# Patient Record
Sex: FEMALE | Race: WHITE | NOT HISPANIC OR LATINO | Employment: OTHER | URBAN - NONMETROPOLITAN AREA
[De-identification: names, ages, dates, MRNs, and addresses within clinical notes are randomized per-mention and may not be internally consistent; named-entity substitution may affect disease eponyms.]

---

## 2017-03-15 ENCOUNTER — LAB REQUISITION (OUTPATIENT)
Dept: LAB | Facility: HOSPITAL | Age: 62
End: 2017-03-15

## 2017-03-15 DIAGNOSIS — Z00.00 ENCOUNTER FOR GENERAL ADULT MEDICAL EXAMINATION WITHOUT ABNORMAL FINDINGS: ICD-10-CM

## 2017-03-15 LAB — DIGOXIN SERPL-MCNC: 0.6 NG/ML (ref 0.8–2)

## 2017-03-15 PROCEDURE — 80162 ASSAY OF DIGOXIN TOTAL: CPT | Performed by: INTERNAL MEDICINE

## 2017-03-22 ENCOUNTER — LAB REQUISITION (OUTPATIENT)
Dept: LAB | Facility: HOSPITAL | Age: 62
End: 2017-03-22

## 2017-03-22 DIAGNOSIS — Z01.419 ENCOUNTER FOR GYNECOLOGICAL EXAMINATION WITHOUT ABNORMAL FINDING: ICD-10-CM

## 2017-03-22 LAB — PAP SMEAR: NORMAL

## 2017-03-22 PROCEDURE — G0123 SCREEN CERV/VAG THIN LAYER: HCPCS | Performed by: INTERNAL MEDICINE

## 2017-03-24 LAB
GEN CATEG CVX/VAG CYTO-IMP: NORMAL
LAB AP CASE REPORT: NORMAL
LAB AP GYN ADDITIONAL INFORMATION: NORMAL
Lab: NORMAL
PATH INTERP SPEC-IMP: NORMAL
STAT OF ADQ CVX/VAG CYTO-IMP: NORMAL

## 2017-05-17 ENCOUNTER — OFFICE VISIT (OUTPATIENT)
Dept: CARDIOLOGY | Age: 62
End: 2017-05-17
Payer: COMMERCIAL

## 2017-05-17 VITALS
SYSTOLIC BLOOD PRESSURE: 120 MMHG | DIASTOLIC BLOOD PRESSURE: 84 MMHG | BODY MASS INDEX: 40.15 KG/M2 | HEART RATE: 76 BPM | HEIGHT: 65 IN | WEIGHT: 241 LBS

## 2017-05-17 DIAGNOSIS — I25.10 CORONARY ARTERY DISEASE INVOLVING NATIVE CORONARY ARTERY OF NATIVE HEART WITHOUT ANGINA PECTORIS: ICD-10-CM

## 2017-05-17 DIAGNOSIS — I48.20 CHRONIC ATRIAL FIBRILLATION (HCC): Primary | ICD-10-CM

## 2017-05-17 DIAGNOSIS — I10 ESSENTIAL HYPERTENSION: ICD-10-CM

## 2017-05-17 PROCEDURE — 99213 OFFICE O/P EST LOW 20 MIN: CPT | Performed by: CLINICAL NURSE SPECIALIST

## 2017-05-17 RX ORDER — ALBUTEROL SULFATE 90 UG/1
2 AEROSOL, METERED RESPIRATORY (INHALATION) EVERY 6 HOURS PRN
COMMUNITY
End: 2018-04-25 | Stop reason: SDUPTHER

## 2017-05-17 ASSESSMENT — ENCOUNTER SYMPTOMS
NAUSEA: 0
VOMITING: 0
COUGH: 0
ORTHOPNEA: 0
BLURRED VISION: 0
HEARTBURN: 0
SHORTNESS OF BREATH: 0

## 2017-05-31 ENCOUNTER — HOSPITAL ENCOUNTER (OUTPATIENT)
Dept: WOMENS IMAGING | Age: 62
Discharge: HOME | End: 2017-05-31
Payer: COMMERCIAL

## 2017-05-31 DIAGNOSIS — Z12.31 VISIT FOR SCREENING MAMMOGRAM: ICD-10-CM

## 2017-05-31 PROCEDURE — G0202 SCR MAMMO BI INCL CAD: HCPCS

## 2017-06-08 ENCOUNTER — NURSE ONLY (OUTPATIENT)
Dept: INTERNAL MEDICINE | Age: 62
End: 2017-06-08

## 2017-06-08 ENCOUNTER — ANTI-COAG VISIT (OUTPATIENT)
Dept: INTERNAL MEDICINE | Age: 62
End: 2017-06-08

## 2017-06-08 DIAGNOSIS — I48.20 CHRONIC ATRIAL FIBRILLATION (HCC): Primary | ICD-10-CM

## 2017-06-08 LAB
INTERNATIONAL NORMALIZATION RATIO, POC: 1.8
PROTHROMBIN TIME, POC: NORMAL

## 2017-06-29 ENCOUNTER — NURSE ONLY (OUTPATIENT)
Dept: INTERNAL MEDICINE | Age: 62
End: 2017-06-29

## 2017-06-29 DIAGNOSIS — Z79.01 LONG TERM (CURRENT) USE OF ANTICOAGULANTS: ICD-10-CM

## 2017-06-29 DIAGNOSIS — I48.91 ATRIAL FIBRILLATION, UNSPECIFIED TYPE (HCC): Primary | ICD-10-CM

## 2017-06-29 LAB
INTERNATIONAL NORMALIZATION RATIO, POC: 1.9
PROTHROMBIN TIME, POC: NORMAL

## 2017-07-06 RX ORDER — AMIODARONE HYDROCHLORIDE 200 MG/1
TABLET ORAL
Qty: 30 TABLET | Refills: 0 | Status: SHIPPED | OUTPATIENT
Start: 2017-07-06 | End: 2017-10-17 | Stop reason: CLARIF

## 2017-07-20 DIAGNOSIS — Z79.01 LONG TERM (CURRENT) USE OF ANTICOAGULANTS: Primary | ICD-10-CM

## 2017-07-20 DIAGNOSIS — I48.91 ATRIAL FIBRILLATION, UNSPECIFIED TYPE (HCC): ICD-10-CM

## 2017-07-20 DIAGNOSIS — Z79.01 LONG TERM (CURRENT) USE OF ANTICOAGULANTS: ICD-10-CM

## 2017-07-20 LAB
INR BLD: 2.01 (ref 0.88–1.18)
PROTHROMBIN TIME: 22.9 SEC (ref 12–14.6)

## 2017-07-25 ENCOUNTER — ANTI-COAG VISIT (OUTPATIENT)
Dept: INTERNAL MEDICINE | Age: 62
End: 2017-07-25

## 2017-08-24 ENCOUNTER — ANTI-COAG VISIT (OUTPATIENT)
Dept: INTERNAL MEDICINE | Age: 62
End: 2017-08-24

## 2017-08-24 ENCOUNTER — NURSE ONLY (OUTPATIENT)
Dept: INTERNAL MEDICINE | Age: 62
End: 2017-08-24
Payer: COMMERCIAL

## 2017-08-24 DIAGNOSIS — Z79.01 LONG TERM (CURRENT) USE OF ANTICOAGULANTS: ICD-10-CM

## 2017-08-24 DIAGNOSIS — I48.91 ATRIAL FIBRILLATION, UNSPECIFIED TYPE (HCC): Primary | ICD-10-CM

## 2017-08-24 LAB
INTERNATIONAL NORMALIZATION RATIO, POC: 2.4
PROTHROMBIN TIME, POC: NORMAL

## 2017-08-24 PROCEDURE — 85610 PROTHROMBIN TIME: CPT | Performed by: INTERNAL MEDICINE

## 2017-09-20 DIAGNOSIS — E78.2 MIXED HYPERLIPIDEMIA: ICD-10-CM

## 2017-09-20 DIAGNOSIS — E78.2 MIXED HYPERLIPIDEMIA: Primary | ICD-10-CM

## 2017-09-20 DIAGNOSIS — E03.9 UNSPECIFIED HYPOTHYROIDISM: ICD-10-CM

## 2017-09-20 DIAGNOSIS — E11.9 TYPE 2 DIABETES MELLITUS WITHOUT COMPLICATION, WITHOUT LONG-TERM CURRENT USE OF INSULIN (HCC): ICD-10-CM

## 2017-09-20 DIAGNOSIS — I10 ESSENTIAL HYPERTENSION, MALIGNANT: ICD-10-CM

## 2017-09-20 LAB
ALBUMIN SERPL-MCNC: 4 G/DL (ref 3.5–5.2)
ALP BLD-CCNC: 44 U/L (ref 35–104)
ALT SERPL-CCNC: 31 U/L (ref 5–33)
ANION GAP SERPL CALCULATED.3IONS-SCNC: 17 MMOL/L (ref 7–19)
AST SERPL-CCNC: 26 U/L (ref 5–32)
BILIRUB SERPL-MCNC: 0.5 MG/DL (ref 0.2–1.2)
BUN BLDV-MCNC: 8 MG/DL (ref 8–23)
CALCIUM SERPL-MCNC: 9.1 MG/DL (ref 8.8–10.2)
CHLORIDE BLD-SCNC: 98 MMOL/L (ref 98–111)
CHOLESTEROL, TOTAL: 140 MG/DL (ref 160–199)
CO2: 26 MMOL/L (ref 22–29)
CREAT SERPL-MCNC: 0.5 MG/DL (ref 0.5–0.9)
GFR NON-AFRICAN AMERICAN: >60
GLUCOSE BLD-MCNC: 156 MG/DL (ref 74–109)
HBA1C MFR BLD: 9.3 %
HDLC SERPL-MCNC: 37 MG/DL (ref 65–121)
LDL CHOLESTEROL CALCULATED: 72 MG/DL
POTASSIUM SERPL-SCNC: 3.9 MMOL/L (ref 3.5–5)
SODIUM BLD-SCNC: 141 MMOL/L (ref 136–145)
TOTAL PROTEIN: 7.4 G/DL (ref 6.6–8.7)
TRIGL SERPL-MCNC: 154 MG/DL (ref 150–199)
TSH SERPL DL<=0.05 MIU/L-ACNC: 3.01 UIU/ML (ref 0.27–4.2)

## 2017-09-21 ENCOUNTER — ANTI-COAG VISIT (OUTPATIENT)
Dept: INTERNAL MEDICINE | Age: 62
End: 2017-09-21

## 2017-09-21 ENCOUNTER — NURSE ONLY (OUTPATIENT)
Dept: INTERNAL MEDICINE | Age: 62
End: 2017-09-21
Payer: COMMERCIAL

## 2017-09-21 DIAGNOSIS — I48.91 ATRIAL FIBRILLATION, UNSPECIFIED TYPE (HCC): ICD-10-CM

## 2017-09-21 DIAGNOSIS — Z79.01 LONG TERM (CURRENT) USE OF ANTICOAGULANTS: Primary | ICD-10-CM

## 2017-09-21 LAB
INTERNATIONAL NORMALIZATION RATIO, POC: 1.9
PROTHROMBIN TIME, POC: NORMAL

## 2017-09-21 PROCEDURE — 85610 PROTHROMBIN TIME: CPT | Performed by: INTERNAL MEDICINE

## 2017-09-26 PROBLEM — E55.9 VITAMIN D DEFICIENCY: Status: ACTIVE | Noted: 2017-09-26

## 2017-09-26 PROBLEM — E11.29 TYPE 2 DIABETES MELLITUS WITH MICROALBUMINURIA, WITHOUT LONG-TERM CURRENT USE OF INSULIN (HCC): Status: ACTIVE | Noted: 2017-09-26

## 2017-09-26 PROBLEM — F33.2 ENDOGENOUS DEPRESSION (HCC): Status: ACTIVE | Noted: 2017-09-26

## 2017-09-26 PROBLEM — R80.9 TYPE 2 DIABETES MELLITUS WITH MICROALBUMINURIA, WITHOUT LONG-TERM CURRENT USE OF INSULIN (HCC): Status: ACTIVE | Noted: 2017-09-26

## 2017-09-26 PROBLEM — E78.2 MIXED HYPERLIPIDEMIA: Status: ACTIVE | Noted: 2017-09-26

## 2017-09-26 PROBLEM — I50.20 SYSTOLIC CONGESTIVE HEART FAILURE (HCC): Status: ACTIVE | Noted: 2017-09-26

## 2017-09-26 PROBLEM — E66.01 MORBID OBESITY DUE TO EXCESS CALORIES (HCC): Status: ACTIVE | Noted: 2017-09-26

## 2017-09-26 PROBLEM — Z92.89 H/O BONE DENSITY STUDY: Status: ACTIVE | Noted: 2017-09-26

## 2017-09-26 PROBLEM — Z12.4 PAP SMEAR FOR CERVICAL CANCER SCREENING: Status: ACTIVE | Noted: 2017-09-26

## 2017-10-17 ENCOUNTER — OFFICE VISIT (OUTPATIENT)
Dept: INTERNAL MEDICINE | Age: 62
End: 2017-10-17
Payer: COMMERCIAL

## 2017-10-17 VITALS
HEART RATE: 102 BPM | SYSTOLIC BLOOD PRESSURE: 130 MMHG | OXYGEN SATURATION: 97 % | DIASTOLIC BLOOD PRESSURE: 78 MMHG | WEIGHT: 244.6 LBS | HEIGHT: 65 IN | BODY MASS INDEX: 40.75 KG/M2

## 2017-10-17 DIAGNOSIS — I10 ESSENTIAL HYPERTENSION: Primary | ICD-10-CM

## 2017-10-17 DIAGNOSIS — E03.9 ACQUIRED HYPOTHYROIDISM: ICD-10-CM

## 2017-10-17 DIAGNOSIS — E78.2 MIXED HYPERLIPIDEMIA: ICD-10-CM

## 2017-10-17 DIAGNOSIS — R80.9 TYPE 2 DIABETES MELLITUS WITH MICROALBUMINURIA, WITHOUT LONG-TERM CURRENT USE OF INSULIN (HCC): ICD-10-CM

## 2017-10-17 DIAGNOSIS — F33.2 ENDOGENOUS DEPRESSION (HCC): ICD-10-CM

## 2017-10-17 DIAGNOSIS — E11.29 TYPE 2 DIABETES MELLITUS WITH MICROALBUMINURIA, WITHOUT LONG-TERM CURRENT USE OF INSULIN (HCC): ICD-10-CM

## 2017-10-17 PROCEDURE — 99214 OFFICE O/P EST MOD 30 MIN: CPT | Performed by: INTERNAL MEDICINE

## 2017-10-17 ASSESSMENT — ENCOUNTER SYMPTOMS
COUGH: 0
SORE THROAT: 0
CONSTIPATION: 0
CHEST TIGHTNESS: 0
WHEEZING: 0
ABDOMINAL PAIN: 0

## 2017-10-17 NOTE — PROGRESS NOTES
Chief Complaint   Patient presents with    6 Month Follow-Up     History of presenting illness:  Nava Weldon is a 60 y.o. female who presents today for follow up on her chronic medical conditions as noted below. Essential hypertension- Patient reports her Bp has been well controlled ( systolic below 877; diastolic below 90) at home when checked with home/ store equipment. 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 Has been taking  cholesterol lowering medication as prescribed and does not report any side effects.     Hypothyroidism- takes synthroid daily    Increased allergy sx- increased clear drainage        Patient Active Problem List    Diagnosis Date Noted    Acquired hypothyroidism 10/17/2017    Morbid obesity due to excess calories ([facility] 09/26/2017    Vitamin D deficiency 09/83/1209    Systolic congestive heart failure ([facility] 09/26/2017     Overview Note:     3/12 echo ef 40%      Pap smear for cervical cancer screening 09/26/2017     Overview Note:     2016 neg      H/O bone density study 09/26/2017     Overview Note:     2015 nl      Mixed hyperlipidemia 09/26/2017    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[facility] 09/26/2017    Type 2 diabetes mellitus with microalbuminuria, without long-term current use of insulin ([facility] 09/26/2017    Long term current use of anticoagulant therapy 06/29/2017     Overview Note:     Updating Deprecated Diagnoses      Screening for colon cancer 04/15/2015    Hx of amiodarone therapy 09/24/2014    PAF (paroxysmal atrial fibrillation) tablet Take 81 mg by mouth daily.  levothyroxine (SYNTHROID) 137 MCG tablet Take 125 mcg by mouth daily.  metformin (GLUCOPHAGE) 500 MG tablet Take  by mouth 2 times daily (with meals).  glipiZIDE (GLUCOTROL) 10 MG tablet Take 10 mg by mouth 2 times daily (before meals).  atorvastatin (LIPITOR) 40 MG tablet Take 80 mg by mouth daily. No current facility-administered medications for this visit. Allergies   Allergen Reactions    Aspartame And Phenylalanine     Penicillins     Shellfish-Derived Products     Zetia [Ezetimibe]      Social History   Substance Use Topics    Smoking status: Never Smoker    Smokeless tobacco: Not on file    Alcohol use No      Family History   Problem Relation Age of Onset    Diabetes Mother     Heart Failure Mother     High Blood Pressure Mother     Liver Cancer Father     Colon Cancer Neg Hx     Colon Polyps Neg Hx        Review of Systems   Constitutional: Positive for fatigue. Negative for chills and fever. HENT: Negative for congestion, ear pain, nosebleeds, postnasal drip and sore throat. Respiratory: Negative for cough, chest tightness and wheezing. Cardiovascular: Negative for chest pain, palpitations and leg swelling. Gastrointestinal: Negative for abdominal pain and constipation. Genitourinary: Negative for dysuria and urgency. Musculoskeletal: Positive for arthralgias. Skin: Negative for rash. Neurological: Negative for dizziness and headaches. Psychiatric/Behavioral: Negative. Vitals:    10/17/17 1212   BP: 130/78   Pulse: 102   SpO2: 97%   Weight: 244 lb 9.6 oz (110.9 kg)   Height: 5' 5\" (1.651 m)     Body mass index is 40.7 kg/m². Physical Exam   Constitutional: She is oriented to person, place, and time. She appears well-developed and well-nourished. HENT:   Head: Normocephalic and atraumatic.    Right Ear: External ear normal.   Left Ear: External ear normal.   Mouth/Throat: Oropharynx is clear and use of insulin ([address]- poorly controlled- long discussion with the patient. She states that \"I will never take insulin, I cannot take any shots\" she states that she has not been following her diet and occasionally has not been taking her medication related to all above. She is promising that she will get back on her regular diet and medications and we will repeat the A1c here in about 3 months. At this time I'm adding innvokana, 300 mg daily. 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[address]- Behavioral therapy with counselor, she refuses. She states that she is already feeling better and does not want any medication. She has no suicidal ideation. Mixed hyperlipidemia- lipid panel is good range, she will continue current treatment plans with Lipitor 80 mg daily    Hypothyroid- cont synthroid 137 µg daily              Orders Placed This Encounter   Procedures    Hemoglobin A1C    Basic Metabolic Panel     New Prescriptions    No medications on file        Return in about 3 months (around 1/17/2018) for Medication check. There are no Patient Instructions on file for this visit. EMR Dragon/transcription disclaimer:Significant part of this  encounter note is electronic transcription/translation of spoken language to printed text. The electronic translation of spoken language may be erroneous, or at times, nonsensical words or phrases may be inadvertently transcribed.  Although I have reviewed the note for such errors, some may still exist.

## 2017-10-19 DIAGNOSIS — I48.0 PAF (PAROXYSMAL ATRIAL FIBRILLATION) (HCC): Primary | ICD-10-CM

## 2017-10-19 DIAGNOSIS — Z79.01 LONG TERM CURRENT USE OF ANTICOAGULANT THERAPY: ICD-10-CM

## 2017-10-31 DIAGNOSIS — I10 HYPERTENSION: ICD-10-CM

## 2017-11-01 DIAGNOSIS — I48.0 PAF (PAROXYSMAL ATRIAL FIBRILLATION) (HCC): ICD-10-CM

## 2017-11-01 DIAGNOSIS — Z79.01 LONG TERM CURRENT USE OF ANTICOAGULANT THERAPY: ICD-10-CM

## 2017-11-01 LAB
INR BLD: 1.83 (ref 0.88–1.18)
PROTHROMBIN TIME: 21.2 SEC (ref 12–14.6)

## 2017-11-01 RX ORDER — DIGOXIN 125 MCG
TABLET ORAL
Qty: 30 TABLET | Refills: 3 | Status: SHIPPED | OUTPATIENT
Start: 2017-11-01 | End: 2018-02-20 | Stop reason: SDUPTHER

## 2017-11-01 RX ORDER — METOPROLOL SUCCINATE 25 MG/1
TABLET, EXTENDED RELEASE ORAL
Qty: 30 TABLET | Refills: 3 | Status: SHIPPED | OUTPATIENT
Start: 2017-11-01 | End: 2018-02-20 | Stop reason: SDUPTHER

## 2017-11-01 RX ORDER — VALSARTAN AND HYDROCHLOROTHIAZIDE 320; 25 MG/1; MG/1
TABLET, FILM COATED ORAL
Qty: 30 TABLET | Refills: 3 | Status: SHIPPED | OUTPATIENT
Start: 2017-11-01 | End: 2018-02-20 | Stop reason: SDUPTHER

## 2017-11-01 RX ORDER — LEVOTHYROXINE SODIUM 0.12 MG/1
TABLET ORAL
Qty: 30 TABLET | Refills: 3 | Status: SHIPPED | OUTPATIENT
Start: 2017-11-01 | End: 2018-02-20 | Stop reason: SDUPTHER

## 2017-11-01 RX ORDER — ATORVASTATIN CALCIUM 80 MG/1
TABLET, FILM COATED ORAL
Qty: 30 TABLET | Refills: 3 | Status: SHIPPED | OUTPATIENT
Start: 2017-11-01 | End: 2018-02-20 | Stop reason: SDUPTHER

## 2017-11-01 RX ORDER — GLIPIZIDE 10 MG/1
TABLET, FILM COATED, EXTENDED RELEASE ORAL
Qty: 60 TABLET | Refills: 3 | Status: SHIPPED | OUTPATIENT
Start: 2017-11-01 | End: 2018-02-20 | Stop reason: SDUPTHER

## 2017-11-16 ENCOUNTER — ANTI-COAG VISIT (OUTPATIENT)
Dept: INTERNAL MEDICINE | Age: 62
End: 2017-11-16

## 2017-11-16 ENCOUNTER — NURSE ONLY (OUTPATIENT)
Dept: INTERNAL MEDICINE | Age: 62
End: 2017-11-16
Payer: COMMERCIAL

## 2017-11-16 DIAGNOSIS — I48.0 PAF (PAROXYSMAL ATRIAL FIBRILLATION) (HCC): Primary | ICD-10-CM

## 2017-11-16 DIAGNOSIS — Z79.01 LONG TERM CURRENT USE OF ANTICOAGULANT THERAPY: ICD-10-CM

## 2017-11-16 LAB
INR BLD: 2.1
INTERNATIONAL NORMALIZATION RATIO, POC: NORMAL
PROTHROMBIN TIME, POC: 2.1

## 2017-11-16 PROCEDURE — 85610 PROTHROMBIN TIME: CPT | Performed by: INTERNAL MEDICINE

## 2017-11-16 NOTE — PROGRESS NOTES
Ms. Veronica Llanos was here today.      INR today: 2.1      INR Goal: 2.0-3.0    Dosing Plan  As of 11/16/2017    TTR:   42.8 % (5 mo)   Full instructions:   7.5 mg on Sun, Thu; 5 mg all other days           CONTINUE SAME DOSE    NEXT COUMADIN CLINIC APT IS: 12/14/17 @ 3:30pm    Electronically signed by Jhonatan Stanton MA on 11/16/2017 at 3:40 PM

## 2017-11-20 ENCOUNTER — OFFICE VISIT (OUTPATIENT)
Dept: CARDIOLOGY | Age: 62
End: 2017-11-20
Payer: COMMERCIAL

## 2017-11-20 VITALS
SYSTOLIC BLOOD PRESSURE: 142 MMHG | HEART RATE: 127 BPM | RESPIRATION RATE: 16 BRPM | BODY MASS INDEX: 38.89 KG/M2 | DIASTOLIC BLOOD PRESSURE: 84 MMHG | HEIGHT: 66 IN | WEIGHT: 242 LBS | OXYGEN SATURATION: 98 %

## 2017-11-20 DIAGNOSIS — I10 ESSENTIAL HYPERTENSION: ICD-10-CM

## 2017-11-20 DIAGNOSIS — I48.20 CHRONIC ATRIAL FIBRILLATION (HCC): Primary | ICD-10-CM

## 2017-11-20 PROCEDURE — 99213 OFFICE O/P EST LOW 20 MIN: CPT | Performed by: INTERNAL MEDICINE

## 2017-11-28 NOTE — PROGRESS NOTES
mellitus without mention of complication, not stated as uncontrolled     Umbilical hernia     URI (upper respiratory infection)      Past Surgical History:   Procedure Laterality Date    CARDIAC CATHETERIZATION  10/21/09    EF 35% left ventricle is moderately enlarged     CARDIOVERSION  10/9/13    CHOLECYSTECTOMY      EYE SURGERY      retina and cataracts     GALLBLADDER SURGERY      TUBAL LIGATION        Family History   Problem Relation Age of Onset    Diabetes Mother     Heart Failure Mother     High Blood Pressure Mother     Liver Cancer Father      Social History   Substance Use Topics    Smoking status: Never Smoker    Smokeless tobacco: Never Used    Alcohol use No      Allergies   Allergen Reactions    Aspartame And Phenylalanine     Penicillins     Shellfish-Derived Products     Zetia [Ezetimibe]      Outpatient Prescriptions Marked as Taking for the 11/20/17 encounter (Office Visit) with ROBBIE Lopez MD   Medication Sig Dispense Refill    atorvastatin (LIPITOR) 80 MG tablet TAKE 1 TABLET BY MOUTH ONCE DAILY 30 tablet 3    valsartan-hydrochlorothiazide (DIOVAN-HCT) 320-25 MG per tablet TAKE 1 TABLET BY MOUTH ONCE DAILY 30 tablet 3    digoxin (LANOXIN) 125 MCG tablet TAKE 1 TABLET BY MOUTH ONCE DAILY 30 tablet 3    metFORMIN (GLUCOPHAGE) 500 MG tablet TAKE 2 TABLETS TWICE DAILY WITH FOOD 120 tablet 3    levothyroxine (SYNTHROID) 125 MCG tablet TAKE 1 TABLET BY MOUTH ONCE DAILY 30 tablet 3    glipiZIDE (GLUCOTROL XL) 10 MG extended release tablet TAKE 1 TABLET BY MOUTH TWICE DAILY. 60 tablet 3    metoprolol succinate (TOPROL XL) 25 MG extended release tablet TAKE 1 TABLET BY MOUTH ONCE DAILY 30 tablet 3    albuterol sulfate HFA (VENTOLIN HFA) 108 (90 BASE) MCG/ACT inhaler Inhale 2 puffs into the lungs every 6 hours as needed for Wheezing      Multiple Vitamins-Minerals (THERAPEUTIC MULTIVITAMIN-MINERALS) tablet Take 1 tablet by mouth daily.       Vitamin D (CHOLECALCIFEROL) 1000 UNITS CAPS capsule Take 1,000 Units by mouth daily.  warfarin (COUMADIN) 5 MG tablet Take 5 mg by mouth Daily. Regulated by Dr. Itmiaz Barnett.  aspirin 81 MG EC tablet Take 81 mg by mouth daily. I have reviewed and confirm the Past Medical History, Allergies, and Medications sections above and have updated the computerized patient record. Data:   BP Readings from Last 3 Encounters:   11/20/17 (!) 142/84   10/17/17 130/78   05/17/17 120/84    Pulse Readings from Last 3 Encounters:   11/20/17 127   10/17/17 102   05/17/17 76        Estimated body mass index is 39.66 kg/m² as calculated from the following:    Height as of this encounter: 5' 5.5\" (1.664 m). Weight as of this encounter: 242 lb (109.8 kg). Recent Results (from the past 336 hour(s))   Protime-INR    Collection Time: 11/16/17 12:00 AM   Result Value Ref Range    INR 2.10    POCT INR    Collection Time: 11/16/17  3:33 PM   Result Value Ref Range    INR      Protime 2.1      Review of Systems  Constitutional:  Negative for significant fatigue, activity change, appetite change, or unexpected weight change. Negative for fever, chills or diaphoresis. HENT:  Negative for nosebleeds, facial swelling, rhinorrhea or neck stiffness. RESPIRATORY:  Negative for shortness of breath. No cough, wheezing or stridor. CARDIOVASCULAR:  Negative for chest pain, palpitations or leg swelling. GASTROINTESTINAL:   Negative for abdominal distention or pain. Negative for constipation, diarrhea, nausea or vomiting. GENITOURINARY:  Negative for dysuria, frequency or urgency. MUSCULOSKELETAL:   Negative for myalgia or arthralgia. EXTREMITIES:  Negative for clubbing, cyanosis or edema. SKIN:  Negative for color change or rash. NEUROLOGICAL:   Negative for dizziness, light-headedness, numbness or headaches. Negative for speech difficulty. HEMATOLOGICAL:   No excessive bruising or bleeding.   PSYCHIATRIC/BEHAVIORAL:   No severe confusion, anxiety, or insomnia. Except as noted in the HPI, all other systems are negative. Physical Exam:  Vital Signs:  BP (!) 142/84   Pulse 127   Resp 16   Ht 5' 5.5\" (1.664 m)   Wt 242 lb (109.8 kg)   SpO2 98%   BMI 39.66 kg/m²   Constitutional:  The patient is a pleasant 60 y.o. female in no acute distress. She appears well-developed and well-nourished. HEAD:  Normocephalic without evidence of old or recent trauma. EYES:  Sclerae clear. Conjunctivae pink. EOMs intact. Pupils equal and round. NOSE:  No nasal discharge or epistaxis. MOUTH:  Teeth, gums and palate normal.   THROAT:  No lesions on lips or buccal mucosa. Tongue protrudes in midline and is well papillated. NECK:  No noted jugular venous distention. No carotid bruits. No thyromegaly. CHEST:  Clear bilateral breath sounds without wheezes or rhonchi. RESPIRATORY:  The lungs clear to auscultation bilaterally with normal respiratory effort. CARDIOVASCULAR:   The heart's rhythm is irregular without audible murmurs or gallop sounds. ABDOMEN:  The abdomen is soft without tenderness or masses. UPPER EXTREMITY EVALUATION:  Radial pulses palpable bilaterally. LOWER EXTREMITY EVALUATION:  Negative for peripheral edema. Femoral, popliteal, dorsalis pedis, and posterior tibialis pulses 2+ to palpation bilaterally. No cyanosis or clubbing. MUSCULOSKELETAL:  Normal muscle strength and tone. No atrophy or abnormalities. SKIN:  Warm, dry, intact. No dermatitis or ulcers. NEUROLOGIC:  Intact cranial nerves II through XII and no focal weakness. Assessment / Plan:   1. Chronic atrial fibrillation - she is anticoagulated and rate controlled, continue current medications. 2.  Hypertension - blood pressure is well controlled on current therapy as listed, which we will continue. 3.  Return in six months.     _____________________________________________________  Electronically Signed by:   ROBBIE Landry KELLY Sanchez, F.A.C.C. [id number] [facility]  _____________________________________________________  Copy:   Vernon Bullock MD

## 2017-11-30 NOTE — TELEPHONE ENCOUNTER
Requested Prescriptions     Pending Prescriptions Disp Refills    warfarin (COUMADIN) 5 MG tablet [Pharmacy Med Name: WARFARIN 5MG TABLET] 30 tablet 2     Sig: TAKE 1 TABLET DAILY EXCEPT TAKE 1/2 TABLET ON FRIDAY

## 2017-12-01 RX ORDER — WARFARIN SODIUM 5 MG/1
TABLET ORAL
Qty: 30 TABLET | Refills: 2 | Status: SHIPPED | OUTPATIENT
Start: 2017-12-01 | End: 2018-01-17 | Stop reason: SDUPTHER

## 2017-12-14 ENCOUNTER — ANTI-COAG VISIT (OUTPATIENT)
Dept: INTERNAL MEDICINE | Age: 62
End: 2017-12-14

## 2017-12-14 ENCOUNTER — NURSE ONLY (OUTPATIENT)
Dept: INTERNAL MEDICINE | Age: 62
End: 2017-12-14
Payer: COMMERCIAL

## 2017-12-14 DIAGNOSIS — Z79.01 LONG TERM CURRENT USE OF ANTICOAGULANT THERAPY: Primary | ICD-10-CM

## 2017-12-14 LAB
INTERNATIONAL NORMALIZATION RATIO, POC: 1.7
PROTHROMBIN TIME, POC: NORMAL

## 2017-12-14 PROCEDURE — 85610 PROTHROMBIN TIME: CPT | Performed by: INTERNAL MEDICINE

## 2017-12-14 NOTE — PROGRESS NOTES
Ms. Malcom Hill was here today. INR today: 1.7       INR Goal: 2.0-3.0    Dosing Plan  As of 12/14/2017    TTR:   40.0 % (6 mo)   Full instructions:   12/14: 10 mg; Otherwise 7.5 mg on Sun, Tue, Thu; 5 mg all other days                 PLAN: Take 10mg tonight, then 7.5mg Sunday, Tuesday, and Thursday and 5mg all other days. NEXT COUMADIN CLINIC APT IS: 12/28/17 @ 3:30pm         [facility] COU[facility]  [phone]    [facility] SIDE EFFECTS  The major complication associated with warfarin is bleeding due to excessive anticoagulation. Excessive bleeding, or hemorrhage, can occur from any area of the body, and patients on warfarin should report any falls or accidents, as well as signs or symptoms of bleeding or unusual bruising. Signs of unusual bleeding include bleeding from the gums, blood in the urine, bloody or dark stool, a nosebleed, or vomiting blood. Because the risk of bleeding increases as the INR rises, the INR is closely monitored and adjustments are made as needed to maintain the INR within the target range. Deretha Rocher also cause skin necrosis or gangrene, which can cause dark red or black areas on the skin. This is a rare complication that may occur during the first several days of warfarin therapy. When to seek help  If there are obvious or subtle signs of bleeding, including the following, patients should call their healthcare provider immediately.   · Persistent nausea, stomach upset, or vomiting blood or other material that looks like coffee grounds   · Headaches, dizziness, or weakness   · Nosebleeds   · Dark red or brown urine   · Blood in the bowel movement or dark-colored stool   · Pain, discomfort, or swelling, especially after an injury   · After a serious fall or head injury, even if there are no other symptoms  The patient should also call if any of the following occurs:  · Bleeding from the gums after brushing the teeth   · Swelling or pain at an injection

## 2017-12-28 ENCOUNTER — ANTI-COAG VISIT (OUTPATIENT)
Dept: INTERNAL MEDICINE | Age: 62
End: 2017-12-28

## 2017-12-28 ENCOUNTER — NURSE ONLY (OUTPATIENT)
Dept: INTERNAL MEDICINE | Age: 62
End: 2017-12-28
Payer: COMMERCIAL

## 2017-12-28 DIAGNOSIS — I48.0 PAF (PAROXYSMAL ATRIAL FIBRILLATION) (HCC): Primary | ICD-10-CM

## 2017-12-28 DIAGNOSIS — Z79.01 LONG TERM CURRENT USE OF ANTICOAGULANT THERAPY: ICD-10-CM

## 2017-12-28 LAB
INTERNATIONAL NORMALIZATION RATIO, POC: 1.9
PROTHROMBIN TIME, POC: NORMAL

## 2017-12-28 PROCEDURE — 85610 PROTHROMBIN TIME: CPT | Performed by: INTERNAL MEDICINE

## 2017-12-28 NOTE — PROGRESS NOTES
Ms. Nicholos Epley was here today. INR today: 1.9      INR Goal: 2.0-3.0    Dosing Plan  As of 12/28/2017    TTR:   37.1 % (6.4 mo)   Full instructions:   7.5 mg on Sun, Tue, Thu; 5 mg all other days                 PLAN: CONTINUE CURRENT DOSE    NEXT COUMADIN CLINIC APT IS: 1/4/2018 AT 3:45 PM        [facility] COU[facility]IN CL[facility]  [phone]    Twila LIU  The major complication associated with warfarin is bleeding due to excessive anticoagulation. Excessive bleeding, or hemorrhage, can occur from any area of the body, and patients on warfarin should report any falls or accidents, as well as signs or symptoms of bleeding or unusual bruising. Signs of unusual bleeding include bleeding from the gums, blood in the urine, bloody or dark stool, a nosebleed, or vomiting blood. Because the risk of bleeding increases as the INR rises, the INR is closely monitored and adjustments are made as needed to maintain the INR within the target range. Joenathan Zamora also cause skin necrosis or gangrene, which can cause dark red or black areas on the skin. This is a rare complication that may occur during the first several days of warfarin therapy. When to seek help  If there are obvious or subtle signs of bleeding, including the following, patients should call their healthcare provider immediately.   · Persistent nausea, stomach upset, or vomiting blood or other material that looks like coffee grounds   · Headaches, dizziness, or weakness   · Nosebleeds   · Dark red or brown urine   · Blood in the bowel movement or dark-colored stool   · Pain, discomfort, or swelling, especially after an injury   · After a serious fall or head injury, even if there are no other symptoms  The patient should also call if any of the following occurs:  · Bleeding from the gums after brushing the teeth   · Swelling or pain at an injection site   · Excessive menstrual bleeding or bleeding between menstrual periods outside. Warfarin and food  Some foods and supplements can interfere with warfarin's effectiveness. After being stabilized on a particular warfarin dose, consult a healthcare provider before making major dietary changes (eg, starting a diet to lose weight, starting a nutritional supplement or vitamin). · Vitamin K  Eating an increased amount of foods rich in vitamin K can lower the prothrombin time and INR, making warfarin less effective, and potentially increasing the risk of blood clots. Patients who take warfarin should aim to eat a relatively similar amount of vitamin K each week. Some foods have a high level of vitamin K, including: kale, broccoli, spinach, bailey or turnip greens, lettuce, [place] sprouts, and cabbage (table 1). It is not necessary to avoid these foods. However, the patient should eat a relatively similar amount on a regular basis rather than eating a large serving occasionally. · Cranberry juice  There have been mixed reports on the effect of cranberry juice in people who use warfarin to prevent blood clots. Some experts have reported that drinking cranberry juice while on warfarin can cause significant over-anticoagulation and bleeding [1]. However, a small study found that drinking one eight ounce serving of cranberry juice per day for seven days had no effect on the INR of seven men taking warfarin for atrial fibrillation [2]. It is possible that larger amounts could have a more significant effect. The best advice is probably to avoid consuming large amounts of cranberry juice, and to speak with a healthcare provider regarding any concerns about a possible interaction. · Alcohol  Chronic abuse of alcohol affects the body's ability to handle warfarin. Patients on warfarin therapy should avoid drinking alcohol on a daily basis. Alcohol should be limited to no more than one to two servings of alcohol occasionally.  In addition, drinking excessive amounts of alcohol can increase the risk of injury, and therefore bleeding. Warfarin and medications  A number of medications, herbs, and vitamins can interact with warfarin (table 2 and table 3). This interaction may affect the action of warfarin or the other medication. If warfarin is affected, the dose may need to be adjusted (up or down) to maintain an optimal coagulation effect. Patients who take warfarin should consult with their clinician before taking any new medication, including over-the-counter (non-prescription) drugs, herbal medicines, vitamins, or any other products. Some of the most common over-the-counter pain relievers, including acetaminophen (Tylenol®), aspirin, and nonsteroidal antiinflammatory drugs (such as ibuprofen [Advil®]) and naproxen (Aleve®), enhance the anticoagulant effects of warfarin. Vitamin E may increase the anticoagulant effects of warfarin. Consult a healthcare provider before adding or changing a dose of vitamin E or any other vitamin. Wear medical identification  People who require long-term warfarin should wear a bracelet, necklace, or similar alert tag at all times. If an accident occurs and the person is too ill to explain their condition, this will help responders provide appropriate care. The alert tag should include a list of major medical conditions and the reason warfarin is needed (eg, atrial fibrillation), as well as the name and phone number of an emergency contact. One device, Medic Alert®, provides a toll-free number that emergency medical workers can call to find out a person's medical history, list of medications, family emergency contact numbers, and healthcare provider names and numbers.     GRAPHICS: Table 1  Foods with moderate to high levels of vitamin K  Food name Serving size Vitamin K (micrograms)   High level vitamin K foods   Kale, frozen (cooked or boiled, drained) 1/2 cup 570   Kale, fresh, (cooked or boiled, drained) 1/2 cup 530   Spinach, frozen (cooked or boiled, drained) 1/2 cup 514   Spinach, raw 1 cup 150   Shira greens, frozen (cooked, drained) 1/2 cup 530   Turnip greens, frozen (cooked, drained) 1/2 cup 425   [place] sprouts, frozen (cooked, drained) 1/2 cup 110   Moderate level vitamin K foods   Asparagus, frozen (cooked, drained) 1/2 cup  4 haynes 72  48   Asparagus, fresh (cooked, drained) 4 haynes 30   Broccoli, frozen (cooked, drained) 1/2 cup 60   Broccoli, fresh (cooked, drained) 1 spear 52   Broccoli, raw 1/2 cup 40   Lettuce (butterhead, [place], cornelius) 1/2 head 80   Lettuce (iceberg, crisphead) 1/2 head 65   Lettuce (conor, cos) 1 cup 57   Lettuce (green leaf) 1 cup 97   Okra, fresh (cooked, drained) 1/2 cup 32   Okra, frozen (cooked, drained) 1/2 cup 44   Cabbage (cooked, drained) 1/2 cup 73   Cabbage, raw 1/2 cup 21   Cabbage, josué (raw) 1/2 cup 24   Cabbage, Chinese (cooked, drained) 1/2 cup 28   Coleslaw (fast food-type) 3/4 cup 56   Sauerkraut, canned 1/2 cup 41   Peas, frozen, with pod (cooked, drained) 1/2 cup 24   Peas, fresh, with pod (cooked, drained) 1/2 cup 20   Peas, green, frozen (cooked, drained) 1/2 cup 18   Celery, raw 1/2 cup 17   Beans, green or yellow, fresh (cooked, drained) 1/2 cup 10   Oil, canola 1 tablespoon 17   Oil, olive 1 tablespoon 8   Oil, other (including peanut, sesame, safflower, corn, sunflower, soybean) 1 tablespoon 3 or less   Green tea, brewed in hot water 3.5 ounces 0.3   Data from: [employer]. USDA Nutrient Database for Standard Reference. Nutrient Data Laboratory Home Page, [employer].

## 2018-01-04 ENCOUNTER — NURSE ONLY (OUTPATIENT)
Dept: INTERNAL MEDICINE | Age: 63
End: 2018-01-04
Payer: COMMERCIAL

## 2018-01-04 ENCOUNTER — ANTI-COAG VISIT (OUTPATIENT)
Dept: INTERNAL MEDICINE | Age: 63
End: 2018-01-04

## 2018-01-04 DIAGNOSIS — I48.0 PAF (PAROXYSMAL ATRIAL FIBRILLATION) (HCC): Primary | ICD-10-CM

## 2018-01-04 DIAGNOSIS — Z79.01 LONG TERM CURRENT USE OF ANTICOAGULANT THERAPY: ICD-10-CM

## 2018-01-04 LAB
INTERNATIONAL NORMALIZATION RATIO, POC: 1.8
PROTHROMBIN TIME, POC: NORMAL

## 2018-01-04 PROCEDURE — 85610 PROTHROMBIN TIME: CPT | Performed by: INTERNAL MEDICINE

## 2018-01-04 NOTE — PROGRESS NOTES
Ms. Horne Liner was here today. INR today: INR/Prothrombin Time      INR Goal: 2.0-3.0    Dosing Plan  As of 1/4/2018    TTR:   35.8 % (6.7 mo)   Full instructions:   5 mg on Mon, Wed, Fri; 7.5 mg all other days           Patient INR continues to be thick, dosage adjustment        PLAN: NEW DOSE:  TAKE 7.5MG ON SAT, SUN, TUES, THURS, 5 MG ALL OTHER DAYS    NEXT COUMADIN CLINIC APT IS: 1/10/2018 9:00 am        [facility] COUMADIN CLINIC  [phone]    Fazal Screen EFFECTS  The major complication associated with warfarin is bleeding due to excessive anticoagulation. Excessive bleeding, or hemorrhage, can occur from any area of the body, and patients on warfarin should report any falls or accidents, as well as signs or symptoms of bleeding or unusual bruising. Signs of unusual bleeding include bleeding from the gums, blood in the urine, bloody or dark stool, a nosebleed, or vomiting blood. Because the risk of bleeding increases as the INR rises, the INR is closely monitored and adjustments are made as needed to maintain the INR within the target range. Kev Loach also cause skin necrosis or gangrene, which can cause dark red or black areas on the skin. This is a rare complication that may occur during the first several days of warfarin therapy. When to seek help  If there are obvious or subtle signs of bleeding, including the following, patients should call their healthcare provider immediately.   · Persistent nausea, stomach upset, or vomiting blood or other material that looks like coffee grounds   · Headaches, dizziness, or weakness   · Nosebleeds   · Dark red or brown urine   · Blood in the bowel movement or dark-colored stool   · Pain, discomfort, or swelling, especially after an injury   · After a serious fall or head injury, even if there are no other symptoms  The patient should also call if any of the following occurs:  · Bleeding from the gums after brushing the teeth ice, wet or polished floors, or other potentially slippery surfaces. · Avoid walking on unfamiliar areas outside. Warfarin and food  Some foods and supplements can interfere with warfarin's effectiveness. After being stabilized on a particular warfarin dose, consult a healthcare provider before making major dietary changes (eg, starting a diet to lose weight, starting a nutritional supplement or vitamin). · Vitamin K  Eating an increased amount of foods rich in vitamin K can lower the prothrombin time and INR, making warfarin less effective, and potentially increasing the risk of blood clots. Patients who take warfarin should aim to eat a relatively similar amount of vitamin K each week. Some foods have a high level of vitamin K, including: kale, broccoli, spinach, bailey or turnip greens, lettuce, [place] sprouts, and cabbage (table 1). It is not necessary to avoid these foods. However, the patient should eat a relatively similar amount on a regular basis rather than eating a large serving occasionally. · Cranberry juice  There have been mixed reports on the effect of cranberry juice in people who use warfarin to prevent blood clots. Some experts have reported that drinking cranberry juice while on warfarin can cause significant over-anticoagulation and bleeding [1]. However, a small study found that drinking one eight ounce serving of cranberry juice per day for seven days had no effect on the INR of seven men taking warfarin for atrial fibrillation [2]. It is possible that larger amounts could have a more significant effect. The best advice is probably to avoid consuming large amounts of cranberry juice, and to speak with a healthcare provider regarding any concerns about a possible interaction. · Alcohol  Chronic abuse of alcohol affects the body's ability to handle warfarin. Patients on warfarin therapy should avoid drinking alcohol on a daily basis.  Alcohol should be limited to no more than one to two servings of alcohol occasionally. In addition, drinking excessive amounts of alcohol can increase the risk of injury, and therefore bleeding. Warfarin and medications  A number of medications, herbs, and vitamins can interact with warfarin (table 2 and table 3). This interaction may affect the action of warfarin or the other medication. If warfarin is affected, the dose may need to be adjusted (up or down) to maintain an optimal coagulation effect. Patients who take warfarin should consult with their clinician before taking any new medication, including over-the-counter (non-prescription) drugs, herbal medicines, vitamins, or any other products. Some of the most common over-the-counter pain relievers, including acetaminophen (Tylenol®), aspirin, and nonsteroidal antiinflammatory drugs (such as ibuprofen [Advil®]) and naproxen (Aleve®), enhance the anticoagulant effects of warfarin. Vitamin E may increase the anticoagulant effects of warfarin. Consult a healthcare provider before adding or changing a dose of vitamin E or any other vitamin. Wear medical identification  People who require long-term warfarin should wear a bracelet, necklace, or similar alert tag at all times. If an accident occurs and the person is too ill to explain their condition, this will help responders provide appropriate care. The alert tag should include a list of major medical conditions and the reason warfarin is needed (eg, atrial fibrillation), as well as the name and phone number of an emergency contact. One device, Medic Alert®, provides a toll-free number that emergency medical workers can call to find out a person's medical history, list of medications, family emergency contact numbers, and healthcare provider names and numbers.     GRAPHICS: Table 1  Foods with moderate to high levels of vitamin K  Food name Serving size Vitamin K (micrograms)   High level vitamin K foods   Kale, frozen (cooked or boiled, drained)

## 2018-01-05 ENCOUNTER — TELEPHONE (OUTPATIENT)
Dept: INTERNAL MEDICINE | Age: 63
End: 2018-01-05

## 2018-01-05 NOTE — TELEPHONE ENCOUNTER
Patient was seen in Coumadin Clinic yesterday. She continues to run on the thick side:    1/4  1.8  12/28 1.9  12/14 1.7  11/16 2.1    I increased her Coumadin to 7.5 4 days a week (sat, sun, tues, thurs) 5 mg all other days. She had been taking 5mg daily, 7.5 tues, thurs, sun. This is the 2nd time we have increased her dose since 11/16. Patient states that taking 7.5mg gives her diarrhea and she would like for me to discuss this and her dosing with you. Please advise. Patient will be at work today, but advised that I can leave a message with detailed instructions with her son and she will call back if she has any further questions.

## 2018-01-05 NOTE — TELEPHONE ENCOUNTER
Discussed this further with Dr. Jennie Robles this morning,  She suggested on the days that pt takes 7.5 mg to divide the dose: 2.5 mg in the morning, 5 mg in the evening to see if this helps with her stomach issues. Left a detailed msg with patient's son, Aura Gold. I let him know that Dr. Jennie Robles does what pt to take the Coumadin like instructed at yesterday's [facility] appt and that she suggested on the 7.5mg days taking 2.5  Mg in the am and 5mg in the pm.  Pt's son voiced understanding and repeated the instructions back to me. He will have patient call Coumadin Clinic or Dr. Mamie Stoner office with any questions or concerns.

## 2018-01-10 ENCOUNTER — ANTI-COAG VISIT (OUTPATIENT)
Dept: INTERNAL MEDICINE | Age: 63
End: 2018-01-10

## 2018-01-10 ENCOUNTER — NURSE ONLY (OUTPATIENT)
Dept: INTERNAL MEDICINE | Age: 63
End: 2018-01-10
Payer: COMMERCIAL

## 2018-01-10 DIAGNOSIS — E11.29 TYPE 2 DIABETES MELLITUS WITH MICROALBUMINURIA, WITHOUT LONG-TERM CURRENT USE OF INSULIN (HCC): ICD-10-CM

## 2018-01-10 DIAGNOSIS — R80.9 TYPE 2 DIABETES MELLITUS WITH MICROALBUMINURIA, WITHOUT LONG-TERM CURRENT USE OF INSULIN (HCC): ICD-10-CM

## 2018-01-10 DIAGNOSIS — Z79.01 LONG TERM CURRENT USE OF ANTICOAGULANT THERAPY: ICD-10-CM

## 2018-01-10 DIAGNOSIS — I48.0 PAF (PAROXYSMAL ATRIAL FIBRILLATION) (HCC): Primary | ICD-10-CM

## 2018-01-10 LAB
ANION GAP SERPL CALCULATED.3IONS-SCNC: 18 MMOL/L (ref 7–19)
BUN BLDV-MCNC: 9 MG/DL (ref 8–23)
CALCIUM SERPL-MCNC: 9 MG/DL (ref 8.8–10.2)
CHLORIDE BLD-SCNC: 98 MMOL/L (ref 98–111)
CO2: 27 MMOL/L (ref 22–29)
CREAT SERPL-MCNC: 0.5 MG/DL (ref 0.5–0.9)
GFR NON-AFRICAN AMERICAN: >60
GLUCOSE BLD-MCNC: 150 MG/DL (ref 74–109)
HBA1C MFR BLD: 7.6 %
INTERNATIONAL NORMALIZATION RATIO, POC: 2.1
POTASSIUM SERPL-SCNC: 3.7 MMOL/L (ref 3.5–5)
PROTHROMBIN TIME, POC: NORMAL
SODIUM BLD-SCNC: 143 MMOL/L (ref 136–145)

## 2018-01-10 PROCEDURE — 85610 PROTHROMBIN TIME: CPT | Performed by: INTERNAL MEDICINE

## 2018-01-17 RX ORDER — WARFARIN SODIUM 5 MG/1
TABLET ORAL
Qty: 60 TABLET | Refills: 2 | Status: SHIPPED | OUTPATIENT
Start: 2018-01-17 | End: 2018-05-21 | Stop reason: SDUPTHER

## 2018-01-24 ENCOUNTER — HOSPITAL ENCOUNTER (OUTPATIENT)
Dept: GENERAL RADIOLOGY | Age: 63
Discharge: HOME | End: 2018-01-24
Payer: COMMERCIAL

## 2018-01-24 ENCOUNTER — NURSE ONLY (OUTPATIENT)
Dept: INTERNAL MEDICINE | Age: 63
End: 2018-01-24
Payer: COMMERCIAL

## 2018-01-24 ENCOUNTER — OFFICE VISIT (OUTPATIENT)
Dept: INTERNAL MEDICINE | Age: 63
End: 2018-01-24
Payer: COMMERCIAL

## 2018-01-24 ENCOUNTER — ANTI-COAG VISIT (OUTPATIENT)
Dept: INTERNAL MEDICINE | Age: 63
End: 2018-01-24

## 2018-01-24 VITALS
SYSTOLIC BLOOD PRESSURE: 132 MMHG | OXYGEN SATURATION: 98 % | WEIGHT: 230 LBS | HEART RATE: 98 BPM | BODY MASS INDEX: 36.96 KG/M2 | HEIGHT: 66 IN | DIASTOLIC BLOOD PRESSURE: 78 MMHG

## 2018-01-24 DIAGNOSIS — E78.2 MIXED HYPERLIPIDEMIA: ICD-10-CM

## 2018-01-24 DIAGNOSIS — I48.0 PAF (PAROXYSMAL ATRIAL FIBRILLATION) (HCC): Primary | ICD-10-CM

## 2018-01-24 DIAGNOSIS — Z79.01 LONG TERM CURRENT USE OF ANTICOAGULANT THERAPY: ICD-10-CM

## 2018-01-24 DIAGNOSIS — E11.29 TYPE 2 DIABETES MELLITUS WITH MICROALBUMINURIA, WITHOUT LONG-TERM CURRENT USE OF INSULIN (HCC): ICD-10-CM

## 2018-01-24 DIAGNOSIS — M79.644 FINGER PAIN, RIGHT: ICD-10-CM

## 2018-01-24 DIAGNOSIS — E55.9 VITAMIN D DEFICIENCY: ICD-10-CM

## 2018-01-24 DIAGNOSIS — E03.9 ACQUIRED HYPOTHYROIDISM: ICD-10-CM

## 2018-01-24 DIAGNOSIS — F33.2 ENDOGENOUS DEPRESSION (HCC): ICD-10-CM

## 2018-01-24 DIAGNOSIS — R80.9 TYPE 2 DIABETES MELLITUS WITH MICROALBUMINURIA, WITHOUT LONG-TERM CURRENT USE OF INSULIN (HCC): ICD-10-CM

## 2018-01-24 DIAGNOSIS — I10 ESSENTIAL HYPERTENSION: Primary | ICD-10-CM

## 2018-01-24 LAB
INTERNATIONAL NORMALIZATION RATIO, POC: 2.1
PROTHROMBIN TIME, POC: NORMAL

## 2018-01-24 PROCEDURE — 99214 OFFICE O/P EST MOD 30 MIN: CPT | Performed by: INTERNAL MEDICINE

## 2018-01-24 PROCEDURE — 85610 PROTHROMBIN TIME: CPT | Performed by: INTERNAL MEDICINE

## 2018-01-24 PROCEDURE — 73130 X-RAY EXAM OF HAND: CPT

## 2018-01-24 ASSESSMENT — ENCOUNTER SYMPTOMS
SORE THROAT: 0
CHEST TIGHTNESS: 0
WHEEZING: 0
ABDOMINAL PAIN: 0
COUGH: 0
CONSTIPATION: 0

## 2018-01-24 NOTE — PROGRESS NOTES
 PAF (paroxysmal atrial fibrillation) (HCC)     Essential hypertension     CAD (coronary artery disease)      Past Medical History:   Diagnosis Date    Acute laryngitis     Acute sinusitis     Allergic rhinitis     Ankle pain     Asthma     Asthmatic bronchitis     Atrial fibrillation ([facility] [address] 9/13/12, 10/9/13    cardioversion    Atrial flutter (HCC)     paroxysmal     CAD (coronary artery disease)     CHF (congestive heart failure) (HCC)     Chronic back pain     Cough     Diabetes     Dizzy     Dysuria     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HCC)     Fatigue     Foot pain     Hx of amiodarone therapy 9/24/2014    Hyperlipidemia     PCP manages cholesterol    Hypertension     Menopause     Obesity     Skin rash     Type II or unspecified type diabetes mellitus without mention of complication, not stated as uncontrolled     Umbilical hernia     URI (upper respiratory infection)       Past Surgical History:   Procedure Laterality Date    CARDIAC CATHETERIZATION  10/21/09    EF 35% left ventricle is moderately enlarged     CARDIOVERSION  10/9/13    CHOLECYSTECTOMY      EYE SURGERY      retina and cataracts     GALLBLADDER SURGERY      TUBAL LIGATION       Current Outpatient Prescriptions   Medication Sig Dispense Refill    warfarin (COUMADIN) 5 MG tablet TAKE 1 TABLET Monday, Wednesday, Friday, all other days take 1 1/2 tablets 60 tablet 2    atorvastatin (LIPITOR) 80 MG tablet TAKE 1 TABLET BY MOUTH ONCE DAILY 30 tablet 3    valsartan-hydrochlorothiazide (DIOVAN-HCT) 320-25 MG per tablet TAKE 1 TABLET BY MOUTH ONCE DAILY 30 tablet 3    digoxin (LANOXIN) 125 MCG tablet TAKE 1 TABLET BY MOUTH ONCE DAILY 30 tablet 3    metFORMIN (GLUCOPHAGE) 500 MG tablet TAKE 2 TABLETS TWICE DAILY WITH FOOD 120 tablet 3    levothyroxine (SYNTHROID) 125 MCG tablet TAKE 1 TABLET BY MOUTH ONCE DAILY 30 tablet 3    glipiZIDE (GLUCOTROL XL) 10 MG extended release tablet TAKE 1 TABLET BY MOUTH TWICE DAILY.  60 tablet 3 appears well-developed and well-nourished. HENT:   Right Ear: External ear normal.   Left Ear: External ear normal.   Mouth/Throat: Oropharynx is clear and moist. No oropharyngeal exudate. Eyes: Conjunctivae are normal. Pupils are equal, round, and reactive to light. Neck: Neck supple. No JVD present. No thyromegaly present. Cardiovascular: Normal rate and normal heart sounds. No murmur heard. Pulmonary/Chest: Breath sounds normal. No respiratory distress. She has no wheezes. She has no rales. She exhibits no tenderness. Abdominal: Soft. Bowel sounds are normal.   Musculoskeletal: Normal range of motion. The lateral hands have arthritic changes, Heberden's nodes bilateral DIP joints  Right index finger DIP joint movement is painful, slightly more swollen compared to left side   Lymphadenopathy:     She has no cervical adenopathy. Neurological: She is oriented to person, place, and time. Skin: Skin is warm. No rash noted.        Lab Review   Nurse Only on 01/24/2018   Component Date Value    INR 01/24/2018 2.1    Orders Only on 01/10/2018   Component Date Value    Hemoglobin A1C 01/10/2018 7.6*    Sodium 01/10/2018 143     Potassium 01/10/2018 3.7     Chloride 01/10/2018 98     CO2 01/10/2018 27     Anion Gap 01/10/2018 18     Glucose 01/10/2018 150*    BUN 01/10/2018 9     CREATININE 01/10/2018 0.5     GFR Non- 01/10/2018 >60     Calcium 01/10/2018 9.0    Nurse Only on 01/10/2018   Component Date Value    INR 01/10/2018 2.1    Nurse Only on 01/04/2018   Component Date Value    INR 01/04/2018 1.8    Nurse Only on 12/28/2017   Component Date Value    INR 12/28/2017 1.9    Nurse Only on 12/14/2017   Component Date Value    INR 12/14/2017 1.7            ASSESSMENT/PLAN:  Essential hypertension- BP is good/ cont same meds     Type 2 diabetes mellitus with microalbuminuria, without long-term current use of insulin ([address]-  Hemoglobin A1c is significantly better -it is 7.6/was 9.3 late September 2017. At that time we added innvokana, 300 mg daily that she could not afford. She will also continue metformin, glipizide Patient also has been following diet, continue diet and exercise     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[facility] [address]- Behavioral therapy with counselor, she refuses. She states that she is already feeling better and does not want any medication. She has no suicidal ideation.     Mixed hyperlipidemia- lipid panel is good range, she will continue current treatment plans with Lipitor 80 mg daily     Hypothyroid- cont synthroid 137 µg daily    Right hand index finger pain, see above. Obtain x-ray for further evaluation    Orders Placed This Encounter   Procedures    Comprehensive Metabolic Panel    CBC Auto Differential    Hemoglobin A1C    Lipid Panel    Microalbumin / Creatinine Urine Ratio    Urinalysis    TSH without Reflex    T4, Free    Vitamin D 25 Hydroxy     New Prescriptions    No medications on file        Return in about 3 months (around 4/24/2018) for Annual Physical.   There are no Patient Instructions on file for this visit. EMR Dragon/transcription disclaimer:Significant part of this  encounter note is electronic transcription/translation of spoken language to printed text. The electronic translation of spoken language may be erroneous, or at times, nonsensical words or phrases may be inadvertently transcribed.  Although I have reviewed the note for such errors, some may still exist.

## 2018-02-15 ENCOUNTER — ANTI-COAG VISIT (OUTPATIENT)
Dept: INTERNAL MEDICINE | Age: 63
End: 2018-02-15

## 2018-02-15 ENCOUNTER — NURSE ONLY (OUTPATIENT)
Dept: INTERNAL MEDICINE | Age: 63
End: 2018-02-15
Payer: COMMERCIAL

## 2018-02-15 DIAGNOSIS — Z79.01 LONG TERM CURRENT USE OF ANTICOAGULANT THERAPY: Primary | ICD-10-CM

## 2018-02-15 LAB
INTERNATIONAL NORMALIZATION RATIO, POC: 2.3
PROTHROMBIN TIME, POC: NORMAL

## 2018-02-15 PROCEDURE — 85610 PROTHROMBIN TIME: CPT | Performed by: INTERNAL MEDICINE

## 2018-02-15 NOTE — PROGRESS NOTES
· Diarrhea, vomiting, or inability to eat for more than 24 hours   · Fever (temperature greater than 100.4º F or 38º C)    It is important to remember that warfarin is taken to reduce the risk of a clotting condition(s), such as a deep vein thrombosis or pulmonary embolism. If one or more of these symptoms develops, the patient should seek immediate medical attention. OTHER RECOMMENDATIONS  Take warfarin on a schedule  Warfarin should be taken exactly as directed. Do not increase, decrease, or change the dose unless told to do so by a healthcare provider. If a dose is missed or forgotten, call the prescribing clinician for advice. Warfarin tablets come in different strengths; each is usually a different color, with the amount of warfarin (in milligrams) clearly printed on the tablet. If the color or dose of the tablet appears different than those taken previously, the patient should immediately notify their pharmacist or healthcare provider. Reduce the risk of bleeding  There is a tendency to bleed more easily than usual while taking warfarin. Some simple changes can decrease this risk:  · Use a soft bristle toothbrush   · Floss with waxed floss rather than unwaxed floss   · Shave with an electric razor rather than a blade   · Take care when using sharp objects, such as knives and scissors   · Avoid activities that have a risk of falling or injury (eg, contact sports)  Prevent falls  Falling may significantly increase the risk of bleeding. Taking measures to prevent falls is recommended, and could include the following:  · Remove loose rugs and electrical cords or any other loose items in the home that could lead to tripping, slipping, and falling. · Ensure that there is adequate lighting in all areas inside and around the home, including stairwells and entrance ways. · Avoid walking on ice, wet or polished floors, or other potentially slippery surfaces.    · Avoid walking on unfamiliar areas drained) 1/2 cup 514   Spinach, raw 1 cup 150   Shira greens, frozen (cooked, drained) 1/2 cup 530   Turnip greens, frozen (cooked, drained) 1/2 cup 425   [place] sprouts, frozen (cooked, drained) 1/2 cup 110   Moderate level vitamin K foods   Asparagus, frozen (cooked, drained) 1/2 cup  4 haynes 72  48   Asparagus, fresh (cooked, drained) 4 haynes 30   Broccoli, frozen (cooked, drained) 1/2 cup 60   Broccoli, fresh (cooked, drained) 1 spear 52   Broccoli, raw 1/2 cup 40   Lettuce (butterhead, [place], cornelius) 1/2 head 80   Lettuce (iceberg, crisphead) 1/2 head 65   Lettuce (conor, cos) 1 cup 57   Lettuce (green leaf) 1 cup 97   Okra, fresh (cooked, drained) 1/2 cup 32   Okra, frozen (cooked, drained) 1/2 cup 44   Cabbage (cooked, drained) 1/2 cup 73   Cabbage, raw 1/2 cup 21   Cabbage, josué (raw) 1/2 cup 24   Cabbage, Chinese (cooked, drained) 1/2 cup 28   Coleslaw (fast food-type) 3/4 cup 56   Sauerkraut, canned 1/2 cup 41   Peas, frozen, with pod (cooked, drained) 1/2 cup 24   Peas, fresh, with pod (cooked, drained) 1/2 cup 20   Peas, green, frozen (cooked, drained) 1/2 cup 18   Celery, raw 1/2 cup 17   Beans, green or yellow, fresh (cooked, drained) 1/2 cup 10   Oil, canola 1 tablespoon 17   Oil, olive 1 tablespoon 8   Oil, other (including peanut, sesame, safflower, corn, sunflower, soybean) 1 tablespoon 3 or less   Green tea, brewed in hot water 3.5 ounces 0.3   Data from: [employer]. USDA Nutrient Database for Standard Reference. Nutrient Data Laboratory Home Page, [employer].

## 2018-02-20 DIAGNOSIS — I10 HYPERTENSION: ICD-10-CM

## 2018-02-20 RX ORDER — VALSARTAN AND HYDROCHLOROTHIAZIDE 320; 25 MG/1; MG/1
TABLET, FILM COATED ORAL
Qty: 30 TABLET | Refills: 0 | Status: SHIPPED | OUTPATIENT
Start: 2018-02-20 | End: 2018-03-22 | Stop reason: SDUPTHER

## 2018-02-20 RX ORDER — LEVOTHYROXINE SODIUM 0.12 MG/1
TABLET ORAL
Qty: 30 TABLET | Refills: 0 | Status: SHIPPED | OUTPATIENT
Start: 2018-02-20 | End: 2018-03-22 | Stop reason: SDUPTHER

## 2018-02-20 RX ORDER — DIGOXIN 125 MCG
TABLET ORAL
Qty: 30 TABLET | Refills: 0 | Status: SHIPPED | OUTPATIENT
Start: 2018-02-20 | End: 2018-03-22 | Stop reason: SDUPTHER

## 2018-02-20 RX ORDER — METOPROLOL SUCCINATE 25 MG/1
TABLET, EXTENDED RELEASE ORAL
Qty: 30 TABLET | Refills: 0 | Status: SHIPPED | OUTPATIENT
Start: 2018-02-20 | End: 2018-03-22 | Stop reason: SDUPTHER

## 2018-02-20 RX ORDER — GLIPIZIDE 10 MG/1
TABLET, FILM COATED, EXTENDED RELEASE ORAL
Qty: 60 TABLET | Refills: 0 | Status: SHIPPED | OUTPATIENT
Start: 2018-02-20 | End: 2018-03-22 | Stop reason: SDUPTHER

## 2018-02-20 RX ORDER — ATORVASTATIN CALCIUM 80 MG/1
TABLET, FILM COATED ORAL
Qty: 30 TABLET | Refills: 0 | Status: SHIPPED | OUTPATIENT
Start: 2018-02-20 | End: 2018-03-22 | Stop reason: SDUPTHER

## 2018-02-22 ENCOUNTER — OFFICE VISIT (OUTPATIENT)
Dept: INTERNAL MEDICINE | Age: 63
End: 2018-02-22
Payer: COMMERCIAL

## 2018-02-22 VITALS
TEMPERATURE: 98.1 F | HEART RATE: 116 BPM | DIASTOLIC BLOOD PRESSURE: 84 MMHG | HEIGHT: 66 IN | OXYGEN SATURATION: 95 % | WEIGHT: 232 LBS | BODY MASS INDEX: 37.28 KG/M2 | SYSTOLIC BLOOD PRESSURE: 166 MMHG

## 2018-02-22 DIAGNOSIS — J02.9 PHARYNGITIS, UNSPECIFIED ETIOLOGY: Primary | ICD-10-CM

## 2018-02-22 LAB — S PYO AG THROAT QL: NORMAL

## 2018-02-22 PROCEDURE — 99213 OFFICE O/P EST LOW 20 MIN: CPT | Performed by: NURSE PRACTITIONER

## 2018-02-22 PROCEDURE — 87880 STREP A ASSAY W/OPTIC: CPT | Performed by: NURSE PRACTITIONER

## 2018-02-22 RX ORDER — AZITHROMYCIN 250 MG/1
TABLET, FILM COATED ORAL
Qty: 1 PACKET | Refills: 0 | Status: SHIPPED | OUTPATIENT
Start: 2018-02-22 | End: 2018-04-25 | Stop reason: CLARIF

## 2018-02-22 RX ORDER — METHYLPREDNISOLONE 4 MG/1
TABLET ORAL
Qty: 1 KIT | Refills: 0 | Status: SHIPPED | OUTPATIENT
Start: 2018-02-22 | End: 2018-02-28

## 2018-02-22 ASSESSMENT — ENCOUNTER SYMPTOMS
COUGH: 1
RHINORRHEA: 0
COLOR CHANGE: 0
BACK PAIN: 0
SHORTNESS OF BREATH: 0
PHOTOPHOBIA: 0
VOICE CHANGE: 0
SORE THROAT: 1
VOMITING: 0
NAUSEA: 0

## 2018-02-22 NOTE — PROGRESS NOTES
[facility] INTERNAL MEDICINE  [facility] [facility]  [address] [place] [address]  Dept: [phone]  Dept Fax: [phone]  Loc: [phone]    Dewayne Sargent is a 60 y.o. female who presents today for her medical conditions/complaints as noted below. Dewayne Sargent is c/o of Pharyngitis (Sore throat that started yesterday with white patches, difficulty swallowing, cough, hoarseness)        HPI:     HPI   Chief Complaint   Patient presents with    Pharyngitis     Sore throat that started yesterday with white patches, difficulty swallowing, cough, hoarseness     Patient presents today for evaluation of sore throat and cough. She states she can see white patches. She is having difficulty swallowing. She denies fever. Symptoms have been present x 2 days.      Past Medical History:   Diagnosis Date    Acute laryngitis     Acute sinusitis     Allergic rhinitis     Ankle pain     Asthma     Asthmatic bronchitis     Atrial fibrillation ([address] 9/13/12, 10/9/13    cardioversion    Atrial flutter (HCC)     paroxysmal     CAD (coronary artery disease)     CHF (congestive heart failure) (HCC)     Chronic back pain     Cough     Diabetes     Dizzy     Dysuria     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[address]     Fatigue     Foot pain     Hx of amiodarone therapy 9/24/2014    Hyperlipidemia     PCP manages cholesterol    Hypertension     Menopause     Obesity     Skin rash     Type II or unspecified type diabetes mellitus without mention of complication, not stated as uncontrolled     Umbilical hernia     URI (upper respiratory infection)       Past Surgical History:   Procedure Laterality Date    CARDIAC CATHETERIZATION  10/21/09    EF 35% left ventricle is moderately enlarged     CARDIOVERSION  10/9/13    CHOLECYSTECTOMY      EYE SURGERY      retina and cataracts     GALLBLADDER SURGERY      TUBAL LIGATION         Vitals 2/22/2018 1/24/2018 11/20/2017 11/20/2017 10/17/2017 5/17/2017 SYSTOLIC 252 609 [phone]   DIASTOLIC 84 78 84 86 78 84   Site Left Arm Left Arm - - - -   Position Sitting Sitting - - - -   Pulse 116 98 - 127 102 76   Temp 98.1 - - - - -   Resp - - - 16 - -   Weight 232 lb 230 lb - 242 lb 244 lb 9.6 oz 241 lb   Height 5' 5.5\" 5' 5.5\" - 5' 5.5\" 5' 5\" 5' 5\"   BMI (wt*703/ht~2) 38.02 kg/m2 37.69 kg/m2 - 39.65 kg/m2 40.7 kg/m2 40.1 kg/m2   Some recent data might be hidden       Family History   Problem Relation Age of Onset    Diabetes Mother     Heart Failure Mother     High Blood Pressure Mother     Liver Cancer Father     Colon Cancer Neg Hx     Colon Polyps Neg Hx        Social History   Substance Use Topics    Smoking status: Never Smoker    Smokeless tobacco: Never Used    Alcohol use No      Current Outpatient Prescriptions   Medication Sig Dispense Refill    methylPREDNISolone (MEDROL DOSEPACK) 4 MG tablet Take by mouth. 1 kit 0    azithromycin (ZITHROMAX) 250 MG tablet Take 2 tabs (500 mg) on Day 1, and take 1 tab (250 mg) on days 2 through 5. 1 packet 0    atorvastatin (LIPITOR) 80 MG tablet TAKE 1 TABLET BY MOUTH ONCE DAILY 30 tablet 0    valsartan-hydrochlorothiazide (DIOVAN-HCT) 320-25 MG per tablet TAKE 1 TABLET BY MOUTH ONCE DAILY 30 tablet 0    digoxin (LANOXIN) 125 MCG tablet TAKE 1 TABLET BY MOUTH ONCE DAILY 30 tablet 0    metFORMIN (GLUCOPHAGE) 500 MG tablet TAKE 2 TABLETS TWICE DAILY WITH FOOD 120 tablet 0    glipiZIDE (GLUCOTROL XL) 10 MG extended release tablet TAKE 1 TABLET BY MOUTH TWICE DAILY.  60 tablet 0    metoprolol succinate (TOPROL XL) 25 MG extended release tablet TAKE 1 TABLET BY MOUTH ONCE DAILY 30 tablet 0    levothyroxine (SYNTHROID) 125 MCG tablet TAKE 1 TABLET BY MOUTH ONCE DAILY 30 tablet 0    warfarin (COUMADIN) 5 MG tablet TAKE 1 TABLET Monday, Wednesday, Friday, all other days take 1 1/2 tablets 60 tablet 2    albuterol sulfate HFA (VENTOLIN HFA) 108 (90 BASE) MCG/ACT inhaler Inhale 2 puffs into the lungs every 6 for color change and rash. Allergic/Immunologic: Negative for environmental allergies and food allergies. Neurological: Negative for dizziness, speech difficulty and headaches. Hematological: Does not bruise/bleed easily. Psychiatric/Behavioral: Negative for sleep disturbance and suicidal ideas. Objective:     Physical Exam   Constitutional: She is oriented to person, place, and time. She appears well-developed and well-nourished. HENT:   Head: Atraumatic. Right Ear: External ear normal.   Left Ear: External ear normal.   Nose: Nose normal.   Mouth/Throat: Posterior oropharyngeal edema and posterior oropharyngeal erythema present. Eyes: Conjunctivae are normal. Pupils are equal, round, and reactive to light. Neck: Normal range of motion. Neck supple. Cardiovascular: Normal rate, regular rhythm, S1 normal, S2 normal and normal heart sounds. Pulmonary/Chest: Effort normal and breath sounds normal.   Abdominal: Soft. Bowel sounds are normal.   Musculoskeletal: Normal range of motion. Neurological: She is alert and oriented to person, place, and time. Skin: Skin is warm and dry. Psychiatric: She has a normal mood and affect. Her behavior is normal.   Nursing note and vitals reviewed. BP (!) 166/84 (Site: Left Arm, Position: Sitting)   Pulse 116   Temp 98.1 °F (36.7 °C)   Ht 5' 5.5\" (1.664 m)   Wt 232 lb (105.2 kg)   SpO2 95%   BMI 38.02 kg/m²     Assessment:      1. Pharyngitis, unspecified etiology  POCT rapid strep A     Results for orders placed or performed in visit on 02/22/18   POCT rapid strep A   Result Value Ref Range    Strep A Ag None Detected None Detected       Plan:     1. Begin medrol dose pack and zpack as directed. 2. Increase fluids. 3. Return if symptoms worsen. Patient given educational materials - see patient instructions. Discussed use, benefit, and side effects of prescribed medications. All patient questions answered.   Pt voiced understanding. Reviewed health maintenance. Instructed to continue current medications, diet and exercise. Patient agreed with treatment plan. Follow up as directed. MEDICATIONS:  Orders Placed This Encounter   Medications    methylPREDNISolone (MEDROL DOSEPACK) 4 MG tablet     Sig: Take by mouth. Dispense:  1 kit     Refill:  0    azithromycin (ZITHROMAX) 250 MG tablet     Sig: Take 2 tabs (500 mg) on Day 1, and take 1 tab (250 mg) on days 2 through 5. Dispense:  1 packet     Refill:  0         ORDERS:  Orders Placed This Encounter   Procedures    POCT rapid strep A       Follow-up:  Return if symptoms worsen or fail to improve. PATIENT INSTRUCTIONS:  Patient Instructions   1. Begin medrol dose pack and zpack as directed. 2. Increase fluids. 3. Return if symptoms worsen.      Electronically signed by ADELITA Burr on 2/22/2018 at 10:09 AM

## 2018-03-08 ENCOUNTER — TELEPHONE (OUTPATIENT)
Dept: INTERNAL MEDICINE | Age: 63
End: 2018-03-08

## 2018-03-13 ENCOUNTER — TELEPHONE (OUTPATIENT)
Dept: CARDIOLOGY | Age: 63
End: 2018-03-13

## 2018-03-13 ENCOUNTER — TELEPHONE (OUTPATIENT)
Dept: INTERNAL MEDICINE | Age: 63
End: 2018-03-13

## 2018-03-13 NOTE — TELEPHONE ENCOUNTER
Patient called stating that she is having 2 teeth pulled on Thurs and that that Dr. Leigha Diallo needed something regarding her Coumadin. I spoke with Maciej Cosme at Dr. Morelia Johnson office, they need an order for patient to hold Coumadin 2-3 days prior to procedure. Spoke with Dr. Rosario Winchester and because patient sees cardio for A-fib, she states they need to be the ones to adjust it. Left voice mail for cardio to call me back. Spoke with Maciej Cosme at [facility] office and let her know that cardio would have to adjust it. She says that Dr. Leigha Diallo will most likely pull the teeth since it is only 2, if there is a recent inr, if we can't get the order to hold. Spoke with Kaykay Lopez at [facility], Dr. Fay Elliott refuses to give order on whether patient can hold it or not because patient goes to Coumadin Clinic here and Dr. Rosario Winchester manages her Coumadin dosage. Even though she sees him for A-fib, he won't give the order. I discussed this with Dr. Rosario Winchester. Verbal order for patient to hold x 2 days, with INR 3/14 afternoon to fax to Dr. Leigha Diallo given. Patient notifed and CC appt scheduled for 3/14/18 at 3:30pm.  Left msg for Maciej Cosme to call me back, she was in surgery when I tried to call her.

## 2018-03-14 ENCOUNTER — NURSE ONLY (OUTPATIENT)
Dept: INTERNAL MEDICINE | Age: 63
End: 2018-03-14
Payer: COMMERCIAL

## 2018-03-14 ENCOUNTER — TELEPHONE (OUTPATIENT)
Dept: INTERNAL MEDICINE | Age: 63
End: 2018-03-14

## 2018-03-14 ENCOUNTER — ANTI-COAG VISIT (OUTPATIENT)
Dept: INTERNAL MEDICINE | Age: 63
End: 2018-03-14

## 2018-03-14 DIAGNOSIS — Z79.01 LONG TERM CURRENT USE OF ANTICOAGULANT THERAPY: ICD-10-CM

## 2018-03-14 DIAGNOSIS — I48.0 PAF (PAROXYSMAL ATRIAL FIBRILLATION) (HCC): Primary | ICD-10-CM

## 2018-03-14 LAB
INTERNATIONAL NORMALIZATION RATIO, POC: 3
PROTHROMBIN TIME, POC: NORMAL

## 2018-03-14 PROCEDURE — 85610 PROTHROMBIN TIME: CPT | Performed by: INTERNAL MEDICINE

## 2018-03-14 NOTE — PROGRESS NOTES
loose items in the home that could lead to tripping, slipping, and falling. · Ensure that there is adequate lighting in all areas inside and around the home, including stairwells and entrance ways. · Avoid walking on ice, wet or polished floors, or other potentially slippery surfaces. · Avoid walking on unfamiliar areas outside. Warfarin and food  Some foods and supplements can interfere with warfarin's effectiveness. After being stabilized on a particular warfarin dose, consult a healthcare provider before making major dietary changes (eg, starting a diet to lose weight, starting a nutritional supplement or vitamin). · Vitamin K  Eating an increased amount of foods rich in vitamin K can lower the prothrombin time and INR, making warfarin less effective, and potentially increasing the risk of blood clots. Patients who take warfarin should aim to eat a relatively similar amount of vitamin K each week. Some foods have a high level of vitamin K, including: kale, broccoli, spinach, bailey or turnip greens, lettuce, [place] sprouts, and cabbage (table 1). It is not necessary to avoid these foods. However, the patient should eat a relatively similar amount on a regular basis rather than eating a large serving occasionally. · Cranberry juice  There have been mixed reports on the effect of cranberry juice in people who use warfarin to prevent blood clots. Some experts have reported that drinking cranberry juice while on warfarin can cause significant over-anticoagulation and bleeding [1]. However, a small study found that drinking one eight ounce serving of cranberry juice per day for seven days had no effect on the INR of seven men taking warfarin for atrial fibrillation [2]. It is possible that larger amounts could have a more significant effect.   The best advice is probably to avoid consuming large amounts of cranberry juice, and to speak with a healthcare provider regarding any concerns about a possible

## 2018-03-22 ENCOUNTER — ANTI-COAG VISIT (OUTPATIENT)
Dept: INTERNAL MEDICINE | Age: 63
End: 2018-03-22

## 2018-03-22 ENCOUNTER — NURSE ONLY (OUTPATIENT)
Dept: INTERNAL MEDICINE | Age: 63
End: 2018-03-22
Payer: COMMERCIAL

## 2018-03-22 DIAGNOSIS — Z79.01 LONG TERM CURRENT USE OF ANTICOAGULANT THERAPY: Primary | ICD-10-CM

## 2018-03-22 DIAGNOSIS — I10 HYPERTENSION: ICD-10-CM

## 2018-03-22 LAB
INTERNATIONAL NORMALIZATION RATIO, POC: 1.4
PROTHROMBIN TIME, POC: NORMAL

## 2018-03-22 PROCEDURE — 85610 PROTHROMBIN TIME: CPT | Performed by: INTERNAL MEDICINE

## 2018-03-22 NOTE — PROGRESS NOTES
1/2 cup 570   Kale, fresh, (cooked or boiled, drained) 1/2 cup 530   Spinach, frozen (cooked or boiled, drained) 1/2 cup 514   Spinach, raw 1 cup 150   Shira greens, frozen (cooked, drained) 1/2 cup 530   Turnip greens, frozen (cooked, drained) 1/2 cup 425   [place] sprouts, frozen (cooked, drained) 1/2 cup 110   Moderate level vitamin K foods   Asparagus, frozen (cooked, drained) 1/2 cup  4 haynes 72  48   Asparagus, fresh (cooked, drained) 4 haynes 30   Broccoli, frozen (cooked, drained) 1/2 cup 60   Broccoli, fresh (cooked, drained) 1 spear 52   Broccoli, raw 1/2 cup 40   Lettuce (butterhead, [place], cornelius) 1/2 head 80   Lettuce (iceberg, crisphead) 1/2 head 65   Lettuce (conor, cos) 1 cup 57   Lettuce (green leaf) 1 cup 97   Okra, fresh (cooked, drained) 1/2 cup 32   Okra, frozen (cooked, drained) 1/2 cup 44   Cabbage (cooked, drained) 1/2 cup 73   Cabbage, raw 1/2 cup 21   Cabbage, josué (raw) 1/2 cup 24   Cabbage, Chinese (cooked, drained) 1/2 cup 28   Coleslaw (fast food-type) 3/4 cup 56   Sauerkraut, canned 1/2 cup 41   Peas, frozen, with pod (cooked, drained) 1/2 cup 24   Peas, fresh, with pod (cooked, drained) 1/2 cup 20   Peas, green, frozen (cooked, drained) 1/2 cup 18   Celery, raw 1/2 cup 17   Beans, green or yellow, fresh (cooked, drained) 1/2 cup 10   Oil, canola 1 tablespoon 17   Oil, olive 1 tablespoon 8   Oil, other (including peanut, sesame, safflower, corn, sunflower, soybean) 1 tablespoon 3 or less   Green tea, brewed in hot water 3.5 ounces 0.3   Data from: [employer]. USDA Nutrient Database for Standard Reference. Nutrient Data Laboratory Home Page, CreditCardRecommendations.ca.

## 2018-03-23 RX ORDER — GLIPIZIDE 10 MG/1
TABLET, FILM COATED, EXTENDED RELEASE ORAL
Qty: 60 TABLET | Refills: 3 | Status: SHIPPED | OUTPATIENT
Start: 2018-03-23 | End: 2018-07-19 | Stop reason: SDUPTHER

## 2018-03-23 RX ORDER — METOPROLOL SUCCINATE 25 MG/1
TABLET, EXTENDED RELEASE ORAL
Qty: 30 TABLET | Refills: 3 | Status: SHIPPED | OUTPATIENT
Start: 2018-03-23 | End: 2018-05-21 | Stop reason: SDUPTHER

## 2018-03-23 RX ORDER — VALSARTAN AND HYDROCHLOROTHIAZIDE 320; 25 MG/1; MG/1
TABLET, FILM COATED ORAL
Qty: 30 TABLET | Refills: 3 | Status: SHIPPED | OUTPATIENT
Start: 2018-03-23 | End: 2018-07-19 | Stop reason: SDUPTHER

## 2018-03-23 RX ORDER — DIGOXIN 125 MCG
TABLET ORAL
Qty: 30 TABLET | Refills: 2 | Status: SHIPPED | OUTPATIENT
Start: 2018-03-23 | End: 2018-06-21 | Stop reason: SDUPTHER

## 2018-03-23 RX ORDER — LEVOTHYROXINE SODIUM 0.12 MG/1
TABLET ORAL
Qty: 30 TABLET | Refills: 3 | Status: SHIPPED | OUTPATIENT
Start: 2018-03-23 | End: 2018-07-19 | Stop reason: SDUPTHER

## 2018-03-23 RX ORDER — ATORVASTATIN CALCIUM 80 MG/1
TABLET, FILM COATED ORAL
Qty: 30 TABLET | Refills: 3 | Status: SHIPPED | OUTPATIENT
Start: 2018-03-23 | End: 2018-07-19 | Stop reason: SDUPTHER

## 2018-03-29 ENCOUNTER — NURSE ONLY (OUTPATIENT)
Dept: INTERNAL MEDICINE | Age: 63
End: 2018-03-29
Payer: COMMERCIAL

## 2018-03-29 ENCOUNTER — ANTI-COAG VISIT (OUTPATIENT)
Dept: INTERNAL MEDICINE | Age: 63
End: 2018-03-29

## 2018-03-29 DIAGNOSIS — Z79.01 LONG TERM CURRENT USE OF ANTICOAGULANT THERAPY: Primary | ICD-10-CM

## 2018-03-29 LAB
INTERNATIONAL NORMALIZATION RATIO, POC: 2.4
PROTHROMBIN TIME, POC: NORMAL

## 2018-03-29 PROCEDURE — 85610 PROTHROMBIN TIME: CPT | Performed by: INTERNAL MEDICINE

## 2018-03-29 NOTE — PROGRESS NOTES
vomiting, or inability to eat for more than 24 hours   · Fever (temperature greater than 100.4º F or 38º C)    It is important to remember that warfarin is taken to reduce the risk of a clotting condition(s), such as a deep vein thrombosis or pulmonary embolism. If one or more of these symptoms develops, the patient should seek immediate medical attention. OTHER RECOMMENDATIONS  Take warfarin on a schedule  Warfarin should be taken exactly as directed. Do not increase, decrease, or change the dose unless told to do so by a healthcare provider. If a dose is missed or forgotten, call the prescribing clinician for advice. Warfarin tablets come in different strengths; each is usually a different color, with the amount of warfarin (in milligrams) clearly printed on the tablet. If the color or dose of the tablet appears different than those taken previously, the patient should immediately notify their pharmacist or healthcare provider. Reduce the risk of bleeding  There is a tendency to bleed more easily than usual while taking warfarin. Some simple changes can decrease this risk:  · Use a soft bristle toothbrush   · Floss with waxed floss rather than unwaxed floss   · Shave with an electric razor rather than a blade   · Take care when using sharp objects, such as knives and scissors   · Avoid activities that have a risk of falling or injury (eg, contact sports)  Prevent falls  Falling may significantly increase the risk of bleeding. Taking measures to prevent falls is recommended, and could include the following:  · Remove loose rugs and electrical cords or any other loose items in the home that could lead to tripping, slipping, and falling. · Ensure that there is adequate lighting in all areas inside and around the home, including stairwells and entrance ways. · Avoid walking on ice, wet or polished floors, or other potentially slippery surfaces.    · Avoid walking on unfamiliar areas drained) 1/2 cup 514   Spinach, raw 1 cup 150   Shira greens, frozen (cooked, drained) 1/2 cup 530   Turnip greens, frozen (cooked, drained) 1/2 cup 425   [place] sprouts, frozen (cooked, drained) 1/2 cup 110   Moderate level vitamin K foods   Asparagus, frozen (cooked, drained) 1/2 cup  4 haynes 72  48   Asparagus, fresh (cooked, drained) 4 haynes 30   Broccoli, frozen (cooked, drained) 1/2 cup 60   Broccoli, fresh (cooked, drained) 1 spear 52   Broccoli, raw 1/2 cup 40   Lettuce (butterhead, [place], cornelius) 1/2 head 80   Lettuce (iceberg, crisphead) 1/2 head 65   Lettuce (conor, cos) 1 cup 57   Lettuce (green leaf) 1 cup 97   Okra, fresh (cooked, drained) 1/2 cup 32   Okra, frozen (cooked, drained) 1/2 cup 44   Cabbage (cooked, drained) 1/2 cup 73   Cabbage, raw 1/2 cup 21   Cabbage, josué (raw) 1/2 cup 24   Cabbage, Chinese (cooked, drained) 1/2 cup 28   Coleslaw (fast food-type) 3/4 cup 56   Sauerkraut, canned 1/2 cup 41   Peas, frozen, with pod (cooked, drained) 1/2 cup 24   Peas, fresh, with pod (cooked, drained) 1/2 cup 20   Peas, green, frozen (cooked, drained) 1/2 cup 18   Celery, raw 1/2 cup 17   Beans, green or yellow, fresh (cooked, drained) 1/2 cup 10   Oil, canola 1 tablespoon 17   Oil, olive 1 tablespoon 8   Oil, other (including peanut, sesame, safflower, corn, sunflower, soybean) 1 tablespoon 3 or less   Green tea, brewed in hot water 3.5 ounces 0.3   Data from: [employer]. USDA Nutrient Database for Standard Reference. Nutrient Data Laboratory Home Page, CreditCardRecommendations.ca.

## 2018-04-11 DIAGNOSIS — I10 ESSENTIAL HYPERTENSION: ICD-10-CM

## 2018-04-11 DIAGNOSIS — E03.9 ACQUIRED HYPOTHYROIDISM: ICD-10-CM

## 2018-04-11 DIAGNOSIS — E78.2 MIXED HYPERLIPIDEMIA: ICD-10-CM

## 2018-04-11 DIAGNOSIS — R80.9 TYPE 2 DIABETES MELLITUS WITH MICROALBUMINURIA, WITHOUT LONG-TERM CURRENT USE OF INSULIN (HCC): ICD-10-CM

## 2018-04-11 DIAGNOSIS — E11.29 TYPE 2 DIABETES MELLITUS WITH MICROALBUMINURIA, WITHOUT LONG-TERM CURRENT USE OF INSULIN (HCC): ICD-10-CM

## 2018-04-11 DIAGNOSIS — E55.9 VITAMIN D DEFICIENCY: ICD-10-CM

## 2018-04-11 LAB
ALBUMIN SERPL-MCNC: 4 G/DL (ref 3.5–5.2)
ALP BLD-CCNC: 43 U/L (ref 35–104)
ALT SERPL-CCNC: 24 U/L (ref 5–33)
ANION GAP SERPL CALCULATED.3IONS-SCNC: 14 MMOL/L (ref 7–19)
AST SERPL-CCNC: 23 U/L (ref 5–32)
BACTERIA: NEGATIVE /HPF
BASOPHILS ABSOLUTE: 0.1 K/UL (ref 0–0.2)
BASOPHILS RELATIVE PERCENT: 1 % (ref 0–1)
BILIRUB SERPL-MCNC: 0.6 MG/DL (ref 0.2–1.2)
BILIRUBIN URINE: NEGATIVE
BLOOD, URINE: NEGATIVE
BUN BLDV-MCNC: 9 MG/DL (ref 8–23)
CALCIUM SERPL-MCNC: 9.3 MG/DL (ref 8.8–10.2)
CHLORIDE BLD-SCNC: 101 MMOL/L (ref 98–111)
CHOLESTEROL, TOTAL: 148 MG/DL (ref 160–199)
CLARITY: CLEAR
CO2: 30 MMOL/L (ref 22–29)
COLOR: YELLOW
CREAT SERPL-MCNC: 0.5 MG/DL (ref 0.5–0.9)
CREATININE URINE: 76.5 MG/DL (ref 4.2–622)
EOSINOPHILS ABSOLUTE: 0.2 K/UL (ref 0–0.6)
EOSINOPHILS RELATIVE PERCENT: 2.2 % (ref 0–5)
EPITHELIAL CELLS, UA: 2 /HPF (ref 0–5)
GFR NON-AFRICAN AMERICAN: >60
GLUCOSE BLD-MCNC: 120 MG/DL (ref 74–109)
GLUCOSE URINE: NEGATIVE MG/DL
HBA1C MFR BLD: 7.3 %
HCT VFR BLD CALC: 43.9 % (ref 37–47)
HDLC SERPL-MCNC: 39 MG/DL (ref 65–121)
HEMOGLOBIN: 14.1 G/DL (ref 12–16)
HYALINE CASTS: 1 /HPF (ref 0–8)
KETONES, URINE: NEGATIVE MG/DL
LDL CHOLESTEROL CALCULATED: 78 MG/DL
LEUKOCYTE ESTERASE, URINE: ABNORMAL
LYMPHOCYTES ABSOLUTE: 3 K/UL (ref 1.1–4.5)
LYMPHOCYTES RELATIVE PERCENT: 41.3 % (ref 20–40)
MCH RBC QN AUTO: 29.1 PG (ref 27–31)
MCHC RBC AUTO-ENTMCNC: 32.1 G/DL (ref 33–37)
MCV RBC AUTO: 90.5 FL (ref 81–99)
MICROALBUMIN UR-MCNC: 4.3 MG/DL (ref 0–19)
MICROALBUMIN/CREAT UR-RTO: 56.2 MG/G
MONOCYTES ABSOLUTE: 0.6 K/UL (ref 0–0.9)
MONOCYTES RELATIVE PERCENT: 8.8 % (ref 0–10)
NEUTROPHILS ABSOLUTE: 3.4 K/UL (ref 1.5–7.5)
NEUTROPHILS RELATIVE PERCENT: 46.3 % (ref 50–65)
NITRITE, URINE: NEGATIVE
PDW BLD-RTO: 14.2 % (ref 11.5–14.5)
PH UA: 7.5
PLATELET # BLD: 202 K/UL (ref 130–400)
PMV BLD AUTO: 11.1 FL (ref 9.4–12.3)
POTASSIUM SERPL-SCNC: 3.9 MMOL/L (ref 3.5–5)
PROTEIN UA: NEGATIVE MG/DL
RBC # BLD: 4.85 M/UL (ref 4.2–5.4)
RBC UA: 2 /HPF (ref 0–4)
SODIUM BLD-SCNC: 145 MMOL/L (ref 136–145)
SPECIFIC GRAVITY UA: 1.01
T4 FREE: 1.6 NG/DL (ref 0.9–1.7)
TOTAL PROTEIN: 7.2 G/DL (ref 6.6–8.7)
TRIGL SERPL-MCNC: 155 MG/DL (ref 0–149)
TSH SERPL DL<=0.05 MIU/L-ACNC: 1.38 UIU/ML (ref 0.27–4.2)
UROBILINOGEN, URINE: 1 E.U./DL
VITAMIN D 25-HYDROXY: 32.5 NG/ML
WBC # BLD: 7.3 K/UL (ref 4.8–10.8)
WBC UA: 1 /HPF (ref 0–5)

## 2018-04-12 ENCOUNTER — NURSE ONLY (OUTPATIENT)
Dept: INTERNAL MEDICINE | Age: 63
End: 2018-04-12
Payer: COMMERCIAL

## 2018-04-12 ENCOUNTER — ANTI-COAG VISIT (OUTPATIENT)
Dept: INTERNAL MEDICINE | Age: 63
End: 2018-04-12

## 2018-04-12 DIAGNOSIS — Z79.01 LONG TERM CURRENT USE OF ANTICOAGULANT THERAPY: Primary | ICD-10-CM

## 2018-04-12 PROBLEM — Z12.4 PAP SMEAR FOR CERVICAL CANCER SCREENING: Status: RESOLVED | Noted: 2017-09-26 | Resolved: 2018-04-12

## 2018-04-12 LAB
INTERNATIONAL NORMALIZATION RATIO, POC: 2.2
PROTHROMBIN TIME, POC: NORMAL

## 2018-04-12 PROCEDURE — 85610 PROTHROMBIN TIME: CPT | Performed by: INTERNAL MEDICINE

## 2018-04-25 ENCOUNTER — NURSE ONLY (OUTPATIENT)
Dept: INTERNAL MEDICINE | Age: 63
End: 2018-04-25
Payer: COMMERCIAL

## 2018-04-25 ENCOUNTER — OFFICE VISIT (OUTPATIENT)
Dept: INTERNAL MEDICINE | Age: 63
End: 2018-04-25
Payer: COMMERCIAL

## 2018-04-25 VITALS
HEIGHT: 66 IN | HEART RATE: 106 BPM | RESPIRATION RATE: 18 BRPM | BODY MASS INDEX: 35.84 KG/M2 | SYSTOLIC BLOOD PRESSURE: 122 MMHG | OXYGEN SATURATION: 96 % | DIASTOLIC BLOOD PRESSURE: 72 MMHG | WEIGHT: 223 LBS

## 2018-04-25 DIAGNOSIS — E11.29 TYPE 2 DIABETES MELLITUS WITH MICROALBUMINURIA, WITHOUT LONG-TERM CURRENT USE OF INSULIN (HCC): ICD-10-CM

## 2018-04-25 DIAGNOSIS — I10 ESSENTIAL HYPERTENSION: ICD-10-CM

## 2018-04-25 DIAGNOSIS — Z12.31 SCREENING MAMMOGRAM, ENCOUNTER FOR: ICD-10-CM

## 2018-04-25 DIAGNOSIS — Z79.01 LONG TERM CURRENT USE OF ANTICOAGULANT THERAPY: Primary | ICD-10-CM

## 2018-04-25 DIAGNOSIS — E03.9 ACQUIRED HYPOTHYROIDISM: ICD-10-CM

## 2018-04-25 DIAGNOSIS — R80.9 TYPE 2 DIABETES MELLITUS WITH MICROALBUMINURIA, WITHOUT LONG-TERM CURRENT USE OF INSULIN (HCC): ICD-10-CM

## 2018-04-25 DIAGNOSIS — E55.9 VITAMIN D DEFICIENCY: ICD-10-CM

## 2018-04-25 DIAGNOSIS — I48.0 PAF (PAROXYSMAL ATRIAL FIBRILLATION) (HCC): ICD-10-CM

## 2018-04-25 DIAGNOSIS — F33.2 ENDOGENOUS DEPRESSION (HCC): ICD-10-CM

## 2018-04-25 DIAGNOSIS — Z00.00 ANNUAL PHYSICAL EXAM: Primary | ICD-10-CM

## 2018-04-25 DIAGNOSIS — E78.2 MIXED HYPERLIPIDEMIA: ICD-10-CM

## 2018-04-25 LAB
INTERNATIONAL NORMALIZATION RATIO, POC: 2.4
PROTHROMBIN TIME, POC: NORMAL

## 2018-04-25 PROCEDURE — 99396 PREV VISIT EST AGE 40-64: CPT | Performed by: INTERNAL MEDICINE

## 2018-04-25 PROCEDURE — 85610 PROTHROMBIN TIME: CPT | Performed by: INTERNAL MEDICINE

## 2018-04-25 RX ORDER — CLINDAMYCIN HYDROCHLORIDE 300 MG/1
300 CAPSULE ORAL 3 TIMES DAILY
COMMUNITY
End: 2018-05-21 | Stop reason: ALTCHOICE

## 2018-04-25 RX ORDER — ALBUTEROL SULFATE 90 UG/1
2 AEROSOL, METERED RESPIRATORY (INHALATION) EVERY 6 HOURS PRN
Qty: 1 INHALER | Refills: 5 | Status: SHIPPED | OUTPATIENT
Start: 2018-04-25 | End: 2019-05-21 | Stop reason: SDUPTHER

## 2018-04-25 ASSESSMENT — ENCOUNTER SYMPTOMS
EYE REDNESS: 0
COLOR CHANGE: 0
CONSTIPATION: 0
TROUBLE SWALLOWING: 0
COUGH: 0
ABDOMINAL PAIN: 0
WHEEZING: 0
SINUS PRESSURE: 0
DIARRHEA: 0
EYE PAIN: 0
SORE THROAT: 0
CHEST TIGHTNESS: 0
BLOOD IN STOOL: 0
NAUSEA: 0
VOICE CHANGE: 0
VOMITING: 0

## 2018-04-25 ASSESSMENT — PATIENT HEALTH QUESTIONNAIRE - PHQ9
SUM OF ALL RESPONSES TO PHQ QUESTIONS 1-9: 0
2. FEELING DOWN, DEPRESSED OR HOPELESS: 0
1. LITTLE INTEREST OR PLEASURE IN DOING THINGS: 0
SUM OF ALL RESPONSES TO PHQ9 QUESTIONS 1 & 2: 0

## 2018-05-08 ENCOUNTER — NURSE ONLY (OUTPATIENT)
Dept: INTERNAL MEDICINE | Age: 63
End: 2018-05-08
Payer: COMMERCIAL

## 2018-05-08 DIAGNOSIS — I48.0 PAF (PAROXYSMAL ATRIAL FIBRILLATION) (HCC): Primary | ICD-10-CM

## 2018-05-08 DIAGNOSIS — Z79.01 LONG TERM CURRENT USE OF ANTICOAGULANT THERAPY: ICD-10-CM

## 2018-05-08 LAB
INTERNATIONAL NORMALIZATION RATIO, POC: 2.5
PROTHROMBIN TIME, POC: NORMAL

## 2018-05-08 PROCEDURE — 85610 PROTHROMBIN TIME: CPT | Performed by: INTERNAL MEDICINE

## 2018-05-21 ENCOUNTER — OFFICE VISIT (OUTPATIENT)
Dept: CARDIOLOGY | Age: 63
End: 2018-05-21
Payer: COMMERCIAL

## 2018-05-21 VITALS
HEART RATE: 98 BPM | HEIGHT: 66 IN | SYSTOLIC BLOOD PRESSURE: 132 MMHG | DIASTOLIC BLOOD PRESSURE: 62 MMHG | WEIGHT: 222 LBS | BODY MASS INDEX: 35.68 KG/M2

## 2018-05-21 DIAGNOSIS — E78.2 MIXED HYPERLIPIDEMIA: ICD-10-CM

## 2018-05-21 DIAGNOSIS — E66.01 MORBID OBESITY DUE TO EXCESS CALORIES (HCC): ICD-10-CM

## 2018-05-21 DIAGNOSIS — I10 ESSENTIAL HYPERTENSION: ICD-10-CM

## 2018-05-21 DIAGNOSIS — I48.20 CHRONIC ATRIAL FIBRILLATION (HCC): Primary | ICD-10-CM

## 2018-05-21 PROCEDURE — 93000 ELECTROCARDIOGRAM COMPLETE: CPT | Performed by: CLINICAL NURSE SPECIALIST

## 2018-05-21 PROCEDURE — 99213 OFFICE O/P EST LOW 20 MIN: CPT | Performed by: CLINICAL NURSE SPECIALIST

## 2018-05-21 RX ORDER — WARFARIN SODIUM 5 MG/1
TABLET ORAL
Qty: 38 TABLET | Refills: 0 | Status: SHIPPED | OUTPATIENT
Start: 2018-05-21 | End: 2018-07-19 | Stop reason: SDUPTHER

## 2018-05-21 RX ORDER — METOPROLOL SUCCINATE 50 MG/1
50 TABLET, EXTENDED RELEASE ORAL DAILY
Qty: 90 TABLET | Refills: 3 | Status: SHIPPED | OUTPATIENT
Start: 2018-05-21 | End: 2019-05-09 | Stop reason: SDUPTHER

## 2018-05-21 ASSESSMENT — ENCOUNTER SYMPTOMS
VOMITING: 0
BLURRED VISION: 0
ORTHOPNEA: 0
NAUSEA: 0
COUGH: 0
HEARTBURN: 0
SHORTNESS OF BREATH: 0

## 2018-05-25 PROBLEM — Z00.00 ANNUAL PHYSICAL EXAM: Status: RESOLVED | Noted: 2018-04-25 | Resolved: 2018-05-25

## 2018-05-25 PROBLEM — Z12.31 SCREENING MAMMOGRAM, ENCOUNTER FOR: Status: RESOLVED | Noted: 2018-04-25 | Resolved: 2018-05-25

## 2018-05-31 ENCOUNTER — NURSE ONLY (OUTPATIENT)
Dept: INTERNAL MEDICINE | Age: 63
End: 2018-05-31
Payer: COMMERCIAL

## 2018-05-31 DIAGNOSIS — I48.20 CHRONIC ATRIAL FIBRILLATION (HCC): ICD-10-CM

## 2018-05-31 DIAGNOSIS — Z79.01 LONG TERM CURRENT USE OF ANTICOAGULANT THERAPY: Primary | ICD-10-CM

## 2018-05-31 LAB
INTERNATIONAL NORMALIZATION RATIO, POC: 2.2
PROTHROMBIN TIME, POC: NORMAL

## 2018-05-31 PROCEDURE — 85610 PROTHROMBIN TIME: CPT | Performed by: INTERNAL MEDICINE

## 2018-06-01 ENCOUNTER — HOSPITAL ENCOUNTER (OUTPATIENT)
Dept: WOMENS IMAGING | Age: 63
Discharge: HOME | End: 2018-06-01
Payer: COMMERCIAL

## 2018-06-01 DIAGNOSIS — Z12.31 SCREENING MAMMOGRAM, ENCOUNTER FOR: ICD-10-CM

## 2018-06-01 PROCEDURE — 77063 BREAST TOMOSYNTHESIS BI: CPT

## 2018-06-21 ENCOUNTER — NURSE ONLY (OUTPATIENT)
Dept: INTERNAL MEDICINE | Age: 63
End: 2018-06-21
Payer: COMMERCIAL

## 2018-06-21 DIAGNOSIS — Z79.01 LONG TERM CURRENT USE OF ANTICOAGULANT THERAPY: Primary | ICD-10-CM

## 2018-06-21 LAB
INTERNATIONAL NORMALIZATION RATIO, POC: 2.6
PROTHROMBIN TIME, POC: NORMAL

## 2018-06-21 PROCEDURE — 85610 PROTHROMBIN TIME: CPT | Performed by: INTERNAL MEDICINE

## 2018-06-21 RX ORDER — DIGOXIN 125 MCG
TABLET ORAL
Qty: 30 TABLET | Refills: 3 | Status: SHIPPED | OUTPATIENT
Start: 2018-06-21 | End: 2018-10-11 | Stop reason: SDUPTHER

## 2018-07-12 ENCOUNTER — NURSE ONLY (OUTPATIENT)
Dept: INTERNAL MEDICINE | Age: 63
End: 2018-07-12
Payer: COMMERCIAL

## 2018-07-12 DIAGNOSIS — I48.20 CHRONIC ATRIAL FIBRILLATION (HCC): Primary | ICD-10-CM

## 2018-07-12 LAB
INTERNATIONAL NORMALIZATION RATIO, POC: 2.1
PROTHROMBIN TIME, POC: NORMAL

## 2018-07-12 PROCEDURE — 85610 PROTHROMBIN TIME: CPT | Performed by: INTERNAL MEDICINE

## 2018-07-12 NOTE — PROGRESS NOTES
Ms. Bc Sumner was here today. INR today:   Results for orders placed or performed in visit on 07/12/18   POCT INR   Result Value Ref Range    INR 2.1     Protime         INR Goal: 2.0-3.0    Dosing Plan  As of 7/12/2018    TTR:   64.1 % (1.1 y)   Full warfarin instructions:   5 mg on Mon, Wed, Fri; 7.5 mg all other days              PLAN: CONTINUE CURRENT DOSE    NEXT COUMADIN CLINIC APT IS: 8/1/2018 at 9:15am  FASTING LABS ON 8/1/18 BEFORE OR AFTER COUMADIN CLINIC  DR. GRANADO 8/14/18 AT 12:45PM    [facility] INTERNAL MEDICINE COUMADIN CLINIC  [phone]  Summer Hours:  Tuesday's-   11:12am-10:80pm  Wednesday's- 7:15am-3:00pm  Thursday's-  6:30am-4:30pm      IF IT'S AN EMERGENCY, PLEASE CALL 911 OR GO TO YOUR NEAREST EMERGENCY ROOM. IF UNABLE TO REACH COUMADIN CLINIC, [address] [place] [address] Rd, [phone]. [phone]

## 2018-07-19 RX ORDER — ATORVASTATIN CALCIUM 80 MG/1
TABLET, FILM COATED ORAL
Qty: 30 TABLET | Refills: 5 | Status: SHIPPED | OUTPATIENT
Start: 2018-07-19 | End: 2019-01-09 | Stop reason: SDUPTHER

## 2018-07-19 RX ORDER — VALSARTAN AND HYDROCHLOROTHIAZIDE 320; 25 MG/1; MG/1
TABLET, FILM COATED ORAL
Qty: 30 TABLET | Refills: 5 | Status: SHIPPED | OUTPATIENT
Start: 2018-07-19 | End: 2018-08-14

## 2018-07-19 RX ORDER — GLIPIZIDE 10 MG/1
TABLET, FILM COATED, EXTENDED RELEASE ORAL
Qty: 60 TABLET | Refills: 5 | Status: SHIPPED | OUTPATIENT
Start: 2018-07-19 | End: 2018-08-14

## 2018-07-19 RX ORDER — LEVOTHYROXINE SODIUM 0.12 MG/1
TABLET ORAL
Qty: 30 TABLET | Refills: 5 | Status: SHIPPED | OUTPATIENT
Start: 2018-07-19 | End: 2019-01-15 | Stop reason: SDUPTHER

## 2018-07-19 RX ORDER — WARFARIN SODIUM 5 MG/1
TABLET ORAL
Qty: 38 TABLET | Refills: 5 | Status: SHIPPED | OUTPATIENT
Start: 2018-07-19 | End: 2019-01-09 | Stop reason: SDUPTHER

## 2018-07-23 ENCOUNTER — TELEPHONE (OUTPATIENT)
Dept: INTERNAL MEDICINE | Age: 63
End: 2018-07-23

## 2018-07-24 RX ORDER — IRBESARTAN AND HYDROCHLOROTHIAZIDE 300; 12.5 MG/1; MG/1
1 TABLET, FILM COATED ORAL DAILY
Qty: 30 TABLET | Refills: 3 | Status: SHIPPED | OUTPATIENT
Start: 2018-07-24 | End: 2018-11-13 | Stop reason: SDUPTHER

## 2018-08-01 ENCOUNTER — NURSE ONLY (OUTPATIENT)
Dept: INTERNAL MEDICINE | Age: 63
End: 2018-08-01
Payer: COMMERCIAL

## 2018-08-01 DIAGNOSIS — E11.29 TYPE 2 DIABETES MELLITUS WITH MICROALBUMINURIA, WITHOUT LONG-TERM CURRENT USE OF INSULIN (HCC): ICD-10-CM

## 2018-08-01 DIAGNOSIS — I48.20 CHRONIC ATRIAL FIBRILLATION (HCC): Primary | ICD-10-CM

## 2018-08-01 DIAGNOSIS — R80.9 TYPE 2 DIABETES MELLITUS WITH MICROALBUMINURIA, WITHOUT LONG-TERM CURRENT USE OF INSULIN (HCC): ICD-10-CM

## 2018-08-01 LAB
ALBUMIN SERPL-MCNC: 3.9 G/DL (ref 3.5–5.2)
ALP BLD-CCNC: 41 U/L (ref 35–104)
ALT SERPL-CCNC: 21 U/L (ref 5–33)
ANION GAP SERPL CALCULATED.3IONS-SCNC: 15 MMOL/L (ref 7–19)
AST SERPL-CCNC: 19 U/L (ref 5–32)
BILIRUB SERPL-MCNC: 0.5 MG/DL (ref 0.2–1.2)
BUN BLDV-MCNC: 9 MG/DL (ref 8–23)
CALCIUM SERPL-MCNC: 9.1 MG/DL (ref 8.8–10.2)
CHLORIDE BLD-SCNC: 102 MMOL/L (ref 98–111)
CO2: 26 MMOL/L (ref 22–29)
CREAT SERPL-MCNC: <0.5 MG/DL (ref 0.5–0.9)
GFR NON-AFRICAN AMERICAN: >60
GLUCOSE BLD-MCNC: 103 MG/DL (ref 74–109)
HBA1C MFR BLD: 6.9 % (ref 4–6)
INTERNATIONAL NORMALIZATION RATIO, POC: 2.7
POTASSIUM SERPL-SCNC: 3.9 MMOL/L (ref 3.5–5)
PROTHROMBIN TIME, POC: NORMAL
SODIUM BLD-SCNC: 143 MMOL/L (ref 136–145)
TOTAL PROTEIN: 7.1 G/DL (ref 6.6–8.7)

## 2018-08-01 PROCEDURE — 85610 PROTHROMBIN TIME: CPT | Performed by: INTERNAL MEDICINE

## 2018-08-01 NOTE — PROGRESS NOTES
Ms. Shelby Monge was here today. INR today:   Results for orders placed or performed in visit on 08/01/18   POCT INR   Result Value Ref Range    INR 2.7     Protime       INR Goal: 2.0-3.0    Dosing Plan  As of 8/1/2018    TTR:   65.8 % (1.1 y)   Full warfarin instructions:   5 mg on Mon, Wed, Fri; 7.5 mg all other days          PLAN: CONTINUE CURRENT DOSE  NEXT COUMADIN CLINIC AP[facility]: [facility] [phone]  Summer Hours:  Tuesday's-   11:12am-10:80pm  Wednesday's- 11:12am-10:80pm  Thursday's-  6:30am-4:30pm  IF IT'S AN EMERGENCY, PLEASE CALL 911 OR GO TO YOUR NEAREST EMERGENCY ROOM. IF UNABLE TO REACH COUMADIN CLINIC, [address] [place] [address] Rd, [phone].

## 2018-08-14 ENCOUNTER — NURSE ONLY (OUTPATIENT)
Dept: INTERNAL MEDICINE | Age: 63
End: 2018-08-14
Payer: COMMERCIAL

## 2018-08-14 ENCOUNTER — OFFICE VISIT (OUTPATIENT)
Dept: INTERNAL MEDICINE | Age: 63
End: 2018-08-14
Payer: COMMERCIAL

## 2018-08-14 VITALS
RESPIRATION RATE: 18 BRPM | SYSTOLIC BLOOD PRESSURE: 136 MMHG | OXYGEN SATURATION: 97 % | HEIGHT: 66 IN | DIASTOLIC BLOOD PRESSURE: 82 MMHG | WEIGHT: 219 LBS | HEART RATE: 92 BPM | BODY MASS INDEX: 35.2 KG/M2

## 2018-08-14 DIAGNOSIS — E55.9 VITAMIN D DEFICIENCY: ICD-10-CM

## 2018-08-14 DIAGNOSIS — R80.9 TYPE 2 DIABETES MELLITUS WITH MICROALBUMINURIA, WITHOUT LONG-TERM CURRENT USE OF INSULIN (HCC): Primary | ICD-10-CM

## 2018-08-14 DIAGNOSIS — I48.20 CHRONIC ATRIAL FIBRILLATION (HCC): ICD-10-CM

## 2018-08-14 DIAGNOSIS — I48.20 CHRONIC ATRIAL FIBRILLATION (HCC): Primary | ICD-10-CM

## 2018-08-14 DIAGNOSIS — F33.2 ENDOGENOUS DEPRESSION (HCC): ICD-10-CM

## 2018-08-14 DIAGNOSIS — I10 ESSENTIAL HYPERTENSION: ICD-10-CM

## 2018-08-14 DIAGNOSIS — E11.29 TYPE 2 DIABETES MELLITUS WITH MICROALBUMINURIA, WITHOUT LONG-TERM CURRENT USE OF INSULIN (HCC): Primary | ICD-10-CM

## 2018-08-14 LAB
INTERNATIONAL NORMALIZATION RATIO, POC: 2.6
PROTHROMBIN TIME, POC: NORMAL

## 2018-08-14 PROCEDURE — 85610 PROTHROMBIN TIME: CPT | Performed by: INTERNAL MEDICINE

## 2018-08-14 PROCEDURE — 99214 OFFICE O/P EST MOD 30 MIN: CPT | Performed by: INTERNAL MEDICINE

## 2018-08-14 RX ORDER — GLIPIZIDE 5 MG/1
TABLET ORAL
Qty: 90 TABLET | Refills: 3 | Status: SHIPPED | OUTPATIENT
Start: 2018-08-14 | End: 2018-12-11 | Stop reason: SDUPTHER

## 2018-08-14 RX ORDER — HYDROCHLOROTHIAZIDE 25 MG/1
TABLET ORAL
Qty: 30 TABLET | Refills: 3 | Status: SHIPPED | OUTPATIENT
Start: 2018-08-14 | End: 2019-04-09 | Stop reason: SDUPTHER

## 2018-08-14 ASSESSMENT — ENCOUNTER SYMPTOMS
SORE THROAT: 0
COUGH: 0
CHEST TIGHTNESS: 0
ABDOMINAL PAIN: 0
WHEEZING: 0
CONSTIPATION: 0

## 2018-08-14 NOTE — PROGRESS NOTES
Ms. Jenny Miller was here today. INR today:   Results for orders placed or performed in visit on 08/14/18   POCT INR   Result Value Ref Range    INR 2.6     Protime     INR Goal: 2.0-3.0    Dosing Plan  As of 8/14/2018    TTR:   66.9 % (1.2 y)   Full warfarin instructions:   5 mg on Mon, Wed, Fri; 7.5 mg all other days          PLAN: CONTINUE CURRENT DOSE    NEXT COUMADIN CLINIC APT IS: 9/6/2018 AT 3:45PM    [facility] INTERNAL MEDICINE COUMADIN CLINIC [phone]  Summer Hours:  Tuesday's-   11:12am-10:80pm  Wednesday's- 11:12am-10:80pm  Thursday's-  6:30am-4:30pm  IF IT'S AN EMERGENCY, PLEASE CALL 911 OR GO TO YOUR NEAREST EMERGENCY ROOM. IF UNABLE TO REACH COUMADIN CLINIC, [address] [place] [address] Rd, [phone].

## 2018-08-14 NOTE — PROGRESS NOTES
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[address]     Fatigue     Foot pain     Hx of amiodarone therapy 9/24/2014    Hyperlipidemia     PCP manages cholesterol    Hypertension     Menopause     Obesity     Skin rash     Type II or unspecified type diabetes mellitus without mention of complication, not stated as uncontrolled     Umbilical hernia     URI (upper respiratory infection)       Past Surgical History:   Procedure Laterality Date    CARDIAC CATHETERIZATION  10/21/09    EF 35% left ventricle is moderately enlarged     CARDIOVERSION  10/9/13    CHOLECYSTECTOMY      EYE SURGERY      retina and cataracts     GALLBLADDER SURGERY      TUBAL LIGATION       Current Outpatient Prescriptions   Medication Sig Dispense Refill    hydrochlorothiazide (HYDRODIURIL) 25 MG tablet Take 1/2 tablet daily 30 tablet 3    glipiZIDE (GLUCOTROL) 5 MG tablet Take 2 in am and 1 in pm 90 tablet 3    irbesartan-hydrochlorothiazide (AVALIDE) 300-12.5 MG per tablet Take 1 tablet by mouth daily 30 tablet 3    warfarin (COUMADIN) 5 MG tablet TAKE 1 TABLET BY MOUTH EVERY DAY ON M-W-F AND 1 1/2 TABLETS ON ALL OTHER DAYS 38 tablet 5    atorvastatin (LIPITOR) 80 MG tablet TAKE 1 TABLET BY MOUTH ONCE DAILY 30 tablet 5    levothyroxine (SYNTHROID) 125 MCG tablet TAKE 1 TABLET BY MOUTH ONCE DAILY 30 tablet 5    digoxin (LANOXIN) 125 MCG tablet TAKE 1 TABLET BY MOUTH ONCE DAILY 30 tablet 3    metFORMIN (GLUCOPHAGE) 500 MG tablet TAKE 2 TABLETS BY MOUTH TWICE DAILY WITH FOOD 120 tablet 3    metoprolol succinate (TOPROL XL) 50 late September 2017. She will  continue metformin. 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 , currently no prescription is needed     Mixed hyperlipidemia- lipid panel is good range, she will continue current treatment plans with Lipitor 80 mg daily     Hypothyroid- cont synthroid 137 µg daily/ her level good 4/18  Repeat 11/18     Vit D- increased to 2000 ( level 32)/ plan repeat level in 11/18    INR good today 2.6  Cont current dose coumadin      Orders Placed This Encounter   Procedures    Lipid Panel    Hemoglobin A1C    Comprehensive Metabolic Panel    Vitamin D 25 Hydroxy    TSH without Reflex    T4, Free     New Prescriptions    GLIPIZIDE (GLUCOTROL) 5 MG TABLET    Take 2 in am and 1 in pm    HYDROCHLOROTHIAZIDE (HYDRODIURIL) 25 MG TABLET    Take 1/2 tablet daily        Return in about 3 months (around 11/14/2018) for Medication check. There are no Patient Instructions on file for this visit. EMR Dragon/transcription disclaimer:Significant part of this  encounter note is electronic transcription/translation of spoken language to printed text. The electronic translation of spoken language may be erroneous, or at times, nonsensical words or phrases may be inadvertently transcribed.  Although I have reviewed the note for such errors, some may still exist.

## 2018-08-27 ENCOUNTER — OFFICE VISIT (OUTPATIENT)
Dept: URGENT CARE | Age: 63
End: 2018-08-27
Payer: COMMERCIAL

## 2018-08-27 VITALS
SYSTOLIC BLOOD PRESSURE: 137 MMHG | DIASTOLIC BLOOD PRESSURE: 83 MMHG | WEIGHT: 223 LBS | RESPIRATION RATE: 20 BRPM | OXYGEN SATURATION: 97 % | HEIGHT: 65 IN | HEART RATE: 102 BPM | TEMPERATURE: 97.5 F | BODY MASS INDEX: 37.15 KG/M2

## 2018-08-27 DIAGNOSIS — T63.301A SPIDER BITE WOUND, ACCIDENTAL OR UNINTENTIONAL, INITIAL ENCOUNTER: Primary | ICD-10-CM

## 2018-08-27 PROCEDURE — 99213 OFFICE O/P EST LOW 20 MIN: CPT | Performed by: NURSE PRACTITIONER

## 2018-08-27 RX ORDER — CLINDAMYCIN HYDROCHLORIDE 300 MG/1
300 CAPSULE ORAL 3 TIMES DAILY
Qty: 30 CAPSULE | Refills: 0 | Status: SHIPPED | OUTPATIENT
Start: 2018-08-27 | End: 2018-09-06

## 2018-08-27 RX ORDER — CALCIUM CARBONATE 200(500)MG
1 TABLET,CHEWABLE ORAL DAILY
Qty: 30 TABLET | Refills: 0 | Status: SHIPPED | OUTPATIENT
Start: 2018-08-27 | End: 2018-09-26

## 2018-08-27 NOTE — PROGRESS NOTES
Dose Series) 04/25/2019 (Originally 8/4/2004)    Colon cancer screen colonoscopy  10/16/2019 (Originally 8/4/2004)    DTaP/Tdap/Td vaccine (1 - Tdap) 10/15/2026 (Originally 8/4/1973)    Pneumococcal med risk (1 of 1 - PPSV23) 10/22/2026 (Originally 8/4/1973)    Hepatitis C screen  10/14/2027 (Originally 1955)    Flu vaccine (1) 10/21/2027 (Originally 9/1/2018)    HIV screen  10/12/2028 (Originally 8/4/1969)    Lipid screen  04/11/2019    TSH testing  04/11/2019    Diabetic foot exam  04/25/2019    A1C test (Diabetic or Prediabetic)  08/01/2019    Potassium monitoring  08/01/2019    Creatinine monitoring  08/01/2019    Cervical cancer screen  03/22/2020    Breast cancer screen  06/01/2020       Subjective:      Review of Systems   Constitutional: Negative. Negative for chills, fatigue, fever and unexpected weight change. HENT: Negative. Eyes: Negative. Negative for redness and itching. Respiratory: Negative. Negative for cough, shortness of breath and wheezing. Cardiovascular: Negative. Gastrointestinal: Negative. Genitourinary: Negative. Musculoskeletal: Negative. Skin: Positive for color change. Negative for rash. Neurological: Negative. Hematological: Negative. Psychiatric/Behavioral: Negative. Objective:     Physical Exam   Constitutional: She is oriented to person, place, and time. Vital signs are normal. She appears well-developed and well-nourished. Non-toxic appearance. She does not have a sickly appearance. She does not appear ill. No distress. HENT:   Head: Normocephalic and atraumatic. Eyes: Pupils are equal, round, and reactive to light. Conjunctivae are normal.   Neck: Trachea normal and normal range of motion. Cardiovascular: Normal rate and regular rhythm. Pulmonary/Chest: Effort normal and breath sounds normal.   Abdominal: Soft. Bowel sounds are normal.   Neurological: She is alert and oriented to person, place, and time.    Skin: Skin is warm. 2cm erythematous area with pinpoint dark center. Non-fluctuant. No drainage, no warmth, no streaks noted to area. Psychiatric: She has a normal mood and affect. Her speech is normal and behavior is normal. Judgment and thought content normal. Cognition and memory are normal.   Nursing note reviewed. /83   Pulse 102   Temp 97.5 °F (36.4 °C) (Oral)   Resp 20   Ht 5' 5\" (1.651 m)   Wt 223 lb (101.2 kg)   SpO2 97%   BMI 37.11 kg/m²     Assessment:       Diagnosis Orders   1. Spider bite wound, accidental or unintentional, initial encounter  clindamycin (CLEOCIN) 300 MG capsule       Plan:     Bite appears to be from spider. Pt agreed to treat prophylactic with antibiotics. Advised to follow up in 3 days or sooner if signs of infection worsen (discussed). Pt voiced understanding. No orders of the defined types were placed in this encounter. No Follow-up on file. No orders of the defined types were placed in this encounter. Orders Placed This Encounter   Medications    calcium carbonate (ANTACID) 500 MG chewable tablet     Sig: Take 1 tablet by mouth daily     Dispense:  30 tablet     Refill:  0    clindamycin (CLEOCIN) 300 MG capsule     Sig: Take 1 capsule by mouth 3 times daily for 10 days     Dispense:  30 capsule     Refill:  0       Patient given educational materials - see patient instructions. Discussed use, benefit, and side effects of prescribed medications. All patient questions answered. Pt voiced understanding. Reviewed health maintenance. Instructed to continue current medications, diet and exercise. Patient agreed with treatment plan. Follow up as directed. Patient Instructions   1. Take antibiotic as prescribed  2. Take claritin for 10 days  3. Take antacid for 3 days  4. Watch for signs of infection: warmth, red streaks, fever and return to clinic or go to ER  5.  Return to clinic as needed        Electronically signed by ADELITA Hernandez -

## 2018-08-28 ASSESSMENT — ENCOUNTER SYMPTOMS
SHORTNESS OF BREATH: 0
EYE REDNESS: 0
EYES NEGATIVE: 1
WHEEZING: 0
EYE ITCHING: 0
COLOR CHANGE: 1
RESPIRATORY NEGATIVE: 1
COUGH: 0
GASTROINTESTINAL NEGATIVE: 1

## 2018-09-06 ENCOUNTER — NURSE ONLY (OUTPATIENT)
Dept: INTERNAL MEDICINE | Age: 63
End: 2018-09-06
Payer: COMMERCIAL

## 2018-09-06 DIAGNOSIS — I48.20 CHRONIC ATRIAL FIBRILLATION (HCC): Primary | ICD-10-CM

## 2018-09-06 LAB
INTERNATIONAL NORMALIZATION RATIO, POC: 3.5
PROTHROMBIN TIME, POC: NORMAL

## 2018-09-06 PROCEDURE — 85610 PROTHROMBIN TIME: CPT | Performed by: INTERNAL MEDICINE

## 2018-09-18 ENCOUNTER — NURSE ONLY (OUTPATIENT)
Dept: INTERNAL MEDICINE | Age: 63
End: 2018-09-18
Payer: COMMERCIAL

## 2018-09-18 DIAGNOSIS — I48.20 CHRONIC ATRIAL FIBRILLATION (HCC): Primary | ICD-10-CM

## 2018-09-18 LAB
INTERNATIONAL NORMALIZATION RATIO, POC: 2.2
PROTHROMBIN TIME, POC: NORMAL

## 2018-09-18 PROCEDURE — 85610 PROTHROMBIN TIME: CPT | Performed by: INTERNAL MEDICINE

## 2018-09-18 NOTE — PROGRESS NOTES
Ms. Donald Ring was here today. INR today:   Results for orders placed or performed in visit on 09/18/18   POCT INR   Result Value Ref Range    INR 2.2     Protime       INR Goal: 2.0-3.0    Dosing Plan  As of 9/18/2018    TTR:   65.6 % (1.3 y)   Full warfarin instructions:   5 mg on Mon, Wed, Fri; 7.5 mg all other days          PLAN: CONTINUE CURRENT DOSE  NEXT COUMADIN CLINIC APT IS: 10/4/2018 at 3:45pm    Coumadin Clinic Hours  Tuesday 7:30am - 4:00pm  Wednesday 7:30am - 4:00pm  Thursday 7:30am - 4:00pm  IF IT'S AN EMERGENCY, PLEASE CALL 911 OR GO TO YOUR NEAREST EMERGENCY ROOM. [facility] INTERNAL MEDICINE COUMADIN CLINIC [phone]  IF UNABLE TO REACH COUMADIN CLINIC, [address] [place] [address], [phone].

## 2018-10-04 ENCOUNTER — NURSE ONLY (OUTPATIENT)
Dept: INTERNAL MEDICINE | Age: 63
End: 2018-10-04
Payer: COMMERCIAL

## 2018-10-04 DIAGNOSIS — I48.20 CHRONIC ATRIAL FIBRILLATION (HCC): Primary | ICD-10-CM

## 2018-10-04 LAB
INTERNATIONAL NORMALIZATION RATIO, POC: 2.4
PROTHROMBIN TIME, POC: NORMAL

## 2018-10-04 PROCEDURE — 85610 PROTHROMBIN TIME: CPT | Performed by: INTERNAL MEDICINE

## 2018-10-11 RX ORDER — DIGOXIN 125 MCG
TABLET ORAL
Qty: 30 TABLET | Refills: 5 | Status: SHIPPED | OUTPATIENT
Start: 2018-10-11 | End: 2019-04-08 | Stop reason: SDUPTHER

## 2018-10-18 ENCOUNTER — NURSE ONLY (OUTPATIENT)
Dept: INTERNAL MEDICINE | Age: 63
End: 2018-10-18
Payer: COMMERCIAL

## 2018-10-18 DIAGNOSIS — I48.20 CHRONIC ATRIAL FIBRILLATION (HCC): Primary | ICD-10-CM

## 2018-10-18 LAB
INTERNATIONAL NORMALIZATION RATIO, POC: 2.5
PROTHROMBIN TIME, POC: NORMAL

## 2018-10-18 PROCEDURE — 85610 PROTHROMBIN TIME: CPT | Performed by: INTERNAL MEDICINE

## 2018-10-18 NOTE — PROGRESS NOTES
Ms. Zach Cleary was here today. INR today:   Results for orders placed or performed in visit on 10/18/18   POCT INR   Result Value Ref Range    INR 2.5     Protime     INR Goal: 2.0-3.0    Dosing Plan  As of 10/18/2018    TTR:   67.7 % (1.3 y)   Full warfarin instructions:   5 mg on Mon, Wed, Fri; 7.5 mg all other days            PLAN: CONTINUE CURRENT DOSE  NEXT COUMADIN CLINIC APT IS: 11/14/2018 at 11:15am (at appt with Dr. Kevin Garcia)  IF YOU START AN ANTIBIOTIC, CALL AND I WILL GET YOU IN SOONER. Coumadin Clinic Hours  Tuesday 7:30am - 4:00pm  Wednesday 7:30am - 4:00pm  Thursday 7:30am - 4:00pm  IF IT'S AN EMERGENCY, PLEASE CALL 911 OR GO TO YOUR NEAREST EMERGENCY ROOM. [facility] INTERNAL MEDICINE COUMADIN CLINIC [phone]  IF UNABLE TO REACH COUMADIN CLINIC, [address] [place] [address], [phone].

## 2018-10-31 DIAGNOSIS — E55.9 VITAMIN D DEFICIENCY: ICD-10-CM

## 2018-10-31 DIAGNOSIS — F33.2 ENDOGENOUS DEPRESSION (HCC): ICD-10-CM

## 2018-10-31 DIAGNOSIS — E11.29 TYPE 2 DIABETES MELLITUS WITH MICROALBUMINURIA, WITHOUT LONG-TERM CURRENT USE OF INSULIN (HCC): ICD-10-CM

## 2018-10-31 DIAGNOSIS — I10 ESSENTIAL HYPERTENSION: ICD-10-CM

## 2018-10-31 DIAGNOSIS — R80.9 TYPE 2 DIABETES MELLITUS WITH MICROALBUMINURIA, WITHOUT LONG-TERM CURRENT USE OF INSULIN (HCC): ICD-10-CM

## 2018-10-31 DIAGNOSIS — I48.20 CHRONIC ATRIAL FIBRILLATION (HCC): ICD-10-CM

## 2018-10-31 LAB
ALBUMIN SERPL-MCNC: 3.9 G/DL (ref 3.5–5.2)
ALP BLD-CCNC: 42 U/L (ref 35–104)
ALT SERPL-CCNC: 32 U/L (ref 5–33)
ANION GAP SERPL CALCULATED.3IONS-SCNC: 14 MMOL/L (ref 7–19)
AST SERPL-CCNC: 27 U/L (ref 5–32)
BILIRUB SERPL-MCNC: 0.6 MG/DL (ref 0.2–1.2)
BUN BLDV-MCNC: 8 MG/DL (ref 8–23)
CALCIUM SERPL-MCNC: 9.4 MG/DL (ref 8.8–10.2)
CHLORIDE BLD-SCNC: 101 MMOL/L (ref 98–111)
CHOLESTEROL, TOTAL: 144 MG/DL (ref 160–199)
CO2: 29 MMOL/L (ref 22–29)
CREAT SERPL-MCNC: 0.5 MG/DL (ref 0.5–0.9)
GFR NON-AFRICAN AMERICAN: >60
GLUCOSE BLD-MCNC: 125 MG/DL (ref 74–109)
HBA1C MFR BLD: 6.9 % (ref 4–6)
HDLC SERPL-MCNC: 41 MG/DL (ref 65–121)
LDL CHOLESTEROL CALCULATED: 76 MG/DL
POTASSIUM SERPL-SCNC: 4.4 MMOL/L (ref 3.5–5)
SODIUM BLD-SCNC: 144 MMOL/L (ref 136–145)
T4 FREE: 1.5 NG/DL (ref 0.9–1.7)
TOTAL PROTEIN: 7.1 G/DL (ref 6.6–8.7)
TRIGL SERPL-MCNC: 136 MG/DL (ref 0–149)
TSH SERPL DL<=0.05 MIU/L-ACNC: 2.36 UIU/ML (ref 0.27–4.2)
VITAMIN D 25-HYDROXY: 36.1 NG/ML

## 2018-11-13 RX ORDER — IRBESARTAN AND HYDROCHLOROTHIAZIDE 300; 12.5 MG/1; MG/1
TABLET, FILM COATED ORAL
Qty: 30 TABLET | Refills: 0 | Status: SHIPPED | OUTPATIENT
Start: 2018-11-13 | End: 2018-12-11 | Stop reason: SDUPTHER

## 2018-11-14 ENCOUNTER — NURSE ONLY (OUTPATIENT)
Dept: INTERNAL MEDICINE | Age: 63
End: 2018-11-14
Payer: COMMERCIAL

## 2018-11-14 ENCOUNTER — OFFICE VISIT (OUTPATIENT)
Dept: INTERNAL MEDICINE | Age: 63
End: 2018-11-14
Payer: COMMERCIAL

## 2018-11-14 VITALS
BODY MASS INDEX: 35.68 KG/M2 | DIASTOLIC BLOOD PRESSURE: 86 MMHG | SYSTOLIC BLOOD PRESSURE: 130 MMHG | HEART RATE: 88 BPM | HEIGHT: 66 IN | OXYGEN SATURATION: 98 % | WEIGHT: 222 LBS

## 2018-11-14 DIAGNOSIS — I48.20 CHRONIC ATRIAL FIBRILLATION (HCC): Primary | ICD-10-CM

## 2018-11-14 DIAGNOSIS — Z79.01 LONG TERM CURRENT USE OF ANTICOAGULANT THERAPY: ICD-10-CM

## 2018-11-14 DIAGNOSIS — I48.20 CHRONIC ATRIAL FIBRILLATION (HCC): ICD-10-CM

## 2018-11-14 DIAGNOSIS — E11.29 TYPE 2 DIABETES MELLITUS WITH MICROALBUMINURIA, WITHOUT LONG-TERM CURRENT USE OF INSULIN (HCC): Primary | ICD-10-CM

## 2018-11-14 DIAGNOSIS — E03.9 ACQUIRED HYPOTHYROIDISM: ICD-10-CM

## 2018-11-14 DIAGNOSIS — R80.9 TYPE 2 DIABETES MELLITUS WITH MICROALBUMINURIA, WITHOUT LONG-TERM CURRENT USE OF INSULIN (HCC): Primary | ICD-10-CM

## 2018-11-14 DIAGNOSIS — J01.00 ACUTE NON-RECURRENT MAXILLARY SINUSITIS: ICD-10-CM

## 2018-11-14 DIAGNOSIS — E78.2 MIXED HYPERLIPIDEMIA: ICD-10-CM

## 2018-11-14 DIAGNOSIS — E55.9 VITAMIN D DEFICIENCY: ICD-10-CM

## 2018-11-14 DIAGNOSIS — I10 ESSENTIAL HYPERTENSION: ICD-10-CM

## 2018-11-14 LAB
INTERNATIONAL NORMALIZATION RATIO, POC: 2.2
PROTHROMBIN TIME, POC: NORMAL

## 2018-11-14 PROCEDURE — 99214 OFFICE O/P EST MOD 30 MIN: CPT | Performed by: INTERNAL MEDICINE

## 2018-11-14 PROCEDURE — 85610 PROTHROMBIN TIME: CPT | Performed by: INTERNAL MEDICINE

## 2018-11-14 RX ORDER — AZITHROMYCIN 250 MG/1
250 TABLET, FILM COATED ORAL SEE ADMIN INSTRUCTIONS
Qty: 6 TABLET | Refills: 0 | Status: SHIPPED | OUTPATIENT
Start: 2018-11-14 | End: 2019-02-20 | Stop reason: SDUPTHER

## 2018-11-14 ASSESSMENT — ENCOUNTER SYMPTOMS
COUGH: 0
ABDOMINAL PAIN: 0
SINUS PRESSURE: 1
CHEST TIGHTNESS: 0
WHEEZING: 0
CONSTIPATION: 0
SINUS PAIN: 1

## 2018-11-14 NOTE — PROGRESS NOTES
Dragon/transcription disclaimer:Significant part of this  encounter note is electronic transcription/translationof spoken language to printed text. The electronic translation of spoken language may be erroneous, or at times, nonsensical words or phrases may be inadvertently transcribed.  Although I have reviewed the note for sucherrors, some may still exist.

## 2018-11-14 NOTE — PROGRESS NOTES
Ms. Ian Rizvi was here today. INR today:   Results for orders placed or performed in visit on 11/14/18   POCT INR   Result Value Ref Range    INR 2.2     Protime       INR Goal: 2.0-3.0    Dosing Plan  As of 11/14/2018    TTR:   69.4 % (1.4 y)   Full warfarin instructions:   5 mg on Mon, Wed, Fri; 7.5 mg all other days          PLAN: CONTINUE CURRENT DOSE  NEXT COUMADIN CLINIC APT IS: 12/13/2018 at 4:00pm    Coumadin Clinic Hours  Tuesday 7:30am - 4:00pm  Wednesday 7:30am - 4:00pm  Thursday 7:30am - 4:00pm  IF IT'S AN EMERGENCY, PLEASE CALL 911 OR GO TO YOUR NEAREST EMERGENCY ROOM. [facility] INTERNAL MEDICINE COUMADIN CLINIC [phone]  IF UNABLE TO REACH COUMADIN CLINIC, [address] [place] [address], [phone].

## 2018-11-19 ENCOUNTER — OFFICE VISIT (OUTPATIENT)
Dept: CARDIOLOGY | Age: 63
End: 2018-11-19
Payer: COMMERCIAL

## 2018-11-19 VITALS
SYSTOLIC BLOOD PRESSURE: 126 MMHG | DIASTOLIC BLOOD PRESSURE: 78 MMHG | WEIGHT: 226 LBS | BODY MASS INDEX: 37.65 KG/M2 | HEART RATE: 66 BPM | HEIGHT: 65 IN

## 2018-11-19 DIAGNOSIS — I25.10 CORONARY ARTERY DISEASE INVOLVING NATIVE CORONARY ARTERY OF NATIVE HEART WITHOUT ANGINA PECTORIS: ICD-10-CM

## 2018-11-19 DIAGNOSIS — I50.22 CHRONIC SYSTOLIC CONGESTIVE HEART FAILURE (HCC): ICD-10-CM

## 2018-11-19 DIAGNOSIS — I48.20 CHRONIC ATRIAL FIBRILLATION (HCC): Primary | ICD-10-CM

## 2018-11-19 DIAGNOSIS — I10 ESSENTIAL HYPERTENSION: ICD-10-CM

## 2018-11-19 DIAGNOSIS — E66.01 MORBID OBESITY DUE TO EXCESS CALORIES (HCC): ICD-10-CM

## 2018-11-19 PROCEDURE — 99213 OFFICE O/P EST LOW 20 MIN: CPT | Performed by: CLINICAL NURSE SPECIALIST

## 2018-11-19 ASSESSMENT — ENCOUNTER SYMPTOMS
CHEST TIGHTNESS: 0
SHORTNESS OF BREATH: 0
COUGH: 0
WHEEZING: 0
FACIAL SWELLING: 0
EYE REDNESS: 0
NAUSEA: 0
VOMITING: 0
ABDOMINAL PAIN: 0

## 2018-11-19 NOTE — PROGRESS NOTES
11/14/18 130/86   08/27/18 137/83    Pulse Readings from Last 3 Encounters:   11/19/18 66   11/14/18 88   08/27/18 102        Wt Readings from Last 3 Encounters:   11/19/18 226 lb (102.5 kg)   11/14/18 222 lb (100.7 kg)   08/27/18 223 lb (101.2 kg)      Physical Exam   Constitutional: She is oriented to person, place, and time. She appears well-developed and well-nourished. HENT:   Head: Normocephalic and atraumatic. Eyes: Pupils are equal, round, and reactive to light. Right eye exhibits no discharge. Left eye exhibits no discharge. Neck: No JVD present. No tracheal deviation present. Cardiovascular: Normal rate, regular rhythm, normal heart sounds and intact distal pulses. Exam reveals no gallop and no friction rub. No murmur heard. No carotid bruit   Pulmonary/Chest: Effort normal and breath sounds normal. No respiratory distress. She has no wheezes. She has no rales. Abdominal: Soft. There is no tenderness. Musculoskeletal: She exhibits no edema. Normal gait and station   Neurological: She is alert and oriented to person, place, and time. No cranial nerve deficit. Skin: Skin is warm and dry. No rash noted. Psychiatric: She has a normal mood and affect. Her behavior is normal. Judgment normal.   Nursing note and vitals reviewed. Data:  Lab Results   Component Value Date    CHOL 144 (L) 10/31/2018    TRIG 136 10/31/2018    HDL 41 (L) 10/31/2018    LDLCALC 76 10/31/2018     Lab Results   Component Value Date    ALT 32 10/31/2018    AST 27 10/31/2018     Assessment:     Diagnosis Orders   1. Chronic atrial fibrillation ([address]     2. Chronic systolic congestive heart failure ([address]     3. Coronary artery disease involving native coronary artery of native heart without angina pectoris     4. Essential hypertension     5.  Morbid obesity due to excess calories (HCC)       Chronic atrial fibrillation-rate controlled and anticoagulated without bleeding issues    Chronic systolic heart failure NYHA class II- appears euvolemic guideline directed medical therapy    CAD-no angina symptoms, nonocclusive disease    Hypertension-well controlled on current regimen    Morbid obesity-discussed weight loss and encouraged exercise    Recommend testing for sleep apnea. Patient declines at this time    Stable cardiovascular status. No evidence of overt heart failure,angina or dysrhythmia. Plan    Return in about 6 months (around 5/19/2019) for ADELITA. Consider testing for sleep apnea    Call with any questionsor concerns  Follow up with Vivi Garrett MD for non cardiac problems and coumadin management  Report any new problems  Cardiovascular Fitness-Exercise as tolerated. Strive for 15 minutes of exercise most days of the week. Cardiac / HealthyDiet  Continue current medications as directed  Continue plan of treatment  It is always recommended that you bring your medicationsbottles with you to each visit - this is for your safety!        ADELITA Alex

## 2018-12-11 RX ORDER — IRBESARTAN AND HYDROCHLOROTHIAZIDE 300; 12.5 MG/1; MG/1
TABLET, FILM COATED ORAL
Qty: 30 TABLET | Refills: 3 | Status: SHIPPED | OUTPATIENT
Start: 2018-12-11 | End: 2019-04-08 | Stop reason: SDUPTHER

## 2018-12-11 RX ORDER — GLIPIZIDE 5 MG/1
TABLET ORAL
Qty: 90 TABLET | Refills: 3 | Status: SHIPPED | OUTPATIENT
Start: 2018-12-11 | End: 2019-04-08 | Stop reason: SDUPTHER

## 2018-12-11 NOTE — TELEPHONE ENCOUNTER
Ricki Wynn called requesting a refill of the below medication which has been pended for you:     Requested Prescriptions     Pending Prescriptions Disp Refills    glipiZIDE (GLUCOTROL) 5 MG tablet [Pharmacy Med Name: GLIPIZIDE 5MG TAB SD ND*] 90 tablet 3     Sig: TAKE 2 TABLETS IN THE MORNING AND 1 TABLET IN THE EVENING    irbesartan-hydrochlorothiazide (AVALIDE) 300-12.5 MG per tablet [Pharmacy Med Name: IRBE/HCTZ 300/12.5MG] 30 tablet 3     Sig: TAKE 1 TABLET BY MOUTH ONCE DAILY.        Last Appointment Date: 11/14/2018  Next Appointment Date: 12/13/2018    Allergies   Allergen Reactions    Aspartame And Phenylalanine     Penicillins     Shellfish-Derived Products     Zetia [Ezetimibe]

## 2018-12-13 ENCOUNTER — NURSE ONLY (OUTPATIENT)
Dept: INTERNAL MEDICINE | Age: 63
End: 2018-12-13
Payer: COMMERCIAL

## 2018-12-13 DIAGNOSIS — I48.20 CHRONIC ATRIAL FIBRILLATION (HCC): Primary | ICD-10-CM

## 2018-12-13 LAB
INTERNATIONAL NORMALIZATION RATIO, POC: 2.3
PROTHROMBIN TIME, POC: NORMAL

## 2018-12-13 PROCEDURE — 85610 PROTHROMBIN TIME: CPT | Performed by: INTERNAL MEDICINE

## 2018-12-13 NOTE — PROGRESS NOTES
Ms. Cynthia Cosme was here today. INR today:   Results for orders placed or performed in visit on 12/13/18   POCT INR   Result Value Ref Range    INR 2.3     Protime     INR Goal: 2.0-3.0    Dosing Plan  As of 12/13/2018    TTR:   71.1 % (1.5 y)   Full warfarin instructions:   5 mg on Mon, Wed, Fri; 7.5 mg all other days            PLAN: CONTINUE CURRENT DOSE  NEXT COUMADIN CLINIC APT IS: 1/10/2019 AT 4:00PM    Coumadin Clinic Hours  Tuesday 7:30am - 4:00pm  Wednesday 7:30am - 4:00pm  Thursday 7:30am - 4:00pm  IF IT'S AN EMERGENCY, PLEASE CALL 911 OR GO TO YOUR NEAREST EMERGENCY ROOM. [facility] [phone]  IF UNABLE TO REACH COUMADIN CLINIC, [address] [place] [address], [phone].

## 2019-01-09 RX ORDER — ATORVASTATIN CALCIUM 80 MG/1
TABLET, FILM COATED ORAL
Qty: 30 TABLET | Refills: 0 | Status: SHIPPED | OUTPATIENT
Start: 2019-01-09 | End: 2019-02-07 | Stop reason: SDUPTHER

## 2019-01-09 RX ORDER — WARFARIN SODIUM 5 MG/1
TABLET ORAL
Qty: 38 TABLET | Refills: 0 | Status: SHIPPED | OUTPATIENT
Start: 2019-01-09 | End: 2019-02-07 | Stop reason: SDUPTHER

## 2019-01-10 ENCOUNTER — NURSE ONLY (OUTPATIENT)
Dept: INTERNAL MEDICINE | Age: 64
End: 2019-01-10
Payer: COMMERCIAL

## 2019-01-10 DIAGNOSIS — Z79.01 LONG TERM CURRENT USE OF ANTICOAGULANT THERAPY: Primary | ICD-10-CM

## 2019-01-10 LAB
INTERNATIONAL NORMALIZATION RATIO, POC: 2.1
PROTHROMBIN TIME, POC: NORMAL

## 2019-01-10 PROCEDURE — 85610 PROTHROMBIN TIME: CPT | Performed by: INTERNAL MEDICINE

## 2019-01-15 RX ORDER — LEVOTHYROXINE SODIUM 0.12 MG/1
TABLET ORAL
Qty: 30 TABLET | Refills: 0 | Status: SHIPPED | OUTPATIENT
Start: 2019-01-15 | End: 2019-02-07 | Stop reason: SDUPTHER

## 2019-02-06 ENCOUNTER — NURSE ONLY (OUTPATIENT)
Dept: INTERNAL MEDICINE | Age: 64
End: 2019-02-06
Payer: COMMERCIAL

## 2019-02-06 DIAGNOSIS — I48.20 CHRONIC ATRIAL FIBRILLATION (HCC): Primary | ICD-10-CM

## 2019-02-06 DIAGNOSIS — E55.9 VITAMIN D DEFICIENCY: ICD-10-CM

## 2019-02-06 DIAGNOSIS — R80.9 TYPE 2 DIABETES MELLITUS WITH MICROALBUMINURIA, WITHOUT LONG-TERM CURRENT USE OF INSULIN (HCC): ICD-10-CM

## 2019-02-06 DIAGNOSIS — I10 ESSENTIAL HYPERTENSION: ICD-10-CM

## 2019-02-06 DIAGNOSIS — E11.29 TYPE 2 DIABETES MELLITUS WITH MICROALBUMINURIA, WITHOUT LONG-TERM CURRENT USE OF INSULIN (HCC): ICD-10-CM

## 2019-02-06 LAB
ALBUMIN SERPL-MCNC: 4.3 G/DL (ref 3.5–5.2)
ALP BLD-CCNC: 46 U/L (ref 35–104)
ALT SERPL-CCNC: 26 U/L (ref 5–33)
ANION GAP SERPL CALCULATED.3IONS-SCNC: 17 MMOL/L (ref 7–19)
AST SERPL-CCNC: 22 U/L (ref 5–32)
BILIRUB SERPL-MCNC: 0.6 MG/DL (ref 0.2–1.2)
BUN BLDV-MCNC: 10 MG/DL (ref 8–23)
CALCIUM SERPL-MCNC: 9.8 MG/DL (ref 8.8–10.2)
CHLORIDE BLD-SCNC: 100 MMOL/L (ref 98–111)
CO2: 27 MMOL/L (ref 22–29)
CREAT SERPL-MCNC: 0.5 MG/DL (ref 0.5–0.9)
GFR NON-AFRICAN AMERICAN: >60
GLUCOSE BLD-MCNC: 137 MG/DL (ref 74–109)
HBA1C MFR BLD: 7.3 % (ref 4–6)
INTERNATIONAL NORMALIZATION RATIO, POC: 2.3
POTASSIUM SERPL-SCNC: 4.2 MMOL/L (ref 3.5–5)
PROTHROMBIN TIME, POC: NORMAL
SODIUM BLD-SCNC: 144 MMOL/L (ref 136–145)
TOTAL PROTEIN: 7.4 G/DL (ref 6.6–8.7)

## 2019-02-06 PROCEDURE — 85610 PROTHROMBIN TIME: CPT | Performed by: INTERNAL MEDICINE

## 2019-02-07 RX ORDER — LEVOTHYROXINE SODIUM 0.12 MG/1
TABLET ORAL
Qty: 30 TABLET | Refills: 0 | Status: SHIPPED | OUTPATIENT
Start: 2019-02-07 | End: 2019-02-20 | Stop reason: SDUPTHER

## 2019-02-07 RX ORDER — ATORVASTATIN CALCIUM 80 MG/1
TABLET, FILM COATED ORAL
Qty: 30 TABLET | Refills: 0 | Status: SHIPPED | OUTPATIENT
Start: 2019-02-07 | End: 2019-03-05 | Stop reason: SDUPTHER

## 2019-02-07 RX ORDER — WARFARIN SODIUM 5 MG/1
TABLET ORAL
Qty: 38 TABLET | Refills: 0 | Status: SHIPPED | OUTPATIENT
Start: 2019-02-07 | End: 2019-03-05 | Stop reason: SDUPTHER

## 2019-02-20 ENCOUNTER — OFFICE VISIT (OUTPATIENT)
Dept: INTERNAL MEDICINE | Age: 64
End: 2019-02-20
Payer: COMMERCIAL

## 2019-02-20 VITALS
HEIGHT: 66 IN | RESPIRATION RATE: 18 BRPM | WEIGHT: 225 LBS | HEART RATE: 95 BPM | BODY MASS INDEX: 36.16 KG/M2 | DIASTOLIC BLOOD PRESSURE: 88 MMHG | OXYGEN SATURATION: 96 % | SYSTOLIC BLOOD PRESSURE: 138 MMHG

## 2019-02-20 DIAGNOSIS — R80.9 TYPE 2 DIABETES MELLITUS WITH MICROALBUMINURIA, WITHOUT LONG-TERM CURRENT USE OF INSULIN (HCC): ICD-10-CM

## 2019-02-20 DIAGNOSIS — J21.9 ACUTE BRONCHITIS AND BRONCHIOLITIS: ICD-10-CM

## 2019-02-20 DIAGNOSIS — F33.2 ENDOGENOUS DEPRESSION (HCC): ICD-10-CM

## 2019-02-20 DIAGNOSIS — J20.9 ACUTE BRONCHITIS AND BRONCHIOLITIS: ICD-10-CM

## 2019-02-20 DIAGNOSIS — E55.9 VITAMIN D DEFICIENCY: ICD-10-CM

## 2019-02-20 DIAGNOSIS — E11.29 TYPE 2 DIABETES MELLITUS WITH MICROALBUMINURIA, WITHOUT LONG-TERM CURRENT USE OF INSULIN (HCC): ICD-10-CM

## 2019-02-20 DIAGNOSIS — R09.81 SINUS CONGESTION: ICD-10-CM

## 2019-02-20 DIAGNOSIS — I10 ESSENTIAL HYPERTENSION: Primary | ICD-10-CM

## 2019-02-20 DIAGNOSIS — E03.9 ACQUIRED HYPOTHYROIDISM: ICD-10-CM

## 2019-02-20 DIAGNOSIS — E78.2 MIXED HYPERLIPIDEMIA: ICD-10-CM

## 2019-02-20 PROCEDURE — 99214 OFFICE O/P EST MOD 30 MIN: CPT | Performed by: INTERNAL MEDICINE

## 2019-02-20 RX ORDER — FLUTICASONE PROPIONATE 50 MCG
2 SPRAY, SUSPENSION (ML) NASAL DAILY
Qty: 3 BOTTLE | Refills: 0 | Status: SHIPPED | OUTPATIENT
Start: 2019-02-20 | End: 2019-05-21 | Stop reason: ALTCHOICE

## 2019-02-20 RX ORDER — AZITHROMYCIN 250 MG/1
250 TABLET, FILM COATED ORAL SEE ADMIN INSTRUCTIONS
Qty: 6 TABLET | Refills: 0 | Status: SHIPPED | OUTPATIENT
Start: 2019-02-20 | End: 2019-02-25

## 2019-02-20 RX ORDER — LEVOTHYROXINE SODIUM 0.12 MG/1
TABLET ORAL
Qty: 30 TABLET | Refills: 5 | Status: SHIPPED | OUTPATIENT
Start: 2019-02-20 | End: 2019-09-06 | Stop reason: SDUPTHER

## 2019-02-20 ASSESSMENT — ENCOUNTER SYMPTOMS
ABDOMINAL PAIN: 0
CONSTIPATION: 0
CHEST TIGHTNESS: 0
COUGH: 1
WHEEZING: 0
SORE THROAT: 0

## 2019-03-05 RX ORDER — ATORVASTATIN CALCIUM 80 MG/1
TABLET, FILM COATED ORAL
Qty: 30 TABLET | Refills: 3 | Status: SHIPPED | OUTPATIENT
Start: 2019-03-05 | End: 2019-07-06 | Stop reason: SDUPTHER

## 2019-03-05 RX ORDER — WARFARIN SODIUM 5 MG/1
TABLET ORAL
Qty: 38 TABLET | Refills: 5 | Status: SHIPPED | OUTPATIENT
Start: 2019-03-05 | End: 2019-09-06 | Stop reason: SDUPTHER

## 2019-03-06 ENCOUNTER — NURSE ONLY (OUTPATIENT)
Dept: INTERNAL MEDICINE | Age: 64
End: 2019-03-06
Payer: COMMERCIAL

## 2019-03-06 DIAGNOSIS — I48.20 CHRONIC ATRIAL FIBRILLATION (HCC): Primary | ICD-10-CM

## 2019-03-06 LAB
INTERNATIONAL NORMALIZATION RATIO, POC: 2.1
PROTHROMBIN TIME, POC: NORMAL

## 2019-03-06 PROCEDURE — 85610 PROTHROMBIN TIME: CPT | Performed by: INTERNAL MEDICINE

## 2019-03-15 ENCOUNTER — OFFICE VISIT (OUTPATIENT)
Dept: URGENT CARE | Age: 64
End: 2019-03-15
Payer: COMMERCIAL

## 2019-03-15 ENCOUNTER — TELEPHONE (OUTPATIENT)
Dept: INTERNAL MEDICINE | Age: 64
End: 2019-03-15

## 2019-03-15 VITALS
RESPIRATION RATE: 18 BRPM | BODY MASS INDEX: 36.93 KG/M2 | DIASTOLIC BLOOD PRESSURE: 77 MMHG | HEART RATE: 107 BPM | TEMPERATURE: 99.1 F | WEIGHT: 229.8 LBS | OXYGEN SATURATION: 98 % | SYSTOLIC BLOOD PRESSURE: 139 MMHG | HEIGHT: 66 IN

## 2019-03-15 DIAGNOSIS — J02.9 SORE THROAT: ICD-10-CM

## 2019-03-15 DIAGNOSIS — R42 VERTIGO: ICD-10-CM

## 2019-03-15 DIAGNOSIS — J06.9 URI, ACUTE: Primary | ICD-10-CM

## 2019-03-15 LAB
INFLUENZA A ANTIBODY: NEGATIVE
INFLUENZA B ANTIBODY: NEGATIVE
S PYO AG THROAT QL: NORMAL

## 2019-03-15 PROCEDURE — 87880 STREP A ASSAY W/OPTIC: CPT | Performed by: SPECIALIST

## 2019-03-15 PROCEDURE — 99213 OFFICE O/P EST LOW 20 MIN: CPT | Performed by: SPECIALIST

## 2019-03-15 PROCEDURE — 87804 INFLUENZA ASSAY W/OPTIC: CPT | Performed by: SPECIALIST

## 2019-03-15 RX ORDER — CEFDINIR 300 MG/1
300 CAPSULE ORAL 2 TIMES DAILY
Qty: 14 CAPSULE | Refills: 0 | Status: SHIPPED | OUTPATIENT
Start: 2019-03-15 | End: 2019-03-22

## 2019-03-15 RX ORDER — MECLIZINE HYDROCHLORIDE 25 MG/1
25 TABLET ORAL 3 TIMES DAILY PRN
Qty: 30 TABLET | Refills: 0 | Status: SHIPPED | OUTPATIENT
Start: 2019-03-15 | End: 2019-03-25

## 2019-03-15 ASSESSMENT — ENCOUNTER SYMPTOMS
RHINORRHEA: 1
COUGH: 1
SORE THROAT: 1

## 2019-03-26 ENCOUNTER — NURSE ONLY (OUTPATIENT)
Dept: INTERNAL MEDICINE | Age: 64
End: 2019-03-26
Payer: COMMERCIAL

## 2019-03-26 DIAGNOSIS — I48.20 CHRONIC ATRIAL FIBRILLATION (HCC): Primary | ICD-10-CM

## 2019-03-26 LAB
INTERNATIONAL NORMALIZATION RATIO, POC: 2.2
PROTHROMBIN TIME, POC: NORMAL

## 2019-03-26 PROCEDURE — 85610 PROTHROMBIN TIME: CPT | Performed by: INTERNAL MEDICINE

## 2019-04-08 RX ORDER — GLIPIZIDE 5 MG/1
TABLET ORAL
Qty: 90 TABLET | Refills: 0 | Status: SHIPPED | OUTPATIENT
Start: 2019-04-08 | End: 2019-05-07 | Stop reason: SDUPTHER

## 2019-04-08 RX ORDER — IRBESARTAN AND HYDROCHLOROTHIAZIDE 300; 12.5 MG/1; MG/1
TABLET, FILM COATED ORAL
Qty: 30 TABLET | Refills: 0 | Status: SHIPPED | OUTPATIENT
Start: 2019-04-08 | End: 2019-05-07 | Stop reason: SDUPTHER

## 2019-04-08 RX ORDER — DIGOXIN 125 MCG
TABLET ORAL
Qty: 30 TABLET | Refills: 0 | Status: SHIPPED | OUTPATIENT
Start: 2019-04-08 | End: 2019-05-07 | Stop reason: SDUPTHER

## 2019-04-08 NOTE — TELEPHONE ENCOUNTER
Kaylee Halon called requesting a refill of the below medication which has been pended for you:     Requested Prescriptions     Pending Prescriptions Disp Refills    irbesartan-hydrochlorothiazide (AVALIDE) 300-12.5 MG per tablet [Pharmacy Med Name: IRBE/HCTZ 300/12.5MG] 30 tablet 0     Sig: TAKE 1 TABLET BY MOUTH ONCE DAILY.     glipiZIDE (GLUCOTROL) 5 MG tablet [Pharmacy Med Name: GLIPIZIDE 5MG TAB SD NM*] 90 tablet 0     Sig: TAKE 2 TABLETS IN THE MORNING AND 1 TABLET IN THE EVENING    digoxin (LANOXIN) 125 MCG tablet [Pharmacy Med Name: DIGOXIN 0.125MGTAB] 30 tablet 0     Sig: TAKE 1 TABLET BY MOUTH ONCE DAILY       Last Appointment Date: 2/20/2019  Next Appointment Date: 4/25/2019    Allergies   Allergen Reactions    Aspartame And Phenylalanine     Penicillins     Shellfish-Derived Products     Zetia [Ezetimibe]

## 2019-04-09 RX ORDER — HYDROCHLOROTHIAZIDE 25 MG/1
TABLET ORAL
Qty: 30 TABLET | Refills: 0 | Status: SHIPPED | OUTPATIENT
Start: 2019-04-09 | End: 2019-11-05 | Stop reason: SDUPTHER

## 2019-04-09 NOTE — TELEPHONE ENCOUNTER
Anjana Worley called requesting a refill of the below medication which has been pended for you:     Requested Prescriptions     Pending Prescriptions Disp Refills    hydrochlorothiazide (HYDRODIURIL) 25 MG tablet [Pharmacy Med Name: HCTZ 25MG TABLET SD] 30 tablet 0     Sig: TAKE (1/2) ONE-HALF TABLET BY MOUTH DAILY.        Last Appointment Date: 2/20/2019  Next Appointment Date: 4/25/2019    Allergies   Allergen Reactions    Aspartame And Phenylalanine     Penicillins     Shellfish-Derived Products     Zetia [Ezetimibe]

## 2019-04-25 ENCOUNTER — NURSE ONLY (OUTPATIENT)
Dept: INTERNAL MEDICINE | Age: 64
End: 2019-04-25
Payer: COMMERCIAL

## 2019-04-25 DIAGNOSIS — I48.20 CHRONIC ATRIAL FIBRILLATION (HCC): Primary | ICD-10-CM

## 2019-04-25 LAB
INTERNATIONAL NORMALIZATION RATIO, POC: 2.3
PROTHROMBIN TIME, POC: NORMAL

## 2019-04-25 PROCEDURE — 85610 PROTHROMBIN TIME: CPT | Performed by: INTERNAL MEDICINE

## 2019-05-07 RX ORDER — IRBESARTAN AND HYDROCHLOROTHIAZIDE 300; 12.5 MG/1; MG/1
TABLET, FILM COATED ORAL
Qty: 30 TABLET | Refills: 5 | Status: SHIPPED | OUTPATIENT
Start: 2019-05-07 | End: 2019-11-05 | Stop reason: SDUPTHER

## 2019-05-07 RX ORDER — GLIPIZIDE 5 MG/1
TABLET ORAL
Qty: 90 TABLET | Refills: 5 | Status: SHIPPED | OUTPATIENT
Start: 2019-05-07 | End: 2019-11-05 | Stop reason: SDUPTHER

## 2019-05-07 RX ORDER — DIGOXIN 125 MCG
TABLET ORAL
Qty: 30 TABLET | Refills: 5 | Status: SHIPPED | OUTPATIENT
Start: 2019-05-07 | End: 2019-11-05 | Stop reason: SDUPTHER

## 2019-05-07 NOTE — TELEPHONE ENCOUNTER
Gerri Llamas called requesting a refill of the below medication which has been pended for you:     Requested Prescriptions     Pending Prescriptions Disp Refills    irbesartan-hydrochlorothiazide (AVALIDE) 300-12.5 MG per tablet [Pharmacy Med Name: IRBE/HCTZ 300/12.5MG] 30 tablet 0     Sig: TAKE 1 TABLET BY MOUTH ONCE DAILY.  glipiZIDE (GLUCOTROL) 5 MG tablet [Pharmacy Med Name: GLIPIZIDE 5MG TAB SD NC*] 90 tablet 0     Sig: TAKE 2 TABLETS BY MOUTH IN THE MORNING AND 1 TABLET IN THE EVENING    digoxin (LANOXIN) 125 MCG tablet [Pharmacy Med Name: DIGOXIN 0.125MGTAB] 30 tablet 0     Sig: TAKE 1 TABLET BY MOUTH ONCE DAILY.        Last Appointment Date: 2/20/2019  Next Appointment Date: 5/21/2019    Allergies   Allergen Reactions    Aspartame And Phenylalanine     Penicillins     Shellfish-Derived Products     Zetia [Ezetimibe]

## 2019-05-08 DIAGNOSIS — I10 ESSENTIAL HYPERTENSION: ICD-10-CM

## 2019-05-08 DIAGNOSIS — R80.9 TYPE 2 DIABETES MELLITUS WITH MICROALBUMINURIA, WITHOUT LONG-TERM CURRENT USE OF INSULIN (HCC): ICD-10-CM

## 2019-05-08 DIAGNOSIS — I10 HYPERTENSION: ICD-10-CM

## 2019-05-08 DIAGNOSIS — E03.9 ACQUIRED HYPOTHYROIDISM: ICD-10-CM

## 2019-05-08 DIAGNOSIS — E11.29 TYPE 2 DIABETES MELLITUS WITH MICROALBUMINURIA, WITHOUT LONG-TERM CURRENT USE OF INSULIN (HCC): ICD-10-CM

## 2019-05-08 DIAGNOSIS — E78.2 MIXED HYPERLIPIDEMIA: ICD-10-CM

## 2019-05-08 DIAGNOSIS — F33.2 ENDOGENOUS DEPRESSION (HCC): ICD-10-CM

## 2019-05-08 DIAGNOSIS — E55.9 VITAMIN D DEFICIENCY: ICD-10-CM

## 2019-05-08 LAB
ALBUMIN SERPL-MCNC: 4.2 G/DL (ref 3.5–5.2)
ALP BLD-CCNC: 41 U/L (ref 35–104)
ALT SERPL-CCNC: 27 U/L (ref 5–33)
ANION GAP SERPL CALCULATED.3IONS-SCNC: 13 MMOL/L (ref 7–19)
AST SERPL-CCNC: 27 U/L (ref 5–32)
BACTERIA: NEGATIVE /HPF
BASOPHILS ABSOLUTE: 0.1 K/UL (ref 0–0.2)
BASOPHILS RELATIVE PERCENT: 1 % (ref 0–1)
BILIRUB SERPL-MCNC: 0.7 MG/DL (ref 0.2–1.2)
BILIRUBIN URINE: NEGATIVE
BLOOD, URINE: NEGATIVE
BUN BLDV-MCNC: 8 MG/DL (ref 8–23)
CALCIUM SERPL-MCNC: 9.7 MG/DL (ref 8.8–10.2)
CHLORIDE BLD-SCNC: 99 MMOL/L (ref 98–111)
CHOLESTEROL, TOTAL: 146 MG/DL (ref 160–199)
CLARITY: CLEAR
CO2: 30 MMOL/L (ref 22–29)
COLOR: YELLOW
CREAT SERPL-MCNC: <0.5 MG/DL (ref 0.5–0.9)
EOSINOPHILS ABSOLUTE: 0.2 K/UL (ref 0–0.6)
EOSINOPHILS RELATIVE PERCENT: 2.5 % (ref 0–5)
EPITHELIAL CELLS, UA: 3 /HPF (ref 0–5)
GFR NON-AFRICAN AMERICAN: >60
GLUCOSE BLD-MCNC: 109 MG/DL (ref 74–109)
GLUCOSE URINE: NEGATIVE MG/DL
HBA1C MFR BLD: 7.2 % (ref 4–6)
HCT VFR BLD CALC: 41.7 % (ref 37–47)
HDLC SERPL-MCNC: 41 MG/DL (ref 65–121)
HEMOGLOBIN: 13.6 G/DL (ref 12–16)
HYALINE CASTS: 1 /HPF (ref 0–8)
KETONES, URINE: NEGATIVE MG/DL
LDL CHOLESTEROL CALCULATED: 75 MG/DL
LEUKOCYTE ESTERASE, URINE: ABNORMAL
LYMPHOCYTES ABSOLUTE: 3.1 K/UL (ref 1.1–4.5)
LYMPHOCYTES RELATIVE PERCENT: 41.6 % (ref 20–40)
MCH RBC QN AUTO: 28.9 PG (ref 27–31)
MCHC RBC AUTO-ENTMCNC: 32.6 G/DL (ref 33–37)
MCV RBC AUTO: 88.5 FL (ref 81–99)
MONOCYTES ABSOLUTE: 0.7 K/UL (ref 0–0.9)
MONOCYTES RELATIVE PERCENT: 9 % (ref 0–10)
NEUTROPHILS ABSOLUTE: 3.4 K/UL (ref 1.5–7.5)
NEUTROPHILS RELATIVE PERCENT: 45.8 % (ref 50–65)
NITRITE, URINE: NEGATIVE
PDW BLD-RTO: 13.8 % (ref 11.5–14.5)
PH UA: 7.5 (ref 5–8)
PLATELET # BLD: 197 K/UL (ref 130–400)
PMV BLD AUTO: 11.1 FL (ref 9.4–12.3)
POTASSIUM SERPL-SCNC: 3.9 MMOL/L (ref 3.5–5)
PROTEIN UA: NEGATIVE MG/DL
RBC # BLD: 4.71 M/UL (ref 4.2–5.4)
RBC UA: 1 /HPF (ref 0–4)
SODIUM BLD-SCNC: 142 MMOL/L (ref 136–145)
SPECIFIC GRAVITY UA: 1.01 (ref 1–1.03)
T4 FREE: 1.5 NG/DL (ref 0.9–1.7)
TOTAL PROTEIN: 7.4 G/DL (ref 6.6–8.7)
TRIGL SERPL-MCNC: 150 MG/DL (ref 0–149)
TSH SERPL DL<=0.05 MIU/L-ACNC: 2.93 UIU/ML (ref 0.27–4.2)
UROBILINOGEN, URINE: 0.2 E.U./DL
VITAMIN D 25-HYDROXY: 39 NG/ML
WBC # BLD: 7.3 K/UL (ref 4.8–10.8)
WBC UA: 1 /HPF (ref 0–5)

## 2019-05-09 DIAGNOSIS — I10 ESSENTIAL HYPERTENSION: ICD-10-CM

## 2019-05-09 RX ORDER — METOPROLOL SUCCINATE 25 MG/1
TABLET, EXTENDED RELEASE ORAL
Qty: 30 TABLET | Refills: 0 | Status: SHIPPED | OUTPATIENT
Start: 2019-05-09 | End: 2019-06-25 | Stop reason: DRUGHIGH

## 2019-05-09 RX ORDER — METOPROLOL SUCCINATE 50 MG/1
TABLET, EXTENDED RELEASE ORAL
Qty: 90 TABLET | Refills: 0 | Status: SHIPPED | OUTPATIENT
Start: 2019-05-09 | End: 2019-07-06 | Stop reason: SDUPTHER

## 2019-05-20 ENCOUNTER — OFFICE VISIT (OUTPATIENT)
Dept: CARDIOLOGY | Age: 64
End: 2019-05-20
Payer: COMMERCIAL

## 2019-05-20 VITALS
WEIGHT: 228 LBS | HEIGHT: 66 IN | SYSTOLIC BLOOD PRESSURE: 138 MMHG | HEART RATE: 106 BPM | DIASTOLIC BLOOD PRESSURE: 88 MMHG | BODY MASS INDEX: 36.64 KG/M2

## 2019-05-20 DIAGNOSIS — I25.10 CORONARY ARTERY DISEASE INVOLVING NATIVE CORONARY ARTERY OF NATIVE HEART WITHOUT ANGINA PECTORIS: ICD-10-CM

## 2019-05-20 DIAGNOSIS — I48.20 CHRONIC ATRIAL FIBRILLATION (HCC): Primary | ICD-10-CM

## 2019-05-20 DIAGNOSIS — I10 ESSENTIAL HYPERTENSION: ICD-10-CM

## 2019-05-20 DIAGNOSIS — E66.01 MORBID OBESITY DUE TO EXCESS CALORIES (HCC): ICD-10-CM

## 2019-05-20 DIAGNOSIS — I50.22 CHRONIC SYSTOLIC CONGESTIVE HEART FAILURE (HCC): ICD-10-CM

## 2019-05-20 PROCEDURE — 99213 OFFICE O/P EST LOW 20 MIN: CPT | Performed by: CLINICAL NURSE SPECIALIST

## 2019-05-20 PROCEDURE — 93000 ELECTROCARDIOGRAM COMPLETE: CPT | Performed by: CLINICAL NURSE SPECIALIST

## 2019-05-20 ASSESSMENT — ENCOUNTER SYMPTOMS
VOMITING: 0
ABDOMINAL PAIN: 0
SHORTNESS OF BREATH: 0
WHEEZING: 0
FACIAL SWELLING: 0
CHEST TIGHTNESS: 0
EYE REDNESS: 0
NAUSEA: 0
COUGH: 0

## 2019-05-20 NOTE — PATIENT INSTRUCTIONS

## 2019-05-20 NOTE — PROGRESS NOTES
Cough     Diabetes     Dizzy     Dysuria     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[facility]     Fatigue     Foot pain     Hx of amiodarone therapy 9/24/2014    Hyperlipidemia     PCP manages cholesterol    Hypertension     Menopause     Obesity     Skin rash     Type II or unspecified type diabetes mellitus without mention of complication, not stated as uncontrolled     Umbilical hernia     URI (upper respiratory infection)      Past Surgical History:   Procedure Laterality Date    CARDIAC CATHETERIZATION  10/21/09    EF 35% left ventricle is moderately enlarged     CARDIOVERSION  10/9/13    CHOLECYSTECTOMY      EYE SURGERY      retina and cataracts     GALLBLADDER SURGERY      TUBAL LIGATION       Family History   Problem Relation Age of Onset    Diabetes Mother     Heart Failure Mother     High Blood Pressure Mother     Liver Cancer Father     Colon Cancer Neg Hx     Colon Polyps Neg Hx      Social History     Tobacco Use    Smoking status: Never Smoker    Smokeless tobacco: Never Used   Substance Use Topics    Alcohol use: No      Current Outpatient Medications   Medication Sig Dispense Refill    metoprolol succinate (TOPROL XL) 25 MG extended release tablet TAKE 1 TABLET BY MOUTH ONCE DAILY 30 tablet 0    metoprolol succinate (TOPROL XL) 50 MG extended release tablet TAKE 1 TABLET BY MOUTH ONCE DAILY. 90 tablet 0    irbesartan-hydrochlorothiazide (AVALIDE) 300-12.5 MG per tablet TAKE 1 TABLET BY MOUTH ONCE DAILY. 30 tablet 5    glipiZIDE (GLUCOTROL) 5 MG tablet TAKE 2 TABLETS BY MOUTH IN THE MORNING AND 1 TABLET IN THE EVENING 90 tablet 5    digoxin (LANOXIN) 125 MCG tablet TAKE 1 TABLET BY MOUTH ONCE DAILY. 30 tablet 5    hydrochlorothiazide (HYDRODIURIL) 25 MG tablet TAKE (1/2) ONE-HALF TABLET BY MOUTH DAILY.  30 tablet 0    metFORMIN (GLUCOPHAGE) 500 MG tablet TAKE 2 TABLETS BY MOUTH TWICE DAILY WITH FOOD 120 tablet 2    warfarin (COUMADIN) 5 MG tablet TAKE 1 TABLET BY MOUTH EVERY DAY ON M-W-F AND 1 1/2 TABLET ON THE OTHER DAYS 38 tablet 5    atorvastatin (LIPITOR) 80 MG tablet TAKE 1 TABLET BY MOUTH ONCE DAILY. 30 tablet 3    levothyroxine (SYNTHROID) 125 MCG tablet TAKE 1 TABLET BY MOUTH ONCE DAILY 30 tablet 5    fluticasone (FLONASE) 50 MCG/ACT nasal spray 2 sprays by Each Nare route daily 3 Bottle 0    albuterol sulfate HFA (VENTOLIN HFA) 108 (90 Base) MCG/ACT inhaler Inhale 2 puffs into the lungs every 6 hours as needed for Wheezing 1 Inhaler 5    Multiple Vitamins-Minerals (THERAPEUTIC MULTIVITAMIN-MINERALS) tablet Take 1 tablet by mouth daily.  Vitamin D (CHOLECALCIFEROL) 1000 UNITS CAPS capsule Take 1,000 Units by mouth 2 times daily       aspirin 81 MG EC tablet Take 81 mg by mouth daily. No current facility-administered medications for this visit. Allergies: Aspartame and phenylalanine; Penicillins; Shellfish-derived products; and Zetia [ezetimibe]    Review of Systems  Review of Systems   Constitutional: Negative for activity change, diaphoresis, fatigue, fever and unexpected weight change. HENT: Negative for facial swelling and nosebleeds. Eyes: Negative for redness and visual disturbance. Respiratory: Negative for cough, chest tightness, shortness of breath and wheezing. Cardiovascular: Positive for leg swelling (occasional). Negative for chest pain and palpitations. Gastrointestinal: Negative for abdominal pain, nausea and vomiting. Endocrine: Negative for cold intolerance and heat intolerance. Genitourinary: Negative for dysuria and hematuria. Musculoskeletal: Negative for arthralgias and myalgias. Skin: Negative for pallor and rash. Neurological: Negative for dizziness, seizures, syncope, weakness and light-headedness. Hematological: Does not bruise/bleed easily. Psychiatric/Behavioral: Negative for agitation. The patient is not nervous/anxious.         Objective  Vital Signs - /88   Pulse 106   Ht 5' 5.5\" (1.664 m)   Wt 228 lb (103.4 kg)   BMI 37.36 kg/m²      BP Readings from Last 3 Encounters:   05/20/19 138/88   03/15/19 139/77   02/20/19 138/88    Pulse Readings from Last 3 Encounters:   05/20/19 106   03/15/19 107   02/20/19 95        Wt Readings from Last 3 Encounters:   05/20/19 228 lb (103.4 kg)   03/15/19 229 lb 12.8 oz (104.2 kg)   02/20/19 225 lb (102.1 kg)      Physical Exam   Constitutional: She is oriented to person, place, and time. She appears well-developed and well-nourished. HENT:   Head: Normocephalic and atraumatic. Eyes: Pupils are equal, round, and reactive to light. Right eye exhibits no discharge. Left eye exhibits no discharge. Neck: No JVD present. No tracheal deviation present. Cardiovascular: Normal rate, normal heart sounds and intact distal pulses. Exam reveals no gallop and no friction rub. No murmur heard. No carotid bruit  Irregularly irregular rhythm   Pulmonary/Chest: Effort normal and breath sounds normal. No respiratory distress. She has no wheezes. She has no rales. Abdominal: Soft. There is no tenderness. Musculoskeletal: She exhibits no edema. Neurological: She is alert and oriented to person, place, and time. No cranial nerve deficit. Skin: Skin is warm and dry. No rash noted. Psychiatric: She has a normal mood and affect. Her behavior is normal. Judgment normal.   Nursing note and vitals reviewed. Data:  Lab Results   Component Value Date    CHOL 146 (L) 05/08/2019    TRIG 150 (H) 05/08/2019    HDL 41 (L) 05/08/2019    LDLCALC 75 05/08/2019     Lab Results   Component Value Date    ALT 27 05/08/2019    AST 27 05/08/2019     EKG shows atrial fibrillation rate 93  Assessment:     Diagnosis Orders   1. Chronic atrial fibrillation (HCC)  EKG 12 lead   2. Chronic systolic congestive heart failure ([address]     3. Coronary artery disease involving native coronary artery of native heart without angina pectoris     4. Essential hypertension     5.  Morbid obesity due to excess calories ([address]       Chronic atrial fibrillation-rate controlled and anticoagulated without bleeding issues. Coumadin monitored via PCP office    Chronic systolic heart failure NYHA class II- appears euvolemic guideline directed medical therapy    CAD-no angina symptoms, nonocclusive disease    Hypertension-well controlled on current regimen    Morbid obesity-discussed weight loss and encouraged exercise    Recommend testing for sleep apnea. Patient declines at this time    Stable cardiovascular status. No evidence of overt heart failure,angina or dysrhythmia. Plan    Return in about 6 months (around 11/20/2019) for APRN. Call with any questionsor concerns  Follow up with Luis Gabriel MD for non cardiac problems and coumadin management  Report any new problems  Cardiovascular Fitness-Exercise as tolerated. Strive for 15 minutes of exercise most days of the week. Cardiac / HealthyDiet  Continue current medications as directed  Continue plan of treatment  It is always recommended that you bring your medicationsbottles with you to each visit - this is for your safety!        ADELITA Alvarado

## 2019-05-21 ENCOUNTER — NURSE ONLY (OUTPATIENT)
Dept: INTERNAL MEDICINE | Age: 64
End: 2019-05-21
Payer: COMMERCIAL

## 2019-05-21 ENCOUNTER — TELEPHONE (OUTPATIENT)
Dept: OTOLARYNGOLOGY | Age: 64
End: 2019-05-21

## 2019-05-21 ENCOUNTER — OFFICE VISIT (OUTPATIENT)
Dept: INTERNAL MEDICINE | Age: 64
End: 2019-05-21
Payer: COMMERCIAL

## 2019-05-21 VITALS
DIASTOLIC BLOOD PRESSURE: 80 MMHG | BODY MASS INDEX: 37.53 KG/M2 | WEIGHT: 229 LBS | HEART RATE: 88 BPM | OXYGEN SATURATION: 98 % | SYSTOLIC BLOOD PRESSURE: 138 MMHG

## 2019-05-21 DIAGNOSIS — R49.9 CHANGE IN VOICE: ICD-10-CM

## 2019-05-21 DIAGNOSIS — I48.0 PAF (PAROXYSMAL ATRIAL FIBRILLATION) (HCC): ICD-10-CM

## 2019-05-21 DIAGNOSIS — E78.2 MIXED HYPERLIPIDEMIA: ICD-10-CM

## 2019-05-21 DIAGNOSIS — E03.9 ACQUIRED HYPOTHYROIDISM: ICD-10-CM

## 2019-05-21 DIAGNOSIS — Z12.39 SCREENING FOR BREAST CANCER: ICD-10-CM

## 2019-05-21 DIAGNOSIS — E55.9 VITAMIN D DEFICIENCY: ICD-10-CM

## 2019-05-21 DIAGNOSIS — I48.20 CHRONIC ATRIAL FIBRILLATION (HCC): Primary | ICD-10-CM

## 2019-05-21 DIAGNOSIS — R80.9 TYPE 2 DIABETES MELLITUS WITH MICROALBUMINURIA, WITHOUT LONG-TERM CURRENT USE OF INSULIN (HCC): ICD-10-CM

## 2019-05-21 DIAGNOSIS — E11.29 TYPE 2 DIABETES MELLITUS WITH MICROALBUMINURIA, WITHOUT LONG-TERM CURRENT USE OF INSULIN (HCC): ICD-10-CM

## 2019-05-21 DIAGNOSIS — I10 ESSENTIAL HYPERTENSION: ICD-10-CM

## 2019-05-21 DIAGNOSIS — Z00.00 ANNUAL PHYSICAL EXAM: Primary | ICD-10-CM

## 2019-05-21 LAB
INTERNATIONAL NORMALIZATION RATIO, POC: 2.2
PROTHROMBIN TIME, POC: NORMAL

## 2019-05-21 PROCEDURE — 85610 PROTHROMBIN TIME: CPT | Performed by: INTERNAL MEDICINE

## 2019-05-21 PROCEDURE — 99396 PREV VISIT EST AGE 40-64: CPT | Performed by: INTERNAL MEDICINE

## 2019-05-21 RX ORDER — ALBUTEROL SULFATE 90 UG/1
2 AEROSOL, METERED RESPIRATORY (INHALATION) EVERY 6 HOURS PRN
Qty: 1 INHALER | Refills: 5 | Status: SHIPPED | OUTPATIENT
Start: 2019-05-21 | End: 2020-03-12 | Stop reason: SDUPTHER

## 2019-05-21 ASSESSMENT — ENCOUNTER SYMPTOMS
CONSTIPATION: 0
CHEST TIGHTNESS: 0
SINUS PRESSURE: 0
DIARRHEA: 0
COLOR CHANGE: 0
TROUBLE SWALLOWING: 0
EYE PAIN: 0
VOICE CHANGE: 0
ABDOMINAL PAIN: 0
VOMITING: 0
WHEEZING: 0
NAUSEA: 0
SORE THROAT: 0
EYE REDNESS: 0
COUGH: 0
BLOOD IN STOOL: 0

## 2019-05-21 NOTE — PROGRESS NOTES
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[facility]     Fatigue     Foot pain     Hx of amiodarone therapy 9/24/2014    Hyperlipidemia     PCP manages cholesterol    Hypertension     Menopause     Obesity     Skin rash     Type II or unspecified type diabetes mellitus without mention of complication, not stated as uncontrolled     Umbilical hernia     URI (upper respiratory infection)        Past Surgical History:   Procedure Laterality Date    CARDIAC CATHETERIZATION  10/21/09    EF 35% left ventricle is moderately enlarged     CARDIOVERSION  10/9/13    CHOLECYSTECTOMY      EYE SURGERY      retina and cataracts     GALLBLADDER SURGERY      TUBAL LIGATION         Current Outpatient Medications   Medication Sig Dispense Refill    albuterol sulfate HFA (VENTOLIN HFA) 108 (90 Base) MCG/ACT inhaler Inhale 2 puffs into the lungs every 6 hours as needed for Wheezing 1 Inhaler 5    metoprolol succinate (TOPROL XL) 25 MG extended release tablet TAKE 1 TABLET BY MOUTH ONCE DAILY 30 tablet 0    metoprolol succinate (TOPROL XL) 50 MG extended release tablet TAKE 1 TABLET BY MOUTH ONCE DAILY. 90 tablet 0    irbesartan-hydrochlorothiazide (AVALIDE) 300-12.5 MG per tablet TAKE 1 TABLET BY MOUTH ONCE DAILY. 30 tablet 5    glipiZIDE (GLUCOTROL) 5 MG tablet TAKE 2 TABLETS BY MOUTH IN THE MORNING AND 1 TABLET IN THE EVENING 90 tablet 5    digoxin (LANOXIN) 125 MCG tablet TAKE 1 TABLET BY MOUTH ONCE DAILY.  30 tablet 5    hydrochlorothiazide (HYDRODIURIL) 25 MG tablet TAKE (1/2) ONE-HALF TABLET BY MOUTH DAILY. 30 tablet 0    metFORMIN (GLUCOPHAGE) 500 MG tablet TAKE 2 TABLETS BY MOUTH TWICE DAILY WITH FOOD 120 tablet 2    warfarin (COUMADIN) 5 MG tablet TAKE 1 TABLET BY MOUTH EVERY DAY ON M-W-F AND 1 1/2 TABLET ON THE OTHER DAYS 38 tablet 5    atorvastatin (LIPITOR) 80 MG tablet TAKE 1 TABLET BY MOUTH ONCE DAILY. 30 tablet 3    levothyroxine (SYNTHROID) 125 MCG tablet TAKE 1 TABLET BY MOUTH ONCE DAILY 30 tablet 5    Multiple Vitamins-Minerals (THERAPEUTIC MULTIVITAMIN-MINERALS) tablet Take 1 tablet by mouth daily.  Vitamin D (CHOLECALCIFEROL) 1000 UNITS CAPS capsule Take 1,000 Units by mouth 2 times daily       aspirin 81 MG EC tablet Take 81 mg by mouth daily. No current facility-administered medications for this visit.       Allergies   Allergen Reactions    Aspartame And Phenylalanine     Penicillins     Shellfish-Derived Products     Zetia [Ezetimibe]        Social History     Socioeconomic History    Marital status:      Spouse name: Not on file    Number of children: Not on file    Years of education: Not on file    Highest education level: Not on file   Occupational History    Occupation: [employer]   Social Needs    Financial resource strain: Not on file    Food insecurity:     Worry: Not on file     Inability: Not on file    Transportation needs:     Medical: Not on file     Non-medical: Not on file   Tobacco Use    Smoking status: Never Smoker    Smokeless tobacco: Never Used   Substance and Sexual Activity    Alcohol use: No    Drug use: No    Sexual activity: Not on file   Lifestyle    Physical activity:     Days per week: Not on file     Minutes per session: Not on file    Stress: Not on file   Relationships    Social connections:     Talks on phone: Not on file     Gets together: Not on file     Attends [employer] service: Not on file     Active member of club or organization: Not on file     Attends meetings of clubs or organizations: Not on file     Relationship status: Not on file    Intimate partner violence:     Fear of current or ex partner: Not on file     Emotionally abused: Not on file     Physically abused: Not on file     Forced sexual activity: Not on file   Other Topics Concern    Not on file   Social History Narrative    Not on file     Family History   Problem Relation Age of Onset    Diabetes Mother     Heart Failure Mother     High Blood Pressure Mother     Liver Cancer Father     Colon Cancer Neg Hx     Colon Polyps Neg Hx           Past Surgical History:   Procedure Laterality Date    CARDIAC CATHETERIZATION  10/21/09    EF 35% left ventricle is moderately enlarged     CARDIOVERSION  10/9/13    CHOLECYSTECTOMY      EYE SURGERY      retina and cataracts     GALLBLADDER SURGERY      TUBAL LIGATION           Lab Review   Orders Only on 05/08/2019   Component Date Value    Vit D, 25-Hydroxy 05/08/2019 39.0     T4 Free 05/08/2019 1.5     TSH 05/08/2019 2.930     Color, UA 05/08/2019 YELLOW     Clarity, UA 05/08/2019 Clear     Glucose, Ur 05/08/2019 Negative     Bilirubin Urine 05/08/2019 Negative     Ketones, Urine 05/08/2019 Negative     Specific Gravity, UA 05/08/2019 1.008     Blood, Urine 05/08/2019 Negative     pH, UA 05/08/2019 7.5     Protein, UA 05/08/2019 Negative     Urobilinogen, Urine 05/08/2019 0.2     Nitrite, Urine 05/08/2019 Negative     Leukocyte Esterase, Urine 05/08/2019 TRACE*    WBC 05/08/2019 7.3     RBC 05/08/2019 4.71     Hemoglobin 05/08/2019 13.6     Hematocrit 05/08/2019 41.7     MCV 05/08/2019 88.5     MCH 05/08/2019 28.9     MCHC 05/08/2019 32.6*    RDW 05/08/2019 13.8     Platelets 43/93/7048 197     MPV 05/08/2019 11.1     Neutrophils % 05/08/2019 45.8*    Lymphocytes % 05/08/2019 41.6*    Monocytes % 05/08/2019 9.0     Eosinophils % 05/08/2019 2.5     Basophils % 05/08/2019 1.0     Neutrophils # 05/08/2019 3.4     Lymphocytes # 05/08/2019 3.1     Monocytes # 05/08/2019 0.70     Eosinophils # 05/08/2019 0.20     Basophils # 05/08/2019 0.10     Sodium 05/08/2019 142     Potassium 05/08/2019 3.9     Chloride 05/08/2019 99     CO2 05/08/2019 30*    Anion Gap 05/08/2019 13     Glucose 05/08/2019 109     BUN 05/08/2019 8     CREATININE 05/08/2019 <0.5     GFR Non- 05/08/2019 >60     Calcium 05/08/2019 9.7     Total Protein 05/08/2019 7.4     Alb 05/08/2019 4.2     Total Bilirubin 05/08/2019 0.7     Alkaline Phosphatase 05/08/2019 41     ALT 05/08/2019 27     AST 05/08/2019 27     Hemoglobin A1C 05/08/2019 7.2*    Cholesterol, Total 05/08/2019 146*    Triglycerides 05/08/2019 150*    HDL 05/08/2019 41*    LDL Calculated 05/08/2019 75     Bacteria, UA 05/08/2019 NEGATIVE     Hyaline Casts, UA 05/08/2019 1     WBC, UA 05/08/2019 1     RBC, UA 05/08/2019 1     Epi Cells 05/08/2019 3    Nurse Only on 04/25/2019   Component Date Value    INR 04/25/2019 2.3    Nurse Only on 03/26/2019   Component Date Value    INR 03/26/2019 2.2          Review of Systems   Constitutional: Positive for fatigue. Negative for chills and fever. HENT: Negative for congestion, ear pain, postnasal drip, sinus pressure, sore throat, trouble swallowing and voice change. Eyes: Negative for pain, redness and visual disturbance. Respiratory: Negative for cough, chest tightness and wheezing. Cardiovascular: Negative for chest pain, palpitations and leg swelling. Gastrointestinal: Negative for abdominal pain, blood in stool, constipation, diarrhea, nausea and vomiting. Endocrine: Negative for polydipsia and polyuria. Genitourinary: Negative for dysuria, enuresis, flank pain, frequency and urgency. Musculoskeletal: Positive for arthralgias. Negative for gait problem and joint swelling. Skin: Negative for color change and rash.    Neurological: Negative for dizziness, tremors, syncope, facial asymmetry, speech difficulty, weakness, numbness and headaches. Psychiatric/Behavioral: Negative for agitation, behavioral problems, confusion, sleep disturbance and suicidal ideas. The patient is not nervous/anxious. Vitals:    05/21/19 1041   BP: 138/80   Pulse: 88   SpO2: 98%   Weight: 229 lb (103.9 kg)      Wt Readings from Last 3 Encounters:   05/21/19 229 lb (103.9 kg)   05/20/19 228 lb (103.4 kg)   03/15/19 229 lb 12.8 oz (104.2 kg)   Body mass index is 37.53 kg/m². BP Readings from Last 3 Encounters:   05/21/19 138/80   05/20/19 138/88   03/15/19 139/77       Physical Exam   Constitutional: She is oriented to person, place, and time. She appears well-developed and well-nourished. HENT:   Head: Normocephalic and atraumatic. Right Ear: External ear normal.   Left Ear: External ear normal.   Mouth/Throat: Oropharyngeal exudate present. Eyes: Pupils are equal, round, and reactive to light. Conjunctivae are normal. No scleral icterus. Neck: Normal range of motion. Neck supple. No JVD present. No thyromegaly present. Cardiovascular: Normal rate, regular rhythm and normal heart sounds. No murmur heard. Pulmonary/Chest: Effort normal and breath sounds normal. No respiratory distress. She has no wheezes. She exhibits no tenderness. Abdominal: Soft. Bowel sounds are normal. She exhibits no mass. There is no tenderness. Umbilical hernia present   Musculoskeletal: Normal range of motion. She exhibits no edema or tenderness. Lymphadenopathy:     She has no cervical adenopathy. Neurological: She is alert and oriented to person, place, and time. She has normal reflexes. No cranial nerve deficit. Coordination normal.   Skin: Skin is warm and dry. No rash noted. No erythema. Psychiatric: She has a normal mood and affect.  Her behavior is normal.     Breast exam  Bilateral breast exam- symmetric, no nodules, no lymphadenopathy, no nipple discharge            ASSESSMENT/PLAN  ANNUAL PHYSICAL  Mammogram May 2018, repeat 1 year  Bone density 4/16 repeat 5 years  Colonoscopy- refuses/ agrees to cologuard  PAP 2017/ repeat 3 yrs    Essential hypertension- since we changed to avapro 300/12.5 she has been taking less hctz  Cont 25 m hctz  BP is good/ cont same meds     Type 2 diabetes mellitus with microalbuminuria, without long-term current use of insulin ([address]-  Hemoglobin A1c is well 7.2 (7.3) ( 6.9) (7.3 )(7.6)/was 9.3 late September 2017. She will  continue metformin. 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 , currently no prescription is needed     Hypothyroid- cont synthroid 125 µg daily/level next lab in 3 mo  Repeat 11/18      INR good  2.2  Cont current dose coumadin    Hyperlipidemia  LDL is 75  TG good  lipitor 80 - cont    Vit d deficiency- now good 44- cont 2000 daily    States that has to clear her throat all the time  Throat feels scratchy/ voice stays hoarse- change in voice  She takes claritin + mucinex daily  Proceed with ENT referral per pt request  Orders Placed This Encounter   Procedures    MORRIS DIGITAL SCREEN W CAD BILATERAL    Hemoglobin A1C    Comprehensive Metabolic Panel    Lipid Panel    TSH without Reflex   Rosslyn Boast, MD, Otolaryngology, [facility]     New Prescriptions    No medications on file      There are no Patient Instructions on file for this visit. Return in about 3 months (around 8/21/2019) for Medication check. EMR Dragon/transcription disclaimer:Significant part of this  encounter note is electronic transcription/translation of spoken language to printed text. The electronic translation of spoken language may beerroneous, or at times, nonsensical words or phrases may be inadvertently transcribed.  Although I have reviewed the note for such errors,

## 2019-05-21 NOTE — PROGRESS NOTES
Ms. Rosana Lynne was here today. INR today:   Results for orders placed or performed in visit on 05/21/19   POCT INR   Result Value Ref Range    INR 2.2     Protime     INR Goal: 2.0-3.0    Dosing Plan  As of 5/21/2019    TTR:   77.6 % (1.9 y)   Full warfarin instructions:   5 mg every Mon, Wed, Fri; 7.5 mg all other days            PLAN: CONTINUE CURRENT DOSE  NEXT COUMADIN CLINIC APT IS: 6/20/2019 at 4:00pm    Coumadin Clinic Hours  Tuesday 7:30am - 4:00pm  Wednesday 7:30am - 4:00pm  Thursday 7:30am - 4:00pm  IF IT'S AN EMERGENCY, PLEASE CALL 911 OR GO TO YOUR NEAREST EMERGENCY ROOM. [facility] INTERNAL MEDICINE COUMADIN CLINIC [phone]  IF UNABLE TO REACH COUMADIN CLINIC, [address] [place] [address], [phone].

## 2019-05-30 ENCOUNTER — OFFICE VISIT (OUTPATIENT)
Dept: OTOLARYNGOLOGY | Age: 64
End: 2019-05-30
Payer: COMMERCIAL

## 2019-05-30 VITALS
OXYGEN SATURATION: 96 % | RESPIRATION RATE: 18 BRPM | BODY MASS INDEX: 36.16 KG/M2 | DIASTOLIC BLOOD PRESSURE: 88 MMHG | HEIGHT: 66 IN | WEIGHT: 225 LBS | TEMPERATURE: 97.5 F | HEART RATE: 81 BPM | SYSTOLIC BLOOD PRESSURE: 138 MMHG

## 2019-05-30 DIAGNOSIS — J06.9 RECENT URI: ICD-10-CM

## 2019-05-30 DIAGNOSIS — R49.9 CHANGE IN VOICE: ICD-10-CM

## 2019-05-30 DIAGNOSIS — K21.9 LARYNGOPHARYNGEAL REFLUX (LPR): Primary | ICD-10-CM

## 2019-05-30 PROCEDURE — 99203 OFFICE O/P NEW LOW 30 MIN: CPT | Performed by: OTOLARYNGOLOGY

## 2019-05-30 PROCEDURE — 31575 DIAGNOSTIC LARYNGOSCOPY: CPT | Performed by: OTOLARYNGOLOGY

## 2019-05-30 NOTE — PROGRESS NOTES
[facility]  [address] [facility]  Dept: [phone]  Dept Fax: [phone]  Loc: [phone]     Janna Kirkpatrick is a 58 y.o. female who presents today for her medical conditions/complaintsas noted below.   Janna Kirkpatrick is c/o of [facility] (Referred by Dr. 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[address]     Fatigue     Foot pain     Hx of amiodarone therapy 9/24/2014    Hyperlipidemia     PCP manages cholesterol    Hypertension     Menopause     Obesity     Skin rash     Type II or unspecified type diabetes mellitus without mention of complication, not stated as uncontrolled     Umbilical hernia     URI (upper respiratory infection)       Past Surgical History:   Procedure Laterality Date    CARDIAC CATHETERIZATION  10/21/09    EF 35% left ventricle is moderately enlarged     CARDIOVERSION  10/9/13    CHOLECYSTECTOMY      EYE SURGERY      retina and cataracts     GALLBLADDER SURGERY      TUBAL LIGATION         Family History   Problem Relation Age of Onset    Diabetes Mother     Heart Failure Mother     High Blood Pressure Mother     Liver Cancer Father     Colon Cancer Neg Hx     Colon Polyps Neg Hx           Social History     Tobacco Use    Smoking status: Never Smoker    Smokeless tobacco: Never Used   Substance Use Topics    Alcohol use: No         Current Outpatient Medications   Medication Sig Dispense Refill    albuterol sulfate HFA (VENTOLIN HFA) 108 (90 Base) MCG/ACT inhaler Inhale 2 puffs into the lungs every 6 hours as needed for Wheezing 1 Inhaler 5    metoprolol succinate (TOPROL XL) 25 MG extended release tablet TAKE 1 TABLET BY MOUTH ONCE DAILY 30 tablet 0    metoprolol succinate (TOPROL XL) 50 MG extended release tablet TAKE 1 TABLET BY MOUTH ONCE DAILY. 90 tablet 0    irbesartan-hydrochlorothiazide (AVALIDE) 300-12.5 MG per tablet TAKE 1 TABLET BY MOUTH ONCE DAILY. 30 tablet 5    glipiZIDE (GLUCOTROL) 5 MG tablet TAKE 2 TABLETS BY MOUTH IN THE MORNING AND 1 TABLET IN THE EVENING 90 tablet 5    digoxin (LANOXIN) 125 MCG tablet TAKE 1 TABLET BY MOUTH ONCE DAILY. 30 tablet 5    hydrochlorothiazide (HYDRODIURIL) 25 MG tablet TAKE (1/2) ONE-HALF TABLET BY MOUTH DAILY. 30 tablet 0    metFORMIN (GLUCOPHAGE) 500 MG tablet TAKE 2 TABLETS BY MOUTH TWICE DAILY WITH FOOD 120 tablet 2    warfarin (COUMADIN) 5 MG tablet TAKE 1 TABLET BY MOUTH EVERY DAY ON M-W-F AND 1 1/2 TABLET ON THE OTHER DAYS 38 tablet 5    atorvastatin (LIPITOR) 80 MG tablet TAKE 1 TABLET BY MOUTH ONCE DAILY. 30 tablet 3    levothyroxine (SYNTHROID) 125 MCG tablet TAKE 1 TABLET BY MOUTH ONCE DAILY 30 tablet 5    Multiple Vitamins-Minerals (THERAPEUTIC MULTIVITAMIN-MINERALS) tablet Take 1 tablet by mouth daily.  Vitamin D (CHOLECALCIFEROL) 1000 UNITS CAPS capsule Take 1,000 Units by mouth 2 times daily       aspirin 81 MG EC tablet Take 81 mg by mouth daily. No current facility-administered medications for this visit. Allergies   Allergen Reactions    Aspartame And Phenylalanine     Penicillins     Shellfish-Derived Products     Zetia [Ezetimibe]        Subjective:   March significant  URI with voice complaint. No odynophasia  or dysphasia. Mild LPR. Review of Systems  A 12 point review of systems was completed, reviewed, and scanned to chart per staff. Patient medical history reviewed.     Objective:      Physical Exam  /88   Pulse 81   Temp 97.5 °F (36.4 °C)   Resp 18   Ht 5' 5.5\" (1.664 m)   Wt 225 lb (102.1 kg)   SpO2 96%   BMI 36.87 kg/m²   Physical Exam:     General appearance: Normally developed structures of the head and neck with no deformities. Ability to communicate: Normal voice with ability to convey appropriately the nature of their complaints. Head and Face: Normal appearance with no visible lesions of the skin. Sinus tenderness: None appreciated on exam. No areas of facial swelling. Facial strength: No weakness of the facial muscles appreciated. Otoscopic: Normal external ear canals and tympanic membranes. Hearing assessment: normal to soft voice and finger rub. External ears and nose: normal.   Nasal exam: Anterior speculum exam normal with no polyps purulence or lesions. Oral:  Mucosa of buccal, floor of mouth, and palatal areas appear normal and free of any lesions. Lips, teeth, gingiva: Normal with no lesions. Oropharynx: Tongue reveals normal appearance and mobility. Oral mucosa of buccal, floor of mouth, and palatal areas appear normal and free of any lesions or ulcerations. Salivary:  Examination of the parotid and submandibular glands was normal with no palpable masses, nodules or irregularities. Neck: No gross swellings or deformities. No palpable lymphadenopathy. Thyroid normal without palpable masses. Respiratory:  Effort appears normal with no notable distress, stridor,accessory muscle usage or tachypnea     Due to the nature of the patient's complaints and/or differential diagnosis, a fiberoptic exam to evaluate the nasopharynx, hypopharynx and larynx is warranted. Anesthesia of topical lidocaine and oxymetazoline was administered intranasally and a fiberoptic scope was introduced transnasnasally to evaluate the anatomy. The nasopharynx, vallecula, tongue base, and pharyngeal mucosa were visualized as well.   The patient was asked to phonate and perform

## 2019-06-07 ENCOUNTER — HOSPITAL ENCOUNTER (OUTPATIENT)
Dept: WOMENS IMAGING | Age: 64
Discharge: HOME | End: 2019-06-07
Payer: COMMERCIAL

## 2019-06-07 DIAGNOSIS — Z12.39 SCREENING FOR BREAST CANCER: ICD-10-CM

## 2019-06-07 PROCEDURE — 77063 BREAST TOMOSYNTHESIS BI: CPT

## 2019-06-20 ENCOUNTER — NURSE ONLY (OUTPATIENT)
Dept: INTERNAL MEDICINE | Age: 64
End: 2019-06-20
Payer: COMMERCIAL

## 2019-06-20 ENCOUNTER — TELEPHONE (OUTPATIENT)
Dept: INTERNAL MEDICINE | Age: 64
End: 2019-06-20

## 2019-06-20 DIAGNOSIS — I48.20 CHRONIC ATRIAL FIBRILLATION (HCC): Primary | ICD-10-CM

## 2019-06-20 LAB
INTERNATIONAL NORMALIZATION RATIO, POC: 2.9
PROTHROMBIN TIME, POC: NORMAL

## 2019-06-20 PROCEDURE — 85610 PROTHROMBIN TIME: CPT | Performed by: INTERNAL MEDICINE

## 2019-06-20 NOTE — TELEPHONE ENCOUNTER
Patient states that she had a tick on her buttocks. She is wondering if there is lab work she can have done to check for Lyme disease. She states she has a rash, but gets a rash every time she gets bitten by a tick. She also states that under her arms is sore and she feels flu-like sx. D/t location, she hasn't been able to look at it. Offered patient an appt with Dr. Prudence Mcdonald, he was the only provider available with appts, but pt declined. I advised her to go to [facility] to be checked out, but she declined that also. She wants to see what Dr. Skip Puente says. She reports that she has been positive for lyme disease approx 30 years ago. She uses [facility],  She said you can call her at work tomorrow.

## 2019-06-20 NOTE — PROGRESS NOTES
Ms. Anna Hoskins was here today. INR today:   Results for orders placed or performed in visit on 06/20/19   POCT INR   Result Value Ref Range    INR 2.9     Protime     INR Goal: 2.0-3.0    Dosing Plan  As of 6/20/2019    TTR:   78.5 % (2 y)   Full warfarin instructions:   6/20: 5 mg; Otherwise 5 mg every Mon, Wed, Fri; 7.5 mg all other days            PLAN: TAKE 5MG TONIGHT, THEN CONTINUE CURRENT DOSE  NEXT COUMADIN CLINIC APT IS: 7/18/2019 AT 4:00PM    Coumadin Clinic Hours  Tuesday 7:30am - 4:00pm  Wednesday 7:30am - 4:00pm  Thursday 7:30am - 4:00pm  IF IT'S AN EMERGENCY, PLEASE CALL 911 OR GO TO YOUR NEAREST EMERGENCY ROOM. [facility] INTERNAL MEDICINE COUMADIN CLINIC [phone]  IF UNABLE TO REACH COUMADIN CLINIC, [address] [place] [address], [phone].

## 2019-06-22 NOTE — TELEPHONE ENCOUNTER
Please talk to pt on Monday  Lyme disease testing- pt needs to be seen/ documented; needs to be appropriate timing ( testing is negative for several weeks) and SX presence

## 2019-06-25 ENCOUNTER — OFFICE VISIT (OUTPATIENT)
Dept: INTERNAL MEDICINE | Age: 64
End: 2019-06-25
Payer: COMMERCIAL

## 2019-06-25 VITALS
WEIGHT: 230 LBS | DIASTOLIC BLOOD PRESSURE: 84 MMHG | OXYGEN SATURATION: 98 % | HEIGHT: 66 IN | HEART RATE: 101 BPM | SYSTOLIC BLOOD PRESSURE: 136 MMHG | BODY MASS INDEX: 36.96 KG/M2

## 2019-06-25 DIAGNOSIS — Z79.01 LONG TERM CURRENT USE OF ANTICOAGULANT THERAPY: ICD-10-CM
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
## 2019-06-25 DIAGNOSIS — I48.0 PAF (PAROXYSMAL ATRIAL FIBRILLATION) (HCC): ICD-10-CM

## 2019-06-25 PROBLEM — I48.91 ATRIAL FIBRILLATION (HCC): Status: RESOLVED | Noted: 2018-05-21 | Resolved: 2019-06-25

## 2019-06-25 PROBLEM — I48.91 ATRIAL FIBRILLATION (HCC): Status: ACTIVE | Noted: 2018-05-21

## 2019-06-25 PROCEDURE — 99213 OFFICE O/P EST LOW 20 MIN: CPT | Performed by: INTERNAL MEDICINE

## 2019-06-25 RX ORDER — DOXYCYCLINE HYCLATE 100 MG
100 TABLET ORAL 2 TIMES DAILY
Qty: 20 TABLET | Refills: 0 | Status: SHIPPED | OUTPATIENT
Start: 2019-06-25 | End: 2019-07-05

## 2019-06-25 ASSESSMENT — ENCOUNTER SYMPTOMS
WHEEZING: 0
CONSTIPATION: 0
SORE THROAT: 0
ABDOMINAL PAIN: 0
COUGH: 0
CHEST TIGHTNESS: 0

## 2019-06-25 NOTE — PROGRESS NOTES
Chief Complaint   Patient presents with   [address]     Removed a tick from left buttock approx a month ago and still has redness itching     History of presenting illness:  Gwendolyn Forte is a58 y.o. female who presents today for follow up on her chronic medical conditions as noted below.     Tick bite one month ago - below left buttock  Her son \" pulled it off\"  She states this area still bothers her/ stays red  She worries about lyme disease    Was seen here for Coumadin clinic on 6/20  INR elevated 2.9  States that has noticed some abdominal wall bruising over the last few days, not over the last 24 hours      Patient Active Problem List    Diagnosis Date Noted    Acquired hypothyroidism 10/17/2017    Morbid obesity due to excess calories ([facility] [address] 09/26/2017    Vitamin D deficiency 27/13/9037    Systolic congestive heart failure ([address] 09/26/2017     Overview Note:     3/12 echo ef 40%      H/O bone density study 09/26/2017     Overview Note:     2015 nl      Mixed hyperlipidemia 09/26/2017    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    Type 2 diabetes mellitus with microalbuminuria, without long-term current use of insulin ([address] 09/26/2017    Long term current use of anticoagulant therapy 06/29/2017     Overview Note:     Updating Deprecated Diagnoses      Essential hypertension     CAD (coronary artery disease)      Past Medical History:   Diagnosis Date    Acute laryngitis     Acute sinusitis     Allergic rhinitis     Ankle pain     Asthma     Asthmatic bronchitis     Atrial fibrillation ([address] 9/13/12, 10/9/13    cardioversion    Atrial flutter (HCC)     paroxysmal     CAD (coronary artery disease)     CHF (congestive heart failure) (HCC)     Chronic back pain     Cough     Diabetes     Dizzy     Dysuria     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 No current facility-administered medications for this visit. Allergies   Allergen Reactions    Aspartame And Phenylalanine     Penicillins     Shellfish-Derived Products     Zetia [Ezetimibe]      Social History     Tobacco Use    Smoking status: Never Smoker    Smokeless tobacco: Never Used   Substance Use Topics    Alcohol use: No      Family History   Problem Relation Age of Onset    Diabetes Mother     Heart Failure Mother     High Blood Pressure Mother     Liver Cancer Father     Colon Cancer Neg Hx     Colon Polyps Neg Hx        Review of Systems   Constitutional: Negative for chills, fatigue and fever. HENT: Negative for congestion, ear pain, nosebleeds, postnasal drip and sore throat. Respiratory: Negative for cough, chest tightness and wheezing. Cardiovascular: Negative for chest pain, palpitations and leg swelling. Gastrointestinal: Negative for abdominal pain and constipation. Genitourinary: Negative for dysuria and urgency. Musculoskeletal: Negative. Negative for arthralgias. Skin: Negative for rash. Skin lesion below left buttock   Neurological: Negative for dizziness and headaches. Psychiatric/Behavioral: Negative. Vitals:    06/25/19 0724   BP: 136/84   Site: Left Upper Arm   Position: Sitting   Pulse: 101   SpO2: 98%   Weight: 230 lb (104.3 kg)   Height: 5' 5.5\" (1.664 m)     Body mass index is 37.69 kg/m². Physical Exam   Constitutional: She is oriented to person, place, and time. She appears well-developed and well-nourished. HENT:   Right Ear: External ear normal.   Left Ear: External ear normal.   Mouth/Throat: Oropharynx is clear and moist. No oropharyngeal exudate. Eyes: Pupils are equal, round, and reactive to light. Conjunctivae are normal.   Neck: Neck supple. No JVD present. No thyromegaly present. Cardiovascular: Normal rate and normal heart sounds.    No murmur 99     CO2 05/08/2019 30*    Anion Gap 05/08/2019 13     Glucose 05/08/2019 109     BUN 05/08/2019 8     CREATININE 05/08/2019 <0.5     GFR Non- 05/08/2019 >60     Calcium 05/08/2019 9.7     Total Protein 05/08/2019 7.4     Alb 05/08/2019 4.2     Total Bilirubin 05/08/2019 0.7     Alkaline Phosphatase 05/08/2019 41     ALT 05/08/2019 27     AST 05/08/2019 27     Hemoglobin A1C 05/08/2019 7.2*    Cholesterol, Total 05/08/2019 146*    Triglycerides 05/08/2019 150*    HDL 05/08/2019 41*    LDL Calculated 05/08/2019 75     Bacteria, UA 05/08/2019 NEGATIVE     Hyaline Casts, UA 05/08/2019 1     WBC, UA 05/08/2019 1     RBC, UA 05/08/2019 1     Epi Cells 05/08/2019 3            ASSESSMENT/PLAN:    Tick bite, initial encounter  Patient has known history of previous Lyme disease  Very worried about possibility of Lyme disease  Tick bite month ago  Discussion with the patient  Suggest we proceed with empiric treatment  Lyme titers with her prior history of Lyme disease would be controversial, possible take long time to come back  Start doxycycline 100 mg twice daily x10 days empirically. Repeat INR today since INR elevated 2.9, doxycycline possibility moderate interaction with Coumadin    Long term current use of anticoagulant therapy  PAF (paroxysmal atrial fibrillation) (HCC)  INR elevated last week  It is 1.6 today  Since there is likelihood of moderate interaction with Coumadin patient will continue current dosing  5 mg every Mon, Wed, Fri; 7.5 mg all other days  Repeat Coumadin clinic appointment next week    Other orders  -     doxycycline hyclate (VIBRA-TABS) 100 MG tablet; Take 1 tablet by mouth 2 times daily for 10 days              No orders of the defined types were placed in this encounter. New Prescriptions    DOXYCYCLINE HYCLATE (VIBRA-TABS) 100 MG TABLET    Take 1 tablet by mouth 2 times daily for 10 days         No follow-ups on file.    There are no Patient Instructions on file for this visit. EMR Dragon/transcription disclaimer:Significant part of this  encounter note is electronic transcription/translationof spoken language to printed text. The electronic translation of spoken language may be erroneous, or at times, nonsensical words or phrases may be inadvertently transcribed.  Although I have reviewed the note for sucherrors, some may still exist.

## 2019-07-03 ENCOUNTER — NURSE ONLY (OUTPATIENT)
Dept: INTERNAL MEDICINE | Age: 64
End: 2019-07-03
Payer: COMMERCIAL

## 2019-07-03 DIAGNOSIS — I48.0 PAF (PAROXYSMAL ATRIAL FIBRILLATION) (HCC): Primary | ICD-10-CM

## 2019-07-03 LAB
INTERNATIONAL NORMALIZATION RATIO, POC: 3.2
PROTHROMBIN TIME, POC: NORMAL

## 2019-07-03 PROCEDURE — 85610 PROTHROMBIN TIME: CPT | Performed by: INTERNAL MEDICINE

## 2019-07-06 DIAGNOSIS — I10 ESSENTIAL HYPERTENSION: ICD-10-CM

## 2019-07-08 RX ORDER — METOPROLOL SUCCINATE 50 MG/1
TABLET, EXTENDED RELEASE ORAL
Qty: 90 TABLET | Refills: 1 | Status: SHIPPED | OUTPATIENT
Start: 2019-07-08 | End: 2019-11-13 | Stop reason: SDUPTHER

## 2019-07-08 RX ORDER — ATORVASTATIN CALCIUM 80 MG/1
TABLET, FILM COATED ORAL
Qty: 30 TABLET | Refills: 0 | Status: SHIPPED | OUTPATIENT
Start: 2019-07-08 | End: 2019-09-06 | Stop reason: SDUPTHER

## 2019-07-18 ENCOUNTER — NURSE ONLY (OUTPATIENT)
Dept: INTERNAL MEDICINE | Age: 64
End: 2019-07-18
Payer: COMMERCIAL

## 2019-07-18 DIAGNOSIS — I48.0 PAF (PAROXYSMAL ATRIAL FIBRILLATION) (HCC): Primary | ICD-10-CM

## 2019-07-18 LAB
INTERNATIONAL NORMALIZATION RATIO, POC: 2.8
PROTHROMBIN TIME, POC: NORMAL

## 2019-07-18 PROCEDURE — 85610 PROTHROMBIN TIME: CPT | Performed by: INTERNAL MEDICINE

## 2019-07-18 NOTE — PROGRESS NOTES
Ms. Zuly Botello was here today. INR today:   Results for orders placed or performed in visit on 07/18/19   POCT INR   Result Value Ref Range    INR 2.8     Protime       INR Goal: 2.0-3.0    Dosing Plan  As of 7/18/2019    TTR:   77.2 % (2.1 y)   Full warfarin instructions:   5 mg every Mon, Wed, Fri; 7.5 mg all other days            PLAN: CONTINUE CURRENT DOSE, EAT A SERVING OF GREENS  NEXT COUMADIN CLINIC APT IS: 8/7/2019 AT 9:15AM  FASTING LABS DAY OF COUMADIN CLINIC    Coumadin Clinic Hours  Tuesday 7:30am - 4:00pm  Wednesday 7:30am - 4:00pm  Thursday 7:30am - 4:00pm    IF IT'S AN EMERGENCY, PLEASE CALL 911 OR GO TO YOUR NEAREST EMERGENCY ROOM. [facility] INTERNAL MEDICINE COUMADIN CLINIC [phone]  IF UNABLE TO REACH COUMADIN CLINIC, [address] [place] [address], [phone].

## 2019-08-07 ENCOUNTER — NURSE ONLY (OUTPATIENT)
Dept: INTERNAL MEDICINE | Age: 64
End: 2019-08-07
Payer: COMMERCIAL

## 2019-08-07 DIAGNOSIS — Z12.39 SCREENING FOR BREAST CANCER: ICD-10-CM

## 2019-08-07 DIAGNOSIS — E78.2 MIXED HYPERLIPIDEMIA: ICD-10-CM

## 2019-08-07 DIAGNOSIS — E55.9 VITAMIN D DEFICIENCY: ICD-10-CM

## 2019-08-07 DIAGNOSIS — Z00.00 ANNUAL PHYSICAL EXAM: ICD-10-CM

## 2019-08-07 DIAGNOSIS — I10 ESSENTIAL HYPERTENSION: ICD-10-CM

## 2019-08-07 DIAGNOSIS — I48.0 PAF (PAROXYSMAL ATRIAL FIBRILLATION) (HCC): ICD-10-CM

## 2019-08-07 DIAGNOSIS — E11.29 TYPE 2 DIABETES MELLITUS WITH MICROALBUMINURIA, WITHOUT LONG-TERM CURRENT USE OF INSULIN (HCC): ICD-10-CM

## 2019-08-07 DIAGNOSIS — E03.9 ACQUIRED HYPOTHYROIDISM: ICD-10-CM

## 2019-08-07 DIAGNOSIS — I48.0 PAF (PAROXYSMAL ATRIAL FIBRILLATION) (HCC): Primary | ICD-10-CM

## 2019-08-07 DIAGNOSIS — R80.9 TYPE 2 DIABETES MELLITUS WITH MICROALBUMINURIA, WITHOUT LONG-TERM CURRENT USE OF INSULIN (HCC): ICD-10-CM

## 2019-08-07 LAB
ALBUMIN SERPL-MCNC: 4.2 G/DL (ref 3.5–5.2)
ALP BLD-CCNC: 41 U/L (ref 35–104)
ALT SERPL-CCNC: 29 U/L (ref 5–33)
ANION GAP SERPL CALCULATED.3IONS-SCNC: 15 MMOL/L (ref 7–19)
AST SERPL-CCNC: 26 U/L (ref 5–32)
BILIRUB SERPL-MCNC: 0.6 MG/DL (ref 0.2–1.2)
BUN BLDV-MCNC: 11 MG/DL (ref 8–23)
CALCIUM SERPL-MCNC: 9.6 MG/DL (ref 8.8–10.2)
CHLORIDE BLD-SCNC: 100 MMOL/L (ref 98–111)
CHOLESTEROL, TOTAL: 155 MG/DL (ref 160–199)
CO2: 27 MMOL/L (ref 22–29)
CREAT SERPL-MCNC: 0.5 MG/DL (ref 0.5–0.9)
GFR NON-AFRICAN AMERICAN: >60
GLUCOSE BLD-MCNC: 139 MG/DL (ref 74–109)
HBA1C MFR BLD: 7.5 % (ref 4–6)
HDLC SERPL-MCNC: 42 MG/DL (ref 65–121)
INTERNATIONAL NORMALIZATION RATIO, POC: 3.4
LDL CHOLESTEROL CALCULATED: 74 MG/DL
POTASSIUM SERPL-SCNC: 4 MMOL/L (ref 3.5–5)
PROTHROMBIN TIME, POC: NORMAL
SODIUM BLD-SCNC: 142 MMOL/L (ref 136–145)
TOTAL PROTEIN: 7.5 G/DL (ref 6.6–8.7)
TRIGL SERPL-MCNC: 193 MG/DL (ref 0–149)
TSH SERPL DL<=0.05 MIU/L-ACNC: 2.94 UIU/ML (ref 0.27–4.2)

## 2019-08-07 PROCEDURE — 85610 PROTHROMBIN TIME: CPT | Performed by: INTERNAL MEDICINE

## 2019-08-07 PROCEDURE — 93793 ANTICOAG MGMT PT WARFARIN: CPT | Performed by: INTERNAL MEDICINE

## 2019-08-21 ENCOUNTER — ANTI-COAG VISIT (OUTPATIENT)
Dept: INTERNAL MEDICINE | Age: 64
End: 2019-08-21

## 2019-08-21 ENCOUNTER — OFFICE VISIT (OUTPATIENT)
Dept: INTERNAL MEDICINE | Age: 64
End: 2019-08-21
Payer: COMMERCIAL

## 2019-08-21 VITALS
DIASTOLIC BLOOD PRESSURE: 86 MMHG | RESPIRATION RATE: 20 BRPM | SYSTOLIC BLOOD PRESSURE: 126 MMHG | WEIGHT: 231 LBS | BODY MASS INDEX: 37.12 KG/M2 | OXYGEN SATURATION: 96 % | HEIGHT: 66 IN | HEART RATE: 92 BPM

## 2019-08-21 DIAGNOSIS — I10 ESSENTIAL HYPERTENSION: ICD-10-CM

## 2019-08-21 DIAGNOSIS — E03.9 ACQUIRED HYPOTHYROIDISM: ICD-10-CM

## 2019-08-21 DIAGNOSIS — R80.9 TYPE 2 DIABETES MELLITUS WITH MICROALBUMINURIA, WITHOUT LONG-TERM CURRENT USE OF INSULIN (HCC): Primary | ICD-10-CM

## 2019-08-21 DIAGNOSIS — I48.0 PAF (PAROXYSMAL ATRIAL FIBRILLATION) (HCC): ICD-10-CM

## 2019-08-21 DIAGNOSIS — E55.9 VITAMIN D DEFICIENCY: ICD-10-CM

## 2019-08-21 DIAGNOSIS — E11.29 TYPE 2 DIABETES MELLITUS WITH MICROALBUMINURIA, WITHOUT LONG-TERM CURRENT USE OF INSULIN (HCC): Primary | ICD-10-CM

## 2019-08-21 DIAGNOSIS — E78.2 MIXED HYPERLIPIDEMIA: ICD-10-CM

## 2019-08-21 LAB
INTERNATIONAL NORMALIZATION RATIO, POC: 2.5
INTERNATIONAL NORMALIZATION RATIO, POC: 2.5
PROTHROMBIN TIME, POC: NORMAL

## 2019-08-21 PROCEDURE — 85610 PROTHROMBIN TIME: CPT | Performed by: INTERNAL MEDICINE

## 2019-08-21 PROCEDURE — 99214 OFFICE O/P EST MOD 30 MIN: CPT | Performed by: INTERNAL MEDICINE

## 2019-08-21 ASSESSMENT — ENCOUNTER SYMPTOMS
CHEST TIGHTNESS: 0
SORE THROAT: 0
WHEEZING: 0
CONSTIPATION: 0
COUGH: 0
ABDOMINAL PAIN: 0

## 2019-08-21 NOTE — PROGRESS NOTES
sinusitis     Allergic rhinitis     Ankle pain     Asthma     Asthmatic bronchitis     Atrial fibrillation ([facility] [address] 9/13/12, 10/9/13    cardioversion    Atrial flutter (HCC)     paroxysmal     CAD (coronary artery disease)     [facility]) ([facility]     Chronic back pain     Cough     Diabetes     Dizzy     Dysuria     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[facility] [address]     Fatigue     Foot pain     Hx of amiodarone therapy 9/24/2014    Hyperlipidemia     PCP manages cholesterol    Hypertension     Menopause     Obesity     Skin rash     Type II or unspecified type diabetes mellitus without mention of complication, not stated as uncontrolled     Umbilical hernia     URI (upper respiratory infection)       Past Surgical History:   Procedure Laterality Date    CARDIAC CATHETERIZATION  10/21/09    EF 35% left ventricle is moderately enlarged     CARDIOVERSION  10/9/13    CHOLECYSTECTOMY      EYE SURGERY      retina and cataracts     GALLBLADDER SURGERY      TUBAL LIGATION       Current Outpatient Medications   Medication Sig Dispense Refill    metFORMIN (GLUCOPHAGE) 500 MG tablet TAKE 2 TABLETS TWICE DAILY WITH MEALS. 120 tablet 0    atorvastatin (LIPITOR) 80 MG tablet TAKE 1 TABLET BY MOUTH ONCE DAILY. 30 tablet 0    metoprolol succinate (TOPROL XL) 50 MG extended release tablet TAKE 1 TABLET BY MOUTH ONCE DAILY. 90 tablet 1    albuterol sulfate HFA (VENTOLIN HFA) 108 (90 Base) MCG/ACT inhaler Inhale 2 puffs into the lungs every 6 hours as needed for Wheezing 1 Inhaler 5    irbesartan-hydrochlorothiazide (AVALIDE) 300-12.5 MG per tablet TAKE 1 TABLET BY MOUTH ONCE DAILY. 30 tablet 5    glipiZIDE (GLUCOTROL) 5 MG tablet TAKE 2 TABLETS BY MOUTH IN THE MORNING AND 1 TABLET IN THE EVENING 90 tablet 5    digoxin (LANOXIN) 125 MCG tablet TAKE 1 TABLET BY MOUTH ONCE DAILY.  (Patient taking differently: Take 250 mcg by mouth daily ) 30 tablet 5    hydrochlorothiazide (HYDRODIURIL) 25 MG tablet TAKE (1/2) ([facility] [address]-  Hemoglobin A1c is 7.5 ( 7.2) (7.3) ( 6.9) (7.3 )(7.6)/was 9.3 late September 2017. She will  continue metformin. Cont glipizide 5mg dose - take 2 am/ 1 pm  Patient also will  continue diet and exercise  Needs to make appt to see ophthalmology for DM eye exam     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[facility] [address]- improved , currently no prescription is needed     Hypothyroid- tsh good   cont synthroid 125 µg daily/level next lab in 3 mo    PAF  INR good  2.5  Cont current dose coumadin     Hyperlipidemia  LDL is 74 (75)  TG good  lipitor 80 - cont     Vit d deficiency- good 39 in 5/2019- cont 2000 daily    Orders Placed This Encounter   Procedures    Hemoglobin A1C    Lipid Panel    Comprehensive Metabolic Panel    Vitamin D 25 Hydroxy    TSH without Reflex    T4, Free    POCT INR     New Prescriptions    No medications on file         Return in about 3 months (around 11/21/2019) for Medication check. There are no Patient Instructions on file for this visit. EMR Dragon/transcription disclaimer:Significant part of this  encounter note is electronic transcription/translationof spoken language to printed text. The electronic translation of spoken language may be erroneous, or at times, nonsensical words or phrases may be inadvertently transcribed.  Although I have reviewed the note for sucherrors, some may still exist.

## 2019-09-06 RX ORDER — ATORVASTATIN CALCIUM 80 MG/1
TABLET, FILM COATED ORAL
Qty: 30 TABLET | Refills: 0 | Status: SHIPPED | OUTPATIENT
Start: 2019-09-06 | End: 2019-10-04 | Stop reason: SDUPTHER

## 2019-09-06 RX ORDER — WARFARIN SODIUM 5 MG/1
TABLET ORAL
Qty: 38 TABLET | Refills: 0 | Status: SHIPPED | OUTPATIENT
Start: 2019-09-06 | End: 2019-10-04 | Stop reason: SDUPTHER

## 2019-09-06 RX ORDER — LEVOTHYROXINE SODIUM 0.12 MG/1
TABLET ORAL
Qty: 30 TABLET | Refills: 0 | Status: SHIPPED | OUTPATIENT
Start: 2019-09-06 | End: 2019-10-04 | Stop reason: SDUPTHER

## 2019-09-06 NOTE — TELEPHONE ENCOUNTER
Last Appointment Date: 8/21/2019  Next Appointment Date: 9/12/2019    Allergies   Allergen Reactions    Aspartame And Phenylalanine     Penicillins     Shellfish-Derived Products     Zetia [Ezetimibe]        Patient needs refill on   Requested Prescriptions     Pending Prescriptions Disp Refills    atorvastatin (LIPITOR) 80 MG tablet [Pharmacy Med Name: ATORVASTATIN 80MG TAB SD] 30 tablet 0     Sig: TAKE 1 TABLET BY MOUTH ONCE DAILY.  metFORMIN (GLUCOPHAGE) 500 MG tablet [Pharmacy Med Name: METFORMIN  MG  DD] 120 tablet 0     Sig: TAKE 2 TABLETS TWICE DAILY WITH MEALS.     warfarin (COUMADIN) 5 MG tablet [Pharmacy Med Name: WARFARIN 5MG TABLET] 38 tablet 0     Sig: TAKE 1 TABLET BY MOUTH EVERY DAY ON M-W-F AND 1 1/2 TABLETS EVERY DAYON THE OTHER DAYS    levothyroxine (SYNTHROID) 125 MCG tablet [Pharmacy Med Name: LEVOTHYROXINE 125 MCG TAB *DD] 30 tablet 0     Sig: TAKE 1 TABLET BY MOUTH ONCE DAILY

## 2019-09-12 ENCOUNTER — ANTI-COAG VISIT (OUTPATIENT)
Dept: INTERNAL MEDICINE | Age: 64
End: 2019-09-12
Payer: COMMERCIAL

## 2019-09-12 DIAGNOSIS — I48.0 PAF (PAROXYSMAL ATRIAL FIBRILLATION) (HCC): Primary | ICD-10-CM

## 2019-09-12 LAB
INTERNATIONAL NORMALIZATION RATIO, POC: 2.2
PROTHROMBIN TIME, POC: NORMAL

## 2019-09-12 PROCEDURE — 85610 PROTHROMBIN TIME: CPT | Performed by: INTERNAL MEDICINE

## 2019-09-12 PROCEDURE — 93793 ANTICOAG MGMT PT WARFARIN: CPT | Performed by: INTERNAL MEDICINE

## 2019-10-03 ENCOUNTER — NURSE ONLY (OUTPATIENT)
Dept: INTERNAL MEDICINE | Age: 64
End: 2019-10-03
Payer: COMMERCIAL

## 2019-10-03 DIAGNOSIS — I48.0 PAF (PAROXYSMAL ATRIAL FIBRILLATION) (HCC): Primary | ICD-10-CM

## 2019-10-03 LAB
INTERNATIONAL NORMALIZATION RATIO, POC: 3
PROTHROMBIN TIME, POC: NORMAL

## 2019-10-03 PROCEDURE — 85610 PROTHROMBIN TIME: CPT | Performed by: PHYSICIAN ASSISTANT

## 2019-10-03 PROCEDURE — 93793 ANTICOAG MGMT PT WARFARIN: CPT | Performed by: PHYSICIAN ASSISTANT

## 2019-10-04 DIAGNOSIS — I10 HYPERTENSION: ICD-10-CM

## 2019-10-07 RX ORDER — WARFARIN SODIUM 5 MG/1
TABLET ORAL
Qty: 38 TABLET | Refills: 0 | Status: SHIPPED | OUTPATIENT
Start: 2019-10-07 | End: 2019-11-05 | Stop reason: SDUPTHER

## 2019-10-07 RX ORDER — METOPROLOL SUCCINATE 50 MG/1
50 TABLET, EXTENDED RELEASE ORAL DAILY
Qty: 30 TABLET | Refills: 0 | Status: SHIPPED | OUTPATIENT
Start: 2019-10-07 | End: 2019-11-05 | Stop reason: SDUPTHER

## 2019-10-07 RX ORDER — LEVOTHYROXINE SODIUM 0.12 MG/1
TABLET ORAL
Qty: 30 TABLET | Refills: 0 | Status: SHIPPED | OUTPATIENT
Start: 2019-10-07 | End: 2019-11-05 | Stop reason: SDUPTHER

## 2019-10-07 RX ORDER — ATORVASTATIN CALCIUM 80 MG/1
TABLET, FILM COATED ORAL
Qty: 30 TABLET | Refills: 0 | Status: SHIPPED | OUTPATIENT
Start: 2019-10-07 | End: 2019-11-13 | Stop reason: SDUPTHER

## 2019-11-07 ENCOUNTER — NURSE ONLY (OUTPATIENT)
Dept: INTERNAL MEDICINE | Age: 64
End: 2019-11-07
Payer: COMMERCIAL

## 2019-11-07 DIAGNOSIS — I48.0 PAF (PAROXYSMAL ATRIAL FIBRILLATION) (HCC): Primary | ICD-10-CM

## 2019-11-07 LAB
INTERNATIONAL NORMALIZATION RATIO, POC: 2
PROTHROMBIN TIME, POC: NORMAL

## 2019-11-07 PROCEDURE — 85610 PROTHROMBIN TIME: CPT | Performed by: INTERNAL MEDICINE

## 2019-11-07 PROCEDURE — 93793 ANTICOAG MGMT PT WARFARIN: CPT | Performed by: INTERNAL MEDICINE

## 2019-11-13 ENCOUNTER — OFFICE VISIT (OUTPATIENT)
Dept: CARDIOLOGY | Age: 64
End: 2019-11-13
Payer: COMMERCIAL

## 2019-11-13 VITALS
WEIGHT: 234 LBS | HEART RATE: 86 BPM | BODY MASS INDEX: 37.61 KG/M2 | DIASTOLIC BLOOD PRESSURE: 86 MMHG | SYSTOLIC BLOOD PRESSURE: 138 MMHG | HEIGHT: 66 IN

## 2019-11-13 DIAGNOSIS — I48.20 CHRONIC ATRIAL FIBRILLATION (HCC): Primary | ICD-10-CM

## 2019-11-13 DIAGNOSIS — I10 ESSENTIAL HYPERTENSION: ICD-10-CM

## 2019-11-13 DIAGNOSIS — E03.9 ACQUIRED HYPOTHYROIDISM: ICD-10-CM

## 2019-11-13 DIAGNOSIS — E66.01 MORBID OBESITY DUE TO EXCESS CALORIES (HCC): ICD-10-CM

## 2019-11-13 DIAGNOSIS — I48.0 PAF (PAROXYSMAL ATRIAL FIBRILLATION) (HCC): ICD-10-CM

## 2019-11-13 DIAGNOSIS — I25.10 CORONARY ARTERY DISEASE INVOLVING NATIVE CORONARY ARTERY OF NATIVE HEART WITHOUT ANGINA PECTORIS: ICD-10-CM

## 2019-11-13 DIAGNOSIS — E11.29 TYPE 2 DIABETES MELLITUS WITH MICROALBUMINURIA, WITHOUT LONG-TERM CURRENT USE OF INSULIN (HCC): ICD-10-CM

## 2019-11-13 DIAGNOSIS — R80.9 TYPE 2 DIABETES MELLITUS WITH MICROALBUMINURIA, WITHOUT LONG-TERM CURRENT USE OF INSULIN (HCC): ICD-10-CM

## 2019-11-13 DIAGNOSIS — E78.2 MIXED HYPERLIPIDEMIA: ICD-10-CM

## 2019-11-13 DIAGNOSIS — I50.22 CHRONIC SYSTOLIC CONGESTIVE HEART FAILURE (HCC): ICD-10-CM

## 2019-11-13 DIAGNOSIS — E55.9 VITAMIN D DEFICIENCY: ICD-10-CM

## 2019-11-13 LAB
ALBUMIN SERPL-MCNC: 4.3 G/DL (ref 3.5–5.2)
ALP BLD-CCNC: 48 U/L (ref 35–104)
ALT SERPL-CCNC: 39 U/L (ref 5–33)
ANION GAP SERPL CALCULATED.3IONS-SCNC: 14 MMOL/L (ref 7–19)
AST SERPL-CCNC: 31 U/L (ref 5–32)
BILIRUB SERPL-MCNC: 0.5 MG/DL (ref 0.2–1.2)
BUN BLDV-MCNC: 10 MG/DL (ref 8–23)
CALCIUM SERPL-MCNC: 9.5 MG/DL (ref 8.8–10.2)
CHLORIDE BLD-SCNC: 98 MMOL/L (ref 98–111)
CHOLESTEROL, TOTAL: 158 MG/DL (ref 160–199)
CO2: 27 MMOL/L (ref 22–29)
CREAT SERPL-MCNC: 0.5 MG/DL (ref 0.5–0.9)
GFR NON-AFRICAN AMERICAN: >60
GLUCOSE BLD-MCNC: 178 MG/DL (ref 74–109)
HBA1C MFR BLD: 8 % (ref 4–6)
HDLC SERPL-MCNC: 42 MG/DL (ref 65–121)
LDL CHOLESTEROL CALCULATED: 77 MG/DL
POTASSIUM SERPL-SCNC: 4 MMOL/L (ref 3.5–5)
SODIUM BLD-SCNC: 139 MMOL/L (ref 136–145)
T4 FREE: 1.4 NG/DL (ref 0.9–1.7)
TOTAL PROTEIN: 7.4 G/DL (ref 6.6–8.7)
TRIGL SERPL-MCNC: 193 MG/DL (ref 0–149)
TSH SERPL DL<=0.05 MIU/L-ACNC: 2.64 UIU/ML (ref 0.27–4.2)
VITAMIN D 25-HYDROXY: 39.1 NG/ML

## 2019-11-13 PROCEDURE — 99213 OFFICE O/P EST LOW 20 MIN: CPT | Performed by: CLINICAL NURSE SPECIALIST

## 2019-11-13 RX ORDER — METOPROLOL SUCCINATE 50 MG/1
TABLET, EXTENDED RELEASE ORAL
Qty: 90 TABLET | Refills: 3 | Status: ON HOLD
Start: 2019-11-13 | End: 2020-02-22 | Stop reason: HOSPADM

## 2019-11-13 RX ORDER — DIGOXIN 125 MCG
125 TABLET ORAL DAILY
Qty: 60 TABLET | Refills: 5
Start: 2019-11-13 | End: 2020-04-22

## 2019-11-13 RX ORDER — HYDROCHLOROTHIAZIDE 25 MG/1
TABLET ORAL
Qty: 30 TABLET | Refills: 5 | Status: ON HOLD
Start: 2019-11-13 | End: 2020-02-22 | Stop reason: HOSPADM

## 2019-11-13 ASSESSMENT — ENCOUNTER SYMPTOMS
ABDOMINAL PAIN: 0
NAUSEA: 0
EYE REDNESS: 0
WHEEZING: 0
CHEST TIGHTNESS: 0
SHORTNESS OF BREATH: 1
FACIAL SWELLING: 0
VOMITING: 0
COUGH: 0

## 2019-11-20 ENCOUNTER — OFFICE VISIT (OUTPATIENT)
Dept: INTERNAL MEDICINE | Age: 64
End: 2019-11-20
Payer: COMMERCIAL

## 2019-11-20 VITALS
OXYGEN SATURATION: 97 % | HEIGHT: 66 IN | HEART RATE: 94 BPM | BODY MASS INDEX: 37.61 KG/M2 | WEIGHT: 234 LBS | DIASTOLIC BLOOD PRESSURE: 74 MMHG | RESPIRATION RATE: 18 BRPM | SYSTOLIC BLOOD PRESSURE: 128 MMHG

## 2019-11-20 DIAGNOSIS — E78.2 MIXED HYPERLIPIDEMIA: ICD-10-CM

## 2019-11-20 DIAGNOSIS — E55.9 VITAMIN D DEFICIENCY: ICD-10-CM

## 2019-11-20 DIAGNOSIS — I10 ESSENTIAL HYPERTENSION: Primary | ICD-10-CM

## 2019-11-20 DIAGNOSIS — I48.0 PAF (PAROXYSMAL ATRIAL FIBRILLATION) (HCC): ICD-10-CM

## 2019-11-20 DIAGNOSIS — E11.29 TYPE 2 DIABETES MELLITUS WITH MICROALBUMINURIA, WITHOUT LONG-TERM CURRENT USE OF INSULIN (HCC): ICD-10-CM

## 2019-11-20 DIAGNOSIS — E03.9 ACQUIRED HYPOTHYROIDISM: ICD-10-CM

## 2019-11-20 DIAGNOSIS — R80.9 TYPE 2 DIABETES MELLITUS WITH MICROALBUMINURIA, WITHOUT LONG-TERM CURRENT USE OF INSULIN (HCC): ICD-10-CM

## 2019-11-20 PROCEDURE — 99214 OFFICE O/P EST MOD 30 MIN: CPT | Performed by: INTERNAL MEDICINE

## 2019-11-20 ASSESSMENT — ENCOUNTER SYMPTOMS
COUGH: 0
WHEEZING: 0
SINUS PRESSURE: 1
ABDOMINAL PAIN: 0
CONSTIPATION: 0
SORE THROAT: 0
SINUS PAIN: 1
CHEST TIGHTNESS: 0

## 2019-12-05 ENCOUNTER — NURSE ONLY (OUTPATIENT)
Dept: INTERNAL MEDICINE | Age: 64
End: 2019-12-05
Payer: COMMERCIAL

## 2019-12-05 DIAGNOSIS — I48.0 PAF (PAROXYSMAL ATRIAL FIBRILLATION) (HCC): Primary | ICD-10-CM

## 2019-12-05 LAB
INTERNATIONAL NORMALIZATION RATIO, POC: 2.1
PROTHROMBIN TIME, POC: NORMAL

## 2019-12-05 PROCEDURE — 85610 PROTHROMBIN TIME: CPT | Performed by: INTERNAL MEDICINE

## 2019-12-05 PROCEDURE — 93793 ANTICOAG MGMT PT WARFARIN: CPT | Performed by: INTERNAL MEDICINE

## 2020-01-03 RX ORDER — GLIPIZIDE 5 MG/1
TABLET ORAL
Qty: 90 TABLET | Refills: 0 | Status: SHIPPED | OUTPATIENT
Start: 2020-01-03 | End: 2020-01-30

## 2020-01-09 ENCOUNTER — NURSE ONLY (OUTPATIENT)
Dept: INTERNAL MEDICINE | Age: 65
End: 2020-01-09
Payer: COMMERCIAL

## 2020-01-09 LAB
INTERNATIONAL NORMALIZATION RATIO, POC: 2
PROTHROMBIN TIME, POC: NORMAL

## 2020-01-09 PROCEDURE — 93793 ANTICOAG MGMT PT WARFARIN: CPT | Performed by: INTERNAL MEDICINE

## 2020-01-09 PROCEDURE — 85610 PROTHROMBIN TIME: CPT | Performed by: INTERNAL MEDICINE

## 2020-01-09 NOTE — PROGRESS NOTES
Ms. Ida Howell was here today. INR today:   Results for orders placed or performed in visit on 01/09/20   POCT INR   Result Value Ref Range    INR 2.0     Protime       INR Goal: 2.0-3.0    Dosing Plan  As of 1/9/2020    TTR:   79.4 % (2.6 y)   Full warfarin instructions:   5 mg every Mon, Wed, Fri; 7.5 mg all other days            PLAN: CONTINUE CURRENT DOSE  NEXT COUMADIN CLINIC APT IS: 2/13/2020 at 4:00pm      IF IT'S AN EMERGENCY, PLEASE CALL 911 OR GO TO YOUR NEAREST EMERGENCY ROOM. [facility] INTERNAL MEDICINE COUMADIN CLINIC [phone]  IF UNABLE TO REACH COUMADIN CLINIC, [address] [place] [address] Rd, [phone]. I have reviewed nursing plan for Coumadin management and agree with plan.

## 2020-01-30 RX ORDER — GLIPIZIDE 5 MG/1
TABLET ORAL
Qty: 90 TABLET | Refills: 0 | Status: ON HOLD | OUTPATIENT
Start: 2020-01-30 | End: 2020-02-28

## 2020-02-05 DIAGNOSIS — E55.9 VITAMIN D DEFICIENCY: ICD-10-CM

## 2020-02-05 DIAGNOSIS — I48.0 PAF (PAROXYSMAL ATRIAL FIBRILLATION) (HCC): ICD-10-CM

## 2020-02-05 DIAGNOSIS — I10 ESSENTIAL HYPERTENSION: ICD-10-CM

## 2020-02-05 DIAGNOSIS — E78.2 MIXED HYPERLIPIDEMIA: ICD-10-CM

## 2020-02-05 DIAGNOSIS — E11.29 TYPE 2 DIABETES MELLITUS WITH MICROALBUMINURIA, WITHOUT LONG-TERM CURRENT USE OF INSULIN (HCC): ICD-10-CM

## 2020-02-05 DIAGNOSIS — E03.9 ACQUIRED HYPOTHYROIDISM: ICD-10-CM

## 2020-02-05 DIAGNOSIS — R80.9 TYPE 2 DIABETES MELLITUS WITH MICROALBUMINURIA, WITHOUT LONG-TERM CURRENT USE OF INSULIN (HCC): ICD-10-CM

## 2020-02-05 LAB
ALBUMIN SERPL-MCNC: 4.4 G/DL (ref 3.5–5.2)
ALP BLD-CCNC: 44 U/L (ref 35–104)
ALT SERPL-CCNC: 34 U/L (ref 5–33)
ANION GAP SERPL CALCULATED.3IONS-SCNC: 18 MMOL/L (ref 7–19)
AST SERPL-CCNC: 29 U/L (ref 5–32)
BILIRUB SERPL-MCNC: 0.7 MG/DL (ref 0.2–1.2)
BUN BLDV-MCNC: 10 MG/DL (ref 8–23)
CALCIUM SERPL-MCNC: 9.6 MG/DL (ref 8.8–10.2)
CHLORIDE BLD-SCNC: 97 MMOL/L (ref 98–111)
CHOLESTEROL, TOTAL: 152 MG/DL (ref 160–199)
CO2: 25 MMOL/L (ref 22–29)
CREAT SERPL-MCNC: 0.5 MG/DL (ref 0.5–0.9)
GFR NON-AFRICAN AMERICAN: >60
GLUCOSE BLD-MCNC: 184 MG/DL (ref 74–109)
HBA1C MFR BLD: 8.6 % (ref 4–6)
HDLC SERPL-MCNC: 41 MG/DL (ref 65–121)
LDL CHOLESTEROL CALCULATED: 73 MG/DL
POTASSIUM SERPL-SCNC: 3.8 MMOL/L (ref 3.5–5)
SODIUM BLD-SCNC: 140 MMOL/L (ref 136–145)
TOTAL PROTEIN: 7.6 G/DL (ref 6.6–8.7)
TRIGL SERPL-MCNC: 188 MG/DL (ref 0–149)
TSH SERPL DL<=0.05 MIU/L-ACNC: 3.12 UIU/ML (ref 0.27–4.2)

## 2020-02-20 ENCOUNTER — APPOINTMENT (OUTPATIENT)
Dept: CT IMAGING | Age: 65
DRG: 948 | End: 2020-02-20
Payer: COMMERCIAL

## 2020-02-20 ENCOUNTER — OFFICE VISIT (OUTPATIENT)
Dept: INTERNAL MEDICINE | Age: 65
End: 2020-02-20
Payer: COMMERCIAL

## 2020-02-20 ENCOUNTER — HOSPITAL ENCOUNTER (INPATIENT)
Age: 65
LOS: 2 days | Discharge: HOME | DRG: 948 | End: 2020-02-22
Attending: EMERGENCY MEDICINE | Admitting: FAMILY MEDICINE
Payer: COMMERCIAL

## 2020-02-20 ENCOUNTER — APPOINTMENT (OUTPATIENT)
Dept: GENERAL RADIOLOGY | Age: 65
DRG: 948 | End: 2020-02-20
Payer: COMMERCIAL

## 2020-02-20 VITALS
OXYGEN SATURATION: 98 % | BODY MASS INDEX: 37.12 KG/M2 | WEIGHT: 231 LBS | SYSTOLIC BLOOD PRESSURE: 122 MMHG | HEIGHT: 66 IN | DIASTOLIC BLOOD PRESSURE: 80 MMHG | HEART RATE: 59 BPM

## 2020-02-20 PROBLEM — R29.90 STROKE-LIKE SYMPTOMS: Status: ACTIVE | Noted: 2020-02-20

## 2020-02-20 LAB
ALBUMIN SERPL-MCNC: 4.4 G/DL (ref 3.5–5.2)
ALP BLD-CCNC: 50 U/L (ref 35–104)
ALT SERPL-CCNC: 37 U/L (ref 5–33)
ANION GAP SERPL CALCULATED.3IONS-SCNC: 16 MMOL/L (ref 7–19)
APTT: 32.3 SEC (ref 26–36.2)
AST SERPL-CCNC: 38 U/L (ref 5–32)
BACTERIA: NEGATIVE /HPF
BASOPHILS ABSOLUTE: 0.1 K/UL (ref 0–0.2)
BASOPHILS RELATIVE PERCENT: 0.9 % (ref 0–1)
BILIRUB SERPL-MCNC: 0.5 MG/DL (ref 0.2–1.2)
BILIRUBIN URINE: NEGATIVE
BLOOD, URINE: ABNORMAL
BUN BLDV-MCNC: 11 MG/DL (ref 8–23)
CALCIUM SERPL-MCNC: 9.8 MG/DL (ref 8.8–10.2)
CHLORIDE BLD-SCNC: 94 MMOL/L (ref 98–111)
CLARITY: CLEAR
CO2: 24 MMOL/L (ref 22–29)
COLOR: YELLOW
CREAT SERPL-MCNC: 0.5 MG/DL (ref 0.5–0.9)
DIGOXIN LEVEL: 0.7 NG/ML (ref 0.6–1.2)
EOSINOPHILS ABSOLUTE: 0.1 K/UL (ref 0–0.6)
EOSINOPHILS RELATIVE PERCENT: 0.9 % (ref 0–5)
EPITHELIAL CELLS, UA: 1 /HPF (ref 0–5)
GFR NON-AFRICAN AMERICAN: >60
GLUCOSE BLD-MCNC: 167 MG/DL (ref 70–99)
GLUCOSE BLD-MCNC: 215 MG/DL (ref 74–109)
GLUCOSE URINE: NEGATIVE MG/DL
HCT VFR BLD CALC: 47 % (ref 37–47)
HEMOGLOBIN: 15.1 G/DL (ref 12–16)
HYALINE CASTS: 0 /HPF (ref 0–8)
IMMATURE GRANULOCYTES #: 0 K/UL
INR BLD: 1.7 (ref 0.88–1.18)
INTERNATIONAL NORMALIZATION RATIO, POC: 1.7
KETONES, URINE: NEGATIVE MG/DL
LEUKOCYTE ESTERASE, URINE: NEGATIVE
LV EF: 28 %
LVEF MODALITY: NORMAL
LYMPHOCYTES ABSOLUTE: 3.1 K/UL (ref 1.1–4.5)
LYMPHOCYTES RELATIVE PERCENT: 30.7 % (ref 20–40)
MCH RBC QN AUTO: 29.1 PG (ref 27–31)
MCHC RBC AUTO-ENTMCNC: 32.1 G/DL (ref 33–37)
MCV RBC AUTO: 90.6 FL (ref 81–99)
MONOCYTES ABSOLUTE: 0.8 K/UL (ref 0–0.9)
MONOCYTES RELATIVE PERCENT: 7.8 % (ref 0–10)
NEUTROPHILS ABSOLUTE: 6 K/UL (ref 1.5–7.5)
NEUTROPHILS RELATIVE PERCENT: 59.3 % (ref 50–65)
NITRITE, URINE: NEGATIVE
PDW BLD-RTO: 13.3 % (ref 11.5–14.5)
PERFORMED ON: ABNORMAL
PH UA: 6.5 (ref 5–8)
PLATELET # BLD: 226 K/UL (ref 130–400)
PMV BLD AUTO: 10.9 FL (ref 9.4–12.3)
POTASSIUM SERPL-SCNC: 4.1 MMOL/L (ref 3.5–5)
PROTEIN UA: NEGATIVE MG/DL
PROTHROMBIN TIME, POC: NORMAL
PROTHROMBIN TIME: 20.1 SEC (ref 12–14.6)
RAPID INFLUENZA  B AGN: NEGATIVE
RAPID INFLUENZA A AGN: NEGATIVE
RBC # BLD: 5.19 M/UL (ref 4.2–5.4)
RBC UA: 0 /HPF (ref 0–4)
SODIUM BLD-SCNC: 134 MMOL/L (ref 136–145)
SPECIFIC GRAVITY UA: 1 (ref 1–1.03)
TOTAL PROTEIN: 8.1 G/DL (ref 6.6–8.7)
TROPONIN: <0.01 NG/ML (ref 0–0.03)
TSH SERPL DL<=0.05 MIU/L-ACNC: 3.91 UIU/ML (ref 0.27–4.2)
URINE REFLEX TO CULTURE: ABNORMAL
UROBILINOGEN, URINE: 0.2 E.U./DL
WBC # BLD: 10.1 K/UL (ref 4.8–10.8)
WBC UA: 1 /HPF (ref 0–5)

## 2020-02-20 PROCEDURE — 99214 OFFICE O/P EST MOD 30 MIN: CPT | Performed by: INTERNAL MEDICINE

## 2020-02-20 PROCEDURE — 84484 ASSAY OF TROPONIN QUANT: CPT

## 2020-02-20 PROCEDURE — 1210000000 HC MED SURG R&B

## 2020-02-20 PROCEDURE — 93005 ELECTROCARDIOGRAM TRACING: CPT | Performed by: EMERGENCY MEDICINE

## 2020-02-20 PROCEDURE — 84443 ASSAY THYROID STIM HORMONE: CPT

## 2020-02-20 PROCEDURE — 93880 EXTRACRANIAL BILAT STUDY: CPT

## 2020-02-20 PROCEDURE — C8929 TTE W OR WO FOL WCON,DOPPLER: HCPCS

## 2020-02-20 PROCEDURE — 70450 CT HEAD/BRAIN W/O DYE: CPT

## 2020-02-20 PROCEDURE — 85610 PROTHROMBIN TIME: CPT | Performed by: INTERNAL MEDICINE

## 2020-02-20 PROCEDURE — 85730 THROMBOPLASTIN TIME PARTIAL: CPT

## 2020-02-20 PROCEDURE — 3052F HG A1C>EQUAL 8.0%<EQUAL 9.0%: CPT | Performed by: INTERNAL MEDICINE

## 2020-02-20 PROCEDURE — 80053 COMPREHEN METABOLIC PANEL: CPT

## 2020-02-20 PROCEDURE — 2580000003 HC RX 258: Performed by: PHYSICIAN ASSISTANT

## 2020-02-20 PROCEDURE — 99285 EMERGENCY DEPT VISIT HI MDM: CPT

## 2020-02-20 PROCEDURE — 81001 URINALYSIS AUTO W/SCOPE: CPT

## 2020-02-20 PROCEDURE — 6370000000 HC RX 637 (ALT 250 FOR IP): Performed by: PHYSICIAN ASSISTANT

## 2020-02-20 PROCEDURE — 73502 X-RAY EXAM HIP UNI 2-3 VIEWS: CPT

## 2020-02-20 PROCEDURE — 99223 1ST HOSP IP/OBS HIGH 75: CPT | Performed by: PSYCHIATRY & NEUROLOGY

## 2020-02-20 PROCEDURE — 87804 INFLUENZA ASSAY W/OPTIC: CPT

## 2020-02-20 PROCEDURE — 85025 COMPLETE CBC W/AUTO DIFF WBC: CPT

## 2020-02-20 PROCEDURE — 71045 X-RAY EXAM CHEST 1 VIEW: CPT

## 2020-02-20 PROCEDURE — 85610 PROTHROMBIN TIME: CPT

## 2020-02-20 PROCEDURE — 82948 REAGENT STRIP/BLOOD GLUCOSE: CPT

## 2020-02-20 PROCEDURE — 6360000004 HC RX CONTRAST MEDICATION: Performed by: INTERNAL MEDICINE

## 2020-02-20 PROCEDURE — 80162 ASSAY OF DIGOXIN TOTAL: CPT

## 2020-02-20 PROCEDURE — 36415 COLL VENOUS BLD VENIPUNCTURE: CPT

## 2020-02-20 RX ORDER — SODIUM CHLORIDE 0.9 % (FLUSH) 0.9 %
10 SYRINGE (ML) INJECTION PRN
Status: DISCONTINUED | OUTPATIENT
Start: 2020-02-20 | End: 2020-02-22 | Stop reason: HOSPADM

## 2020-02-20 RX ORDER — M-VIT,TX,IRON,MINS/CALC/FOLIC 27MG-0.4MG
1 TABLET ORAL DAILY
Status: DISCONTINUED | OUTPATIENT
Start: 2020-02-21 | End: 2020-02-22 | Stop reason: HOSPADM

## 2020-02-20 RX ORDER — DIGOXIN 125 MCG
125 TABLET ORAL DAILY
Status: DISCONTINUED | OUTPATIENT
Start: 2020-02-21 | End: 2020-02-22 | Stop reason: HOSPADM

## 2020-02-20 RX ORDER — POLYETHYLENE GLYCOL 3350 17 G/17G
17 POWDER, FOR SOLUTION ORAL DAILY PRN
Status: DISCONTINUED | OUTPATIENT
Start: 2020-02-20 | End: 2020-02-22 | Stop reason: HOSPADM

## 2020-02-20 RX ORDER — ATORVASTATIN CALCIUM 80 MG/1
80 TABLET, FILM COATED ORAL NIGHTLY
Status: DISCONTINUED | OUTPATIENT
Start: 2020-02-20 | End: 2020-02-22 | Stop reason: HOSPADM

## 2020-02-20 RX ORDER — ONDANSETRON 2 MG/ML
4 INJECTION INTRAMUSCULAR; INTRAVENOUS EVERY 6 HOURS PRN
Status: DISCONTINUED | OUTPATIENT
Start: 2020-02-20 | End: 2020-02-22 | Stop reason: HOSPADM

## 2020-02-20 RX ORDER — SODIUM CHLORIDE 0.9 % (FLUSH) 0.9 %
10 SYRINGE (ML) INJECTION EVERY 12 HOURS SCHEDULED
Status: DISCONTINUED | OUTPATIENT
Start: 2020-02-20 | End: 2020-02-22 | Stop reason: HOSPADM

## 2020-02-20 RX ORDER — ASPIRIN 81 MG/1
81 TABLET ORAL DAILY
Status: DISCONTINUED | OUTPATIENT
Start: 2020-02-21 | End: 2020-02-22 | Stop reason: HOSPADM

## 2020-02-20 RX ORDER — DEXTROSE MONOHYDRATE 25 G/50ML
12.5 INJECTION, SOLUTION INTRAVENOUS PRN
Status: DISCONTINUED | OUTPATIENT
Start: 2020-02-20 | End: 2020-02-22 | Stop reason: HOSPADM

## 2020-02-20 RX ORDER — LABETALOL 20 MG/4 ML (5 MG/ML) INTRAVENOUS SYRINGE
10 EVERY 10 MIN PRN
Status: DISCONTINUED | OUTPATIENT
Start: 2020-02-20 | End: 2020-02-22 | Stop reason: HOSPADM

## 2020-02-20 RX ORDER — FLUTICASONE PROPIONATE 50 MCG
2 SPRAY, SUSPENSION (ML) NASAL DAILY
Qty: 3 BOTTLE | Refills: 1 | Status: SHIPPED
Start: 2020-02-20 | End: 2020-04-02 | Stop reason: CLARIF

## 2020-02-20 RX ORDER — ALBUTEROL SULFATE 90 UG/1
2 AEROSOL, METERED RESPIRATORY (INHALATION) EVERY 6 HOURS PRN
Status: DISCONTINUED | OUTPATIENT
Start: 2020-02-20 | End: 2020-02-22 | Stop reason: HOSPADM

## 2020-02-20 RX ORDER — LEVOTHYROXINE SODIUM 0.12 MG/1
125 TABLET ORAL DAILY
Status: DISCONTINUED | OUTPATIENT
Start: 2020-02-21 | End: 2020-02-22 | Stop reason: HOSPADM

## 2020-02-20 RX ORDER — DEXTROSE MONOHYDRATE 50 MG/ML
100 INJECTION, SOLUTION INTRAVENOUS PRN
Status: DISCONTINUED | OUTPATIENT
Start: 2020-02-20 | End: 2020-02-22 | Stop reason: HOSPADM

## 2020-02-20 RX ORDER — NICOTINE POLACRILEX 4 MG
15 LOZENGE BUCCAL PRN
Status: DISCONTINUED | OUTPATIENT
Start: 2020-02-20 | End: 2020-02-22 | Stop reason: HOSPADM

## 2020-02-20 RX ORDER — WARFARIN SODIUM 5 MG/1
10 TABLET ORAL
Status: COMPLETED | OUTPATIENT
Start: 2020-02-20 | End: 2020-02-20

## 2020-02-20 RX ORDER — VITAMIN B COMPLEX
1000 TABLET ORAL 2 TIMES DAILY
Status: DISCONTINUED | OUTPATIENT
Start: 2020-02-20 | End: 2020-02-22 | Stop reason: HOSPADM

## 2020-02-20 RX ADMIN — ATORVASTATIN CALCIUM 80 MG: 80 TABLET, FILM COATED ORAL at 20:51

## 2020-02-20 RX ADMIN — WARFARIN SODIUM 10 MG: 5 TABLET ORAL at 17:51

## 2020-02-20 RX ADMIN — PERFLUTREN 1.76 MG: 6.52 INJECTION, SUSPENSION INTRAVENOUS at 16:00

## 2020-02-20 RX ADMIN — INSULIN LISPRO 1 UNITS: 100 INJECTION, SOLUTION INTRAVENOUS; SUBCUTANEOUS at 20:51

## 2020-02-20 RX ADMIN — VITAMIN D, TAB 1000IU (100/BT) 1000 UNITS: 25 TAB at 20:52

## 2020-02-20 RX ADMIN — Medication 10 ML: at 20:52

## 2020-02-20 ASSESSMENT — PAIN DESCRIPTION - LOCATION: LOCATION: BACK

## 2020-02-20 ASSESSMENT — ENCOUNTER SYMPTOMS
DIARRHEA: 0
NAUSEA: 0
SORE THROAT: 0
SORE THROAT: 0
COUGH: 0
CHEST TIGHTNESS: 0
WHEEZING: 0
BACK PAIN: 0
SHORTNESS OF BREATH: 0
ABDOMINAL PAIN: 0
CONSTIPATION: 0
RHINORRHEA: 0
VOMITING: 0
ABDOMINAL PAIN: 0

## 2020-02-20 ASSESSMENT — PAIN SCALES - GENERAL
PAINLEVEL_OUTOF10: 0
PAINLEVEL_OUTOF10: 6

## 2020-02-20 NOTE — H&P
Medication Sig Start Date End Date Taking? Authorizing Provider   metFORMIN (GLUCOPHAGE) 500 MG tablet TAKE 2 TABLETS BY MOUTH TWICE DAILY WITH MEALS 1/30/20  Yes Eloy Herr MD   glipiZIDE (GLUCOTROL) 5 MG tablet TAKE 2 TABLETS BY MOUTH IN THE MORNING AND 1 TABLET IN THE EVENING 1/30/20  Yes Eloy Herr MD   metoprolol succinate (TOPROL XL) 50 MG extended release tablet TAKE 1 TABLET BY MOUTH ONCE DAILY. 11/13/19  Yes ADELITA Doyle   digoxin [facility] TRANSPLANT \[facility]\"") 125 MCG tablet Take 1 tablet by mouth daily 11/13/19  Yes ADELITA Doyle   hydrochlorothiazide (HYDRODIURIL) 25 MG tablet daily  Patient taking differently: 12.5 mg daily 1/2 daily 11/13/19  Yes ADELITA Doyle   irbesartan-hydrochlorothiazide (AVALIDE) 300-12.5 MG per tablet TAKE 1 TABLET BY MOUTH ONCE DAILY. 11/6/19  Yes Eloy Herr MD   atorvastatin (LIPITOR) 80 MG tablet TAKE 1 TABLET BY MOUTH ONCE DAILY. 11/6/19  Yes Eloy Herr MD   warfarin (COUMADIN) 5 MG tablet TAKE 1 TABLET BY MOUTH EVERY DAY ON M-W-F AND 1 1/2 TABLETS EVERY DAYON THE OTHER DAYS 11/6/19  Yes Eloy Herr MD   levothyroxine (SYNTHROID) 125 MCG tablet TAKE 1 TABLET BY MOUTH ONCE DAILY 11/6/19  Yes Eloy Herr MD   Multiple Vitamins-Minerals (THERAPEUTIC MULTIVITAMIN-MINERALS) tablet Take 1 tablet by mouth daily. Yes Historical Provider, MD   Vitamin D (CHOLECALCIFEROL) 1000 UNITS CAPS capsule Take 1,000 Units by mouth 2 times daily    Yes Historical Provider, MD   aspirin 81 MG EC tablet Take 81 mg by mouth daily. Yes Historical Provider, MD   fluticasone (FLONASE) 50 MCG/ACT nasal spray 2 sprays by Each Nostril route daily 2/20/20   Eloy Herr MD   albuterol sulfate HFA (VENTOLIN HFA) 108 (90 Base) MCG/ACT inhaler Inhale 2 puffs into the lungs every 6 hours as needed for Wheezing 5/21/19   Eloy Herr MD     Allergies:    Aspartame and phenylalanine; Mushroom extract complex; Penicillins;  Shellfish-derived products; and Zetia [ezetimibe]    Social History: JVD, supple, symmetrical, trachea midline   Lungs: clear to auscultation bilaterally,no rales or wheezes   Heart: irregularly irregular, S1, S2 normal, no murmur  Abdomen:soft, non-tender; non-distended, normal bowel sounds no masses, no organomegaly  Extremities:No lower extremity edema,  No erythema, no tenderness to palpation  Skin: Skin color, texture, turgor normal. No rashes or lesions  Lymphatic: No palpable lymph node enlargment  Neurologic: Alert and oriented X 3, normal strength and tone. No focal deficits appreciated. Speech fluent. No facial droop. Answers all questions appropriately follows commands. Psychiatric: Anxious    Diagnostic Data:  CBC:  Recent Labs     02/20/20  1115   WBC 10.1   HGB 15.1   HCT 47.0        BMP:  Recent Labs     02/20/20  1115   *   K 4.1   CL 94*   CO2 24   BUN 11   CREATININE 0.5   CALCIUM 9.8     Recent Labs     02/20/20  1115   AST 38*   ALT 37*   BILITOT 0.5   ALKPHOS 50     Coag Panel:   Recent Labs     02/20/20  1003 02/20/20  1115   INR 1.7 1.70*   PROTIME  --  20.1*   APTT  --  32.3     Cardiac Enzymes:   Recent Labs     02/20/20  1115   TROPONINI <0.01     Urinalysis:  Lab Results   Component Value Date    NITRU Negative 02/20/2020    WBCUA 1 02/20/2020    BACTERIA NEGATIVE 02/20/2020    RBCUA 0 02/20/2020    BLOODU TRACE 02/20/2020    SPECGRAV 1.004 02/20/2020    GLUCOSEU Negative 02/20/2020     Ct Head Wo Contrast  No acute intracranial abnormality. The above finding are recorded on digital voice clip in PACS. Signed by Dr Francoise Loera on 2/20/2020 11:40 AM    Xr Chest Portable  No active cardiopulmonary disease. A persistent moderate cardiomegaly. Signed by Dr Francoise Loera on 2/20/2020 11:50 AM    Xr Hip 2-3 Vw W Pelvis Left  No acute fracture of the left hip. Moderate chronic osteoarthritis of the hip joint. There is deformity of the greater trochanter which may represent recent or a previous fracture.  If clinically warranted this may be further

## 2020-02-20 NOTE — PROGRESS NOTES
Clinical Pharmacy Note    Kassi William is a 71 y.o. female for whom pharmacy has been asked to manage warfarin therapy. Reason for Admission: Extremity Weakness    Consulting Physician: Lanec López  Warfarin dose prior to admission:   Coumadin 5 mg Mon / Wed / Fri  Coumadin 7.5 mg Sun / Tues / Thurs / Sat  Warfarin indication: Chronic atrial fibrillation  Target INR range: 2 - 3   Outpatient warfarin provider: Patrick Redd    Past Medical History:   Diagnosis Date    Acute laryngitis     Acute sinusitis     Allergic rhinitis     Ankle pain     Asthma     Asthmatic bronchitis     Atrial fibrillation ([address] 9/13/12, 10/9/13    cardioversion    Atrial flutter (HCC)     paroxysmal     CAD (coronary artery disease)     CHF (congestive heart failure) (HCC)     Chronic back pain     Cough     Diabetes     Dizzy     Dysuria     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[address]     Fatigue     Foot pain     Hx of amiodarone therapy 9/24/2014    Hyperlipidemia     PCP manages cholesterol    Hypertension     Menopause     Obesity     Skin rash     Type II or unspecified type diabetes mellitus without mention of complication, not stated as uncontrolled     Umbilical hernia     URI (upper respiratory infection)                 Recent Labs     02/20/20  1115   INR 1.70*     Recent Labs     02/20/20  1115   HGB 15.1   HCT 47.0          Current warfarin drug-drug interactions:   Levothyroxine - Thyroid Products may enhance the anticoagulant effect of Vitamin K Antagonists. Monitor for increased anticoagulant effects of vitamin K antagonists if a thyroid product is initiated/dose increased, or decreased effects if a thyroid product is discontinued/dose decreased. Date INR Warfarin Dose   02/20/20 1.70 10 mg                                   Notes: Give Coumadin 10 mg po x 1 tonight. Daily PT/INR until stable within therapeutic range. Thank you for the consult.      Electronically signed by GUILLERMINA Jane [facility] on 2/20/2020 at 1:59 PM

## 2020-02-20 NOTE — PROGRESS NOTES
Chief Complaint   Patient presents with    3 Month Follow-Up     Patient c/o blacking out last week while getting lab work and hasn't been the same since. Pt c/o left leg weakness and back pain    Anticoagulation     INR 1.7  current dose 5mg Mon, Wed, Fri and 7.5mg all other days. Take 10mg tonight, then resume current dose and recheck next week, if able     History of presenting illness:  Manuel Weston is a67 y.o. female who presents today for follow up on her chronic medical conditions as noted below.     States had episode of near syncope before her lab draw 2 weeks ago    Patient states that ever since that episode she has been feeling extremely weak in her left lower extremity  She has not contacted any healthcare providers regarding this  She states that when she is trying to walk she is significantly off balance secondary to weakness in her left lower extremity and at times her left lower extremity \"gives up\" and she states that she has nearly fallen several times  She states that she has similar symptoms when she had her previous stroke several years ago(at that time weakness was right lower extremity)    She also presents here for follow-up regarding following medical issues    Hypertension  Her blood pressure has been well controlled    Type 2 diabetes  Patient admits she has not been on good diet, she has been taking her metformin and glipizide    Depression  Stress levels overall better  No new complaints today    Hypothyroidism she takes 125 mcg daily    Paroxysmal atrial fibrillation  Patient remains on Coumadin  Has been compliant with her Coumadin clinic appointments      Patient Active Problem List    Diagnosis Date Noted    Stroke-like symptoms 02/20/2020    Chronic atrial fibrillation 05/21/2018    Acquired hypothyroidism 10/17/2017    Morbid obesity due to excess calories ([facility] 09/26/2017    Vitamin D deficiency 11/74/8606    Systolic congestive heart failure ([facility] 09/26/2017 Overview Note:     3/12 echo ef 40%      H/O bone density study 09/26/2017     Overview Note:     2015 nl      Mixed hyperlipidemia 09/26/2017    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[facility] 09/26/2017    Type 2 diabetes mellitus with microalbuminuria, without long-term current use of insulin ([facility] 09/26/2017    Long term current use of anticoagulant therapy 06/29/2017     Overview Note:     Updating Deprecated Diagnoses      Essential hypertension     Mild CAD      Past Medical History:   Diagnosis Date    Acute laryngitis     Acute sinusitis     Allergic rhinitis     Ankle pain     Asthma     Asthmatic bronchitis     Atrial fibrillation ([facility] 9/13/12, 10/9/13    cardioversion    Atrial flutter (HCC)     paroxysmal     CAD (coronary artery disease)     CHF (congestive heart failure) (HCC)     Chronic back pain     Cough     Diabetes     Dizzy     Dysuria     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[facility]     Fatigue     Foot pain     Hx of amiodarone therapy 9/24/2014    Hyperlipidemia     PCP manages cholesterol    Hypertension     Menopause     Obesity     Skin rash     Type II or unspecified type diabetes mellitus without mention of complication, not stated as uncontrolled     Umbilical hernia     URI (upper respiratory infection)       Past Surgical History:   Procedure Laterality Date    CARDIAC CATHETERIZATION  10/21/09    EF 35% left ventricle is moderately enlarged     CARDIOVERSION  10/9/13    CHOLECYSTECTOMY      EYE SURGERY      retina and cataracts     GALLBLADDER SURGERY      TUBAL LIGATION       No current facility-administered medications for this visit.       Current Outpatient Medications   Medication Sig Dispense Refill    fluticasone (FLONASE) 50 MCG/ACT nasal spray 2 sprays by Each Nostril route daily 3 Bottle 1     Facility-Administered Medications Ordered in Other Visits   Medication Dose Route Frequency Provider Last Rate Last Dose    sodium chloride flush 0.9 % injection 10 mL  10 mL Intravenous 2 times per day Cassie Gonzalez PA-C        sodium chloride flush 0.9 % injection 10 mL  10 mL Intravenous PRN Cassie Gonzalez PA-C        polyethylene glycol [facility]) packet 17 g  17 g Oral Daily PRN Cassie Gonzalez PA-C        [facility]ron [facility]) injection 4 mg  4 mg Intravenous Q6H PRN Cassie Gonzalez PA-C        labetalol (NORMODYNE;TRANDATE) injection syringe 10 mg  10 mg Intravenous Q10 Min PRN Cassie Gonzalez PA-C        insulin lispro (HUMALOG) injection vial 0-12 Units  0-12 Units Subcutaneous TID WC Cassie Gonzalez PA-C        insulin lispro (HUMALOG) injection vial 0-6 Units  0-6 Units Subcutaneous Nightly Cassie Gonzalez PA-C        glucose (GLUTOSE) 40 % oral gel 15 g  15 g Oral PRN Cassie Gonzalez PA-C        dextrose 50 % IV solution  12.5 g Intravenous PRN Cassie Gonzalez PA-C        glucagon (rDNA) injection 1 mg  1 mg Intramuscular PRN Cassie Gonzalez PA-C        dextrose 5 % solution  100 mL/hr Intravenous PRN Cassie Gonzalez PA-C        albuterol sulfate  (90 Base) MCG/ACT inhaler 2 puff  2 puff Inhalation Q6H PRN Cassie Gonzalez PA-C        [START ON 2/21/2020] aspirin EC tablet 81 mg  81 mg Oral Daily Cassie Gonzalez PA-C        atorvastatin (LIPITOR) tablet 80 mg  80 mg Oral Nightly Cassie Gonzalez PA-C        [START ON 2/21/2020] digoxin (LANOXIN) tablet 125 mcg  125 mcg Oral Daily Cassie Gonzalez PA-C        [START ON 2/21/2020] levothyroxine (SYNTHROID) tablet 125 mcg  125 mcg Oral Daily Cassie Gonzalez PA-C        [START ON 2/21/2020] therapeutic multivitamin-minerals 1 tablet  1 tablet Oral Daily Cassie Gonzalez PA-C        Vitamin D (CHOLECALCIFEROL) tablet 1,000 Units  1,000 Units Oral BID Cassie Gonzalez PA-C        warfarin (COUMADIN) daily dosing (placeholder)   Other RX Placeholder Cassie Gonzalez PA-C        warfarin (COUMADIN) tablet 10 mg  10 mg Oral Once Cassie Gonzalez PA-C         Allergies   Allergen Reactions   

## 2020-02-20 NOTE — ED PROVIDER NOTES
[facility]  eMERGENCY dEPARTMENT eNCOUnter      Pt Name: Troy Boyd  MRN: [id number]  Birthdate 1955  Date of evaluation: 2/20/2020  Provider: Jamaal Neves MD    [address] [place] [address]       Chief Complaint   Patient presents with    Extremity Weakness         HISTORY OF PRESENT ILLNESS   ([facility]/Symptom, Timing/Onset,Context/Setting, Quality, Duration, Modifying Factors, Severity)  Note limiting factors. Troy Boyd is a 71 y.o. female who presents to the emergency department with left leg weakness and numbness. The patient states that she had gone to get her labs drawn about 2 weeks ago and had a near syncopal event. She says this was attributed to her having not eaten for her fasting labs however she says she has not felt well since that time. She has had no fever but she reports that she has new left leg numbness and weakness. She reports dizziness. She feels like her left eyelid is drooping. Today her son told her that her speech was slurred. She has residual right-sided weakness from a 2006 stroke. She is on Coumadin. She said her last INR was 1.7. Does have some left hip pain. She says that during her near syncopal event she twisted and almost fell and thinks she might have strained a muscle in the region. HPI    NursingNotes were reviewed. REVIEW OF SYSTEMS    (2-9 systems for level 4, 10 or more for level 5)     Review of Systems   Constitutional: Positive for activity change, appetite change and fatigue. Negative for chills and fever. HENT: Negative for rhinorrhea and sore throat. Respiratory: Negative for shortness of breath. Cardiovascular: Negative for chest pain and leg swelling. Gastrointestinal: Negative for abdominal pain, diarrhea, nausea and vomiting. Genitourinary: Negative for difficulty urinating. Musculoskeletal: Negative for back pain and neck pain. Skin: Negative for rash.    Neurological: Positive for dizziness, speech difficulty, weakness, light-headedness and numbness. Negative for headaches. Psychiatric/Behavioral: Negative for confusion. A complete review of systems was performed and is negative except as noted above in the HPI. PAST MEDICAL HISTORY     Past Medical History:   Diagnosis Date    Acute laryngitis     Acute sinusitis     Allergic rhinitis     Ankle pain     Asthma     Asthmatic bronchitis     Atrial fibrillation ([address] 9/13/12, 10/9/13    cardioversion    Atrial flutter (HCC)     paroxysmal     CAD (coronary artery disease)     CHF (congestive heart failure) (HCC)     Chronic back pain     Cough     Diabetes     Dizzy     Dysuria     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HCC)     Fatigue     Foot pain     Hx of amiodarone therapy 9/24/2014    Hyperlipidemia     PCP manages cholesterol    Hypertension     Menopause     Obesity     Skin rash     Type II or unspecified type diabetes mellitus without mention of complication, not stated as uncontrolled     Umbilical hernia     URI (upper respiratory infection)          SURGICAL HISTORY       Past Surgical History:   Procedure Laterality Date    CARDIAC CATHETERIZATION  10/21/09    EF 35% left ventricle is moderately enlarged     CARDIOVERSION  10/9/13    CHOLECYSTECTOMY      EYE SURGERY      retina and cataracts     GALLBLADDER SURGERY      TUBAL LIGATION           CURRENT MEDICATIONS       Previous Medications    ALBUTEROL SULFATE HFA (VENTOLIN HFA) 108 (90 BASE) MCG/ACT INHALER    Inhale 2 puffs into the lungs every 6 hours as needed for Wheezing    ASPIRIN 81 MG EC TABLET    Take 81 mg by mouth daily. ATORVASTATIN (LIPITOR) 80 MG TABLET    TAKE 1 TABLET BY MOUTH ONCE DAILY.     DIGOXIN (LANOXIN) 125 MCG TABLET    Take 1 tablet by mouth daily    GLIPIZIDE (GLUCOTROL) 5 MG TABLET    TAKE 2 TABLETS BY MOUTH IN THE MORNING AND 1 TABLET IN THE EVENING    HYDROCHLOROTHIAZIDE (HYDRODIURIL) 25 MG TABLET    daily    IRBESARTAN-HYDROCHLOROTHIAZIDE (AVALIDE) 300-12.5 Psychiatric:         Behavior: Behavior normal.         DIAGNOSTIC RESULTS     EKG: All EKG's are interpreted by the Emergency Department Physician who either signs or Co-signs this chart in the absence of a cardiologist.    Atrial fibrillation rate 116 left bundle branch block PVCs similar to prior    RADIOLOGY:   Non-plain film images such as CT, Ultrasound and MRI are read by the radiologist. Orion Solano images are visualized and preliminarily interpreted by the emergency physician with the below findings:        Interpretation per the Radiologist below, if available at the time of this note:    XR CHEST PORTABLE   Final Result   No active cardiopulmonary disease. A persistent moderate cardiomegaly. Signed by Dr Li Prajapati on 2/20/2020 11:50 AM      XR HIP 2-3 VW W PELVIS LEFT   Final Result   No acute fracture of the left hip. Moderate chronic   osteoarthritis of the hip joint. There is deformity of the greater trochanter which may represent   recent or a previous fracture. If clinically warranted this may be   further evaluated with CT scan of the left hip. The above finding are recorded on a digital voice clip in PACS. Signed by Dr Li Prajapati on 2/20/2020 11:59 AM      CT Head WO Contrast   Final Result   No acute intracranial abnormality. The above finding are recorded on digital voice clip in PACS.    Signed by Dr Li Prajapati on 2/20/2020 11:40 AM            ED BEDSIDE ULTRASOUND:   Performed by ED Physician - none    LABS:  Labs Reviewed   CBC WITH AUTO DIFFERENTIAL - Abnormal; Notable for the following components:       Result Value    MCHC 32.1 (*)     All other components within normal limits   COMPREHENSIVE METABOLIC PANEL - Abnormal; Notable for the following components:    Sodium 134 (*)     Chloride 94 (*)     Glucose 215 (*)     ALT 37 (*)     AST 38 (*)     All other components within normal limits   PROTIME-INR - Abnormal; Notable for the following components:

## 2020-02-20 NOTE — CONSULTS
[id number] [facility] Neurology Consult  ? ? Patient: Marques Hogan  MR#: [id number]  Account Number: [de-identified]   Room: [facility]-   YOB: 1955  Date of Progress Note: 2/20/2020  Time of Note 2:15 PM  Attending Physician: Robin Tellez MD  Consulting Physician: Shanna Liriano M.D.  ?  ? CHIEF COMPLAINT: Trouble controlling the left leg  ? HISTORY OF PRESENT ILLNESS:   This 71 y.o. female who presents with trouble controlling the left leg. Says that about a week and half ago she had a syncopal episode during a fasting blood test.  She really has not felt the same since that time. Later that day she began to notice some trouble picking up her left leg and feels though symptoms have gradually progressed over that period of time. No numbness or tingling. No involvement of the face or arm on that side. No change in bowel or bladder. She does have some chronic lower back difficulties. She came into the ED with the symptoms. Case discussed with ED physician. Routine blood studies and CT of the head are relatively unremarkable. Admitted for stroke work-up. She allegedly had a stroke in 2006 causing some chronic right-sided difficulties. Has a history of atrial fibrillation and cardioversion. Is on Coumadin chronically. INR 1.7 on admission. She has a history of hyperlipidemia and diabetes. Also takes a baby aspirin a day along with her Coumadin. REVIEW OF SYSTEMS:  Constitutional - No fever or chills. No diaphoresis or significant fatigue. HENT - No tinnitus or significant hearing loss. Eyes - no sudden vision change or eye pain  Respiratory - no significant shortness of breath or cough  Cardiovascular - no chest pain No palpitations or significant leg swelling  Gastrointestinal - no abdominal swelling or pain. Genitourinary - No difficulty urinating, dysuria  Musculoskeletal - no back pain or myalgia. Skin - no color change or rash  Neurologic - No seizures.  No  confusion  Hematologic - no easy bruising or excessive bleeding. Psychiatric - no severe anxiety or nervousness. All other review of systems are negative. ?   Past Medical History:      Diagnosis Date    Acute laryngitis     Acute sinusitis     Allergic rhinitis     Ankle pain     Asthma     Asthmatic bronchitis     Atrial fibrillation ([address] 9/13/12, 10/9/13    cardioversion    Atrial flutter (HCC)     paroxysmal     CAD (coronary artery disease)     CHF (congestive heart failure) (HCC)     Chronic back pain     Cough     Diabetes     Dizzy     Dysuria     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HCC)     Fatigue     Foot pain     Hx of amiodarone therapy 9/24/2014    Hyperlipidemia     PCP manages cholesterol    Hypertension     Menopause     Obesity     Skin rash     Type II or unspecified type diabetes mellitus without mention of complication, not stated as uncontrolled     Umbilical hernia     URI (upper respiratory infection)      Past Surgical History:      Procedure Laterality Date    CARDIAC CATHETERIZATION  10/21/09    EF 35% left ventricle is moderately enlarged     CARDIOVERSION  10/9/13    CHOLECYSTECTOMY      EYE SURGERY      retina and cataracts     GALLBLADDER SURGERY      TUBAL LIGATION       Medications in Hospital:     Current Facility-Administered Medications:     sodium chloride flush 0.9 % injection 10 mL, 10 mL, Intravenous, 2 times per day, Gordy Narayanan PA-C    sodium chloride flush 0.9 % injection 10 mL, 10 mL, Intravenous, PRN, Gordy Narayanan PA-C    polyethylene glycol (GLYCOLAX) packet 17 g, 17 g, Oral, Daily PRN, Gordy Narayanan PA-C    ondansetron [facility]) injection 4 mg, 4 mg, Intravenous, Q6H PRN, Gordy Narayanan PA-C    labetalol (NORMODYNE;TRANDATE) injection syringe 10 mg, 10 mg, Intravenous, Q10 Min PRN, Gordy Narayanan PA-C    insulin lispro (HUMALOG) injection vial 0-12 Units, 0-12 Units, Subcutaneous, TID WC, Gordy Narayanan PA-C    insulin lispro (HUMALOG) injection vial 0-6 Units, Constitutional - well developed, well nourished. Eyes - conjunctiva normal. Pupils react to light  Ear, nose, throat -hearing intact to finger rub No scars, masses, or lesions over external nose or ears, no atrophy of tongue  Neck-symmetric, no masses noted, no jugular vein distension  Respiration- chest wall appears symmetric, good expansion,   normal effort without use of accessory muscles  Musculoskeletal - no significant wasting of muscles noted, no bony deformities  Extremities-no clubbing, cyanosis or edema  Skin - warm, dry, and intact. No rash, erythema, or pallor. Psychiatric - mood, affect, and behavior appear normal.   Neurological exam  Awake, alert, fluent oriented x 3 appropriate affect  Attention and concentration appear appropriate  Recent and remote memory appears unremarkable  Speech normal without dysarthria  No clear issues with language of fund of knowledge  Cranial Nerve Exam   CN II- Visual fields grossly unremarkable  CN III, IV,VI-EOMI, No nystagmus, conjugate eye movements, no ptosis  CN V-sensation intact to LT over face  CN VII-no facial assymetry  CN VIII-Hearing intact to voice  CN IX and X- Palate elevates in midline  CN XI-good shoulder shrug  CN XII-Tongue midline with no fasciculations or fibrillations  Motor Exam  V/V throughout upper and lower extremities bilaterally, no cogwheeling, normal tone  Sensory Exam  Sensation intact to light touch and temperature upper and lower extremities bilaterally  Reflexes   Absent throughout  No clonus ankles  No Bueno's sign bilateral hands  Tremors- no tremors in hands or head noted  Gait  Not tested  Coordination  Finger to nose-unremarkable  ?   ?  CBC:   Recent Labs     02/20/20  1115   WBC 10.1   HGB 15.1        BMP:   Recent Labs     02/20/20  1115   *   K 4.1   CL 94*   CO2 24   BUN 11   CREATININE 0.5   GLUCOSE 215*     Hepatic:   Recent Labs     02/20/20  1115   AST 38*   ALT 37*   BILITOT 0.5   ALKPHOS 50     Lipids: No results for input(s): CHOL, HDL in the last 72 hours. Invalid input(s): LDLCALCU  INR:   Recent Labs     02/20/20  1003 02/20/20  1115   INR 1.7 1.70*     ?  ? Assessment and Plan   1. Patient with a syncopal episode about a week and a half ago  Subjective complains of difficulty using her left leg since that time. She has a relatively unremarkable neurological examination. Perhaps a hint of weakness in the left leg. Agree with MRI of the head along with a CTA of the head and neck and echocardiogram.  PT/OT requested. Will follow with you. Case discussed with ED physician.   ?  ?      Shanna Liriano MD  Board Certified in Neurology

## 2020-02-21 ENCOUNTER — APPOINTMENT (OUTPATIENT)
Dept: MRI IMAGING | Age: 65
DRG: 948 | End: 2020-02-21
Payer: COMMERCIAL

## 2020-02-21 PROBLEM — Z51.5 PALLIATIVE CARE PATIENT: Status: ACTIVE | Noted: 2020-02-21

## 2020-02-21 LAB
ANION GAP SERPL CALCULATED.3IONS-SCNC: 12 MMOL/L (ref 7–19)
BUN BLDV-MCNC: 10 MG/DL (ref 8–23)
CALCIUM SERPL-MCNC: 9.2 MG/DL (ref 8.8–10.2)
CHLORIDE BLD-SCNC: 100 MMOL/L (ref 98–111)
CHOLESTEROL, TOTAL: 147 MG/DL (ref 160–199)
CO2: 29 MMOL/L (ref 22–29)
CREAT SERPL-MCNC: 0.5 MG/DL (ref 0.5–0.9)
GFR NON-AFRICAN AMERICAN: >60
GLUCOSE BLD-MCNC: 134 MG/DL (ref 74–109)
GLUCOSE BLD-MCNC: 178 MG/DL (ref 70–99)
GLUCOSE BLD-MCNC: 198 MG/DL (ref 70–99)
GLUCOSE BLD-MCNC: 246 MG/DL (ref 70–99)
GLUCOSE BLD-MCNC: 273 MG/DL (ref 70–99)
HBA1C MFR BLD: 8.9 % (ref 4–6)
HCT VFR BLD CALC: 42.3 % (ref 37–47)
HDLC SERPL-MCNC: 36 MG/DL (ref 65–121)
HEMOGLOBIN: 13.4 G/DL (ref 12–16)
INR BLD: 1.85 (ref 0.88–1.18)
LDL CHOLESTEROL CALCULATED: 83 MG/DL
MCH RBC QN AUTO: 29 PG (ref 27–31)
MCHC RBC AUTO-ENTMCNC: 31.7 G/DL (ref 33–37)
MCV RBC AUTO: 91.6 FL (ref 81–99)
PDW BLD-RTO: 13.3 % (ref 11.5–14.5)
PERFORMED ON: ABNORMAL
PLATELET # BLD: 196 K/UL (ref 130–400)
PMV BLD AUTO: 10.7 FL (ref 9.4–12.3)
POTASSIUM REFLEX MAGNESIUM: 4.5 MMOL/L (ref 3.5–5)
PROTHROMBIN TIME: 21.6 SEC (ref 12–14.6)
RBC # BLD: 4.62 M/UL (ref 4.2–5.4)
SODIUM BLD-SCNC: 141 MMOL/L (ref 136–145)
TRIGL SERPL-MCNC: 140 MG/DL (ref 0–149)
TROPONIN: <0.01 NG/ML (ref 0–0.03)
WBC # BLD: 8.7 K/UL (ref 4.8–10.8)

## 2020-02-21 PROCEDURE — 6370000000 HC RX 637 (ALT 250 FOR IP): Performed by: PHYSICIAN ASSISTANT

## 2020-02-21 PROCEDURE — 80048 BASIC METABOLIC PNL TOTAL CA: CPT

## 2020-02-21 PROCEDURE — 93017 CV STRESS TEST TRACING ONLY: CPT

## 2020-02-21 PROCEDURE — 97110 THERAPEUTIC EXERCISES: CPT

## 2020-02-21 PROCEDURE — 92523 SPEECH SOUND LANG COMPREHEN: CPT

## 2020-02-21 PROCEDURE — 82948 REAGENT STRIP/BLOOD GLUCOSE: CPT

## 2020-02-21 PROCEDURE — 92610 EVALUATE SWALLOWING FUNCTION: CPT

## 2020-02-21 PROCEDURE — 70551 MRI BRAIN STEM W/O DYE: CPT

## 2020-02-21 PROCEDURE — 97161 PT EVAL LOW COMPLEX 20 MIN: CPT

## 2020-02-21 PROCEDURE — 85610 PROTHROMBIN TIME: CPT

## 2020-02-21 PROCEDURE — 85027 COMPLETE CBC AUTOMATED: CPT

## 2020-02-21 PROCEDURE — 84484 ASSAY OF TROPONIN QUANT: CPT

## 2020-02-21 PROCEDURE — 99232 SBSQ HOSP IP/OBS MODERATE 35: CPT | Performed by: PSYCHIATRY & NEUROLOGY

## 2020-02-21 PROCEDURE — 97116 GAIT TRAINING THERAPY: CPT

## 2020-02-21 PROCEDURE — 80061 LIPID PANEL: CPT

## 2020-02-21 PROCEDURE — 36415 COLL VENOUS BLD VENIPUNCTURE: CPT

## 2020-02-21 PROCEDURE — 2580000003 HC RX 258: Performed by: PHYSICIAN ASSISTANT

## 2020-02-21 PROCEDURE — 1210000000 HC MED SURG R&B

## 2020-02-21 PROCEDURE — 6370000000 HC RX 637 (ALT 250 FOR IP): Performed by: INTERNAL MEDICINE

## 2020-02-21 PROCEDURE — 83036 HEMOGLOBIN GLYCOSYLATED A1C: CPT

## 2020-02-21 PROCEDURE — 99253 IP/OBS CNSLTJ NEW/EST LOW 45: CPT | Performed by: INTERNAL MEDICINE

## 2020-02-21 RX ORDER — VALSARTAN 80 MG/1
80 TABLET ORAL 2 TIMES DAILY
Status: DISCONTINUED | OUTPATIENT
Start: 2020-02-21 | End: 2020-02-22 | Stop reason: HOSPADM

## 2020-02-21 RX ORDER — CARVEDILOL 12.5 MG/1
12.5 TABLET ORAL 2 TIMES DAILY WITH MEALS
Status: DISCONTINUED | OUTPATIENT
Start: 2020-02-21 | End: 2020-02-22 | Stop reason: HOSPADM

## 2020-02-21 RX ORDER — SPIRONOLACTONE 25 MG/1
25 TABLET ORAL DAILY
Status: DISCONTINUED | OUTPATIENT
Start: 2020-02-21 | End: 2020-02-22 | Stop reason: HOSPADM

## 2020-02-21 RX ORDER — WARFARIN SODIUM 7.5 MG/1
7.5 TABLET ORAL
Status: COMPLETED | OUTPATIENT
Start: 2020-02-21 | End: 2020-02-21

## 2020-02-21 RX ADMIN — WARFARIN SODIUM 7.5 MG: 7.5 TABLET ORAL at 17:47

## 2020-02-21 RX ADMIN — LEVOTHYROXINE SODIUM 125 MCG: 125 TABLET ORAL at 08:07

## 2020-02-21 RX ADMIN — VITAMIN D, TAB 1000IU (100/BT) 1000 UNITS: 25 TAB at 08:08

## 2020-02-21 RX ADMIN — ASPIRIN 81 MG: 81 TABLET, COATED ORAL at 08:08

## 2020-02-21 RX ADMIN — VALSARTAN 80 MG: 80 TABLET, FILM COATED ORAL at 20:30

## 2020-02-21 RX ADMIN — DIGOXIN 125 MCG: 125 TABLET ORAL at 08:08

## 2020-02-21 RX ADMIN — VITAMIN D, TAB 1000IU (100/BT) 1000 UNITS: 25 TAB at 20:30

## 2020-02-21 RX ADMIN — INSULIN LISPRO 3 UNITS: 100 INJECTION, SOLUTION INTRAVENOUS; SUBCUTANEOUS at 20:30

## 2020-02-21 RX ADMIN — CARVEDILOL 12.5 MG: 12.5 TABLET, FILM COATED ORAL at 17:47

## 2020-02-21 RX ADMIN — Medication 10 ML: at 20:30

## 2020-02-21 RX ADMIN — INSULIN LISPRO 4 UNITS: 100 INJECTION, SOLUTION INTRAVENOUS; SUBCUTANEOUS at 11:53

## 2020-02-21 RX ADMIN — INSULIN LISPRO 2 UNITS: 100 INJECTION, SOLUTION INTRAVENOUS; SUBCUTANEOUS at 17:44

## 2020-02-21 RX ADMIN — SPIRONOLACTONE 25 MG: 25 TABLET, FILM COATED ORAL at 10:49

## 2020-02-21 RX ADMIN — ATORVASTATIN CALCIUM 80 MG: 80 TABLET, FILM COATED ORAL at 20:30

## 2020-02-21 RX ADMIN — MULTIPLE VITAMINS W/ MINERALS TAB 1 TABLET: TAB at 08:09

## 2020-02-21 RX ADMIN — VALSARTAN 80 MG: 80 TABLET, FILM COATED ORAL at 10:49

## 2020-02-21 RX ADMIN — CARVEDILOL 12.5 MG: 12.5 TABLET, FILM COATED ORAL at 10:49

## 2020-02-21 RX ADMIN — Medication 10 ML: at 08:11

## 2020-02-21 ASSESSMENT — ENCOUNTER SYMPTOMS
TROUBLE SWALLOWING: 0
BLOOD IN STOOL: 0
NAUSEA: 0
VOMITING: 0
DIARRHEA: 0
BACK PAIN: 0
ABDOMINAL DISTENTION: 0
COUGH: 0
ABDOMINAL PAIN: 0
SORE THROAT: 0
SHORTNESS OF BREATH: 0
EYE REDNESS: 0
CONSTIPATION: 0
WHEEZING: 0
EYE DISCHARGE: 0

## 2020-02-21 ASSESSMENT — PAIN SCALES - GENERAL: PAINLEVEL_OUTOF10: 0

## 2020-02-21 NOTE — PROGRESS NOTES
Speech Language Pathology  Facility/Department: [facility] SURG SERVICES  Initial Speech/Language/Cognitive Assessment  Bedside Swallow Evaluation     NAME: Clay Zendejas  : 1955   MRN: [id number]  ADMISSION DATE: 2020  ADMITTING DIAGNOSIS: has Essential hypertension; Mild CAD; Long term current use of anticoagulant therapy; Morbid obesity due to excess calories ([facility] Vitamin D deficiency; Systolic congestive heart failure ([facility] H/O bone density study; Mixed hyperlipidemia; 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[facility] Type 2 diabetes mellitus with microalbuminuria, without long-term current use of insulin ([facility] Acquired hypothyroidism; Chronic atrial fibrillation; Stroke-like symptoms; and Palliative care patient on their problem list.    Date of Eval: 2020   Evaluating Therapist: FAISAL Doan    Assessment:  Completed assessment. Patient exhibited clear speech production with functional intelligibility for unfamiliar listeners measured at 100% in conversation. No significant deficits were noted in the areas of attention, auditory comprehension, verbal expression, orientation, memory, or reasoning/problem solving. Patient also exhibited functional oral and pharyngeal stages of swallowing with no outward S/S penetration/aspiration observed with any regular solid consistency trial or thin H2O trial administered during evaluation this date. At this time, recommend continuation regular solid consistency and thin liquids. Self-feed. Thank you for this consult. Subjective:  General  Chart Reviewed: Yes  Patient assessed for rehabilitation services?: Yes  Family / Caregiver Present: No    Objective:   Auditory Comprehension  Comprehension:  (Patient demonstrated ability to answer yes/no questions of increased complexity and to follow complex 1 and complex 2 step commands at independent level and with adequate processing speed.)    Expression  Primary Mode of Expression:  (Confrontation naming of items in room

## 2020-02-21 NOTE — PROGRESS NOTES
Physical Therapy  Name: Roge Longo  MRN:  [id number]  Date of service:  2/21/2020 02/21/20 1442   Subjective   Subjective Pt states she is willing to walk, states she is having several test to determine what is causing her to be so weak. General Comment   Comments up in chair   Transfers   Sit to Stand Stand by assistance   Stand to sit Stand by assistance   Ambulation   Ambulation? Yes   Ambulation 1   Surface level tile   Device Rolling Walker   Assistance Contact guard assistance;Stand by assistance   Quality of Gait antalgic   Gait Deviations Decreased step height   Distance 150'   Comments Pt states using the RW makes her feel safer   Exercises   Hip Flexion 20x   Hip Abduction 20x   Knee Long Arc Quad 20x   Ankle Pumps 20x   Comments hip add squeeze 20x   Short term goals   Time Frame for Short term goals 2 WKS   Short term goal 1  FT WITH RW AND SBA   Conditions Requiring Skilled Therapeutic Intervention   Body structures, Functions, Activity limitations Decreased functional mobility ; Decreased strength   Assessment Pt doing well with amb and transfers   Safety Devices   Type of devices Call light within reach; Left in chair       Electronically signed by Angele Babinski, PTA on 2/21/2020 at 2:47 PM

## 2020-02-21 NOTE — PROGRESS NOTES
Clinical Pharmacy Note    Warfarin consult follow-up    Recent Labs     02/21/20  0304   INR 1.85*     Recent Labs     02/20/20  1115 02/21/20  0304   HGB 15.1 13.4   HCT 47.0 42.3    196       Significant Drug-Drug Interactions:  Current warfarin drug-drug interactions: Levothyroxine  Discontinued drug-drug interactions: None    Date INR Warfarin Dose   02/20/20 1.70 10 mg   02/21/20  1.85  7.5 mg                                               Notes:  Give Coumadin 7.5 mg po x 1 tonight. Daily PT/INR until stable within therapeutic range.      Electronically signed by Deborah Rebollar [address] [place] [address] on 2/21/2020 at 12:56 PM

## 2020-02-21 NOTE — PROGRESS NOTES
Patient:   Marty Vera  MR#:    [id number]   Room:    [address] [place] [address]   YOB: 1955  Date of Progress Note: 2/21/2020  Time of Note                           7:52 AM  Consulting Physician:   Ronald Castro M.D. Attending Physician:  Art Vergara MD     CHIEF COMPLAINT: Trouble controlling the left leg  ? Subjective  This 71 y.o. female who presents with trouble controlling the left leg. Says that about a week and half ago she had a syncopal episode during a fasting blood test.  She really has not felt the same since that time. Later that day she began to notice some trouble picking up her left leg and feels though symptoms have gradually progressed over that period of time. No numbness or tingling. No involvement of the face or arm on that side. No change in bowel or bladder. She does have some chronic lower back difficulties. She came into the ED with the symptoms. Routine blood studies and CT of the head are relatively unremarkable. Admitted for stroke work-up. She allegedly had a stroke in 2006 causing some chronic right-sided difficulties. Has a history of atrial fibrillation and cardioversion. Is on Coumadin chronically. INR 1.7 on admission. She has a history of hyperlipidemia and diabetes. Also takes a baby aspirin a day along with her Coumadin. No new difficulties since admission. Continues to complain of trouble with the left leg. PT and OT ordered. REVIEW OF SYSTEMS:  Constitutional: No fevers No chills  Neck:No stiffness  Respiratory: No shortness of breath  Cardiovascular: No chest pain No palpitations  Gastrointestinal: No abdominal pain    Genitourinary: No Dysuria  Neurological: No headache, no confusion      PHYSICAL EXAM:  /71   Pulse 90   Temp 97 °F (36.1 °C) (Temporal)   Resp 12   Ht 5' 5\" (1.651 m)   Wt 231 lb (104.8 kg)   SpO2 96%   BMI 38.44 kg/m²     Constitutional: she appears well-developed and well-nourished.    Eyes - conjunctiva normal. Pupils react to light  Ear, nose, throat -hearing intact to voice. No scars, masses, or lesions over external nose or ears, no atrophy of tongue  Neck-symmetric, no masses noted, no jugular vein distension  Respiration- chest wall appears symmetric, good expansion,   normal effort without use of accessory muscles  Cardiovascular- RRR  Musculoskeletal - no significant wasting of muscles noted, no bony deformities, gait no gross ataxia  Extremities-no clubbing, cyanosis or edema  Skin - warm, dry, and intact. No rash, erythema, or pallor. Psychiatric - mood, affect, and behavior appear normal.      Neurology  NEUROLOGICAL EXAM:      Mental status   Awake, alert, fluent oriented x 3 appropriate affect  Attention and concentration appear appropriate  Recent and remote memory appears unremarkable  Speech normal without dysarthria  No clear issues with language       Cranial Nerves   CN II- Visual fields grossly unremarkable  CN III, IV,VI-EOMI, No nystagmus, conjugate eye movements, no ptosis  CN VII-no facial asymmetry  CN VIII-Hearing intact   CN IX and X- Palate elevates in midline  CN XI-good shoulder shrug  CN XII-Tongue midline with no fasciculations or fibrillations          Motor function  Antigravity x 4     Sensory function Intact to light touch     Cerebellar F-N intact     Tremor None present     Gait                  Not tested           Nursing/pcp notes, imaging,labs and vitals reviewed.      PT,OT and/or speech notes reviewed    Lab Results   Component Value Date    WBC 8.7 02/21/2020    HGB 13.4 02/21/2020    HCT 42.3 02/21/2020    MCV 91.6 02/21/2020     02/21/2020     Lab Results   Component Value Date     02/21/2020    K 4.5 02/21/2020     02/21/2020    CO2 29 02/21/2020    BUN 10 02/21/2020    CREATININE 0.5 02/21/2020    GLUCOSE 134 (H) 02/21/2020    CALCIUM 9.2 02/21/2020    PROT 8.1 02/20/2020    LABALBU 4.4 02/20/2020    BILITOT 0.5 02/20/2020    ALKPHOS 50 02/20/2020    AST 38 (H) 02/20/2020    ALT 37 (H) 02/20/2020    LABGLOM >60 02/21/2020     Lab Results   Component Value Date    INR 1.85 (H) 02/21/2020    INR 1.70 (H) 02/20/2020    INR 1.7 02/20/2020   No results found for: PHENYTOIN, ESR, CRP          RECORD REVIEW: Previous medical records, medications were reviewed at today's visit    IMPRESSION:   Patient with a syncopal episode about a week and a half ago  Subjective complains of difficulty using her left leg since that time. She has a relatively unremarkable neurological examination. Perhaps a hint of weakness in the left leg. Carotid Dopplers unremarkable. MRI pending.. PT/OT requested.

## 2020-02-21 NOTE — PROGRESS NOTES
Colon Cancer Neg Hx     Colon Polyps Neg Hx        Physical Examination      Vitals:  /71   Pulse 90   Temp 97 °F (36.1 °C) (Temporal)   Resp 12   Ht 5' 5\" (1.651 m)   Wt 231 lb (104.8 kg)   SpO2 96%   BMI 38.44 kg/m²   Temp (24hrs), Av.4 °F (36.3 °C), Min:97 °F (36.1 °C), Max:97.8 °F (36.6 °C)      I/O (24Hr): No intake or output data in the 24 hours ending 20 1013    Physical Exam  Constitutional:       General: She is not in acute distress. Appearance: She is not ill-appearing, toxic-appearing or diaphoretic. HENT:      Head: Normocephalic and atraumatic. Nose: Nose normal.      Mouth/Throat:      Pharynx: No oropharyngeal exudate. Eyes:      General: No scleral icterus. Right eye: No discharge. Conjunctiva/sclera: Conjunctivae normal.   Neck:      Musculoskeletal: Normal range of motion. No neck rigidity. Cardiovascular:      Rate and Rhythm: Normal rate. Rhythm irregularly irregular. Pulmonary:      Effort: Pulmonary effort is normal.      Breath sounds: No wheezing or rales. Abdominal:      General: Bowel sounds are normal.      Tenderness: There is no abdominal tenderness. There is no guarding or rebound. Musculoskeletal:      Right lower leg: Edema present. Left lower leg: Edema present. Comments: No obvious weakness appreciated between the left and right lower extremity   Skin:     Capillary Refill: Capillary refill takes less than 2 seconds. Neurological:      General: No focal deficit present. Mental Status: She is alert and oriented to person, place, and time. Mental status is at baseline.    Psychiatric:         Mood and Affect: Mood normal.         Labs/Imaging/Diagnostics   Labs:  CBC:  Recent Labs     20  1115 20  0304   WBC 10.1 8.7   RBC 5.19 4.62   HGB 15.1 13.4   HCT 47.0 42.3   MCV 90.6 91.6   RDW 13.3 13.3    196     CHEMISTRIES:  Recent Labs     20  1115 20  0304   * 141   K 4.1 4.5 !15.6   ! 60      !0.75   !            !               ! +------------+-------+-------+--------+-------+------------+---------------+ ! Prox ICA    ! 84.9   !27.5   !60      !0.68   !            !               ! +------------+-------+-------+--------+-------+------------+---------------+ ! Mid ICA     ! 168    ! 45     !60      !0.73   !            !               ! +------------+-------+-------+--------+-------+------------+---------------+ ! Dist ICA    !105    !33.3   ! 60      !0.68   !            !               ! +------------+-------+-------+--------+-------+------------+---------------+ ! Prox ECA    !107    !       !61      !       !            !               ! +------------+-------+-------+--------+-------+------------+---------------+ ! Vertebral   !73.5   !14.7   !60      !0.8    ! !               ! +------------+-------+-------+--------+-------+------------+---------------+   - There is antegrade vertebral flow noted on the right side. - Additional Measurements:ICAPSV/CCAPSV 2.48. ICAEDV/CCAEDV 3.66. Carotid Left Measurements +------------+-------+-------+--------+-------+------------+---------------+ ! [facility]    ! PSV    ! EDV    ! Angle   ! RI     !%Stenosis   ! Tortuosity     ! +------------+-------+-------+--------+-------+------------+---------------+ ! Prox CCA    !83     !16.1   ! 60      !0.81   !            !               ! +------------+-------+-------+--------+-------+------------+---------------+ ! Mid CCA     !76.8   !16.6   !60      !0.78   !            !               ! +------------+-------+-------+--------+-------+------------+---------------+ ! Prox ICA    !54.1   !19.9   !60      !0.63   !            !               ! +------------+-------+-------+--------+-------+------------+---------------+ ! Mid ICA     !71.6   !22.3   !60      !0.69   !            !               ! +------------+-------+-------+--------+-------+------------+---------------+ ! Dist ICA    !103    !31.8   !60      !0.69 !            !               ! +------------+-------+-------+--------+-------+------------+---------------+ ! Prox ECA    !99.1   !       !60      !       !            !               ! +------------+-------+-------+--------+-------+------------+---------------+ ! Vertebral   !48.8   !11.8   !60      !0.76   !            !               ! +------------+-------+-------+--------+-------+------------+---------------+   - There is antegrade vertebral flow noted on the left side. - Additional Measurements:ICAPSV/CCAPSV 1.24. ICAEDV/CCAEDV 1.98. Xr Hip 2-3 Vw W Pelvis Left    Result Date: 2/20/2020  Examination. XR HIP 2-3 VW W PELVIS LEFT 2/20/2020 10:30 AM History: Left hip pain. The frontal projection of the pelvis and frontal and lateral views of the left hip are obtained. There is no previous study for comparison. The hip articulation is intact with a moderate chronic osteoarthritis of the hip joint. There is irregularity and deformity of the greater trochanter is represent the site of previous trauma? . This may also be due to chronic degenerative process and osteophytes. The visualized sacroiliac joints and symphysis pubis are normal. The visualized right hip joint is unremarkable. No acute fracture of the left hip. Moderate chronic osteoarthritis of the hip joint. There is deformity of the greater trochanter which may represent recent or a previous fracture. If clinically warranted this may be further evaluated with CT scan of the left hip. The above finding are recorded on a digital voice clip in PACS.  Signed by Dr Abril Gabriel on 2/20/2020 11:59 AM        Assessment        Hospital Problems           Last Modified POA    * (Principal) Stroke-like symptoms 2/20/2020 Yes    Essential hypertension 2/20/2020 Yes    Mild CAD 2/20/2020 Yes    Morbid obesity due to excess calories ([facility] 2/20/2020 Yes    Vitamin D deficiency 3/68/5505 Yes    Systolic congestive heart failure ([facility] 2/20/2020 Yes    Overview Signed 9/26/2017 10:24 PM by Jerson Parish MD     3/12 echo ef 40%         Mixed hyperlipidemia 2/20/2020 Yes    Type 2 diabetes mellitus with microalbuminuria, without long-term current use of insulin ([facility] 2/20/2020 Yes    Chronic atrial fibrillation 2/20/2020 Yes    Palliative care patient 2/21/2020 Yes          Plan:        Strokelike symptoms -reportedly patient suffered stroke in 2006,  patient continues to have residual type symptoms, continues to complain of numbness and trouble with complete control in the left lower extremity, head CT obtained within the emergency room unremarkable, vascular carotids bilateral unremarkable, neurology services consulted and following appreciate recommendations, PT/OT/SLP consulted appreciate recommendations    Chronic atrial fibrillation-continue with digoxin, pharmacy to renally dose Coumadin therapy, currently INR subtherapeutic, telemetry monitoring    History of coronary artery disease-chronic condition, currently patient denies any chest pain, cardiology medications being adjusted appreciate recommendations.     Chronic systolic congestive heart failure-echocardiogram completed 2/20 reveals worsening of ejection fraction, cardiology services have been consulted for medication adjustments, patient to undergo nuclear medicine stress test to assess for any worsening ischemia, monitor patient's I/os/daily weights    Type 2 diabetes-chronic condition, controlled, hemoglobin A1c greater than 8, hold oral anti-hyperglycemics, continue Lantus dosing, sliding scale insulin, Accu-Cheks before every meal seizures, C carb diet hypoglycemic protocol in place    Hyperlipidemia-chronic condition, continue with statin therapy    Morbid obesity due to excess calories-chronic condition, continue carb diet    Essential hypertension-chronic condition, permissive hypertension allowed, labetalol as needed    History of vitamin D deficiency-noted    Electronically signed by   Sandee Stover

## 2020-02-21 NOTE — PROGRESS NOTES
Palliative Care/Spiritual Care: Met with pt to initiate Palliative Care. Pt says she was having trouble getting around saying at work she was using a grocery cart to walk. Advance Directives: Pt does not have an AD/LW and not interested at this time. Pt says her son Mily Dunham will make medical decisions. Pain/other symptoms: Pt says she is not having more pain than what she was having at home. Pt says she is very cautious about taking pain medication. Social/Spiritual: Pt says she doesn't have a Druze preference but says she is a [employer]ism. Pt/family discussion r/t goals: Pt has two sons. Pt says she usually ambulates without assistance but recently had to grab a hold of things to walk. Pt says she continually has been getting weaker and weaker. Pt's goal is to regain strength through medication adjustment, saying her heart medications had not been updated for years. Goal is to return home with more strength and the ability to continue driving, cooking, cleaning, and living independently. Provided spiritual care with sustaining presence, nurtured hope, and prayer. Pt expressed gratitude for spiritual care.     Electronically signed by Rafael Mooney on 2/21/2020 at 10:04 AM

## 2020-02-21 NOTE — PROGRESS NOTES
Physical Therapy    Facility/Department: [facility] SURG SERVICES  Initial Assessment    NAME: Leeann Paulino  : 1955  MRN: [id number]    Date of Service: 2020    Discharge Recommendations:  Home with assist PRN(MAY NEED OP PT )   PT Equipment Recommendations  Equipment Needed: Yes  Other: ROLLATOR    Assessment   Body structures, Functions, Activity limitations: Decreased functional mobility ; Decreased strength  Assessment: pt'S GAIT IMPROVED WITH USE OF RW. ADVISE ONGOING USE OF RW TO PREVENT FALLS  Prognosis: Good  Decision Making: Low Complexity  PT Education: Functional Mobility Training;General Safety;Gait Training  REQUIRES PT FOLLOW UP: Yes       Patient Diagnosis(es): The encounter diagnosis was Stroke-like symptoms. 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 has a past surgical history that includes Cardiac catheterization (10/21/09); Cardioversion (10/9/13); Tubal ligation; Gallbladder surgery; Cholecystectomy; and eye surgery. Restrictions     Vision/Hearing        Subjective  General  Additional Pertinent Hx: PREVIOUS CVA THAT AFFECTED HER R SIDE  Diagnosis: STROKE LIKE SYMPTOMS, RECENT SYNCOPE  Follows Commands: Within Functional Limits  Subjective  Subjective: pt STATES SHE HAS TO  HOLD TO OBJECTS OR PUSH A CART AT WORK TO KEEP FROM FALLING DUE TO L KNEE \"GIVING OUT\".  ALSO REPORTS SHE HAD AN EPISODE OF SYNCOPE THAT SHE IS NOT SURE IF RELATED TO HER L KNEE WEAKNESS. Pain Screening  Patient Currently in Pain: Denies          Orientation  Orientation  Overall Orientation Status: Within Functional Limits  Social/Functional History  Social/Functional History  Lives With: Son  ADL Assistance: Independent  Ambulation Assistance: Independent  Transfer Assistance: Independent  Occupation: Part time employment  Type of occupation: PLANS TO RETIRE IN A WEEK  Cognition        Objective          AROM RLE (degrees)  RLE AROM: WFL  AROM LLE (degrees)  LLE AROM : WFL  Strength RLE  Strength RLE: WFL  Comment: MILD WEAKNESS AT THE HIP  Strength LLE  Strength LLE: WFL  Comment:  MILD \"GIVE WAY\" IN L KNEE WITH MMT. Tone RLE  RLE Tone: Normotonic  Tone LLE  LLE Tone: Normotonic  Motor Control  Gross Motor?: WFL  Sensation  Overall Sensation Status: WFL(INTACT TO LIGHT TOUCH)  Bed mobility  Supine to Sit: Independent  Sit to Supine: Independent  Transfers  Sit to Stand: Stand by assistance;Contact guard assistance  Stand to sit: Stand by assistance;Contact guard assistance  Ambulation  Ambulation?: Yes  Ambulation 1  Surface: level tile  Device: Rolling Walker  Assistance: Contact guard assistance  Quality of Gait: WITH RW NOTED MILD L LAT TRUNK SWAY WHICH INCREASES IF TRYING TO AMB WITHOUT RW. WHEN AMB WITHOUT THE WALKER, pt IS LEANING AND REACHING FOR OBJECTS  Gait Deviations: Decreased step length; Slow Ronit  Distance: 170 FT  Comments: pt STATES \"I WALK LIKE A PENGUIN\" AND ATTRIBUTES THAT TO HER CVA THAT AFFECTED HER R LE.  ADVISED pt TO USE A ROLLATOR TO IMPROVE SAFETY WITH AMB              Plan   Plan  Times per week: 5-7  Current Treatment Recommendations: Strengthening, Gait Training, Patient/Caregiver Education & Training, Stair training, Functional Mobility Training, Transfer Training, Safety Education & Training, Endurance Training  Safety Devices  Type of devices: Call light within reach, Left in chair    G-Code       OutComes Score AM-PAC Score             Goals  Short term goals  Time Frame for Short term goals: 2 WKS  Short term goal 1:  FT WITH RW AND SBA       Therapy Time   Individual Concurrent Group Co-treatment   Time In           Time Out           Minutes                   Jaison Bacon PT    Electronically signed by Jaison Bacon PT on 2/21/2020 at 9:57 AM

## 2020-02-21 NOTE — CONSULTS
late she has been using the cart when she walks though she is active. She reports no chest pain or shortness of breath. She has been compliant with her medications though these have not been changed with her last echo done in 2012 reporting an EF of 40%. Current echo reports an EF of 25 to 30%. Prior to the last 2 weeks she has been active. REVIEW OF SYSTEMS:  Review of Systems   Constitutional: Negative for activity change, fatigue and fever. HENT: Negative for ear pain, hearing loss and tinnitus. Eyes: Negative for discharge and visual disturbance. Respiratory: Negative for cough, shortness of breath and wheezing. Cardiovascular: Negative for chest pain, palpitations and leg swelling. Gastrointestinal: Negative for abdominal distention, blood in stool, constipation, diarrhea and vomiting. Endocrine: Negative for cold intolerance, heat intolerance, polydipsia and polyuria. Genitourinary: Negative for dysuria and hematuria. Musculoskeletal: Positive for gait problem. Negative for arthralgias, back pain and myalgias. Skin: Negative for pallor and rash. Neurological: Positive for weakness. Negative for seizures, syncope and headaches. Psychiatric/Behavioral: Negative for behavioral problems and dysphoric mood.        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 Authorizing Provider   metFORMIN (GLUCOPHAGE) 500 MG tablet TAKE 2 TABLETS BY MOUTH TWICE DAILY WITH MEALS 1/30/20  Yes Patrick Redd MD   glipiZIDE (GLUCOTROL) 5 MG tablet TAKE 2 TABLETS BY MOUTH IN THE MORNING AND 1 TABLET IN THE EVENING 1/30/20  Yes Patrick Redd MD   metoprolol succinate (TOPROL XL) 50 MG extended release tablet TAKE 1 TABLET BY MOUTH ONCE DAILY. 11/13/19  Yes ADELITA Brandt   digoxin [facility]) 125 MCG tablet Take 1 tablet by mouth daily 11/13/19  Yes ADELITA Brandt   hydrochlorothiazide (HYDRODIURIL) 25 MG tablet daily  Patient taking differently: 12.5 mg daily 1/2 daily 11/13/19  Yes ADELITA Brandt   irbesartan-hydrochlorothiazide (AVALIDE) 300-12.5 MG per tablet TAKE 1 TABLET BY MOUTH ONCE DAILY. 11/6/19  Yes Patrick Redd MD   atorvastatin (LIPITOR) 80 MG tablet TAKE 1 TABLET BY MOUTH ONCE DAILY. 11/6/19  Yes Patrick Redd MD   warfarin (COUMADIN) 5 MG tablet TAKE 1 TABLET BY MOUTH EVERY DAY ON M-W-F AND 1 1/2 TABLETS EVERY DAYON THE OTHER DAYS 11/6/19  Yes Patrick Redd MD   levothyroxine (SYNTHROID) 125 MCG tablet TAKE 1 TABLET BY MOUTH ONCE DAILY 11/6/19  Yes Patrick Redd MD   Multiple Vitamins-Minerals (THERAPEUTIC MULTIVITAMIN-MINERALS) tablet Take 1 tablet by mouth daily. Yes Historical Provider, MD   Vitamin D (CHOLECALCIFEROL) 1000 UNITS CAPS capsule Take 1,000 Units by mouth 2 times daily    Yes Historical Provider, MD   aspirin 81 MG EC tablet Take 81 mg by mouth daily.      Yes Historical Provider, MD   fluticasone (FLONASE) 50 MCG/ACT nasal spray 2 sprays by Each Nostril route daily 2/20/20   Patrick Redd MD   albuterol sulfate HFA (VENTOLIN HFA) 108 (90 Base) ----------------------------------------------------------------  Electronically signed by Anaid Billy  physician) on 02/20/2020 03:34 PM  ----------------------------------------------------------------  Velocities are measured in cm/s ; Diameters are measured in mm Carotid Right Measurements +------------+-------+-------+--------+-------+------------+---------------+ ! [facility]    ! PSV    ! EDV    ! Angle   ! RI     !%Stenosis   ! Tortuosity     ! +------------+-------+-------+--------+-------+------------+---------------+ ! Prox CCA    !67.8   !12.3   !60      !0.82   !            !               ! +------------+-------+-------+--------+-------+------------+---------------+ ! Mid CCA     !62.1   !15.6   !60      !0.75   !            !               ! +------------+-------+-------+--------+-------+------------+---------------+ ! Prox ICA    ! 84.9   !27.5   !60      !0.68   !            !               ! +------------+-------+-------+--------+-------+------------+---------------+ ! Mid ICA     ! 168    ! 45     !60      !0.73   !            !               ! +------------+-------+-------+--------+-------+------------+---------------+ ! Dist ICA    !105    !33.3   ! 60      !0.68   !            !               ! +------------+-------+-------+--------+-------+------------+---------------+ ! Prox ECA    !107    !       !61      !       !            !               ! +------------+-------+-------+--------+-------+------------+---------------+ ! Vertebral   !73.5   !14.7   !60      !0.8    ! !               ! +------------+-------+-------+--------+-------+------------+---------------+   - There is antegrade vertebral flow noted on the right side. - Additional Measurements:ICAPSV/CCAPSV 2.48. ICAEDV/CCAEDV 3.66. Carotid Left Measurements +------------+-------+-------+--------+-------+------------+---------------+ ! [facility]    ! PSV    ! EDV    ! Angle   ! RI     !%Stenosis   ! Tortuosity     ! +------------+-------+-------+--------+-------+------------+---------------+ ! Prox CCA    !83     !16.1   ! 60      !0.81   !            !               ! +------------+-------+-------+--------+-------+------------+---------------+ ! Mid CCA     !76.8   !16.6   !60      !0.78   !            !               ! +------------+-------+-------+--------+-------+------------+---------------+ ! Prox ICA    !54.1   !19.9   !60      !0.63   !            !               ! +------------+-------+-------+--------+-------+------------+---------------+ ! Mid ICA     !71.6   !22.3   !60      !0.69   !            !               ! +------------+-------+-------+--------+-------+------------+---------------+ ! Dist ICA    !103    !31.8   !60      !0.69   !            !               ! +------------+-------+-------+--------+-------+------------+---------------+ ! Prox ECA    !99.1   !       !60      !       !            !               ! +------------+-------+-------+--------+-------+------------+---------------+ ! Vertebral   !48.8   !11.8   !60      !0.76   !            !               ! +------------+-------+-------+--------+-------+------------+---------------+   - There is antegrade vertebral flow noted on the left side. - Additional Measurements:ICAPSV/CCAPSV 1.24. ICAEDV/CCAEDV 1.98. Xr Hip 2-3 Vw W Pelvis Left    Result Date: 2/20/2020  Examination. XR HIP 2-3 VW W PELVIS LEFT 2/20/2020 10:30 AM History: Left hip pain. The frontal projection of the pelvis and frontal and lateral views of the left hip are obtained. There is no previous study for comparison. The hip articulation is intact with a moderate chronic osteoarthritis of the hip joint. There is irregularity and deformity of the greater trochanter is represent the site of previous trauma? . This may also be due to chronic degenerative process and osteophytes. The visualized sacroiliac joints and symphysis pubis are normal. The visualized right hip joint is unremarkable.     No

## 2020-02-22 ENCOUNTER — APPOINTMENT (OUTPATIENT)
Dept: NUCLEAR MEDICINE | Age: 65
DRG: 948 | End: 2020-02-22
Payer: COMMERCIAL

## 2020-02-22 VITALS
HEART RATE: 55 BPM | OXYGEN SATURATION: 94 % | DIASTOLIC BLOOD PRESSURE: 79 MMHG | HEIGHT: 65 IN | BODY MASS INDEX: 38.49 KG/M2 | WEIGHT: 231 LBS | TEMPERATURE: 97.4 F | RESPIRATION RATE: 16 BRPM | SYSTOLIC BLOOD PRESSURE: 152 MMHG

## 2020-02-22 LAB
ANION GAP SERPL CALCULATED.3IONS-SCNC: 13 MMOL/L (ref 7–19)
BUN BLDV-MCNC: 12 MG/DL (ref 8–23)
CALCIUM SERPL-MCNC: 8.9 MG/DL (ref 8.8–10.2)
CHLORIDE BLD-SCNC: 101 MMOL/L (ref 98–111)
CO2: 26 MMOL/L (ref 22–29)
CREAT SERPL-MCNC: 0.5 MG/DL (ref 0.5–0.9)
GFR NON-AFRICAN AMERICAN: >60
GLUCOSE BLD-MCNC: 153 MG/DL (ref 74–109)
GLUCOSE BLD-MCNC: 193 MG/DL (ref 70–99)
GLUCOSE BLD-MCNC: 194 MG/DL (ref 70–99)
GLUCOSE BLD-MCNC: 211 MG/DL (ref 70–99)
INR BLD: 1.9 (ref 0.88–1.18)
PERFORMED ON: ABNORMAL
POTASSIUM REFLEX MAGNESIUM: 3.7 MMOL/L (ref 3.5–5)
PROTHROMBIN TIME: 22.1 SEC (ref 12–14.6)
SODIUM BLD-SCNC: 140 MMOL/L (ref 136–145)

## 2020-02-22 PROCEDURE — 6370000000 HC RX 637 (ALT 250 FOR IP): Performed by: INTERNAL MEDICINE

## 2020-02-22 PROCEDURE — 3430000000 HC RX DIAGNOSTIC RADIOPHARMACEUTICAL: Performed by: INTERNAL MEDICINE

## 2020-02-22 PROCEDURE — A9500 TC99M SESTAMIBI: HCPCS | Performed by: INTERNAL MEDICINE

## 2020-02-22 PROCEDURE — 36415 COLL VENOUS BLD VENIPUNCTURE: CPT

## 2020-02-22 PROCEDURE — 99232 SBSQ HOSP IP/OBS MODERATE 35: CPT | Performed by: INTERNAL MEDICINE

## 2020-02-22 PROCEDURE — 2580000003 HC RX 258: Performed by: PHYSICIAN ASSISTANT

## 2020-02-22 PROCEDURE — 78452 HT MUSCLE IMAGE SPECT MULT: CPT

## 2020-02-22 PROCEDURE — 85610 PROTHROMBIN TIME: CPT

## 2020-02-22 PROCEDURE — 97116 GAIT TRAINING THERAPY: CPT

## 2020-02-22 PROCEDURE — 99232 SBSQ HOSP IP/OBS MODERATE 35: CPT | Performed by: PSYCHIATRY & NEUROLOGY

## 2020-02-22 PROCEDURE — 82948 REAGENT STRIP/BLOOD GLUCOSE: CPT

## 2020-02-22 PROCEDURE — 6360000002 HC RX W HCPCS: Performed by: INTERNAL MEDICINE

## 2020-02-22 PROCEDURE — 6370000000 HC RX 637 (ALT 250 FOR IP): Performed by: PHYSICIAN ASSISTANT

## 2020-02-22 PROCEDURE — 80048 BASIC METABOLIC PNL TOTAL CA: CPT

## 2020-02-22 RX ORDER — WARFARIN SODIUM 7.5 MG/1
TABLET ORAL
Qty: 30 TABLET | Refills: 1 | Status: SHIPPED | OUTPATIENT
Start: 2020-02-22 | End: 2020-04-23 | Stop reason: SDUPTHER

## 2020-02-22 RX ORDER — SPIRONOLACTONE 25 MG/1
25 TABLET ORAL DAILY
Qty: 30 TABLET | Refills: 3 | Status: SHIPPED | OUTPATIENT
Start: 2020-02-23 | End: 2020-07-08 | Stop reason: SDUPTHER

## 2020-02-22 RX ORDER — CARVEDILOL 12.5 MG/1
12.5 TABLET ORAL 2 TIMES DAILY WITH MEALS
Qty: 60 TABLET | Refills: 3 | Status: SHIPPED | OUTPATIENT
Start: 2020-02-22 | End: 2020-07-29 | Stop reason: SDUPTHER

## 2020-02-22 RX ORDER — WARFARIN SODIUM 7.5 MG/1
7.5 TABLET ORAL
Status: DISCONTINUED | OUTPATIENT
Start: 2020-02-22 | End: 2020-02-22 | Stop reason: HOSPADM

## 2020-02-22 RX ORDER — VALSARTAN 80 MG/1
80 TABLET ORAL 2 TIMES DAILY
Qty: 30 TABLET | Refills: 3 | Status: SHIPPED | OUTPATIENT
Start: 2020-02-22 | End: 2020-02-27 | Stop reason: SDUPTHER

## 2020-02-22 RX ADMIN — INSULIN LISPRO 2 UNITS: 100 INJECTION, SOLUTION INTRAVENOUS; SUBCUTANEOUS at 12:30

## 2020-02-22 RX ADMIN — VALSARTAN 80 MG: 80 TABLET, FILM COATED ORAL at 11:58

## 2020-02-22 RX ADMIN — VITAMIN D, TAB 1000IU (100/BT) 1000 UNITS: 25 TAB at 11:57

## 2020-02-22 RX ADMIN — REGADENOSON 0.4 MG: 0.08 INJECTION, SOLUTION INTRAVENOUS at 09:55

## 2020-02-22 RX ADMIN — Medication 10 ML: at 11:57

## 2020-02-22 RX ADMIN — LEVOTHYROXINE SODIUM 125 MCG: 125 TABLET ORAL at 12:02

## 2020-02-22 RX ADMIN — DIGOXIN 125 MCG: 125 TABLET ORAL at 11:57

## 2020-02-22 RX ADMIN — SPIRONOLACTONE 25 MG: 25 TABLET, FILM COATED ORAL at 11:57

## 2020-02-22 RX ADMIN — MULTIPLE VITAMINS W/ MINERALS TAB 1 TABLET: TAB at 11:57

## 2020-02-22 RX ADMIN — CARVEDILOL 12.5 MG: 12.5 TABLET, FILM COATED ORAL at 11:57

## 2020-02-22 RX ADMIN — TETRAKIS(2-METHOXYISOBUTYLISOCYANIDE)COPPER(I) TETRAFLUOROBORATE 10 MILLICURIE: 1 INJECTION, POWDER, LYOPHILIZED, FOR SOLUTION INTRAVENOUS at 10:37

## 2020-02-22 RX ADMIN — ASPIRIN 81 MG: 81 TABLET, COATED ORAL at 11:57

## 2020-02-22 RX ADMIN — TETRAKIS(2-METHOXYISOBUTYLISOCYANIDE)COPPER(I) TETRAFLUOROBORATE 30 MILLICURIE: 1 INJECTION, POWDER, LYOPHILIZED, FOR SOLUTION INTRAVENOUS at 10:37

## 2020-02-22 ASSESSMENT — ENCOUNTER SYMPTOMS
SHORTNESS OF BREATH: 0
NAUSEA: 0
COUGH: 0
WHEEZING: 0
ABDOMINAL PAIN: 0
TROUBLE SWALLOWING: 0
SORE THROAT: 0
VOMITING: 0
EYE REDNESS: 0

## 2020-02-22 ASSESSMENT — PAIN SCALES - GENERAL: PAINLEVEL_OUTOF10: 0

## 2020-02-22 NOTE — PROGRESS NOTES
Cardiology Progress Note Quintin Arellano MD      Patient:  Marylee Mons  [id number]    Patient Active Problem List    Diagnosis Date Noted    Palliative care patient 02/21/2020     Priority: Low    Stroke-like symptoms 02/20/2020     Priority: Low    Chronic atrial fibrillation 05/21/2018     Priority: Low    Acquired hypothyroidism 10/17/2017     Priority: Low    Morbid obesity due to excess calories ([address] 09/26/2017     Priority: Low    Vitamin D deficiency 09/26/2017     Priority: Low    Systolic congestive heart failure ([address] 09/26/2017     Priority: Low     Overview Note:     3/12 echo ef 40%      H/O bone density study 09/26/2017     Priority: Low     Overview Note:     2015 nl      Mixed hyperlipidemia 09/26/2017     Priority: Low    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     Priority: Low    Type 2 diabetes mellitus with microalbuminuria, without long-term current use of insulin ([address] 09/26/2017     Priority: Low    Long term current use of anticoagulant therapy 06/29/2017     Priority: Low     Overview Note:     Updating Deprecated Diagnoses      Essential hypertension      Priority: Low    Mild CAD      Priority: Low       Admit Date:  2/20/2020    Admission Problem List: Present on Admission:   Stroke-like symptoms   Essential hypertension   Mild CAD   Morbid obesity due to excess calories ([address]   Vitamin D deficiency   Systolic congestive heart failure (HCC)   Mixed hyperlipidemia   Type 2 diabetes mellitus with microalbuminuria, without long-term current use of insulin (HCC)   Chronic atrial fibrillation   Palliative care patient      Cardiac Specific Data:  Specialty Problems        Cardiology Problems    Essential hypertension        Mild CAD        Mixed hyperlipidemia        Systolic congestive heart failure (HCC)        Chronic atrial fibrillation            1.  Nonischemic cardiomyopathy with prior EF 35- 40%, nonocclusive CAD by catheterization 10/2009 (no record) with EF at 25 to 30%.  2.  Chronic atrial fibrillation on Coumadin with history of prior CVA and prior right lower extremity weakness. 3.  Morbid obesity. 4.  Non-insulin-dependent diabetes mellitus. 5.  Hypothyroidism. 6.  New left lower extremity weakness  7. Right ICA intermediate stenosis. Subjective:  Ms. Harvey Miranda has been doing fairly well. Reports some lightheadedness which she relates to her nasal congestion. Blood pressures have been fairly stable. She reports no jitteriness that she had presented with diffuse better in this regard. Left leg weakness is still present. Objective:   BP (!) 152/79   Pulse 56   Temp 97.4 °F (36.3 °C) (Temporal)   Resp 16   Ht 5' 5\" (1.651 m)   Wt 231 lb (104.8 kg)   SpO2 94%   BMI 38.44 kg/m²       Intake/Output Summary (Last 24 hours) at 2/22/2020 3323  Last data filed at 2/21/2020 2214  Gross per 24 hour   Intake 200 ml   Output --   Net 200 ml       Prior to Admission medications    Medication Sig Start Date End Date Taking? Authorizing Provider   metFORMIN (GLUCOPHAGE) 500 MG tablet TAKE 2 TABLETS BY MOUTH TWICE DAILY WITH MEALS 1/30/20  Yes Cat Pope MD   glipiZIDE (GLUCOTROL) 5 MG tablet TAKE 2 TABLETS BY MOUTH IN THE MORNING AND 1 TABLET IN THE EVENING 1/30/20  Yes Cat Pope MD   metoprolol succinate (TOPROL XL) 50 MG extended release tablet TAKE 1 TABLET BY MOUTH ONCE DAILY. 11/13/19  Yes ADELITA Holder   digoxin [facility] TRANSPLANT [facility]) 125 MCG tablet Take 1 tablet by mouth daily 11/13/19  Yes ADELITA Holder   hydrochlorothiazide (HYDRODIURIL) 25 MG tablet daily  Patient taking differently: 12.5 mg daily 1/2 daily 11/13/19  Yes ADELITA Holder   irbesartan-hydrochlorothiazide (AVALIDE) 300-12.5 MG per tablet TAKE 1 TABLET BY MOUTH ONCE DAILY. 11/6/19  Yes Cat Pope MD   atorvastatin (LIPITOR) 80 MG tablet TAKE 1 TABLET BY MOUTH ONCE DAILY.  11/6/19  Yes Cat Pope MD   warfarin (COUMADIN) 5 MG tablet TAKE 1 TABLET BY MOUTH EVERY DAY ON M-W-F AND 1 1/2 TABLETS EVERY DAYON THE OTHER DAYS 11/6/19  Yes Cat Pope MD   levothyroxine (SYNTHROID) 125 MCG tablet TAKE 1 TABLET BY MOUTH ONCE DAILY 11/6/19  Yes Cat Pope MD   Multiple Vitamins-Minerals (THERAPEUTIC MULTIVITAMIN-MINERALS) tablet Take 1 tablet by mouth daily. Yes Historical Provider, MD   Vitamin D (CHOLECALCIFEROL) 1000 UNITS CAPS capsule Take 1,000 Units by mouth 2 times daily    Yes Historical Provider, MD   aspirin 81 MG EC tablet Take 81 mg by mouth daily. Yes Historical Provider, MD   fluticasone [facility]) 50 MCG/ACT nasal spray 2 sprays by Each Nostril route daily 2/20/20   Cat Pope MD   albuterol sulfate HFA (VENTOLIN HFA) 108 (90 Base) MCG/ACT inhaler Inhale 2 puffs into the lungs every 6 hours as needed for Wheezing 5/21/19   Cat Pope MD        carvedilol  12.5 mg Oral BID WC    valsartan  80 mg Oral BID    spironolactone  25 mg Oral Daily    sodium chloride flush  10 mL Intravenous 2 times per day    insulin lispro  0-12 Units Subcutaneous TID WC    insulin lispro  0-6 Units Subcutaneous Nightly    aspirin  81 mg Oral Daily    atorvastatin  80 mg Oral Nightly    digoxin  125 mcg Oral Daily    levothyroxine  125 mcg Oral Daily    therapeutic multivitamin-minerals  1 tablet Oral Daily    Vitamin D  1,000 Units Oral BID    warfarin (COUMADIN) daily dosing (placeholder)   Other RX Placeholder       TELEMETRY: Atrial fibrillation rate controlled     Physical Exam:      Physical Exam  Constitutional:       General: She is not in acute distress. Appearance: She is obese. She is not diaphoretic. HENT:      Mouth/Throat:      Pharynx: No oropharyngeal exudate. Eyes:      General: No scleral icterus. Right eye: No discharge. Left eye: No discharge. Neck:      Thyroid: No thyromegaly. Vascular: No JVD. Cardiovascular:      Rate and Rhythm: Normal rate and regular rhythm. No extrasystoles are present.      Heart sounds: Normal heart sounds, S1 normal and ! +------------+-------+-------+--------+-------+------------+---------------+ ! Vertebral   !73.5   !14.7   !60      !0.8    ! !               ! +------------+-------+-------+--------+-------+------------+---------------+   - There is antegrade vertebral flow noted on the right side. - Additional Measurements:ICAPSV/CCAPSV 2.48. ICAEDV/CCAEDV 3.66. Carotid Left Measurements +------------+-------+-------+--------+-------+------------+---------------+ ! [facility]    ! PSV    ! EDV    ! Angle   ! RI     !%Stenosis   ! Tortuosity     ! +------------+-------+-------+--------+-------+------------+---------------+ ! Prox CCA    !83     !16.1   ! 60      !0.81   !            !               ! +------------+-------+-------+--------+-------+------------+---------------+ ! Mid CCA     !76.8   !16.6   !60      !0.78   !            !               ! +------------+-------+-------+--------+-------+------------+---------------+ ! Prox ICA    !54.1   !19.9   !60      !0.63   !            !               ! +------------+-------+-------+--------+-------+------------+---------------+ ! Mid ICA     !71.6   !22.3   !60      !0.69   !            !               ! +------------+-------+-------+--------+-------+------------+---------------+ ! Dist ICA    !103    !31.8   !60      !0.69   !            !               ! +------------+-------+-------+--------+-------+------------+---------------+ ! Prox ECA    !99.1   !       !60      !       !            !               ! +------------+-------+-------+--------+-------+------------+---------------+ ! Vertebral   !48.8   !11.8   !60      !0.76   !            !               ! +------------+-------+-------+--------+-------+------------+---------------+   - There is antegrade vertebral flow noted on the left side. - Additional Measurements:ICAPSV/CCAPSV 1.24. ICAEDV/CCAEDV 1.98.     Mri Brain Wo Contrast    Result Date: 2/21/2020  MRI brain without contrast - 2/21/2020 3:30 PM Indication: Weakness Comparison: CT head one day prior Findings: No diffusion restriction to suggest acute infarction. No increased susceptibility to suggest hemorrhage. The major physiologic flow voids are maintained. Perivascular spaces in the basal ganglia are noted. Small fluid density focus in the head of caudate may represent sequela of remote lacunar infarct. Periventricular and subcortical T2 FLAIR hyperintense white matter lesions likely represent sequela of chronic microvascular ischemia. No other abnormality of brain parenchyma signal intensity. No midline shift or mass effect. Stable ventricular caliber. Basilar cisterns are patent. Sella turcica and its contents appear unremarkable. No acute orbital finding. Mastoid air cells and visualized paranasal sinuses are clear. 1.  No acute intracranial findings. No evidence of acute stroke. 2.  Chronic microvascular ischemic white matter change and small remote LEFT lacunar infarct. Signed by Dr Lucas Lara on 2/21/2020 4:51 PM    Xr Hip 2-3 Vw W Pelvis Left    Result Date: 2/20/2020  Examination. XR HIP 2-3 VW W PELVIS LEFT 2/20/2020 10:30 AM History: Left hip pain. The frontal projection of the pelvis and frontal and lateral views of the left hip are obtained. There is no previous study for comparison. The hip articulation is intact with a moderate chronic osteoarthritis of the hip joint. There is irregularity and deformity of the greater trochanter is represent the site of previous trauma? . This may also be due to chronic degenerative process and osteophytes. The visualized sacroiliac joints and symphysis pubis are normal. The visualized right hip joint is unremarkable. No acute fracture of the left hip. Moderate chronic osteoarthritis of the hip joint. There is deformity of the greater trochanter which may represent recent or a previous fracture. If clinically warranted this may be further evaluated with CT scan of the left hip.  The above finding

## 2020-02-22 NOTE — DISCHARGE INSTR - DIET

## 2020-02-22 NOTE — PROGRESS NOTES
Progress Note    Date:2/22/2020       Room:0513/513-01  Patient Name:Adelita Blanco     YOB: 1955     Age:64 y.o. Subjective   Interval History Status: Clinically improved, requesting food. Patient seen and examined this a.m. resting comfortably in bed, no acute distress overnight. Case discussed with nursing staff. Patient is scheduled for nuclear medicine stress test today. Patient currently denies any headache, change in vision, abdominal pain, chest pain, upper extremity weakness, fevers or chills. Cumulative hospital course: Patient was admitted to [facility] 2/20 presented to the emergency room due to worsening left leg weakness and numbness which has commenced over the past 2 weeks. States at times her legs just give out. Patient son was present on admission stated that patient has had trouble with finding words. Work-up in the emergency room unremarkable, noted INR to be subtherapeutic as patient takes Coumadin at home. Patient does have residual right-sided weakness. Neurology services have been consulted, plans for further imaging modalities, echocardiogram completed 2/20 reveals: Ejection fraction 25 to 30%, mild left ventricular enlargement with global hypokinesis abnormal contraction pattern suggestive of pulmonary. Cardiology plans for nuclear medicine stress test and cardiology medication adjustments. Review of Systems   Review of Systems   Constitutional: Positive for activity change and fatigue. HENT: Negative for sore throat and trouble swallowing. Eyes: Negative for redness and visual disturbance. Respiratory: Negative for cough, shortness of breath and wheezing. Cardiovascular: Negative for chest pain, palpitations and leg swelling. Gastrointestinal: Negative for abdominal pain, nausea and vomiting. Genitourinary: Negative for difficulty urinating. Musculoskeletal: Negative for arthralgias. Skin: Positive for pallor.    Neurological: Positive for weakness and numbness. Psychiatric/Behavioral: Negative for agitation. Medications   Scheduled Meds:    carvedilol  12.5 mg Oral BID WC    valsartan  80 mg Oral BID    spironolactone  25 mg Oral Daily    sodium chloride flush  10 mL Intravenous 2 times per day    insulin lispro  0-12 Units Subcutaneous TID     insulin lispro  0-6 Units Subcutaneous Nightly    aspirin  81 mg Oral Daily    atorvastatin  80 mg Oral Nightly    digoxin  125 mcg Oral Daily    levothyroxine  125 mcg Oral Daily    therapeutic multivitamin-minerals  1 tablet Oral Daily    Vitamin D  1,000 Units Oral BID    warfarin (COUMADIN) daily dosing (placeholder)   Other RX Placeholder     Continuous Infusions:    dextrose       PRN Meds: technetium sestamibi, technetium sestamibi, regadenoson, sodium chloride flush, polyethylene glycol, ondansetron, labetalol, glucose, dextrose, glucagon (rDNA), dextrose, albuterol sulfate HFA    Past History    Past Medical History:   has a past medical history of Acute laryngitis, Acute sinusitis, Allergic rhinitis, Ankle pain, Asthma, Asthmatic bronchitis, Atrial fibrillation (HCC), Atrial flutter (HCC), CAD (coronary artery disease), CHF (congestive heart failure) ([address] Chronic back pain, Cough, Diabetes, Dizzy, Dysuria, 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[facility] [address] Fatigue, Foot pain, Hx of amiodarone therapy, Hyperlipidemia, Hypertension, Menopause, Obesity, Palliative care patient, Skin rash, Type II or unspecified type diabetes mellitus without mention of complication, not stated as uncontrolled, Umbilical hernia, and URI (upper respiratory infection). Social History:   reports that she has never smoked. She has never used smokeless tobacco. She reports that she does not drink alcohol or use drugs.      Family History:   Family History   Problem Relation Age of Onset    Diabetes Mother     Heart Failure Mother     High Blood Pressure Mother     Liver Cancer Father     Colon Cancer Neg Hx  Colon Polyps Neg Hx        Physical Examination      Vitals:  BP (!) 152/79   Pulse 56   Temp 97.4 °F (36.3 °C) (Temporal)   Resp 16   Ht 5' 5\" (1.651 m)   Wt 231 lb (104.8 kg)   SpO2 94%   BMI 38.44 kg/m²   Temp (24hrs), Av.6 °F (36.4 °C), Min:96.5 °F (35.8 °C), Max:99 °F (37.2 °C)      I/O (24Hr): Intake/Output Summary (Last 24 hours) at 2020 0914  Last data filed at 2020 2217  Gross per 24 hour   Intake 200 ml   Output --   Net 200 ml       Physical Exam  Constitutional:       General: She is not in acute distress. Appearance: She is not ill-appearing, toxic-appearing or diaphoretic. HENT:      Head: Normocephalic and atraumatic. Nose: Nose normal.      Mouth/Throat:      Pharynx: No oropharyngeal exudate. Eyes:      General: No scleral icterus. Right eye: No discharge. Conjunctiva/sclera: Conjunctivae normal.   Neck:      Musculoskeletal: Normal range of motion. No neck rigidity. Cardiovascular:      Rate and Rhythm: Normal rate. Rhythm irregularly irregular. Pulmonary:      Effort: Pulmonary effort is normal.      Breath sounds: No wheezing or rales. Abdominal:      General: Bowel sounds are normal.      Tenderness: There is no abdominal tenderness. There is no guarding or rebound. Musculoskeletal:      Right lower leg: No edema (improved ). Left lower leg: No edema (improved ). Comments: No obvious weakness appreciated between the left and right lower extremity   Skin:     Capillary Refill: Capillary refill takes less than 2 seconds. Neurological:      General: No focal deficit present. Mental Status: She is alert and oriented to person, place, and time. Mental status is at baseline.    Psychiatric:         Mood and Affect: Mood normal.         Labs/Imaging/Diagnostics   Labs:  CBC:  Recent Labs     20  1115 20  0304   WBC 10.1 8.7   RBC 5.19 4.62   HGB 15.1 13.4   HCT 47.0 42.3   MCV 90.6 91.6   RDW 13.3 13.3    196     CHEMISTRIES:  Recent Labs     02/20/20  1115 02/21/20  0304 02/22/20  0357   * 141 140   K 4.1 4.5 3.7   CL 94* 100 101   CO2 24 29 26   BUN 11 10 12   CREATININE 0.5 0.5 0.5   GLUCOSE 215* 134* 153*     PT/INR:  Recent Labs     02/20/20  1115 02/21/20  0304 02/22/20  0357   PROTIME 20.1* 21.6* 22.1*   INR 1.70* 1.85* 1.90*     APTT:  Recent Labs     02/20/20  1115   APTT 32.3     LIVER PROFILE:  Recent Labs     02/20/20  1115   AST 38*   ALT 37*   BILITOT 0.5   ALKPHOS 50       Imaging Last 24 Hours:  Ct Head Wo Contrast    Result Date: 2/20/2020  Examination. CT HEAD WO CONTRAST 2/20/2020 10:30 AM History: Dizziness and numbness. DLP: 750 mGycm. A CT scan of the head is performed without intravenous contrast enhancement. The images are acquired in axial plane with subsequent reconstruction in coronal and sagittal planes. There is no previous study for comparison. There is no evidence of a mass or midline shift. There is no evidence of an intracranial hemorrhage or hematoma. Moderately prominent ventricles, basal cistern and the cortical sulci suggest moderate chronic volume loss. There are scattered areas of chronic white matter ischemia in the centrum semiovale bilaterally. A small focus of chronic infarction is seen in the head of the left caudate nucleus. There is normal gray-white matter differentiation. The images reviewed in bone window show no acute bony abnormality. Visualized paranasal sinuses and mastoid air cells are normal.    No acute intracranial abnormality. The above finding are recorded on digital voice clip in PACS. Signed by Dr Virginia Sy on 2/20/2020 11:40 AM    Xr Chest Portable    Result Date: 2/20/2020  Examination. XR CHEST PORTABLE 2/20/2020 10:30 AM History: Weakness. A single frontal portable upright view of the chest is compared with the previous study dated 2/17/2016. No significant interval change. There is a persistent moderate cardiomegaly.  There are scarring and !               ! +------------+-------+-------+--------+-------+------------+---------------+ ! Mid CCA     !62.1   !15.6   !60      !0.75   !            !               ! +------------+-------+-------+--------+-------+------------+---------------+ ! Prox ICA    ! 84.9   !27.5   !60      !0.68   !            !               ! +------------+-------+-------+--------+-------+------------+---------------+ ! Mid ICA     ! 168    ! 45     !60      !0.73   !            !               ! +------------+-------+-------+--------+-------+------------+---------------+ ! Dist ICA    !105    !33.3   ! 60      !0.68   !            !               ! +------------+-------+-------+--------+-------+------------+---------------+ ! Prox ECA    !107    !       !61      !       !            !               ! +------------+-------+-------+--------+-------+------------+---------------+ ! Vertebral   !73.5   !14.7   !60      !0.8    ! !               ! +------------+-------+-------+--------+-------+------------+---------------+   - There is antegrade vertebral flow noted on the right side. - Additional Measurements:ICAPSV/CCAPSV 2.48. ICAEDV/CCAEDV 3.66. Carotid Left Measurements +------------+-------+-------+--------+-------+------------+---------------+ ! [facility]    ! PSV    ! EDV    ! Angle   ! RI     !%Stenosis   ! Tortuosity     ! +------------+-------+-------+--------+-------+------------+---------------+ ! Prox CCA    !83     !16.1   ! 60      !0.81   !            !               ! +------------+-------+-------+--------+-------+------------+---------------+ ! Mid CCA     !76.8   !16.6   !60      !0.78   !            !               ! +------------+-------+-------+--------+-------+------------+---------------+ ! Prox ICA    !54.1   !19.9   !60      !0.63   !            !               ! +------------+-------+-------+--------+-------+------------+---------------+ ! Mid ICA     !71.6   !22.3   !60      !0.69   !            !               ! +------------+-------+-------+--------+-------+------------+---------------+ ! Dist ICA    !103    !31.8   !60      !0.69   !            !               ! +------------+-------+-------+--------+-------+------------+---------------+ ! Prox ECA    !99.1   !       !60      !       !            !               ! +------------+-------+-------+--------+-------+------------+---------------+ ! Vertebral   !48.8   !11.8   !60      !0.76   !            !               ! +------------+-------+-------+--------+-------+------------+---------------+   - There is antegrade vertebral flow noted on the left side. - Additional Measurements:ICAPSV/CCAPSV 1.24. ICAEDV/CCAEDV 1.98. Xr Hip 2-3 Vw W Pelvis Left    Result Date: 2/20/2020  Examination. XR HIP 2-3 VW W PELVIS LEFT 2/20/2020 10:30 AM History: Left hip pain. The frontal projection of the pelvis and frontal and lateral views of the left hip are obtained. There is no previous study for comparison. The hip articulation is intact with a moderate chronic osteoarthritis of the hip joint. There is irregularity and deformity of the greater trochanter is represent the site of previous trauma? . This may also be due to chronic degenerative process and osteophytes. The visualized sacroiliac joints and symphysis pubis are normal. The visualized right hip joint is unremarkable. No acute fracture of the left hip. Moderate chronic osteoarthritis of the hip joint. There is deformity of the greater trochanter which may represent recent or a previous fracture. If clinically warranted this may be further evaluated with CT scan of the left hip. The above finding are recorded on a digital voice clip in PACS.  Signed by Dr Malgorzata Lucia on 2/20/2020 11:59 AM        Assessment        Hospital Problems           Last Modified POA    * (Principal) Stroke-like symptoms 2/20/2020 Yes    Essential hypertension 2/20/2020 Yes    Mild CAD 2/20/2020 Yes    Morbid obesity due to excess calories ([address] 2/20/2020 Yes    Vitamin D deficiency 3/17/6631 Yes    Systolic congestive heart failure ([facility] [address] 2/20/2020 Yes    Overview Signed 9/26/2017 10:24 PM by Juan Carlos Simon MD     3/12 echo ef 40%         Mixed hyperlipidemia 2/20/2020 Yes    Type 2 diabetes mellitus with microalbuminuria, without long-term current use of insulin ([facility] [address] 2/20/2020 Yes    Chronic atrial fibrillation 2/20/2020 Yes    Palliative care patient 2/21/2020 Yes          Plan:        Strokelike symptoms -patient clinically improved, MRI of the brain obtained 2/21/2020 no acute intracranial findings. Head CT obtained within the emergency room unremarkable, vascular carotids bilateral unremarkable, neurology services consulted and following appreciate recommendations, PT/OT/SLP consulted appreciate recommendations    Chronic atrial fibrillation-continue with digoxin, pharmacy to renally dose Coumadin therapy, currently INR subtherapeutic, telemetry monitoring    History of coronary artery disease-chronic condition, currently patient denies any chest pain, cardiology medications being adjusted appreciate recommendations.     Chronic systolic congestive heart failure-echocardiogram completed 2/20 reveals worsening of ejection fraction, cardiology services have been consulted for medication adjustments, patient to undergo nuclear medicine stress test to assess for any worsening ischemia, monitor patient's I/os/daily weights    Type 2 diabetes-chronic condition, controlled, hemoglobin A1c greater than 8, hold oral anti-hyperglycemics, continue Lantus dosing, sliding scale insulin, Accu-Cheks before every meal seizures, C carb diet hypoglycemic protocol in place    Hyperlipidemia-chronic condition, continue with statin therapy    Morbid obesity due to excess calories-chronic condition, continue carb diet    Essential hypertension-chronic condition, permissive hypertension allowed, labetalol as needed    History of vitamin D deficiency-noted    Electronically

## 2020-02-22 NOTE — DISCHARGE SUMMARY
Discharge Summary  Date:2/22/2020        Patient Name:Adelita Blanco     YOB: 1955     Age:64 y.o. Admit Date:2/20/2020   Admission Condition:poor   Discharged Condition:fair  Discharge Date: 02/22/20     Discharge Diagnoses   Principal Problem:    Stroke-like symptoms  Active Problems:    Essential hypertension    Mild CAD    Morbid obesity due to excess calories (HCC)    Vitamin D deficiency    Systolic congestive heart failure ([address]    Mixed hyperlipidemia    Type 2 diabetes mellitus with microalbuminuria, without long-term current use of insulin (HCC)    Chronic atrial fibrillation    Palliative care patient  Resolved Problems:    * No resolved hospital problems. [facility] Stay   Narrative of Hospital Course: Patient was admitted to [facility] 2/20 presented to the emergency room due to worsening left leg weakness and numbness which has commenced over the past 2 weeks. States at times her legs just give out. Patient son was present on admission stated that patient has had trouble with finding words. Work-up in the emergency room unremarkable, noted INR to be subtherapeutic as patient takes Coumadin at home. Patient does have residual right-sided weakness. Neurology services have been consulted, plans for further imaging modalities, echocardiogram completed 2/20 reveals: Ejection fraction 25 to 30%, mild left ventricular enlargement with global hypokinesis abnormal contraction pattern suggestive of pulmonary. Patient underwent nuclear medicine stress test completed 3/22 with the following results -no obvious ischemia or infarction mild anterior attenuation artifact ejection fraction 31% this was compared with echocardiogram by cardiology who prefers patient to continue with medical management. Follow-up ejection fraction in 3 months with consideration for ICD if ejection fraction remains less than 35%.   Patient to be discharged to start new medication regimens of 4.4 02/20/2020    LABGLOM >60 02/22/2020    PROT 8.1 02/20/2020    PROT 7.6 09/12/2012    CALCIUM 8.9 02/22/2020     PT/INR:    Lab Results   Component Value Date    PROTIME 22.1 02/22/2020    PROTIME 2.1 11/16/2017    INR 1.90 02/22/2020     PTT:   Lab Results   Component Value Date    APTT 32.3 02/20/2020     FLP:    Lab Results   Component Value Date    CHOL 147 02/21/2020    TRIG 140 02/21/2020    HDL 36 02/21/2020     U/A:    Lab Results   Component Value Date    COLORU YELLOW 02/20/2020    SPECGRAV 1.004 02/20/2020    PHUR 6.5 02/20/2020    PROTEINU Negative 02/20/2020    GLUCOSEU Negative 02/20/2020    KETUA Negative 02/20/2020    BILIRUBINUR Negative 02/20/2020    UROBILINOGEN 0.2 02/20/2020    NITRU Negative 02/20/2020    LEUKOCYTESUR Negative 02/20/2020     TSH:    Lab Results   Component Value Date    TSH 3.910 02/20/2020       Radiology Last 7 Days:  Ct Head Wo Contrast    Result Date: 2/20/2020  No acute intracranial abnormality. The above finding are recorded on digital voice clip in PACS. Signed by Dr Tanner Medrano on 2/20/2020 11:40 AM    Xr Chest Portable    Result Date: 2/20/2020  No active cardiopulmonary disease. A persistent moderate cardiomegaly. Signed by Dr Tanner Medrano on 2/20/2020 11:50 AM    Mri Brain Wo Contrast    Result Date: 2/21/2020  1. No acute intracranial findings. No evidence of acute stroke. 2.  Chronic microvascular ischemic white matter change and small remote LEFT lacunar infarct. Signed by Dr Elly Schultz on 2/21/2020 4:51 PM    Xr Hip 2-3 Vw W Pelvis Left    Result Date: 2/20/2020  No acute fracture of the left hip. Moderate chronic osteoarthritis of the hip joint. There is deformity of the greater trochanter which may represent recent or a previous fracture. If clinically warranted this may be further evaluated with CT scan of the left hip. The above finding are recorded on a digital voice clip in PACS.  Signed by Dr Tanner Medrano on 2/20/2020 11:59 AM    Physical

## 2020-02-22 NOTE — PROGRESS NOTES
intact to voice. No scars, masses, or lesions over external nose or ears, no atrophy of tongue  Neck-symmetric, no masses noted, no jugular vein distension  Respiration- chest wall appears symmetric, good expansion,   normal effort without use of accessory muscles  Cardiovascular- RRR  Musculoskeletal - no significant wasting of muscles noted, no bony deformities, gait no gross ataxia  Extremities-no clubbing, cyanosis or edema  Skin - warm, dry, and intact. No rash, erythema, or pallor. Psychiatric - mood, affect, and behavior appear normal.      Neurology  NEUROLOGICAL EXAM:      Mental status   Awake, alert, fluent oriented x 3 appropriate affect  Attention and concentration appear appropriate  Recent and remote memory appears unremarkable  Speech normal without dysarthria  No clear issues with language       Cranial Nerves   CN II- Visual fields grossly unremarkable  CN III, IV,VI-EOMI, No nystagmus, conjugate eye movements, no ptosis  CN VII-no facial asymmetry  CN VIII-Hearing intact   CN IX and X- Palate elevates in midline  CN XI-good shoulder shrug  CN XII-Tongue midline with no fasciculations or fibrillations          Motor function  Antigravity x 4     Sensory function Intact to light touch     Cerebellar F-N intact     Tremor None present     Gait                  Not tested           Nursing/pcp notes, imaging,labs and vitals reviewed.      PT,OT and/or speech notes reviewed    Lab Results   Component Value Date    WBC 8.7 02/21/2020    HGB 13.4 02/21/2020    HCT 42.3 02/21/2020    MCV 91.6 02/21/2020     02/21/2020     Lab Results   Component Value Date     02/22/2020    K 3.7 02/22/2020     02/22/2020    CO2 26 02/22/2020    BUN 12 02/22/2020    CREATININE 0.5 02/22/2020    GLUCOSE 153 (H) 02/22/2020    CALCIUM 8.9 02/22/2020    PROT 8.1 02/20/2020    LABALBU 4.4 02/20/2020    BILITOT 0.5 02/20/2020    ALKPHOS 50 02/20/2020    AST 38 (H) 02/20/2020    ALT 37 (H) 02/20/2020

## 2020-02-22 NOTE — PLAN OF CARE
Problem: Falls - Risk of:  Goal: Will remain free from falls  Description  Will remain free from falls  Outcome: Ongoing  Goal: Absence of physical injury  Description  Absence of physical injury  Outcome: Ongoing     Problem: Discharge Planning:  Goal: Participates in care planning  Description  Participates in care planning  Outcome: Ongoing  Goal: Discharged to appropriate level of care  Description  Discharged to appropriate level of care  Outcome: Ongoing     Problem: Activity Intolerance:  Goal: Ability to tolerate increased activity will improve  Description  Ability to tolerate increased activity will improve  Outcome: Ongoing     Problem: Anxiety/Stress:  Goal: Level of anxiety will decrease  Description  Level of anxiety will decrease  Outcome: Ongoing     Problem:  Bowel Function - Altered:  Goal: Bowel elimination is within specified parameters  Description  Bowel elimination is within specified parameters  Outcome: Ongoing     Problem: Fluid Volume - Deficit:  Goal: Absence of fluid volume deficit signs and symptoms  Description  Absence of fluid volume deficit signs and symptoms  Outcome: Ongoing  Goal: Electrolytes within specified parameters  Description  Electrolytes within specified parameters  Outcome: Ongoing     Problem: Mental Status - Impaired:  Goal: Absence of physical injury  Description  Absence of physical injury  Outcome: Ongoing  Goal: Absence of continued neurological deterioration signs and symptoms  Description  Absence of continued neurological deterioration signs and symptoms  Outcome: Ongoing  Goal: Mental status will be restored to baseline  Description  Mental status will be restored to baseline  Outcome: Ongoing     Problem: Mobility - Impaired:  Goal: Mobility will improve to maximum level  Description  Mobility will improve to maximum level  Outcome: Ongoing     Problem: Nutrition Deficit:  Goal: Ability to achieve adequate nutritional intake will improve  Description  Ability to achieve adequate nutritional intake will improve  Outcome: Ongoing     Problem: Pain:  Goal: Pain level will decrease  Description  Pain level will decrease  Outcome: Ongoing  Goal: Ability to notify healthcare provider of pain before it becomes unmanageable or unbearable will improve  Description  Ability to notify healthcare provider of pain before it becomes unmanageable or unbearable will improve  Outcome: Ongoing  Goal: Control of acute pain  Description  Control of acute pain  Outcome: Ongoing  Goal: Control of chronic pain  Description  Control of chronic pain  Outcome: Ongoing     Problem: Skin Integrity - Impaired:  Goal: Will show no infection signs and symptoms  Description  Will show no infection signs and symptoms  Outcome: Ongoing  Goal: Absence of new skin breakdown  Description  Absence of new skin breakdown  Outcome: Ongoing     Problem: Sleep Pattern Disturbance:  Goal: Appears well-rested  Description  Appears well-rested  Outcome: Ongoing

## 2020-02-22 NOTE — PROGRESS NOTES
Physical Therapy  Name: Roge Longo  MRN:  [id number]  Date of service:  2/22/2020 02/22/20 1055   General   Missed reason Conflicting appointment   General Comment   Comments out of room for testing       Electronically signed by Angele Babinski, PTA on 2/22/2020 at 10:55 AM

## 2020-02-23 LAB
EKG P AXIS: NORMAL DEGREES
EKG P-R INTERVAL: NORMAL MS
EKG Q-T INTERVAL: 370 MS
EKG QRS DURATION: 142 MS
EKG QTC CALCULATION (BAZETT): 459 MS
EKG T AXIS: 114 DEGREES

## 2020-02-23 PROCEDURE — 93010 ELECTROCARDIOGRAM REPORT: CPT | Performed by: INTERNAL MEDICINE

## 2020-02-23 NOTE — PROGRESS NOTES
Serena was deemed stable by medical and cardiology cleared patient to go home meds given prior to discharge were coreg, coumadin, valsartan which she cant get scripts filled until the Am. Medication sheets given with each new med with side effects and uses. Patient also made aware when to follow up with cardiology and internal medicine patient will see internal medicine on Thursday and to follow up with cardiology in 3 months, patient given hard scripts for meds and will fill them in AM, Iv removed from right arm no complications and catheter intact. Patient is discharging home in stable care.

## 2020-02-24 ENCOUNTER — TELEPHONE (OUTPATIENT)
Dept: INTERNAL MEDICINE | Age: 65
End: 2020-02-24

## 2020-02-24 LAB
LV EF: 31 %
LVEF MODALITY: NORMAL

## 2020-02-24 NOTE — TELEPHONE ENCOUNTER
[address] Transitions Initial Follow Up Call    Outreach made within 2 business days of discharge: Yes    Patient: Rosibel Bryson Patient : 1955   MRN: [id number]    Reason for Admission:   Principal Problem:    Stroke-like symptoms  Active Problems:    Essential hypertension    Mild CAD    Morbid obesity due to excess calories ([facility] [address]    Vitamin D deficiency    Systolic congestive heart failure ([facility] [address]    Mixed hyperlipidemia    Type 2 diabetes mellitus with microalbuminuria, without long-term current use of insulin (HCC)    Chronic atrial fibrillation    Palliative care patient  Resolved Problems:    * No resolved hospital problems. *    Admit Date:2020   Discharge Date: 20       Spoke with: patient    Discharge department/facility: [facility]    TCM Interactive Patient Contact:  Was patient able to fill all prescriptions: Yes  Was patient instructed to bring all medications to the follow-up visit: Yes  Is patient taking all medications as directed in the discharge summary? Yes  Does patient understand their discharge instructions: Yes  Does patient have questions or concerns that need addressed prior to 7-14 day follow up office visit: no    Per patient she is doing ok, still weak. She is back at work, with the understanding that she can only sit and train. She is unable to go upstairs or doing any work. This is her last week before she retires and she has to get the person replacing her trained. She said that she will be able to get off work for her appt. She reports her appetite is good and no issues with bowels or bladder. She states they figured out she wasn't having a stroke, but that her heart was failing and that she needed her medications adjusted.      Scheduled appointment with PCP within 7-14 days    Follow Up  Future Appointments   Date Time Provider Terry Phillips   2020  1:15 PM MD [facility] Miguel [facility]   2020  1:30 PM SCHEDULE, [facility]   8/18/2020  9:00 AM Ruperto Fu MD [facility]       April D Amber Velasco LPN

## 2020-02-27 ENCOUNTER — OFFICE VISIT (OUTPATIENT)
Dept: INTERNAL MEDICINE | Age: 65
End: 2020-02-27
Payer: COMMERCIAL

## 2020-02-27 VITALS
BODY MASS INDEX: 38.77 KG/M2 | HEART RATE: 90 BPM | RESPIRATION RATE: 18 BRPM | OXYGEN SATURATION: 96 % | DIASTOLIC BLOOD PRESSURE: 88 MMHG | WEIGHT: 233 LBS | SYSTOLIC BLOOD PRESSURE: 138 MMHG

## 2020-02-27 LAB
INTERNATIONAL NORMALIZATION RATIO, POC: 2.5
PROTHROMBIN TIME, POC: NORMAL

## 2020-02-27 PROCEDURE — 85610 PROTHROMBIN TIME: CPT | Performed by: INTERNAL MEDICINE

## 2020-02-27 PROCEDURE — 99496 TRANSJ CARE MGMT HIGH F2F 7D: CPT | Performed by: INTERNAL MEDICINE

## 2020-02-27 RX ORDER — VALSARTAN 80 MG/1
80 TABLET ORAL 2 TIMES DAILY
Qty: 60 TABLET | Refills: 3 | Status: SHIPPED
Start: 2020-02-27 | End: 2020-04-02 | Stop reason: CLARIF

## 2020-02-27 NOTE — PROGRESS NOTES
Hospital Follow-up Visit      Nondrenay Longo   YOB: 1955    Date of Visit:  2/27/2020    Vitals:    02/27/20 1311   BP: 138/88   Site: Left Upper Arm   Position: Sitting   Cuff Size: Large Adult   Pulse: 90   Resp: 18   SpO2: 96%   Weight: 233 lb (105.7 kg)     Body mass index is 38.77 kg/m². Wt Readings from Last 3 Encounters:   02/27/20 233 lb (105.7 kg)   02/20/20 231 lb (104.8 kg)   02/20/20 231 lb (104.8 kg)     BP Readings from Last 3 Encounters:   02/27/20 138/88   02/22/20 (!) 152/79   02/20/20 122/80       Allergies   Allergen Reactions    Aspartame And Phenylalanine Anaphylaxis    Mushroom Extract Complex     Penicillins     Shellfish-Derived Products     Zetia [Ezetimibe]      Outpatient Medications Marked as Taking for the 2/27/20 encounter (Office Visit) with Hussain Miller MD   Medication Sig Dispense Refill    valsartan (DIOVAN) 80 MG tablet Take 1 tablet by mouth 2 times daily 60 tablet 3    warfarin (COUMADIN) 7.5 MG tablet Take 7.5 mg daily by mouth 30 tablet 1    carvedilol (COREG) 12.5 MG tablet Take 1 tablet by mouth 2 times daily (with meals) 60 tablet 3    spironolactone (ALDACTONE) 25 MG tablet Take 1 tablet by mouth daily 30 tablet 3    fluticasone (FLONASE) 50 MCG/ACT nasal spray 2 sprays by Each Nostril route daily 3 Bottle 1    metFORMIN (GLUCOPHAGE) 500 MG tablet TAKE 2 TABLETS BY MOUTH TWICE DAILY WITH MEALS 120 tablet 0    glipiZIDE (GLUCOTROL) 5 MG tablet TAKE 2 TABLETS BY MOUTH IN THE MORNING AND 1 TABLET IN THE EVENING 90 tablet 0    digoxin (LANOXIN) 125 MCG tablet Take 1 tablet by mouth daily 60 tablet 5    atorvastatin (LIPITOR) 80 MG tablet TAKE 1 TABLET BY MOUTH ONCE DAILY.  30 tablet 5    levothyroxine (SYNTHROID) 125 MCG tablet TAKE 1 TABLET BY MOUTH ONCE DAILY 30 tablet 5    albuterol sulfate HFA (VENTOLIN HFA) 108 (90 Base) MCG/ACT inhaler Inhale 2 puffs into the lungs every 6 hours as needed for Wheezing 1 postnasal drip and sore throat. Respiratory: Negative for cough, chest tightness and wheezing. ++ for dyspnea   Cardiovascular: Negative for chest pain, palpitations and leg swelling. Gastrointestinal: Negative for abdominal pain and constipation. Genitourinary: Negative for dysuria and urgency. Musculoskeletal: Positive for arthralgias. Skin: Negative for rash. Neurological: Positive for weakness. Negative for dizziness and headaches. Psychiatric/Behavioral: Positive for sleep disturbance. Physical Exam  Constitutional:       Appearance: She is well-developed. HENT:      Right Ear: External ear normal.      Left Ear: External ear normal.      Mouth/Throat:      Pharynx: No oropharyngeal exudate. Eyes:      Conjunctiva/sclera: Conjunctivae normal.      Pupils: Pupils are equal, round, and reactive to light. Neck:      Musculoskeletal: Neck supple. Thyroid: No thyromegaly. Vascular: No JVD. Cardiovascular:      Rate and Rhythm: Normal rate. Heart sounds: Normal heart sounds. No murmur. Pulmonary:      Effort: No respiratory distress. Breath sounds: Wheezing and rales present. Chest:      Chest wall: No tenderness. Abdominal:      General: Bowel sounds are normal.      Palpations: Abdomen is soft. Musculoskeletal: Normal range of motion. Lymphadenopathy:      Cervical: No cervical adenopathy. Skin:     General: Skin is warm. Findings: No rash. Neurological:      Mental Status: She is oriented to person, place, and time.        Lab Results   Component Value Date     02/22/2020    K 3.7 02/22/2020     02/22/2020    CO2 26 02/22/2020    BUN 12 02/22/2020    CREATININE 0.5 02/22/2020    GLUCOSE 153 02/22/2020    CALCIUM 8.9 02/22/2020     Lab Results   Component Value Date    WBC 8.7 02/21/2020    WBC 10.1 02/20/2020    WBC 7.3 05/08/2019    HGB 13.4 02/21/2020    HGB 15.1 02/20/2020    HGB 13.6 05/08/2019    HCT 42.3 02/21/2020    HCT 47.0

## 2020-02-28 ENCOUNTER — APPOINTMENT (OUTPATIENT)
Dept: GENERAL RADIOLOGY | Age: 65
DRG: 690 | End: 2020-02-28
Payer: COMMERCIAL

## 2020-02-28 ENCOUNTER — HOSPITAL ENCOUNTER (INPATIENT)
Age: 65
LOS: 3 days | Discharge: NURSING, REHAB OR LONG-TERM CARE | DRG: 690 | End: 2020-03-02
Attending: HOSPITALIST | Admitting: HOSPITALIST
Payer: COMMERCIAL

## 2020-02-28 PROBLEM — N39.0 UTI (URINARY TRACT INFECTION): Status: ACTIVE | Noted: 2020-02-28

## 2020-02-28 LAB
ALBUMIN SERPL-MCNC: 4.6 G/DL (ref 3.5–5.2)
ALP BLD-CCNC: 48 U/L (ref 35–104)
ALT SERPL-CCNC: 37 U/L (ref 5–33)
ANION GAP SERPL CALCULATED.3IONS-SCNC: 13 MMOL/L (ref 7–19)
AST SERPL-CCNC: 24 U/L (ref 5–32)
BACTERIA: NEGATIVE /HPF
BASOPHILS ABSOLUTE: 0.1 K/UL (ref 0–0.2)
BASOPHILS RELATIVE PERCENT: 0.5 % (ref 0–1)
BILIRUB SERPL-MCNC: 0.7 MG/DL (ref 0.2–1.2)
BILIRUBIN URINE: NEGATIVE
BLOOD, URINE: NEGATIVE
BUN BLDV-MCNC: 9 MG/DL (ref 8–23)
CALCIUM SERPL-MCNC: 9.3 MG/DL (ref 8.8–10.2)
CHLORIDE BLD-SCNC: 100 MMOL/L (ref 98–111)
CLARITY: CLEAR
CO2: 28 MMOL/L (ref 22–29)
COLOR: YELLOW
CREAT SERPL-MCNC: <0.5 MG/DL (ref 0.5–0.9)
DIGOXIN LEVEL: 0.8 NG/ML (ref 0.6–1.2)
EOSINOPHILS ABSOLUTE: 0 K/UL (ref 0–0.6)
EOSINOPHILS RELATIVE PERCENT: 0.3 % (ref 0–5)
EPITHELIAL CELLS, UA: 2 /HPF (ref 0–5)
GFR NON-AFRICAN AMERICAN: >60
GLUCOSE BLD-MCNC: 201 MG/DL (ref 74–109)
GLUCOSE URINE: 100 MG/DL
HCT VFR BLD CALC: 44.7 % (ref 37–47)
HEMOGLOBIN: 14.4 G/DL (ref 12–16)
HYALINE CASTS: 1 /HPF (ref 0–8)
IMMATURE GRANULOCYTES #: 0.1 K/UL
INR BLD: 2.3 (ref 0.88–1.18)
KETONES, URINE: NEGATIVE MG/DL
LEUKOCYTE ESTERASE, URINE: ABNORMAL
LYMPHOCYTES ABSOLUTE: 1.7 K/UL (ref 1.1–4.5)
LYMPHOCYTES RELATIVE PERCENT: 14.2 % (ref 20–40)
MCH RBC QN AUTO: 29.1 PG (ref 27–31)
MCHC RBC AUTO-ENTMCNC: 32.2 G/DL (ref 33–37)
MCV RBC AUTO: 90.5 FL (ref 81–99)
MONOCYTES ABSOLUTE: 0.7 K/UL (ref 0–0.9)
MONOCYTES RELATIVE PERCENT: 5.5 % (ref 0–10)
NEUTROPHILS ABSOLUTE: 9.4 K/UL (ref 1.5–7.5)
NEUTROPHILS RELATIVE PERCENT: 79.1 % (ref 50–65)
NITRITE, URINE: NEGATIVE
PDW BLD-RTO: 13.9 % (ref 11.5–14.5)
PH UA: 6 (ref 5–8)
PLATELET # BLD: 202 K/UL (ref 130–400)
PMV BLD AUTO: 10.6 FL (ref 9.4–12.3)
POTASSIUM SERPL-SCNC: 4.5 MMOL/L (ref 3.5–5)
PRO-BNP: 1450 PG/ML (ref 0–900)
PROTEIN UA: NEGATIVE MG/DL
PROTHROMBIN TIME: 25.8 SEC (ref 12–14.6)
RBC # BLD: 4.94 M/UL (ref 4.2–5.4)
RBC UA: 0 /HPF (ref 0–4)
SODIUM BLD-SCNC: 141 MMOL/L (ref 136–145)
SPECIFIC GRAVITY UA: 1 (ref 1–1.03)
TOTAL PROTEIN: 7.2 G/DL (ref 6.6–8.7)
TROPONIN: <0.01 NG/ML (ref 0–0.03)
URINE REFLEX TO CULTURE: YES
UROBILINOGEN, URINE: 0.2 E.U./DL
WBC # BLD: 11.9 K/UL (ref 4.8–10.8)
WBC UA: 4 /HPF (ref 0–5)

## 2020-02-28 PROCEDURE — 81001 URINALYSIS AUTO W/SCOPE: CPT

## 2020-02-28 PROCEDURE — 99285 EMERGENCY DEPT VISIT HI MDM: CPT

## 2020-02-28 PROCEDURE — G0378 HOSPITAL OBSERVATION PER HR: HCPCS

## 2020-02-28 PROCEDURE — 71045 X-RAY EXAM CHEST 1 VIEW: CPT

## 2020-02-28 PROCEDURE — 96374 THER/PROPH/DIAG INJ IV PUSH: CPT

## 2020-02-28 PROCEDURE — 84484 ASSAY OF TROPONIN QUANT: CPT

## 2020-02-28 PROCEDURE — 73560 X-RAY EXAM OF KNEE 1 OR 2: CPT

## 2020-02-28 PROCEDURE — 80053 COMPREHEN METABOLIC PANEL: CPT

## 2020-02-28 PROCEDURE — 93005 ELECTROCARDIOGRAM TRACING: CPT

## 2020-02-28 PROCEDURE — 85025 COMPLETE CBC W/AUTO DIFF WBC: CPT

## 2020-02-28 PROCEDURE — 87086 URINE CULTURE/COLONY COUNT: CPT

## 2020-02-28 PROCEDURE — 80162 ASSAY OF DIGOXIN TOTAL: CPT

## 2020-02-28 PROCEDURE — 73502 X-RAY EXAM HIP UNI 2-3 VIEWS: CPT

## 2020-02-28 PROCEDURE — 36415 COLL VENOUS BLD VENIPUNCTURE: CPT

## 2020-02-28 PROCEDURE — 85610 PROTHROMBIN TIME: CPT

## 2020-02-28 PROCEDURE — 2580000003 HC RX 258: Performed by: HOSPITALIST

## 2020-02-28 PROCEDURE — 1210000000 HC MED SURG R&B

## 2020-02-28 PROCEDURE — 83880 ASSAY OF NATRIURETIC PEPTIDE: CPT

## 2020-02-28 PROCEDURE — 73610 X-RAY EXAM OF ANKLE: CPT

## 2020-02-28 PROCEDURE — 6360000002 HC RX W HCPCS: Performed by: HOSPITALIST

## 2020-02-28 RX ORDER — VITAMIN B COMPLEX
1000 TABLET ORAL 2 TIMES DAILY
Status: DISCONTINUED | OUTPATIENT
Start: 2020-02-28 | End: 2020-03-02 | Stop reason: HOSPADM

## 2020-02-28 RX ORDER — FLUTICASONE PROPIONATE 50 MCG
2 SPRAY, SUSPENSION (ML) NASAL DAILY
Status: DISCONTINUED | OUTPATIENT
Start: 2020-02-29 | End: 2020-03-02 | Stop reason: HOSPADM

## 2020-02-28 RX ORDER — GLIPIZIDE 10 MG/1
10 TABLET ORAL
Status: DISCONTINUED | OUTPATIENT
Start: 2020-02-29 | End: 2020-02-29

## 2020-02-28 RX ORDER — DIGOXIN 125 MCG
125 TABLET ORAL DAILY
Status: DISCONTINUED | OUTPATIENT
Start: 2020-02-29 | End: 2020-03-02 | Stop reason: HOSPADM

## 2020-02-28 RX ORDER — CARVEDILOL 12.5 MG/1
12.5 TABLET ORAL 2 TIMES DAILY WITH MEALS
Status: DISCONTINUED | OUTPATIENT
Start: 2020-02-29 | End: 2020-03-02 | Stop reason: HOSPADM

## 2020-02-28 RX ORDER — VALSARTAN 80 MG/1
80 TABLET ORAL 2 TIMES DAILY
Status: DISCONTINUED | OUTPATIENT
Start: 2020-02-28 | End: 2020-03-02 | Stop reason: HOSPADM

## 2020-02-28 RX ORDER — WARFARIN SODIUM 2.5 MG/1
2.5 TABLET ORAL
Status: COMPLETED | OUTPATIENT
Start: 2020-02-28 | End: 2020-02-29

## 2020-02-28 RX ORDER — ASPIRIN 81 MG/1
81 TABLET ORAL DAILY
Status: DISCONTINUED | OUTPATIENT
Start: 2020-02-29 | End: 2020-03-02 | Stop reason: HOSPADM

## 2020-02-28 RX ORDER — GLIPIZIDE 5 MG/1
10 TABLET ORAL EVERY MORNING
Status: ON HOLD | COMMUNITY
End: 2020-03-02

## 2020-02-28 RX ORDER — ALBUTEROL SULFATE 2.5 MG/3ML
2.5 SOLUTION RESPIRATORY (INHALATION)
Status: DISCONTINUED | OUTPATIENT
Start: 2020-02-28 | End: 2020-03-02 | Stop reason: HOSPADM

## 2020-02-28 RX ORDER — DEXTROSE MONOHYDRATE 25 G/50ML
12.5 INJECTION, SOLUTION INTRAVENOUS PRN
Status: DISCONTINUED | OUTPATIENT
Start: 2020-02-28 | End: 2020-03-02 | Stop reason: HOSPADM

## 2020-02-28 RX ORDER — M-VIT,TX,IRON,MINS/CALC/FOLIC 27MG-0.4MG
1 TABLET ORAL DAILY
Status: DISCONTINUED | OUTPATIENT
Start: 2020-02-29 | End: 2020-03-02 | Stop reason: HOSPADM

## 2020-02-28 RX ORDER — SODIUM CHLORIDE 0.9 % (FLUSH) 0.9 %
10 SYRINGE (ML) INJECTION EVERY 12 HOURS SCHEDULED
Status: DISCONTINUED | OUTPATIENT
Start: 2020-02-28 | End: 2020-03-02 | Stop reason: HOSPADM

## 2020-02-28 RX ORDER — GLIPIZIDE 5 MG/1
5 TABLET ORAL EVERY EVENING
COMMUNITY
End: 2020-04-02 | Stop reason: CLARIF

## 2020-02-28 RX ORDER — ATORVASTATIN CALCIUM 80 MG/1
80 TABLET, FILM COATED ORAL NIGHTLY
Status: DISCONTINUED | OUTPATIENT
Start: 2020-02-28 | End: 2020-03-02 | Stop reason: HOSPADM

## 2020-02-28 RX ORDER — SODIUM CHLORIDE 0.9 % (FLUSH) 0.9 %
10 SYRINGE (ML) INJECTION PRN
Status: DISCONTINUED | OUTPATIENT
Start: 2020-02-28 | End: 2020-03-02 | Stop reason: HOSPADM

## 2020-02-28 RX ORDER — ACETAMINOPHEN 650 MG/1
650 SUPPOSITORY RECTAL EVERY 6 HOURS PRN
Status: DISCONTINUED | OUTPATIENT
Start: 2020-02-28 | End: 2020-03-02 | Stop reason: HOSPADM

## 2020-02-28 RX ORDER — NICOTINE POLACRILEX 4 MG
15 LOZENGE BUCCAL PRN
Status: DISCONTINUED | OUTPATIENT
Start: 2020-02-28 | End: 2020-03-02 | Stop reason: HOSPADM

## 2020-02-28 RX ORDER — ONDANSETRON 2 MG/ML
4 INJECTION INTRAMUSCULAR; INTRAVENOUS EVERY 6 HOURS PRN
Status: DISCONTINUED | OUTPATIENT
Start: 2020-02-28 | End: 2020-03-02 | Stop reason: HOSPADM

## 2020-02-28 RX ORDER — GLIPIZIDE 5 MG/1
5 TABLET ORAL EVERY EVENING
Status: DISCONTINUED | OUTPATIENT
Start: 2020-02-28 | End: 2020-02-29

## 2020-02-28 RX ORDER — PROMETHAZINE HYDROCHLORIDE 12.5 MG/1
12.5 TABLET ORAL EVERY 6 HOURS PRN
Status: DISCONTINUED | OUTPATIENT
Start: 2020-02-28 | End: 2020-03-02 | Stop reason: HOSPADM

## 2020-02-28 RX ORDER — LEVOTHYROXINE SODIUM 0.12 MG/1
125 TABLET ORAL DAILY
Status: DISCONTINUED | OUTPATIENT
Start: 2020-02-29 | End: 2020-03-02 | Stop reason: HOSPADM

## 2020-02-28 RX ORDER — ACETAMINOPHEN 325 MG/1
650 TABLET ORAL EVERY 6 HOURS PRN
Status: DISCONTINUED | OUTPATIENT
Start: 2020-02-28 | End: 2020-03-02 | Stop reason: HOSPADM

## 2020-02-28 RX ORDER — LANOLIN ALCOHOL/MO/W.PET/CERES
3 CREAM (GRAM) TOPICAL NIGHTLY PRN
Status: DISCONTINUED | OUTPATIENT
Start: 2020-02-28 | End: 2020-03-02 | Stop reason: HOSPADM

## 2020-02-28 RX ORDER — POLYETHYLENE GLYCOL 3350 17 G/17G
17 POWDER, FOR SOLUTION ORAL DAILY PRN
Status: DISCONTINUED | OUTPATIENT
Start: 2020-02-28 | End: 2020-03-02 | Stop reason: HOSPADM

## 2020-02-28 RX ORDER — DEXTROSE MONOHYDRATE 50 MG/ML
100 INJECTION, SOLUTION INTRAVENOUS PRN
Status: DISCONTINUED | OUTPATIENT
Start: 2020-02-28 | End: 2020-03-02 | Stop reason: HOSPADM

## 2020-02-28 RX ORDER — SPIRONOLACTONE 25 MG/1
25 TABLET ORAL DAILY
Status: DISCONTINUED | OUTPATIENT
Start: 2020-02-29 | End: 2020-03-02 | Stop reason: HOSPADM

## 2020-02-28 RX ADMIN — SODIUM CHLORIDE, PRESERVATIVE FREE 1 G: 5 INJECTION INTRAVENOUS at 21:15

## 2020-02-28 ASSESSMENT — PAIN SCALES - GENERAL: PAINLEVEL_OUTOF10: 6

## 2020-02-28 NOTE — ED NOTES
ASSESSMENT:  Pt co bilateral knee/leg pain post fall a few hrs ago. Pt reports 'her leg just gave out' and she fell to the ground. Pt denies hitting head or LOC. Pt has strong, bounding lower ext pulses. Pulses are marked. SKIN:  Warm, dry, pink. Cap refill < 2 sec  CARDIAC:  S1 S2 noted  LUNGS: clear upper and lower lobes. Respirations even and unlabored. ABDOMEN: bowel sounds noted upper and lower quadrants. Soft and tender. EXTREMITIES: bilateral DP and PT. No edema noted. Pt alert and oriented x4. Pupils equal and reactive. No distress noted. Side rails up and call light within reach.        Violetta Bob RN  02/28/20 1404

## 2020-02-29 LAB
ANION GAP SERPL CALCULATED.3IONS-SCNC: 15 MMOL/L (ref 7–19)
BASOPHILS ABSOLUTE: 0.1 K/UL (ref 0–0.2)
BASOPHILS RELATIVE PERCENT: 0.6 % (ref 0–1)
BUN BLDV-MCNC: 10 MG/DL (ref 8–23)
CALCIUM SERPL-MCNC: 9 MG/DL (ref 8.8–10.2)
CHLORIDE BLD-SCNC: 99 MMOL/L (ref 98–111)
CO2: 24 MMOL/L (ref 22–29)
CREAT SERPL-MCNC: <0.5 MG/DL (ref 0.5–0.9)
EOSINOPHILS ABSOLUTE: 0.1 K/UL (ref 0–0.6)
EOSINOPHILS RELATIVE PERCENT: 1 % (ref 0–5)
GFR NON-AFRICAN AMERICAN: >60
GLUCOSE BLD-MCNC: 148 MG/DL (ref 70–99)
GLUCOSE BLD-MCNC: 198 MG/DL (ref 70–99)
GLUCOSE BLD-MCNC: 202 MG/DL (ref 70–99)
GLUCOSE BLD-MCNC: 210 MG/DL (ref 74–109)
GLUCOSE BLD-MCNC: 226 MG/DL (ref 70–99)
HCT VFR BLD CALC: 38.7 % (ref 37–47)
HEMOGLOBIN: 12.8 G/DL (ref 12–16)
IMMATURE GRANULOCYTES #: 0 K/UL
INR BLD: 2.06 (ref 0.88–1.18)
LYMPHOCYTES ABSOLUTE: 2.7 K/UL (ref 1.1–4.5)
LYMPHOCYTES RELATIVE PERCENT: 27.2 % (ref 20–40)
MCH RBC QN AUTO: 29.7 PG (ref 27–31)
MCHC RBC AUTO-ENTMCNC: 33.1 G/DL (ref 33–37)
MCV RBC AUTO: 89.8 FL (ref 81–99)
MONOCYTES ABSOLUTE: 0.8 K/UL (ref 0–0.9)
MONOCYTES RELATIVE PERCENT: 7.9 % (ref 0–10)
NEUTROPHILS ABSOLUTE: 6.3 K/UL (ref 1.5–7.5)
NEUTROPHILS RELATIVE PERCENT: 63 % (ref 50–65)
PDW BLD-RTO: 14 % (ref 11.5–14.5)
PERFORMED ON: ABNORMAL
PLATELET # BLD: 199 K/UL (ref 130–400)
PMV BLD AUTO: 11.5 FL (ref 9.4–12.3)
POTASSIUM REFLEX MAGNESIUM: 3.9 MMOL/L (ref 3.5–5)
PROTHROMBIN TIME: 23.6 SEC (ref 12–14.6)
RBC # BLD: 4.31 M/UL (ref 4.2–5.4)
SODIUM BLD-SCNC: 138 MMOL/L (ref 136–145)
WBC # BLD: 10 K/UL (ref 4.8–10.8)

## 2020-02-29 PROCEDURE — 6370000000 HC RX 637 (ALT 250 FOR IP): Performed by: HOSPITALIST

## 2020-02-29 PROCEDURE — G0378 HOSPITAL OBSERVATION PER HR: HCPCS

## 2020-02-29 PROCEDURE — 97162 PT EVAL MOD COMPLEX 30 MIN: CPT

## 2020-02-29 PROCEDURE — 36415 COLL VENOUS BLD VENIPUNCTURE: CPT

## 2020-02-29 PROCEDURE — 1210000000 HC MED SURG R&B

## 2020-02-29 PROCEDURE — 6370000000 HC RX 637 (ALT 250 FOR IP): Performed by: INTERNAL MEDICINE

## 2020-02-29 PROCEDURE — 80048 BASIC METABOLIC PNL TOTAL CA: CPT

## 2020-02-29 PROCEDURE — 87040 BLOOD CULTURE FOR BACTERIA: CPT

## 2020-02-29 PROCEDURE — 2580000003 HC RX 258: Performed by: HOSPITALIST

## 2020-02-29 PROCEDURE — 85610 PROTHROMBIN TIME: CPT

## 2020-02-29 PROCEDURE — 97530 THERAPEUTIC ACTIVITIES: CPT

## 2020-02-29 PROCEDURE — 96376 TX/PRO/DX INJ SAME DRUG ADON: CPT

## 2020-02-29 PROCEDURE — 6360000002 HC RX W HCPCS: Performed by: HOSPITALIST

## 2020-02-29 PROCEDURE — 85025 COMPLETE CBC W/AUTO DIFF WBC: CPT

## 2020-02-29 PROCEDURE — 82947 ASSAY GLUCOSE BLOOD QUANT: CPT

## 2020-02-29 RX ORDER — INSULIN GLARGINE 100 [IU]/ML
10 INJECTION, SOLUTION SUBCUTANEOUS NIGHTLY
Status: DISCONTINUED | OUTPATIENT
Start: 2020-02-29 | End: 2020-03-02 | Stop reason: HOSPADM

## 2020-02-29 RX ADMIN — ATORVASTATIN CALCIUM 80 MG: 80 TABLET, FILM COATED ORAL at 00:20

## 2020-02-29 RX ADMIN — LEVOTHYROXINE SODIUM 125 MCG: 125 TABLET ORAL at 08:56

## 2020-02-29 RX ADMIN — ACETAMINOPHEN 650 MG: 325 TABLET ORAL at 03:44

## 2020-02-29 RX ADMIN — DIGOXIN 125 MCG: 125 TABLET ORAL at 08:56

## 2020-02-29 RX ADMIN — VALSARTAN 80 MG: 80 TABLET, FILM COATED ORAL at 08:55

## 2020-02-29 RX ADMIN — INSULIN LISPRO 4 UNITS: 100 INJECTION, SOLUTION INTRAVENOUS; SUBCUTANEOUS at 13:00

## 2020-02-29 RX ADMIN — INSULIN GLARGINE 10 UNITS: 100 INJECTION, SOLUTION SUBCUTANEOUS at 20:43

## 2020-02-29 RX ADMIN — VITAMIN D, TAB 1000IU (100/BT) 1000 UNITS: 25 TAB at 08:55

## 2020-02-29 RX ADMIN — SODIUM CHLORIDE, PRESERVATIVE FREE 1 G: 5 INJECTION INTRAVENOUS at 20:42

## 2020-02-29 RX ADMIN — WARFARIN SODIUM 2.5 MG: 2.5 TABLET ORAL at 00:21

## 2020-02-29 RX ADMIN — ATORVASTATIN CALCIUM 80 MG: 80 TABLET, FILM COATED ORAL at 20:50

## 2020-02-29 RX ADMIN — SPIRONOLACTONE 25 MG: 25 TABLET ORAL at 08:55

## 2020-02-29 RX ADMIN — MULTIPLE VITAMINS W/ MINERALS TAB 1 TABLET: TAB at 08:55

## 2020-02-29 RX ADMIN — VITAMIN D, TAB 1000IU (100/BT) 1000 UNITS: 25 TAB at 00:21

## 2020-02-29 RX ADMIN — VALSARTAN 80 MG: 80 TABLET, FILM COATED ORAL at 01:25

## 2020-02-29 RX ADMIN — VALSARTAN 80 MG: 80 TABLET, FILM COATED ORAL at 20:42

## 2020-02-29 RX ADMIN — GLIPIZIDE 5 MG: 5 TABLET ORAL at 00:21

## 2020-02-29 RX ADMIN — CARVEDILOL 12.5 MG: 12.5 TABLET, FILM COATED ORAL at 08:55

## 2020-02-29 RX ADMIN — CARVEDILOL 12.5 MG: 12.5 TABLET, FILM COATED ORAL at 16:49

## 2020-02-29 RX ADMIN — ASPIRIN 81 MG: 81 TABLET, COATED ORAL at 08:56

## 2020-02-29 RX ADMIN — WARFARIN SODIUM 7.5 MG: 2.5 TABLET ORAL at 16:49

## 2020-02-29 RX ADMIN — SODIUM CHLORIDE, PRESERVATIVE FREE 10 ML: 5 INJECTION INTRAVENOUS at 00:21

## 2020-02-29 RX ADMIN — SODIUM CHLORIDE, PRESERVATIVE FREE 10 ML: 5 INJECTION INTRAVENOUS at 09:01

## 2020-02-29 RX ADMIN — VITAMIN D, TAB 1000IU (100/BT) 1000 UNITS: 25 TAB at 20:42

## 2020-02-29 RX ADMIN — GLIPIZIDE 10 MG: 5 TABLET ORAL at 08:55

## 2020-02-29 RX ADMIN — FLUTICASONE PROPIONATE 2 SPRAY: 50 SPRAY, METERED NASAL at 08:55

## 2020-02-29 RX ADMIN — ACETAMINOPHEN 650 MG: 325 TABLET ORAL at 16:49

## 2020-02-29 RX ADMIN — METFORMIN HYDROCHLORIDE 1000 MG: 500 TABLET ORAL at 08:56

## 2020-02-29 ASSESSMENT — PAIN DESCRIPTION - LOCATION
LOCATION: KNEE
LOCATION: KNEE

## 2020-02-29 ASSESSMENT — PAIN DESCRIPTION - DESCRIPTORS
DESCRIPTORS: ACHING;BURNING;DISCOMFORT
DESCRIPTORS: ACHING

## 2020-02-29 ASSESSMENT — PAIN SCALES - GENERAL
PAINLEVEL_OUTOF10: 7
PAINLEVEL_OUTOF10: 8
PAINLEVEL_OUTOF10: 4
PAINLEVEL_OUTOF10: 3
PAINLEVEL_OUTOF10: 10
PAINLEVEL_OUTOF10: 3

## 2020-02-29 ASSESSMENT — PAIN DESCRIPTION - ORIENTATION
ORIENTATION: RIGHT;LEFT
ORIENTATION: RIGHT

## 2020-02-29 ASSESSMENT — PAIN DESCRIPTION - PAIN TYPE
TYPE: ACUTE PAIN
TYPE: ACUTE PAIN

## 2020-02-29 NOTE — PROGRESS NOTES
2383    fluticasone (FLONASE) 50 MCG/ACT nasal spray 2 spray  2 spray Each Nostril Daily Blanca Workman MD   2 spray at 02/29/20 4183    therapeutic multivitamin-minerals 1 tablet  1 tablet Oral Daily Blanca Workman MD   1 tablet at 02/29/20 0855    spironolactone (ALDACTONE) tablet 25 mg  25 mg Oral Daily Blanca Workman MD   25 mg at 02/29/20 0855    valsartan (DIOVAN) tablet 80 mg  80 mg Oral BID Blanca Workman MD   80 mg at 02/29/20 6317    Vitamin D (CHOLECALCIFEROL) tablet 1,000 Units  1,000 Units Oral BID Blanca Workman MD   1,000 Units at 02/29/20 0855    levothyroxine (SYNTHROID) tablet 125 mcg  125 mcg Oral Daily Blanca Workman MD   125 mcg at 02/29/20 0856    albuterol (PROVENTIL) nebulizer solution 2.5 mg  2.5 mg Nebulization Q1H PRN Blanca Workman MD        sodium chloride flush 0.9 % injection 10 mL  10 mL Intravenous 2 times per day Blanca Workman MD   10 mL at 02/29/20 0901    sodium chloride flush 0.9 % injection 10 mL  10 mL Intravenous PRN Blanca Workman MD        acetaminophen (TYLENOL) tablet 650 mg  650 mg Oral Q6H PRN Blanca Workman MD   650 mg at 02/29/20 0344    Or    acetaminophen (TYLENOL) suppository 650 mg  650 mg Rectal Q6H PRN Blanca Workman MD        promethazine (PHENERGAN) tablet 12.5 mg  12.5 mg Oral Q6H PRN Blanca Workman MD        Or    [facility]ron [facility]) injection 4 mg  4 mg Intravenous Q6H PRN Blanca Workman MD        polyethylene glycol [facility]) packet 17 g  17 g Oral Daily PRN Blanca Workman MD        melatonin tablet 3 mg  3 mg Oral Nightly PRN Blanca Workman MD        glucose (GLUTOSE) 40 % oral gel 15 g  15 g Oral PRN Blanca Workman MD        dextrose 50 % IV solution  12.5 g Intravenous PRN Blanca Workman MD        glucagon (rDNA) injection 1 mg  1 mg Intramuscular PRN Blanca Workman MD        dextrose 5 % solution  100 mL/hr Intravenous PRN Blanca Workamn MD        warfarin (COUMADIN) daily dosing (placeholder)   Other RX Placeholder Blanca Workman, MD            Labs:     Recent Labs     02/28/20 1915 02/29/20 0410   WBC 11.9* 10.0   RBC 4.94 4.31   HGB 14.4 12.8   HCT 44.7 38.7   MCV 90.5 89.8   MCH 29.1 29.7   MCHC 32.2* 33.1    199     Recent Labs     02/28/20 1915 02/29/20 0410    138   K 4.5 3.9   ANIONGAP 13 15    99   CO2 28 24   BUN 9 10   CREATININE <0.5 <0.5   GLUCOSE 201* 210*   CALCIUM 9.3 9.0     No results for input(s): MG, PHOS in the last 72 hours. Recent Labs     02/28/20 1915   AST 24   ALT 37*   BILITOT 0.7   ALKPHOS 48     ABGs:No results for input(s): PH, PO2, PCO2, HCO3, BE, O2SAT in the last 72 hours. Troponin T:   Recent Labs     02/28/20 1915   TROPONINI <0.01     INR:   Recent Labs     02/27/20  1319 02/28/20 1915 02/29/20 0410   INR 2.5 2.30* 2.06*     Lactic Acid: No results for input(s): LACTA in the last 72 hours. Objective:   Vitals: /80   Pulse 87   Temp 98.4 °F (36.9 °C) (Temporal)   Resp 14   Ht 5' 5\" (1.651 m)   Wt 233 lb (105.7 kg)   LMP  (LMP Unknown)   SpO2 95%   Breastfeeding No   BMI 38.77 kg/m²   24HR INTAKE/OUTPUT:      Intake/Output Summary (Last 24 hours) at 2/29/2020 0916  Last data filed at 2/29/2020 9057  Gross per 24 hour   Intake --   Output 700 ml   Net -700 ml     General appearance: Alert  HEENT: AT/NC  Lungs: no increased work of breathing, \"clear to auscultation bilaterally\" without rales, rhonchi or wheezes  Heart: regular rate and rhythm, S1, S2 normal, no murmur, click, rub or gallop  Abdomen: soft, non-tender; bowel sounds normal; no masses,  no organomegaly  Extremities: b/l knee TTP, pulses 2+, compartments soft, right knee pain with passive and active ROM  Neurologic: alert, gross motor and sensory function intact  Skin: warm    Assessment and Plan: Active Problems:    UTI (urinary tract infection)  Resolved Problems:    * No resolved hospital problems. *    UTI: Rocephin. Follow-up cultures. Afebrile without leukocytosis. Asymptomatic. Fall/Difficulty ambulating/Knee pain: XRs noted. Orthopedic surgery consulted. PT/OT. PRN pain management. Fall precautions. Chronic persistent atrial fibrillation: Pharmacy dosed warfarin. Digoxin. DM: not on insulin at home. ISS. Monitor BG. HTN: Monitor BP and adjust medications PRN. Hypothyroidism: Synthroid.     Advance Directive: Full Code    DVT prophylaxis: Coumadin    Discharge planning: TBD      Signed:  Do Steward MD 2/29/2020 9:16 AM  Rounding Hospitalist

## 2020-02-29 NOTE — PROGRESS NOTES
Physical Therapy    Facility/Department: [facility] ONCOLOGY UNIT  Initial Assessment    NAME: Shazia Milligan  : 1955  MRN: [id number]    Date of Service: 2020    Discharge Recommendations:  Continue to assess pending progress, Patient would benefit from continued therapy after discharge        Assessment   Body structures, Functions, Activity limitations: Decreased functional mobility ; Decreased ROM; Decreased strength;Decreased balance; Increased pain;Decreased posture  Assessment: Pt. will benefit from cont. PT to decrease impairments. Pt. a fall risk and should not attempt mobility on her own at this time due to prior falls and poor mobility during PT eval. Pt. only safe to be transfered with 2A and juancarlos steady. Anticipate pt. will need additional therapy prior to being d/c home. Treatment Diagnosis: impaired gait and mobility  Prognosis: Good  Decision Making: Medium Complexity  PT Education: Goals;PT Role;Plan of Care;General Safety;Transfer Training;Precautions;Gait Training;Functional Mobility Training  Barriers to Learning: none noted  REQUIRES PT FOLLOW UP: Yes  Activity Tolerance  Activity Tolerance: Patient limited by fatigue;Patient limited by pain       Patient Diagnosis(es): The primary encounter diagnosis was Right leg pain. Diagnoses of Urinary tract infection without hematuria, site unspecified, Fall, initial encounter, Ambulatory dysfunction, Knee effusion, right, and Chronic atrial fibrillation were also pertinent to this visit.      has a past medical history of Acute laryngitis, Acute sinusitis, Allergic rhinitis, Ankle pain, Asthma, Asthmatic bronchitis, Atrial fibrillation (HCC), Atrial flutter ([address] CAD (coronary artery disease), Cerebral artery occlusion with cerebral infarction ([address] CHF (congestive heart failure) ([address] Chronic back pain, Cough, Diabetes, Dizzy, Dysuria, 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[address] Fatigue, Foot pain, Hx of amiodarone therapy, Hyperlipidemia, Hypertension, Menopause,

## 2020-02-29 NOTE — H&P
Patient Information:  Patient:Adelita Hernandez  YOB: 1955  Date of Service: 2/28/2020  MRN: [id number]   Acct: [de-identified]   Primary Care Physician: Yodit Stanford MD  Advance Directive: Prior  Admit Date: 2/28/2020       Hospital Day: 1        SUBJECTIVE:    Chief Complaint   Patient presents with    Leg Pain     bilateral knee pain after fall today. pt reports her 'knee went out' and she fell     EP Sign Out:  Diabetic with poor control  bnp 1450  2 falls at home, first pre work down for 30 min & then again when going to car s/p work  Never hit head  r knee effusion  r hip xray ordered  Had been at work today  [employer] with warfarin  States r leg not working, states this happens whenever she is fatigued or sick    HPI and ROS:  Mr. Robert Oakley is a 71year old lady from home. She states that she had fallen and was stuck on the floor for 30 ,minutes before her last day of work. Her sons got her up. She had her son pick her up from work. She had went to get in to his truck and fell as her leg went down under her. She was unable to get herself back up as she was wedged in to the car door. They had to call an ambulance to come get her out. She states that she is unable to bear any weight on her right leg. Her toes feel numb as well. She was admitted as she had newly recurrent weakness, and has an associated UTI at this time. We believe that the UTI is the root cause of her weakness and the resultant ambulatory dysfunction. She has been admitted for IV ABx and will need therapy evaluations and most likely treatments while she is here. She also endorses: chills, night sweats, generalized weakness most severe in the right, she states that she feel numb int he feet like she ha too many socks on, and malodorous urine. She has chronic and unchanged: urinary urgency. She also denies: fevers, chest pains, dyspnea, diaphoresis, confusion, nausea, dysuria, urinary frequency, cloudy urine, and dark urine.     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[address]     Fatigue     Foot pain     Hx of amiodarone therapy 9/24/2014    Hyperlipidemia     PCP manages cholesterol    Hypertension     Menopause     Obesity     Palliative care patient 02/21/2020    Skin rash     Type II or unspecified type diabetes mellitus without mention of complication, not stated as uncontrolled     Umbilical hernia     URI (upper respiratory infection)      Past Psychiatric History:  Denies any    Past Surgical History:   Procedure Laterality Date    CARDIAC CATHETERIZATION  10/21/09    EF 35% left ventricle is moderately enlarged     CARDIOVERSION  10/9/13    CHOLECYSTECTOMY      EYE SURGERY      retina and cataracts     GALLBLADDER SURGERY      TUBAL LIGATION       Social History     Tobacco History     Smoking Status  Never Smoker    Smokeless Tobacco Use  Never Used          Alcohol History     Alcohol Use Status  No          Drug Use     Drug Use Status  No                        Family History:  Deferred     Allergies   Allergen Reactions    Aspartame And Phenylalanine Anaphylaxis    Mushroom Extract Complex     Penicillins     Shellfish-Derived Products     Zetia [Ezetimibe]      Current Facility-Administered Medications   Medication Dose Route Frequency Provider Last Rate Last Dose    cefTRIAXone (ROCEPHIN) 1 g in sodium chloride (PF) 10 mL IV syringe  1 g Intravenous Q24H Sixto Funez MD   Stopped at 02/28/20 3092    aspirin EC tablet 81 mg  81 mg Oral Daily Larry Lemons MD        atorvastatin (LIPITOR) tablet 80 mg  80 mg Oral Nightly Larry Lemons MD   80 mg at 02/29/20 0020    carvedilol (COREG) tablet 12.5 mg  12.5 mg Oral BID MIRIAN Lemons MD        digoxin [facility] TR[facility]PLANT [facility]) tablet 125 mcg  125 mcg Oral Daily Larry Lemons MD        fluticasone [facility]) 50 MCG/ACT nasal spray 2 spray  2 spray Each Nostril Daily Larry Lemons MD        glipiZIDE (GLUCOTROL) tablet 10 mg  10 mg Oral QAM AC Larry Lemons MD        glipiZIDE (GLUCOTROL) tablet 5 mg  5 mg Oral QPM Larry Lemons MD   5 mg at 02/29/20 0021    metFORMIN (GLUCOPHAGE) tablet 1,000 mg  1,000 mg Oral BID MIRIAN Lemons MD        therapeutic multivitamin-minerals 1 tablet  1 tablet Oral Daily Larry Lemons MD        spironolactone (ALDACTONE) tablet 25 mg  25 mg Oral Daily Larry Lemons MD        valsartan (DIOVAN) tablet 80 mg  80 mg Oral BID Larry Lemons MD   80 mg at 02/29/20 0125    Vitamin D (CHOLECALCIFEROL) tablet 1,000 Units  1,000 Units Oral BID Larry Lemons MD   1,000 Units at 02/29/20 0021    levothyroxine (SYNTHROID) tablet 125 mcg  125 mcg Oral Daily Larry Lemons MD        albuterol (PROVENTIL) nebulizer solution 2.5 mg  2.5 mg Nebulization Q1H PRN Larry Lemons MD        sodium chloride flush 0.9 % injection 10 mL  10 mL Intravenous 2 times per day Larry Lemons MD   10 mL at 02/29/20 0021    sodium chloride flush 0.9 % injection 10 mL  10 mL Intravenous PRN Larry Lemons MD        acetaminophen (TYLENOL) tablet 650 mg  650 mg Oral Q6H PRN Larry Lemons MD   650 mg at 02/29/20 0344    Or    acetaminophen (TYLENOL) suppository 650 mg  650 mg Rectal Q6H PRN Larry Lemons MD        promethazine (PHENERGAN) tablet 12.5 mg  12.5 mg Oral Q6H PRN Larry Lemons MD        Or    ondansetron [facility]) injection 4 mg  4 mg Intravenous Q6H PRN Larry Lemons MD        polyethylene glycol [facility]) packet 17 g  17 g Oral Daily PRN Larry Lemons MD       [facility] melatonin tablet 3 mg  3 mg Oral Nightly PRN Blanca Workman MD        glucose (GLUTOSE) 40 % oral gel 15 g  15 g Oral PRN Blanca Workman MD        dextrose 50 % IV solution  12.5 g Intravenous PRN Blanca Workman MD        glucagon (rDNA) injection 1 mg  1 mg Intramuscular PRN Blanca Workman MD        dextrose 5 % solution  100 mL/hr Intravenous PRN Blanca Workman MD        warfarin (COUMADIN) daily dosing (placeholder)   Other Mary Zuleta MD         Home Medications:  Prior to Admission medications    Medication Sig Start Date End Date Taking? Authorizing Provider   valsartan (DIOVAN) 80 MG tablet Take 1 tablet by mouth 2 times daily 2/27/20   Jerson Parish MD   warfarin (COUMADIN) 7.5 MG tablet Take 7.5 mg daily by mouth 2/22/20   Sandee Stover MD   carvedilol (COREG) 12.5 MG tablet Take 1 tablet by mouth 2 times daily (with meals) 2/22/20   Sandee Stover MD   spironolactone (ALDACTONE) 25 MG tablet Take 1 tablet by mouth daily 2/23/20   Sandee Stover MD   fluticasone [facility]) 50 MCG/ACT nasal spray 2 sprays by Each Nostril route daily 2/20/20   Jerson Parish MD   metFORMIN (GLUCOPHAGE) 500 MG tablet TAKE 2 TABLETS BY MOUTH TWICE DAILY WITH MEALS 1/30/20   Jerson Parish MD   glipiZIDE (GLUCOTROL) 5 MG tablet TAKE 2 TABLETS BY MOUTH IN THE MORNING AND 1 TABLET IN THE EVENING 1/30/20   Jerson Parish MD   digoxin [facility]) 125 MCG tablet Take 1 tablet by mouth daily 11/13/19   ADELITA Ledesma   atorvastatin (LIPITOR) 80 MG tablet TAKE 1 TABLET BY MOUTH ONCE DAILY. 11/6/19   Jerson Parish MD   levothyroxine (SYNTHROID) 125 MCG tablet TAKE 1 TABLET BY MOUTH ONCE DAILY 11/6/19   Jerson Parish MD   albuterol sulfate HFA (VENTOLIN HFA) 108 (90 Base) MCG/ACT inhaler Inhale 2 puffs into the lungs every 6 hours as needed for Wheezing 5/21/19   Jerson Parish MD   Multiple Vitamins-Minerals (THERAPEUTIC MULTIVITAMIN-MINERALS) tablet Take 1 tablet by mouth daily.     Historical Provider, MD 48     Coag Panel:   Recent Labs     02/27/20  1319 02/28/20 1915   INR 2.5 2.30*   PROTIME  --  25.8*     Cardiac Enzymes:   Recent Labs     02/28/20 1915   TROPONINI <0.01     Urinalysis:  Lab Results   Component Value Date    NITRU Negative 02/28/2020    WBCUA 4 02/28/2020    BACTERIA NEGATIVE 02/28/2020    RBCUA 0 02/28/2020    BLOODU Negative 02/28/2020    SPECGRAV 1.005 02/28/2020    GLUCOSEU 100 02/28/2020     IMAGING:  Xr Knee Left (1-2 Views)  Result Date: 2/28/2020  1. Moderate to severe degenerative changes in both knees without evidence of acute fracture or dislocation. 2. Moderate sized right knee effusion. Signed by Dr Stephanie Kessler on 2/28/2020 6:35 PM  Xr Knee Right (1-2 Views)  Result Date: 2/28/2020  1. Moderate to severe degenerative changes in both knees without evidence of acute fracture or dislocation. 2. Moderate sized right knee effusion. Signed by Dr Stephanie Kessler on 2/28/2020 6:35 PM  Xr Ankle Right (min 3 Views)  Result Date: 2/28/2020  1. No acute fracture or dislocation in the right ankle. 2. Degenerative changes in the midfoot and peripheral vascular calcifications. Signed by Dr Stephanie Kessler on 2/28/2020 8:28 PM  Xr Chest Portable  Result Date: 2/28/2020  1. Moderate to marked cardiac enlargement. 2. No la pulmonary edema.  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)  Admit to Kelsie Rasmussen with Tele for 24 hours as per Hs of PCN allergy  Rocephin 1g IV Q24h if no significant allergy, d/w ED RN who watched after investiagting and she had no reaction   Blood Cxx x2  Digoxin level added on  PT & OT consults   Bed Alarm  Activity with assistance  Fall precautions      Patient Active Problem List   Diagnosis    Essential hypertension    Mild CAD    Long term current use of anticoagulant therapy    Morbid obesity due to excess calories (HCC)    Vitamin D deficiency    Systolic congestive heart failure (HCC)    H/O bone density study    Mixed hyperlipidemia    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HCC)    Type 2 diabetes mellitus with microalbuminuria, without long-term current use of insulin (HCC)    Acquired hypothyroidism    Chronic atrial fibrillation    Stroke-like symptoms    Palliative care patient    UTI (urinary tract infection)      cefTRIAXone (ROCEPHIN) IV  1 g Intravenous Q24H    aspirin  81 mg Oral Daily    atorvastatin  80 mg Oral Nightly    carvedilol  12.5 mg Oral BID WC    digoxin  125 mcg Oral Daily    fluticasone  2 spray Each Nostril Daily    glipiZIDE  10 mg Oral QAM AC    glipiZIDE  5 mg Oral QPM    metFORMIN  1,000 mg Oral BID WC    therapeutic multivitamin-minerals  1 tablet Oral Daily    spironolactone  25 mg Oral Daily    valsartan  80 mg Oral BID    Vitamin D  1,000 Units Oral BID    levothyroxine  125 mcg Oral Daily    sodium chloride flush  10 mL Intravenous 2 times per day    warfarin (COUMADIN) daily dosing (placeholder)   Other RX Placeholder     albuterol  PRN nebs in place of home puffer      Supoportive and Prophylactic Txx:  DVT Prophylaxis: already on [facility] with Warfarin for her A Fib  GI (PUD) Ppx: not indicated   PT consult for evalutation and Txx as indicated: as per age and as per above  DIET CARB CONTROL;      Care time of >45 minutes  Pt seen/examined and Admitted to Inpatient with an expected LOS greater than two midnights due to medical therapy and or critical care needs, otherwise placed to OBServation status.     Signed:  Electronically signed by Kriss Toledo MD on 2/29/20 at 08:30

## 2020-02-29 NOTE — PROGRESS NOTES
Marli Henson arrived to room # 414. Presented with: UTI. Mental Status: Patient is oriented, alert, coherent, logical, thought processes intact and able to concentrate and follow conversation. Vitals:    02/28/20 2232   BP: 136/76   Pulse: 88   Resp: 18   Temp: 98.2 °F (36.8 °C)   SpO2: 93%     Patient safety contract and falls prevention contract reviewed with patient Yes. Oriented Patient to room. Call light within reach. Yes.   Needs, issues or concerns expressed at this time: no.      Electronically signed by Valeria Wisdom LPN on 3/24/9042 at 65:53 PM

## 2020-02-29 NOTE — PROGRESS NOTES
Clinical Pharmacy Note    Ned Alfaro is a 71 y.o. female for whom pharmacy has been asked to manage warfarin therapy. Reason for Admission: UTI    Consulting Physician: Dr. Rubens Salas  Warfarin dose prior to admission:  Coumadin 7.5 po daily  Warfarin indication: A. Fib  Target INR range: 2-3   Outpatient warfarin provider: Dr. Nishant Keller    Past Medical History:   Diagnosis Date    Acute laryngitis     Acute sinusitis     Allergic rhinitis     Ankle pain     Asthma     Asthmatic bronchitis     Atrial fibrillation ([address] 9/13/12, 10/9/13    cardioversion    Atrial flutter (HCC)     paroxysmal     CAD (coronary artery disease)     Cerebral artery occlusion with cerebral infarction ([address]     2006    CHF (congestive heart failure) (HCC)     Chronic back pain     Cough     Diabetes     Dizzy     Dysuria     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     Fatigue     Foot pain     Hx of amiodarone therapy 9/24/2014    Hyperlipidemia     PCP manages cholesterol    Hypertension     Menopause     Obesity     Palliative care patient 02/21/2020    Skin rash     Type II or unspecified type diabetes mellitus without mention of complication, not stated as uncontrolled     Umbilical hernia     URI (upper respiratory infection)                 Recent Labs     02/29/20  0410   INR 2.06*     Recent Labs     02/28/20  1915 02/29/20  0410   HGB 14.4 12.8   HCT 44.7 38.7    199       Current warfarin drug-drug interactions: Rocephin and Synthroid      Date INR Warfarin Dose   02/28/20 2.3 2.5 mg (slightly after midnight)   02/29/20 2.06 7.5 mg                              Give Coumadin 7.5 mg x 1 dose tonight    Daily PT/INR until stable within therapeutic range. Thank you for the consult.      Electronically signed by GUILLERMINA Bourne [facility] on 2/29/2020 at 12:35 PM

## 2020-02-29 NOTE — CONSULTS
(CHOLECALCIFEROL) 1000 UNITS CAPS capsule Take 1,000 Units by mouth 2 times daily    Yes Historical Provider, MD   aspirin 81 MG EC tablet Take 81 mg by mouth daily. Yes Historical Provider, MD   albuterol sulfate HFA (VENTOLIN HFA) 108 (90 Base) MCG/ACT inhaler Inhale 2 puffs into the lungs every 6 hours as needed for Wheezing 5/21/19   Adamaris Levine MD       Allergies:  Aspartame and phenylalanine; Mushroom extract complex; Penicillins;  Shellfish-derived products; and Zetia [ezetimibe]    Social History:   Social History     Socioeconomic History    Marital status:      Spouse name: Not on file    Number of children: Not on file    Years of education: Not on file    Highest education level: Not on file   Occupational History    Occupation: [employer]   Social Needs    Financial resource strain: Not on file    Food insecurity:     Worry: Not on file     Inability: Not on file    Transportation needs:     Medical: Not on file     Non-medical: Not on file   Tobacco Use    Smoking status: Never Smoker    Smokeless tobacco: Never Used   Substance and Sexual Activity    Alcohol use: No    Drug use: No    Sexual activity: Not on file   Lifestyle    Physical activity:     Days per week: Not on file     Minutes per session: Not on file    Stress: Not on file   Relationships    Social connections:     Talks on phone: Not on file     Gets together: Not on file     Attends [employer]ars[employer] service: Not on file     Active member of club or organization: Not on file     Attends meetings of clubs or organizations: Not on file     Relationship status: Not on file    Intimate partner violence:     Fear of current or ex partner: Not on file     Emotionally abused: Not on file     Physically abused: Not on file     Forced sexual activity: Not on file   Other Topics Concern    Not on file   Social History Narrative    Not on file       Family History:   Family History   Problem Relation Age of Onset    BUN 10 02/29/2020    CREATININE <0.5 02/29/2020    CALCIUM 9.0 02/29/2020    LABGLOM >60 02/29/2020    GLUCOSE 210 02/29/2020         Radiology: I have reviewed the radiology images listed below and agree with the findings of the interpreting radiologist(s). Xr Knee Left (1-2 Views)    Result Date: 2/28/2020  XR KNEE LEFT (1-2 VIEWS), XR KNEE RIGHT (1-2 VIEWS) 2/28/2020 5:15 PM HISTORY: Bilateral knee pain after a fall COMPARISON: None FINDINGS: Frontal and lateral views of the bilateral knees were obtained. Moderate to severe degenerative changes are seen in both knees. There is no acute fracture or dislocation. A moderate sized right knee effusion is present. No left knee effusion is identified. Atherosclerotic calcifications are present. 1. Moderate to severe degenerative changes in both knees without evidence of acute fracture or dislocation. 2. Moderate sized right knee effusion. Signed by Dr Jefferey Rubinstein on 2/28/2020 6:35 PM    Xr Knee Right (1-2 Views)    Result Date: 2/28/2020  XR KNEE LEFT (1-2 VIEWS), XR KNEE RIGHT (1-2 VIEWS) 2/28/2020 5:15 PM HISTORY: Bilateral knee pain after a fall COMPARISON: None FINDINGS: Frontal and lateral views of the bilateral knees were obtained. Moderate to severe degenerative changes are seen in both knees. There is no acute fracture or dislocation. A moderate sized right knee effusion is present. No left knee effusion is identified. Atherosclerotic calcifications are present. 1. Moderate to severe degenerative changes in both knees without evidence of acute fracture or dislocation. 2. Moderate sized right knee effusion. Signed by Dr Jefferey Rubinstein on 2/28/2020 6:35 PM    Xr Ankle Right (min 3 Views)    Result Date: 2/28/2020  XR ANKLE RIGHT (MIN 3 VIEWS) 2/28/2020 7:15 PM HISTORY: Right ankle pain COMPARISON: 1/9/2014. Findings: Frontal, lateral, and oblique radiographs of the right ankle were provided for review.  There is no evidence of acute fracture or dislocation. Degenerative changes are seen in the midfoot. The talar dome is free of osteochondral lesions. Vascular calcifications are noted. Plantar and posterior calcaneal spurs are seen. 1. No acute fracture or dislocation in the right ankle. 2. Degenerative changes in the midfoot and peripheral vascular calcifications. Signed by Dr Alfred Linares on 2/28/2020 8:28 PM    Xr Chest Portable    Result Date: 2/28/2020  XR CHEST PORTABLE 2/28/2020 6:45 PM HISTORY: Weakness COMPARISON: 2/20/2020. FINDINGS: Frontal view of the chest was obtained. The lungs are clear. The heart is moderately to markedly enlarged but stable. The osseous structures and surrounding soft tissues demonstrate no acute abnormality. 1. Moderate to marked cardiac enlargement. 2. No la pulmonary edema. Signed by Dr Alfred Linares on 2/28/2020 7:55 PM    Xr Hip 2-3 Vw W Pelvis Right    Result Date: 2/28/2020  XR HIP 2-3 VW W PELVIS RIGHT 2/28/2020 8:00 PM HISTORY: Right hip pain and leg weakness COMPARISON: 2/20/2020 FINDINGS: Frontal and lateral views of the right hip were obtained. Frontal view of the pelvis was also obtained. There is no fracture or dislocation. The joint spaces are maintained. The sacroiliac joints are patent. There is hypertrophy of the greater trochanters, bilaterally. This is a nonspecific finding. Vascular calcifications are noted. 1. No evidence of acute bony abnormality in the pelvis or right hip. Signed by Dr Alfred Linares on 2/28/2020 9:17 PM    --------------------------------------------------------------------------------------------------------------------    Assessment:   70 y/o F with significant medical history for CAD, atrial fibrillation on coumadin, CHF, type II DM, obesity who was admitted due to recent weakness/falls, possible UTI and bilateral knee pain/osteoarthritis    Plan:  1) Bilateral knee pain: Radiographs reveal bilateral degenerative changes, no acute fracture appreciated.  Recommend

## 2020-02-29 NOTE — ED PROVIDER NOTES
[facility] 4 ONCOLOGY UNIT  eMERGENCY dEPARTMENT eNCOUnter      Pt Name: Josse Elliott  MRN: [id number]  Birthdate 1955  Date of evaluation: 2/28/2020  Provider: Neisha Frederick, [address]       Chief Complaint   Patient presents with    Leg Pain     bilateral knee pain after fall today. pt reports her 'knee went out' and she fell         HISTORY OF PRESENT ILLNESS   ([facility]/Symptom, Timing/Onset,Context/Setting, Quality, Duration, Modifying Factors, Severity)  Note limiting factors. Josse Elliott is a 71 y.o. female who presents to the emergency department by ambulance with leg weakness. She was discharged from the hospital a week ago after being admitted with left leg weakness and trouble finding words. Work-up for stroke was negative. She does have a 31% ejection fraction cardiology is following this her medications were adjusted upon discharge. She is a poorly controlled diabetic. Patient states neither leg will hold her up. It is worse than when she was hospitalized last week. She denies any chest pain or shortness of breath. No fever. She has been using a walker. She tells me she did go to work today. She was able to sit most of the day. She denies any back pain. She fell twice today. The second time she landed on her knees. Pt complains of r leg weakness which she has at times after a stroke in 2005. Has seen her pcp since being in the hospital    The history is provided by the patient. Leg Pain   This is a new problem. The problem occurs constantly. The problem has been gradually worsening. Pertinent negatives include no chest pain, no abdominal pain and no shortness of breath. The symptoms are aggravated by walking and standing. NursingNotes were reviewed. REVIEW OF SYSTEMS    (2-9 systems for level 4, 10 or more for level 5)     Review of Systems   Constitutional: Negative for fever. HENT: Negative for congestion.     Respiratory: Negative for cough and shortness of breath. Cardiovascular: Negative for chest pain. Gastrointestinal: Negative for abdominal pain and vomiting. Genitourinary: Negative for difficulty urinating and dysuria. Musculoskeletal: Positive for arthralgias, gait problem and myalgias. Negative for back pain. Skin: Negative for wound. Neurological: Negative for dizziness. Except as noted above the remainder of the review of systems was reviewed and negative.        PAST MEDICAL HISTORY     Past Medical History:   Diagnosis Date    Acute laryngitis     Acute sinusitis     Allergic rhinitis     Ankle pain     Asthma     Asthmatic bronchitis     Atrial fibrillation ([facility] [address] 9/13/12, 10/9/13    cardioversion    Atrial flutter (HCC)     paroxysmal     CAD (coronary artery disease)     Cerebral artery occlusion with cerebral infarction 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[address]     Fatigue     Foot pain     Hx of amiodarone therapy 9/24/2014    Hyperlipidemia     PCP manages cholesterol    Hypertension     Menopause     Obesity     Palliative care patient 02/21/2020    Skin rash     Type II or unspecified type diabetes mellitus without mention of complication, not stated as uncontrolled     Umbilical hernia     URI (upper respiratory infection)          SURGICALHISTORY       Past Surgical History:   Procedure Laterality Date    CARDIAC CATHETERIZATION  10/21/09    EF 35% left ventricle is moderately enlarged     CARDIOVERSION  10/9/13    CHOLECYSTECTOMY      EYE SURGERY      retina and cataracts     GALLBLADDER SURGERY      TUBAL LIGATION           CURRENT MEDICATIONS       Discharge Medication List as of 3/2/2020 11:53 AM      CONTINUE these medications which have NOT CHANGED    Details   !! glipiZIDE (GLUCOTROL) 5 MG tablet Take 5 mg by mouth every eveningHistorical Med      valsartan (DIOVAN) 80 MG tablet Take 1 Social History     Socioeconomic History    Marital status:      Spouse name: None    Number of children: None    Years of education: None    Highest education level: None   Occupational History    Occupation: [employer]   Social Needs    Financial resource strain: None    Food insecurity:     Worry: None     Inability: None    Transportation needs:     Medical: None     Non-medical: None   Tobacco Use    Smoking status: Never Smoker    Smokeless tobacco: Never Used   Substance and Sexual Activity    Alcohol use: No    Drug use: No    Sexual activity: None   Lifestyle    Physical activity:     Days per week: None     Minutes per session: None    Stress: None   Relationships    Social connections:     Talks on phone: None     Gets together: None     Attends Roman [employer] service: None     Active member of club or organization: None     Attends meetings of clubs or organizations: None     Relationship status: None    Intimate partner violence:     Fear of current or ex partner: None     Emotionally abused: None     Physically abused: None     Forced sexual activity: None   Other Topics Concern    None   Social History Narrative    None       SCREENINGS    Kyle Coma Scale  Eye Opening: Spontaneous  Best Verbal Response: Oriented  Best Motor Response: Obeys commands  Kyle Coma Scale Score: 15 @FLOW([id number])@      PHYSICAL EXAM    (up to 7 for level 4, 8 or more for level 5)     ED Triage Vitals [02/28/20 1653]   BP Temp Temp Source Pulse Resp SpO2 Height Weight   (!) 166/99 98.7 °F (37.1 °C) Oral 98 20 95 % -- 230 lb (104.3 kg)       Physical Exam  Vitals signs and nursing note reviewed. Constitutional:       Appearance: She is well-developed. She is obese. HENT:      Head: Normocephalic and atraumatic. Eyes:      General: No scleral icterus. Right eye: No discharge. Left eye: No discharge. Neck:      Musculoskeletal: Normal range of motion and neck supple. CASE MANAGEMENT  IP CONSULT TO REHAB/TCU ADMISSION COORDINATOR  PALLIATIVE CARE EVAL    PROCEDURES:  Unless otherwise noted below, none     Procedures    FINAL IMPRESSION      1. Right leg pain    2. Urinary tract infection without hematuria, site unspecified    3. Fall, initial encounter    4. Ambulatory dysfunction    5. Knee effusion, right    6. Chronic atrial fibrillation        DISPOSITION/PLAN   DISPOSITION        PATIENT REFERRED TO:  MD Leticia Olmstead [address] [address]  [phone]          Orthopedic Surgery            DISCHARGE MEDICATIONS:  Discharge Medication List as of 3/2/2020 11:53 AM      START taking these medications    Details   montelukast (SINGULAIR) 10 MG tablet Take 1 tablet by mouth nightly, Disp-30 tablet, R-3Normal                (Please note that portions of this note were completed with a voice recognitionprogram.  Efforts were made to edit the dictations but occasionally words are mis-transcribed.)    ADELITA Calvin (electronically signed)          ADELITA Calvin  02/28/20 [address] [facility], ADELITA  03/06/20 6991

## 2020-03-01 LAB
ANION GAP SERPL CALCULATED.3IONS-SCNC: 14 MMOL/L (ref 7–19)
BASOPHILS ABSOLUTE: 0.1 K/UL (ref 0–0.2)
BASOPHILS RELATIVE PERCENT: 0.7 % (ref 0–1)
BUN BLDV-MCNC: 10 MG/DL (ref 8–23)
CALCIUM SERPL-MCNC: 8.6 MG/DL (ref 8.8–10.2)
CHLORIDE BLD-SCNC: 100 MMOL/L (ref 98–111)
CO2: 25 MMOL/L (ref 22–29)
CREAT SERPL-MCNC: 0.5 MG/DL (ref 0.5–0.9)
EOSINOPHILS ABSOLUTE: 0.2 K/UL (ref 0–0.6)
EOSINOPHILS RELATIVE PERCENT: 2.2 % (ref 0–5)
GFR NON-AFRICAN AMERICAN: >60
GLUCOSE BLD-MCNC: 179 MG/DL (ref 74–109)
GLUCOSE BLD-MCNC: 192 MG/DL (ref 70–99)
GLUCOSE BLD-MCNC: 205 MG/DL (ref 70–99)
GLUCOSE BLD-MCNC: 211 MG/DL (ref 70–99)
GLUCOSE BLD-MCNC: 257 MG/DL (ref 70–99)
HCT VFR BLD CALC: 38.9 % (ref 37–47)
HEMOGLOBIN: 12.7 G/DL (ref 12–16)
IMMATURE GRANULOCYTES #: 0 K/UL
INR BLD: 2.25 (ref 0.88–1.18)
LYMPHOCYTES ABSOLUTE: 2.6 K/UL (ref 1.1–4.5)
LYMPHOCYTES RELATIVE PERCENT: 27.6 % (ref 20–40)
MCH RBC QN AUTO: 29.6 PG (ref 27–31)
MCHC RBC AUTO-ENTMCNC: 32.6 G/DL (ref 33–37)
MCV RBC AUTO: 90.7 FL (ref 81–99)
MONOCYTES ABSOLUTE: 0.7 K/UL (ref 0–0.9)
MONOCYTES RELATIVE PERCENT: 7.4 % (ref 0–10)
NEUTROPHILS ABSOLUTE: 5.9 K/UL (ref 1.5–7.5)
NEUTROPHILS RELATIVE PERCENT: 61.7 % (ref 50–65)
ORGANISM: ABNORMAL
PDW BLD-RTO: 13.9 % (ref 11.5–14.5)
PERFORMED ON: ABNORMAL
PLATELET # BLD: 183 K/UL (ref 130–400)
PMV BLD AUTO: 10.4 FL (ref 9.4–12.3)
POTASSIUM REFLEX MAGNESIUM: 4 MMOL/L (ref 3.5–5)
PROTHROMBIN TIME: 25.3 SEC (ref 12–14.6)
RBC # BLD: 4.29 M/UL (ref 4.2–5.4)
SODIUM BLD-SCNC: 139 MMOL/L (ref 136–145)
URINE CULTURE, ROUTINE: ABNORMAL
URINE CULTURE, ROUTINE: ABNORMAL
WBC # BLD: 9.6 K/UL (ref 4.8–10.8)

## 2020-03-01 PROCEDURE — 82947 ASSAY GLUCOSE BLOOD QUANT: CPT

## 2020-03-01 PROCEDURE — 96376 TX/PRO/DX INJ SAME DRUG ADON: CPT

## 2020-03-01 PROCEDURE — 1210000000 HC MED SURG R&B

## 2020-03-01 PROCEDURE — 6370000000 HC RX 637 (ALT 250 FOR IP): Performed by: INTERNAL MEDICINE

## 2020-03-01 PROCEDURE — 36415 COLL VENOUS BLD VENIPUNCTURE: CPT

## 2020-03-01 PROCEDURE — 6360000002 HC RX W HCPCS: Performed by: HOSPITALIST

## 2020-03-01 PROCEDURE — G0378 HOSPITAL OBSERVATION PER HR: HCPCS

## 2020-03-01 PROCEDURE — 2580000003 HC RX 258: Performed by: HOSPITALIST

## 2020-03-01 PROCEDURE — 80048 BASIC METABOLIC PNL TOTAL CA: CPT

## 2020-03-01 PROCEDURE — 6370000000 HC RX 637 (ALT 250 FOR IP): Performed by: HOSPITALIST

## 2020-03-01 PROCEDURE — 85610 PROTHROMBIN TIME: CPT

## 2020-03-01 PROCEDURE — 85025 COMPLETE CBC W/AUTO DIFF WBC: CPT

## 2020-03-01 RX ADMIN — INSULIN LISPRO 2 UNITS: 100 INJECTION, SOLUTION INTRAVENOUS; SUBCUTANEOUS at 09:10

## 2020-03-01 RX ADMIN — CARVEDILOL 12.5 MG: 12.5 TABLET, FILM COATED ORAL at 09:10

## 2020-03-01 RX ADMIN — VITAMIN D, TAB 1000IU (100/BT) 1000 UNITS: 25 TAB at 09:09

## 2020-03-01 RX ADMIN — INSULIN GLARGINE 10 UNITS: 100 INJECTION, SOLUTION SUBCUTANEOUS at 22:11

## 2020-03-01 RX ADMIN — WARFARIN SODIUM 7.5 MG: 2.5 TABLET ORAL at 17:41

## 2020-03-01 RX ADMIN — SPIRONOLACTONE 25 MG: 25 TABLET ORAL at 09:09

## 2020-03-01 RX ADMIN — INSULIN LISPRO 3 UNITS: 100 INJECTION, SOLUTION INTRAVENOUS; SUBCUTANEOUS at 22:09

## 2020-03-01 RX ADMIN — ASPIRIN 81 MG: 81 TABLET, COATED ORAL at 09:09

## 2020-03-01 RX ADMIN — ATORVASTATIN CALCIUM 80 MG: 80 TABLET, FILM COATED ORAL at 22:08

## 2020-03-01 RX ADMIN — ACETAMINOPHEN 650 MG: 325 TABLET ORAL at 09:16

## 2020-03-01 RX ADMIN — SODIUM CHLORIDE, PRESERVATIVE FREE 1 G: 5 INJECTION INTRAVENOUS at 21:48

## 2020-03-01 RX ADMIN — VALSARTAN 80 MG: 80 TABLET, FILM COATED ORAL at 21:08

## 2020-03-01 RX ADMIN — INSULIN LISPRO 4 UNITS: 100 INJECTION, SOLUTION INTRAVENOUS; SUBCUTANEOUS at 17:41

## 2020-03-01 RX ADMIN — MULTIPLE VITAMINS W/ MINERALS TAB 1 TABLET: TAB at 09:09

## 2020-03-01 RX ADMIN — VALSARTAN 80 MG: 80 TABLET, FILM COATED ORAL at 09:09

## 2020-03-01 RX ADMIN — SODIUM CHLORIDE, PRESERVATIVE FREE 10 ML: 5 INJECTION INTRAVENOUS at 09:15

## 2020-03-01 RX ADMIN — DIGOXIN 125 MCG: 125 TABLET ORAL at 09:10

## 2020-03-01 RX ADMIN — ACETAMINOPHEN 650 MG: 325 TABLET ORAL at 15:10

## 2020-03-01 RX ADMIN — INSULIN LISPRO 4 UNITS: 100 INJECTION, SOLUTION INTRAVENOUS; SUBCUTANEOUS at 12:40

## 2020-03-01 RX ADMIN — LEVOTHYROXINE SODIUM 125 MCG: 125 TABLET ORAL at 09:09

## 2020-03-01 RX ADMIN — VITAMIN D, TAB 1000IU (100/BT) 1000 UNITS: 25 TAB at 21:08

## 2020-03-01 RX ADMIN — CARVEDILOL 12.5 MG: 12.5 TABLET, FILM COATED ORAL at 17:41

## 2020-03-01 RX ADMIN — FLUTICASONE PROPIONATE 2 SPRAY: 50 SPRAY, METERED NASAL at 09:09

## 2020-03-01 ASSESSMENT — PAIN DESCRIPTION - LOCATION: LOCATION: KNEE

## 2020-03-01 ASSESSMENT — PAIN SCALES - GENERAL
PAINLEVEL_OUTOF10: 2
PAINLEVEL_OUTOF10: 6
PAINLEVEL_OUTOF10: 5
PAINLEVEL_OUTOF10: 2
PAINLEVEL_OUTOF10: 2

## 2020-03-01 ASSESSMENT — PAIN DESCRIPTION - ORIENTATION: ORIENTATION: RIGHT

## 2020-03-01 ASSESSMENT — PAIN DESCRIPTION - PAIN TYPE: TYPE: ACUTE PAIN

## 2020-03-01 NOTE — PROGRESS NOTES
[facility]        Hospitalist Progress Note  3/1/2020 8:03 AM  Subjective:   Admit Date: 2/28/2020  PCP: Sree Pratt MD    Chief Complaint: Fall at home. Subjective: Patient seen and examined at bedside. No acute distress. Cumulative Hospital History: 79-year-old morbidly obese female presenting for difficulty ambulating and fall at home complaining of knee pain right greater than left with moderate joint effusions noted on bilateral knee x-rays. Patient also has pain with passive and active range of motion of the right knee. Patient was incidentally found to have an asymptomatic UTI and was admitted for such. ROS: 14 point review of systems is negative except as specifically addressed above. DIET CARB CONTROL;     Intake/Output Summary (Last 24 hours) at 3/1/2020 0803  Last data filed at 3/1/2020 0112  Gross per 24 hour   Intake 1080 ml   Output 1950 ml   Net -870 ml     Medications:   dextrose       Current Facility-Administered Medications   Medication Dose Route Frequency Provider Last Rate Last Dose    insulin lispro (HUMALOG) injection vial 0-12 Units  0-12 Units Subcutaneous TID  Cassy Higgins MD   4 Units at 02/29/20 1300    insulin lispro (HUMALOG) injection vial 0-6 Units  0-6 Units Subcutaneous Nightly Cassy Higgins MD        insulin glargine (LANTUS) injection vial 10 Units  10 Units Subcutaneous Nightly Cassy Higgins MD   10 Units at 02/29/20 2043    cefTRIAXone (ROCEPHIN) 1 g in sodium chloride (PF) 10 mL IV syringe  1 g Intravenous Q24H Sixto Funez MD 0 mL/hr at 02/28/20 2130 1 g at 02/29/20 2042    aspirin EC tablet 81 mg  81 mg Oral Daily Sixto Funez MD   81 mg at 02/29/20 0856    atorvastatin (LIPITOR) tablet 80 mg  80 mg Oral Nightly Sixto Funez MD   80 mg at 02/29/20 2050    carvedilol (COREG) tablet 12.5 mg  12.5 mg Oral BID  Sixto Funez MD   12.5 mg at 02/29/20 1649    digoxin (LANOXIN) tablet 125 mcg  125 mcg Oral Daily Sixto Funez MD Problems:    * No resolved hospital problems. *    UTI: Rocephin. Follow-up cultures. Afebrile without leukocytosis. Asymptomatic. Fall/Difficulty ambulating/Knee pain/Right ankle sprain: Orthopedic surgery evaluated during admission. WBAT. Possible outpatient corticosteroid injection as outpatient. PT/OT. PRN pain management. Fall precautions. Patient refuses rehab but also says she's not going home until she's able to walk - CM consulted. Chronic persistent atrial fibrillation: Pharmacy dosed warfarin. Digoxin. DM: not on insulin at home. ISS. Monitor BG. HTN: Monitor BP and adjust medications PRN. Hypothyroidism: Synthroid.     Advance Directive: Full Code    DVT prophylaxis: Coumadin    Discharge planning: TBD - Rehab vs home with home health, awaiting CM consult/inpatient rehab referral      Signed:  Jose Hartman MD 3/1/2020 8:03 AM  Rounding Hospitalist

## 2020-03-01 NOTE — PROGRESS NOTES
Clinical Pharmacy Note    Warfarin consult follow-up    Recent Labs     03/01/20  0454   INR 2.25*     Recent Labs     02/28/20  1915 02/29/20  0410 03/01/20  0454   HGB 14.4 12.8 12.7   HCT 44.7 38.7 38.9    199 183     Current warfarin drug-drug interactions: Rocephin and Synthroid        Date INR Warfarin Dose   02/28/20 2.3 2.5 mg (slightly after midnight)   02/29/20 2.06 7.5 mg   03/01/20  2.25   7.5 mg                                        Notes:                   Give warfarin 7.5 mg x 1 dose. Daily PT/INR until stable within therapeutic range.      Electronically signed by GUILLERMINA [facility] on 3/1/2020 at 10:31 AM

## 2020-03-01 NOTE — PLAN OF CARE
Problem: Falls - Risk of:  Goal: Will remain free from falls  Description  Will remain free from falls  Outcome: Ongoing  Goal: Absence of physical injury  Description  Absence of physical injury  Outcome: Ongoing     Problem: Sensory:  Goal: General experience of comfort will improve  Description  General experience of comfort will improve  Outcome: Ongoing     Problem: Urinary Elimination:  Goal: Signs and symptoms of infection will decrease  Description  Signs and symptoms of infection will decrease  Outcome: Ongoing  Goal: Ability to reestablish a normal urinary elimination pattern will improve - after catheter removal  Description  Ability to reestablish a normal urinary elimination pattern will improve  Outcome: Ongoing  Goal: Complications related to the disease process, condition or treatment will be avoided or minimized  Description  Complications related to the disease process, condition or treatment will be avoided or minimized  Outcome: Ongoing     Problem:  Activity:  Goal: Capacity to carry out activities will improve  Description  Capacity to carry out activities will improve  Outcome: Ongoing  Goal: Fatigue will decrease  Description  Fatigue will decrease  Outcome: Ongoing  Goal: Energy level will increase  Description  Energy level will increase  Outcome: Ongoing  Goal: Ability to implement measures to reduce episodes of fatigue will improve  Description  Ability to implement measures to reduce episodes of fatigue will improve  Outcome: Ongoing     Problem: Coping:  Goal: Ability to identify and develop effective coping behavior will improve  Description  Ability to identify and develop effective coping behavior will improve  Outcome: Ongoing  Goal: Ability to identify and utilize available resources and services will improve  Description  Ability to identify and utilize available resources and services will improve  Outcome: Ongoing     Problem: Pain:  Goal: Pain level will decrease  Description  Pain level will decrease  Outcome: Ongoing  Goal: Control of acute pain  Description  Control of acute pain  Outcome: Ongoing  Goal: Control of chronic pain  Description  Control of chronic pain  Outcome: Ongoing     Problem: Risk for Impaired Skin Integrity  Goal: Tissue integrity - skin and mucous membranes  Description  Structural intactness and normal physiological function of skin and  mucous membranes.   Outcome: Ongoing

## 2020-03-02 ENCOUNTER — APPOINTMENT (OUTPATIENT)
Dept: GENERAL RADIOLOGY | Age: 65
DRG: 690 | End: 2020-03-02
Payer: COMMERCIAL

## 2020-03-02 VITALS
HEIGHT: 65 IN | OXYGEN SATURATION: 96 % | TEMPERATURE: 97.7 F | HEART RATE: 96 BPM | RESPIRATION RATE: 20 BRPM | BODY MASS INDEX: 38.82 KG/M2 | DIASTOLIC BLOOD PRESSURE: 71 MMHG | WEIGHT: 233 LBS | SYSTOLIC BLOOD PRESSURE: 144 MMHG

## 2020-03-02 PROBLEM — R53.1 GENERALIZED WEAKNESS: Status: ACTIVE | Noted: 2020-03-02

## 2020-03-02 LAB
ANION GAP SERPL CALCULATED.3IONS-SCNC: 14 MMOL/L (ref 7–19)
BASOPHILS ABSOLUTE: 0.1 K/UL (ref 0–0.2)
BASOPHILS RELATIVE PERCENT: 0.8 % (ref 0–1)
BUN BLDV-MCNC: 10 MG/DL (ref 8–23)
CALCIUM SERPL-MCNC: 8.8 MG/DL (ref 8.8–10.2)
CHLORIDE BLD-SCNC: 100 MMOL/L (ref 98–111)
CO2: 27 MMOL/L (ref 22–29)
CREAT SERPL-MCNC: 0.5 MG/DL (ref 0.5–0.9)
EOSINOPHILS ABSOLUTE: 0.2 K/UL (ref 0–0.6)
EOSINOPHILS RELATIVE PERCENT: 2.5 % (ref 0–5)
GFR NON-AFRICAN AMERICAN: >60
GLUCOSE BLD-MCNC: 171 MG/DL (ref 74–109)
GLUCOSE BLD-MCNC: 210 MG/DL (ref 70–99)
GLUCOSE BLD-MCNC: 239 MG/DL (ref 70–99)
HCT VFR BLD CALC: 39.6 % (ref 37–47)
HEMOGLOBIN: 12.9 G/DL (ref 12–16)
IMMATURE GRANULOCYTES #: 0 K/UL
INR BLD: 2.32 (ref 0.88–1.18)
LYMPHOCYTES ABSOLUTE: 2.3 K/UL (ref 1.1–4.5)
LYMPHOCYTES RELATIVE PERCENT: 24.6 % (ref 20–40)
MCH RBC QN AUTO: 29.7 PG (ref 27–31)
MCHC RBC AUTO-ENTMCNC: 32.6 G/DL (ref 33–37)
MCV RBC AUTO: 91.2 FL (ref 81–99)
MONOCYTES ABSOLUTE: 0.6 K/UL (ref 0–0.9)
MONOCYTES RELATIVE PERCENT: 6.9 % (ref 0–10)
NEUTROPHILS ABSOLUTE: 6 K/UL (ref 1.5–7.5)
NEUTROPHILS RELATIVE PERCENT: 64.9 % (ref 50–65)
PDW BLD-RTO: 13.9 % (ref 11.5–14.5)
PERFORMED ON: ABNORMAL
PERFORMED ON: ABNORMAL
PLATELET # BLD: 195 K/UL (ref 130–400)
PMV BLD AUTO: 10.5 FL (ref 9.4–12.3)
POTASSIUM REFLEX MAGNESIUM: 4 MMOL/L (ref 3.5–5)
PROTHROMBIN TIME: 26 SEC (ref 12–14.6)
RBC # BLD: 4.34 M/UL (ref 4.2–5.4)
SODIUM BLD-SCNC: 141 MMOL/L (ref 136–145)
WBC # BLD: 9.2 K/UL (ref 4.8–10.8)

## 2020-03-02 PROCEDURE — 71045 X-RAY EXAM CHEST 1 VIEW: CPT

## 2020-03-02 PROCEDURE — 94640 AIRWAY INHALATION TREATMENT: CPT

## 2020-03-02 PROCEDURE — 6370000000 HC RX 637 (ALT 250 FOR IP): Performed by: HOSPITALIST

## 2020-03-02 PROCEDURE — G0378 HOSPITAL OBSERVATION PER HR: HCPCS

## 2020-03-02 PROCEDURE — 82947 ASSAY GLUCOSE BLOOD QUANT: CPT

## 2020-03-02 PROCEDURE — 6370000000 HC RX 637 (ALT 250 FOR IP): Performed by: INTERNAL MEDICINE

## 2020-03-02 PROCEDURE — 85025 COMPLETE CBC W/AUTO DIFF WBC: CPT

## 2020-03-02 PROCEDURE — 85610 PROTHROMBIN TIME: CPT

## 2020-03-02 PROCEDURE — 2700000000 HC OXYGEN THERAPY PER DAY

## 2020-03-02 PROCEDURE — 36415 COLL VENOUS BLD VENIPUNCTURE: CPT

## 2020-03-02 PROCEDURE — 80048 BASIC METABOLIC PNL TOTAL CA: CPT

## 2020-03-02 RX ORDER — MONTELUKAST SODIUM 10 MG/1
10 TABLET ORAL NIGHTLY
Qty: 30 TABLET | Refills: 3 | Status: SHIPPED | OUTPATIENT
Start: 2020-03-02 | End: 2020-04-02 | Stop reason: CLARIF

## 2020-03-02 RX ORDER — GLIPIZIDE 5 MG/1
TABLET ORAL
Qty: 90 TABLET | Refills: 1 | Status: SHIPPED | OUTPATIENT
Start: 2020-03-02 | End: 2020-04-22

## 2020-03-02 RX ORDER — ALPRAZOLAM 0.5 MG/1
0.5 TABLET ORAL 3 TIMES DAILY PRN
Status: DISCONTINUED | OUTPATIENT
Start: 2020-03-02 | End: 2020-03-02 | Stop reason: HOSPADM

## 2020-03-02 RX ORDER — MONTELUKAST SODIUM 10 MG/1
10 TABLET ORAL NIGHTLY
Status: DISCONTINUED | OUTPATIENT
Start: 2020-03-02 | End: 2020-03-02 | Stop reason: HOSPADM

## 2020-03-02 RX ORDER — IPRATROPIUM BROMIDE AND ALBUTEROL SULFATE 2.5; .5 MG/3ML; MG/3ML
1 SOLUTION RESPIRATORY (INHALATION)
Status: DISCONTINUED | OUTPATIENT
Start: 2020-03-02 | End: 2020-03-02 | Stop reason: HOSPADM

## 2020-03-02 RX ADMIN — DIGOXIN 125 MCG: 125 TABLET ORAL at 08:12

## 2020-03-02 RX ADMIN — MULTIPLE VITAMINS W/ MINERALS TAB 1 TABLET: TAB at 08:11

## 2020-03-02 RX ADMIN — VITAMIN D, TAB 1000IU (100/BT) 1000 UNITS: 25 TAB at 08:11

## 2020-03-02 RX ADMIN — IPRATROPIUM BROMIDE AND ALBUTEROL SULFATE 1 AMPULE: .5; 3 SOLUTION RESPIRATORY (INHALATION) at 10:13

## 2020-03-02 RX ADMIN — FLUTICASONE PROPIONATE 2 SPRAY: 50 SPRAY, METERED NASAL at 08:10

## 2020-03-02 RX ADMIN — CARVEDILOL 12.5 MG: 12.5 TABLET, FILM COATED ORAL at 08:12

## 2020-03-02 RX ADMIN — VALSARTAN 80 MG: 80 TABLET, FILM COATED ORAL at 08:11

## 2020-03-02 RX ADMIN — ACETAMINOPHEN 650 MG: 325 TABLET ORAL at 08:12

## 2020-03-02 RX ADMIN — IPRATROPIUM BROMIDE AND ALBUTEROL SULFATE 1 AMPULE: .5; 3 SOLUTION RESPIRATORY (INHALATION) at 15:00

## 2020-03-02 RX ADMIN — LEVOTHYROXINE SODIUM 125 MCG: 125 TABLET ORAL at 08:11

## 2020-03-02 RX ADMIN — INSULIN LISPRO 4 UNITS: 100 INJECTION, SOLUTION INTRAVENOUS; SUBCUTANEOUS at 12:24

## 2020-03-02 RX ADMIN — ASPIRIN 81 MG: 81 TABLET, COATED ORAL at 08:11

## 2020-03-02 RX ADMIN — SPIRONOLACTONE 25 MG: 25 TABLET ORAL at 08:11

## 2020-03-02 RX ADMIN — INSULIN LISPRO 4 UNITS: 100 INJECTION, SOLUTION INTRAVENOUS; SUBCUTANEOUS at 08:15

## 2020-03-02 ASSESSMENT — PAIN SCALES - GENERAL
PAINLEVEL_OUTOF10: 8
PAINLEVEL_OUTOF10: 3
PAINLEVEL_OUTOF10: 3

## 2020-03-02 ASSESSMENT — PAIN DESCRIPTION - DESCRIPTORS: DESCRIPTORS: DULL

## 2020-03-02 ASSESSMENT — PAIN DESCRIPTION - LOCATION: LOCATION: KNEE

## 2020-03-02 ASSESSMENT — PAIN DESCRIPTION - ORIENTATION: ORIENTATION: RIGHT;LEFT

## 2020-03-02 ASSESSMENT — PAIN DESCRIPTION - PAIN TYPE: TYPE: ACUTE PAIN

## 2020-03-02 NOTE — H&P
Evelin Warner  :  1955  MRN:  [id number]    Admit date:  2020  Discharge date:  2020    Discharging Physician:  Chris Peters MD    Advance Directive: Full Code    Consults: Dr. Padilla Charles surgery    Primary Care Physician:  Sol Valencia MD    Discharge Diagnoses:  Principal Problem:    UTI (urinary tract infection)  Active Problems:    Generalized weakness    Essential hypertension    Morbid obesity due to excess calories ([facility])    Systolic congestive heart failure ([address]    Mixed hyperlipidemia    Type 2 diabetes mellitus with microalbuminuria, without long-term current use of insulin ([facility])    Acquired hypothyroidism    Chronic atrial fibrillation  Resolved Problems:    * No resolved hospital problems. *    Portions of this note have been copied forward, however, changed to reflect the most current clinical status of this patient. Hospital Course:    Ms. Ezio Issa is a 71year old female who presented to [address] [place] [address] ED via ambulance with left leg weakness. She has recently undergone stroke work up that was negative due to left leg weakness and word finding difficulty. She stated she felt profoundly weak and has been unable to bear weight on her lower extremities. She's had multiple recent falls. Right knee with effusion. She was also found to have a urinary tract infection. She was admitted to Hospitalist service and placed on Rocephin for UTI. Orthopedic surgery was consulted for bilateral knee pain and effusion and recommended weight bearing as tolerated, NSAIDs and follow up in office as an outpatient. At this time Ms. Ezio Issa is stable for discharge and will go in stable condition to [facility]. She has completed antibiotic course at this time. Significant Diagnostic Studies:   Xr Knee Left (1-2 Views)  1. Moderate to severe degenerative changes in both knees without evidence of acute fracture or dislocation. 2. Moderate sized right knee effusion.   Signed by Dr Jeff Banks on 2020 6:35 PM    Xr Knee Right (1-2 Views)  1. Moderate to severe degenerative changes in both knees without evidence of acute fracture or dislocation. 2. Moderate sized right knee effusion. Signed by Dr Molly Toledo on 2/28/2020 6:35 PM    Xr Ankle Right (min 3 Views)  1. No acute fracture or dislocation in the right ankle. 2. Degenerative changes in the midfoot and peripheral vascular calcifications. Signed by Dr Molly Toledo on 2/28/2020 8:28 PM    Xr Chest Portable  1. Moderate to marked cardiac enlargement. 2. No la pulmonary edema. Signed by Dr Molly Toledo on 2/28/2020 7:55 PM    Xr Hip 2-3 Vw W Pelvis Right  1. No evidence of acute bony abnormality in the pelvis or right hip. Signed by Dr Molly Toledo on 2/28/2020 9:17 PM    Pertinent Labs:   CBC:   Recent Labs     02/29/20 0410 03/01/20 0454 03/02/20 0429   WBC 10.0 9.6 9.2   HGB 12.8 12.7 12.9    183 195     BMP:    Recent Labs     02/29/20 0410 03/01/20 0454 03/02/20  0429    139 141   K 3.9 4.0 4.0   CL 99 100 100   CO2 24 25 27   BUN 10 10 10   CREATININE <0.5 0.5 0.5   GLUCOSE 210* 179* 171*     INR:   Recent Labs     02/29/20 0410 03/01/20 0454 03/02/20  0427   INR 2.06* 2.25* 2.32*     Physical Exam:  Vital Signs: BP (!) 144/71   Pulse 96   Temp 97.7 °F (36.5 °C) (Temporal)   Resp 15   Ht 5' 5\" (1.651 m)   Wt 233 lb (105.7 kg)   LMP  (LMP Unknown)   SpO2 97%   Breastfeeding No   BMI 38.77 kg/m²   General appearance:. Alert and Cooperative, sitting up comfortably in bed  HEENT: Normocephalic. Chest: diminished at bases otherwise clear to auscultation bilaterally without wheezes or rhonchi. Cardiac: Irregularly irregular, S1, S2 normal. No murmurs, gallops, or rubs auscultated. Abdomen:soft, obese, non-tender; non-distended normal bowel sounds no masses, no organomegaly. Extremities: No clubbing or cyanosis. Trace peripheral edema. Peripheral pulses palpable.   Neurologic: Generalized weakness, no other focal deficits     Discharge Medications:       Rafita Jolley   Home Medication Instructions AUW:[id number]    Printed on:03/02/20 1202   Medication Information                      albuterol sulfate HFA (VENTOLIN HFA) 108 (90 Base) MCG/ACT inhaler  Inhale 2 puffs into the lungs every 6 hours as needed for Wheezing             aspirin 81 MG EC tablet  Take 81 mg by mouth daily. atorvastatin (LIPITOR) 80 MG tablet  TAKE 1 TABLET BY MOUTH ONCE DAILY. carvedilol (COREG) 12.5 MG tablet  Take 1 tablet by mouth 2 times daily (with meals)             digoxin (LANOXIN) 125 MCG tablet  Take 1 tablet by mouth daily             fluticasone (FLONASE) 50 MCG/ACT nasal spray  2 sprays by Each Nostril route daily             glipiZIDE (GLUCOTROL) 5 MG tablet  Take 5 mg by mouth every evening             glipiZIDE (GLUCOTROL) 5 MG tablet  TAKE 2 TABLETS BY MOUTH IN THE MORNING AND 1 TABLET IN THE EVENING             levothyroxine (SYNTHROID) 125 MCG tablet  TAKE 1 TABLET BY MOUTH ONCE DAILY             metFORMIN (GLUCOPHAGE) 500 MG tablet  TAKE 2 TABLETS BY MOUTH TWICE DAILY WITH MEALS             montelukast (SINGULAIR) 10 MG tablet  Take 1 tablet by mouth nightly             Multiple Vitamins-Minerals (THERAPEUTIC MULTIVITAMIN-MINERALS) tablet  Take 1 tablet by mouth daily. spironolactone (ALDACTONE) 25 MG tablet  Take 1 tablet by mouth daily             valsartan (DIOVAN) 80 MG tablet  Take 1 tablet by mouth 2 times daily             Vitamin D (CHOLECALCIFEROL) 1000 UNITS CAPS capsule  Take 1,000 Units by mouth 2 times daily              warfarin (COUMADIN) 7.5 MG tablet  Take 7.5 mg daily by mouth             Medication reconciliation completed per attending Physician at time of discharge    Discharge Instructions: Follow up with Gabriela Cheung MD in 3-5 days. Take medications as directed. Resume activity as tolerated.     Diet: DIET CARB CONTROL;     Disposition: Patient is medically stable and will be discharged

## 2020-03-02 NOTE — PLAN OF CARE
Problem: Falls - Risk of:  Goal: Will remain free from falls  Description  Will remain free from falls  3/2/2020 0130 by Ingrid Ramirez RN  Outcome: Ongoing  3/1/2020 1742 by Estefania Arambula LPN  Outcome: Ongoing  Goal: Absence of physical injury  Description  Absence of physical injury  3/2/2020 0130 by Ingrid Ramirez RN  Outcome: Ongoing  3/1/2020 1742 by Estefania Arambula LPN  Outcome: Ongoing     Problem: Sensory:  Goal: General experience of comfort will improve  Description  General experience of comfort will improve  3/2/2020 0130 by Ingrid Ramirez RN  Outcome: Ongoing  3/1/2020 1742 by Estefania Arambula LPN  Outcome: Ongoing     Problem: Urinary Elimination:  Goal: Signs and symptoms of infection will decrease  Description  Signs and symptoms of infection will decrease  3/2/2020 0130 by Ingrid Ramirez RN  Outcome: Ongoing  3/1/2020 1742 by Estefania Arambula LPN  Outcome: Ongoing  Goal: Ability to reestablish a normal urinary elimination pattern will improve - after catheter removal  Description  Ability to reestablish a normal urinary elimination pattern will improve  3/2/2020 0130 by Ingrid Ramirez RN  Outcome: Ongoing  3/1/2020 1742 by Estefania Arambula LPN  Outcome: Ongoing  Goal: Complications related to the disease process, condition or treatment will be avoided or minimized  Description  Complications related to the disease process, condition or treatment will be avoided or minimized  3/2/2020 0130 by Ingrid Ramirez RN  Outcome: Ongoing  3/1/2020 1742 by Estefania Arambula LPN  Outcome: Ongoing     Problem:  Activity:  Goal: Capacity to carry out activities will improve  Description  Capacity to carry out activities will improve  3/2/2020 0130 by Ingrid Ramirez RN  Outcome: Ongoing  3/1/2020 1742 by Estefania Arambula LPN  Outcome: Ongoing  Goal: Fatigue will decrease  Description  Fatigue will decrease  3/2/2020 0130 by Ingrid Ramirez RN  Outcome: Ongoing  3/1/2020 1742 by Estefania Arambula LPN  Outcome:

## 2020-03-02 NOTE — CARE COORDINATION
SW spoke w/ pt and pt's son in regards to question that pt's BCBS insurance is still her primary, though she states that insurance is termed and she only has Medicare part A and B now that she's retired. Pt states she turned in paperwork previously and this should be current as of March 1, 2020. Pt gave SW permission to contact her employer at [employer] on [address] in [place] in regards to this matter. Pt stated either Lulú Oro or Sarath Herrera in the business office could possibly assist. SW reached out to [employer] and spoke w/ Sarath Herrera; [employer] not there today. SW explained the above and Sarath Herrera stated she is not in charge of insurance but provided SW the phone number to their [employer] as they are a union. Stated Cinda Ordonez is the associate there that assists w/ needs for their [employer] location. NICOLASA reached out to [employer] [phone] and asked if Alex Mohamud was available; currently assisting another client. Lázaro available to assist SW, so the above explained. Remberto Morgan informed that the pt's paperwork had not made it to the [employer] yet, so they are \"showing that she has [facility], but she may very well NOT have BCBS coverage at this time. \" Remberto Mora stated if the paperwork was filed by the pt for a certain date, it will likely go back to that date once processed. NICOLASA informed Geovanny Maurice at [facility] of the above. She will contact NICOLASA back.   [facility] P: [phone] F: [phone]    Electronically signed by Komal Eller on 3/2/2020 at 2:12 PM

## 2020-03-02 NOTE — DISCHARGE INSTR - COC
Continuity of Care Form    Patient Name: Marylee Mons   :  [birth year]  MRN:  [id number]    Admit date:  2020  Discharge date:  3-2-20    Code Status Order: Full Code   Advance Directives:   [facility] Documentation     Date/Time Healthcare Directive Type of Healthcare Directive Copy in [address] Agent's Name Healthcare Agent's Phone Number    20 2224  No, patient does not have an advance directive for healthcare treatment -- -- -- -- --          Admitting Physician:  Osmar Archer MD  PCP: Adamaris Levine MD    Discharging Nurse: [address] Unit/Room#: 1646/028-27  Discharging Unit Phone Number: [phone]    Emergency Contact:   Extended Emergency Contact Information  Primary Emergency Contact: Alphonse Light  Address: [place] [address] [address]  Phone: [phone]  Mobile Phone: [phone]  Relation: Child  Secondary Emergency Contact: Tonio Hernandez [place] [address] [address]  Phone: [phone]  Relation: Child    Past Surgical History:  Past Surgical History:   Procedure Laterality Date    CARDIAC CATHETERIZATION  10/21/09    EF 35% left ventricle is moderately enlarged     CARDIOVERSION  10/9/13    CHOLECYSTECTOMY      EYE SURGERY      retina and cataracts     GALLBLADDER SURGERY      TUBAL LIGATION         Immunization History: There is no immunization history on file for this patient.     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    Type 2 diabetes mellitus with microalbuminuria, without long-term current use of insulin ([facility] E11.29, R80.9    Acquired hypothyroidism E03.9    Chronic atrial fibrillation I48.20    Stroke-like symptoms R29.90    Palliative care patient Z51.5    UTI (urinary tract infection) N39.0       Isolation/Infection:   Isolation          No Isolation        Patient Infection Status     None to display          Nurse Assessment:  Last Vital Signs: BP (!) 144/71   Pulse 96   Temp 97.7 °F (36.5 °C) (Temporal)   Resp 15   Ht 5' 5\" (1.651 m)   Wt 233 lb (105.7 kg)   LMP  (LMP Unknown)   SpO2 97%   Breastfeeding No   BMI 38.77 kg/m²     Last documented pain score (0-10 scale): Pain Level: 3  Last Weight:   Wt Readings from Last 1 Encounters:   02/28/20 233 lb (105.7 kg)     Mental Status:  oriented and alert    IV Access:  - None    Nursing Mobility/ADLs:  Walking   Assisted  Transfer  Assisted  Bathing  Assisted  Dressing  Assisted  Toileting  Assisted  Feeding  Assisted  Med Admin  Assisted  Med Delivery   whole    Wound Care Documentation and Therapy:        Elimination:  Continence:   · Bowel: Yes  · Bladder: Yes  Urinary Catheter: None   Colostomy/Ileostomy/Ileal Conduit: No       Date of Last BM: 3-1-20    Intake/Output Summary (Last 24 hours) at 3/2/2020 1146  Last data filed at 3/2/2020 1010  Gross per 24 hour   Intake 440 ml   Output 3525 ml   Net -3085 ml     I/O last 3 completed shifts: In: 440 [P.O.:440]  Out: 3550 [Urine:3550]    Safety Concerns:     History of Falls (last 30 days) and At Risk for Falls    Impairments/Disabilities:      None    Nutrition Therapy:  Current Nutrition Therapy:   - Oral Diet:  Carb Control 3 carbs/meal (1500kcals/day)    Routes of Feeding: Oral  Liquids: No Restrictions  Daily Fluid Restriction: no  Last Modified Barium Swallow with Video (Video Swallowing Test): not done    Treatments at the Time of Hospital Discharge:   Respiratory Treatments: nebulizer treatments   Oxygen Therapy:  is not on home oxygen therapy.   Ventilator:    - No ventilator

## 2020-03-02 NOTE — DISCHARGE SUMMARY
Conception Stewart  :  1955  MRN:  [id number]      This note copied forward from incorrectly labeled note written this same day. Admit date:  2020  Discharge date:  2020    Discharging Physician:  Dino Garcia MD    Advance Directive: Full Code    Consults: Dr. Eleuterio Guallpa surgery    Primary Care Physician:  Patrick Redd MD    Discharge Diagnoses:  Principal Problem:    UTI (urinary tract infection)  Active Problems:    Generalized weakness    Essential hypertension    Morbid obesity due to excess calories ([facility])    Systolic congestive heart failure ([address]    Mixed hyperlipidemia    Type 2 diabetes mellitus with microalbuminuria, without long-term current use of insulin ([facility])    Acquired hypothyroidism    Chronic atrial fibrillation  Resolved Problems:    * No resolved hospital problems. *    Portions of this note have been copied forward, however, changed to reflect the most current clinical status of this patient. Hospital Course:    Ms. Alphonse Solis is a 71year old female who presented to [address] [place] [address] ED via ambulance with left leg weakness. She has recently undergone stroke work up that was negative due to left leg weakness and word finding difficulty. She stated she felt profoundly weak and has been unable to bear weight on her lower extremities. She's had multiple recent falls. Right knee with effusion. She was also found to have a urinary tract infection. She was admitted to Hospitalist service and placed on Rocephin for UTI. Orthopedic surgery was consulted for bilateral knee pain and effusion and recommended weight bearing as tolerated, NSAIDs and follow up in office as an outpatient. At this time Ms. Alphonse Solis is stable for discharge and will go in stable condition to [facility]. She has completed antibiotic course at this time. Significant Diagnostic Studies:   Xr Knee Left (1-2 Views)  1. Moderate to severe degenerative changes in both knees without evidence of acute fracture or dislocation.  2. Moderate sized right knee effusion. Signed by Dr Keith Pagan on 2/28/2020 6:35 PM    Xr Knee Right (1-2 Views)  1. Moderate to severe degenerative changes in both knees without evidence of acute fracture or dislocation. 2. Moderate sized right knee effusion. Signed by Dr Keith Pagan on 2/28/2020 6:35 PM    Xr Ankle Right (min 3 Views)  1. No acute fracture or dislocation in the right ankle. 2. Degenerative changes in the midfoot and peripheral vascular calcifications. Signed by Dr Keith Pagan on 2/28/2020 8:28 PM    Xr Chest Portable  1. Moderate to marked cardiac enlargement. 2. No la pulmonary edema. Signed by Dr Keith Pagan on 2/28/2020 7:55 PM    Xr Hip 2-3 Vw W Pelvis Right  1. No evidence of acute bony abnormality in the pelvis or right hip. Signed by Dr Keith Pagan on 2/28/2020 9:17 PM    Pertinent Labs:   CBC:   Recent Labs     02/29/20 0410 03/01/20 0454 03/02/20 0429   WBC 10.0 9.6 9.2   HGB 12.8 12.7 12.9    183 195     BMP:    Recent Labs     02/29/20  0410 03/01/20 0454 03/02/20  0429    139 141   K 3.9 4.0 4.0   CL 99 100 100   CO2 24 25 27   BUN 10 10 10   CREATININE <0.5 0.5 0.5   GLUCOSE 210* 179* 171*     INR:   Recent Labs     02/29/20 0410 03/01/20 0454 03/02/20  0427   INR 2.06* 2.25* 2.32*     Physical Exam:  Vital Signs: BP (!) 144/71   Pulse 96   Temp 97.7 °F (36.5 °C) (Temporal)   Resp 15   Ht 5' 5\" (1.651 m)   Wt 233 lb (105.7 kg)   LMP  (LMP Unknown)   SpO2 97%   Breastfeeding No   BMI 38.77 kg/m²   General appearance:. Alert and Cooperative, sitting up comfortably in bed  HEENT: Normocephalic. Chest: diminished at bases otherwise clear to auscultation bilaterally without wheezes or rhonchi. Cardiac: Irregularly irregular, S1, S2 normal. No murmurs, gallops, or rubs auscultated. Abdomen:soft, obese, non-tender; non-distended normal bowel sounds no masses, no organomegaly. Extremities: No clubbing or cyanosis. Trace peripheral edema.  Peripheral tolerated. Diet: DIET CARB CONTROL;     Disposition: Patient is medically stable and will be discharged to [facility]. Time spent on discharge 36 minutes spent in assessing patient, reviewing medications, discussion with nursing, confirming safe discharge plan and preparation of discharge summary.     Signed:  Denver Ponce PA-C

## 2020-03-02 NOTE — CARE COORDINATION
The [facility]  Notification of Admission Decision      [] Patient has been accepted for admit to [facility] on :       Please write discharge orders and summary prior to discharge. [] Patient acceptance to Rehab pending the following :    [] Eval in progress       [x] Patient determined to be ineligible for services at [facility] because : No Rehab diagnosis. We recommend you consider: SNF or HH       Thank you for your referral, we appreciate you.     Electronically Signed by Jf Singletary Admissions Coordinator 3/2/2020 8:14 AM

## 2020-03-02 NOTE — CARE COORDINATION
Referral to [facility] and [facility]. Medically clear to dc today 3/2. Will follow.     [place] P: [phone] F: [facility]: [phone] F: [phone]    Electronically signed by Farrah Jones on 3/2/2020 at 10:43 AM

## 2020-03-02 NOTE — PROGRESS NOTES
Clinical Pharmacy Note    Warfarin consult follow-up    Recent Labs     03/02/20  0427   INR 2.32*     Recent Labs     02/29/20  0410 03/01/20  0454 03/02/20  0429   HGB 12.8 12.7 12.9   HCT 38.7 38.9 39.6    183 195       Significant Drug-Drug Interactions:  New warfarin drug-drug interactions: none  Discontinued drug-drug interactions: none    Date INR Warfarin Dose   02/28/20 2.3 2.5 mg (slightly after midnight)   02/29/20 2.06 7.5 mg   03/01/20 2.25  7.5 mg   03/02/20  2.32  7.5 mg                             Notes: Will give warfarin 7.5 mg tonight. Daily PT/INR until stable within therapeutic range.      Electronically signed by Bridgette Sanches, [facility] on 3/2/2020 at 8:07 AM

## 2020-03-03 ENCOUNTER — LAB REQUISITION (OUTPATIENT)
Dept: LAB | Facility: HOSPITAL | Age: 65
End: 2020-03-03

## 2020-03-03 ENCOUNTER — TELEPHONE (OUTPATIENT)
Dept: INTERNAL MEDICINE | Age: 65
End: 2020-03-03

## 2020-03-03 DIAGNOSIS — Z00.00 ENCOUNTER FOR GENERAL ADULT MEDICAL EXAMINATION WITHOUT ABNORMAL FINDINGS: ICD-10-CM

## 2020-03-03 LAB
ALBUMIN SERPL-MCNC: 3.8 G/DL (ref 3.5–5.2)
ALP SERPL-CCNC: 45 U/L (ref 39–117)
ALT SERPL W P-5'-P-CCNC: 23 U/L (ref 1–33)
ANION GAP SERPL CALCULATED.3IONS-SCNC: 17 MMOL/L (ref 5–15)
AST SERPL-CCNC: 22 U/L (ref 1–32)
BASOPHILS # BLD AUTO: 0.05 10*3/MM3 (ref 0–0.2)
BASOPHILS NFR BLD AUTO: 0.5 % (ref 0–1.5)
BILIRUB CONJ SERPL-MCNC: 0.2 MG/DL (ref 0.2–0.3)
BILIRUB INDIRECT SERPL-MCNC: 0.4 MG/DL
BILIRUB SERPL-MCNC: 0.6 MG/DL (ref 0.2–1.2)
BUN BLD-MCNC: 13 MG/DL (ref 8–23)
BUN/CREAT SERPL: 25.5 (ref 7–25)
CALCIUM SPEC-SCNC: 8.8 MG/DL (ref 8.6–10.5)
CHLORIDE SERPL-SCNC: 97 MMOL/L (ref 98–107)
CHOLEST SERPL-MCNC: 138 MG/DL (ref 0–200)
CO2 SERPL-SCNC: 21 MMOL/L (ref 22–29)
CREAT BLD-MCNC: 0.51 MG/DL (ref 0.57–1)
DEPRECATED RDW RBC AUTO: 45.1 FL (ref 37–54)
DIGOXIN SERPL-MCNC: 0.5 NG/ML (ref 0.6–1.2)
EOSINOPHIL # BLD AUTO: 0 10*3/MM3 (ref 0–0.4)
EOSINOPHIL NFR BLD AUTO: 0 % (ref 0.3–6.2)
ERYTHROCYTE [DISTWIDTH] IN BLOOD BY AUTOMATED COUNT: 14.1 % (ref 12.3–15.4)
FLUAV AG NPH QL: NEGATIVE
FLUBV AG NPH QL IA: NEGATIVE
GFR SERPL CREATININE-BSD FRML MDRD: 121 ML/MIN/1.73
GLUCOSE BLD-MCNC: 293 MG/DL (ref 65–99)
HBA1C MFR BLD: 8.7 % (ref 4.8–5.6)
HCT VFR BLD AUTO: 40 % (ref 34–46.6)
HDLC SERPL-MCNC: 40 MG/DL (ref 40–60)
HGB BLD-MCNC: 13.6 G/DL (ref 12–15.9)
IMM GRANULOCYTES # BLD AUTO: 0.03 10*3/MM3 (ref 0–0.05)
IMM GRANULOCYTES NFR BLD AUTO: 0.3 % (ref 0–0.5)
LDLC SERPL CALC-MCNC: 81 MG/DL (ref 0–100)
LDLC/HDLC SERPL: 2.03 {RATIO}
LYMPHOCYTES # BLD AUTO: 0.51 10*3/MM3 (ref 0.7–3.1)
LYMPHOCYTES NFR BLD AUTO: 4.7 % (ref 19.6–45.3)
MCH RBC QN AUTO: 29.8 PG (ref 26.6–33)
MCHC RBC AUTO-ENTMCNC: 34 G/DL (ref 31.5–35.7)
MCV RBC AUTO: 87.7 FL (ref 79–97)
MONOCYTES # BLD AUTO: 0.51 10*3/MM3 (ref 0.1–0.9)
MONOCYTES NFR BLD AUTO: 4.7 % (ref 5–12)
NEUTROPHILS # BLD AUTO: 9.74 10*3/MM3 (ref 1.7–7)
NEUTROPHILS NFR BLD AUTO: 89.8 % (ref 42.7–76)
NRBC BLD AUTO-RTO: 0 /100 WBC (ref 0–0.2)
PLATELET # BLD AUTO: 203 10*3/MM3 (ref 140–450)
PMV BLD AUTO: 11.3 FL (ref 6–12)
POTASSIUM BLD-SCNC: 4.2 MMOL/L (ref 3.5–5.2)
PREALB SERPL-MCNC: 14.9 MG/DL (ref 20–40)
PROT SERPL-MCNC: 7.5 G/DL (ref 6–8.5)
RBC # BLD AUTO: 4.56 10*6/MM3 (ref 3.77–5.28)
SODIUM BLD-SCNC: 135 MMOL/L (ref 136–145)
TRIGL SERPL-MCNC: 85 MG/DL (ref 0–150)
TSH SERPL DL<=0.05 MIU/L-ACNC: 1.55 UIU/ML (ref 0.27–4.2)
VIT B12 BLD-MCNC: 193 PG/ML (ref 211–946)
VLDLC SERPL-MCNC: 17 MG/DL
WBC NRBC COR # BLD: 10.84 10*3/MM3 (ref 3.4–10.8)

## 2020-03-03 PROCEDURE — 83036 HEMOGLOBIN GLYCOSYLATED A1C: CPT | Performed by: INTERNAL MEDICINE

## 2020-03-03 PROCEDURE — 82607 VITAMIN B-12: CPT | Performed by: INTERNAL MEDICINE

## 2020-03-03 PROCEDURE — 87804 INFLUENZA ASSAY W/OPTIC: CPT | Performed by: INTERNAL MEDICINE

## 2020-03-03 PROCEDURE — 84134 ASSAY OF PREALBUMIN: CPT | Performed by: INTERNAL MEDICINE

## 2020-03-03 PROCEDURE — 80061 LIPID PANEL: CPT | Performed by: INTERNAL MEDICINE

## 2020-03-03 PROCEDURE — 36415 COLL VENOUS BLD VENIPUNCTURE: CPT | Performed by: INTERNAL MEDICINE

## 2020-03-03 PROCEDURE — 80048 BASIC METABOLIC PNL TOTAL CA: CPT | Performed by: INTERNAL MEDICINE

## 2020-03-03 PROCEDURE — 80076 HEPATIC FUNCTION PANEL: CPT | Performed by: INTERNAL MEDICINE

## 2020-03-03 PROCEDURE — 85025 COMPLETE CBC W/AUTO DIFF WBC: CPT | Performed by: INTERNAL MEDICINE

## 2020-03-03 PROCEDURE — 84443 ASSAY THYROID STIM HORMONE: CPT | Performed by: INTERNAL MEDICINE

## 2020-03-03 PROCEDURE — 80162 ASSAY OF DIGOXIN TOTAL: CPT | Performed by: INTERNAL MEDICINE

## 2020-03-04 ENCOUNTER — LAB REQUISITION (OUTPATIENT)
Dept: LAB | Facility: HOSPITAL | Age: 65
End: 2020-03-04

## 2020-03-04 DIAGNOSIS — Z00.00 ENCOUNTER FOR GENERAL ADULT MEDICAL EXAMINATION WITHOUT ABNORMAL FINDINGS: ICD-10-CM

## 2020-03-04 LAB
INR PPP: 1.33 (ref 0.91–1.09)
PROTHROMBIN TIME: 16.4 SECONDS (ref 11.9–14.6)

## 2020-03-04 PROCEDURE — 36415 COLL VENOUS BLD VENIPUNCTURE: CPT | Performed by: NURSE PRACTITIONER

## 2020-03-04 PROCEDURE — 85610 PROTHROMBIN TIME: CPT | Performed by: NURSE PRACTITIONER

## 2020-03-05 LAB
BLOOD CULTURE, ROUTINE: NORMAL
CULTURE, BLOOD 2: NORMAL

## 2020-03-06 ASSESSMENT — ENCOUNTER SYMPTOMS
SHORTNESS OF BREATH: 0
BACK PAIN: 0
COUGH: 0
VOMITING: 0
ABDOMINAL PAIN: 0

## 2020-03-08 LAB
EKG P AXIS: NORMAL DEGREES
EKG P-R INTERVAL: NORMAL MS
EKG Q-T INTERVAL: 360 MS
EKG QRS DURATION: 140 MS
EKG QTC CALCULATION (BAZETT): 445 MS
EKG T AXIS: 120 DEGREES

## 2020-03-10 ENCOUNTER — TELEPHONE (OUTPATIENT)
Dept: INTERNAL MEDICINE | Age: 65
End: 2020-03-10

## 2020-03-10 NOTE — TELEPHONE ENCOUNTER
Pt called she left the nursing home due to lack of things getting done. She was told they would do therapy but no therapy done she is stuck in a wheel chair bc she laid in a bed for a week at the nursing home she cant come in bc she is so weak, she just really dont know what to do and is asking for help on what can be done.   I told her we could order home health and possibly PT but she would need to be see and she says she just cant make it in

## 2020-03-12 ENCOUNTER — TELEPHONE (OUTPATIENT)
Dept: INTERNAL MEDICINE CLINIC | Age: 65
End: 2020-03-12

## 2020-03-12 RX ORDER — ALBUTEROL SULFATE 90 UG/1
2 AEROSOL, METERED RESPIRATORY (INHALATION) EVERY 6 HOURS PRN
Qty: 1 INHALER | Refills: 5 | Status: SHIPPED | OUTPATIENT
Start: 2020-03-12 | End: 2020-03-16 | Stop reason: SDUPTHER

## 2020-03-16 ENCOUNTER — TELEPHONE (OUTPATIENT)
Dept: INTERNAL MEDICINE CLINIC | Age: 65
End: 2020-03-16

## 2020-03-16 RX ORDER — ALBUTEROL SULFATE 90 UG/1
2 AEROSOL, METERED RESPIRATORY (INHALATION) EVERY 6 HOURS PRN
Qty: 1 INHALER | Refills: 5 | Status: SHIPPED | OUTPATIENT
Start: 2020-03-16 | End: 2021-01-27

## 2020-03-16 NOTE — TELEPHONE ENCOUNTER
[facility] nurse was notified and orders added to [facility] chart

## 2020-03-18 ENCOUNTER — TELEPHONE (OUTPATIENT)
Dept: INTERNAL MEDICINE CLINIC | Age: 65
End: 2020-03-18

## 2020-03-18 NOTE — TELEPHONE ENCOUNTER
[facility] nurse reporting todays PT/INR is 23.4 /2.0   Coumadin was on hold Monday and Tuesday evening.     Please advise     Pt was 53.1 / 4.4 on Monday and was on 7.5 mg daily

## 2020-03-20 ENCOUNTER — TELEPHONE (OUTPATIENT)
Dept: INTERNAL MEDICINE | Age: 65
End: 2020-03-20

## 2020-03-20 RX ORDER — IRBESARTAN 75 MG/1
75 TABLET ORAL 2 TIMES DAILY
Qty: 60 TABLET | Refills: 2 | Status: SHIPPED
Start: 2020-03-20 | End: 2020-07-08 | Stop reason: CLARIF

## 2020-03-20 NOTE — TELEPHONE ENCOUNTER
Valsartan 80 mg not available all they have is the 320 mg and they are not scored for splitting do we want to change her medication?

## 2020-03-24 ENCOUNTER — TELEPHONE (OUTPATIENT)
Dept: INTERNAL MEDICINE | Age: 65
End: 2020-03-24

## 2020-03-24 NOTE — TELEPHONE ENCOUNTER
HH reporting patient PT 22.2 INR 1.9  Patient takes 7.5 mg daily except on Monday and Thursday she takes 1/2 tablet

## 2020-03-31 ENCOUNTER — ANTI-COAG VISIT (OUTPATIENT)
Dept: INTERNAL MEDICINE | Age: 65
End: 2020-03-31
Payer: COMMERCIAL

## 2020-03-31 ENCOUNTER — TELEPHONE (OUTPATIENT)
Dept: INTERNAL MEDICINE CLINIC | Age: 65
End: 2020-03-31

## 2020-03-31 LAB — INR BLD: 2.3

## 2020-03-31 PROCEDURE — 93793 ANTICOAG MGMT PT WARFARIN: CPT | Performed by: INTERNAL MEDICINE

## 2020-03-31 NOTE — TELEPHONE ENCOUNTER
[address] [address]  Nurse reporting todays PT/INR is 27.4 / 2.3     Pt is taking couamdin 7.5 mg every day except for Monday and Thursday, and on those days  she takes a half tablet (3.75 mg  )    Please advise

## 2020-04-01 PROBLEM — N39.0 UTI (URINARY TRACT INFECTION): Status: RESOLVED | Noted: 2020-02-28 | Resolved: 2020-04-01

## 2020-04-02 ENCOUNTER — TELEPHONE (OUTPATIENT)
Dept: INTERNAL MEDICINE CLINIC | Age: 65
End: 2020-04-02

## 2020-04-02 ENCOUNTER — VIRTUAL VISIT (OUTPATIENT)
Dept: INTERNAL MEDICINE | Age: 65
End: 2020-04-02
Payer: MEDICARE

## 2020-04-02 VITALS — HEART RATE: 76 BPM | DIASTOLIC BLOOD PRESSURE: 75 MMHG | SYSTOLIC BLOOD PRESSURE: 130 MMHG | OXYGEN SATURATION: 96 %

## 2020-04-02 PROCEDURE — 99214 OFFICE O/P EST MOD 30 MIN: CPT | Performed by: INTERNAL MEDICINE

## 2020-04-02 PROCEDURE — 3052F HG A1C>EQUAL 8.0%<EQUAL 9.0%: CPT | Performed by: INTERNAL MEDICINE

## 2020-04-02 RX ORDER — FUROSEMIDE 20 MG/1
20 TABLET ORAL DAILY
Qty: 30 TABLET | Refills: 1 | Status: SHIPPED | OUTPATIENT
Start: 2020-04-02 | End: 2020-05-07 | Stop reason: SDUPTHER

## 2020-04-02 ASSESSMENT — PATIENT HEALTH QUESTIONNAIRE - PHQ9
SUM OF ALL RESPONSES TO PHQ QUESTIONS 1-9: 0
1. LITTLE INTEREST OR PLEASURE IN DOING THINGS: 0
SUM OF ALL RESPONSES TO PHQ QUESTIONS 1-9: 0
SUM OF ALL RESPONSES TO PHQ9 QUESTIONS 1 & 2: 0
2. FEELING DOWN, DEPRESSED OR HOPELESS: 0

## 2020-04-02 ASSESSMENT — ENCOUNTER SYMPTOMS
ABDOMINAL PAIN: 0
WHEEZING: 0
CONSTIPATION: 0
COUGH: 0
CHEST TIGHTNESS: 0
SORE THROAT: 0

## 2020-04-02 NOTE — PROGRESS NOTES
Chief Complaint   Patient presents with    Leg Swelling     Pt states that this has been going on for about a month     History of presenting illness:  Sean Sofia is a67 y.o. female       TELEHEALTH EVALUATION -- Audio/Visual (During AQWPT-39 public health emergency)  Patient location-home  Pursuant to the emergency declaration under the [address] [place] [address] authority and the [employer] Act, this Virtual  Visit was conducted, with patient's consent, to reduce the patient's risk of exposure to COVID-19 and provide continuity of care for an established patient. Services were provided through a video synchronous discussion virtually to substitute for in-person clinic visit.     Reason for video visit today  Significantly increased swelling in her lower extremities  This patient is post recent hospital admission late February 2020  She initially was seen at the hospital with strokelike symptoms but was diagnosed with CHF exacerbation  She subsequently has been at home now with home health following  Over last 1 week her lower extremity swelling is significantly worse according to patient and also according to the home health  Her blood pressure has been stable  Her most recent ejection fraction that was done during admission in February was 31% EF    Patient tells me that her blood sugars have been better  Her blood pressure has been good  She states that she has seen urologist but her legs still remaining very weak  She is unable to stand on her feet to check her weight without help  She has been getting home physical therapy and also home health is following    Patient Active Problem List    Diagnosis Date Noted    Generalized weakness 03/02/2020    Palliative care patient 02/21/2020    Stroke-like symptoms 02/20/2020    Chronic atrial fibrillation 05/21/2018    Acquired hypothyroidism 10/17/2017    Morbid on 02/28/2020, Discharged on 03/02/2020   Component Date Value    WBC 02/28/2020 11.9*    RBC 02/28/2020 4.94     Hemoglobin 02/28/2020 14.4     Hematocrit 02/28/2020 44.7     MCV 02/28/2020 90.5     MCH 02/28/2020 29.1     MCHC 02/28/2020 32.2*    RDW 02/28/2020 13.9     Platelets 79/34/7245 202     MPV 02/28/2020 10.6     Neutrophils % 02/28/2020 79.1*    Lymphocytes % 02/28/2020 14.2*    Monocytes % 02/28/2020 5.5     Eosinophils % 02/28/2020 0.3     Basophils % 02/28/2020 0.5     Neutrophils Absolute 02/28/2020 9.4*    Immature Granulocytes # 02/28/2020 0.1     Lymphocytes Absolute 02/28/2020 1.7     Monocytes Absolute 02/28/2020 0.70     Eosinophils Absolute 02/28/2020 0.00     Basophils Absolute 02/28/2020 0.10     Sodium 02/28/2020 141     Potassium 02/28/2020 4.5     Chloride 02/28/2020 100     CO2 02/28/2020 28     Anion Gap 02/28/2020 13     Glucose 02/28/2020 201*    BUN 02/28/2020 9     CREATININE 02/28/2020 <0.5     GFR Non- 02/28/2020 >60     Calcium 02/28/2020 9.3     Total Protein 02/28/2020 7.2     Alb 02/28/2020 4.6     Total Bilirubin 02/28/2020 0.7     Alkaline Phosphatase 02/28/2020 48     ALT 02/28/2020 37*    AST 02/28/2020 24     Protime 02/28/2020 25.8*    INR 02/28/2020 2.30*    QRS Duration 02/28/2020 140     Q-T Interval 02/28/2020 360     QTc Calculation (Bazett) 02/28/2020 445     T Axis 02/28/2020 120     Troponin 02/28/2020 <0.01     Color, UA 02/28/2020 YELLOW     Clarity, UA 02/28/2020 Clear     Glucose, Ur 02/28/2020 100*    Bilirubin Urine 02/28/2020 Negative     Ketones, Urine 02/28/2020 Negative     Specific Gravity, UA 02/28/2020 1.005     Blood, Urine 02/28/2020 Negative     pH, UA 02/28/2020 6.0     Protein, UA 02/28/2020 Negative     Urobilinogen, Urine 02/28/2020 0.2     Nitrite, Urine 02/28/2020 Negative     Leukocyte Esterase, Urine 02/28/2020 TRACE*    Urine Reflex to Culture 02/28/2020 YES      POC Glucose 02/29/2020 148*    Performed on 02/29/2020 AccuChek     Protime 03/01/2020 25.3*    INR 03/01/2020 2.25*    WBC 03/01/2020 9.6     RBC 03/01/2020 4.29     Hemoglobin 03/01/2020 12.7     Hematocrit 03/01/2020 38.9     MCV 03/01/2020 90.7     MCH 03/01/2020 29.6     MCHC 03/01/2020 32.6*    RDW 03/01/2020 13.9     Platelets 31/99/3640 183     MPV 03/01/2020 10.4     Neutrophils % 03/01/2020 61.7     Lymphocytes % 03/01/2020 27.6     Monocytes % 03/01/2020 7.4     Eosinophils % 03/01/2020 2.2     Basophils % 03/01/2020 0.7     Neutrophils Absolute 03/01/2020 5.9     Immature Granulocytes # 03/01/2020 0.0     Lymphocytes Absolute 03/01/2020 2.6     Monocytes Absolute 03/01/2020 0.70     Eosinophils Absolute 03/01/2020 0.20     Basophils Absolute 03/01/2020 0.10     Sodium 03/01/2020 139     Potassium reflex Magnesi* 03/01/2020 4.0     Chloride 03/01/2020 100     CO2 03/01/2020 25     Anion Gap 03/01/2020 14     Glucose 03/01/2020 179*    BUN 03/01/2020 10     CREATININE 03/01/2020 0.5     GFR Non- 03/01/2020 >60     Calcium 03/01/2020 8.6*    POC Glucose 02/29/2020 198*    Performed on 02/29/2020 AccuChek     POC Glucose 03/01/2020 192*    Performed on 03/01/2020 AccuChek     POC Glucose 03/01/2020 205*    Performed on 03/01/2020 AccuChek     POC Glucose 03/01/2020 211*    Performed on 03/01/2020 AccuChek     Protime 03/02/2020 26.0*    INR 03/02/2020 2.32*    WBC 03/02/2020 9.2     RBC 03/02/2020 4.34     Hemoglobin 03/02/2020 12.9     Hematocrit 03/02/2020 39.6     MCV 03/02/2020 91.2     MCH 03/02/2020 29.7     MCHC 03/02/2020 32.6*    RDW 03/02/2020 13.9     Platelets 47/52/1655 195     MPV 03/02/2020 10.5     Neutrophils % 03/02/2020 64.9     Lymphocytes % 03/02/2020 24.6     Monocytes % 03/02/2020 6.9     Eosinophils % 03/02/2020 2.5     Basophils % 03/02/2020 0.8     Neutrophils Absolute 03/02/2020 6.0     Immature Phosphatase 02/20/2020 50     ALT 02/20/2020 37*    AST 02/20/2020 38*    aPTT 02/20/2020 32.3     QRS Duration 02/20/2020 142     Q-T Interval 02/20/2020 370     QTc Calculation (Bazett) 02/20/2020 459     T Axis 02/20/2020 114     Protime 02/20/2020 20.1*    INR 02/20/2020 1.70*    Troponin 02/20/2020 <0.01     Color, UA 02/20/2020 YELLOW     Clarity, UA 02/20/2020 Clear     Glucose, Ur 02/20/2020 Negative     Bilirubin Urine 02/20/2020 Negative     Ketones, Urine 02/20/2020 Negative     Specific Gravity, UA 02/20/2020 1.004     Blood, Urine 02/20/2020 TRACE*    pH, UA 02/20/2020 6.5     Protein, UA 02/20/2020 Negative     Urobilinogen, Urine 02/20/2020 0.2     Nitrite, Urine 02/20/2020 Negative     Leukocyte Esterase, Urine 02/20/2020 Negative     Urine Reflex to Culture 02/20/2020 Not Indicated     TSH 02/20/2020 3.910     Rapid Influenza A Ag 02/20/2020 Negative     Rapid Influenza B Ag 02/20/2020 Negative     Digoxin Lvl 02/20/2020 0.7     Bacteria, UA 02/20/2020 NEGATIVE*    Hyaline Casts, UA 02/20/2020 0     WBC, UA 02/20/2020 1     RBC, UA 02/20/2020 0     Epithelial Cells, UA 02/20/2020 1     Left Ventricular Ejectio* 02/20/2020 28     LVEF MODALITY 02/20/2020 ECHO     Sodium 02/21/2020 141     Potassium reflex Magnesi* 02/21/2020 4.5     Chloride 02/21/2020 100     CO2 02/21/2020 29     Anion Gap 02/21/2020 12     Glucose 02/21/2020 134*    BUN 02/21/2020 10     CREATININE 02/21/2020 0.5     GFR Non- 02/21/2020 >60     Calcium 02/21/2020 9.2     Hemoglobin A1C 02/21/2020 8.9*    Cholesterol, Total 02/21/2020 147*    Triglycerides 02/21/2020 140     HDL 02/21/2020 36*    LDL Calculated 02/21/2020 83     WBC 02/21/2020 8.7     RBC 02/21/2020 4.62     Hemoglobin 02/21/2020 13.4     Hematocrit 02/21/2020 42.3     MCV 02/21/2020 91.6     MCH 02/21/2020 29.0     MCHC 02/21/2020 31.7*    RDW 02/21/2020 13.3     Platelets 17/10/1288 196 visit.  EMR Dragon/transcription disclaimer:Significant part of this  encounter note is electronic transcription/translationof spoken language to printed text. The electronic translation of spoken language may be erroneous, or at times, nonsensical words or phrases may be inadvertently transcribed.  Although I have reviewed the note for sucherrors, some may still exist.

## 2020-04-03 ENCOUNTER — TELEPHONE (OUTPATIENT)
Dept: INTERNAL MEDICINE CLINIC | Age: 65
End: 2020-04-03

## 2020-04-08 ENCOUNTER — TELEPHONE (OUTPATIENT)
Dept: INTERNAL MEDICINE CLINIC | Age: 65
End: 2020-04-08

## 2020-04-08 ENCOUNTER — ANTI-COAG VISIT (OUTPATIENT)
Dept: INTERNAL MEDICINE | Age: 65
End: 2020-04-08
Payer: MEDICARE

## 2020-04-08 LAB — INR BLD: 2

## 2020-04-08 PROCEDURE — 93793 ANTICOAG MGMT PT WARFARIN: CPT | Performed by: INTERNAL MEDICINE

## 2020-04-10 ENCOUNTER — TELEPHONE (OUTPATIENT)
Dept: INTERNAL MEDICINE CLINIC | Age: 65
End: 2020-04-10

## 2020-04-10 NOTE — TELEPHONE ENCOUNTER
[address] [address] PT re assessment done and per PT the pt has been followed by PT for E-stim for muscle re-education, strengthening, transfer training, balance activities and Pt/caregiver education  Pt. is showing progress with increased LE strength, improving transfers but still needs assist of 1-2 people and ability to stand with assist of 2 up to a total of 25 secs. Have not yet been able to obtain a weight as Pt. unable to stand unassisted and has significant difficulty standing on scales due to requiring such a small base of support. Pt. is much more able to wear shoes at this point with edema significantly decreased in feet. Recommend continued PT services 3wk2, 2wk2 beginning 04/12/2020 progressing as Pt. Tolerates.     Please advise if approved

## 2020-04-14 ENCOUNTER — TELEPHONE (OUTPATIENT)
Dept: INTERNAL MEDICINE | Age: 65
End: 2020-04-14

## 2020-04-14 ENCOUNTER — ANTI-COAG VISIT (OUTPATIENT)
Dept: INTERNAL MEDICINE | Age: 65
End: 2020-04-14
Payer: MEDICARE

## 2020-04-14 LAB
INR BLD: 2.55 (ref 0.88–1.18)
PROTHROMBIN TIME: 28 SEC (ref 12–14.6)

## 2020-04-14 PROCEDURE — 93793 ANTICOAG MGMT PT WARFARIN: CPT | Performed by: INTERNAL MEDICINE

## 2020-04-14 NOTE — PROGRESS NOTES
HOME HEALTH INR Result    INR today: 2.55    INR Goal: 2.0-3.0    Dosing Plan  As of 4/14/2020    TTR:   76.4 % (2.7 y)   Full warfarin instructions:   3.75 mg every Mon, Thu; 7.5 mg all other days              PLAN:  PT NOTIFIED TO CONTINUE CURRENT DOSE AND RECHECK IN ONE WEEK. Msg sent to Aziza Small, [address] Nurse  I have reviewed nursing plan for Coumadin management and agree with plan.

## 2020-04-22 RX ORDER — ATORVASTATIN CALCIUM 80 MG/1
TABLET, FILM COATED ORAL
Qty: 30 TABLET | Refills: 0 | Status: SHIPPED | OUTPATIENT
Start: 2020-04-22 | End: 2020-05-07 | Stop reason: SDUPTHER

## 2020-04-22 RX ORDER — WARFARIN SODIUM 5 MG/1
TABLET ORAL
Qty: 38 TABLET | Refills: 0 | OUTPATIENT
Start: 2020-04-22

## 2020-04-22 RX ORDER — IRBESARTAN AND HYDROCHLOROTHIAZIDE 300; 12.5 MG/1; MG/1
TABLET, FILM COATED ORAL
Qty: 30 TABLET | Refills: 0 | OUTPATIENT
Start: 2020-04-22

## 2020-04-22 RX ORDER — GLIPIZIDE 5 MG/1
TABLET ORAL
Qty: 90 TABLET | Refills: 0 | Status: SHIPPED | OUTPATIENT
Start: 2020-04-22 | End: 2020-07-08 | Stop reason: SDUPTHER

## 2020-04-22 RX ORDER — LEVOTHYROXINE SODIUM 0.12 MG/1
TABLET ORAL
Qty: 30 TABLET | Refills: 0 | Status: SHIPPED | OUTPATIENT
Start: 2020-04-22 | End: 2020-05-07 | Stop reason: SDUPTHER

## 2020-04-22 RX ORDER — DIGOXIN 125 MCG
TABLET ORAL
Qty: 60 TABLET | Refills: 0 | Status: SHIPPED | OUTPATIENT
Start: 2020-04-22 | End: 2021-01-27 | Stop reason: SDUPTHER

## 2020-04-23 ENCOUNTER — TELEPHONE (OUTPATIENT)
Dept: INTERNAL MEDICINE | Age: 65
End: 2020-04-23

## 2020-04-23 ENCOUNTER — VIRTUAL VISIT (OUTPATIENT)
Dept: INTERNAL MEDICINE | Age: 65
End: 2020-04-23
Payer: MEDICARE

## 2020-04-23 ENCOUNTER — ANTI-COAG VISIT (OUTPATIENT)
Dept: INTERNAL MEDICINE | Age: 65
End: 2020-04-23
Payer: MEDICARE

## 2020-04-23 VITALS — DIASTOLIC BLOOD PRESSURE: 82 MMHG | SYSTOLIC BLOOD PRESSURE: 124 MMHG

## 2020-04-23 LAB — INR BLD: 2.5

## 2020-04-23 PROCEDURE — 99214 OFFICE O/P EST MOD 30 MIN: CPT | Performed by: INTERNAL MEDICINE

## 2020-04-23 PROCEDURE — 93793 ANTICOAG MGMT PT WARFARIN: CPT | Performed by: INTERNAL MEDICINE

## 2020-04-23 PROCEDURE — 3052F HG A1C>EQUAL 8.0%<EQUAL 9.0%: CPT | Performed by: INTERNAL MEDICINE

## 2020-04-23 RX ORDER — METOPROLOL SUCCINATE 50 MG/1
TABLET, EXTENDED RELEASE ORAL
Qty: 30 TABLET | Refills: 0 | Status: SHIPPED | OUTPATIENT
Start: 2020-04-23 | End: 2020-07-08 | Stop reason: SDUPTHER

## 2020-04-23 RX ORDER — WARFARIN SODIUM 7.5 MG/1
TABLET ORAL
Qty: 30 TABLET | Refills: 1 | Status: SHIPPED | OUTPATIENT
Start: 2020-04-23 | End: 2020-07-08 | Stop reason: SDUPTHER

## 2020-04-23 RX ORDER — HYDROCHLOROTHIAZIDE 25 MG/1
12.5 TABLET ORAL DAILY
Qty: 45 TABLET | Refills: 3 | Status: SHIPPED | OUTPATIENT
Start: 2020-04-23 | End: 2021-01-27

## 2020-04-23 ASSESSMENT — ENCOUNTER SYMPTOMS
CHEST TIGHTNESS: 0
WHEEZING: 0
BACK PAIN: 1
ABDOMINAL PAIN: 0
SORE THROAT: 0
CONSTIPATION: 0
COUGH: 0

## 2020-04-23 NOTE — PROGRESS NOTES
Diagnosis Date Noted    Generalized weakness 03/02/2020    Palliative care patient 02/21/2020    Stroke-like symptoms 02/20/2020    Chronic atrial fibrillation 05/21/2018    Acquired hypothyroidism 10/17/2017    Morbid obesity due to excess calories ([address] 09/26/2017    Vitamin D deficiency 50/25/0541    Systolic congestive heart failure ([address] 09/26/2017     Overview Note:     3/12 echo ef 40%      H/O bone density study 09/26/2017     Overview Note:     2015 nl      Mixed hyperlipidemia 09/26/2017    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    Type 2 diabetes mellitus with microalbuminuria, without long-term current use of insulin ([address] 09/26/2017    Long term current use of anticoagulant therapy 06/29/2017     Overview Note:     Updating Deprecated Diagnoses      Essential hypertension     Mild CAD      Past Medical History:   Diagnosis Date    Acute laryngitis     Acute sinusitis     Allergic rhinitis     Ankle pain     Asthma     Asthmatic bronchitis     Atrial fibrillation ([address] 9/13/12, 10/9/13    cardioversion    Atrial flutter (HCC)     paroxysmal     CAD (coronary artery disease)     Cerebral artery occlusion with cerebral infarction ([address]     2006    CHF (congestive heart failure) (HCC)     Chronic back pain     Cough     Diabetes     Dizzy     Dysuria     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[address]     Fatigue     Foot pain     Hx of amiodarone therapy 9/24/2014    Hyperlipidemia     PCP manages cholesterol    Hypertension     Menopause     Obesity     Palliative care patient 02/21/2020    Skin rash     Type II or unspecified type diabetes mellitus without mention of complication, not stated as uncontrolled     Umbilical hernia     URI (upper respiratory infection)       Past Surgical History:   Procedure Laterality Date    CARDIAC CATHETERIZATION  10/21/09    EF 35% left ventricle is moderately enlarged     CARDIOVERSION  10/9/13    CHOLECYSTECTOMY      EYE SURGERY      retina and Depressed appearing  [] Confused appearing      [] Poor short term memory  [] Poor long term memory    [] OTHER:      Due to this being a TeleHealth encounter, evaluation of the following organ systems is limited: Vitals/Constitutional/EENT/Resp/CV/GI//MS/Neuro/Skin/Heme-Lymph-Imm.     Lab Review   Anti-coag visit on 04/23/2020   Component Date Value    INR 04/23/2020 2.50    Orders Only on 04/14/2020   Component Date Value    Protime 04/14/2020 28.0*    INR 04/14/2020 2.55*   Anti-coag visit on 04/08/2020   Component Date Value    INR 04/08/2020 2.00    Anti-coag visit on 03/31/2020   Component Date Value    INR 03/31/2020 2.30    Admission on 02/28/2020, Discharged on 03/02/2020   Component Date Value    WBC 02/28/2020 11.9*    RBC 02/28/2020 4.94     Hemoglobin 02/28/2020 14.4     Hematocrit 02/28/2020 44.7     MCV 02/28/2020 90.5     MCH 02/28/2020 29.1     MCHC 02/28/2020 32.2*    RDW 02/28/2020 13.9     Platelets 52/66/3253 202     MPV 02/28/2020 10.6     Neutrophils % 02/28/2020 79.1*    Lymphocytes % 02/28/2020 14.2*    Monocytes % 02/28/2020 5.5     Eosinophils % 02/28/2020 0.3     Basophils % 02/28/2020 0.5     Neutrophils Absolute 02/28/2020 9.4*    Immature Granulocytes # 02/28/2020 0.1     Lymphocytes Absolute 02/28/2020 1.7     Monocytes Absolute 02/28/2020 0.70     Eosinophils Absolute 02/28/2020 0.00     Basophils Absolute 02/28/2020 0.10     Sodium 02/28/2020 141     Potassium 02/28/2020 4.5     Chloride 02/28/2020 100     CO2 02/28/2020 28     Anion Gap 02/28/2020 13     Glucose 02/28/2020 201*    BUN 02/28/2020 9     CREATININE 02/28/2020 <0.5     GFR Non- 02/28/2020 >60     Calcium 02/28/2020 9.3     Total Protein 02/28/2020 7.2     Alb 02/28/2020 4.6     Total Bilirubin 02/28/2020 0.7     Alkaline Phosphatase 02/28/2020 48     ALT 02/28/2020 37*    AST 02/28/2020 24     Protime 02/28/2020 25.8*    INR 02/28/2020 2.30*    QRS Duration Immature Granulocytes # 02/29/2020 0.0     Lymphocytes Absolute 02/29/2020 2.7     Monocytes Absolute 02/29/2020 0.80     Eosinophils Absolute 02/29/2020 0.10     Basophils Absolute 02/29/2020 0.10     Sodium 02/29/2020 138     Potassium reflex Magnesi* 02/29/2020 3.9     Chloride 02/29/2020 99     CO2 02/29/2020 24     Anion Gap 02/29/2020 15     Glucose 02/29/2020 210*    BUN 02/29/2020 10     CREATININE 02/29/2020 <0.5     GFR Non- 02/29/2020 >60     Calcium 02/29/2020 9.0     POC Glucose 02/29/2020 202*    Performed on 02/29/2020 AccuChek     POC Glucose 02/29/2020 226*    Performed on 02/29/2020 AccuChek     POC Glucose 02/29/2020 148*    Performed on 02/29/2020 AccuChek     Protime 03/01/2020 25.3*    INR 03/01/2020 2.25*    WBC 03/01/2020 9.6     RBC 03/01/2020 4.29     Hemoglobin 03/01/2020 12.7     Hematocrit 03/01/2020 38.9     MCV 03/01/2020 90.7     MCH 03/01/2020 29.6     MCHC 03/01/2020 32.6*    RDW 03/01/2020 13.9     Platelets 37/44/2503 183     MPV 03/01/2020 10.4     Neutrophils % 03/01/2020 61.7     Lymphocytes % 03/01/2020 27.6     Monocytes % 03/01/2020 7.4     Eosinophils % 03/01/2020 2.2     Basophils % 03/01/2020 0.7     Neutrophils Absolute 03/01/2020 5.9     Immature Granulocytes # 03/01/2020 0.0     Lymphocytes Absolute 03/01/2020 2.6     Monocytes Absolute 03/01/2020 0.70     Eosinophils Absolute 03/01/2020 0.20     Basophils Absolute 03/01/2020 0.10     Sodium 03/01/2020 139     Potassium reflex Magnesi* 03/01/2020 4.0     Chloride 03/01/2020 100     CO2 03/01/2020 25     Anion Gap 03/01/2020 14     Glucose 03/01/2020 179*    BUN 03/01/2020 10     CREATININE 03/01/2020 0.5     GFR Non- 03/01/2020 >60     Calcium 03/01/2020 8.6*    POC Glucose 02/29/2020 198*    Performed on 02/29/2020 AccuChek     POC Glucose 03/01/2020 192*    Performed on 03/01/2020 AccuChek     POC Glucose 03/01/2020 205*    Performed on 03/01/2020 AccuChek     POC Glucose 03/01/2020 211*    Performed on 03/01/2020 AccuChek     Protime 03/02/2020 26.0*    INR 03/02/2020 2.32*    WBC 03/02/2020 9.2     RBC 03/02/2020 4.34     Hemoglobin 03/02/2020 12.9     Hematocrit 03/02/2020 39.6     MCV 03/02/2020 91.2     MCH 03/02/2020 29.7     MCHC 03/02/2020 32.6*    RDW 03/02/2020 13.9     Platelets 48/50/2713 195     MPV 03/02/2020 10.5     Neutrophils % 03/02/2020 64.9     Lymphocytes % 03/02/2020 24.6     Monocytes % 03/02/2020 6.9     Eosinophils % 03/02/2020 2.5     Basophils % 03/02/2020 0.8     Neutrophils Absolute 03/02/2020 6.0     Immature Granulocytes # 03/02/2020 0.0     Lymphocytes Absolute 03/02/2020 2.3     Monocytes Absolute 03/02/2020 0.60     Eosinophils Absolute 03/02/2020 0.20     Basophils Absolute 03/02/2020 0.10     Sodium 03/02/2020 141     Potassium reflex Magnesi* 03/02/2020 4.0     Chloride 03/02/2020 100     CO2 03/02/2020 27     Anion Gap 03/02/2020 14     Glucose 03/02/2020 171*    BUN 03/02/2020 10     CREATININE 03/02/2020 0.5     GFR Non- 03/02/2020 >60     Calcium 03/02/2020 8.8     POC Glucose 03/01/2020 257*    Performed on 03/01/2020 AccuChek     POC Glucose 03/02/2020 210*    Performed on 03/02/2020 AccuChek     POC Glucose 03/02/2020 239*    Performed on 03/02/2020 AccuChek    Office Visit on 02/27/2020   Component Date Value    INR 02/27/2020 2.5            ASSESSMENT/PLAN:      Recent admission for acute on chronic systolic congestive heart failure exacerbation  Systolic congestive heart failure    Swelling still significant  in her lower extremities  Pt still unable to stand to weigh herself-unable to stand on the scale even with help  She continues home physical therapy to improve her strength since the recent admission  This patient is post recent hospital admission late February 2020  She initially was seen at the hospital with strokelike symptoms but was diagnosed with CHF exacerbation  She subsequently has been at home now with home health following  Over last 1 week her lower extremity swelling is significantly worse according to patient and also according to the home health  Her blood pressure has been stable  Her most recent ejection fraction that was done during admission in February was 31% EF  Lasix 20 mg was added 3 weeks ago  She states that her swelling is little better but still signficant  Denies SOB ( states not sob with upper body exercises)    Previous available ejection fraction 40%  Patient has followed with cardiology for many years  Nuclear stress test last admission  shows-mild anterior attenuation artifact ejection fraction 31%  Per cardiology if ejection fraction in 3-month follow-up echocardiogram remains below 35% ICD would be considered  Blood pressure has been stable  Patient states that she does not feel more short of breath  Recommendations today  1. Continue spironolactone 25 mg daily  2. Continue digoxin current dose 0.125 daily  3. Continue ARB Avapro 75 mg daily + coreg 12.5 bid  4. Lasix 20 mg :  On 4/23 take extra 20 mg today  Tomorrow morning on 4/24 take 40 mg of Lasix  Patient will have lab testing done tomorrow which includes BMP, BNP, CBC and digoxin levels  We will call her tomorrow afternoon with lab results and further plans for diuretics for this coming weekend      PAF (paroxysmal atrial fibrillation) (HCC)  Long-term anticoagulation-      2.50 (4/23/2020)    Warfarin maintenance plan:  3.75 mg (7.5 mg x 0.5) every Mon, Thu; 7.5 mg (7.5 mg x 1) all other days       Type 2 diabetes mellitus with microalbuminuria, without long-term current use of insulin ([facility]-  Hemoglobin A1c is 8.6 (8.0) (7.5) ( 7.2) (7.3) ( 6.9) (7.3 )(7.6)( 9.3 )  Poorly controlled diabetes  A1c significantly elevated last lab  IShe will  continue metformin.   Cont glipizide 5mg dose - take 2 am/ 1 pm   I would strongly encouraged to add Ozempic injections- will disucc after next a1c in 5/2020-           Orders Placed This Encounter   Procedures    Digoxin Level     New Prescriptions    No medications on file         No follow-ups on file. There are no Patient Instructions on file for this visit. EMR Dragon/transcription disclaimer:Significant part of this  encounter note is electronic transcription/translationof spoken language to printed text. The electronic translation of spoken language may be erroneous, or at times, nonsensical words or phrases may be inadvertently transcribed.  Although I have reviewed the note for sucherrors, some may still exist.

## 2020-04-23 NOTE — PROGRESS NOTES
INR Result    INR today:   Results for orders placed or performed in visit on 04/23/20   Protime-INR   Result Value Ref Range    INR 2.50        INR Goal: 2.0-3.0    Dosing Plan  As of 4/23/2020    TTR:   76.6 % (2.7 y)   Full warfarin instructions:   3.75 mg every Mon, Thu; 7.5 mg all other days              PLAN: CONTINUE CURRENT DOSE AND RECHECK IN ONE WEEK. I have reviewed nursing plan for Coumadin management and agree with plan.

## 2020-04-28 ENCOUNTER — TELEPHONE (OUTPATIENT)
Dept: INTERNAL MEDICINE | Age: 65
End: 2020-04-28

## 2020-04-29 LAB — INR BLD: 3.1

## 2020-04-30 ENCOUNTER — ANTI-COAG VISIT (OUTPATIENT)
Dept: INTERNAL MEDICINE | Age: 65
End: 2020-04-30
Payer: MEDICARE

## 2020-04-30 ENCOUNTER — TELEPHONE (OUTPATIENT)
Dept: INTERNAL MEDICINE | Age: 65
End: 2020-04-30

## 2020-04-30 PROCEDURE — 93793 ANTICOAG MGMT PT WARFARIN: CPT | Performed by: INTERNAL MEDICINE

## 2020-04-30 NOTE — PROGRESS NOTES
HOME HEALTH INR Result    INR today:   Results for orders placed or performed in visit on 04/30/20   Protime-INR   Result Value Ref Range    INR 3.10        INR Goal: 2.0-3.0    Dosing Plan  As of 4/30/2020    TTR:   76.6 % (2.8 y)   Full warfarin instructions:   4/30: Hold; Otherwise 3.75 mg every Mon, Thu; 7.5 mg all other days              PLAN:   Patient took her dose last night. Advised her to Hold tonight's dose. Also confirmed patient's weekly dosage. Notified sourav with home health of the above and to recheck next week. I have reviewed nursing plan for Coumadin management and agree with plan.

## 2020-05-05 ENCOUNTER — ANTI-COAG VISIT (OUTPATIENT)
Dept: INTERNAL MEDICINE | Age: 65
End: 2020-05-05
Payer: MEDICARE

## 2020-05-05 ENCOUNTER — TELEPHONE (OUTPATIENT)
Dept: INTERNAL MEDICINE | Age: 65
End: 2020-05-05

## 2020-05-05 LAB
ANION GAP SERPL CALCULATED.3IONS-SCNC: 17 MMOL/L (ref 7–19)
BUN BLDV-MCNC: 9 MG/DL (ref 8–23)
CALCIUM SERPL-MCNC: 9.5 MG/DL (ref 8.8–10.2)
CHLORIDE BLD-SCNC: 94 MMOL/L (ref 98–111)
CO2: 25 MMOL/L (ref 22–29)
CREAT SERPL-MCNC: 0.5 MG/DL (ref 0.5–0.9)
GFR NON-AFRICAN AMERICAN: >60
GLUCOSE BLD-MCNC: 254 MG/DL (ref 74–109)
INR BLD: 2.9
POTASSIUM SERPL-SCNC: 4.4 MMOL/L (ref 3.5–5)
SODIUM BLD-SCNC: 136 MMOL/L (ref 136–145)

## 2020-05-05 PROCEDURE — 93793 ANTICOAG MGMT PT WARFARIN: CPT | Performed by: INTERNAL MEDICINE

## 2020-05-05 NOTE — TELEPHONE ENCOUNTER
HH called to report pt has had increased numbness in her legs and also that she has had some leg cramps that have been bothering her

## 2020-05-07 ENCOUNTER — TELEPHONE (OUTPATIENT)
Dept: INTERNAL MEDICINE | Age: 65
End: 2020-05-07

## 2020-05-07 LAB
ANION GAP SERPL CALCULATED.3IONS-SCNC: 18 MMOL/L (ref 7–19)
BUN BLDV-MCNC: 10 MG/DL (ref 8–23)
CALCIUM SERPL-MCNC: 9.3 MG/DL (ref 8.8–10.2)
CHLORIDE BLD-SCNC: 96 MMOL/L (ref 98–111)
CO2: 24 MMOL/L (ref 22–29)
CREAT SERPL-MCNC: 0.5 MG/DL (ref 0.5–0.9)
DIGOXIN LEVEL: 0.7 NG/ML (ref 0.6–1.2)
GFR NON-AFRICAN AMERICAN: >60
GLUCOSE BLD-MCNC: 246 MG/DL (ref 74–109)
MAGNESIUM: 1.7 MG/DL (ref 1.6–2.4)
POTASSIUM SERPL-SCNC: 4.1 MMOL/L (ref 3.5–5)
PRO-BNP: 2825 PG/ML (ref 0–900)
SODIUM BLD-SCNC: 138 MMOL/L (ref 136–145)

## 2020-05-07 RX ORDER — LEVOTHYROXINE SODIUM 0.12 MG/1
125 TABLET ORAL DAILY
Qty: 30 TABLET | Refills: 5 | Status: SHIPPED | OUTPATIENT
Start: 2020-05-07

## 2020-05-07 RX ORDER — FUROSEMIDE 20 MG/1
20 TABLET ORAL DAILY
Qty: 30 TABLET | Refills: 1 | Status: SHIPPED | OUTPATIENT
Start: 2020-05-07 | End: 2020-07-08 | Stop reason: SDUPTHER

## 2020-05-07 RX ORDER — ATORVASTATIN CALCIUM 80 MG/1
TABLET, FILM COATED ORAL
Qty: 30 TABLET | Refills: 5 | Status: SHIPPED | OUTPATIENT
Start: 2020-05-07 | End: 2020-07-29 | Stop reason: SDUPTHER

## 2020-05-07 NOTE — TELEPHONE ENCOUNTER
Hips so swollen she cant move them she is so swollen from her toes all the way to her hips. She is in a lot of pain from it she is watching her sodium and peeing a lot every two hours.

## 2020-05-11 ENCOUNTER — TELEPHONE (OUTPATIENT)
Dept: INTERNAL MEDICINE | Age: 65
End: 2020-05-11

## 2020-05-11 ENCOUNTER — ANTI-COAG VISIT (OUTPATIENT)
Dept: INTERNAL MEDICINE | Age: 65
End: 2020-05-11
Payer: MEDICARE

## 2020-05-11 LAB — INR BLD: 2.6

## 2020-05-11 PROCEDURE — 93793 ANTICOAG MGMT PT WARFARIN: CPT | Performed by: INTERNAL MEDICINE

## 2020-05-11 NOTE — PROGRESS NOTES
HOME HEALTH INR RESULT    INR today:   Results for orders placed or performed in visit on 05/11/20   Protime-INR   Result Value Ref Range    INR 2.60        INR Goal: 2.0-3.0    Dosing Plan  As of 5/11/2020    TTR:   76.6 % (2.8 y)   Full warfarin instructions:   3.75 mg every Mon, Thu; 7.5 mg all other days              PLAN:PATIENT NOTIFIED TO  CONTINUE CURRENT DOSE AND RECHECK IN ONE WEEK. I have reviewed nursing plan for Coumadin management and agree with plan.

## 2020-05-12 ENCOUNTER — CARE COORDINATION (OUTPATIENT)
Dept: CARE COORDINATION | Age: 65
End: 2020-05-12

## 2020-05-12 ENCOUNTER — TELEPHONE (OUTPATIENT)
Dept: INTERNAL MEDICINE | Age: 65
End: 2020-05-12

## 2020-05-13 ENCOUNTER — CARE COORDINATION (OUTPATIENT)
Dept: CARE COORDINATION | Age: 65
End: 2020-05-13

## 2020-05-13 NOTE — CARE COORDINATION
provide a 24 hour notice. Patient said, \"that's the problem. I need to be seen now. \"    I told the patient she needs to call for an ambulance to take her to the Emergency Room if she feels her condition needs immediate attention. She said she would not do that. Share with the patient that I was aware that her current health problem has been an issue for some time. She needs to see a provider. It is her choice as to which provider she would like to see - Dr Aleisha Dewitt or other practice provider, or she can go to the Emergency Room. Asked patient to call the office to schedule a face-to-face visit with Dr Aleisha Dewitt for as soon as possible. Once appointment made, she is to call [facility] to schedule a ride. Cost $1.75 per mile; she needs to give PATS a 24 hour notice. [facility] Provided [facility] to her.  [phone]. Submitted by Alex/ALFONZO    Routing message to Dr Leah Alonso, and Iesha Mejia for update.     Submitted by Alex/ALFONZO

## 2020-05-14 ENCOUNTER — NURSE TRIAGE (OUTPATIENT)
Dept: CALL CENTER | Facility: HOSPITAL | Age: 65
End: 2020-05-14

## 2020-05-14 ENCOUNTER — HOSPITAL ENCOUNTER (INPATIENT)
Facility: HOSPITAL | Age: 65
LOS: 8 days | Discharge: NURSING, REHAB OR LONG-TERM CARE | End: 2020-05-22
Attending: FAMILY MEDICINE | Admitting: INTERNAL MEDICINE

## 2020-05-14 DIAGNOSIS — Z78.9 DECREASED ACTIVITIES OF DAILY LIVING (ADL): ICD-10-CM

## 2020-05-14 DIAGNOSIS — M51.04 INTERVERTEBRAL THORACIC DISC DISORDER WITH MYELOPATHY, THORACIC REGION: Primary | ICD-10-CM

## 2020-05-14 DIAGNOSIS — Z98.1 STATUS POST THORACIC SPINAL FUSION: ICD-10-CM

## 2020-05-14 DIAGNOSIS — R29.898 WEAKNESS OF BOTH LOWER EXTREMITIES: ICD-10-CM

## 2020-05-14 PROBLEM — E03.9 ACQUIRED HYPOTHYROIDISM: Status: ACTIVE | Noted: 2017-10-17

## 2020-05-14 PROBLEM — I50.20 SYSTOLIC CONGESTIVE HEART FAILURE (HCC): Status: ACTIVE | Noted: 2017-09-26

## 2020-05-14 PROBLEM — Z79.01 LONG TERM CURRENT USE OF ANTICOAGULANT THERAPY: Status: ACTIVE | Noted: 2017-06-29

## 2020-05-14 PROBLEM — I10 ESSENTIAL HYPERTENSION: Status: ACTIVE | Noted: 2020-05-14

## 2020-05-14 PROBLEM — E78.2 MIXED HYPERLIPIDEMIA: Status: ACTIVE | Noted: 2017-09-26

## 2020-05-14 PROBLEM — I48.20 CHRONIC ATRIAL FIBRILLATION (HCC): Status: ACTIVE | Noted: 2018-05-21

## 2020-05-14 PROBLEM — E11.29 TYPE 2 DIABETES MELLITUS WITH MICROALBUMINURIA, WITHOUT LONG-TERM CURRENT USE OF INSULIN (HCC): Status: ACTIVE | Noted: 2017-09-26

## 2020-05-14 PROBLEM — R80.9 TYPE 2 DIABETES MELLITUS WITH MICROALBUMINURIA, WITHOUT LONG-TERM CURRENT USE OF INSULIN (HCC): Status: ACTIVE | Noted: 2017-09-26

## 2020-05-14 PROBLEM — E66.01 MORBID OBESITY DUE TO EXCESS CALORIES (HCC): Status: ACTIVE | Noted: 2017-09-26

## 2020-05-14 PROBLEM — I25.10 MILD CAD: Status: ACTIVE | Noted: 2020-05-14

## 2020-05-14 LAB
ALBUMIN SERPL-MCNC: 4.5 G/DL (ref 3.5–5.2)
ALBUMIN/GLOB SERPL: 1.3 G/DL
ALP SERPL-CCNC: 55 U/L (ref 39–117)
ALT SERPL W P-5'-P-CCNC: 31 U/L (ref 1–33)
ANION GAP SERPL CALCULATED.3IONS-SCNC: 15 MMOL/L (ref 5–15)
APTT PPP: 38.3 SECONDS (ref 24.1–35)
AST SERPL-CCNC: 25 U/L (ref 1–32)
BASOPHILS # BLD AUTO: 0.07 10*3/MM3 (ref 0–0.2)
BASOPHILS NFR BLD AUTO: 0.7 % (ref 0–1.5)
BILIRUB SERPL-MCNC: 0.3 MG/DL (ref 0.2–1.2)
BILIRUB UR QL STRIP: NEGATIVE
BUN BLD-MCNC: 12 MG/DL (ref 8–23)
BUN/CREAT SERPL: 19.7 (ref 7–25)
CALCIUM SPEC-SCNC: 9.4 MG/DL (ref 8.6–10.5)
CHLORIDE SERPL-SCNC: 91 MMOL/L (ref 98–107)
CK SERPL-CCNC: 58 U/L (ref 20–180)
CLARITY UR: CLEAR
CO2 SERPL-SCNC: 26 MMOL/L (ref 22–29)
COLOR UR: YELLOW
CREAT BLD-MCNC: 0.61 MG/DL (ref 0.57–1)
DEPRECATED RDW RBC AUTO: 42.5 FL (ref 37–54)
DIGOXIN SERPL-MCNC: 0.4 NG/ML (ref 0.6–1.2)
EOSINOPHIL # BLD AUTO: 0.17 10*3/MM3 (ref 0–0.4)
EOSINOPHIL NFR BLD AUTO: 1.8 % (ref 0.3–6.2)
ERYTHROCYTE [DISTWIDTH] IN BLOOD BY AUTOMATED COUNT: 13.4 % (ref 12.3–15.4)
GFR SERPL CREATININE-BSD FRML MDRD: 98 ML/MIN/1.73
GLOBULIN UR ELPH-MCNC: 3.4 GM/DL
GLUCOSE BLD-MCNC: 247 MG/DL (ref 65–99)
GLUCOSE UR STRIP-MCNC: ABNORMAL MG/DL
HCT VFR BLD AUTO: 42.2 % (ref 34–46.6)
HGB BLD-MCNC: 14.2 G/DL (ref 12–15.9)
HGB UR QL STRIP.AUTO: NEGATIVE
IMM GRANULOCYTES # BLD AUTO: 0.03 10*3/MM3 (ref 0–0.05)
IMM GRANULOCYTES NFR BLD AUTO: 0.3 % (ref 0–0.5)
INR PPP: 2.65 (ref 0.91–1.09)
KETONES UR QL STRIP: NEGATIVE
LEUKOCYTE ESTERASE UR QL STRIP.AUTO: NEGATIVE
LYMPHOCYTES # BLD AUTO: 2.81 10*3/MM3 (ref 0.7–3.1)
LYMPHOCYTES NFR BLD AUTO: 29.2 % (ref 19.6–45.3)
MAGNESIUM SERPL-MCNC: 1.8 MG/DL (ref 1.6–2.4)
MCH RBC QN AUTO: 29.3 PG (ref 26.6–33)
MCHC RBC AUTO-ENTMCNC: 33.6 G/DL (ref 31.5–35.7)
MCV RBC AUTO: 87.2 FL (ref 79–97)
MONOCYTES # BLD AUTO: 0.8 10*3/MM3 (ref 0.1–0.9)
MONOCYTES NFR BLD AUTO: 8.3 % (ref 5–12)
NEUTROPHILS # BLD AUTO: 5.73 10*3/MM3 (ref 1.7–7)
NEUTROPHILS NFR BLD AUTO: 59.7 % (ref 42.7–76)
NITRITE UR QL STRIP: NEGATIVE
NRBC BLD AUTO-RTO: 0 /100 WBC (ref 0–0.2)
NT-PROBNP SERPL-MCNC: 691.2 PG/ML (ref 5–900)
PH UR STRIP.AUTO: 7 [PH] (ref 5–8)
PHOSPHATE SERPL-MCNC: 3.5 MG/DL (ref 2.5–4.5)
PLATELET # BLD AUTO: 216 10*3/MM3 (ref 140–450)
PMV BLD AUTO: 10.2 FL (ref 6–12)
POTASSIUM BLD-SCNC: 4.6 MMOL/L (ref 3.5–5.2)
PROT SERPL-MCNC: 7.9 G/DL (ref 6–8.5)
PROT UR QL STRIP: NEGATIVE
PROTHROMBIN TIME: 28.3 SECONDS (ref 11.9–14.6)
RBC # BLD AUTO: 4.84 10*6/MM3 (ref 3.77–5.28)
SODIUM BLD-SCNC: 132 MMOL/L (ref 136–145)
SP GR UR STRIP: <=1.005 (ref 1–1.03)
UROBILINOGEN UR QL STRIP: ABNORMAL
WBC NRBC COR # BLD: 9.61 10*3/MM3 (ref 3.4–10.8)

## 2020-05-14 PROCEDURE — 85610 PROTHROMBIN TIME: CPT | Performed by: FAMILY MEDICINE

## 2020-05-14 PROCEDURE — 83880 ASSAY OF NATRIURETIC PEPTIDE: CPT | Performed by: FAMILY MEDICINE

## 2020-05-14 PROCEDURE — 83735 ASSAY OF MAGNESIUM: CPT | Performed by: FAMILY MEDICINE

## 2020-05-14 PROCEDURE — 80162 ASSAY OF DIGOXIN TOTAL: CPT | Performed by: NURSE PRACTITIONER

## 2020-05-14 PROCEDURE — 99284 EMERGENCY DEPT VISIT MOD MDM: CPT

## 2020-05-14 PROCEDURE — 85730 THROMBOPLASTIN TIME PARTIAL: CPT | Performed by: FAMILY MEDICINE

## 2020-05-14 PROCEDURE — 81003 URINALYSIS AUTO W/O SCOPE: CPT | Performed by: FAMILY MEDICINE

## 2020-05-14 PROCEDURE — 82550 ASSAY OF CK (CPK): CPT | Performed by: FAMILY MEDICINE

## 2020-05-14 PROCEDURE — P9612 CATHETERIZE FOR URINE SPEC: HCPCS

## 2020-05-14 PROCEDURE — 93005 ELECTROCARDIOGRAM TRACING: CPT | Performed by: NURSE PRACTITIONER

## 2020-05-14 PROCEDURE — 84100 ASSAY OF PHOSPHORUS: CPT | Performed by: FAMILY MEDICINE

## 2020-05-14 PROCEDURE — 85025 COMPLETE CBC W/AUTO DIFF WBC: CPT | Performed by: FAMILY MEDICINE

## 2020-05-14 PROCEDURE — 80053 COMPREHEN METABOLIC PANEL: CPT | Performed by: FAMILY MEDICINE

## 2020-05-14 RX ORDER — ONDANSETRON 4 MG/1
4 TABLET, FILM COATED ORAL EVERY 6 HOURS PRN
Status: DISCONTINUED | OUTPATIENT
Start: 2020-05-14 | End: 2020-05-22 | Stop reason: HOSPADM

## 2020-05-14 RX ORDER — GLIPIZIDE 10 MG/1
10 TABLET ORAL EVERY MORNING
COMMUNITY

## 2020-05-14 RX ORDER — CARVEDILOL 6.25 MG/1
12.5 TABLET ORAL 2 TIMES DAILY WITH MEALS
Status: DISCONTINUED | OUTPATIENT
Start: 2020-05-15 | End: 2020-05-22 | Stop reason: HOSPADM

## 2020-05-14 RX ORDER — MELATONIN
1000 DAILY
COMMUNITY

## 2020-05-14 RX ORDER — SODIUM CHLORIDE 0.9 % (FLUSH) 0.9 %
10 SYRINGE (ML) INJECTION EVERY 12 HOURS SCHEDULED
Status: DISCONTINUED | OUTPATIENT
Start: 2020-05-15 | End: 2020-05-20

## 2020-05-14 RX ORDER — FUROSEMIDE 20 MG/1
20 TABLET ORAL DAILY
COMMUNITY

## 2020-05-14 RX ORDER — DIGOXIN 125 MCG
250 TABLET ORAL
COMMUNITY

## 2020-05-14 RX ORDER — DIGOXIN 250 MCG
250 TABLET ORAL
Status: DISCONTINUED | OUTPATIENT
Start: 2020-05-15 | End: 2020-05-22 | Stop reason: HOSPADM

## 2020-05-14 RX ORDER — ATORVASTATIN CALCIUM 40 MG/1
80 TABLET, FILM COATED ORAL NIGHTLY
Status: DISCONTINUED | OUTPATIENT
Start: 2020-05-15 | End: 2020-05-22 | Stop reason: HOSPADM

## 2020-05-14 RX ORDER — METOPROLOL SUCCINATE 50 MG/1
50 TABLET, EXTENDED RELEASE ORAL DAILY
Status: DISCONTINUED | OUTPATIENT
Start: 2020-05-15 | End: 2020-05-22 | Stop reason: HOSPADM

## 2020-05-14 RX ORDER — MULTIPLE VITAMINS W/ MINERALS TAB 9MG-400MCG
1 TAB ORAL DAILY
COMMUNITY

## 2020-05-14 RX ORDER — KETOROLAC TROMETHAMINE 30 MG/ML
30 INJECTION, SOLUTION INTRAMUSCULAR; INTRAVENOUS EVERY 6 HOURS PRN
Status: DISCONTINUED | OUTPATIENT
Start: 2020-05-14 | End: 2020-05-16

## 2020-05-14 RX ORDER — NICOTINE POLACRILEX 4 MG
15 LOZENGE BUCCAL
Status: DISCONTINUED | OUTPATIENT
Start: 2020-05-14 | End: 2020-05-22 | Stop reason: HOSPADM

## 2020-05-14 RX ORDER — WARFARIN SODIUM 7.5 MG/1
3.75 TABLET ORAL
Status: ON HOLD | COMMUNITY
End: 2020-05-22 | Stop reason: SDUPTHER

## 2020-05-14 RX ORDER — SPIRONOLACTONE 25 MG/1
25 TABLET ORAL DAILY
COMMUNITY

## 2020-05-14 RX ORDER — HYDROCHLOROTHIAZIDE 12.5 MG/1
12.5 TABLET ORAL DAILY
COMMUNITY
End: 2020-05-22 | Stop reason: HOSPADM

## 2020-05-14 RX ORDER — DEXTROSE MONOHYDRATE 25 G/50ML
25 INJECTION, SOLUTION INTRAVENOUS
Status: DISCONTINUED | OUTPATIENT
Start: 2020-05-14 | End: 2020-05-22 | Stop reason: HOSPADM

## 2020-05-14 RX ORDER — ATORVASTATIN CALCIUM 80 MG/1
80 TABLET, FILM COATED ORAL NIGHTLY
COMMUNITY

## 2020-05-14 RX ORDER — TIZANIDINE 4 MG/1
4 TABLET ORAL EVERY 8 HOURS PRN
Status: DISCONTINUED | OUTPATIENT
Start: 2020-05-14 | End: 2020-05-22 | Stop reason: HOSPADM

## 2020-05-14 RX ORDER — ASPIRIN 81 MG/1
81 TABLET, CHEWABLE ORAL DAILY
COMMUNITY

## 2020-05-14 RX ORDER — WARFARIN SODIUM 7.5 MG/1
3.75 TABLET ORAL
Status: DISCONTINUED | OUTPATIENT
Start: 2020-05-15 | End: 2020-05-14

## 2020-05-14 RX ORDER — IRBESARTAN 75 MG/1
75 TABLET ORAL 2 TIMES DAILY
COMMUNITY
End: 2020-05-22 | Stop reason: HOSPADM

## 2020-05-14 RX ORDER — GLIPIZIDE 5 MG/1
5 TABLET ORAL EVERY EVENING
Status: DISCONTINUED | OUTPATIENT
Start: 2020-05-15 | End: 2020-05-16

## 2020-05-14 RX ORDER — ASPIRIN 81 MG/1
81 TABLET, CHEWABLE ORAL DAILY
Status: DISCONTINUED | OUTPATIENT
Start: 2020-05-15 | End: 2020-05-19

## 2020-05-14 RX ORDER — MELATONIN
1000 DAILY
Status: DISCONTINUED | OUTPATIENT
Start: 2020-05-15 | End: 2020-05-22 | Stop reason: HOSPADM

## 2020-05-14 RX ORDER — METOPROLOL SUCCINATE 50 MG/1
50 TABLET, EXTENDED RELEASE ORAL DAILY
COMMUNITY

## 2020-05-14 RX ORDER — SODIUM CHLORIDE 0.9 % (FLUSH) 0.9 %
10 SYRINGE (ML) INJECTION AS NEEDED
Status: DISCONTINUED | OUTPATIENT
Start: 2020-05-14 | End: 2020-05-20

## 2020-05-14 RX ORDER — LOSARTAN POTASSIUM 25 MG/1
25 TABLET ORAL
Status: DISCONTINUED | OUTPATIENT
Start: 2020-05-15 | End: 2020-05-22 | Stop reason: HOSPADM

## 2020-05-14 RX ORDER — ACETAMINOPHEN 650 MG/1
650 SUPPOSITORY RECTAL EVERY 4 HOURS PRN
Status: DISCONTINUED | OUTPATIENT
Start: 2020-05-14 | End: 2020-05-22 | Stop reason: HOSPADM

## 2020-05-14 RX ORDER — GLIPIZIDE 10 MG/1
10 TABLET ORAL EVERY MORNING
Status: DISCONTINUED | OUTPATIENT
Start: 2020-05-15 | End: 2020-05-22 | Stop reason: HOSPADM

## 2020-05-14 RX ORDER — GLIPIZIDE 5 MG/1
5 TABLET ORAL EVERY EVENING
COMMUNITY
End: 2020-05-22 | Stop reason: HOSPADM

## 2020-05-14 RX ORDER — FUROSEMIDE 10 MG/ML
40 INJECTION INTRAMUSCULAR; INTRAVENOUS EVERY 12 HOURS
Status: DISCONTINUED | OUTPATIENT
Start: 2020-05-15 | End: 2020-05-16

## 2020-05-14 RX ORDER — ACETAMINOPHEN 160 MG/5ML
650 SOLUTION ORAL EVERY 4 HOURS PRN
Status: DISCONTINUED | OUTPATIENT
Start: 2020-05-14 | End: 2020-05-22 | Stop reason: HOSPADM

## 2020-05-14 RX ORDER — CARVEDILOL 12.5 MG/1
12.5 TABLET ORAL 2 TIMES DAILY WITH MEALS
COMMUNITY

## 2020-05-14 RX ORDER — ONDANSETRON 2 MG/ML
4 INJECTION INTRAMUSCULAR; INTRAVENOUS EVERY 6 HOURS PRN
Status: DISCONTINUED | OUTPATIENT
Start: 2020-05-14 | End: 2020-05-22 | Stop reason: HOSPADM

## 2020-05-14 RX ORDER — ALBUTEROL SULFATE 90 UG/1
2 AEROSOL, METERED RESPIRATORY (INHALATION) EVERY 6 HOURS PRN
COMMUNITY

## 2020-05-14 RX ORDER — ACETAMINOPHEN 325 MG/1
650 TABLET ORAL EVERY 4 HOURS PRN
Status: DISCONTINUED | OUTPATIENT
Start: 2020-05-14 | End: 2020-05-22 | Stop reason: HOSPADM

## 2020-05-14 NOTE — TELEPHONE ENCOUNTER
"Calling 911 was discussed.  Son states he will have someone help him get her in the car.  It requires two people to lift her from wheelchair.    Reason for Disposition  • [1] SEVERE weakness (i.e., unable to walk or barely able to walk, requires support) AND [2] new onset or worsening    Answer Assessment - Initial Assessment Questions  1. SYMPTOM: \"What is the main symptom you are concerned about?\" (e.g., weakness, numbness)      Son says her legs are numb from her feet all the way up to her back.    2. ONSET: \"When did this start?\" (minutes, hours, days; while sleeping)      This started in March after a fall.  She has been in a wheelchair ever since.    3. LAST NORMAL: \"When was the last time you were normal (no symptoms)?\"      March 205202  4. PATTERN \"Does this come and go, or has it been constant since it started?\"  \"Is it present now?\"      Constant.   5. CARDIAC SYMPTOMS: \"Have you had any of the following symptoms: chest pain, difficulty breathing, palpitations?\"      No cardiac symptoms.   6. NEUROLOGIC SYMPTOMS: \"Have you had any of the following symptoms: headache, dizziness, vision loss, double vision, changes in speech, unsteady on your feet?\"      Has not been able to walk since March.    7. OTHER SYMPTOMS: \"Do you have any other symptoms?\"      Feet and legs are swollen and are different colors.    8. PREGNANCY: \"Is there any chance you are pregnant?\" \"When was your last menstrual period?\"      No.    Protocols used: NEUROLOGIC DEFICIT-ADULT-AH      "

## 2020-05-15 ENCOUNTER — APPOINTMENT (OUTPATIENT)
Dept: NEUROLOGY | Facility: HOSPITAL | Age: 65
End: 2020-05-15

## 2020-05-15 ENCOUNTER — APPOINTMENT (OUTPATIENT)
Dept: MRI IMAGING | Facility: HOSPITAL | Age: 65
End: 2020-05-15

## 2020-05-15 ENCOUNTER — APPOINTMENT (OUTPATIENT)
Dept: ULTRASOUND IMAGING | Facility: HOSPITAL | Age: 65
End: 2020-05-15

## 2020-05-15 LAB
ALBUMIN SERPL-MCNC: 3.7 G/DL (ref 3.5–5.2)
ALBUMIN/GLOB SERPL: 1.4 G/DL
ALP SERPL-CCNC: 43 U/L (ref 39–117)
ALT SERPL W P-5'-P-CCNC: 24 U/L (ref 1–33)
ANION GAP SERPL CALCULATED.3IONS-SCNC: 14 MMOL/L (ref 5–15)
AST SERPL-CCNC: 21 U/L (ref 1–32)
BASOPHILS # BLD AUTO: 0.07 10*3/MM3 (ref 0–0.2)
BASOPHILS NFR BLD AUTO: 0.7 % (ref 0–1.5)
BILIRUB SERPL-MCNC: 0.4 MG/DL (ref 0.2–1.2)
BUN BLD-MCNC: 13 MG/DL (ref 8–23)
BUN/CREAT SERPL: 26 (ref 7–25)
CALCIUM SPEC-SCNC: 9.1 MG/DL (ref 8.6–10.5)
CHLORIDE SERPL-SCNC: 99 MMOL/L (ref 98–107)
CHOLEST SERPL-MCNC: 123 MG/DL (ref 0–200)
CO2 SERPL-SCNC: 25 MMOL/L (ref 22–29)
CREAT BLD-MCNC: 0.5 MG/DL (ref 0.57–1)
DEPRECATED RDW RBC AUTO: 42.2 FL (ref 37–54)
EOSINOPHIL # BLD AUTO: 0.17 10*3/MM3 (ref 0–0.4)
EOSINOPHIL NFR BLD AUTO: 1.8 % (ref 0.3–6.2)
ERYTHROCYTE [DISTWIDTH] IN BLOOD BY AUTOMATED COUNT: 13.3 % (ref 12.3–15.4)
FOLATE SERPL-MCNC: >20 NG/ML (ref 4.78–24.2)
GFR SERPL CREATININE-BSD FRML MDRD: 124 ML/MIN/1.73
GLOBULIN UR ELPH-MCNC: 2.7 GM/DL
GLUCOSE BLD-MCNC: 129 MG/DL (ref 65–99)
GLUCOSE BLDC GLUCOMTR-MCNC: 129 MG/DL (ref 70–130)
GLUCOSE BLDC GLUCOMTR-MCNC: 210 MG/DL (ref 70–130)
GLUCOSE BLDC GLUCOMTR-MCNC: 241 MG/DL (ref 70–130)
HBA1C MFR BLD: 8.8 % (ref 4.8–5.6)
HCT VFR BLD AUTO: 36.2 % (ref 34–46.6)
HDLC SERPL-MCNC: 34 MG/DL (ref 40–60)
HGB BLD-MCNC: 12.3 G/DL (ref 12–15.9)
IMM GRANULOCYTES # BLD AUTO: 0.02 10*3/MM3 (ref 0–0.05)
IMM GRANULOCYTES NFR BLD AUTO: 0.2 % (ref 0–0.5)
INR PPP: 2.71 (ref 0.91–1.09)
LDLC SERPL CALC-MCNC: 63 MG/DL (ref 0–100)
LDLC/HDLC SERPL: 1.86 {RATIO}
LYMPHOCYTES # BLD AUTO: 3.34 10*3/MM3 (ref 0.7–3.1)
LYMPHOCYTES NFR BLD AUTO: 34.7 % (ref 19.6–45.3)
MCH RBC QN AUTO: 29.5 PG (ref 26.6–33)
MCHC RBC AUTO-ENTMCNC: 34 G/DL (ref 31.5–35.7)
MCV RBC AUTO: 86.8 FL (ref 79–97)
MONOCYTES # BLD AUTO: 0.77 10*3/MM3 (ref 0.1–0.9)
MONOCYTES NFR BLD AUTO: 8 % (ref 5–12)
NEUTROPHILS # BLD AUTO: 5.25 10*3/MM3 (ref 1.7–7)
NEUTROPHILS NFR BLD AUTO: 54.6 % (ref 42.7–76)
NRBC BLD AUTO-RTO: 0 /100 WBC (ref 0–0.2)
PLATELET # BLD AUTO: 177 10*3/MM3 (ref 140–450)
PMV BLD AUTO: 11 FL (ref 6–12)
POTASSIUM BLD-SCNC: 3.9 MMOL/L (ref 3.5–5.2)
PROT SERPL-MCNC: 6.4 G/DL (ref 6–8.5)
PROTHROMBIN TIME: 28.8 SECONDS (ref 11.9–14.6)
RBC # BLD AUTO: 4.17 10*6/MM3 (ref 3.77–5.28)
SODIUM BLD-SCNC: 138 MMOL/L (ref 136–145)
TRIGL SERPL-MCNC: 129 MG/DL (ref 0–150)
TSH SERPL DL<=0.05 MIU/L-ACNC: 3.91 UIU/ML (ref 0.27–4.2)
VIT B12 BLD-MCNC: <150 PG/ML (ref 211–946)
VLDLC SERPL-MCNC: 25.8 MG/DL
WBC NRBC COR # BLD: 9.62 10*3/MM3 (ref 3.4–10.8)

## 2020-05-15 PROCEDURE — 82607 VITAMIN B-12: CPT | Performed by: CLINICAL NURSE SPECIALIST

## 2020-05-15 PROCEDURE — 83036 HEMOGLOBIN GLYCOSYLATED A1C: CPT | Performed by: NURSE PRACTITIONER

## 2020-05-15 PROCEDURE — 94799 UNLISTED PULMONARY SVC/PX: CPT

## 2020-05-15 PROCEDURE — 80061 LIPID PANEL: CPT | Performed by: NURSE PRACTITIONER

## 2020-05-15 PROCEDURE — 97162 PT EVAL MOD COMPLEX 30 MIN: CPT | Performed by: PHYSICAL THERAPIST

## 2020-05-15 PROCEDURE — 99222 1ST HOSP IP/OBS MODERATE 55: CPT | Performed by: PSYCHIATRY & NEUROLOGY

## 2020-05-15 PROCEDURE — 63710000001 INSULIN LISPRO (HUMAN) PER 5 UNITS: Performed by: NURSE PRACTITIONER

## 2020-05-15 PROCEDURE — 86334 IMMUNOFIX E-PHORESIS SERUM: CPT | Performed by: PSYCHIATRY & NEUROLOGY

## 2020-05-15 PROCEDURE — 82962 GLUCOSE BLOOD TEST: CPT

## 2020-05-15 PROCEDURE — 25010000002 CYANOCOBALAMIN PER 1000 MCG: Performed by: CLINICAL NURSE SPECIALIST

## 2020-05-15 PROCEDURE — 72146 MRI CHEST SPINE W/O DYE: CPT

## 2020-05-15 PROCEDURE — 93970 EXTREMITY STUDY: CPT

## 2020-05-15 PROCEDURE — 93010 ELECTROCARDIOGRAM REPORT: CPT | Performed by: INTERNAL MEDICINE

## 2020-05-15 PROCEDURE — 82784 ASSAY IGA/IGD/IGG/IGM EACH: CPT | Performed by: PSYCHIATRY & NEUROLOGY

## 2020-05-15 PROCEDURE — 25010000002 FUROSEMIDE PER 20 MG: Performed by: NURSE PRACTITIONER

## 2020-05-15 PROCEDURE — 82746 ASSAY OF FOLIC ACID SERUM: CPT | Performed by: CLINICAL NURSE SPECIALIST

## 2020-05-15 PROCEDURE — 85610 PROTHROMBIN TIME: CPT | Performed by: NURSE PRACTITIONER

## 2020-05-15 PROCEDURE — 95911 NRV CNDJ TEST 9-10 STUDIES: CPT

## 2020-05-15 PROCEDURE — 25010000002 KETOROLAC TROMETHAMINE PER 15 MG: Performed by: NURSE PRACTITIONER

## 2020-05-15 PROCEDURE — 97166 OT EVAL MOD COMPLEX 45 MIN: CPT

## 2020-05-15 PROCEDURE — 95911 NRV CNDJ TEST 9-10 STUDIES: CPT | Performed by: PSYCHIATRY & NEUROLOGY

## 2020-05-15 PROCEDURE — 93970 EXTREMITY STUDY: CPT | Performed by: SURGERY

## 2020-05-15 PROCEDURE — 84425 ASSAY OF VITAMIN B-1: CPT | Performed by: CLINICAL NURSE SPECIALIST

## 2020-05-15 PROCEDURE — 80053 COMPREHEN METABOLIC PANEL: CPT | Performed by: NURSE PRACTITIONER

## 2020-05-15 PROCEDURE — 85025 COMPLETE CBC W/AUTO DIFF WBC: CPT | Performed by: NURSE PRACTITIONER

## 2020-05-15 PROCEDURE — 84443 ASSAY THYROID STIM HORMONE: CPT | Performed by: NURSE PRACTITIONER

## 2020-05-15 PROCEDURE — 84207 ASSAY OF VITAMIN B-6: CPT | Performed by: CLINICAL NURSE SPECIALIST

## 2020-05-15 RX ORDER — ALBUTEROL SULFATE 2.5 MG/3ML
2.5 SOLUTION RESPIRATORY (INHALATION) EVERY 4 HOURS PRN
Status: DISCONTINUED | OUTPATIENT
Start: 2020-05-15 | End: 2020-05-22 | Stop reason: HOSPADM

## 2020-05-15 RX ORDER — CYANOCOBALAMIN 1000 UG/ML
1000 INJECTION, SOLUTION INTRAMUSCULAR; SUBCUTANEOUS ONCE
Status: COMPLETED | OUTPATIENT
Start: 2020-05-15 | End: 2020-05-15

## 2020-05-15 RX ORDER — LORAZEPAM 2 MG/ML
1 INJECTION INTRAMUSCULAR ONCE
Status: COMPLETED | OUTPATIENT
Start: 2020-05-15 | End: 2020-05-19

## 2020-05-15 RX ADMIN — ACETAMINOPHEN 650 MG: 325 TABLET, FILM COATED ORAL at 04:41

## 2020-05-15 RX ADMIN — SODIUM CHLORIDE, PRESERVATIVE FREE 10 ML: 5 INJECTION INTRAVENOUS at 01:37

## 2020-05-15 RX ADMIN — CARVEDILOL 12.5 MG: 6.25 TABLET, FILM COATED ORAL at 08:25

## 2020-05-15 RX ADMIN — MUPIROCIN: 20 OINTMENT TOPICAL at 14:49

## 2020-05-15 RX ADMIN — KETOROLAC TROMETHAMINE 30 MG: 30 INJECTION, SOLUTION INTRAMUSCULAR; INTRAVENOUS at 01:43

## 2020-05-15 RX ADMIN — GLIPIZIDE 5 MG: 5 TABLET ORAL at 17:50

## 2020-05-15 RX ADMIN — TIZANIDINE 4 MG: 4 TABLET ORAL at 01:10

## 2020-05-15 RX ADMIN — LOSARTAN POTASSIUM 25 MG: 25 TABLET, FILM COATED ORAL at 08:24

## 2020-05-15 RX ADMIN — MUPIROCIN: 20 OINTMENT TOPICAL at 20:46

## 2020-05-15 RX ADMIN — ATORVASTATIN CALCIUM 80 MG: 40 TABLET, FILM COATED ORAL at 20:46

## 2020-05-15 RX ADMIN — DIGOXIN 250 MCG: 250 TABLET ORAL at 14:48

## 2020-05-15 RX ADMIN — SODIUM CHLORIDE, PRESERVATIVE FREE 10 ML: 5 INJECTION INTRAVENOUS at 08:26

## 2020-05-15 RX ADMIN — FUROSEMIDE 40 MG: 10 INJECTION, SOLUTION INTRAMUSCULAR; INTRAVENOUS at 14:53

## 2020-05-15 RX ADMIN — ASPIRIN 81 MG: 81 TABLET, CHEWABLE ORAL at 08:24

## 2020-05-15 RX ADMIN — GLIPIZIDE 10 MG: 10 TABLET ORAL at 08:25

## 2020-05-15 RX ADMIN — FUROSEMIDE 40 MG: 10 INJECTION, SOLUTION INTRAMUSCULAR; INTRAVENOUS at 01:37

## 2020-05-15 RX ADMIN — CARVEDILOL 12.5 MG: 6.25 TABLET, FILM COATED ORAL at 17:50

## 2020-05-15 RX ADMIN — SODIUM CHLORIDE, PRESERVATIVE FREE 10 ML: 5 INJECTION INTRAVENOUS at 20:46

## 2020-05-15 RX ADMIN — VITAMIN D 1000 UNITS: 25 TAB ORAL at 08:24

## 2020-05-15 RX ADMIN — CYANOCOBALAMIN 1000 MCG: 1000 INJECTION, SOLUTION INTRAMUSCULAR at 14:49

## 2020-05-15 RX ADMIN — INSULIN LISPRO 4 UNITS: 100 INJECTION, SOLUTION INTRAVENOUS; SUBCUTANEOUS at 17:50

## 2020-05-15 NOTE — PROGRESS NOTES
RT Nebulizer Protocol    Assessment tool to be used for patients with existing breathing treatments ordered by hospitalists                                                                  0  1  2  3  4      Respiratory History   No Smoking      Smoking History      1 Pack/Day      Pulmonary Disease   x   Exacerbation        Respiratory Rate   Normal   x   20-25      Dyspneic      Accessory Muscles      Severe Dyspnea        Breath Sounds   Clear x     Crackles      Crackles/ Rhonchi      Wheezing      Absent/ Severe Wheezing        Chest   X-ray   Clear/no xray   x   1 Lobe Infiltration/ Consolidation/ PE      2 Lobe Same Lung Infiltration/ Consolidation/ PE       2 Lobe Infiltration/ Both Lungs/ Consolidation/ PE      Both Lungs/ More Than 1 Lobe/ Atelectasis/ Consolidation/  PE        Cough   Strong Non- Productive   x   Excessive Secretions/ Strong Cough      Excessive Secretions/ Weak Cough      Thick Bronchial Secretions/ Weak Cough      Thick Bronchial Secretions/ No Cough        Total Patient Score =  3  0-4=Q4 PRN  5-9=TID and Q4 PRN  10-14=QID and Q3 PRN  15-19=Q4 and Q2 PRN  20=Q3 and Q2 PRN    Bronchopulmonary Hygiene (CPT)   Q4 ATC Copious secretions, dyspnea, unable to sleep, mucus plug    QID & Q4 PRN Moderate secretions    TID Small amounts of secretions w/ poor cough and history of secretions    BID Unable to deep breathe and cough spontaneously       Lung Expansion Therapy (PEP)   Q4 & PRN at night Severe atelectasis, poor oxygenation    QID  High risk for persistent atelectasis, existence of same    TID At risk for developing atelectasis    BID Unable to deep breathe and cough spontaneously     Instruct, 1 follow up Patients able to perform well on their own      Contiune present order of Q4 PRN ALBUTEROL

## 2020-05-15 NOTE — NURSING NOTE
RT Nebulizer Protocol    Assessment tool to be used for patients with existing breathing treatments ordered by hospitalists                                                                  0  1  2  3  4      Respiratory History   No Smoking    Smoking History    1 Pack/Day    Pulmonary Disease X   Exacerbation      Respiratory Rate   Normal X   20-25    Dyspneic    Accessory Muscles    Severe Dyspnea      Breath Sounds   Clear   X   Crackles      Crackles/ Rhonchi      Wheezing      Absent/ Severe Wheezing        Chest   X-ray   Clear/NO XRAY   X   1 Lobe Infiltration/ Consolidation/ PE      2 Lobe Same Lung Infiltration/ Consolidation/ PE       2 Lobe Infiltration/ Both Lungs/ Consolidation/ PE      Both Lungs/ More Than 1 Lobe/ Atelectasis/ Consolidation/  PE        Cough   Strong Non- Productive   X   Excessive Secretions/ Strong Cough      Excessive Secretions/ Weak Cough      Thick Bronchial Secretions/ Weak Cough      Thick Bronchial Secretions/ No Cough        Total Patient Score =  3  0-4=Q4 PRN  5-9=TID and Q4 PRN  10-14=QID and Q3 PRN  15-19=Q4 and Q2 PRN  20=Q3 and Q2 PRN    Bronchopulmonary Hygiene (CPT)   Q4 ATC Copious secretions, dyspnea, unable to sleep, mucus plug    QID & Q4 PRN Moderate secretions    TID Small amounts of secretions w/ poor cough and history of secretions    BID Unable to deep breathe and cough spontaneously       Lung Expansion Therapy (PEP)   Q4 & PRN at night Severe atelectasis, poor oxygenation    QID  High risk for persistent atelectasis, existence of same    TID At risk for developing atelectasis    BID Unable to deep breathe and cough spontaneously     Instruct, 1 follow up Patients able to perform well on their own      PATIENT TAKES RESCUE INHALER ALBUTEROL, ORDERING Q4 PRN ALBUTEROL .

## 2020-05-15 NOTE — CONSULTS
Neurology Consult Note    Referring Provider: TYSON Epstein  Reason for Consultation: progressive lower extremity weakness      History of present illness:    Patient history is obtained by patient and medical records. Patient is right handed. She retired from [employer] work 2/2020. Patient has history of Afib on ASA and warfarin, stroke 2006 that left her with remaining mild right arm and leg weakness (states if she was holding something in her right hand and not looking at it she would drop it) , CAD, CHF, Fabry disease,vitamin D deficiency,  hypertension, hyperlipidemia, obesity, DM with recent A1C 8.8. Patient states prior to her first hospitalization at [facility] 2/2020 she was ambulating independently.  2/20/2020 patient presented to [facility] with left leg weakness as there was concern for stroke. MRI brain was negative for acute stroke but showed prior left lacunar infarct. Carotid duplex scan showed 50-69% right ICA stenosis and less than 50% stenosis of left ICA. Work up showed worsening CHF with EF of 25-30%. She had medication changes and was discharged home. Per patient It was recommended to her to obtain a walker for possible medication effect causing weakness and imbalance. At that time she noted numbness on ball of both feet.   On day patient officially retired 2/28/2020 she suffered a fall. She as admitted to [facility] again. Xray of both knees showed moderate to severe osteoarthritis. Labs showed B12 193. Patient unsure if this was treated and B12 not listed on medication list. She was subsequently discharge to nursing home for rehab but patient states she was not getting therapy and she was discharged to home with her sons as her caregivers. Since that fall, She has had progressive ascending numbness. She basically was wheelchair bound stating she could not feel her legs under her and when she stood felt like her knees would not support her. She noted swelling and redness in her feet about the  same time. About 3 weeks ago she noted numbness had radiated to her hips and yesterday numbness had progressed to her umbilical region. Yesterday she also noted that it seemed to take more effort to empty her bladder with no incontinence.  She is able to control her bowels but still has the feeling that she needs to have a BM.  She is complaining of low back pain across her hips. Patient denies difficulty swallowing or SOB.     Patient denies illness for the last 6 months. Denies fever, chills. She does not receive influenza vaccine. Denies rash, shingles.       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[facility])    • Hyperlipidemia    • Hypertension    • Left sided lacunar infarction (CMS/[facility])    • Obesity, Class II, BMI 35-39.9    • Systolic heart failure (CMS/[facility])    • Type 2 diabetes mellitus (CMS/[facility])        Allergies   Allergen Reactions   • Aspartame And Phenylalanine Anaphylaxis, Shortness Of Breath and Swelling   • Mushroom Anaphylaxis   • Mushroom Extract Complex Anaphylaxis, Shortness Of Breath and Swelling   • Penicillins Rash, Anaphylaxis, Hives, Itching, Shortness Of Breath and Swelling     TOLERATES ROCEPHIN   • Shellfish-Derived Products Anaphylaxis   • Ezetimibe Nausea Only     No current facility-administered medications on file prior to encounter.      Current Outpatient Medications on File Prior to Encounter   Medication Sig   • albuterol sulfate  (90 Base) MCG/ACT inhaler Inhale 2 puffs Every 6 (Six) Hours As Needed for Wheezing.   • aspirin 81 MG chewable tablet Chew 81 mg Daily.   • atorvastatin (LIPITOR) 80 MG tablet Take 80 mg by mouth Every Night.   • carvedilol (COREG) 12.5 MG tablet Take 12.5 mg by mouth 2 (Two) Times a Day With Meals.   • cholecalciferol (VITAMIN D3) 25 MCG (1000 UT) tablet Take 1,000 Units by mouth Daily.   • digoxin  (LANOXIN) 125 MCG tablet Take 250 mcg by mouth Daily.   • furosemide (LASIX) 20 MG tablet Take 20 mg by mouth Daily.   • glipizide (GLUCOTROL) 10 MG tablet Take 10 mg by mouth Every Morning.   • glipizide (GLUCOTROL) 5 MG tablet Take 5 mg by mouth Every Evening.   • hydroCHLOROthiazide (HYDRODIURIL) 12.5 MG tablet Take 12.5 mg by mouth Daily.   • irbesartan (AVAPRO) 75 MG tablet Take 75 mg by mouth 2 (Two) Times a Day.   • metFORMIN (GLUCOPHAGE) 500 MG tablet Take 1,000 mg by mouth 2 (Two) Times a Day With Meals.   • metoprolol succinate XL (TOPROL-XL) 50 MG 24 hr tablet Take 50 mg by mouth Daily.   • Multiple Vitamins-Minerals (MULTIVITAMIN WITH MINERALS) tablet tablet Take 1 tablet by mouth Daily.   • spironolactone (ALDACTONE) 25 MG tablet Take 25 mg by mouth Daily.   • warfarin (COUMADIN) 7.5 MG tablet Take 3.75 mg by mouth Daily.       Current Facility-Administered Medications:   •  acetaminophen (TYLENOL) tablet 650 mg, 650 mg, Oral, Q4H PRN, 650 mg at 05/15/20 0441 **OR** acetaminophen (TYLENOL) 160 MG/5ML solution 650 mg, 650 mg, Oral, Q4H PRN **OR** acetaminophen (TYLENOL) suppository 650 mg, 650 mg, Rectal, Q4H PRN, Laurie Schwarz, APRN  •  albuterol (PROVENTIL) nebulizer solution 0.083% 2.5 mg/3mL, 2.5 mg, Nebulization, Q4H PRN, Foreign Anna MD  •  aspirin chewable tablet 81 mg, 81 mg, Oral, Daily, Laurie Schwarz, APRN, 81 mg at 05/15/20 0824  •  atorvastatin (LIPITOR) tablet 80 mg, 80 mg, Oral, Nightly, Laurie Schwarz, APRN  •  carvedilol (COREG) tablet 12.5 mg, 12.5 mg, Oral, BID With Meals, Laurie Schwarz, APRN, 12.5 mg at 05/15/20 0825  •  cholecalciferol (VITAMIN D3) tablet 1,000 Units, 1,000 Units, Oral, Daily, Laurie Schwarz, APRN, 1,000 Units at 05/15/20 0824  •  dextrose (D50W) 25 g/ 50mL Intravenous Solution 25 g, 25 g, Intravenous, Q15 Min PRN, Laurie Schwarz, APRN  •  dextrose (GLUTOSE) oral gel 15 g, 15 g, Oral, Q15 Min PRN, Laurie Schwarz, APRN  •  digoxin  (LANOXIN) tablet 250 mcg, 250 mcg, Oral, Daily, Laurie Schwarz, APRN  •  furosemide (LASIX) injection 40 mg, 40 mg, Intravenous, Q12H, Laurie Schwarz, APRN, 40 mg at 05/15/20 0137  •  glipizide (GLUCOTROL) tablet 10 mg, 10 mg, Oral, QAM, Laurie Schwarz, APRN, 10 mg at 05/15/20 0825  •  glipizide (GLUCOTROL) tablet 5 mg, 5 mg, Oral, Q PM, Laurie Schwarz, APRN  •  glucagon (human recombinant) (GLUCAGEN DIAGNOSTIC) injection 1 mg, 1 mg, Subcutaneous, Q15 Min PRN, Laurie Schwarz, APRN  •  insulin lispro (humaLOG) injection 2-9 Units, 2-9 Units, Subcutaneous, TID AC, Laurie Schwarz, APRN  •  ketorolac (TORADOL) injection 30 mg, 30 mg, Intravenous, Q6H PRN, Laurie Schwarz, APRN, 30 mg at 05/15/20 0143  •  losartan (COZAAR) tablet 25 mg, 25 mg, Oral, Q24H, Laurie Schwarz, APRN, 25 mg at 05/15/20 0824  •  metoprolol succinate XL (TOPROL-XL) 24 hr tablet 50 mg, 50 mg, Oral, Daily, Laurie Schwarz, APRN  •  mupirocin (BACTROBAN) 2 % ointment, , Topical, Q12H, Alfredo Champion MD  •  ondansetron (ZOFRAN) tablet 4 mg, 4 mg, Oral, Q6H PRN **OR** ondansetron (ZOFRAN) injection 4 mg, 4 mg, Intravenous, Q6H PRN, Laurie Schwarz, APRN  •  sodium chloride 0.9 % flush 10 mL, 10 mL, Intravenous, Q12H, Laurie Schwarz, APRN, 10 mL at 05/15/20 0826  •  sodium chloride 0.9 % flush 10 mL, 10 mL, Intravenous, PRN, Laurie Schwarz, APRN  •  tiZANidine (ZANAFLEX) tablet 4 mg, 4 mg, Oral, Q8H PRN, Laurie Schwarz, APRN, 4 mg at 05/15/20 0110    Social History     Socioeconomic History   • Marital status:      Spouse name: Not on file   • Number of children: Not on file   • Years of education: Not on file   • Highest education level: Not on file   Tobacco Use   • Smoking status: Never Smoker   Substance and Sexual Activity   • Alcohol use: Never     Frequency: Never   • Drug use: Never     Family History   Problem Relation Age of Onset   • Heart failure Mother    • Hypertension Mother    •  Diabetes Mother    • Liver cancer Father        Review of Systems  A 14 point review of systems was reviewed and was negative except for numbness bilateral lower extremities and weakness.     Vital Signs   Temp:  [97.4 °F (36.3 °C)-98.7 °F (37.1 °C)] 98.3 °F (36.8 °C)  Heart Rate:  [] 77  Resp:  [18-20] 18  BP: (115-141)/() 132/65    General Exam:  Head:  Normal cephalic, atraumatic  HEENT:  Neck supple  Fundoscopic Exam:  No signs of disc edema  CVS:  Regular rate and rhythm.  No murmurs  Carotid Examination:  No bruits  Lungs:  Clear to auscultation  Abdomen:  Non-tender, Non-distended. Umbilical hernia. Obese.   Extremities:  Pitting edema bilateral lower extremities  Skin: redness on  Both feet radiating to her knees in a lace pattern  Neurologic Exam:    Mental Status:    -Awake, Alert, Oriented X 3  -No word finding difficulties  -No aphasia  -No dysarthria  -Follows simple and complex commands    CN II:  Visual fields full.  Pupils equally reactive to light  CN III, IV, VI:  Extraocular Muscles full with no signs of nystagmus  CN V:  Facial sensory is symmetric with no asymmetries.  CN VII:  Facial motor symmetric  CN VIII:  Gross hearing intact bilaterally  CN IX:  Palate elevates symmetrically  CN X:  Palate elevates symmetrically  CN XI:  Shoulder shrug symmetric  CN XII:  Tongue is midline on protrusion    Motor: (strength out of 5:  1= minimal movement, 2 = movement in plane of gravity, 3 = movement against gravity, 4 = movement against some resistance, 5 = full strength)    -Right Upper Ext: Proximal: 5 Distal: 5  -Left Upper Ext: Proximal: 5 Distal: 5    -Right Lower Ext: Proximal: 5 Distal: 5  -Left Lower Ext: Proximal: 4 Distal: 4  - right plantar and dorsiflexion 5/5  - left plantar and dorsiflexion 5-/5        DTR:  -Right   Bicep: 2+ Tricep: 2+ Brachoradialis: 2+   Patella: 0 Ankle: 0 Neg Babinski  -Left   Bicep: 2+ Tricep: 2+ Brachoradialis: 2+   Patella: 0 Ankle: 0 Neg  Babinski    Sensory:  -Intact to light touch, pinprick, temperature, pain, but appears to be subjectively less on left from umbilicus to toes.  Proprioception altered distally. Patient states feels like her toes are being twisted while tested.       Coordination:  -Finger to nose intact  -Heel to shin intact  But states she cannot lift her legs to perform.  -No ataxia    Gait  -not attempted for safety reasons.    Results Review:  Lab Results (last 24 hours)     Procedure Component Value Units Date/Time    Vitamin B1, Whole Blood [[id number]] Collected:  05/15/20 0900    Specimen:  Blood Updated:  05/15/20 0905    Vitamin B6 [[id number]] Collected:  05/15/20 0900    Specimen:  Blood Updated:  05/15/20 0905    Vitamin B12 [[id number]] Collected:  05/15/20 0900    Specimen:  Blood Updated:  05/15/20 0905    Folate [[id number]] Collected:  05/15/20 0900    Specimen:  Blood Updated:  05/15/20 0905    POC Glucose Once [[id number]]  (Normal) Collected:  05/15/20 0751    Specimen:  Blood Updated:  05/15/20 0802     Glucose 129 mg/dL      Comment: : [id number] Surinder Gonsalves DestinyMeter ID: [id number]       Comprehensive Metabolic Panel [[id number]]  (Abnormal) Collected:  05/15/20 0352    Specimen:  Blood Updated:  05/15/20 0518     Glucose 129 mg/dL      BUN 13 mg/dL      Creatinine 0.50 mg/dL      Sodium 138 mmol/L      Potassium 3.9 mmol/L      Chloride 99 mmol/L      CO2 25.0 mmol/L      Calcium 9.1 mg/dL      Total Protein 6.4 g/dL      Albumin 3.70 g/dL      ALT (SGPT) 24 U/L      AST (SGOT) 21 U/L      Alkaline Phosphatase 43 U/L      Total Bilirubin 0.4 mg/dL      eGFR Non African Amer 124 mL/min/1.73      Globulin 2.7 gm/dL      A/G Ratio 1.4 g/dL      BUN/Creatinine Ratio 26.0     Anion Gap 14.0 mmol/L     Narrative:       GFR Normal >60  Chronic Kidney Disease <60  Kidney Failure <15      Lipid Panel [[id number]]  (Abnormal) Collected:  05/15/20 0352    Specimen:  Blood Updated:  05/15/20 0518     Total Cholesterol  123 mg/dL      Triglycerides 129 mg/dL      HDL Cholesterol 34 mg/dL      LDL Cholesterol  63 mg/dL      VLDL Cholesterol 25.8 mg/dL      LDL/HDL Ratio 1.86    Narrative:       Cholesterol Reference Ranges  (U.S. Department of Health and Human Services ATP III Classifications)    Desirable          <200 mg/dL  Borderline High    200-239 mg/dL  High Risk          >240 mg/dL      Triglyceride Reference Ranges  (U.S. Department of Health and Human Services ATP III Classifications)    Normal           <150 mg/dL  Borderline High  150-199 mg/dL  High             200-499 mg/dL  Very High        >500 mg/dL    HDL Reference Ranges  (U.S. Department of Health and Human Services ATP III Classifcations)    Low     <40 mg/dl (major risk factor for CHD)  High    >60 mg/dl ('negative' risk factor for CHD)        LDL Reference Ranges  (U.S. Department of Health and Human Services ATP III Classifcations)    Optimal          <100 mg/dL  Near Optimal     100-129 mg/dL  Borderline High  130-159 mg/dL  High             160-189 mg/dL  Very High        >189 mg/dL    TSH [[id number]]  (Normal) Collected:  05/15/20 0352    Specimen:  Blood Updated:  05/15/20 0518     TSH 3.910 uIU/mL      Comment: TSH results may be falsely decreased if patient taking Biotin.       Hemoglobin A1c [[id number]]  (Abnormal) Collected:  05/15/20 0352    Specimen:  Blood Updated:  05/15/20 0509     Hemoglobin A1C 8.80 %     Narrative:       Hemoglobin A1C Ranges:    Increased Risk for Diabetes  5.7% to 6.4%  Diabetes                     >= 6.5%  Diabetic Goal                < 7.0%    Protime-INR [[id number]]  (Abnormal) Collected:  05/15/20 0352    Specimen:  Blood Updated:  05/15/20 0457     Protime 28.8 Seconds      INR 2.71    CBC Auto Differential [[id number]]  (Abnormal) Collected:  05/15/20 0352    Specimen:  Blood Updated:  05/15/20 0449     WBC 9.62 10*3/mm3      RBC 4.17 10*6/mm3      Hemoglobin 12.3 g/dL      Hematocrit 36.2 %      MCV 86.8 fL      MCH  29.5 pg      MCHC 34.0 g/dL      RDW 13.3 %      RDW-SD 42.2 fl      MPV 11.0 fL      Platelets 177 10*3/mm3      Neutrophil % 54.6 %      Lymphocyte % 34.7 %      Monocyte % 8.0 %      Eosinophil % 1.8 %      Basophil % 0.7 %      Immature Grans % 0.2 %      Neutrophils, Absolute 5.25 10*3/mm3      Lymphocytes, Absolute 3.34 10*3/mm3      Monocytes, Absolute 0.77 10*3/mm3      Eosinophils, Absolute 0.17 10*3/mm3      Basophils, Absolute 0.07 10*3/mm3      Immature Grans, Absolute 0.02 10*3/mm3      nRBC 0.0 /100 WBC     Digoxin Level [[id number]]  (Abnormal) Collected:  05/14/20 2056    Specimen:  Blood from Arm, Left Updated:  05/14/20 2355     Digoxin 0.40 ng/mL     Comprehensive Metabolic Panel [[id number]]  (Abnormal) Collected:  05/14/20 2056    Specimen:  Blood from Arm, Left Updated:  05/14/20 2122     Glucose 247 mg/dL      BUN 12 mg/dL      Creatinine 0.61 mg/dL      Sodium 132 mmol/L      Potassium 4.6 mmol/L      Chloride 91 mmol/L      CO2 26.0 mmol/L      Calcium 9.4 mg/dL      Total Protein 7.9 g/dL      Albumin 4.50 g/dL      ALT (SGPT) 31 U/L      AST (SGOT) 25 U/L      Alkaline Phosphatase 55 U/L      Total Bilirubin 0.3 mg/dL      eGFR Non African Amer 98 mL/min/1.73      Globulin 3.4 gm/dL      A/G Ratio 1.3 g/dL      BUN/Creatinine Ratio 19.7     Anion Gap 15.0 mmol/L     Narrative:       GFR Normal >60  Chronic Kidney Disease <60  Kidney Failure <15      CK [[id number]]  (Normal) Collected:  05/14/20 2056    Specimen:  Blood from Arm, Left Updated:  05/14/20 2121     Creatine Kinase 58 U/L     BNP [[id number]]  (Normal) Collected:  05/14/20 2056    Specimen:  Blood from Arm, Left Updated:  05/14/20 2120     proBNP 691.2 pg/mL     Narrative:       Among patients with dyspnea, NT-proBNP is highly sensitive for the detection of acute congestive heart failure. In addition NT-proBNP of <300 pg/ml effectively rules out acute congestive heart failure with 99% negative predictive value.    Results may be  falsely decreased if patient taking Biotin.      Phosphorus [[id number]]  (Normal) Collected:  05/14/20 2056    Specimen:  Blood from Arm, Left Updated:  05/14/20 2119     Phosphorus 3.5 mg/dL     Magnesium [[id number]]  (Normal) Collected:  05/14/20 2056    Specimen:  Blood from Arm, Left Updated:  05/14/20 2116     Magnesium 1.8 mg/dL     Protime-INR [[id number]]  (Abnormal) Collected:  05/14/20 2056    Specimen:  Blood from Arm, Left Updated:  05/14/20 2113     Protime 28.3 Seconds      INR 2.65    aPTT [[id number]]  (Abnormal) Collected:  05/14/20 2056    Specimen:  Blood from Arm, Left Updated:  05/14/20 2113     PTT 38.3 seconds     Urinalysis With Culture If Indicated - Urine, Catheter [[id number]]  (Abnormal) Collected:  05/14/20 2046    Specimen:  Urine, Catheter Updated:  05/14/20 2106     Color, UA Yellow     Appearance, UA Clear     pH, UA 7.0     Specific Gravity, UA <=1.005     Glucose,  mg/dL (Trace)     Ketones, UA Negative     Bilirubin, UA Negative     Blood, UA Negative     Protein, UA Negative     Leuk Esterase, UA Negative     Nitrite, UA Negative     Urobilinogen, UA 0.2 E.U./dL    Narrative:       Urine microscopic not indicated.    CBC & Differential [[id number]] Collected:  05/14/20 2056    Specimen:  Blood from Arm, Left Updated:  05/14/20 2104    Narrative:       The following orders were created for panel order CBC & Differential.  Procedure                               Abnormality         Status                     ---------                               -----------         ------                     CBC Auto Differential[[id number]]        Normal              Final result                 Please view results for these tests on the individual orders.    CBC Auto Differential [[id number]]  (Normal) Collected:  05/14/20 2056    Specimen:  Blood from Arm, Left Updated:  05/14/20 2104     WBC 9.61 10*3/mm3      RBC 4.84 10*6/mm3      Hemoglobin 14.2 g/dL      Hematocrit 42.2 %      MCV 87.2 fL       MCH 29.3 pg      MCHC 33.6 g/dL      RDW 13.4 %      RDW-SD 42.5 fl      MPV 10.2 fL      Platelets 216 10*3/mm3      Neutrophil % 59.7 %      Lymphocyte % 29.2 %      Monocyte % 8.3 %      Eosinophil % 1.8 %      Basophil % 0.7 %      Immature Grans % 0.3 %      Neutrophils, Absolute 5.73 10*3/mm3      Lymphocytes, Absolute 2.81 10*3/mm3      Monocytes, Absolute 0.80 10*3/mm3      Eosinophils, Absolute 0.17 10*3/mm3      Basophils, Absolute 0.07 10*3/mm3      Immature Grans, Absolute 0.03 10*3/mm3      nRBC 0.0 /100 WBC           .  Imaging Results (Last 24 Hours)     ** No results found for the last 24 hours. **          Active Hospital Problems    Diagnosis POA   • Weakness of both lower extremities [R29.898] Yes   • Essential hypertension [I10] Yes   • Mild CAD [I25.10] Yes   • Chronic atrial fibrillation [I48.20] Yes   • Morbid obesity due to excess calories (CMS/[facility]) [E66.01] Yes   • Systolic congestive heart failure (CMS/[facility]) [I50.20] Yes   • Type 2 diabetes mellitus with microalbuminuria, without long-term current use of insulin (CMS/[facility]) [E11.29, R80.9] Yes   • Long term current use of anticoagulant therapy [Z79.01] Not Applicable       Impression  1. Progressive numbness and subjective weakness of bilateral lower extremities. Patient does have strength bilateral lower extremities but has altered proprioception that may cause patient to feel her legs would not support her.   2. B12 deficiency  3. Prior stroke left lacunar with reported right sided weakness.   4. Atrial fibrillation on chronic warfarin and ASA 81 mg.  5. CHF  6. DM.  7. Osteoarthritis  8. Vitamin D deficiency on long term therapy.     Plan  1. MRI cervical, thoracic, and lumbar spine all with and without.  2. EMG/NCV bilateral lower extremities  3. Check B12, B1, B6, folate levels. Vitamin D level, Vitamin E and A levels.  4. B12 1000 mcg SQ today.  5. OT/PT to see patient.  6. More recommendations after patient seen and examined by   MATTY Manrique.   7. Will check lower extremity doppler studies.     I discussed the patients findings and my recommendations with patient    Grazyna Ortega, APRN  05/15/20  10:17

## 2020-05-15 NOTE — PLAN OF CARE
Problem: Patient Care Overview  Goal: Plan of Care Review  Outcome: Ongoing (interventions implemented as appropriate)  Flowsheets (Taken 5/15/2020 1130)  Progress: no change  Plan of Care Reviewed With: patient  Note:   Pt with A1c 8.8%. We discussed current diet and key food habit changes to aid with better DM control and with lowering her A1c. She states she is familiar with how to follow a diabetic diet as she has achieved a much lower A1c previously. She states this is difficult now as she is not able to prepare her own meals. Her sons care for her and prepare her meals and do not aid with her following a diabetic diet. Provided alternative options for helping get back on track however pt did not seem interested as she does not want to burden her sons. Encouraged diet compliance and stressed the importance. Allowed time for questions, will continue to follow.

## 2020-05-15 NOTE — ED PROVIDER NOTES
Subjective   Ms. Foreman is a 64-year-old female who has a 3-month history of progressively ascending weakness and numbness in her lower extremities.  She said it started actually when she retired from her work where she used to be on her feet 8 hours a day.  In February she was worked up in [place] for possible stroke because because the symptoms were more unilateral back then.  She presents today saying that the feeling of numbness and weakness is now ascended to around her umbilicus.  Contradictory she says she has been wearing an adult diaper for some time now but today was the only time she had some leakage of urine and difficulty controlling her urine.  She states she is unable to ambulate at this time.  She denies fever per se          Review of Systems   Neurological: Positive for weakness and numbness.   All other systems reviewed and are negative.      History reviewed. No pertinent past medical history.    Allergies   Allergen Reactions   • Aspartame And Phenylalanine Anaphylaxis   • Mushroom Anaphylaxis   • Shellfish-Derived Products Anaphylaxis   • Penicillins Rash       History reviewed. No pertinent surgical history.    No family history on file.    Social History     Socioeconomic History   • Marital status:      Spouse name: Not on file   • Number of children: Not on file   • Years of education: Not on file   • Highest education level: Not on file           Objective   Physical Exam   Constitutional: She is oriented to person, place, and time. She appears well-developed and well-nourished.   HENT:   Head: Normocephalic and atraumatic.   Mouth/Throat: Oropharynx is clear and moist.   Eyes: Conjunctivae and EOM are normal.   Neck: Normal range of motion. Neck supple.   Cardiovascular: Normal rate, regular rhythm, normal heart sounds and intact distal pulses.   Pulmonary/Chest: Effort normal and breath sounds normal.   Abdominal: Soft. Bowel sounds are normal.   Musculoskeletal: Normal range of  motion.   Neurological: She is alert and oriented to person, place, and time. A sensory deficit is present. No cranial nerve deficit. GCS eye subscore is 4. GCS verbal subscore is 5. GCS motor subscore is 6.   The patient is unable to flex her knees against any resistance.  She is able to flex her right knee against gravity but has difficulty with the left knee.  I was unable to elicit any lower extremity DTRs.  Her sensory exam is significant for apparent numbness in the lower extremities bilaterally extending up to around the level of T10   Skin: Skin is warm and dry. Capillary refill takes less than 2 seconds.   Psychiatric: She has a normal mood and affect. Her behavior is normal. Judgment and thought content normal.   Nursing note and vitals reviewed.      Procedures           ED Course                                           MDM  Number of Diagnoses or Management Options     Amount and/or Complexity of Data Reviewed  Clinical lab tests: reviewed and ordered    Patient Progress  Patient progress: stable      Final diagnoses:   Weakness of both lower extremities       I discussed the case with neurology and they advised admission to the hospitalist service and an MRI of the entire spine and EMG studies in the morning.  The patient is currently stable in the ED     Aris Blandon MD  05/14/20 4414

## 2020-05-15 NOTE — NURSING NOTE
Attempted IV x1, no flash present, notified charge nurse and charge nurse successfully inserted IV

## 2020-05-15 NOTE — H&P
"    [facility]  HISTORY AND PHYSICAL    Date of Admission: 5/14/2020  Primary Care Physician: Lorrie Wilhelm MD    Subjective     Chief Complaint: 3-month history of progressive weakness, today with urinary incontinence    History of Present Illness  Anne-Marie Foreman is a 64-year-old female with a vast medical history to include mild Federico artery disease, systolic heart failure with most recent EF 2/20/2020 25-30%-no LifeVest, chronic atrial fibrillation on Coumadin-followed by Dr. Betancourt [facility] cardiology; diabetes non-insulin-dependent, hyperlipidemia, hypertension, obesity, chronic back pain-untreated, Jacobo disease, left sided lacunar infarction 2006 with residual right-sided weakness, please see below for complete list.  Patient reports a 3-month history of progressive lower extremity weakness.  This initially started mid February with admission to [facility] 2/20/2020 with evaluation for strokelike symptoms.  Patient was seen by Dr. Silverio neurology she also underwent significant cardiac evaluation see below for results.  No gnosis of weakness identified.  Patient discharged home and returned 2/28/2020 after fall injuring both knees, patient states legs just \"went out from under her\".  She was discharged to [facility] nursing home and rehab for physical therapy however due to perceived lack of actual therapy her sons did take her home.  She has been undergoing home physical therapy and nursing care per [facility].  Per extensive record review patient calls PCP Dr. Lorrie Wilhelm's office every day or so with complaints.  She presented to [facility] due to new urinary incontinence.  She states she is had increasing trouble voiding and today she was unable to void and then has incontinence.  She states she also feels the need to defecate having gone 2 to 3 hours prior to presentation and continues with urge.  She is reporting lumbar pain " bilaterally currently scored 7 on a scale of 1-10 with intermittent lower extremity spasms.  She has chronic left hip pain from injury 2006.  She also reports worsening bilateral lower extremity edema despite multiple diuretic treatments.  She denies chest pain or shortness of breathing.  ER provider did notify neurology and EMG with nerve conduction study and spinal MRI with and without contrast ordered for tomorrow to be followed up by Dr. Cline.  Patient is admitted for further evaluation and treatment.      Review of Systems   A 10 point review of systems was completed, all negative except for those discussed in HPI    Past Medical History:   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HCC)    • Hyperlipidemia    • Hypertension    • Left sided lacunar infarction (CMS/HCC)    • Obesity, Class II, BMI 35-39.9    • Systolic heart failure (CMS/HCC)    • Type 2 diabetes mellitus (CMS/HCC)            Past Surgical History:   Past Surgical History:   Procedure Laterality Date   • CARDIAC CATHETERIZATION  10/21/2009   • CARDIOVERSION  10/09/2013   • CATARACT EXTRACTION, BILATERAL     • CHOLECYSTECTOMY     • LAPAROSCOPIC TUBAL LIGATION     • RETINAL LASER PROCEDURE         Family History: family history includes Diabetes in her mother; Heart failure in her mother; Hypertension in her mother; Liver cancer in her father.     Social History:  reports that she has never smoked. She does not have any smokeless tobacco history on file. She reports that she does not drink alcohol or use drugs.    Code Status: Full, if unable speak for herself her son Chauncey will speak for her      Allergies:  Allergies   Allergen Reactions   • Aspartame And Phenylalanine Anaphylaxis, Shortness Of Breath and Swelling   • Mushroom Anaphylaxis   • Mushroom Extract Complex Anaphylaxis, Shortness Of Breath  "and Swelling   • Penicillins Rash, Anaphylaxis, Hives, Itching, Shortness Of Breath and Swelling     TOLERATES ROCEPHIN   • Shellfish-Derived Products Anaphylaxis   • Ezetimibe Nausea Only       Medications:  No current facility-administered medications on file prior to encounter.      Medication Sig   • albuterol sulfate  (90 Base) MCG/ACT inhaler Inhale 2 puffs Every 6 (Six) Hours As Needed for Wheezing.   • aspirin 81 MG chewable tablet Chew 81 mg Daily.   • atorvastatin (LIPITOR) 80 MG tablet Take 80 mg by mouth Every Night.   • carvedilol (COREG) 12.5 MG tablet Take 12.5 mg by mouth 2 (Two) Times a Day With Meals.   • cholecalciferol (VITAMIN D3) 25 MCG (1000 UT) tablet Take 1,000 Units by mouth Daily.   • digoxin (LANOXIN) 125 MCG tablet Take 250 mcg by mouth Daily.   • furosemide (LASIX) 20 MG tablet Take 20 mg by mouth Daily.   • glipizide (GLUCOTROL) 10 MG tablet Take 10 mg by mouth Every Morning.   • glipizide (GLUCOTROL) 5 MG tablet Take 5 mg by mouth Every Evening.   • hydroCHLOROthiazide (HYDRODIURIL) 12.5 MG tablet Take 12.5 mg by mouth Daily.   • irbesartan (AVAPRO) 75 MG tablet Take 75 mg by mouth 2 (Two) Times a Day.   • metFORMIN (GLUCOPHAGE) 500 MG tablet Take 1,000 mg by mouth 2 (Two) Times a Day With Meals.   • metoprolol succinate XL (TOPROL-XL) 50 MG 24 hr tablet Take 50 mg by mouth Daily.   • Multiple Vitamins-Minerals (MULTIVITAMIN WITH MINERALS) tablet tablet Take 1 tablet by mouth Daily.   • spironolactone (ALDACTONE) 25 MG tablet Take 25 mg by mouth Daily.   • warfarin (COUMADIN) 7.5 MG tablet Take 3.75 mg by mouth Daily.         Objective     /51 (BP [facility]: Right arm, Patient Position: Lying)   Pulse 75   Temp 98.2 °F (36.8 °C) (Oral)   Resp 18   Ht 165.1 cm (65\")   Wt 106 kg (233 lb 11 oz)   SpO2 96%   BMI 38.89 kg/m²      Physical Exam   Constitutional: She is oriented to person, place, and time. She appears well-developed and well-nourished. No distress.   " "  Obesity   HENT:   Head: Normocephalic and atraumatic.   Eyes: Pupils are equal, round, and reactive to light. Conjunctivae and EOM are normal. No scleral icterus.   Neck: Normal range of motion. Neck supple. No JVD present. No tracheal deviation present.   Cardiovascular: Normal rate, regular rhythm, normal heart sounds and intact distal pulses. Exam reveals no gallop.   No murmur heard.  Pulmonary/Chest: Effort normal and breath sounds normal. No respiratory distress. She has no wheezes. She has no rales.   Abdominal: Soft. Bowel sounds are normal. She exhibits no distension. There is no tenderness. There is no guarding.   Protuberant   Musculoskeletal: She exhibits edema ( Pitting edema bilateral lower extremities groin down).   Unable to  lower extremities moves slightly when requested   Neurological: She is alert and oriented to person, place, and time.   Bilateral lower extremity weakness   Skin: Skin is warm and dry. No rash noted. She is not diaphoretic. No erythema. No pallor.        Healing wound posterior calf; mild cellulitis   Psychiatric: Her behavior is normal.   Excessively tearful, repeatedly states \"I just want to go to the bathroom by myself\"   Vitals reviewed.      Pertinent Data:   Lab Results (last 72 hours)     Procedure Component Value Units Date/Time    Digoxin Level [[id number]] Collected:  05/14/20 2056    Specimen:  Blood from Arm, Left Updated:  05/14/20 2315    Comprehensive Metabolic Panel [[id number]]  (Abnormal) Collected:  05/14/20 2056    Specimen:  Blood from Arm, Left Updated:  05/14/20 2122     Glucose 247 mg/dL      BUN 12 mg/dL      Creatinine 0.61 mg/dL      Sodium 132 mmol/L      Potassium 4.6 mmol/L      Chloride 91 mmol/L      CO2 26.0 mmol/L      Calcium 9.4 mg/dL      Total Protein 7.9 g/dL      Albumin 4.50 g/dL      ALT (SGPT) 31 U/L      AST (SGOT) 25 U/L      Alkaline Phosphatase 55 U/L      Total Bilirubin 0.3 mg/dL      eGFR Non African Amer 98 mL/min/1.73  "     Globulin 3.4 gm/dL      A/G Ratio 1.3 g/dL      BUN/Creatinine Ratio 19.7     Anion Gap 15.0 mmol/L     CK [[id number]]  (Normal) Collected:  05/14/20 2056    Specimen:  Blood from Arm, Left Updated:  05/14/20 2121     Creatine Kinase 58 U/L     BNP [[id number]]  (Normal) Collected:  05/14/20 2056    Specimen:  Blood from Arm, Left Updated:  05/14/20 2120     proBNP 691.2 pg/mL     Phosphorus [[id number]]  (Normal) Collected:  05/14/20 2056    Specimen:  Blood from Arm, Left Updated:  05/14/20 2119     Phosphorus 3.5 mg/dL     Magnesium [[id number]]  (Normal) Collected:  05/14/20 2056    Specimen:  Blood from Arm, Left Updated:  05/14/20 2116     Magnesium 1.8 mg/dL     Protime-INR [[id number]]  (Abnormal) Collected:  05/14/20 2056    Specimen:  Blood from Arm, Left Updated:  05/14/20 2113     Protime 28.3 Seconds      INR 2.65    aPTT [[id number]]  (Abnormal) Collected:  05/14/20 2056    Specimen:  Blood from Arm, Left Updated:  05/14/20 2113     PTT 38.3 seconds     Urinalysis With Culture If Indicated - Urine, Catheter [[id number]]  (Abnormal) Collected:  05/14/20 2046    Specimen:  Urine, Catheter Updated:  05/14/20 2106     Color, UA Yellow     Appearance, UA Clear     pH, UA 7.0     Specific Gravity, UA <=1.005     Glucose,  mg/dL (Trace)     Ketones, UA Negative     Bilirubin, UA Negative     Blood, UA Negative     Protein, UA Negative     Leuk Esterase, UA Negative     Nitrite, UA Negative     Urobilinogen, UA 0.2 E.U./dL    CBC Auto Differential [[id number]]  (Normal) Collected:  05/14/20 2056    Specimen:  Blood from Arm, Left Updated:  05/14/20 2104     WBC 9.61 10*3/mm3      RBC 4.84 10*6/mm3      Hemoglobin 14.2 g/dL      Hematocrit 42.2 %      MCV 87.2 fL      MCH 29.3 pg      MCHC 33.6 g/dL      RDW 13.4 %      RDW-SD 42.5 fl      MPV 10.2 fL      Platelets 216 10*3/mm3      Neutrophil % 59.7 %      Lymphocyte % 29.2 %      Monocyte % 8.3 %      Eosinophil % 1.8 %      Basophil % 0.7 %      Immature  Grans % 0.3 %      Neutrophils, Absolute 5.73 10*3/mm3      Lymphocytes, Absolute 2.81 10*3/mm3      Monocytes, Absolute 0.80 10*3/mm3      Eosinophils, Absolute 0.17 10*3/mm3      Basophils, Absolute 0.07 10*3/mm3      Immature Grans, Absolute 0.03 10*3/mm3      nRBC 0.0 /100 WBC         Imaging Results (Last 24 Hours)     ** No results found for the last 24 hours. **          2/23/2020 Stress test  Result Impression   Impression:  There is no obvious infarct or ischemia, with a calculated ejection fraction of 31 %.  Suggest: Clinical correlation with cardiomyopathy.    Signed by Dr Chris Nieves on 2/23/2020 9:50 AM       2/21/2020 MRI Brain w/o contrast  Result Impression   1.  No acute intracranial findings. No evidence of acute stroke.  2.  Chronic microvascular ischemic white matter change and small remote LEFT lacunar infarct.    Signed by Dr Buddy Carmona on 2/21/2020 4:51 PM     2/20/2020 Bilateral carotid study  Impression   There is heterogeneous plaque visualized in the bilateral internal carotid artery(ies).   There is 50-69% stenosis in the right internal carotid artery.   There is less than 50% stenosis of the left internal carotid artery.   There is normal antegrade flow in the bilateral vertebral artery(ies).    2/20/2020 ECHO  Summary   Mild left ventricular enlargement with global hypokinesis and an abnormal contraction pattern [suggestive of bundle branch block] with an estimated ejection fraction of 25-30%   Abnormal rhythm precludes assessment of diastolic function   Moderately dilated left atrium   Mildly thickened tricuspid aortic valve leaflets with adequate cusp separation and no significant stenosis or insufficiency   Severe mitral annular calcification [particularly posteriorly] that also involves papillary muscles with moderately thickened mitral leaflets which   demonstrated reduced mobility, no significant stenosis and mild regurgitation   Trace pulmonic insufficiency   Moderately dilated  right atrium with normal right ventricular size and systolic function   Mild tricuspid regurgitation with RVSP estimate of 28 mmHg   Normal IVC dimensions with reduced respiratory motion consistent with elevated right atrial filling pressures of 11-15 mmHg   No significant pericardial effusion and aortic dimensions are within normal limits   Definity contrast utilized to better define endocardial surface      I have personally reviewed and interpreted the radiology studies and ECG obtained at time of admission.     Assessment / Plan     Assessment:   Active Hospital Problems    Diagnosis   • Weakness of both lower extremities   • Essential hypertension   • Mild CAD   • Chronic atrial fibrillation   • Morbid obesity due to excess calories (CMS/[facility])   • Systolic congestive heart failure (CMS/[facility])   • Type 2 diabetes mellitus with microalbuminuria, without long-term current use of insulin (CMS/[facility])   • Long term current use of anticoagulant therapy       Plan:   1.  Admit as inpatient  2.  Consult neurology in a.m.  3.  EMG with nerve conduction study- Coumadin on hold  4.  Home medications reviewed and restarted as appropriate  5.  DVT prophylaxis with SCDs, hold Coumadin for procedure-Daily PT/INR  6.  Consult PT and OT  7.  Spine MRI with and without contrast in a.m.  8.  Labs in a.m.  9.  Bladder scan, pure wick in use may need indwelling catheter  10.  Monitor glucose AC with regular insulin sliding scale coverage  11.  Pain management  12.  Supplemental oxygen as needed, incentive spirometry, RT to initiate breathing treatment protocol  13.  Lasix 40 mg IV every 12 hours, daily weight    I discussed the patient's findings and my recommendations with: Foreign Anna MD  Time spent: 55 minutes    Patient seen and examined by me on 5/14/2020 at 9:50 PM.    TYSON Bradley  05/14/20   23:44     I personally evaluated and examined the patient in conjunction with TYSON Bradley and agree with the  assessment, treatment plan, and disposition of the patient as recorded by her. My history, exam, and further recommendations are:   Patient evaluated time mission agree assessment plan examinations as outlined above.  Agree with need for neurological consultation MRI of the spine.  Defer EMG nerve conductions to neurology.  Bladder scan to make sure she is not retaining urine.      Foreign Anna MD  05/15/20  04:24

## 2020-05-15 NOTE — PLAN OF CARE
Problem: Patient Care Overview  Goal: Plan of Care Review  Flowsheets  Taken 5/15/2020 1130 by Rosa Menjivar  Progress: no change  Plan of Care Reviewed With: patient  Taken 5/15/2020 1105 by Kim Abdi, OTR/L, DIONTE  Outcome Summary: OT evaluation completed.  Pt demonstrates need for continued OT services.  Pt demonstrates LE weakness (L>R), impaired proprioception and light touch in BLEs, impaired sitting balance, fear for falling, and generalized impaired endurance.  Pt required min A for sup to sit, and max A for sit to supine, however pt was unable to stand on 2 attempts with max A x2 people.  Pt is to have additional imaging of her spine and brain this afternoon for clarification of diagnosis.  OT will cont to follow to maximize her I with ADLs and functional mobility.  It is recommended for pt to discharge to SNF upon discharge home.

## 2020-05-15 NOTE — THERAPY EVALUATION
Patient Name: Anne-Marie Foreman  : 1955    MRN: [id number]                              Today's Date: 5/15/2020       Admit Date: 2020    Visit Dx:     ICD-10-CM ICD-9-CM   1. Weakness of both lower extremities R29.898 729.89   2. Decreased activities of daily living (ADL) Z78.9 V49.89     Patient Active Problem List   Diagnosis   • Weakness of both lower extremities   • Chronic atrial fibrillation   • Essential hypertension   • Long term current use of anticoagulant therapy   • Mild CAD   • Mixed hyperlipidemia   • Morbid obesity due to excess calories (CMS/HCC)   • Systolic congestive heart failure (CMS/HCC)   • Type 2 diabetes mellitus with microalbuminuria, without long-term current use of insulin (CMS/HCC)   • Acquired hypothyroidism     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HCC)    • Hyperlipidemia    • Hypertension    • Left sided lacunar infarction (CMS/HCC)    • Obesity, Class II, BMI 35-39.9    • Systolic heart failure (CMS/HCC)    • Type 2 diabetes mellitus (CMS/HCC)      Past Surgical History:   Procedure Laterality Date   • CARDIAC CATHETERIZATION  10/21/2009   • CARDIOVERSION  10/09/2013   • CATARACT EXTRACTION, BILATERAL     • CHOLECYSTECTOMY     • LAPAROSCOPIC TUBAL LIGATION     • RETINAL LASER PROCEDURE       General Information     Row Name 05/15/20 1100          PT Evaluation Time/Intention    Document Type  evaluation progressive B LE weakness, became incontinent and presented to hospital  -MS     Mode of Treatment  physical therapy;co-treatment  -MS     Row Name 05/15/20 1100          General Information    Patient Profile Reviewed?  yes  -MS     Existing Precautions/Restrictions  fall B LE weakness L>R  -MS     Barriers to Rehab  physical barrier;medically complex  -MS     Row Name 05/15/20 1100          Cognitive Assessment/Intervention-  PT/OT    Orientation Status (Cognition)  oriented x 4  -MS     Row Name 05/15/20 1100          Safety Issues, Functional Mobility    Impairments Affecting Function (Mobility)  motor control;strength;sensation/sensory awareness;pain;range of motion (ROM)  -MS       User Key  (r) = Recorded By, (t) = Taken By, (c) = Cosigned By    Initials Name Provider Type    Ramandeep Nelson, PT, DPT, NCS Physical Therapist        Mobility     Row Name 05/15/20 1100          Bed Mobility Assessment/Treatment    Bed Mobility Assessment/Treatment  supine-sit;sit-supine;scooting/bridging  -MS     Scooting/Bridging [place] (Bed Mobility)  maximum assist (25% patient effort);2 person assist;verbal cues  -MS     Supine-Sit [place] (Bed Mobility)  minimum assist (75% patient effort);verbal cues  -MS     Sit-Supine [place] (Bed Mobility)  maximum assist (25% patient effort);verbal cues  -MS     Assistive Device (Bed Mobility)  bed rails;draw sheet;head of bed elevated  -MS     Row Name 05/15/20 1100          Transfer Assessment/Treatment    Comment (Transfers)  attempted to stand with assist of 2 twice. Pt gave poor effort and her bottom did not budge off the bed  -MS       User Key  (r) = Recorded By, (t) = Taken By, (c) = Cosigned By    Initials Name Provider Type    Ramandeep Nelson, PT, DPT, NCS Physical Therapist        Obj/Interventions     Row Name 05/15/20 1100          General ROM    GENERAL ROM COMMENTS  R LE WFL, L LE impaired 25%  -MS     Row Name 05/15/20 1100          MMT (Manual Muscle Testing)    General MMT Comments  R LE grossly 4/5, L LE grossly 3-/5  -MS     Row Name 05/15/20 1100          Static Sitting Balance    Level of [place] (Unsupported Sitting, Static Balance)  standby assist  -MS     Sitting Position (Unsupported Sitting, Static Balance)  sitting on edge of bed  -MS     Row Name 05/15/20 1100          Sensory Assessment/Intervention    Sensory General Assessment  -- L LE impaired  sensation, more affected proximally vs distally. Pt have no proprioception of B LEs  -MS       User Key  (r) = Recorded By, (t) = Taken By, (c) = Cosigned By    Initials Name Provider Type    MS Ramandeep Goodwin, PT, DPT, NCS Physical Therapist        Goals/Plan     Row Name 05/15/20 1313          Bed Mobility Goal 1 (PT)    Activity/Assistive Device (Bed Mobility Goal 1, PT)  bed mobility activities, all  -MS     [place] Level/Cues Needed (Bed Mobility Goal 1, PT)  contact guard assist;tactile cues required;verbal cues required  -MS     Time Frame (Bed Mobility Goal 1, PT)  long term goal (LTG);by discharge  -MS     Progress/Outcomes (Bed Mobility Goal 1, PT)  goal ongoing  -MS     Row Name 05/15/20 1313          Transfer Goal 1 (PT)    Activity/Assistive Device (Transfer Goal 1, PT)  sit-to-stand/stand-to-sit;bed-to-chair/chair-to-bed;sliding board  -MS     [place] Level/Cues Needed (Transfer Goal 1, PT)  moderate assist (50-74% patient effort)  -MS     Time Frame (Transfer Goal 1, PT)  long term goal (LTG);by discharge  -MS     Progress/Outcome (Transfer Goal 1, PT)  goal ongoing  -MS       User Key  (r) = Recorded By, (t) = Taken By, (c) = Cosigned By    Initials Name Provider Type    MS Ramandeep Goodwin, PT, DPT, NCS Physical Therapist        Clinical Impression     Row Name 05/15/20 1100          Pain Assessment    Additional Documentation  Pain Scale: Numbers Pre/Post-Treatment (Group)  -MS     Row Name 05/15/20 1100          Pain Scale: Numbers Pre/Post-Treatment    Pain Scale: Numbers, Pretreatment  6/10  -MS     Pain Scale: Numbers, Post-Treatment  6/10  -MS     Pain [facility] - Side  Bilateral  -MS     Pain [facility] - Orientation  lower  -MS     Pain [facility]  extremity  -MS     Pain Intervention(s)  Repositioned;Ambulation/increased activity  -MS     Row Name 05/15/20 1100          Plan of Care Review    Plan of Care Reviewed With  patient  -MS     Progress  no change  -MS     Outcome Summary   PT evaluation completed. The patient presents alert and oriented x4. She does have B LE weakness with L>R. Her R LE is strong enough to assist with movement, but she does not attempt to use it to assist even with cues. She does have impaired sensation in her L LE with it being more impaired proximally vs distally. She was able to reach EOB with little assistance, but when attempting to stand she gave poor effort. She does have clonus with multiple beats the L ankle. She will benefit from skilled PT services to work on strengthening and transfers. Recommend discharge to SNF vs acute rehab depending upon medical diagnosis and progression.  -MS     Row Name 05/15/20 1100          Physical Therapy Clinical Impression    Patient/Family Goals Statement (PT Clinical Impression)  improve strength  -MS     Criteria for Skilled Interventions Met (PT Clinical Impression)  yes;treatment indicated  -MS     Rehab Potential (PT Clinical Summary)  good, to achieve stated therapy goals  -MS     Predicted Duration of Therapy (PT)  until discharge  -MS     Row Name 05/15/20 1100          Positioning and Restraints    Post Treatment Position  bed  -MS     In Bed  fowlers;call light within reach;encouraged to call for assist;side rails up x2;legs elevated  -MS       User Key  (r) = Recorded By, (t) = Taken By, (c) = Cosigned By    Initials Name Provider Type    MS Buddy Ramandeep R, PT, DPT, NCS Physical Therapist        Outcome Measures     Row Name 05/15/20 1314          How much help from another person do you currently need...    Turning from your back to your side while in flat bed without using bedrails?  2  -MS     Moving from lying on back to sitting on the side of a flat bed without bedrails?  2  -MS     Moving to and from a bed to a chair (including a wheelchair)?  1  -MS     Standing up from a chair using your arms (e.g., wheelchair, bedside chair)?  1  -MS     Climbing 3-5 steps with a railing?  1  -MS     To walk in hospital  room?  1  -MS     AM-PAC 6 Clicks Score (PT)  8  -MS     Row Name 05/15/20 1314          Functional Assessment    Outcome Measure Options  AM-PAC 6 Clicks Basic Mobility (PT)  -MS       User Key  (r) = Recorded By, (t) = Taken By, (c) = Cosigned By    Initials Name Provider Type    Ramandeep Nelson R, PT, DPT, NCS Physical Therapist        Physical Therapy Education                 Title: PT OT SLP Therapies (In Progress)     Topic: Physical Therapy (In Progress)     Point: Mobility training (In Progress)     Description:   Instruct learner(s) on safety and technique for assisting patient out of bed, chair or wheelchair.  Instruct in the proper use of assistive devices, such as walker, crutches, cane or brace.              Patient Friendly Description:   It's important to get you on your feet again, but we need to do so in a way that is safe for you. Falling has serious consequences, and your personal safety is the most important thing of all.        When it's time to get out of bed, one of us or a family member will sit next to you on the bed to give you support.     If your doctor or nurse tells you to use a walker, crutches, a cane, or a brace, be sure you use it every time you get out of bed, even if you think you don't need it.    Learning Progress Summary           Patient Acceptance, E, NR by MS at 5/15/2020 1769    Comment:  role of PT in her care                   Point: Home exercise program (Not Started)     Description:   Instruct learner(s) on appropriate technique for monitoring, assisting and/or progressing patient with therapeutic exercises and activities.              Learner Progress:   Not documented in this visit.          Point: Body mechanics (Not Started)     Description:   Instruct learner(s) on proper positioning and spine alignment for patient and/or caregiver during mobility tasks and/or exercises.              Learner Progress:   Not documented in this visit.          Point: Precautions  (Not Started)     Description:   Instruct learner(s) on prescribed precautions during mobility and gait tasks              Learner Progress:   Not documented in this visit.                      User Key     Initials Effective Dates Name Provider Type Discipline    MS 06/19/18 -  Ramandeep Goodwin, PT, DPT, NCS Physical Therapist PT              PT Recommendation and Plan  Planned Therapy Interventions (PT Eval): balance training, bed mobility training, patient/family education, ROM (range of motion), strengthening, transfer training  Outcome Summary/Treatment Plan (PT)  Anticipated Discharge Disposition (PT): skilled nursing facility, inpatient rehabilitation facility  Plan of Care Reviewed With: patient  Progress: no change  Outcome Summary: PT evaluation completed. The patient presents alert and oriented x4. She does have B LE weakness with L>R. Her R LE is strong enough to assist with movement, but she does not attempt to use it to assist even with cues. She does have impaired sensation in her L LE with it being more impaired proximally vs distally. She was able to reach EOB with little assistance, but when attempting to stand she gave poor effort. She does have clonus with multiple beats the L ankle. She will benefit from skilled PT services to work on strengthening and transfers. Recommend discharge to SNF vs acute rehab depending upon medical diagnosis and progression.     Time Calculation:   PT Charges     Row Name 05/15/20 1315             Time Calculation    Start Time  1100  -MS      Stop Time  1140  -MS      Time Calculation (min)  40 min  -MS      PT Received On  05/15/20  -MS      PT Goal Re-Cert Due Date  05/25/20  -MS        User Key  (r) = Recorded By, (t) = Taken By, (c) = Cosigned By    Initials Name Provider Type    MS Buddy Ramandeep URIBE, PT, DPT, NCS Physical Therapist        Therapy Charges for Today     Code Description Service Date Service Provider Modifiers Qty    [id number]  PT EVAL MOD  COMPLEXITY 3 5/15/2020 Ramandeep Goodwin PT, DPT, NCS GP 1          PT G-Codes  Outcome Measure Options: AM-PAC 6 Clicks Basic Mobility (PT)  AM-PAC 6 Clicks Score (PT): 8  AM-PAC 6 Clicks Score (OT): 13    Ramandeep Goodwin, PT, DPT, NCS  5/15/2020

## 2020-05-15 NOTE — NURSING NOTE
[facility] Note      Patient: Anne-Marie Foreman  MRN: [id number] : 1955         Problem List:   Patient Active Problem List    Diagnosis   • Weakness of both lower extremities [R29.898]   • Essential hypertension [I10]   • Mild CAD [I25.10]   • Chronic atrial fibrillation [I48.20]   • Acquired hypothyroidism [E03.9]   • Mixed hyperlipidemia [E78.2]   • Morbid obesity due to excess calories (CMS/HCC) [E66.01]   • Systolic congestive heart failure (CMS/HCC) [I50.20]   • Type 2 diabetes mellitus with microalbuminuria, without long-term current use of insulin (CMS/[facility]) [E11.29, R80.9]   • Long term current use of anticoagulant therapy [Z79.01]         Reason for Visit: Patient is an 64 y.o. female, being seen by [facility] for wound on right posterior leg.     Patient presents with a wound on the right posterior distal leg.  The patient has a history of weakness and numbness in bilateral lower extremities rendering her wheelchair bound at home.  She states she cannot walk because she cannot feel her feet and she gets muscle spasms frequently in her back hips and legs.      The patient states she is unsure how the wound got there.  She doesn't think anything on her wheelchair could have caused it by trauma or pressure.  She says it could be from a spider bite because she had put her feet into an area where there were spiders at her house and she was unaware.  The wound occurred sometime the first week in March and the patient states it has not changed.      The wound measures 0.7cm x 1cm.  It is covered by a scab.  The periwound area is dry and has blanchable erythema surrounding the wound.              Recommendations:    - Bactroban to wound and cover with silicone border foam dressing.       )Jerilyn Sebastian RN 5/15/2020

## 2020-05-15 NOTE — THERAPY EVALUATION
Acute Care - Occupational Therapy Initial Evaluation  [facility]     Patient Name: Anne-Marie Foreman  : 1955  MRN: [id number]  Today's Date: 5/15/2020  Onset of Illness/Injury or Date of Surgery: 20  Date of Referral to OT: 20  Referring Physician: TYSON Epstein    Admit Date: 2020       ICD-10-CM ICD-9-CM   1. Weakness of both lower extremities R29.898 729.89   2. Decreased activities of daily living (ADL) Z78.9 V49.89     Patient Active Problem List   Diagnosis   • Weakness of both lower extremities   • Chronic atrial fibrillation   • Essential hypertension   • Long term current use of anticoagulant therapy   • Mild CAD   • Mixed hyperlipidemia   • Morbid obesity due to excess calories (CMS/HCC)   • Systolic congestive heart failure (CMS/HCC)   • Type 2 diabetes mellitus with microalbuminuria, without long-term current use of insulin (CMS/HCC)   • Acquired hypothyroidism     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HCC)    • Hyperlipidemia    • Hypertension    • Left sided lacunar infarction (CMS/HCC)    • Obesity, Class II, BMI 35-39.9    • Systolic heart failure (CMS/HCC)    • Type 2 diabetes mellitus (CMS/HCC)      Past Surgical History:   Procedure Laterality Date   • CARDIAC CATHETERIZATION  10/21/2009   • CARDIOVERSION  10/09/2013   • CATARACT EXTRACTION, BILATERAL     • CHOLECYSTECTOMY     • LAPAROSCOPIC TUBAL LIGATION     • RETINAL LASER PROCEDURE            OT ASSESSMENT FLOWSHEET (last 12 hours)      Occupational Therapy Evaluation     Row Name 05/15/20 1117 05/15/20 1105                OT Evaluation Time/Intention    Subjective Information  --  complains of;pain;fatigue N/T, weakness lower waist down BLEs  -CS       Document Type  --  evaluation  -CS       Mode of Treatment  --  occupational therapy  -CS       Evaluation Not Performed   --  -CS  --          General Information    Patient Profile Reviewed?  --  yes  -CS       Onset of Illness/Injury or Date of Surgery  --  05/14/20  -CS       Referring Physician  --  TYSON Epstein  -CS       Patient Observations  --  alert;cooperative;agree to therapy  -CS       Patient/Family Observations  --  no family present  -CS       General Observations of Patient  --  Pt fowlers, telemetry  -CS       Prior Level of Function  --  mod assist:;dressing;bathing;max assist:;transfer pt sleeps in recliner, completes sponge bath, uses BSC  -CS       Equipment Currently Used at Home  --  wheelchair;commode, bedside;walker, rolling wc wont fit outside kitchen and living room  -CS       Pertinent History of Current Functional Problem  --  Pt presents with progressive LE weakness and numbness over last 3 months.  Pt had admissions in Feb of 2020 at [facility] and was discharged with thoughts her weakness/numbness and imbalance was due to medication effects.  Pt had fall on 2/28/2020 in which she was admitted to hospital again, and then to NH, and she has been at home since then living with her 2 adult sons.  Pt presents this admission for progressive weakness and numbness ascending from her feet to umbilical region, difficulty emptying bladder, low back pain.  Pt to have MRI of cervical, thoracic, and lumbar spine today.  Awaiting diagnosis.  -CS       Existing Precautions/Restrictions  --  fall  -CS       Risks Reviewed  --  patient:;LOB;nausea/vomiting;dizziness;increased discomfort;change in vital signs  -CS       Benefits Reviewed  --  patient:;improve function;increase independence;increase strength;increase balance;decrease pain  -CS       Barriers to Rehab  --  physical barrier  -CS          Relationship/Environment    Lives With  --  child(jered), adult  -CS       Name(s) of Who Lives With Patient  --  2 sons  -CS          Resource/Environmental Concerns    Current Living Arrangements  --   home/apartment/condo  -          Cognitive Assessment/Interventions    Additional Documentation  --  Cognitive Assessment/Intervention (Group)  -CS          Cognitive Assessment/Intervention- PT/OT    Affect/Mental Status (Cognitive)  --  WNL  -CS       Orientation Status (Cognition)  --  oriented x 4  -CS          Bed Mobility Assessment/Treatment    Bed Mobility Assessment/Treatment  --  supine-sit;sit-supine;scooting/bridging  -CS       Scooting/Bridging [place] (Bed Mobility)  --  maximum assist (25% patient effort);2 person assist;verbal cues  -CS       Supine-Sit [place] (Bed Mobility)  --  minimum assist (75% patient effort);verbal cues  -CS       Sit-Supine [place] (Bed Mobility)  --  maximum assist (25% patient effort);verbal cues  -CS       Bed Mobility, Safety Issues  --  decreased use of legs for bridging/pushing;impaired trunk control for bed mobility  -       Assistive Device (Bed Mobility)  --  bed rails;draw sheet;head of bed elevated  -          Functional Mobility    Functional Mobility- Comment  --  unable to complete due to weakness  -          Transfer Assessment/Treatment    Comment (Transfers)  --  Pt attempted to stand   -          ADL Assessment/Intervention    BADL Assessment/Intervention  --  lower body dressing  -          Lower Body Dressing Assessment/Training    Lower Body Dressing [place] Level  --  don;doff;socks;dependent (less than 25% patient effort)  -       Lower Body Dressing Position  --  edge of bed sitting  -          BADL Safety/Performance    Impairments, BADL Safety/Performance  --  balance;endurance/activity tolerance;strength;trunk/postural control;sensory awareness;range of motion;pain;motor control  -          General ROM    GENERAL ROM COMMENTS  --  BUE AROM WFL; See PT note for BLE information  -          MMT (Manual Muscle Testing)    General MMT Comments  --  BUE strength: 4+/5  -CS          Motor Assessment/Interventions     Additional Documentation  --  Balance (Group);Fine Motor Testing & Training (Group);Gross Motor Coordination (Group)  -          Gross Motor Coordination    Gross Motor Skill, Impairments Detail  --  BUE [facility] WF  -          Balance    Balance  --  static sitting balance;static standing balance  -          Static Sitting Balance    Level of [place] (Unsupported Sitting, Static Balance)  --  standby assist;contact guard assist  -CS       Sitting Position (Unsupported Sitting, Static Balance)  --  sitting on edge of bed  -CS          Dynamic Sitting Balance    Level of [place], Reaches Outside Midline (Sitting, Dynamic Balance)  --  contact guard assist  -CS       Sitting Position, Reaches Outside Midline (Sitting, Dynamic Balance)  --  sitting on edge of bed  -CS          Fine Motor Testing & Training    Comment, Fine Motor Coordination  --  BUE [facility]  -          Sensory Assessment/Intervention    Sensory General Assessment  --  no sensation deficits identified BUEs, See PT note for BLE information  -CS          Positioning and Restraints    Pre-Treatment Position  --  in bed  -CS       Post Treatment Position  --  bed  -CS       In Bed  --  fowlers;call light within reach;encouraged to call for assist;side rails up x2;legs elevated  -CS          Pain Assessment    Additional Documentation  --  Pain Scale: Numbers Pre/Post-Treatment (Group)  -CS          Pain Scale: Numbers Pre/Post-Treatment    Pain Scale: Numbers, Pretreatment  --  6/10  -CS       Pain Scale: Numbers, Post-Treatment  --  6/10  -CS       Pain [facility] - Side  --  Bilateral  -CS       Pain [facility] - Orientation  --  lower  -CS       Pain [facility]  --  extremity  -CS       Pre/Post Treatment Pain Comment  --  lower back, julieth hips, julieth LEs  -CS       Pain Intervention(s)  --  Medication (See MAR);Repositioned;Ambulation/increased activity  -          Wound 05/14/20 2300 Right lower leg     Wound - Properties Group Date first  assessed: 05/14/20  -TC Time first assessed: 2300  -TC Present on Hospital Admission: Y  -TC Side: Right  -TC Orientation: lower  -TC [facility]: leg  -TC Primary Wound Type: --  -TC, redness/scab        Plan of Care Review    Plan of Care Reviewed With  --  patient  -CS       Progress  --  no change  -CS       Outcome Summary  --  OT evaluation completed.  Pt demonstrates need for continued OT services.  Pt demonstrates LE weakness (L>R), impaired proprioception and light touch in BLEs, impaired sitting balance, fear for falling, and generalized impaired endurance.  Pt required min A for sup to sit, and max A for sit to supine, however pt was unable to stand on 2 attempts with max A x2 people.  Pt is to have additional imaging of her spine and brain this afternoon for clarification of diagnosis.  OT will cont to follow to maximize her I with ADLs and functional mobility.  It is recommended for pt to discharge to SNF upon discharge home.  -CS          Clinical Impression (OT)    Date of Referral to OT  --  05/14/20  -CS       OT Diagnosis  --  Impaired ADLs and functional mobility  -CS       Patient/Family Goals Statement (OT Eval)  --  to go home  -CS       Criteria for Skilled Therapeutic Interventions Met (OT Eval)  --  yes;treatment indicated  -CS       Rehab Potential (OT Eval)  --  good, to achieve stated therapy goals  -CS       Therapy Frequency (OT Eval)  --  5 times/wk  -CS       Predicted Duration of Therapy Intervention (Therapy Eval)  --  until hospital discharge  -CS       Care Plan Review (OT)  --  evaluation/treatment results reviewed;care plan/treatment goals reviewed;risks/benefits reviewed;current/potential barriers reviewed  -CS       Anticipated Equipment Needs at Discharge (OT)  --  patient lift if pt discharges home  -CS       Anticipated Discharge Disposition (OT)  --  home with 24/7 care;home with home health;skilled nursing facility pending progress and family/pt wishes availability  -CS           Planned OT Interventions    Planned Therapy Interventions (OT Eval)  --  activity tolerance training;adaptive equipment training;BADL retraining;functional balance retraining;IADL retraining;transfer/mobility retraining;strengthening exercise;patient/caregiver education/training;occupation/activity based interventions  -CS          OT Goals    Bathing Goal Selection (OT)  --  bathing, OT goal 1  -CS       Dressing Goal Selection (OT)  --  dressing, OT goal 1  -CS       Balance Goal Selection (OT)  --  balance, OT goal 1  -CS       Additional Documentation  --  Balance Goal Selection (OT) (Row)  -CS          Bathing Goal 1 (OT)    Activity/Assistive Device (Bathing Goal 1, OT)  --  bathing skills, all;long-handled sponge  -CS       [place] Level/Cues Needed (Bathing Goal 1, OT)  --  minimum assist (75% or more patient effort)  -CS       Time Frame (Bathing Goal 1, OT)  --  10 days  -CS       Progress/Outcomes (Bathing Goal 1, OT)  --  goal ongoing  -CS          Dressing Goal 1 (OT)    Activity/Assistive Device (Dressing Goal 1, OT)  --  dressing skills, all;long handled shoe horn;reacher;sock-aid  -CS       [place]/Cues Needed (Dressing Goal 1, OT)  --  minimum assist (75% or more patient effort);verbal cues required  -CS       Time Frame (Dressing Goal 1, OT)  --  10 days  -CS       Progress/Outcome (Dressing Goal 1, OT)  --  goal ongoing  -CS          Balance Goal 1 (OT)    Activity/Assistive Device (Balance Goal 1, OT)  --  sitting, static;sitting, dynamic for all ADLs  -CS       [place] Level/Cues Needed (Balance Goal 1, OT)  --  independent  -CS       Time Frame (Balance Goal 1, OT)  --  10 days  -CS       Progress/Outcomes (Balance Goal 1, OT)  --  goal ongoing  -CS          Living Environment    Home Accessibility  --  wheelchair accessible;tub/shower is not walk in  -CS         User Key  (r) = Recorded By, (t) = Taken By, (c) = Cosigned By    Initials Name Effective Dates    CS Abdi,  Kim CANALES, OTR/L, CNT 04/02/19 -     TC Castleman, Tamara K, RN 08/02/16 -                OT Recommendation and Plan  Outcome Summary/Treatment Plan (OT)  Anticipated Equipment Needs at Discharge (OT): patient lift(if pt discharges home)  Anticipated Discharge Disposition (OT): home with 24/7 care, home with home health, skilled nursing facility(pending progress and family/pt wishes availability)  Planned Therapy Interventions (OT Eval): activity tolerance training, adaptive equipment training, BADL retraining, functional balance retraining, IADL retraining, transfer/mobility retraining, strengthening exercise, patient/caregiver education/training, occupation/activity based interventions  Therapy Frequency (OT Eval): 5 times/wk  Plan of Care Review  Plan of Care Reviewed With: patient  Plan of Care Reviewed With: patient  Outcome Summary: OT evaluation completed.  Pt demonstrates need for continued OT services.  Pt demonstrates LE weakness (L>R), impaired proprioception and light touch in BLEs, impaired sitting balance, fear for falling, and generalized impaired endurance.  Pt required min A for sup to sit, and max A for sit to supine, however pt was unable to stand on 2 attempts with max A x2 people.  Pt is to have additional imaging of her spine and brain this afternoon for clarification of diagnosis.  OT will cont to follow to maximize her I with ADLs and functional mobility.  It is recommended for pt to discharge to SNF upon discharge home.    Outcome Measures     Row Name 05/15/20 7681             How much help from another is currently needed...    Putting on and taking off regular lower body clothing?  1  -CS      Bathing (including washing, rinsing, and drying)  2  -CS      Toileting (which includes using toilet bed pan or urinal)  1  -CS      Putting on and taking off regular upper body clothing  3  -CS      Taking care of personal grooming (such as brushing teeth)  3  -CS      Eating meals  3  -CS       AM-PAC 6 Clicks Score (OT)  13  -CS         Functional Assessment    Outcome Measure Options  AM-PAC 6 Clicks Daily Activity (OT)  -CS        User Key  (r) = Recorded By, (t) = Taken By, (c) = Cosigned By    Initials Name Provider Type    Kim Arce OTR/L, DIONTE Occupational Therapist          Time Calculation:   Time Calculation- OT     Row Name 05/15/20 1256             Time Calculation- OT    OT Start Time  1105  -CS      OT Stop Time  1159  -CS      OT Time Calculation (min)  54 min  -CS      OT Received On  05/15/20  -CS      OT Goal Re-Cert Due Date  05/25/20  -CS        User Key  (r) = Recorded By, (t) = Taken By, (c) = Cosigned By    Initials Name Provider Type    Kim Arce OTR/L, DIONTE Occupational Therapist        Therapy Charges for Today     Code Description Service Date Service Provider Modifiers Qty    54018922838 HC OT EVAL MOD COMPLEXITY 4 5/15/2020 Kim Abdi OTR/L, DIONTE GO 1               MARK Hinds/L, CNT  5/15/2020

## 2020-05-15 NOTE — PLAN OF CARE
Received patient from Er last night. Diagnosis of weakness to BLE. Has had numbness and tingling BLE for 3 months. Lower extremities are radha in color. Has spasms when feet are touched. C/o of chronic back pain plus spasms from feet up. Medicated with iv toradol and zanaflex with some relief. She says she does not do well with narcotics. Coccyx red blancheable. Wound, scab to RLE. She says it could be from a spider, or a diabetic ulcer. Wound care consult. Reposition every 2 hours. She does not like wedges. Has refused some turns. Edema to lower extremities, generalized. Unable to do bladder scan, would not  bladder on scan. Place a purewick, putting out good amount of urine. Tele:afib. VSS. Neurology consult. Has MRI x 3 of spine ordered. MRI tech called last night says, only 2 can be done today. Patients says she may need something to relax her if she goes down for MRI. Allergic to shellfish. Safety maintained.     Problem: Fall Risk (Adult)  Goal: Identify Related Risk Factors and Signs and Symptoms  Description  Related risk factors and signs and symptoms are identified upon initiation of Human Response Clinical Practice Guideline (CPG).  Outcome: Ongoing (interventions implemented as appropriate)     Problem: Fall Risk (Adult)  Goal: Absence of Fall  Description  Patient will demonstrate the desired outcomes by discharge/transition of care.  Outcome: Ongoing (interventions implemented as appropriate)  Flowsheets (Taken 5/15/2020 0648)  Absence of Fall: making progress toward outcome     Problem: Skin Injury Risk (Adult)  Goal: Identify Related Risk Factors and Signs and Symptoms  Description  Related risk factors and signs and symptoms are identified upon initiation of Human Response Clinical Practice Guideline (CPG).  Outcome: Ongoing (interventions implemented as appropriate)  Flowsheets (Taken 5/15/2020 0648)  Related Risk Factors (Skin Injury Risk): body weight extremes; edema; mobility impaired;  moisture; tissue perfusion altered     Problem: Skin Injury Risk (Adult)  Goal: Skin Health and Integrity  Description  Patient will demonstrate the desired outcomes by discharge/transition of care.  Outcome: Ongoing (interventions implemented as appropriate)  Flowsheets (Taken 5/15/2020 0648)  Skin Health and Integrity: making progress toward outcome     Problem: Activity Intolerance (Adult)  Goal: Identify Related Risk Factors and Signs and Symptoms  Description  Related risk factors and signs and symptoms are identified upon initiation of Human Response Clinical Practice Guideline (CPG).  Outcome: Ongoing (interventions implemented as appropriate)  Flowsheets (Taken 5/15/2020 0648)  Related Risk Factors (Activity Intolerance): generalized weakness; impaired cardiac output; neurological disorder; obesity; pain; sedentary lifestyle  Signs and Symptoms (Activity Intolerance): pain/discomfort; unable to complete BADL/IADL     Problem: Activity Intolerance (Adult)  Goal: Activity Tolerance  Description  Patient will demonstrate the desired outcomes by discharge/transition of care.  Outcome: Ongoing (interventions implemented as appropriate)  Flowsheets (Taken 5/15/2020 0648)  Activity Tolerance: making progress toward outcome     Problem: Activity Intolerance (Adult)  Goal: Effective Energy Conservation Techniques  Description  Patient will demonstrate the desired outcomes by discharge/transition of care.  Outcome: Ongoing (interventions implemented as appropriate)  Flowsheets (Taken 5/15/2020 0648)  Effective Energy Conservation Techniques: making progress toward outcome     Problem: Fluid Volume Excess (Adult)  Goal: Identify Related Risk Factors and Signs and Symptoms  Description  Related risk factors and signs and symptoms are identified upon initiation of Human Response Clinical Practice Guideline (CPG).  Outcome: Ongoing (interventions implemented as appropriate)  Flowsheets (Taken 5/15/2020 0648)  Related Risk  Factors (Fluid Volume Excess): other (see comments) (edema, heart failure)     Problem: Fluid Volume Excess (Adult)  Goal: Optimal Fluid Balance  Description  Patient will demonstrate the desired outcomes by discharge/transition of care.  Outcome: Ongoing (interventions implemented as appropriate)  Flowsheets (Taken 5/15/2020 0648)  Optimal Fluid Balance: making progress toward outcome     Problem: Patient Care Overview  Goal: Plan of Care Review  Outcome: Ongoing (interventions implemented as appropriate)  Flowsheets  Taken 5/15/2020 0648  Progress: no change  Taken 5/14/2020 2300  Plan of Care Reviewed With: patient     Problem: Patient Care Overview  Goal: Individualization and Mutuality  Outcome: Ongoing (interventions implemented as appropriate)  Flowsheets (Taken 5/15/2020 0648)  Patient Specific Goals (Include Timeframe): Patient's neuro function will improve by discharge.  How to Address Anxieties/Fears: Hopes she can walk someday.  Patient Specific Interventions: Reposition every 2 hours.  Patient Specific Preferences: Does not like wedges.

## 2020-05-15 NOTE — PROGRESS NOTES
Discharge Planning Assessment  [facility]     Patient Name: Anne-Marie Foreman  MRN: [id number]  Today's Date: 5/15/2020    Admit Date: 5/14/2020    Discharge Needs Assessment     Row Name 05/15/20 1033       Living Environment    Lives With  child(jered), adult    Current Living Arrangements  home/apartment/condo    Primary Care Provided by  child(jered)    Provides Primary Care For  no one    Family Caregiver if Needed  child(jered), adult    Quality of Family Relationships  helpful;involved;supportive    Able to Return to Prior Arrangements  yes       Resource/Environmental Concerns    Resource/Environmental Concerns  none    Transportation Concerns  car, none       Transition Planning    Patient/Family Anticipates Transition to  home with family    Patient/Family Anticipated Services at Transition  none    Transportation Anticipated  family or friend will provide       Discharge Needs Assessment    Readmission Within the Last 30 Days  no previous admission in last 30 days    Concerns to be Addressed  no discharge needs identified;denies needs/concerns at this time    Equipment Currently Used at Home  walker, rolling;wheelchair    Anticipated Changes Related to Illness  none    Equipment Needed After Discharge  none    Discharge Facility/Level of Care Needs  home with home health    Patient's Choice of Community Agency(s)  [facility]    Discharge Coordination/Progress  Pt has RX coverage and a PCP. Pt plans on discharging home with her children and is refusing placement at this time. Pt is current with [facility] and plans on returning home with  as well. Pt will need resumption orders for [facility]. SW will follow and set up HH at discharge         Discharge Plan    No documentation.       Destination      Coordination has not been started for this encounter.      Durable Medical Equipment      Coordination has not been started for this encounter.      Dialysis/Infusion      Coordination has not been started for this encounter.       Home Medical Care      Coordination has not been started for this encounter.      Therapy      Coordination has not been started for this encounter.      Community Resources      Coordination has not been started for this encounter.          Demographic Summary    No documentation.       Functional Status    No documentation.       Psychosocial    No documentation.       Abuse/Neglect    No documentation.       Legal    No documentation.       Substance Abuse    No documentation.       Patient Forms    No documentation.           Nancy Beck

## 2020-05-15 NOTE — PLAN OF CARE
Problem: Patient Care Overview  Goal: Plan of Care Review  Outcome: Ongoing (interventions implemented as appropriate)  Flowsheets (Taken 5/15/2020 1316)  Progress: no change  Plan of Care Reviewed With: patient  Outcome Summary: PT evaluation completed. The patient presents alert and oriented x4. She does have B LE weakness with L>R. Her R LE is strong enough to assist with movement, but she does not attempt to use it to assist even with cues. She does have impaired sensation in her L LE with it being more impaired proximally vs distally. She was able to reach EOB with little assistance, but when attempting to stand she gave poor effort. She does have clonus with multiple beats the L ankle. She will benefit from skilled PT services to work on strengthening and transfers. Recommend discharge to SNF vs acute rehab depending upon medical diagnosis and progression.

## 2020-05-15 NOTE — PROGRESS NOTES
"1           [facility] Medicine Services  INPATIENT PROGRESS NOTE    Patient Name: Anne-Marie Foreman  Date of Admission: 5/14/2020  Today's Date: 05/15/20  Length of Stay: 1  Primary Care Physician: Lorrie Wilhelm MD    Subjective   Chief Complaint: Follow-up  HPI   Admitted overnight for 3-month history of progressive weakness and reported urinary incontinence.  Neurology on board.   Weakness reportedly progressive and started sometime in February when she was admitted to [facility] for evaluation of strokelike symptoms.  She was discharged in [facility] for rehab.  Review of record indicates that she had fallen injuring her knees that she returned on February 28.  She is admitted to hospital service for weakness of both lower extremities.    Neurologic work-up still in progress  MRI of spine has not been done.  I spoke earlier to Patty with radiology.  I am told she was having another procedure when transporter came to pick her for MRI    She told me she came because she couldn't feel her feet taht progress up to level of his hip.  It has been 'work\" to urinate. She previously had incontinence prior to this. Nurse Nell told me they could not bladder scan her because of hr body habitus.  She has good urine output.    Review of Systems     All pertinent negatives and positives are as above. All other systems have been reviewed and are negative unless otherwise stated.     Objective    Temp:  [97.4 °F (36.3 °C)-98.7 °F (37.1 °C)] 98.3 °F (36.8 °C)  Heart Rate:  [] 77  Resp:  [18-20] 18  BP: (115-141)/() 132/65  Physical Exam  Obese woman  No distress  Alert and oriented x 3 coherent  Supple neck  No thromegaly  Moist oral mucosa  Lung sound is diminished, no gross crackles/wheezes  s1 s2 irr, tele afib 72  soft obese abd, no gross hsm  Feet is red at the bottom and part of dorsum but no gross warmth difference  + swelling      Results Review:  I have reviewed the labs, " radiology results, and diagnostic studies.    Laboratory Data:   Results from last 7 days   Lab Units 05/15/20  0352 05/14/20 2056   WBC 10*3/mm3 9.62 9.61   HEMOGLOBIN g/dL 12.3 14.2   HEMATOCRIT % 36.2 42.2   PLATELETS 10*3/mm3 177 216        Results from last 7 days   Lab Units 05/15/20  0352 05/14/20 2056   SODIUM mmol/L 138 132*   POTASSIUM mmol/L 3.9 4.6   CHLORIDE mmol/L 99 91*   CO2 mmol/L 25.0 26.0   BUN mg/dL 13 12   CREATININE mg/dL 0.50* 0.61   CALCIUM mg/dL 9.1 9.4   BILIRUBIN mg/dL 0.4 0.3   ALK PHOS U/L 43 55   ALT (SGPT) U/L 24 31   AST (SGOT) U/L 21 25   GLUCOSE mg/dL 129* 247*       Culture Data:   No results found for: BLOODCX, URINECX, WOUNDCX, MRSACX, RESPCX, STOOLCX    Radiology Data:   Imaging Results (Last 24 Hours)     ** No results found for the last 24 hours. **          I have reviewed the patient's current medications.     Assessment/Plan     Active Hospital Problems    Diagnosis   • Weakness of both lower extremities   • Essential hypertension   • Mild CAD   • Chronic atrial fibrillation   • Morbid obesity due to excess calories (CMS/[facility])   • Systolic congestive heart failure (CMS/[facility])   • Type 2 diabetes mellitus with microalbuminuria, without long-term current use of insulin (CMS/[facility])   • Long term current use of anticoagulant therapy     MRI of spine still pending  Neurology work-up pending  INR noted at 2.7.  History of chronic atrial fibrillation  A1c 8.8; Accu-Chek been 129 and 210  History of heart failure with ejection fraction of 25 to 30%; on chronic anticoagulation.  Coumadin on hold for what I believe for procedure  Monitor blood pressure.  Adequately controlled with current medication  Continue physical rehabilitation  I introduce myself to patient and explained the role I can play in her health (DM, heart failure, HTN in general).  I defer further evaluation of bilateral lower extremity weakness with inconinence to neurology - ? Cauda equina  vl us of le pending          aspirin 81 mg Oral Daily   atorvastatin 80 mg Oral Nightly   carvedilol 12.5 mg Oral BID With Meals   cholecalciferol 1,000 Units Oral Daily   cyanocobalamin 1,000 mcg Subcutaneous Once   digoxin 250 mcg Oral Daily   furosemide 40 mg Intravenous Q12H   glipizide 10 mg Oral QAM   glipizide 5 mg Oral Q PM   insulin lispro 2-9 Units Subcutaneous TID AC   losartan 25 mg Oral Q24H   metoprolol succinate XL 50 mg Oral Daily   mupirocin  Topical Q12H   sodium chloride 10 mL Intravenous Q12H   discussed with nurse Nell.  Ok by neurology to start on coumadin.  Currently therapeutic and had gone up w/o intake of coumadin last night.   I think I can hold until tomorrow.   Discussed with Susanne Burger  Discharge Planning: JESSICA Champion MD   05/15/20   14:23

## 2020-05-16 LAB
25(OH)D3 SERPL-MCNC: 35.4 NG/ML (ref 30–100)
ANION GAP SERPL CALCULATED.3IONS-SCNC: 17 MMOL/L (ref 5–15)
BUN BLD-MCNC: 14 MG/DL (ref 8–23)
BUN/CREAT SERPL: 29.8 (ref 7–25)
CALCIUM SPEC-SCNC: 9.3 MG/DL (ref 8.6–10.5)
CHLORIDE SERPL-SCNC: 97 MMOL/L (ref 98–107)
CO2 SERPL-SCNC: 27 MMOL/L (ref 22–29)
CREAT BLD-MCNC: 0.47 MG/DL (ref 0.57–1)
GFR SERPL CREATININE-BSD FRML MDRD: 133 ML/MIN/1.73
GLUCOSE BLD-MCNC: 154 MG/DL (ref 65–99)
GLUCOSE BLDC GLUCOMTR-MCNC: 158 MG/DL (ref 70–130)
GLUCOSE BLDC GLUCOMTR-MCNC: 165 MG/DL (ref 70–130)
GLUCOSE BLDC GLUCOMTR-MCNC: 199 MG/DL (ref 70–130)
GLUCOSE BLDC GLUCOMTR-MCNC: 272 MG/DL (ref 70–130)
HBV SURFACE AG SERPL QL IA: NORMAL
HCV AB SER DONR QL: NORMAL
HIV1+2 AB SER QL: NORMAL
HOLD SPECIMEN: NORMAL
HOLD SPECIMEN: NORMAL
INR PPP: 1.85 (ref 0.91–1.09)
POTASSIUM BLD-SCNC: 3.8 MMOL/L (ref 3.5–5.2)
PROTHROMBIN TIME: 21.1 SECONDS (ref 11.9–14.6)
SODIUM BLD-SCNC: 141 MMOL/L (ref 136–145)
WHOLE BLOOD HOLD SPECIMEN: NORMAL

## 2020-05-16 PROCEDURE — 80048 BASIC METABOLIC PNL TOTAL CA: CPT | Performed by: INTERNAL MEDICINE

## 2020-05-16 PROCEDURE — 86803 HEPATITIS C AB TEST: CPT | Performed by: INTERNAL MEDICINE

## 2020-05-16 PROCEDURE — 84590 ASSAY OF VITAMIN A: CPT | Performed by: CLINICAL NURSE SPECIALIST

## 2020-05-16 PROCEDURE — 97110 THERAPEUTIC EXERCISES: CPT

## 2020-05-16 PROCEDURE — 63710000001 INSULIN DETEMIR PER 5 UNITS: Performed by: INTERNAL MEDICINE

## 2020-05-16 PROCEDURE — 85610 PROTHROMBIN TIME: CPT | Performed by: INTERNAL MEDICINE

## 2020-05-16 PROCEDURE — 99232 SBSQ HOSP IP/OBS MODERATE 35: CPT | Performed by: PSYCHIATRY & NEUROLOGY

## 2020-05-16 PROCEDURE — 63710000001 INSULIN LISPRO (HUMAN) PER 5 UNITS: Performed by: NURSE PRACTITIONER

## 2020-05-16 PROCEDURE — 82962 GLUCOSE BLOOD TEST: CPT

## 2020-05-16 PROCEDURE — 84446 ASSAY OF VITAMIN E: CPT | Performed by: CLINICAL NURSE SPECIALIST

## 2020-05-16 PROCEDURE — 25010000002 CYANOCOBALAMIN PER 1000 MCG: Performed by: INTERNAL MEDICINE

## 2020-05-16 PROCEDURE — 97530 THERAPEUTIC ACTIVITIES: CPT

## 2020-05-16 PROCEDURE — 82306 VITAMIN D 25 HYDROXY: CPT | Performed by: CLINICAL NURSE SPECIALIST

## 2020-05-16 PROCEDURE — 25010000002 KETOROLAC TROMETHAMINE PER 15 MG: Performed by: NURSE PRACTITIONER

## 2020-05-16 PROCEDURE — 87340 HEPATITIS B SURFACE AG IA: CPT | Performed by: INTERNAL MEDICINE

## 2020-05-16 PROCEDURE — 25010000002 FUROSEMIDE PER 20 MG: Performed by: NURSE PRACTITIONER

## 2020-05-16 PROCEDURE — G0432 EIA HIV-1/HIV-2 SCREEN: HCPCS | Performed by: INTERNAL MEDICINE

## 2020-05-16 RX ORDER — LEVOTHYROXINE SODIUM 0.12 MG/1
125 TABLET ORAL DAILY
COMMUNITY

## 2020-05-16 RX ORDER — CYANOCOBALAMIN 1000 UG/ML
1000 INJECTION, SOLUTION INTRAMUSCULAR; SUBCUTANEOUS DAILY
Status: DISCONTINUED | OUTPATIENT
Start: 2020-05-16 | End: 2020-05-20

## 2020-05-16 RX ORDER — WARFARIN SODIUM 7.5 MG/1
3.75 TABLET ORAL
Status: DISCONTINUED | OUTPATIENT
Start: 2020-05-16 | End: 2020-05-17

## 2020-05-16 RX ORDER — LEVOTHYROXINE SODIUM 0.12 MG/1
125 TABLET ORAL
Status: DISCONTINUED | OUTPATIENT
Start: 2020-05-16 | End: 2020-05-22 | Stop reason: HOSPADM

## 2020-05-16 RX ORDER — FUROSEMIDE 20 MG/1
20 TABLET ORAL DAILY
Status: DISCONTINUED | OUTPATIENT
Start: 2020-05-16 | End: 2020-05-22 | Stop reason: HOSPADM

## 2020-05-16 RX ADMIN — DIGOXIN 250 MCG: 250 TABLET ORAL at 12:08

## 2020-05-16 RX ADMIN — FUROSEMIDE 40 MG: 10 INJECTION, SOLUTION INTRAMUSCULAR; INTRAVENOUS at 00:23

## 2020-05-16 RX ADMIN — METOPROLOL SUCCINATE 50 MG: 50 TABLET, EXTENDED RELEASE ORAL at 08:10

## 2020-05-16 RX ADMIN — LEVOTHYROXINE SODIUM 125 MCG: 125 TABLET ORAL at 10:10

## 2020-05-16 RX ADMIN — INSULIN LISPRO 2 UNITS: 100 INJECTION, SOLUTION INTRAVENOUS; SUBCUTANEOUS at 17:22

## 2020-05-16 RX ADMIN — ATORVASTATIN CALCIUM 80 MG: 40 TABLET, FILM COATED ORAL at 21:06

## 2020-05-16 RX ADMIN — FUROSEMIDE 20 MG: 20 TABLET ORAL at 12:08

## 2020-05-16 RX ADMIN — KETOROLAC TROMETHAMINE 30 MG: 30 INJECTION, SOLUTION INTRAMUSCULAR; INTRAVENOUS at 08:15

## 2020-05-16 RX ADMIN — SODIUM CHLORIDE, PRESERVATIVE FREE 10 ML: 5 INJECTION INTRAVENOUS at 21:07

## 2020-05-16 RX ADMIN — LOSARTAN POTASSIUM 25 MG: 25 TABLET, FILM COATED ORAL at 08:11

## 2020-05-16 RX ADMIN — INSULIN LISPRO 6 UNITS: 100 INJECTION, SOLUTION INTRAVENOUS; SUBCUTANEOUS at 11:51

## 2020-05-16 RX ADMIN — INSULIN LISPRO 2 UNITS: 100 INJECTION, SOLUTION INTRAVENOUS; SUBCUTANEOUS at 08:15

## 2020-05-16 RX ADMIN — INSULIN DETEMIR 15 UNITS: 100 INJECTION, SOLUTION SUBCUTANEOUS at 21:46

## 2020-05-16 RX ADMIN — WARFARIN SODIUM 3.75 MG: 7.5 TABLET ORAL at 17:16

## 2020-05-16 RX ADMIN — CYANOCOBALAMIN 1000 MCG: 1000 INJECTION, SOLUTION INTRAMUSCULAR at 10:44

## 2020-05-16 RX ADMIN — CARVEDILOL 12.5 MG: 6.25 TABLET, FILM COATED ORAL at 21:07

## 2020-05-16 RX ADMIN — SODIUM CHLORIDE, PRESERVATIVE FREE 10 ML: 5 INJECTION INTRAVENOUS at 08:11

## 2020-05-16 RX ADMIN — CARVEDILOL 12.5 MG: 6.25 TABLET, FILM COATED ORAL at 08:10

## 2020-05-16 RX ADMIN — MUPIROCIN: 20 OINTMENT TOPICAL at 21:06

## 2020-05-16 RX ADMIN — VITAMIN D 1000 UNITS: 25 TAB ORAL at 08:16

## 2020-05-16 RX ADMIN — ASPIRIN 81 MG: 81 TABLET, CHEWABLE ORAL at 08:10

## 2020-05-16 RX ADMIN — GLIPIZIDE 10 MG: 10 TABLET ORAL at 08:10

## 2020-05-16 RX ADMIN — TIZANIDINE 4 MG: 4 TABLET ORAL at 08:15

## 2020-05-16 RX ADMIN — MUPIROCIN 1 APPLICATION: 20 OINTMENT TOPICAL at 08:11

## 2020-05-16 NOTE — PROGRESS NOTES
"    [facility] Medicine Services  INPATIENT PROGRESS NOTE    Patient Name: Anne-Marie Foreman  Date of Admission: 5/14/2020  Today's Date: 05/16/20  Length of Stay: 2  Primary Care Physician: Lorrie Wilhelm MD    Subjective   Chief Complaint: Follow-up  HPI   She was admitted on May 14 for 3-month history of progressive weakness and reported urinary incontinence.    Neurology following  MRI of spine was ordered.  Only available report is of the thoracic spine which showed multifactorial spinal canal stenosis and foraminal stenosis with cord flattening and myelomalacia at T10-11.  She had nerve conduction study    Venous duplex ultrasound done on May 15 preliminarily read as no evidence of deep vein thrombosis.    Patient is on chronic anticoagulation.  She has history of chronic atrial fibrillation, systolic heart failure, coronary artery disease.  Her EF as of February 2020 was 25 to 30%.  She is normally followed by Dr. Betancourt at [facility] cardiology.    She has diabetes.  Her A1c level is 8.8.  This is similar to previous A1c level done couple of months ago.    \"the bed was defective.\" Didn't get to sleep well last night  [place] untoward reaction to Toradol.  States she normally takes Tylenol only.    Denies any difficulty breathing, rash  Review of Systems     All pertinent negatives and positives are as above. All other systems have been reviewed and are negative unless otherwise stated.     Objective    Temp:  [97.7 °F (36.5 °C)-98.5 °F (36.9 °C)] 97.9 °F (36.6 °C)  Heart Rate:  [60-95] 60  Resp:  [18] 18  BP: (104-152)/(70-87) 104/82  Physical Exam  Obese woman  No distress  Alert and oriented x 3; coherent  Supple neck  No thromegaly  Moist oral mucosa  Lung sound is diminished, no gross crackles/wheezes  s1 s2 irr, tele afib   soft obese abd, no gross hsm  Feet is red at the bottom and part of dorsum but no gross warmth difference  + swelling  No significant change from " yesterday  hemodynamically stable although lower bp reading today.     Results Review:  I have reviewed the labs, radiology results, and diagnostic studies.    Laboratory Data:   Results from last 7 days   Lab Units 05/15/20  0352 05/14/20  2056   WBC 10*3/mm3 9.62 9.61   HEMOGLOBIN g/dL 12.3 14.2   HEMATOCRIT % 36.2 42.2   PLATELETS 10*3/mm3 177 216        Results from last 7 days   Lab Units 05/15/20  0352 05/14/20  2056   SODIUM mmol/L 138 132*   POTASSIUM mmol/L 3.9 4.6   CHLORIDE mmol/L 99 91*   CO2 mmol/L 25.0 26.0   BUN mg/dL 13 12   CREATININE mg/dL 0.50* 0.61   CALCIUM mg/dL 9.1 9.4   BILIRUBIN mg/dL 0.4 0.3   ALK PHOS U/L 43 55   ALT (SGPT) U/L 24 31   AST (SGOT) U/L 21 25   GLUCOSE mg/dL 129* 247*       Culture Data:   No results found for: BLOODCX, URINECX, WOUNDCX, MRSACX, RESPCX, STOOLCX    Radiology Data:   Imaging Results (Last 24 Hours)     Procedure Component Value Units Date/Time    MRI Thoracic Spine Without Contrast [[id number]] Collected:  05/15/20 1614     Updated:  05/15/20 1621    Narrative:       EXAMINATION: MRI THORACIC SPINE WO CONTRAST-     5/15/2020 3:10 PM CDT     HISTORY: progressive weakness; R29.898-Other symptoms and signs  involving the musculoskeletal system; Z78.9-Other specified health  status     Noncontrast thoracic spine MR imaging.     Due to claustrophobia the patient was unable to tolerate additional  imaging with the use of contrast.     Curvature and alignment is appropriate.     Degenerative endplate spurring.     No compression fracture.     Small central disc protrusion at T7-8. Mild ventral cord effacement.  No foraminal stenosis.     Facet disease with mild foraminal narrowing at T9-T10.     Disc protrusion and facet disease with ligamentous hypertrophy at T10-11  with mild-to-moderate spinal canal stenosis and mild cord flattening.  Associated cord signal change compatible with myelomalacia.  Associated mild to moderate bilateral foraminal stenosis.      Summary:  1. Multifactorial spinal canal stenosis and foraminal stenosis with cord  flattening and myelomalacia at T10-11.        This report was finalized on 05/15/2020 16:18 by Dr. Eugenio Ball MD.    US Venous Doppler Lower Extremity Bilateral (duplex) [[id number]] Resulted:  05/15/20 1619     Updated:  05/15/20 1620          I have reviewed the patient's current medications.     Assessment/Plan     Active Hospital Problems    Diagnosis   • Weakness of both lower extremities   • Essential hypertension   • Mild CAD   • Chronic atrial fibrillation   • Morbid obesity due to excess calories (CMS/[facility])   • Systolic congestive heart failure (CMS/[facility])   • Type 2 diabetes mellitus with microalbuminuria, without long-term current use of insulin (CMS/[facility])   • Long term current use of anticoagulant therapy   spinal stenosis thoracic spine by imaging     · Will check PT/INR.  I anticipate resuming Coumadin today  · I took the initiative to start her on vitamin B-12 intramuscularly daily for the next 5 days and subsequently every 28 days  · Other work-up from neurology standpoint pending  · MRI of cervical and lumbar spine are pending.  Interpretation and correlation of MRI thoracic spine deferred to neurology  · Nerve conduction study pending formal reading  · We will need better control of diabetes as her A1c level is 8.8 and been consistent since February 2020.  Will discuss option of using insulin instead of Glucotrol and metformin.  She is agreeable to insulin.  · Blood pressure is adequately controlled and appears trending towards lower reading.  She is on Lasix 40 twice a day by IV.  There has not been any mention of heart failure exacerbation on admit.  Her home record indicates she only takes 20 daily of Lasix.  She also has hydrochlorothiazide 12.5 mg daily.  We will cut back on Lasix and changed to p.o.  We will check basic metabolic panel. We will monitor bp and volume status (had > 3L output in the past couple  of days)  · Discussed with nurse Keri        Discharge Planning: sara Champion MD   05/16/20   09:41

## 2020-05-16 NOTE — THERAPY TREATMENT NOTE
Acute Care - Physical Therapy Treatment Note  [facility]     Patient Name: Anne-Marie Foreman  : 1955  MRN: [id number]  Today's Date: 2020  Onset of Illness/Injury or Date of Surgery: 20     Referring Physician: TYSON Epstein    Admit Date: 2020    Visit Dx:    ICD-10-CM ICD-9-CM   1. Weakness of both lower extremities R29.898 729.89   2. Decreased activities of daily living (ADL) Z78.9 V49.89     Patient Active Problem List   Diagnosis   • Weakness of both lower extremities   • Chronic atrial fibrillation   • Essential hypertension   • Long term current use of anticoagulant therapy   • Mild CAD   • Mixed hyperlipidemia   • Morbid obesity due to excess calories (CMS/HCC)   • Systolic congestive heart failure (CMS/[facility])   • Type 2 diabetes mellitus with microalbuminuria, without long-term current use of insulin (CMS/[facility])   • Acquired hypothyroidism       Therapy Treatment    Rehabilitation Treatment Summary     Row Name 20 1410 20 0918          Treatment Time/Intention    Discipline  physical therapy assistant  -AB  occupational therapy assistant  -CJ     Document Type  therapy note (daily note)  -AB  therapy note (daily note)  -CJ     Subjective Information  complains of;weakness;fatigue  -AB2  complains of;weakness;fatigue;pain  -CJ     Mode of Treatment  physical therapy  -AB2  occupational therapy  -CJ     Patient Effort  --  adequate  -CJ     Existing Precautions/Restrictions  fall  -AB  fall Numbness from waist down!  -CJ     Recorded by [AB] Mariella Hernandez, PTA 20 1411  [AB2] Mariella Hernandez, PTA 20 1502 [CJ] Se Gil COTA/L 20 1141     Row Name 20 1410 20 0918          Bed Mobility Assessment/Treatment    Supine-Sit [place] (Bed Mobility)  verbal cues;contact guard  -AB  --     Sit-Supine [place] (Bed Mobility)  verbal cues;minimum assist (75% patient effort)  -AB  --     Assistive Device (Bed Mobility)  bed rails;head of  bed elevated  -AB  --     Comment (Bed Mobility)  --  fowlers in bed!  -CJ     Recorded by [AB] Mariella Hernandez, PTA 05/16/20 1502 [CJ] Se Gil COTA/L 05/16/20 1141     Row Name 05/16/20 0918             Motor Skills Assessment/Interventions    Additional Documentation  Therapeutic Exercise (Group)  -CJ      Recorded by [CJ] Se Gil COTA/L 05/16/20 1141      Row Name 05/16/20 1410 05/16/20 0918          Therapeutic Exercise    Upper Extremity (Therapeutic Exercise)  --  bicep curl, bilateral;wand exercises  -CJ     Upper Extremity Range of Motion (Therapeutic Exercise)  --  elbow flexion/extension, bilateral;wrist flexion/extension, bilateral  -CJ     Lower Extremity (Therapeutic Exercise)  LAQ (long arc quad), bilateral  -AB  --     Lower Extremity Range of Motion (Therapeutic Exercise)  hip flexion/extension, bilateral;hip abduction/adduction, bilateral;ankle dorsiflexion/plantar flexion, bilateral  -AB  --     Weight/Resistance (Therapeutic Exercise)  --  2 pounds;3 pounds  -CJ     Exercise Type (Therapeutic Exercise)  AROM (active range of motion)  -AB  AROM (active range of motion)  -CJ     Position (Therapeutic Exercise)  seated  -AB  seated  -CJ     Sets/Reps (Therapeutic Exercise)  20  -AB  2 sets x 15 reps!  -CJ     Equipment (Therapeutic Exercise)  --  dowel essie/wand;free weight, barbell  -CJ     Expected Outcome (Therapeutic Exercise)  --  facilitate normal movement patterns;improve functional stability;improve functional tolerance, self-care activity;improve motor control;improve neuromuscular control;improve performance, BADLs;improve performance, fine motor coordination skills;improve performance, gait skills;improve performance, transfer skills;improve postural control;increase active range of motion  -CJ     Recorded by [AB] Mariella Hernandez, PTA 05/16/20 1502 [CJ] Se Gil COTA/L 05/16/20 1141     Row Name 05/16/20 1410 05/16/20 0918          Positioning and  Restraints    Pre-Treatment Position  in bed  -AB  in bed  -CJ     Post Treatment Position  bed  -AB  bed  -CJ     In Bed  fowlers;call light within reach  -AB  fowlers;call light within reach;encouraged to call for assist;side rails up x2  -CJ     Recorded by [AB] Mariella Hernandez, PTA 05/16/20 1502 [CJ] Se Gil, SHERIFF/L 05/16/20 1141     Row Name 05/16/20 0918             Pain Assessment    Additional Documentation  Pain Scale 2: Word Pre/Post-Treatment (Group)  -CJ      Recorded by [CJ] Se Gil SHERIFF/L 05/16/20 1141      Row Name 05/16/20 0918             Pain Scale: Numbers Pre/Post-Treatment    Pain Scale: Numbers, Pretreatment  3/10  -CJ      Pain Scale: Numbers, Post-Treatment  3/10  -CJ      Pain [facility] - Side  Bilateral  -CJ      Pain [facility] - Orientation  lower  -CJ      Pain [facility]  back  -CJ      Pain Intervention(s)  Rest  -CJ      Recorded by [CJ] Se Gil SHERIFF/L 05/16/20 1141      Row Name                Wound 05/14/20 2300 Right lower leg     Wound - Properties Group Date first assessed: 05/14/20 [TC] Time first assessed: 2300 [TC] Present on Hospital Admission: Y [TC] Side: Right [TC] Orientation: lower [TC] [facility]: leg [TC] Primary Wound Type: -- [TC], redness/scab  Recorded by:  [TC] Castleman, Tamara K, RN 05/15/20 0329    Row Name 05/16/20 0918             Outcome Summary/Treatment Plan (OT)    Daily Summary of Progress (OT)  progress towards functional goals is fair  -CJ      Barriers to Overall Progress (OT)  Fall/pain/Mov't, but numbness from waist down!  -CJ      Plan for Continued Treatment (OT)  continue with ot poc!  -CJ      Recorded by [CJ] Se Gil SHERIFF/L 05/16/20 1141        User Key  (r) = Recorded By, (t) = Taken By, (c) = Cosigned By    Initials Name Effective Dates Discipline    AB Mariella Hernandez, PTA 08/02/16 -  PT    CJ Se Gil, SHERIFF/L 08/02/16 -  OT    TC Castleman, Tamara K, RN 08/02/16 -  Nurse          Wound  05/14/20 2300 Right lower leg  (Active)   Dressing Appearance open to air 5/15/2020  8:00 PM   Base scab 5/15/2020  8:00 PM   Periwound redness 5/15/2020  8:00 PM   Care, Wound antimicrobial agent applied 5/15/2020  8:00 PM   Dressing Care, Wound foam 5/15/2020  8:00 PM           Physical Therapy Education                 Title: PT OT SLP Therapies (In Progress)     Topic: Physical Therapy (In Progress)     Point: Mobility training (In Progress)     Description:   Instruct learner(s) on safety and technique for assisting patient out of bed, chair or wheelchair.  Instruct in the proper use of assistive devices, such as walker, crutches, cane or brace.              Patient Friendly Description:   It's important to get you on your feet again, but we need to do so in a way that is safe for you. Falling has serious consequences, and your personal safety is the most important thing of all.        When it's time to get out of bed, one of us or a family member will sit next to you on the bed to give you support.     If your doctor or nurse tells you to use a walker, crutches, a cane, or a brace, be sure you use it every time you get out of bed, even if you think you don't need it.    Learning Progress Summary           Patient Acceptance, E, NR by MS at 5/15/2020 2448    Comment:  role of PT in her care                   Point: Home exercise program (Not Started)     Description:   Instruct learner(s) on appropriate technique for monitoring, assisting and/or progressing patient with therapeutic exercises and activities.              Learner Progress:   Not documented in this visit.          Point: Body mechanics (Not Started)     Description:   Instruct learner(s) on proper positioning and spine alignment for patient and/or caregiver during mobility tasks and/or exercises.              Learner Progress:   Not documented in this visit.          Point: Precautions (Not Started)     Description:   Instruct learner(s) on  prescribed precautions during mobility and gait tasks              Learner Progress:   Not documented in this visit.                      User Key     Initials Effective Dates Name Provider Type Discipline    MS 06/19/18 -  Ramandeep Goodwin R, PT, DPT, NCS Physical Therapist PT                PT Recommendation and Plan     Progress: improving  Outcome Summary: She was CGA with bedrail and HOB elevated to get to EOB. She sat EOB Independently and performed 20 reps sitting exercises AROM Rod LE. She needed min assist with L LE getting back in bed and Max assist of 2 to scoot up in bed.  Outcome Measures     Row Name 05/16/20 0918 05/15/20 1105          How much help from another is currently needed...    Putting on and taking off regular lower body clothing?  1  -CJ  1  -CS     Bathing (including washing, rinsing, and drying)  2  -CJ  2  -CS     Toileting (which includes using toilet bed pan or urinal)  1  -CJ  1  -CS     Putting on and taking off regular upper body clothing  3  -CJ  3  -CS     Taking care of personal grooming (such as brushing teeth)  3  -CJ  3  -CS     Eating meals  3  -CJ  3  -CS     AM-PAC 6 Clicks Score (OT)  13  -CJ  13  -CS        Functional Assessment    Outcome Measure Options  AM-PAC 6 Clicks Daily Activity (OT)  -CJ  AM-PAC 6 Clicks Daily Activity (OT)  -CS       User Key  (r) = Recorded By, (t) = Taken By, (c) = Cosigned By    Initials Name Provider Type    CJ Se Gil, SHERIFF/L Occupational Therapy Assistant    CS Kim Abdi S, OTR/L, CNT Occupational Therapist         Time Calculation:   PT Charges     Row Name 05/16/20 1410             Time Calculation    Start Time  1410  -AB      Stop Time  1500  -AB      Time Calculation (min)  50 min  -AB      PT Received On  05/16/20  -AB         Time Calculation- PT    Total Timed Code Minutes- PT  50 minute(s)  -AB        User Key  (r) = Recorded By, (t) = Taken By, (c) = Cosigned By    Initials Name Provider Type    AB Mariella Hernandez  RORO CHEN Physical Therapy Assistant        Therapy Charges for Today     Code Description Service Date Service Provider Modifiers Qty    16647830185  PT THER PROC EA 15 MIN 5/16/2020 Mariella Hernandez, RORO GP 1    [id number]  PT THERAPEUTIC ACT EA 15 MIN 5/16/2020 Mariella Hernandez, RORO GP 2          PT G-Codes  Outcome Measure Options: AM-PAC 6 Clicks Daily Activity (OT)  AM-PAC 6 Clicks Score (PT): 8  AM-PAC 6 Clicks Score (OT): 13    Mariella Hernandez PTA  5/16/2020

## 2020-05-16 NOTE — PROGRESS NOTES
RT Nebulizer Protocol    Assessment tool to be used for patients with existing breathing treatments ordered by hospitalists                                                                  0  1  2  3  4      Respiratory History   No Smoking      Smoking History      1 Pack/Day      Pulmonary Disease   x   Exacerbation        Respiratory Rate   Normal   x   20-25      Dyspneic      Accessory Muscles      Severe Dyspnea        Breath Sounds   Clear   x   Crackles      Crackles/ Rhonchi      Wheezing      Absent/ Severe Wheezing        Chest   X-ray   Clear/no xray   x   1 Lobe Infiltration/ Consolidation/ PE      2 Lobe Same Lung Infiltration/ Consolidation/ PE       2 Lobe Infiltration/ Both Lungs/ Consolidation/ PE      Both Lungs/ More Than 1 Lobe/ Atelectasis/ Consolidation/  PE        Cough   Strong Non- Productive   x   Excessive Secretions/ Strong Cough      Excessive Secretions/ Weak Cough      Thick Bronchial Secretions/ Weak Cough      Thick Bronchial Secretions/ No Cough        Total Patient Score =  3  0-4=Q4 PRN  5-9=TID and Q4 PRN  10-14=QID and Q3 PRN  15-19=Q4 and Q2 PRN  20=Q3 and Q2 PRN    Bronchopulmonary Hygiene (CPT)   Q4 ATC Copious secretions, dyspnea, unable to sleep, mucus plug    QID & Q4 PRN Moderate secretions    TID Small amounts of secretions w/ poor cough and history of secretions    BID Unable to deep breathe and cough spontaneously       Lung Expansion Therapy (PEP)   Q4 & PRN at night Severe atelectasis, poor oxygenation    QID  High risk for persistent atelectasis, existence of same    TID At risk for developing atelectasis    BID Unable to deep breathe and cough spontaneously     Instruct, 1 follow up Patients able to perform well on their own        Continue present order of Q4 prn ALbuterol

## 2020-05-16 NOTE — PLAN OF CARE
Patient c/o no sleep and generalized pain throughout body-so we changed her to a Braxton bed to help and repositioning with wedges and pillows, Patient having difficulty moving left leg. C/o incontinence and currently using a purewic as she states she does not want to have to move many times today to change incontinence pads or diaper. Encouraging patient to turn and lay on side many time today- but does not want to. Left back of calf wound medicated as ordered. Will continue to monitor BG and encourage turns.

## 2020-05-16 NOTE — PLAN OF CARE
Problem: Patient Care Overview  Goal: Plan of Care Review  5/15/2020 1921 by Nell Gautam, RN  Flowsheets (Taken 5/15/2020 1915)  Outcome Summary: Pt A&Ox4, VSS safety maintained. Only tolerated 15 min of MRI, refused to go any longer. Purewick in place, voiding large amounts. Will continue to monitor

## 2020-05-16 NOTE — PLAN OF CARE
Problem: Patient Care Overview  Goal: Plan of Care Review  Outcome: Ongoing (interventions implemented as appropriate)  Flowsheets  Taken 5/16/2020 0235  Progress: no change  Outcome Summary: PT is A&Ox4. Numbness in legs. No cokplaints o wale pain. IV lasix given. Purewick in place, with large amounts of output. No neuro changes. VSS. safety maintained  Taken 5/15/2020 2000  Plan of Care Reviewed With: patient

## 2020-05-16 NOTE — PLAN OF CARE
Problem: Patient Care Overview  Goal: Plan of Care Review  Flowsheets (Taken 5/16/2020 1142)  Progress: improving  Plan of Care Reviewed With: patient  Note:   Pt. Fowlers in bed, mov't, but numbness from waist down, pt. States that she is not ambing now, doesn't know why that's why she's here! Ue exs performed to increase her act. Pro and bed mobility with transfers!

## 2020-05-16 NOTE — PLAN OF CARE
Problem: Patient Care Overview  Goal: Plan of Care Review  Outcome: Ongoing (interventions implemented as appropriate)  Flowsheets (Taken 5/16/2020 1410)  Progress: improving  Outcome Summary: She was CGA with bedrail and HOB elevated to get to EOB. She sat EOB Independently and performed 20 reps sitting exercises AROM Rod LE. She needed min assist with L LE getting back in bed and Max assist of 2 to scoot up in bed.

## 2020-05-16 NOTE — PLAN OF CARE
Problem: Patient Care Overview  Goal: Individualization and Mutuality  Outcome: Ongoing (interventions implemented as appropriate)  Flowsheets (Taken 5/15/2020 0648 by Castleman, Tamara K, RN)  Patient Specific Goals (Include Timeframe): Patient's neuro function will improve by discharge.  Patient Specific Interventions: Reposition every 2 hours.  Patient Specific Preferences: Does not like wedges.

## 2020-05-16 NOTE — THERAPY TREATMENT NOTE
Acute Care - Occupational Therapy Treatment Note  [facility]     Patient Name: Anne-Marie Foreman  : 1955  MRN: [id number]  Today's Date: 2020  Onset of Illness/Injury or Date of Surgery: 20  Date of Referral to OT: 20  Referring Physician: TYSON Epstein    Admit Date: 2020       ICD-10-CM ICD-9-CM   1. Weakness of both lower extremities R29.898 729.89   2. Decreased activities of daily living (ADL) Z78.9 V49.89     Patient Active Problem List   Diagnosis   • Weakness of both lower extremities   • Chronic atrial fibrillation   • Essential hypertension   • Long term current use of anticoagulant therapy   • Mild CAD   • Mixed hyperlipidemia   • Morbid obesity due to excess calories (CMS/HCC)   • Systolic congestive heart failure (CMS/HCC)   • Type 2 diabetes mellitus with microalbuminuria, without long-term current use of insulin (CMS/HCC)   • Acquired hypothyroidism     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HCC)    • Hyperlipidemia    • Hypertension    • Left sided lacunar infarction (CMS/HCC)    • Obesity, Class II, BMI 35-39.9    • Systolic heart failure (CMS/HCC)    • Type 2 diabetes mellitus (CMS/HCC)      Past Surgical History:   Procedure Laterality Date   • CARDIAC CATHETERIZATION  10/21/2009   • CARDIOVERSION  10/09/2013   • CATARACT EXTRACTION, BILATERAL     • CHOLECYSTECTOMY     • LAPAROSCOPIC TUBAL LIGATION     • RETINAL LASER PROCEDURE         Therapy Treatment    Rehabilitation Treatment Summary     Row Name 20 0918             Treatment Time/Intention    Discipline  occupational therapy assistant  -CJ      Document Type  therapy note (daily note)  -CJ      Subjective Information  complains of;weakness;fatigue;pain  -CJ      Mode of Treatment  occupational therapy  -CJ      Patient Effort  adequate  -CJ      Existing  Precautions/Restrictions  fall Numbness from waist down!  -CJ      Recorded by [CJ] Se Gil COTA/L 05/16/20 1141      Row Name 05/16/20 0918             Bed Mobility Assessment/Treatment    Comment (Bed Mobility)  fowlers in bed!  -CJ      Recorded by [CJ] Se Gil COTA/L 05/16/20 1141      Row Name 05/16/20 0918             Motor Skills Assessment/Interventions    Additional Documentation  Therapeutic Exercise (Group)  -CJ      Recorded by [CJ] Se Gil COTA/L 05/16/20 1141      Row Name 05/16/20 0918             Therapeutic Exercise    Upper Extremity (Therapeutic Exercise)  bicep curl, bilateral;wand exercises  -CJ      Upper Extremity Range of Motion (Therapeutic Exercise)  elbow flexion/extension, bilateral;wrist flexion/extension, bilateral  -CJ      Weight/Resistance (Therapeutic Exercise)  2 pounds;3 pounds  -CJ      Exercise Type (Therapeutic Exercise)  AROM (active range of motion)  -CJ      Position (Therapeutic Exercise)  seated  -CJ      Sets/Reps (Therapeutic Exercise)  2 sets x 15 reps!  -CJ      Equipment (Therapeutic Exercise)  dowel essie/wand;free weight, barbell  -CJ      Expected Outcome (Therapeutic Exercise)  facilitate normal movement patterns;improve functional stability;improve functional tolerance, self-care activity;improve motor control;improve neuromuscular control;improve performance, BADLs;improve performance, fine motor coordination skills;improve performance, gait skills;improve performance, transfer skills;improve postural control;increase active range of motion  -CJ      Recorded by [CJ] Se Gil COTA/L 05/16/20 1141      Row Name 05/16/20 0918             Positioning and Restraints    Pre-Treatment Position  in bed  -CJ      Post Treatment Position  bed  -CJ      In Bed  fowlers;call light within reach;encouraged to call for assist;side rails up x2  -CJ      Recorded by [CJ] Se Gil COTA/L 05/16/20 1141      Row Name 05/16/20  0918             Pain Assessment    Additional Documentation  Pain Scale 2: Word Pre/Post-Treatment (Group)  -CJ      Recorded by [CJ] Se Gil COTA/L 05/16/20 1141      Row Name 05/16/20 0918             Pain Scale: Numbers Pre/Post-Treatment    Pain Scale: Numbers, Pretreatment  3/10  -      Pain Scale: Numbers, Post-Treatment  3/10  -      Pain [facility] - Side  Bilateral  -CJ      Pain [facility] - Orientation  lower  -CJ      Pain [facility]  back  -CJ      Pain Intervention(s)  Rest  -CJ      Recorded by [CJ] Se Gil SHERIFF/L 05/16/20 1141      Row Name                Wound 05/14/20 2300 Right lower leg     Wound - Properties Group Date first assessed: 05/14/20 [TC] Time first assessed: 2300 [TC] Present on Hospital Admission: Y [TC] Side: Right [TC] Orientation: lower [TC] [facility]: leg [TC] Primary Wound Type: -- [TC], redness/scab  Recorded by:  [TC] Castleman, Tamara K, RN 05/15/20 0329    Row Name 05/16/20 0918             Outcome Summary/Treatment Plan (OT)    Daily Summary of Progress (OT)  progress towards functional goals is fair  -      Barriers to Overall Progress (OT)  Fall/pain/Mov't, but numbness from waist down!  -      Plan for Continued Treatment (OT)  continue with ot poc!  -CJ      Recorded by [CJ] Se Gil SHERIFF/L 05/16/20 1141        User Key  (r) = Recorded By, (t) = Taken By, (c) = Cosigned By    Initials Name Effective Dates Discipline    CJ Se Gil COTA/L 08/02/16 -  OT    TC Castleman, Tamara K, RN 08/02/16 -  Nurse        Wound 05/14/20 2300 Right lower leg  (Active)   Dressing Appearance open to air 5/15/2020  8:00 PM   Base scab 5/15/2020  8:00 PM   Periwound redness 5/15/2020  8:00 PM   Care, Wound antimicrobial agent applied 5/15/2020  8:00 PM   Dressing Care, Wound foam 5/15/2020  8:00 PM       Occupational Therapy Education                 Title: PT OT SLP Therapies (In Progress)     Topic: Occupational Therapy (In Progress)      Point: ADL training (Not Started)     Description:   Instruct learner(s) on proper safety adaptation and remediation techniques during self care or transfers.   Instruct in proper use of assistive devices.              Learner Progress:   Not documented in this visit.          Point: Home exercise program (Done)     Description:   Instruct learner(s) on appropriate technique for monitoring, assisting and/or progressing therapeutic exercises/activities.              Learning Progress Summary           Patient Acceptance, E,TB, VU,DU,NR by  at 5/16/2020 1142    Comment:  Pt. performed ue exs to increase her bed mobility and tranfer ability!                   Point: Precautions (Done)     Description:   Instruct learner(s) on prescribed precautions during self-care and functional transfers.              Learning Progress Summary           Patient Acceptance, E,TB, VU,DU,NR by  at 5/16/2020 1142    Comment:  Pt. performed ue exs to increase her bed mobility and tranfer ability!                   Point: Body mechanics (Done)     Description:   Instruct learner(s) on proper positioning and spine alignment during self-care, functional mobility activities and/or exercises.              Learning Progress Summary           Patient Acceptance, E,TB, VU,DU,NR by  at 5/16/2020 1142    Comment:  Pt. performed ue exs to increase her bed mobility and tranfer ability!                               User Key     Initials Effective Dates Name Provider Type Discipline     08/02/16 -  Se Gil COTA/L Occupational Therapy Assistant OT                OT Recommendation and Plan  Outcome Summary/Treatment Plan (OT)  Daily Summary of Progress (OT): progress towards functional goals is fair  Barriers to Overall Progress (OT): Fall/pain/Mov't, but numbness from waist down!  Plan for Continued Treatment (OT): continue with ot poc!  Daily Summary of Progress (OT): progress towards functional goals is fair  Plan of Care  Review  Plan of Care Reviewed With: patient  Plan of Care Reviewed With: patient  Outcome Measures     Row Name 05/16/20 0918 05/15/20 1105          How much help from another is currently needed...    Putting on and taking off regular lower body clothing?  1  -  1  -CS     Bathing (including washing, rinsing, and drying)  2  -  2  -CS     Toileting (which includes using toilet bed pan or urinal)  1  -  1  -CS     Putting on and taking off regular upper body clothing  3  -  3  -CS     Taking care of personal grooming (such as brushing teeth)  3  -CJ  3  -CS     Eating meals  3  -CJ  3  -CS     AM-PAC 6 Clicks Score (OT)  13  -  13  -CS        Functional Assessment    Outcome Measure Options  AM-PAC 6 Clicks Daily Activity (OT)  -  AM-PAC 6 Clicks Daily Activity (OT)  -CS       User Key  (r) = Recorded By, (t) = Taken By, (c) = Cosigned By    Initials Name Provider Type    Se Landers COTA/L Occupational Therapy Assistant    Kim Arce, OTR/L, DIONTE Occupational Therapist           Time Calculation:   Time Calculation- OT     Row Name 05/16/20 0918             Time Calculation- OT    OT Start Time  0918  -      OT Stop Time  0943  -      OT Time Calculation (min)  25 min  -      Total Timed Code Minutes- OT  25 minute(s)  -      TCU Minutes- OT  25 min  -      OT Received On  05/16/20  -      OT Goal Re-Cert Due Date  05/25/20  -        User Key  (r) = Recorded By, (t) = Taken By, (c) = Cosigned By    Initials Name Provider Type    Se Lnaders COTA/L Occupational Therapy Assistant        Therapy Charges for Today     Code Description Service Date Service Provider Modifiers Qty    18014593408 HC OT THER PROC EA 15 MIN 5/16/2020 Se Gil COTA/L GO 2               JAZIEL Pascual/JAVI  5/16/2020

## 2020-05-17 ENCOUNTER — APPOINTMENT (OUTPATIENT)
Dept: MRI IMAGING | Facility: HOSPITAL | Age: 65
End: 2020-05-17

## 2020-05-17 PROBLEM — E53.8 VITAMIN B 12 DEFICIENCY: Status: ACTIVE | Noted: 2020-05-17

## 2020-05-17 LAB
ANION GAP SERPL CALCULATED.3IONS-SCNC: 14 MMOL/L (ref 5–15)
BUN BLD-MCNC: 19 MG/DL (ref 8–23)
BUN/CREAT SERPL: 40.4 (ref 7–25)
CALCIUM SPEC-SCNC: 9.2 MG/DL (ref 8.6–10.5)
CHLORIDE SERPL-SCNC: 95 MMOL/L (ref 98–107)
CO2 SERPL-SCNC: 26 MMOL/L (ref 22–29)
CREAT BLD-MCNC: 0.47 MG/DL (ref 0.57–1)
DEPRECATED RDW RBC AUTO: 41.5 FL (ref 37–54)
ERYTHROCYTE [DISTWIDTH] IN BLOOD BY AUTOMATED COUNT: 13.2 % (ref 12.3–15.4)
GFR SERPL CREATININE-BSD FRML MDRD: 133 ML/MIN/1.73
GLUCOSE BLD-MCNC: 159 MG/DL (ref 65–99)
GLUCOSE BLDC GLUCOMTR-MCNC: 154 MG/DL (ref 70–130)
GLUCOSE BLDC GLUCOMTR-MCNC: 187 MG/DL (ref 70–130)
GLUCOSE BLDC GLUCOMTR-MCNC: 215 MG/DL (ref 70–130)
GLUCOSE BLDC GLUCOMTR-MCNC: 226 MG/DL (ref 70–130)
HCT VFR BLD AUTO: 37.7 % (ref 34–46.6)
HGB BLD-MCNC: 13 G/DL (ref 12–15.9)
INR PPP: 1.46 (ref 0.91–1.09)
MCH RBC QN AUTO: 29.8 PG (ref 26.6–33)
MCHC RBC AUTO-ENTMCNC: 34.5 G/DL (ref 31.5–35.7)
MCV RBC AUTO: 86.5 FL (ref 79–97)
PLATELET # BLD AUTO: 186 10*3/MM3 (ref 140–450)
PMV BLD AUTO: 11.1 FL (ref 6–12)
POTASSIUM BLD-SCNC: 3.9 MMOL/L (ref 3.5–5.2)
PROTHROMBIN TIME: 17.4 SECONDS (ref 11.9–14.6)
RBC # BLD AUTO: 4.36 10*6/MM3 (ref 3.77–5.28)
SODIUM BLD-SCNC: 135 MMOL/L (ref 136–145)
VIT B6 SERPL-MCNC: 26.1 UG/L (ref 2–32.8)
WBC NRBC COR # BLD: 7.32 10*3/MM3 (ref 3.4–10.8)

## 2020-05-17 PROCEDURE — 97110 THERAPEUTIC EXERCISES: CPT

## 2020-05-17 PROCEDURE — 25010000002 CYANOCOBALAMIN PER 1000 MCG: Performed by: INTERNAL MEDICINE

## 2020-05-17 PROCEDURE — 0 GADOBENATE DIMEGLUMINE 529 MG/ML SOLUTION: Performed by: INTERNAL MEDICINE

## 2020-05-17 PROCEDURE — 72158 MRI LUMBAR SPINE W/O & W/DYE: CPT

## 2020-05-17 PROCEDURE — 82962 GLUCOSE BLOOD TEST: CPT

## 2020-05-17 PROCEDURE — A9577 INJ MULTIHANCE: HCPCS | Performed by: INTERNAL MEDICINE

## 2020-05-17 PROCEDURE — 63710000001 INSULIN LISPRO (HUMAN) PER 5 UNITS: Performed by: NURSE PRACTITIONER

## 2020-05-17 PROCEDURE — 97535 SELF CARE MNGMENT TRAINING: CPT

## 2020-05-17 PROCEDURE — 85027 COMPLETE CBC AUTOMATED: CPT | Performed by: INTERNAL MEDICINE

## 2020-05-17 PROCEDURE — 97530 THERAPEUTIC ACTIVITIES: CPT

## 2020-05-17 PROCEDURE — 85610 PROTHROMBIN TIME: CPT | Performed by: INTERNAL MEDICINE

## 2020-05-17 PROCEDURE — 80048 BASIC METABOLIC PNL TOTAL CA: CPT | Performed by: INTERNAL MEDICINE

## 2020-05-17 PROCEDURE — 99233 SBSQ HOSP IP/OBS HIGH 50: CPT | Performed by: PSYCHIATRY & NEUROLOGY

## 2020-05-17 PROCEDURE — 25010000002 LORAZEPAM PER 2 MG: Performed by: PSYCHIATRY & NEUROLOGY

## 2020-05-17 PROCEDURE — 63710000001 INSULIN DETEMIR PER 5 UNITS: Performed by: INTERNAL MEDICINE

## 2020-05-17 RX ORDER — FLUTICASONE PROPIONATE 50 MCG
2 SPRAY, SUSPENSION (ML) NASAL DAILY
Status: DISCONTINUED | OUTPATIENT
Start: 2020-05-17 | End: 2020-05-22 | Stop reason: HOSPADM

## 2020-05-17 RX ORDER — WARFARIN SODIUM 5 MG/1
5 TABLET ORAL
Status: DISCONTINUED | OUTPATIENT
Start: 2020-05-17 | End: 2020-05-17

## 2020-05-17 RX ORDER — FLUTICASONE PROPIONATE 50 MCG
2 SPRAY, SUSPENSION (ML) NASAL DAILY PRN
COMMUNITY

## 2020-05-17 RX ORDER — WARFARIN SODIUM 5 MG/1
7.5 TABLET ORAL
Status: DISCONTINUED | OUTPATIENT
Start: 2020-05-17 | End: 2020-05-18

## 2020-05-17 RX ORDER — LORAZEPAM 2 MG/ML
1 INJECTION INTRAMUSCULAR ONCE
Status: COMPLETED | OUTPATIENT
Start: 2020-05-17 | End: 2020-05-17

## 2020-05-17 RX ADMIN — LORAZEPAM 1 MG: 2 INJECTION INTRAMUSCULAR; INTRAVENOUS at 09:09

## 2020-05-17 RX ADMIN — GLIPIZIDE 10 MG: 10 TABLET ORAL at 08:16

## 2020-05-17 RX ADMIN — ACETAMINOPHEN 650 MG: 325 TABLET, FILM COATED ORAL at 11:51

## 2020-05-17 RX ADMIN — INSULIN LISPRO 2 UNITS: 100 INJECTION, SOLUTION INTRAVENOUS; SUBCUTANEOUS at 09:11

## 2020-05-17 RX ADMIN — MUPIROCIN: 20 OINTMENT TOPICAL at 20:42

## 2020-05-17 RX ADMIN — WARFARIN SODIUM 7.5 MG: 7.5 TABLET ORAL at 17:22

## 2020-05-17 RX ADMIN — LEVOTHYROXINE SODIUM 125 MCG: 125 TABLET ORAL at 05:06

## 2020-05-17 RX ADMIN — CARVEDILOL 12.5 MG: 6.25 TABLET, FILM COATED ORAL at 08:17

## 2020-05-17 RX ADMIN — SODIUM CHLORIDE, PRESERVATIVE FREE 10 ML: 5 INJECTION INTRAVENOUS at 20:42

## 2020-05-17 RX ADMIN — MUPIROCIN 1 APPLICATION: 20 OINTMENT TOPICAL at 08:17

## 2020-05-17 RX ADMIN — VITAMIN D 1000 UNITS: 25 TAB ORAL at 08:17

## 2020-05-17 RX ADMIN — INSULIN DETEMIR 15 UNITS: 100 INJECTION, SOLUTION SUBCUTANEOUS at 20:51

## 2020-05-17 RX ADMIN — CARVEDILOL 12.5 MG: 6.25 TABLET, FILM COATED ORAL at 17:22

## 2020-05-17 RX ADMIN — LOSARTAN POTASSIUM 25 MG: 25 TABLET, FILM COATED ORAL at 08:18

## 2020-05-17 RX ADMIN — INSULIN LISPRO 4 UNITS: 100 INJECTION, SOLUTION INTRAVENOUS; SUBCUTANEOUS at 11:51

## 2020-05-17 RX ADMIN — FUROSEMIDE 20 MG: 20 TABLET ORAL at 08:17

## 2020-05-17 RX ADMIN — TIZANIDINE 4 MG: 4 TABLET ORAL at 00:18

## 2020-05-17 RX ADMIN — INSULIN LISPRO 2 UNITS: 100 INJECTION, SOLUTION INTRAVENOUS; SUBCUTANEOUS at 17:22

## 2020-05-17 RX ADMIN — SODIUM CHLORIDE, PRESERVATIVE FREE 10 ML: 5 INJECTION INTRAVENOUS at 08:18

## 2020-05-17 RX ADMIN — DIGOXIN 250 MCG: 250 TABLET ORAL at 11:51

## 2020-05-17 RX ADMIN — FLUTICASONE PROPIONATE 2 SPRAY: 50 SPRAY, METERED NASAL at 18:46

## 2020-05-17 RX ADMIN — ASPIRIN 81 MG: 81 TABLET, CHEWABLE ORAL at 08:17

## 2020-05-17 RX ADMIN — CYANOCOBALAMIN 1000 MCG: 1000 INJECTION, SOLUTION INTRAMUSCULAR at 08:19

## 2020-05-17 RX ADMIN — METOPROLOL SUCCINATE 50 MG: 50 TABLET, EXTENDED RELEASE ORAL at 08:17

## 2020-05-17 RX ADMIN — ATORVASTATIN CALCIUM 80 MG: 40 TABLET, FILM COATED ORAL at 20:42

## 2020-05-17 RX ADMIN — GADOBENATE DIMEGLUMINE 20 ML: 529 INJECTION, SOLUTION INTRAVENOUS at 11:30

## 2020-05-17 NOTE — PROGRESS NOTES
Neurology Progress Note      Date of admission: 5/14/2020  8:03 PM  Date of visit: 5/17/2020    Chief Complaint:  F/u weakness and inability to walk and low back pain    Subjective     Subjective:    Patient sleepy from the Ativan that she requested this morning so she could go through the MRI. States she is too sleepy to tell me if she is any different.She has no difficulty with urination now  Medications:  Current Facility-Administered Medications   Medication Dose Route Frequency Provider Last Rate Last Dose   • acetaminophen (TYLENOL) tablet 650 mg  650 mg Oral Q4H PRN Laurie Schwarz APRN   650 mg at 05/17/20 1151    Or   • acetaminophen (TYLENOL) 160 MG/5ML solution 650 mg  650 mg Oral Q4H PRN Laurie Schwarz APRN        Or   • acetaminophen (TYLENOL) suppository 650 mg  650 mg Rectal Q4H PRN Laurie Schwarz APRN       • albuterol (PROVENTIL) nebulizer solution 0.083% 2.5 mg/3mL  2.5 mg Nebulization Q4H PRN Foreign Anna MD       • aspirin chewable tablet 81 mg  81 mg Oral Daily Laurie Schwarz APRN   81 mg at 05/17/20 0817   • atorvastatin (LIPITOR) tablet 80 mg  80 mg Oral Nightly Laurie Schwarz APRN   80 mg at 05/16/20 2106   • carvedilol (COREG) tablet 12.5 mg  12.5 mg Oral BID With Meals Laurie Schwarz APRN   12.5 mg at 05/17/20 0817   • cholecalciferol (VITAMIN D3) tablet 1,000 Units  1,000 Units Oral Daily Laurie Schwarz APRN   1,000 Units at 05/17/20 0817   • cyanocobalamin injection 1,000 mcg  1,000 mcg Intramuscular Daily Alfredo Champion MD   1,000 mcg at 05/17/20 0819   • dextrose (D50W) 25 g/ 50mL Intravenous Solution 25 g  25 g Intravenous Q15 Min PRN Laurie Schwarz APRN       • dextrose (GLUTOSE) oral gel 15 g  15 g Oral Q15 Min PRN Laurie Schwarz APRN       • digoxin (LANOXIN) tablet 250 mcg  250 mcg Oral Daily Laurie Schwarz APRN   250 mcg at 05/17/20 1151   • furosemide (LASIX) tablet 20 mg  20 mg Oral Daily Alfredo Champion MD   20  mg at 05/17/20 0817   • glipizide (GLUCOTROL) tablet 10 mg  10 mg Oral QAM Laurie Schwarz APRN   10 mg at 05/17/20 0816   • glucagon (human recombinant) (GLUCAGEN DIAGNOSTIC) injection 1 mg  1 mg Subcutaneous Q15 Min PRN Laurie Schwarz APRN       • insulin detemir (LEVEMIR) injection 15 Units  15 Units Subcutaneous Nightly Alfredo Champion MD   15 Units at 05/16/20 2146   • insulin lispro (humaLOG) injection 2-9 Units  2-9 Units Subcutaneous TID AC Laurie Schwarz APRN   4 Units at 05/17/20 1151   • levothyroxine (SYNTHROID, LEVOTHROID) tablet 125 mcg  125 mcg Oral Q AM Alfredo Champion MD   125 mcg at 05/17/20 0506   • LORazepam (ATIVAN) injection 1 mg  1 mg Intravenous Once Alfredo Champion MD       • losartan (COZAAR) tablet 25 mg  25 mg Oral Q24H Laurie Schwarz APRN   25 mg at 05/17/20 0818   • metoprolol succinate XL (TOPROL-XL) 24 hr tablet 50 mg  50 mg Oral Daily Laurie Schwarz APRN   50 mg at 05/17/20 0817   • mupirocin (BACTROBAN) 2 % ointment   Topical Q12H Alfredo Champion MD   1 application at 05/17/20 0817   • ondansetron (ZOFRAN) tablet 4 mg  4 mg Oral Q6H PRN Laurie Schwarz APRN        Or   • ondansetron (ZOFRAN) injection 4 mg  4 mg Intravenous Q6H PRN Laurie Schwarz APRN       • sodium chloride 0.9 % flush 10 mL  10 mL Intravenous Q12H Laurie Schwarz APRN   10 mL at 05/17/20 0818   • sodium chloride 0.9 % flush 10 mL  10 mL Intravenous PRN Laurie Schwarz APRN       • tiZANidine (ZANAFLEX) tablet 4 mg  4 mg Oral Q8H PRN Laurie Schwarz APRN   4 mg at 05/17/20 0018   • warfarin (COUMADIN) tablet 5 mg  5 mg Oral Daily Alfredo Champion MD           Review of Systems:   -A 14 point review of systems is completed and is negative except for being sleepy  Objective     Objective      Vital Signs  Temp:  [97.9 °F (36.6 °C)-98.5 °F (36.9 °C)] 98.1 °F (36.7 °C)  Heart Rate:  [53-81] 81  Resp:  [16-20] 20  BP: (113-133)/(51-69)  129/69    Physical Exam:    HEENT:  Neck supple  CVS:  Regular rate and rhythm.  No murmurs  Carotid Examination:  No bruits  Lungs:  Clear to auscultation  Abdomen:  Non-tender, Non-distended  Extremities:  No signs of peripheral edema    Neurologic Exam:    -Somnolent but awakens easily  -No word finding difficulties  -No aphasia  -No dysarthria  -Follows simple and complex commands    Cranial nerves II through XII intact.  Opens eyes, tracks  EOMI  No facial sensory or facial motor asymmetry  Hearing intact to voice   Shoulder shrug symmetric  Motor: (strength out of 5:  1= minimal movement, 2 = movement in plane of gravity, 3 = movement against gravity, 4 = movement against some resistance, 5 = full strength)    -Right Upper Ext: Proximal: 5 Distal: 5  -Left Upper Ext: Proximal: 5 Distal: 5    -Right Lower Ext: Proximal: 5 Distal: 5  -Left Lower Ext: Proximal: 5 Distal: 5--she does not move her left leg spontaneously as much but there now appears to be full strength    DTR:  2+ throughout in all four extremities  Upgoing toes but also withdrawal    Sensory:  She still reports decrease  light touch, pinprick below the umbilicus    Coordination/Gait:  -No ataxia  Normal finger to nose     Results Review:    I reviewed the patient's new clinical results.    Lab Results (last 24 hours)     Procedure Component Value Units Date/Time    POC Glucose Once [[id number]]  (Abnormal) Collected:  05/17/20 1112    Specimen:  Blood Updated:  05/17/20 1125     Glucose 215 mg/dL      Comment: : [id number] Moses Stewart ID: [id number]       POC Glucose Once [[id number]]  (Abnormal) Collected:  05/17/20 0816    Specimen:  Blood Updated:  05/17/20 0846     Glucose 154 mg/dL      Comment: : [id number] Moses Stewart ID: [id number]       Protime-INR [[id number]]  (Abnormal) Collected:  05/17/20 0602    Specimen:  Blood Updated:  05/17/20 0715     Protime 17.4 Seconds      INR 1.46    Basic Metabolic Panel [[id number]]   (Abnormal) Collected:  05/17/20 0602    Specimen:  Blood Updated:  05/17/20 0703     Glucose 159 mg/dL      BUN 19 mg/dL      Creatinine 0.47 mg/dL      Sodium 135 mmol/L      Potassium 3.9 mmol/L      Chloride 95 mmol/L      CO2 26.0 mmol/L      Calcium 9.2 mg/dL      eGFR Non African Amer 133 mL/min/1.73      BUN/Creatinine Ratio 40.4     Anion Gap 14.0 mmol/L     Narrative:       GFR Normal >60  Chronic Kidney Disease <60  Kidney Failure <15      CBC (No Diff) [[id number]]  (Normal) Collected:  05/17/20 0602    Specimen:  Blood Updated:  05/17/20 0656     WBC 7.32 10*3/mm3      RBC 4.36 10*6/mm3      Hemoglobin 13.0 g/dL      Hematocrit 37.7 %      MCV 86.5 fL      MCH 29.8 pg      MCHC 34.5 g/dL      RDW 13.2 %      RDW-SD 41.5 fl      MPV 11.1 fL      Platelets 186 10*3/mm3     [place] Pt Source [[id number]] Collected:  05/16/20 0502    Specimen:  Blood Updated:  05/16/20 2102    Narrative:       The following orders were created for panel order [place].  Procedure                               Abnormality         Status                     ---------                               -----------         ------                     Hepatitis B Surface Antigen[[id number]]  Normal              Final result               HIV-1 / O / 2 Ag / Antib...[[id number]]  Normal              Final result               Hepatitis C Antibody[[id number]]         Normal              Final result               Hep B Confirmation Tube[[id number]]                          Final result                 Please view results for these tests on the individual orders.    Hep B Confirmation Tube [[id number]] Collected:  05/16/20 1141    Specimen:  Blood Updated:  05/16/20 2102     Extra Tube Hold for add-ons.    POC Glucose Once [[id number]]  (Abnormal) Collected:  05/16/20 2018    Specimen:  Blood Updated:  05/16/20 2029     Glucose 199 mg/dL      Comment: : [id number] Nena Fadieter ID: [id number]       POC Glucose Once  [[id number]]  (Abnormal) Collected:  05/16/20 1714    Specimen:  Blood Updated:  05/16/20 1726     Glucose 158 mg/dL      Comment: : [id number] Stewart ID: [id number]       Hepatitis B Surface Antigen [[id number]]  (Normal) Collected:  05/16/20 0502    Specimen:  Blood Updated:  05/16/20 1235     Hepatitis B Surface Ag Non-Reactive    Narrative:       Results may be falsely decreased if patient taking Biotin.      HIV-1 / O / 2 Ag / Antibody 4th Generation [[id number]]  (Normal) Collected:  05/16/20 0502    Specimen:  Blood Updated:  05/16/20 1235     HIV-1/ HIV-2 Non-Reactive    Narrative:       The HIV antibody/antigen combo assay is a qualitative assay for HIV that includes the p24 antigen as well as antibodies to HIV types 1 and 2. This test is intended to be used as a screening assay in the diagnosis of HIV infection in patients over the age of 2.  Results may be falsely decreased if patient taking Biotin.      Hepatitis C Antibody [[id number]]  (Normal) Collected:  05/16/20 0502    Specimen:  Blood Updated:  05/16/20 1235     Hepatitis C Ab Non-Reactive    Narrative:       Results may be falsely decreased if patient taking Biotin.          Imaging Results (Last 24 Hours)     Procedure Component Value Units Date/Time    MRI Lumbar Spine With & Without Contrast [[id number]] Collected:  05/17/20 1102     Updated:  05/17/20 1110    Narrative:       EXAMINATION: MRI LUMBAR SPINE W WO CONTRAST- 5/17/2020 11:02 AM CDT     HISTORY: Progressive weakness     COMPARISON: None     Technical: Multiplanar, multisequence imaging was performed through the  lumbar spine before and after the administration of IV contrast.     FINDINGS:  Review of the visualized paraspinal soft tissues demonstrates no acute  abnormalities.     There are presumed to be 5 lumbar-type vertebral bodies. Assuming this,  the conus medullaris terminates normally at the level of L1-L2. The  visualized spinal cord appears normal in signal and  morphology. 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     Alignment of the lumbar spine appears normal. Vertebral body heights  appear well maintained. No infiltrative marrow process is identified.     Disc levels:  L1-L2: Mild diffuse disc bulge and facet hypertrophy without severe  thecal sac stenosis. There is mild left neuroforaminal narrowing.     L2-L3: Diffuse disc bulge and marked facet hypertrophy result in  effacement of the thecal sac with moderate thecal sac stenosis. No  severe neuroforaminal narrowing is identified.     L3-L4: Diffuse disc bulge and marked ligamentous and facet hypertrophy  result in effacement of the thecal sac with moderate to severe thecal  sac stenosis. Additionally, there is moderate right and mild left  neuroforaminal narrowing.     L4-L5: Diffuse disc bulge and ligamentous hypertrophy are present,  resulting in effacement of the thecal sac but no significant thecal sac  stenosis. There does appear to be moderate bilateral neuroforaminal  narrowing at this level.     L5-S1: Diffuse disc bulge and facet hypertrophy are present without  appreciable thecal sac stenosis. There is mild to moderate bilateral  neuroforaminal narrowing.     On postcontrast imaging, there is some enhancement at the facet joints  of L4-L5, suggesting a synovitis. Otherwise, no abnormal enhancement is  identified.       Impression:          1. 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  2. More focal areas of thecal sac stenosis are noted as detailed above.  3. Areas of mild to moderate neural foraminal narrowing are seen at  multiple levels.  4. Synovitis suspected of the facet joints at L4-L5. Otherwise, no  abnormal enhancement identified.  This report was finalized on 05/17/2020 11:07 by Dr. Jerry Chinchilla MD.          Assessment/Plan     Hospital Problem List      Weakness of both lower extremities    Chronic atrial  fibrillation    Essential hypertension    Long term current use of anticoagulant therapy    Mild CAD    Morbid obesity due to excess calories (CMS/HCC)    Systolic congestive heart failure (CMS/HCC)    Type 2 diabetes mellitus with microalbuminuria, without long-term current use of insulin (CMS/[facility])    Impression:  1. Lumbar stenosis  2. Thoracic T10.11 stenosis  3. Peripheral polyneuropathy on background of DM that is not well controlled. And B12 not even measurable  4. B12 deficiency--severe < 150  5. A fib  6/ C/o muscle spasms and on PRN tizanidine    Plan:  Complete work up  Await serum KAREN  Aggressively replace B12  Will have Neurosurgery see if they want to do anything further.         Francisca Manrique MD  05/17/20  12:15

## 2020-05-17 NOTE — PROGRESS NOTES
Neurology Progress Note      Date of admission: 5/14/2020  8:03 PM  Date of visit: 5/16/2020    Chief Complaint:  F/u lower extremity weakness    Subjective     Subjective:    She is doing better but will not allow nursing to turn her on her side. She is now able to lift up both legs. States pain is better usually but certain movements can make it worse.  Reports spasms of lower extremities. Can urinate  Medications:  Current Facility-Administered Medications   Medication Dose Route Frequency Provider Last Rate Last Dose   • acetaminophen (TYLENOL) tablet 650 mg  650 mg Oral Q4H PRN Laurie Schwarz APRN   650 mg at 05/15/20 0441    Or   • acetaminophen (TYLENOL) 160 MG/5ML solution 650 mg  650 mg Oral Q4H PRN Laurie Schwarz APRN        Or   • acetaminophen (TYLENOL) suppository 650 mg  650 mg Rectal Q4H PRN Laurie Schwarz APRN       • albuterol (PROVENTIL) nebulizer solution 0.083% 2.5 mg/3mL  2.5 mg Nebulization Q4H PRN Foreign Anna MD       • aspirin chewable tablet 81 mg  81 mg Oral Daily Laurie Schwarz APRN   81 mg at 05/16/20 0810   • atorvastatin (LIPITOR) tablet 80 mg  80 mg Oral Nightly Laurie Schwarz APRN   80 mg at 05/15/20 2046   • carvedilol (COREG) tablet 12.5 mg  12.5 mg Oral BID With Meals Laurie Schwarz APRN   12.5 mg at 05/16/20 0810   • cholecalciferol (VITAMIN D3) tablet 1,000 Units  1,000 Units Oral Daily Laurie Schwarz APRN   1,000 Units at 05/16/20 0816   • cyanocobalamin injection 1,000 mcg  1,000 mcg Intramuscular Daily Alfredo Champion MD   1,000 mcg at 05/16/20 1044   • dextrose (D50W) 25 g/ 50mL Intravenous Solution 25 g  25 g Intravenous Q15 Min PRN Laurie Schwarz APRN       • dextrose (GLUTOSE) oral gel 15 g  15 g Oral Q15 Min PRN Laurie Schwarz APRN       • digoxin (LANOXIN) tablet 250 mcg  250 mcg Oral Daily Laurie Scwharz APRN   250 mcg at 05/16/20 1208   • furosemide (LASIX) tablet 20 mg  20 mg Oral Daily Alfredo Champion  MD Maisha   20 mg at 05/16/20 1208   • glipizide (GLUCOTROL) tablet 10 mg  10 mg Oral QAM Laurie Schwarz APRN   10 mg at 05/16/20 0810   • glucagon (human recombinant) (GLUCAGEN DIAGNOSTIC) injection 1 mg  1 mg Subcutaneous Q15 Min PRN Laurie Schwarz APRN       • insulin detemir (LEVEMIR) injection 15 Units  15 Units Subcutaneous Nightly Alfredo Champion MD       • insulin lispro (humaLOG) injection 2-9 Units  2-9 Units Subcutaneous TID AC Laurie Schwarz APRN   2 Units at 05/16/20 1722   • levothyroxine (SYNTHROID, LEVOTHROID) tablet 125 mcg  125 mcg Oral Q AM Alfredo Champion MD   125 mcg at 05/16/20 1010   • LORazepam (ATIVAN) injection 1 mg  1 mg Intravenous Once Alfredo Champion MD       • losartan (COZAAR) tablet 25 mg  25 mg Oral Q24H Laurie Schwarz APRN   25 mg at 05/16/20 0811   • metoprolol succinate XL (TOPROL-XL) 24 hr tablet 50 mg  50 mg Oral Daily Laurie Schwarz APRN   50 mg at 05/16/20 0810   • mupirocin (BACTROBAN) 2 % ointment   Topical Q12H Alfredo Champion MD   1 application at 05/16/20 0811   • ondansetron (ZOFRAN) tablet 4 mg  4 mg Oral Q6H PRN Laurie Schwarz APRN        Or   • ondansetron (ZOFRAN) injection 4 mg  4 mg Intravenous Q6H PRN Laurie Schwarz APRN       • sodium chloride 0.9 % flush 10 mL  10 mL Intravenous Q12H Laurie Schwarz APRN   10 mL at 05/16/20 0811   • sodium chloride 0.9 % flush 10 mL  10 mL Intravenous PRN Laurie Schwarz APRN       • tiZANidine (ZANAFLEX) tablet 4 mg  4 mg Oral Q8H PRN Laurie Schwarz APRN   4 mg at 05/16/20 0815   • warfarin (COUMADIN) tablet 3.75 mg  3.75 mg Oral Daily Alfredo Champion MD   3.75 mg at 05/16/20 0895       Review of Systems:   -A 14 point review of systems is completed and is negative except for occasional spasm of lower extremities    Objective     Objective      Vital Signs  Temp:  [97.8 °F (36.6 °C)-98.8 °F (37.1 °C)] 98.2 °F (36.8 °C)  Heart Rate:  [56-95]  80  Resp:  [18] 18  BP: (102-129)/(45-82) 116/55    Physical Exam:    HEENT:  Neck supple  CVS:  Regular rate and rhythm.  No murmurs  Carotid Examination:  No bruits  Lungs:  Clear to auscultation  Abdomen:  Non-tender, Non-distended  Extremities:  No signs of peripheral edema    Neurologic Exam:    -Awake, Alert, Oriented X 3  -No word finding difficulties  -No aphasia  -No dysarthria  -Follows simple and complex commands    Cranial nerves II through XII intact.  Pupils react  EOMI  No facial senosry loss  No facial motor asymmetry  Hearing intact to voice    Motor: (strength out of 5:  1= minimal movement, 2 = movement in plane of gravity, 3 = movement against gravity, 4 = movement against some resistance, 5 = full strength)    -Right Upper Ext: Proximal: 5 Distal: 5  -Left Upper Ext: Proximal: 5 Distal: 5    -Right Lower Ext: Proximal: 5 Distal: 5  -Left Lower Ext: Proximal: 5 Distal: 5--she clearly is 5/5 compared to testing yesterday    DTR:  2+ throughout in all four extremities but again withdrawal verse Babinski    Sensory:  -Decreased light touch, pinprick to umbilicus--t 10  Coordination/Gait:  -Normal finger to nose     Results Review:    I reviewed the patient's new clinical results.    Lab Results (last 24 hours)     Procedure Component Value Units Date/Time    POC Glucose Once [[id number]]  (Abnormal) Collected:  05/16/20 1714    Specimen:  Blood Updated:  05/16/20 1726     Glucose 158 mg/dL      Comment: : [id number] Moses Willslinda ID: [id number]       [place] Pt [facility] [[id number]] Collected:  05/16/20 0502    Specimen:  Blood Updated:  05/16/20 1235    Narrative:       The following orders were created for panel order [place] [facility].  Procedure                               Abnormality         Status                     ---------                               -----------         ------                     Hepatitis B Surface Antigen[[id number]]  Normal              Final result                HIV-1 / O / 2 Ag / Antib...[[id number]]  Normal              Final result               Hepatitis C Antibody[[id number]]         Normal              Final result               Hep B Confirmation Tube[[id number]]                          In process                   Please view results for these tests on the individual orders.    Hepatitis B Surface Antigen [[id number]]  (Normal) Collected:  05/16/20 0502    Specimen:  Blood Updated:  05/16/20 1235     Hepatitis B Surface Ag Non-Reactive    Narrative:       Results may be falsely decreased if patient taking Biotin.      HIV-1 / O / 2 Ag / Antibody 4th Generation [[id number]]  (Normal) Collected:  05/16/20 0502    Specimen:  Blood Updated:  05/16/20 1235     HIV-1/ HIV-2 Non-Reactive    Narrative:       The HIV antibody/antigen combo assay is a qualitative assay for HIV that includes the p24 antigen as well as antibodies to HIV types 1 and 2. This test is intended to be used as a screening assay in the diagnosis of HIV infection in patients over the age of 2.  Results may be falsely decreased if patient taking Biotin.      Hepatitis C Antibody [[id number]]  (Normal) Collected:  05/16/20 0502    Specimen:  Blood Updated:  05/16/20 1235     Hepatitis C Ab Non-Reactive    Narrative:       Results may be falsely decreased if patient taking Biotin.      Vitamin D 25 Hydroxy [[id number]]  (Normal) Collected:  05/16/20 0502    Specimen:  Blood Updated:  05/16/20 1213     25 Hydroxy, Vitamin D 35.4 ng/ml     Narrative:       Reference Range for Total Vitamin D 25(OH)     Deficiency <20.0 ng/mL   Insufficiency 21-29 ng/mL   Sufficiency  ng/mL  Toxicity >100 ng/ml    Results may be falsely increased if patient taking Biotin.      Hep B Confirmation Tube [[id number]] Collected:  05/16/20 1141    Specimen:  Blood Updated:  05/16/20 1141    POC Glucose Once [[id number]]  (Abnormal) Collected:  05/16/20 1118    Specimen:  Blood Updated:  05/16/20 1130     Glucose 272 mg/dL       Comment: : [id number] Danielle Romano ID: [id number]       Basic Metabolic Panel [[id number]]  (Abnormal) Collected:  05/16/20 0502    Specimen:  Blood Updated:  05/16/20 1015     Glucose 154 mg/dL      BUN 14 mg/dL      Creatinine 0.47 mg/dL      Sodium 141 mmol/L      Potassium 3.8 mmol/L      Chloride 97 mmol/L      CO2 27.0 mmol/L      Calcium 9.3 mg/dL      eGFR Non African Amer 133 mL/min/1.73      BUN/Creatinine Ratio 29.8     Anion Gap 17.0 mmol/L     Narrative:       GFR Normal >60  Chronic Kidney Disease <60  Kidney Failure <15      Protime-INR [[id number]]  (Abnormal) Collected:  05/16/20 0954    Specimen:  Blood Updated:  05/16/20 1009     Protime 21.1 Seconds      INR 1.85    POC Glucose Once [[id number]]  (Abnormal) Collected:  05/16/20 0756    Specimen:  Blood Updated:  05/16/20 0823     Glucose 165 mg/dL      Comment: : [id number] Moses Stewart ID: [id number]       Extra Tubes [[id number]] Collected:  05/16/20 0502    Specimen:  Blood, Venous Line Updated:  05/16/20 0800    Narrative:       The following orders were created for panel order Extra Tubes.  Procedure                               Abnormality         Status                     ---------                               -----------         ------                     Lavender Top[[id number]]                                     Final result                 Please view results for these tests on the individual orders.    Lavender Top [[id number]] Collected:  05/16/20 0502    Specimen:  Blood Updated:  05/16/20 0800     Extra Tube hold for add-on     Comment: Auto resulted       Extra Tubes [[id number]] Collected:  05/16/20 0502    Specimen:  Blood, Venous Line Updated:  05/16/20 0800    Narrative:       The following orders were created for panel order Extra Tubes.  Procedure                               Abnormality         Status                     ---------                               -----------         ------                     Cody  Top - [facility][[id number]]                                   Final result                 Please view results for these tests on the individual orders.    Gold Top - SST [[id number]] Collected:  05/16/20 0502    Specimen:  Blood Updated:  05/16/20 0800     Extra Tube Hold for add-ons.     Comment: Auto resulted.       Vitamin A & E [[id number]] Collected:  05/16/20 0502    Specimen:  Blood Updated:  05/16/20 0557        Imaging Results (Last 24 Hours)     ** No results found for the last 24 hours. **      Personal review of MRI of thoracic spine     Assessment/Plan     Hospital Problem List      Weakness of both lower extremities    Chronic atrial fibrillation    Essential hypertension    Long term current use of anticoagulant therapy    Mild CAD    Morbid obesity due to excess calories (CMS/[facility])    Systolic congestive heart failure (CMS/[facility])    Type 2 diabetes mellitus with microalbuminuria, without long-term current use of insulin (CMS/[facility])    Impression:  1. T10 -11 spinal stenosis, disc protrusion and ligamentum flavum hypertrophy  at thoracic with what does appear as myelomalacia but will need other studies which she would not do. This may be the culprit as it would fit with sensory level at umbilicus.   2. B 12 deficiency of only 150--prolonged as previously 193 in March 2020  3. Peripheral neuropathy--multifactorial with DM, B12 deficiency and will play some role in her legs and numbness  4. LE edema but venous doppler negative and edema resolved  5. DM--poorly controlled with hemoglobin A1C of 8.8    Plan:  1. Await MRI of lumbar and cervical spine. She has agreed to do one per day  2. Would benefit from NEE of lower extremities   3. Await KAREN      Francisca Manrique MD  05/16/20  20:25

## 2020-05-17 NOTE — THERAPY TREATMENT NOTE
Acute Care - Physical Therapy Treatment Note  [facility]     Patient Name: Anne-Marie Foreman  : 1955  MRN: [id number]  Today's Date: 2020  Onset of Illness/Injury or Date of Surgery: 20     Referring Physician: TYSON Epstein    Admit Date: 2020    Visit Dx:    ICD-10-CM ICD-9-CM   1. Weakness of both lower extremities R29.898 729.89   2. Decreased activities of daily living (ADL) Z78.9 V49.89     Patient Active Problem List   Diagnosis   • Weakness of both lower extremities   • Chronic atrial fibrillation   • Essential hypertension   • Long term current use of anticoagulant therapy   • Mild CAD   • Mixed hyperlipidemia   • Morbid obesity due to excess calories (CMS/HCC)   • Systolic congestive heart failure (CMS/HCC)   • Type 2 diabetes mellitus with microalbuminuria, without long-term current use of insulin (CMS/[facility])   • Acquired hypothyroidism       Therapy Treatment    Rehabilitation Treatment Summary     Row Name 20 1320             Treatment Time/Intention    Discipline  physical therapy assistant  -AB      Document Type  therapy note (daily note)  -AB      Subjective Information  complains of;fatigue  -AB2      Mode of Treatment  physical therapy  -AB2      Existing Precautions/Restrictions  fall  -AB2      Recorded by [AB] Mariella Hernandez, PTA 20 1326  [AB2] Mariella Hernandez, PTA 20 1345      Row Name 20 1320             Bed Mobility Assessment/Treatment    Supine-Sit [place] (Bed Mobility)  verbal cues;moderate assist (50% patient effort)  -AB      Sit-Supine [place] (Bed Mobility)  maximum assist (25% patient effort);2 person assist  -AB2      Bed Mobility, Safety Issues  decreased use of arms for pushing/pulling;decreased use of legs for bridging/pushing  -AB2      Recorded by [AB] Mariella Hernandez, PTA 20 1350  [AB2] Mariella Hernandez, PTA 20 1406      Row Name 20 1320             Transfer Assessment/Treatment    Transfer  Assessment/Treatment  bed-chair transfer  -AB      Recorded by [AB] Mariella Hernandez, PTA 05/17/20 1410      Row Name 05/17/20 1320             Bed-Chair Transfer    Bed-Chair [place] (Transfers)  maximum assist (25% patient effort);2 person assist bed to Shower chair and then back to bed.  -AB      Recorded by [AB] Mariella Hernandez PTA 05/17/20 1410      Row Name 05/17/20 1320             Therapeutic Exercise    Lower Extremity (Therapeutic Exercise)  LAQ (long arc quad), bilateral  -AB      Lower Extremity Range of Motion (Therapeutic Exercise)  hip abduction/adduction, bilateral;hip flexion/extension, bilateral  -AB      Exercise Type (Therapeutic Exercise)  AROM (active range of motion);AAROM (active assistive range of motion) R LE AROM, LLE AAROM  -AB      Position (Therapeutic Exercise)  seated  -AB      Sets/Reps (Therapeutic Exercise)  15  -AB      Recorded by [AB] Mariella Hernandez PTA 05/17/20 1410      Row Name 05/17/20 1320             Positioning and Restraints    Pre-Treatment Position  in bed  -AB      Post Treatment Position  bed  -AB      In Bed  fowlers;call light within reach  -AB      Recorded by [AB] Mariella Hernandez PTA 05/17/20 1410      Row Name                Wound 05/14/20 2300 Right lower leg     Wound - Properties Group Date first assessed: 05/14/20 [TC] Time first assessed: 2300 [TC] Present on Hospital Admission: Y [TC] Side: Right [TC] Orientation: lower [TC] [facility]: leg [TC] Primary Wound Type: -- [TC], redness/scab  Recorded by:  [TC] Castleman, Tamara K, RN 05/15/20 0329      User Key  (r) = Recorded By, (t) = Taken By, (c) = Cosigned By    Initials Name Effective Dates Discipline    AB Mariella Hernandez PTA 08/02/16 -  PT    TC Castleman, Tamara K, RN 08/02/16 -  Nurse          Wound 05/14/20 2300 Right lower leg  (Active)   Dressing Appearance open to air 5/17/2020  8:00 AM   Base scab 5/17/2020  8:00 AM   Periwound redness 5/17/2020  8:00 AM   Care, Wound antimicrobial  agent applied 5/17/2020  8:00 AM   Dressing Care, Wound foam;dressing changed 5/17/2020  8:00 AM           Physical Therapy Education                 Title: PT OT SLP Therapies (In Progress)     Topic: Physical Therapy (In Progress)     Point: Mobility training (In Progress)     Description:   Instruct learner(s) on safety and technique for assisting patient out of bed, chair or wheelchair.  Instruct in the proper use of assistive devices, such as walker, crutches, cane or brace.              Patient Friendly Description:   It's important to get you on your feet again, but we need to do so in a way that is safe for you. Falling has serious consequences, and your personal safety is the most important thing of all.        When it's time to get out of bed, one of us or a family member will sit next to you on the bed to give you support.     If your doctor or nurse tells you to use a walker, crutches, a cane, or a brace, be sure you use it every time you get out of bed, even if you think you don't need it.    Learning Progress Summary           Patient Acceptance, E, NR by MS at 5/15/2020 9346    Comment:  role of PT in her care                   Point: Home exercise program (Not Started)     Description:   Instruct learner(s) on appropriate technique for monitoring, assisting and/or progressing patient with therapeutic exercises and activities.              Learner Progress:   Not documented in this visit.          Point: Body mechanics (Not Started)     Description:   Instruct learner(s) on proper positioning and spine alignment for patient and/or caregiver during mobility tasks and/or exercises.              Learner Progress:   Not documented in this visit.          Point: Precautions (Not Started)     Description:   Instruct learner(s) on prescribed precautions during mobility and gait tasks              Learner Progress:   Not documented in this visit.                      User Key     Initials Effective Dates Name  Provider Type Discipline    MS 06/19/18 -  Ramandeep Goodwin, PT, DPT, NCS Physical Therapist PT                PT Recommendation and Plan     Progress: improving  Outcome Summary: She was CGA with bedrail and HOB elevated to get to EOB. She sat EOB Independently and performed 20 reps sitting exercises AROM Rod LE. She needed min assist with L LE getting back in bed and Max assist of 2 to scoot up in bed.  Outcome Measures     Row Name 05/16/20 0918 05/15/20 1105          How much help from another is currently needed...    Putting on and taking off regular lower body clothing?  1  -CJ  1  -CS     Bathing (including washing, rinsing, and drying)  2  -CJ  2  -CS     Toileting (which includes using toilet bed pan or urinal)  1  -CJ  1  -CS     Putting on and taking off regular upper body clothing  3  -CJ  3  -CS     Taking care of personal grooming (such as brushing teeth)  3  -CJ  3  -CS     Eating meals  3  -CJ  3  -CS     AM-PAC 6 Clicks Score (OT)  13  -CJ  13  -CS        Functional Assessment    Outcome Measure Options  AM-PAC 6 Clicks Daily Activity (OT)  -CJ  AM-PAC 6 Clicks Daily Activity (OT)  -CS       User Key  (r) = Recorded By, (t) = Taken By, (c) = Cosigned By    Initials Name Provider Type    CJ Se Gil R, SHERIFF/L Occupational Therapy Assistant    CS Kim Abdi S, OTR/L, CNT Occupational Therapist         Time Calculation:   PT Charges     Row Name 05/17/20 1320             Time Calculation    Start Time  1320  -AB      Stop Time  1407  -AB      Time Calculation (min)  47 min  -AB      PT Received On  05/17/20  -AB         Time Calculation- PT    Total Timed Code Minutes- PT  25 minute(s)  -AB        User Key  (r) = Recorded By, (t) = Taken By, (c) = Cosigned By    Initials Name Provider Type    AB Mariella Hernandez, PTA Physical Therapy Assistant        Therapy Charges for Today     Code Description Service Date Service Provider Modifiers Qty    97760392519 HC PT THER PROC EA 15 MIN 5/16/2020  Mariella Hernandez, PTA GP 1    04653749234 HC PT THERAPEUTIC ACT EA 15 MIN 5/16/2020 Mariella Hernandez, PTA GP 2    36264152610 HC PT THER PROC EA 15 MIN 5/17/2020 Mariella Hernandez, PTA GP 1    98468014819 HC PT THERAPEUTIC ACT EA 15 MIN 5/17/2020 Mariella Hernandez, PTA GP 1          PT G-Codes  Outcome Measure Options: AM-PAC 6 Clicks Daily Activity (OT)  AM-PAC 6 Clicks Score (PT): 8  AM-PAC 6 Clicks Score (OT): 13    Mariella Hernandez PTA  5/17/2020

## 2020-05-17 NOTE — PROGRESS NOTES
"1           [facility]  INPATIENT PROGRESS NOTE    Patient Name: Anne-Marie Foreman  Date of Admission: 5/14/2020  Today's Date: 05/17/20  Length of Stay: 3  Primary Care Physician: Lorrie Wilhelm MD    Subjective   Chief Complaint: Follow-up  HPI   Patient finally agreed to have MRI of cervical spine/lumbar spine.  MRI thoracic spine been reviewed and correlated by neurologist.  Nerve conduction study report still pending.    She is a 64-year-old woman who was admitted on May 14 for a 3-month history of progressive weakness and reported urinary incontinence.  Patient has known diabetes with A1c of 8.8.  She also has low vitamin B12 to which supplementation has been initiated.  Neurologist felt peripheral neuropathy is multifactorial (diabetes, B12 deficiency)    She is on chronic anticoagulation.  Her INR is subtherapeutic.  I increased her Coumadin today.    BP readings improved today patient working with therapist.  It is my understanding that there was a needlestick injury from yesterday thus HIV-1 forward to antibody, hepatitis B surface antigen, hepatitis C antibody were requested.    \"I don't have much goals.  I just wanted to walk even with walker and get to the bathroom by my self.\"  Review of Systems     All pertinent negatives and positives are as above. All other systems have been reviewed and are negative unless otherwise stated.     Objective    Temp:  [97.9 °F (36.6 °C)-98.8 °F (37.1 °C)] 98.1 °F (36.7 °C)  Heart Rate:  [53-81] 76  Resp:  [16-18] 18  BP: (102-133)/(45-66) 113/66  Physical Exam  Obese woman  No distress  Alert and oriented x 3; coherent  Supple neck  No thromegaly  Moist oral mucosa  Lung sound is diminished, no gross crackles/wheezes  s1 s2   soft obese abd, no gross hsm  Feet is red at the bottom and part of dorsum but no gross warmth difference  + swelling  No significant change from yesterday  hemodynamically stable; BP reading improved.   "       Results Review:  I have reviewed the labs, radiology results, and diagnostic studies.    Laboratory Data:   Results from last 7 days   Lab Units 05/17/20  0602 05/15/20  0352 05/14/20 2056   WBC 10*3/mm3 7.32 9.62 9.61   HEMOGLOBIN g/dL 13.0 12.3 14.2   HEMATOCRIT % 37.7 36.2 42.2   PLATELETS 10*3/mm3 186 177 216        Results from last 7 days   Lab Units 05/17/20  0602 05/16/20  0502 05/15/20  0352 05/14/20  2056   SODIUM mmol/L 135* 141 138 132*   POTASSIUM mmol/L 3.9 3.8 3.9 4.6   CHLORIDE mmol/L 95* 97* 99 91*   CO2 mmol/L 26.0 27.0 25.0 26.0   BUN mg/dL 19 14 13 12   CREATININE mg/dL 0.47* 0.47* 0.50* 0.61   CALCIUM mg/dL 9.2 9.3 9.1 9.4   BILIRUBIN mg/dL  --   --  0.4 0.3   ALK PHOS U/L  --   --  43 55   ALT (SGPT) U/L  --   --  24 31   AST (SGOT) U/L  --   --  21 25   GLUCOSE mg/dL 159* 154* 129* 247*       Culture Data:   No results found for: BLOODCX, URINECX, WOUNDCX, MRSACX, RESPCX, STOOLCX    Radiology Data:   Imaging Results (Last 24 Hours)     ** No results found for the last 24 hours. **          I have reviewed the patient's current medications.     Assessment/Plan     Active Hospital Problems    Diagnosis   • Weakness of both lower extremities   • Essential hypertension   • Mild CAD   • Chronic atrial fibrillation   • Morbid obesity due to excess calories (CMS/[facility])   • Systolic congestive heart failure (CMS/[facility])   • Type 2 diabetes mellitus with microalbuminuria, without long-term current use of insulin (CMS/[facility])   • Long term current use of anticoagulant therapy       · I increased Coumadin to 5 mg and will monitor PT/INR with INR goal between 2-3; Daily PT/INR  · MRI cervical/lumbar spine pending; other plans pending recommendation by neurologist.  B12 supplementation  · Patient started on insulin on May 16; Accu-Chek 165, 272, 158, 199.  This morning her sugar was 154.  · Blood pressure improved with systolic blood pressure ranging from 1 3-133 on current regimen of carvedilol, home dose  of Lasix, Toprol-XL, losartan.  · Monitor chemistry and volume status      aspirin 81 mg Oral Daily   atorvastatin 80 mg Oral Nightly   carvedilol 12.5 mg Oral BID With Meals   cholecalciferol 1,000 Units Oral Daily   cyanocobalamin 1,000 mcg Intramuscular Daily   digoxin 250 mcg Oral Daily   furosemide 20 mg Oral Daily   gadobenate dimeglumine 20 mL Intravenous Once in imaging   glipizide 10 mg Oral QAM   insulin detemir 15 Units Subcutaneous Nightly   insulin lispro 2-9 Units Subcutaneous TID AC   levothyroxine 125 mcg Oral Q AM   LORazepam 1 mg Intravenous Once   losartan 25 mg Oral Q24H   metoprolol succinate XL 50 mg Oral Daily   mupirocin  Topical Q12H   sodium chloride 10 mL Intravenous Q12H   warfarin 5 mg Oral Daily             Discharge Planning:? SNF   Alfredo Champion MD   05/17/20   10:28    Lumbar spine MRi showed:  Impression:        1. 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  2. More focal areas of thecal sac stenosis are noted as detailed above.  3. Areas of mild to moderate neural foraminal narrowing are seen at  multiple levels.  4. Synovitis suspected of the facet joints at L4-L5. Otherwise, no  abnormal enhancement identified.     Case discussed with Dr. Manrique. Looking at neurosurgery consult

## 2020-05-17 NOTE — THERAPY TREATMENT NOTE
Acute Care - Occupational Therapy Treatment Note  [facility]     Patient Name: Anne-Marie Foreman  : 1955  MRN: [id number]  Today's Date: 2020  Onset of Illness/Injury or Date of Surgery: 20  Date of Referral to OT: 20  Referring Physician: TYSON Epstein    Admit Date: 2020       ICD-10-CM ICD-9-CM   1. Weakness of both lower extremities R29.898 729.89   2. Decreased activities of daily living (ADL) Z78.9 V49.89     Patient Active Problem List   Diagnosis   • Weakness of both lower extremities   • Chronic atrial fibrillation   • Essential hypertension   • Long term current use of anticoagulant therapy   • Mild CAD   • Mixed hyperlipidemia   • Morbid obesity due to excess calories (CMS/HCC)   • Systolic congestive heart failure (CMS/HCC)   • Type 2 diabetes mellitus with microalbuminuria, without long-term current use of insulin (CMS/HCC)   • Acquired hypothyroidism     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HCC)    • Hyperlipidemia    • Hypertension    • Left sided lacunar infarction (CMS/HCC)    • Obesity, Class II, BMI 35-39.9    • Systolic heart failure (CMS/HCC)    • Type 2 diabetes mellitus (CMS/HCC)      Past Surgical History:   Procedure Laterality Date   • CARDIAC CATHETERIZATION  10/21/2009   • CARDIOVERSION  10/09/2013   • CATARACT EXTRACTION, BILATERAL     • CHOLECYSTECTOMY     • LAPAROSCOPIC TUBAL LIGATION     • RETINAL LASER PROCEDURE         Therapy Treatment    Rehabilitation Treatment Summary     Row Name 20 1330 20 1320          Treatment Time/Intention    Discipline  occupational therapy assistant  -CJ  physical therapy assistant  -AB     Document Type  therapy note (daily note)  -CJ  therapy note (daily note)  -AB     Subjective Information  complains of;weakness;fatigue  -  complains of;fatigue  -AB2      Mode of Treatment  occupational therapy  -CJ  physical therapy  -AB2     Patient Effort  adequate  -CJ  --     Existing Precautions/Restrictions  fall  -CJ  fall  -AB2     Recorded by [CJ] Se Gil SHERIFF/L 05/17/20 1458 [AB] Mariella Hernandez, PTA 05/17/20 1326  [AB2] Mariella Hernandez, PTA 05/17/20 1345     Row Name 05/17/20 1330 05/17/20 1320          Bed Mobility Assessment/Treatment    Bed Mobility Assessment/Treatment  supine-sit;sit-supine  -CJ  --     Supine-Sit [place] (Bed Mobility)  set up;verbal cues;moderate assist (50% patient effort);2 person assist  -CJ  verbal cues;moderate assist (50% patient effort)  -AB     Sit-Supine [place] (Bed Mobility)  maximum assist (25% patient effort);2 person assist  -CJ  maximum assist (25% patient effort);2 person assist  -AB2     Bed Mobility, Safety Issues  --  decreased use of arms for pushing/pulling;decreased use of legs for bridging/pushing  -AB2     Recorded by [CJ] Se Gil SHERIFF/L 05/17/20 1458 [AB] Mariella Hernandez, PTA 05/17/20 1350  [AB2] Mariella Hernandez, PTA 05/17/20 1406     Row Name 05/17/20 1330             Functional Mobility    Functional Mobility- Comment  -- pivot 2 people!  -CJ      Recorded by [CJ] Se Gil SHERIFF/L 05/17/20 1458      Row Name 05/17/20 1330 05/17/20 1320          Transfer Assessment/Treatment    Transfer Assessment/Treatment  sit-stand transfer;stand-sit transfer  -CJ  bed-chair transfer  -AB     Recorded by [CJ] Se Gil SHERIFF/L 05/17/20 1458 [AB] Mariella Hernandez, PTA 05/17/20 1410     Row Name 05/17/20 1320             Bed-Chair Transfer    Bed-Chair Wil[place] (Transfers)  maximum assist (25% patient effort);2 person assist bed to Shower chair and then back to bed.  -AB      Recorded by [AB] Mariella Hernandez, PTA 05/17/20 1410      Row Name 05/17/20 1330             Sit-Stand Transfer    Sit-Stand [place] (Transfers)  set up;maximum assist (25% patient effort);2 person  assist  -CJ      Recorded by [CJ] Se Gil COTA/L 05/17/20 1458      Row Name 05/17/20 1330             Stand-Sit Transfer    Stand-Sit [place] (Transfers)  maximum assist (25% patient effort);2 person assist  -CJ      Recorded by [CJ] Se Gil COTA/L 05/17/20 1458      Row Name 05/17/20 1330             ADL Assessment/Intervention    55000 - OT Self Care/Mgmt Minutes  40  -CJ      BADL Assessment/Intervention  bathing;upper body dressing;lower body dressing  -CJ      Recorded by [CJ] Se Gil COTA/L 05/17/20 1458      Row Name 05/17/20 1330             Bathing Assessment/Intervention    Bathing [place] Level  bathing skills;set up;minimum assist (75% patient effort);moderate assist (50% patient effort)  -      Assistive Devices (Bathing)  bath mitt;grab bars/tub rail;hand-held shower spray hose;shower chair  -CJ      Bathing Position  supported standing;supported sitting  -CJ      Recorded by [CJ] Se Gil COTA/L 05/17/20 1458      Row Name 05/17/20 1330             Upper Body Dressing Assessment/Training    Upper Body Dressing [place] Level  doff;don;front opening garment;set up;verbal cues;contact guard assist  -CJ      Upper Body Dressing Position  supported sitting;supported standing  -CJ      Recorded by [CJ] Se Gil COTA/L 05/17/20 1458      Row Name 05/17/20 1330             Lower Body Dressing Assessment/Training    Lower Body Dressing [place] Level  doff;don;socks;set up;maximum assist (25% patient effort);verbal cues  -CJ      Recorded by [CJ] Se Gil COTA/L 05/17/20 1458      Row Name 05/17/20 1320             Therapeutic Exercise    Lower Extremity (Therapeutic Exercise)  LAQ (long arc quad), bilateral  -AB      Lower Extremity Range of Motion (Therapeutic Exercise)  hip abduction/adduction, bilateral;hip flexion/extension, bilateral  -AB      Exercise Type (Therapeutic Exercise)  AROM (active range of  motion);AAROM (active assistive range of motion) R LE AROM, LLE AAROM  -AB      Position (Therapeutic Exercise)  seated  -AB      Sets/Reps (Therapeutic Exercise)  15  -AB      Recorded by [AB] Mariella Hernandez, PTA 05/17/20 1410      Row Name 05/17/20 1330 05/17/20 1320          Positioning and Restraints    Pre-Treatment Position  in bed  -CJ  in bed  -AB     Post Treatment Position  bed  -  bed  -AB     In Bed  fowlers;call light within reach;encouraged to call for assist;side rails up x2  -CJ  fowlers;call light within reach  -AB     Recorded by [CJ] Se Gil, SHERIFF/L 05/17/20 1458 [AB] Mariella Hernandez, PTA 05/17/20 1410     Row Name 05/17/20 1330             Pain Assessment    Additional Documentation  Pain Scale 2: Word Pre/Post-Treatment (Group)  -CJ      Recorded by [CJ] Se Gil SHERIFF/L 05/17/20 1458      Row Name 05/17/20 1330             Pain Scale: Numbers Pre/Post-Treatment    Pain Scale: Numbers, Pretreatment  0/10 - no pain  -CJ      Pain Scale: Numbers, Post-Treatment  0/10 - no pain  -CJ      Recorded by [CJ] Se Gil, SHERIFF/L 05/17/20 1458      Row Name                Wound 05/14/20 2300 Right lower leg     Wound - Properties Group Date first assessed: 05/14/20 [TC] Time first assessed: 2300 [TC] Present on Hospital Admission: Y [TC] Side: Right [TC] Orientation: lower [TC] [facility]: leg [TC] Primary Wound Type: -- [TC], redness/scab  Recorded by:  [TC] Castleman, Tamara K, RN 05/15/20 0329    Row Name 05/17/20 1330             Outcome Summary/Treatment Plan (OT)    Daily Summary of Progress (OT)  progress towards functional goals is fair  -      Barriers to Overall Progress (OT)  Fall!  -      Plan for Continued Treatment (OT)  continue with ot poc!  -CJ      Recorded by [CJ] Se Gil, SHERIFF/L 05/17/20 1458        User Key  (r) = Recorded By, (t) = Taken By, (c) = Cosigned By    Initials Name Effective Dates Discipline    AB Mariella Hernandez, PTA  08/02/16 -  PT    Se Landers, SHERIFF/L 08/02/16 -  OT    TC Castleman, Tamara K, RN 08/02/16 -  Nurse        Wound 05/14/20 2300 Right lower leg  (Active)   Dressing Appearance open to air 5/17/2020  8:00 AM   Base scab 5/17/2020  8:00 AM   Periwound redness 5/17/2020  8:00 AM   Care, Wound antimicrobial agent applied 5/17/2020  8:00 AM   Dressing Care, Wound foam;dressing changed 5/17/2020  8:00 AM       Occupational Therapy Education                 Title: PT OT SLP Therapies (In Progress)     Topic: Occupational Therapy (Done)     Point: ADL training (Done)     Description:   Instruct learner(s) on proper safety adaptation and remediation techniques during self care or transfers.   Instruct in proper use of assistive devices.              Learning Progress Summary           Patient Acceptance, E,TB, VU,DU,NR by GENNARO at 5/17/2020 6629    Comment:  Pt. performed bathing/dressing with 2 people assisting with transfers t0-from shower chair -bed!                   Point: Home exercise program (Done)     Description:   Instruct learner(s) on appropriate technique for monitoring, assisting and/or progressing therapeutic exercises/activities.              Learning Progress Summary           Patient Acceptance, E,TB, VU,DU,NR by GENNARO at 5/16/2020 1142    Comment:  Pt. performed ue exs to increase her bed mobility and tranfer ability!                   Point: Precautions (Done)     Description:   Instruct learner(s) on prescribed precautions during self-care and functional transfers.              Learning Progress Summary           Patient Acceptance, E,TB, VU,DU,NR by GENNARO at 5/17/2020 1529    Comment:  Pt. performed bathing/dressing with 2 people assisting with transfers t0-from shower chair -bed!    Acceptance, E,TB, VU,DU,NR by GENNARO at 5/16/2020 1142    Comment:  Pt. performed ue exs to increase her bed mobility and tranfer ability!                   Point: Body mechanics (Done)     Description:   Instruct learner(s)  on proper positioning and spine alignment during self-care, functional mobility activities and/or exercises.              Learning Progress Summary           Patient Acceptance, E,TB, VU,DU,NR by  at 5/17/2020 2529    Comment:  Pt. performed bathing/dressing with 2 people assisting with transfers t0-from shower chair -bed!    Acceptance, E,TB, VU,DU,NR by  at 5/16/2020 1142    Comment:  Pt. performed ue exs to increase her bed mobility and tranfer ability!                               User Key     Initials Effective Dates Name Provider Type Discipline     08/02/16 -  Se Gil COTA/L Occupational Therapy Assistant OT                OT Recommendation and Plan  Outcome Summary/Treatment Plan (OT)  Daily Summary of Progress (OT): progress towards functional goals is fair  Barriers to Overall Progress (OT): Fall!  Plan for Continued Treatment (OT): continue with ot poc!  Daily Summary of Progress (OT): progress towards functional goals is fair  Plan of Care Review  Plan of Care Reviewed With: patient  Plan of Care Reviewed With: patient  Outcome Measures     Row Name 05/17/20 1330 05/16/20 0918 05/15/20 1105       How much help from another is currently needed...    Putting on and taking off regular lower body clothing?  1  -  1  -  1  -CS    Bathing (including washing, rinsing, and drying)  2  -  2  -  2  -CS    Toileting (which includes using toilet bed pan or urinal)  1  -  1  -  1  -CS    Putting on and taking off regular upper body clothing  3  -  3  -  3  -CS    Taking care of personal grooming (such as brushing teeth)  3  -  3  -  3  -CS    Eating meals  3  -  3  -  3  -CS    AM-PAC 6 Clicks Score (OT)  13  -  13  -  13  -CS       Functional Assessment    Outcome Measure Options  AM-PAC 6 Clicks Daily Activity (OT)  -  AM-PAC 6 Clicks Daily Activity (OT)  -  AM-PAC 6 Clicks Daily Activity (OT)  -CS      User Key  (r) = Recorded By, (t) = Taken By, (c) = Cosigned By     Initials Name Provider Type     Se Gil, SHERIFF/L Occupational Therapy Assistant    CS Kim Abdi, OTR/L, CNT Occupational Therapist           Time Calculation:   Time Calculation- OT     Row Name 05/17/20 1330             Time Calculation- OT    OT Start Time  1330  -      OT Stop Time  1410  -      OT Time Calculation (min)  40 min  -      Total Timed Code Minutes- OT  40 minute(s)  -      TCU Minutes- OT  40 min  -      OT Received On  05/17/20  -      OT Goal Re-Cert Due Date  05/25/20  -         Timed Charges    36226 - OT Self Care/Mgmt Minutes  40  -        User Key  (r) = Recorded By, (t) = Taken By, (c) = Cosigned By    Initials Name Provider Type     Se Gil, SHERIFF/L Occupational Therapy Assistant        Therapy Charges for Today     Code Description Service Date Service Provider Modifiers Qty    15613132708 HC OT THER PROC EA 15 MIN 5/16/2020 Se Gil COTA/L GO 2    12619630652 HC OT SELF CARE/MGMT/TRAIN EA 15 MIN 5/17/2020 Se Gil SHERIFF/L GO 3               ALEX Pascual  5/17/2020

## 2020-05-17 NOTE — PLAN OF CARE
Problem: Patient Care Overview  Goal: Plan of Care Review  Flowsheets (Taken 5/17/2020 1500)  Progress: improving  Plan of Care Reviewed With: patient  Note:   Pt. Required 2 people max. Assist transfer bed-rolling shower chair and mod. Assist with showering, max. Assist le dressing cga ue dressing, no issues with tx! PTA(Mariella assisted with transfers)! PCA assisted with Female areas that pt. Couldn't reach!

## 2020-05-17 NOTE — PLAN OF CARE
Problem: Patient Care Overview  Goal: Plan of Care Review  Outcome: Ongoing (interventions implemented as appropriate)  Flowsheets (Taken 5/17/2020 0240)  Progress: improving  Plan of Care Reviewed With: patient  Outcome Summary: Pt is A&Ox4. PRN muscle relaxer given for spams in legs. N/T in legs still. Purewick in place. No neuro changes noted. VSS. Safety maintained

## 2020-05-17 NOTE — PLAN OF CARE
Patient got Ativan this a.m. For MRI testing and has been slightly drowsy since, however oriented and appropriate. C/o muscle spasms in left lower leg and generalized pain, given tylenol. Telemetry shows patient remains in controlled a.fib, Purewick urine system used for incontinence. BM today, BG monitored. Will continue to monitor and notify MD of any changes.

## 2020-05-18 ENCOUNTER — APPOINTMENT (OUTPATIENT)
Dept: NEUROLOGY | Facility: HOSPITAL | Age: 65
End: 2020-05-18

## 2020-05-18 ENCOUNTER — APPOINTMENT (OUTPATIENT)
Dept: MRI IMAGING | Facility: HOSPITAL | Age: 65
End: 2020-05-18

## 2020-05-18 LAB
25(OH)D3 SERPL-MCNC: 30.2 NG/ML (ref 30–100)
ANION GAP SERPL CALCULATED.3IONS-SCNC: 10 MMOL/L (ref 5–15)
BUN BLD-MCNC: 14 MG/DL (ref 8–23)
BUN/CREAT SERPL: 30.4 (ref 7–25)
CALCIUM SPEC-SCNC: 9.1 MG/DL (ref 8.6–10.5)
CHLORIDE SERPL-SCNC: 100 MMOL/L (ref 98–107)
CO2 SERPL-SCNC: 27 MMOL/L (ref 22–29)
CREAT BLD-MCNC: 0.46 MG/DL (ref 0.57–1)
GFR SERPL CREATININE-BSD FRML MDRD: 136 ML/MIN/1.73
GLUCOSE BLD-MCNC: 158 MG/DL (ref 65–99)
GLUCOSE BLDC GLUCOMTR-MCNC: 151 MG/DL (ref 70–130)
GLUCOSE BLDC GLUCOMTR-MCNC: 177 MG/DL (ref 70–130)
GLUCOSE BLDC GLUCOMTR-MCNC: 207 MG/DL (ref 70–130)
GLUCOSE BLDC GLUCOMTR-MCNC: 212 MG/DL (ref 70–130)
IGA SERPL-MCNC: 396 MG/DL (ref 87–352)
IGG SERPL-MCNC: 971 MG/DL (ref 586–1602)
IGM SERPL-MCNC: 53 MG/DL (ref 26–217)
INR PPP: 1.39 (ref 0.91–1.09)
POTASSIUM BLD-SCNC: 4.1 MMOL/L (ref 3.5–5.2)
PROT PATTERN SERPL IFE-IMP: ABNORMAL
PROTHROMBIN TIME: 16.7 SECONDS (ref 11.9–14.6)
PTH-INTACT SERPL-MCNC: 15.9 PG/ML (ref 15–65)
SODIUM BLD-SCNC: 137 MMOL/L (ref 136–145)
VIT B1 BLD-SCNC: 151.6 NMOL/L (ref 66.5–200)
WHOLE BLOOD HOLD SPECIMEN: NORMAL

## 2020-05-18 PROCEDURE — 95886 MUSC TEST DONE W/N TEST COMP: CPT

## 2020-05-18 PROCEDURE — 99222 1ST HOSP IP/OBS MODERATE 55: CPT | Performed by: NURSE PRACTITIONER

## 2020-05-18 PROCEDURE — 83970 ASSAY OF PARATHORMONE: CPT | Performed by: NURSE PRACTITIONER

## 2020-05-18 PROCEDURE — 95909 NRV CNDJ TST 5-6 STUDIES: CPT

## 2020-05-18 PROCEDURE — 25010000002 ENOXAPARIN PER 10 MG: Performed by: INTERNAL MEDICINE

## 2020-05-18 PROCEDURE — 97535 SELF CARE MNGMENT TRAINING: CPT

## 2020-05-18 PROCEDURE — 63710000001 INSULIN LISPRO (HUMAN) PER 5 UNITS: Performed by: NURSE PRACTITIONER

## 2020-05-18 PROCEDURE — 82962 GLUCOSE BLOOD TEST: CPT

## 2020-05-18 PROCEDURE — 85610 PROTHROMBIN TIME: CPT | Performed by: INTERNAL MEDICINE

## 2020-05-18 PROCEDURE — 99233 SBSQ HOSP IP/OBS HIGH 50: CPT | Performed by: PSYCHIATRY & NEUROLOGY

## 2020-05-18 PROCEDURE — 80048 BASIC METABOLIC PNL TOTAL CA: CPT | Performed by: INTERNAL MEDICINE

## 2020-05-18 PROCEDURE — 25010000002 LORAZEPAM PER 2 MG: Performed by: CLINICAL NURSE SPECIALIST

## 2020-05-18 PROCEDURE — 82306 VITAMIN D 25 HYDROXY: CPT | Performed by: NURSE PRACTITIONER

## 2020-05-18 PROCEDURE — 63710000001 INSULIN DETEMIR PER 5 UNITS: Performed by: INTERNAL MEDICINE

## 2020-05-18 PROCEDURE — 25010000002 CYANOCOBALAMIN PER 1000 MCG: Performed by: INTERNAL MEDICINE

## 2020-05-18 RX ORDER — LORAZEPAM 2 MG/ML
1 INJECTION INTRAMUSCULAR ONCE
Status: COMPLETED | OUTPATIENT
Start: 2020-05-18 | End: 2020-05-18

## 2020-05-18 RX ADMIN — VITAMIN D 1000 UNITS: 25 TAB ORAL at 08:23

## 2020-05-18 RX ADMIN — SODIUM CHLORIDE, PRESERVATIVE FREE 10 ML: 5 INJECTION INTRAVENOUS at 20:14

## 2020-05-18 RX ADMIN — DIGOXIN 250 MCG: 250 TABLET ORAL at 13:16

## 2020-05-18 RX ADMIN — FLUTICASONE PROPIONATE 2 SPRAY: 50 SPRAY, METERED NASAL at 08:22

## 2020-05-18 RX ADMIN — ATORVASTATIN CALCIUM 80 MG: 40 TABLET, FILM COATED ORAL at 20:15

## 2020-05-18 RX ADMIN — METOPROLOL SUCCINATE 50 MG: 50 TABLET, EXTENDED RELEASE ORAL at 08:22

## 2020-05-18 RX ADMIN — TIZANIDINE 4 MG: 4 TABLET ORAL at 13:22

## 2020-05-18 RX ADMIN — LOSARTAN POTASSIUM 25 MG: 25 TABLET, FILM COATED ORAL at 08:22

## 2020-05-18 RX ADMIN — SODIUM CHLORIDE, PRESERVATIVE FREE 10 ML: 5 INJECTION INTRAVENOUS at 08:22

## 2020-05-18 RX ADMIN — LEVOTHYROXINE SODIUM 125 MCG: 125 TABLET ORAL at 05:38

## 2020-05-18 RX ADMIN — CARVEDILOL 12.5 MG: 6.25 TABLET, FILM COATED ORAL at 17:58

## 2020-05-18 RX ADMIN — INSULIN DETEMIR 15 UNITS: 100 INJECTION, SOLUTION SUBCUTANEOUS at 20:19

## 2020-05-18 RX ADMIN — LORAZEPAM 1 MG: 2 INJECTION INTRAMUSCULAR; INTRAVENOUS at 10:56

## 2020-05-18 RX ADMIN — ENOXAPARIN SODIUM 100 MG: 100 INJECTION SUBCUTANEOUS at 13:16

## 2020-05-18 RX ADMIN — GLIPIZIDE 10 MG: 10 TABLET ORAL at 08:23

## 2020-05-18 RX ADMIN — MUPIROCIN: 20 OINTMENT TOPICAL at 20:15

## 2020-05-18 RX ADMIN — ASPIRIN 81 MG: 81 TABLET, CHEWABLE ORAL at 08:23

## 2020-05-18 RX ADMIN — CYANOCOBALAMIN 1000 MCG: 1000 INJECTION, SOLUTION INTRAMUSCULAR at 08:21

## 2020-05-18 RX ADMIN — INSULIN LISPRO 2 UNITS: 100 INJECTION, SOLUTION INTRAVENOUS; SUBCUTANEOUS at 08:22

## 2020-05-18 RX ADMIN — CARVEDILOL 12.5 MG: 6.25 TABLET, FILM COATED ORAL at 08:23

## 2020-05-18 RX ADMIN — INSULIN LISPRO 2 UNITS: 100 INJECTION, SOLUTION INTRAVENOUS; SUBCUTANEOUS at 13:16

## 2020-05-18 RX ADMIN — ENOXAPARIN SODIUM 100 MG: 100 INJECTION SUBCUTANEOUS at 22:21

## 2020-05-18 RX ADMIN — INSULIN LISPRO 4 UNITS: 100 INJECTION, SOLUTION INTRAVENOUS; SUBCUTANEOUS at 17:58

## 2020-05-18 RX ADMIN — FUROSEMIDE 20 MG: 20 TABLET ORAL at 08:22

## 2020-05-18 RX ADMIN — MUPIROCIN: 20 OINTMENT TOPICAL at 08:22

## 2020-05-18 NOTE — PLAN OF CARE
Problem: Patient Care Overview  Goal: Plan of Care Review  Outcome: Ongoing (interventions implemented as appropriate)  Flowsheets (Taken 5/18/2020 4358)  Progress: no change  Plan of Care Reviewed With: patient  Outcome Summary: Pt is A&Ox4. Muscle spams in legs at times. Purewick in place no changes to numbness in legs. VSS. Safety maintained.

## 2020-05-18 NOTE — PROGRESS NOTES
[facility] Medicine Services  INPATIENT PROGRESS NOTE    Patient Name: Anne-Marie Foreman  Date of Admission: 5/14/2020  Today's Date: 05/18/20  Length of Stay: 4  Primary Care Physician: Lorrie Wilhelm MD    Subjective   Chief Complaint: Follow-up lower extremity weakness    HPI   Patient seen and examined.  Somewhat emotional.  [place] earlier today that he potentially has some spinal cord compromise and will potentially need surgery this week.  She is upset and feels that [facility] missed this diagnosis back in February.  She otherwise physically is doing okay outside of her weakness.  Denies chest pain or shortness of breath.  No nausea or vomiting.  Tolerating p.o.  Suspect in February prior to the start of all this she was working normally without any cardiac complaints or issues.  States she had a stress test back in February at [facility] which was normal.        Review of Systems   All pertinent negatives and positives are as above. All other systems have been reviewed and are negative unless otherwise stated.     Objective    Temp:  [97.8 °F (36.6 °C)-98.2 °F (36.8 °C)] 98.1 °F (36.7 °C)  Heart Rate:  [55-81] 68  Resp:  [18-20] 18  BP: (127-153)/(58-69) 153/59  Physical Exam  GEN: Awake, alert, interactive, in NAD  HEENT: Atraumatic, PERRLA, EOMI, Anicteric, Trachea midline  Lungs: CTAB, no wheezing/rales/rhonchi  Heart: irreg/irreg, +S1/s2, no rub  ABD: soft, nt/nd, +BS, no guarding/rebound  Extremities: no cyanosis, b/l LE edema  Skin: no rashes or lesions  Neuro: AAOx3, CN intact, normal UE exam b/l        Results Review:  I have reviewed the labs, radiology results, and diagnostic studies.    Laboratory Data:   Results from last 7 days   Lab Units 05/17/20  0602 05/15/20  0352 05/14/20  2056   WBC 10*3/mm3 7.32 9.62 9.61   HEMOGLOBIN g/dL 13.0 12.3 14.2   HEMATOCRIT % 37.7 36.2 42.2   PLATELETS 10*3/mm3 186 177 216        Results from last 7 days   Lab Units 05/18/20  0537  05/17/20  0602 05/16/20  0502 05/15/20  0352 05/14/20 2056   SODIUM mmol/L 137 135* 141 138 132*   POTASSIUM mmol/L 4.1 3.9 3.8 3.9 4.6   CHLORIDE mmol/L 100 95* 97* 99 91*   CO2 mmol/L 27.0 26.0 27.0 25.0 26.0   BUN mg/dL 14 19 14 13 12   CREATININE mg/dL 0.46* 0.47* 0.47* 0.50* 0.61   CALCIUM mg/dL 9.1 9.2 9.3 9.1 9.4   BILIRUBIN mg/dL  --   --   --  0.4 0.3   ALK PHOS U/L  --   --   --  43 55   ALT (SGPT) U/L  --   --   --  24 31   AST (SGOT) U/L  --   --   --  21 25   GLUCOSE mg/dL 158* 159* 154* 129* 247*       Culture Data:   No results found for: BLOODCX, URINECX, WOUNDCX, MRSACX, RESPCX, STOOLCX    Radiology Data:   Imaging Results (Last 24 Hours)     Procedure Component Value Units Date/Time    US Venous Doppler Lower Extremity Bilateral (duplex) [[id number]] Collected:  05/17/20 1220     Updated:  05/17/20 1223    Narrative:       History: Swelling       Impression:       Impression: There is no evidence of deep venous thrombosis or  superficial thrombophlebitis of right or left lower extremities.     Comments: Bilateral lower extremity venous duplex exam was performed  using color Doppler flow, Doppler waveform analysis, and grayscale  imaging, with and without compression. There is no evidence of deep  venous thrombosis in the common femoral, superficial femoral, popliteal,  peroneal, anterior tibial, and posterior tibial veins bilaterally. No  thrombus is identified in the saphenofemoral junctions and greater  saphenous veins bilaterally.         This report was finalized on 05/17/2020 12:20 by Dr. Michael Cartagena MD.    MRI Lumbar Spine With & Without Contrast [[id number]] Collected:  05/17/20 1102     Updated:  05/17/20 1110    Narrative:       EXAMINATION: MRI LUMBAR SPINE W WO CONTRAST- 5/17/2020 11:02 AM CDT     HISTORY: Progressive weakness     COMPARISON: None     Technical: Multiplanar, multisequence imaging was performed through the  lumbar spine before and after the administration of IV  contrast.     FINDINGS:  Review of the visualized paraspinal soft tissues demonstrates no acute  abnormalities.     There are presumed to be 5 lumbar-type vertebral bodies. Assuming this,  the conus medullaris terminates normally at the level of L1-L2. The  visualized spinal cord appears normal in signal and morphology. 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     Alignment of the lumbar spine appears normal. Vertebral body heights  appear well maintained. No infiltrative marrow process is identified.     Disc levels:  L1-L2: Mild diffuse disc bulge and facet hypertrophy without severe  thecal sac stenosis. There is mild left neuroforaminal narrowing.     L2-L3: Diffuse disc bulge and marked facet hypertrophy result in  effacement of the thecal sac with moderate thecal sac stenosis. No  severe neuroforaminal narrowing is identified.     L3-L4: Diffuse disc bulge and marked ligamentous and facet hypertrophy  result in effacement of the thecal sac with moderate to severe thecal  sac stenosis. Additionally, there is moderate right and mild left  neuroforaminal narrowing.     L4-L5: Diffuse disc bulge and ligamentous hypertrophy are present,  resulting in effacement of the thecal sac but no significant thecal sac  stenosis. There does appear to be moderate bilateral neuroforaminal  narrowing at this level.     L5-S1: Diffuse disc bulge and facet hypertrophy are present without  appreciable thecal sac stenosis. There is mild to moderate bilateral  neuroforaminal narrowing.     On postcontrast imaging, there is some enhancement at the facet joints  of L4-L5, suggesting a synovitis. Otherwise, no abnormal enhancement is  identified.       Impression:          1. 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  2. More focal areas of thecal sac stenosis are noted as detailed above.  3. Areas of mild to moderate neural foraminal narrowing are seen  at  multiple levels.  4. Synovitis suspected of the facet joints at L4-L5. Otherwise, no  abnormal enhancement identified.  This report was finalized on 05/17/2020 11:07 by Dr. Jerry Chinchilla MD.          I have reviewed the patient's current medications.     Assessment/Plan     Active Hospital Problems    Diagnosis   • **Weakness of both lower extremities   • Vitamin B 12 deficiency   • Essential hypertension   • Mild CAD   • Chronic atrial fibrillation   • Morbid obesity due to excess calories (CMS/[facility])   • Systolic congestive heart failure (CMS/[facility])   • Type 2 diabetes mellitus with microalbuminuria, without long-term current use of insulin (CMS/[facility])   • Long term current use of anticoagulant therapy       #1 bilateral lower extremity weakness -has been having issues since February per report.  Does have a history of neuropathy in the setting of diabetes.  Does have B12 deficiency which is being replaced with 1000 micrograms daily injections.  MRI of the lumbar spine did have evidence for stenosis and neurosurgery consult has been requested.  Per notes patient will need OR later this week potentially Thursday.  Will DC Coumadin and recheck an INR tomorrow, was 1.36 today.  Will monitor for any reversal needs prior to OR.  Cover with prophylactic Lovenox preop.  Given multitude of cardiac issues will get cardiac evaluation for preop but given negative stress in February, controlled fib, no overt decompensated heart failure suspect she will be able to go.    #2 chronic A. Fib -continue digoxin and Toprol-XL.  INR was 2.65 on arrival but has fallen subtherapeutic.  As above holding further Coumadin due to OR needs.  Will cover with Lovenox.  Will need to restart Coumadin postop when okay with neurosurgery.    #3 chronic systolic heart failure -with low EF of 25%.  No LifeVest.  Follows with cardiology at [facility].  Continue Toprol-XL, losartan, Lasix.  Not sure why patient is not on Aldactone but defer to her  outpatient cardiologist.  Patient also appears to be on Coreg and Toprol and I am not sure why she is on dual therapy, suspect we can discontinue one. Again. Will get cardiology eval.     #4 DM 2 -on Levemir and sliding scale coverage.  Continue hypoglycemia protocol.    #5 history of CAD -no current issues with chest pain.  On beta-blocker, aspirin, statin.    Discharge Planning: Ongoing pending recommendations.  Initial mention was for home health but patient will likely need SNF placement based on therapy notes and current diagnosis, she understands this and is agreeable.  CM consult.    Marty Kc DO   05/18/20   08:59

## 2020-05-18 NOTE — THERAPY TREATMENT NOTE
Acute Care - Occupational Therapy Treatment Note  [facility]     Patient Name: Anne-Marie Foreman  : 1955  MRN: [id number]  Today's Date: 2020  Onset of Illness/Injury or Date of Surgery: 20  Date of Referral to OT: 20  Referring Physician: TYSON Epstein    Admit Date: 2020       ICD-10-CM ICD-9-CM   1. Weakness of both lower extremities R29.898 729.89   2. Decreased activities of daily living (ADL) Z78.9 V49.89     Patient Active Problem List   Diagnosis   • Weakness of both lower extremities   • Chronic atrial fibrillation   • Essential hypertension   • Long term current use of anticoagulant therapy   • Mild CAD   • Mixed hyperlipidemia   • Morbid obesity due to excess calories (CMS/HCC)   • Systolic congestive heart failure (CMS/HCC)   • Type 2 diabetes mellitus with microalbuminuria, without long-term current use of insulin (CMS/HCC)   • Acquired hypothyroidism   • Vitamin B 12 deficiency     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HCC)    • Hyperlipidemia    • Hypertension    • Left sided lacunar infarction (CMS/HCC)    • Obesity, Class II, BMI 35-39.9    • Systolic heart failure (CMS/HCC)    • Type 2 diabetes mellitus (CMS/HCC)      Past Surgical History:   Procedure Laterality Date   • CARDIAC CATHETERIZATION  10/21/2009   • CARDIOVERSION  10/09/2013   • CATARACT EXTRACTION, BILATERAL     • CHOLECYSTECTOMY     • LAPAROSCOPIC TUBAL LIGATION     • RETINAL LASER PROCEDURE         Therapy Treatment    Rehabilitation Treatment Summary     Row Name 20 0849             Treatment Time/Intention    Discipline  occupational therapy assistant  -TS      Document Type  therapy note (daily note)  -TS      Subjective Information  complains of;pain  -TS2      Patient Effort  good  -TS2      Existing Precautions/Restrictions  fall  -TS2       Recorded by [TS] Bertha Siddiqui COTA/L 05/18/20 0849  [TS2] Bertha Siddiqui COTA/L 05/18/20 0945      Row Name 05/18/20 0849             Bed Mobility Assessment/Treatment    Bed Mobility Assessment/Treatment  rolling left;rolling right;scooting/bridging  -TS      Rolling Left [place] (Bed Mobility)  contact guard SBA  -TS2      Rolling Right [place] (Bed Mobility)  contact guard SBA  -TS2      Scooting/Bridging [place] (Bed Mobility)  maximum assist (25% patient effort)  -TS2      Assistive Device (Bed Mobility)  bed rails;draw sheet  -TS2      Comment (Bed Mobility)  bed in trendelenberg for scooting, pt was able to hold self in sidelying with SBA  -TS2      Recorded by [TS] Bertha Siddiqui COTA/L 05/18/20 0952  [TS2] Bertha Siddiqui COTA/L 05/18/20 0958      Row Name 05/18/20 0849             Positioning and Restraints    Pre-Treatment Position  in bed  -TS      Post Treatment Position  bed  -TS      In Bed  fowlers;call light within reach;encouraged to call for assist;side rails up x2  -TS      Recorded by [TS] Bertha Siddiqui COTA/L 05/18/20 0958      Row Name 05/18/20 0849             Pain Scale: Numbers Pre/Post-Treatment    Pain Scale: Numbers, Pretreatment  0/10 - no pain  -TS      Pain Scale: Numbers, Post-Treatment  7/10  -TS      Pain [facility] - Orientation  lower  -TS      Pain [facility]  back and hip  -TS      Pre/Post Treatment Pain Comment  with movement, pt has spasms in LLE and hip   -TS      Recorded by [TS] Bertha Siddiqui COTA/L 05/18/20 0958      Row Name                Wound 05/14/20 2300 Right lower leg     Wound - Properties Group Date first assessed: 05/14/20 [TC] Time first assessed: 2300 [TC] Present on Hospital Admission: Y [TC] Side: Right [TC] Orientation: lower [TC] [facility]: leg [TC] Primary Wound Type: -- [TC], redness/scab  Recorded by:  [TC] Castleman, Tamara K, RN 05/15/20 0329    Row Name 05/18/20 0849             Outcome  Summary/Treatment Plan (OT)    Daily Summary of Progress (OT)  progress toward functional goals is good  -TS      Recorded by [TS] Bertha Siddiqui SHERIFF/L 05/18/20 0958        User Key  (r) = Recorded By, (t) = Taken By, (c) = Cosigned By    Initials Name Effective Dates Discipline    TS Bertha Siddiqui, SHERIFF/L 08/02/16 -  OT    TC Castleman, Tamara K, RN 08/02/16 -  Nurse        Wound 05/14/20 2300 Right lower leg  (Active)   Dressing Appearance open to air 5/17/2020  8:00 PM   Base scab 5/17/2020  8:00 PM   Periwound redness 5/17/2020  8:00 PM   Care, Wound antimicrobial agent applied 5/17/2020  8:00 PM   Dressing Care, Wound foam 5/17/2020  8:00 PM       Occupational Therapy Education                 Title: PT OT SLP Therapies (In Progress)     Topic: Occupational Therapy (Done)     Point: ADL training (Done)     Description:   Instruct learner(s) on proper safety adaptation and remediation techniques during self care or transfers.   Instruct in proper use of assistive devices.              Learning Progress Summary           Patient Acceptance, E,TB, VU,DU,NR by  at 5/17/2020 3909    Comment:  Pt. performed bathing/dressing with 2 people assisting with transfers t0-from shower chair -bed!                   Point: Home exercise program (Done)     Description:   Instruct learner(s) on appropriate technique for monitoring, assisting and/or progressing therapeutic exercises/activities.              Learning Progress Summary           Patient Acceptance, E,TB, VU,DU,NR by  at 5/16/2020 1142    Comment:  Pt. performed ue exs to increase her bed mobility and tranfer ability!                   Point: Precautions (Done)     Description:   Instruct learner(s) on prescribed precautions during self-care and functional transfers.              Learning Progress Summary           Patient Acceptance, E,TB, VU,DU,NR by  at 5/17/2020 0079    Comment:  Pt. performed bathing/dressing with 2 people assisting with  transfers t0-from shower chair -bed!    Acceptance, E,TB, VU,DU,NR by  at 5/16/2020 1142    Comment:  Pt. performed ue exs to increase her bed mobility and tranfer ability!                   Point: Body mechanics (Done)     Description:   Instruct learner(s) on proper positioning and spine alignment during self-care, functional mobility activities and/or exercises.              Learning Progress Summary           Patient Acceptance, E,TB, VU,DU,NR by  at 5/17/2020 9979    Comment:  Pt. performed bathing/dressing with 2 people assisting with transfers t0-from shower chair -bed!    Acceptance, E,TB, VU,DU,NR by  at 5/16/2020 1142    Comment:  Pt. performed ue exs to increase her bed mobility and tranfer ability!                               User Key     Initials Effective Dates Name Provider Type Discipline     08/02/16 -  Se Gil COTA/L Occupational Therapy Assistant OT                OT Recommendation and Plan  Outcome Summary/Treatment Plan (OT)  Daily Summary of Progress (OT): progress toward functional goals is good  Daily Summary of Progress (OT): progress toward functional goals is good  Plan of Care Review  Plan of Care Reviewed With: patient  Plan of Care Reviewed With: patient  Outcome Summary: Pt completed bed rolling L/R with SBA/CGA for assist with leg placement multiple times. Pt able to complete all bed mobilty with bed rails. Pt was able to maintain side lying position with SBA. Continue OT POC  Outcome Measures     Row Name 05/17/20 1330 05/16/20 0918 05/15/20 1105       How much help from another is currently needed...    Putting on and taking off regular lower body clothing?  1  -CJ  1  -CJ  1  -CS    Bathing (including washing, rinsing, and drying)  2  -CJ  2  -CJ  2  -CS    Toileting (which includes using toilet bed pan or urinal)  1  -CJ  1  -CJ  1  -CS    Putting on and taking off regular upper body clothing  3  -CJ  3  -CJ  3  -CS    Taking care of personal grooming (such  as brushing teeth)  3  -  3  -CJ  3  -CS    Eating meals  3  -CJ  3  -CJ  3  -CS    AM-PAC 6 Clicks Score (OT)  13  -  13  -  13  -CS       Functional Assessment    Outcome Measure Options  AM-PAC 6 Clicks Daily Activity (OT)  -  AM-PAC 6 Clicks Daily Activity (OT)  -  AM-PAC 6 Clicks Daily Activity (OT)  -CS      User Key  (r) = Recorded By, (t) = Taken By, (c) = Cosigned By    Initials Name Provider Type     Se Gil, SHERIFF/L Occupational Therapy Assistant    Kim Arce, OTR/L, CNT Occupational Therapist           Time Calculation:   Time Calculation- OT     Row Name 05/18/20 0958             Time Calculation- OT    OT Start Time  0849  -TS      OT Stop Time  0959  -TS      OT Time Calculation (min)  70 min  -TS      Total Timed Code Minutes- OT  70 minute(s)  -TS      OT Received On  05/18/20  -TS         Timed Charges    85795 - OT Self Care/Mgmt Minutes  70  -TS        User Key  (r) = Recorded By, (t) = Taken By, (c) = Cosigned By    Initials Name Provider Type     Bertha Siddiqui COTA/L Occupational Therapy Assistant        Therapy Charges for Today     Code Description Service Date Service Provider Modifiers Qty    [id number] HC OT SELF CARE/MGMT/TRAIN EA 15 MIN 5/18/2020 Bertha Siddiqui COTA/L GO 5               Bertha RAMOS. JAZIEL Siddiqui/JAVI  5/18/2020

## 2020-05-18 NOTE — PLAN OF CARE
Problem: Patient Care Overview  Goal: Plan of Care Review  Flowsheets (Taken 5/18/2020 0959)  Progress: improving  Plan of Care Reviewed With: patient  Outcome Summary: Pt completed bed rolling L/R with SBA/CGA for assist with leg placement multiple times. Pt able to complete all bed mobilty with bed rails. Pt was able to maintain side lying position with SBA. Continue OT POC

## 2020-05-18 NOTE — CONSULTS
NEUROSURGERY INITIAL HOSPITAL ENCOUNTER    Assessment/Plan:   Anne-Marie Foreman is a 64 y.o. female with a significant medical history of A. fib, chronic anticoagulation on Coumadin and aspirin, CAD, hypertension, diabetes, hyperlipidemia, Fabry's disease, and obesity.  She presents today with a new problem of lower extremity numbness and weakness that began post fall on February 28, 2020.  Physical exam findings of increased LE tone, bilateral lower extremity weakness, left greater than right (LEFT: IP 2, quad 3, dorsiflex 4-, plantarflex 4+, EHL 2) (RIGHT: IP 5, quads 4-, dorsiflex 4-, plantarflex 4+, EHL 4+), absent bilateral LE reflexes, positive bilateral Babinski's, 2 beats of clonus on the left.  Their imaging shows no acute fractures or malalignment, small central disc protrusion at T7-8 resulting in central canal narrowing without significant stenosis, large disc protrusion and degenerative changes at T10-11 resulting in T2 signal change within the central cord and severe central canal stenosis.  MRI of the lumbar spine shows no acute fractures or malalignment, no bone marrow signal change, no significant STIR signal change, no T2 signal change within central cord, conus terminates at normal level, congenitally shortened pedicles and multilevel facet arthropathy and ligamentous flavum hypertrophy resulting in central canal and bilateral foraminal stenosis at L2-3, worse at L3-4 and L4-5.  Ultrasound of the bilateral lower extremities show no DVT or superficial thrombus.     Differential Diagnosis:   A. fib  Chronic anticoagulation on Coumadin and aspirin   Stenosis of the thoracic spine, T10-11  Weakness and tingling of the bilateral lower extremities  Recurrent falls  Currently unable to ambulate    Recommendations:  Reversal or DC anticoagulation for possible OR Thursday, 5/21/2020; request hospitalist assistance with anticoagulation and surgical clearance  Labs to assess bone quality: PTH, vitamin D,  calcium  Decompressive posterior discectomy vs transpedicular discectomy of T10-11    I discussed the patients findings and my recommendations with patient, primary care team and consulting provider    Thank you very much for this interesting consult.     Level of Risk: Moderate due to: acute illness with sytemic symptoms  MDM: High Complexity  Mod = 53186, High=99223  ________________________________________________________________    Reason for consult: Bilateral lower extremity numbness and inability to ambulate    Chief Complaint:   Chief Complaint   Patient presents with   • Leg Swelling   • BILATERAL LEG NUMBNESS     HPI: Anne-Marie Foreman is a 64 y.o. female with a significant comorbidity of A. fib, chronic anticoagulation on Coumadin and aspirin, CAD, hypertension, diabetes, hyperlipidemia, Fabry's disease, and obesity.  She presents today with a new problem of lower extremity numbness and weakness and the inability to ambulate.     Onset of symptoms began post fall on February 28, 2020, the day she retired from [employer] where she worked for 25 years as a .  Since the onset of symptoms, Ms. Foreman reports a gradual and progressive onset of lower extremity weakness and numbness  that began in her feet and has continued in and acending pattern, currently in a stocking and glove distribution from the mid thighs bilaterally.  Due to her unresolving symptoms, she reports being evaluated numerous times, to include a short stent at [facility] for inpatient PT and OT with no change in her physical symptoms.     Despite inpatient placement and therapy with home health, Ms. Foreman states that the last time she walked was 2/28/2020.  She additionally report intermittent chronic lower back discomfort which is relatively unchanged.  She denies fevers, chills, night sweats, upper extremity weakness, numbness, or tingling, unexplained weight loss, or bowel dysfunction.  She does report  frequency to void, which she reports as chronic, however prior to being admitted to the hospital was dependent on briefs for toileting.    Review of Systems   Constitutional: Positive for activity change and fatigue. Negative for chills, fever and unexpected weight change.   HENT: Negative.    Eyes: Negative.    Respiratory: Negative.    Cardiovascular: Negative.    Gastrointestinal: Negative.    Endocrine: Negative.    Genitourinary: Positive for frequency.   Musculoskeletal: Positive for back pain and gait problem. Negative for neck pain and neck stiffness.   Skin: Positive for color change (bilateral feet).   Allergic/Immunologic: Negative.    Neurological: Positive for weakness and numbness.   Hematological: Negative.    Psychiatric/Behavioral: Negative.    All other systems reviewed and are negative.     Past Medical History:  has a past medical history of Asthma, CAD (coronary artery disease), Chronic atrial fibrillation, Chronic back pain, Chronic fatigue, 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CMS/HCC), Hyperlipidemia, Hypertension, Left sided lacunar infarction (CMS/[facility]), Obesity, Class II, BMI 35-39.9, Systolic heart failure (CMS/[facility]), and Type 2 diabetes mellitus (CMS/[facility]).    Past Surgical History:  has a past surgical history that includes Cardiac catheterization (10/21/2009); Cardioversion (10/09/2013); Cholecystectomy; Cataract extraction, bilateral; Retinal laser procedure; and Laparoscopic tubal ligation.    Family History: family history includes Diabetes in her mother; Heart failure in her mother; Hypertension in her mother; Liver cancer in her father.    Social History:  reports that she has never smoked. She does not have any smokeless tobacco history on file. She reports that she does not drink alcohol or use drugs.    Allergies: Aspartame and phenylalanine; Mushroom; Mushroom extract complex; Penicillins; Shellfish-derived products; and Ezetimibe    Home Medications:   Current Facility-Administered Medications:   •   acetaminophen (TYLENOL) tablet 650 mg, 650 mg, Oral, Q4H PRN, 650 mg at 05/17/20 1151 **OR** acetaminophen (TYLENOL) 160 MG/5ML solution 650 mg, 650 mg, Oral, Q4H PRN **OR** acetaminophen (TYLENOL) suppository 650 mg, 650 mg, Rectal, Q4H PRN, Laurie Schwarz, APRN  •  albuterol (PROVENTIL) nebulizer solution 0.083% 2.5 mg/3mL, 2.5 mg, Nebulization, Q4H PRN, Foreign Anna MD  •  aspirin chewable tablet 81 mg, 81 mg, Oral, Daily, Laurie Schwarz APRN, 81 mg at 05/18/20 0823  •  atorvastatin (LIPITOR) tablet 80 mg, 80 mg, Oral, Nightly, Laurie Shcwarz APRN, 80 mg at 05/17/20 2042  •  carvedilol (COREG) tablet 12.5 mg, 12.5 mg, Oral, BID With Meals, Laurie Schwarz APRN, 12.5 mg at 05/18/20 0823  •  cholecalciferol (VITAMIN D3) tablet 1,000 Units, 1,000 Units, Oral, Daily, Laurie Schwarz APRN, 1,000 Units at 05/18/20 0823  •  cyanocobalamin injection 1,000 mcg, 1,000 mcg, Intramuscular, Daily, Alfredo Champion MD, 1,000 mcg at 05/18/20 0821  •  dextrose (D50W) 25 g/ 50mL Intravenous Solution 25 g, 25 g, Intravenous, Q15 Min PRN, Laurie Schwarz, APRN  •  dextrose (GLUTOSE) oral gel 15 g, 15 g, Oral, Q15 Min PRN, Laurie Schwarz APRN  •  digoxin (LANOXIN) tablet 250 mcg, 250 mcg, Oral, Daily, Laurie Schwarz APRN, 250 mcg at 05/17/20 1151  •  fluticasone (FLONASE) 50 MCG/ACT nasal spray 2 spray, 2 spray, Each Nare, Daily, Alfredo Champion MD, 2 spray at 05/18/20 0822  •  furosemide (LASIX) tablet 20 mg, 20 mg, Oral, Daily, Alfredo Champion MD, 20 mg at 05/18/20 0822  •  glipizide (GLUCOTROL) tablet 10 mg, 10 mg, Oral, QAM, Laurie Schwarz, APRN, 10 mg at 05/18/20 0823  •  glucagon (human recombinant) (GLUCAGEN DIAGNOSTIC) injection 1 mg, 1 mg, Subcutaneous, Q15 Min PRN, Laurie Schwarz, APRN  •  insulin detemir (LEVEMIR) injection 15 Units, 15 Units, Subcutaneous, Nightly, Alfredo Champion MD, 15 Units at 05/17/20 2051  •  insulin lispro (humaLOG)  injection 2-9 Units, 2-9 Units, Subcutaneous, TID AC, Laurie Schwarz APRN, 2 Units at 05/18/20 0822  •  levothyroxine (SYNTHROID, LEVOTHROID) tablet 125 mcg, 125 mcg, Oral, Q AM, Alfredo Champion MD, 125 mcg at 05/18/20 0538  •  LORazepam (ATIVAN) injection 1 mg, 1 mg, Intravenous, Once, Alfredo Champion MD  •  losartan (COZAAR) tablet 25 mg, 25 mg, Oral, Q24H, Laurie Schwarz APRN, 25 mg at 05/18/20 0822  •  metoprolol succinate XL (TOPROL-XL) 24 hr tablet 50 mg, 50 mg, Oral, Daily, Laurie Schwarz APRN, 50 mg at 05/18/20 0822  •  mupirocin (BACTROBAN) 2 % ointment, , Topical, Q12H, Alfredo Champion MD  •  ondansetron (ZOFRAN) tablet 4 mg, 4 mg, Oral, Q6H PRN **OR** ondansetron (ZOFRAN) injection 4 mg, 4 mg, Intravenous, Q6H PRN, Laurie Schwarz APRN  •  sodium chloride 0.9 % flush 10 mL, 10 mL, Intravenous, Q12H, Laurie Schwarz APRN, 10 mL at 05/18/20 0822  •  sodium chloride 0.9 % flush 10 mL, 10 mL, Intravenous, PRN, Laurie Schwarz APRN  •  tiZANidine (ZANAFLEX) tablet 4 mg, 4 mg, Oral, Q8H PRN, Laurie Schwarz APRN, 4 mg at 05/17/20 0018  •  warfarin (COUMADIN) tablet 7.5 mg, 7.5 mg, Oral, Daily, Alfredo Champion MD, 7.5 mg at 05/17/20 1722    Medications: Scheduled Meds:  aspirin 81 mg Oral Daily   atorvastatin 80 mg Oral Nightly   carvedilol 12.5 mg Oral BID With Meals   cholecalciferol 1,000 Units Oral Daily   cyanocobalamin 1,000 mcg Intramuscular Daily   digoxin 250 mcg Oral Daily   fluticasone 2 spray Each Nare Daily   furosemide 20 mg Oral Daily   glipizide 10 mg Oral QAM   insulin detemir 15 Units Subcutaneous Nightly   insulin lispro 2-9 Units Subcutaneous TID AC   levothyroxine 125 mcg Oral Q AM   LORazepam 1 mg Intravenous Once   losartan 25 mg Oral Q24H   metoprolol succinate XL 50 mg Oral Daily   mupirocin  Topical Q12H   sodium chloride 10 mL Intravenous Q12H   warfarin 7.5 mg Oral Daily     Continuous Infusions:   PRN Meds:.•   acetaminophen **OR** acetaminophen **OR** acetaminophen  •  albuterol  •  dextrose  •  dextrose  •  glucagon (human recombinant)  •  ondansetron **OR** ondansetron  •  sodium chloride  •  tiZANidine    Vital Signs  Temp:  [97.8 °F (36.6 °C)-98.2 °F (36.8 °C)] 98.1 °F (36.7 °C)  Heart Rate:  [55-81] 68  Resp:  [18-20] 18  BP: (127-153)/(58-69) 153/59    Physical Exam  Physical Exam   Constitutional: She is oriented to person, place, and time. Vital signs are normal. She appears well-developed and well-nourished. She is cooperative.  Non-toxic appearance. She does not have a sickly appearance. She does not appear ill. No distress.   BMI 38.19   HENT:   Head: Normocephalic and atraumatic.   Right Ear: Hearing normal.   Left Ear: Hearing normal.   Mouth/Throat: Mucous membranes are normal.   Eyes: Pupils are equal, round, and reactive to light. Conjunctivae and EOM are normal.   Neck: Trachea normal and full passive range of motion without pain. Neck supple.   Cardiovascular: Normal rate and regular rhythm.     Bilateral lower extremity edema, diffuse non-macular erythematous rash to the bilateral feet that is nonblanching.   Pulmonary/Chest: Effort normal. No accessory muscle usage. No apnea, no tachypnea and no bradypnea. No respiratory distress.   Abdominal: Soft. Normal appearance.   Neurological: She is alert and oriented to person, place, and time. GCS eye subscore is 4. GCS verbal subscore is 5. GCS motor subscore is 6.   Reflex Scores:       Tricep reflexes are 2+ on the right side and 2+ on the left side.       Bicep reflexes are 2+ on the right side and 2+ on the left side.       Brachioradialis reflexes are 2+ on the right side and 2+ on the left side.       Patellar reflexes are 0 on the right side and 0 on the left side.       Achilles reflexes are 0 on the right side and 0 on the left side.  Skin: Skin is warm, dry and intact. She is not diaphoretic.   Psychiatric: She has a normal mood and affect. Her  speech is normal and behavior is normal.   Nursing note and vitals reviewed.      Neurologic Exam     Mental Status   Oriented to person, place, and time.   Attention: normal. Concentration: normal.   Speech: speech is normal   Level of consciousness: alert  Knowledge: good.   Able to name object.     Cranial Nerves     CN II   Visual fields full to confrontation.     CN III, IV, VI   Pupils are equal, round, and reactive to light.  Extraocular motions are normal.     CN V   Facial sensation intact.     CN VII   Facial expression full, symmetric.     CN VIII   CN VIII normal.     CN IX, X   CN IX normal.     CN XI   CN XI normal.     Motor Exam   Right arm tone: normal  Left arm tone: normal  Right leg tone: increased  Left leg tone: increased    Strength   Right deltoid: 5/5  Left deltoid: 5/5  Right biceps: 5/5  Left biceps: 5/5  Right triceps: 5/5  Left triceps: 5/5  Right wrist extension: 5/5  Left wrist extension: 5/5  Right iliopsoas: 5/5  Left iliopsoas: 2/5  Right quadriceps: 4/5  Left quadriceps: 3/5  Right anterior tibial: 4/5  Left anterior tibial: 4/5  Right posterior tibial: 4/5  Left posterior tibial: 4/5  Right EHL 4+  Left EHL 2     Sensory Exam   Right arm light touch: normal  Left arm light touch: normal  Right leg light touch: decreased from knee  Left leg light touch: decreased from knee    Gait, Coordination, and Reflexes     Gait  Gait: (Unable to ambulate)    Tremor   Resting tremor: absent  Intention tremor: absent  Action tremor: absent    Reflexes   Right brachioradialis: 2+  Left brachioradialis: 2+  Right biceps: 2+  Left biceps: 2+  Right triceps: 2+  Left triceps: 2+  Right patellar: 0  Left patellar: 0  Right achilles: 0  Left achilles: 0  Right : 4+  Left : 4+  Right plantar: upgoing  Left plantar: upgoing  Right Krueger: absent  Left Krueger: absent  Right ankle clonus: absent  Left ankle clonus: present  Right pendular knee jerk: absent  Left pendular knee jerk: absent  2  beats of clonus noted on the left     Results Review:   Independent review and interpretation of imaging  Imaging Results (Last 24 Hours)     Procedure Component Value Units Date/Time    US Venous Doppler Lower Extremity Bilateral (duplex) [[id number]] Collected:  05/17/20 1220     Updated:  05/17/20 1223    Narrative:       History: Swelling       Impression:       Impression: There is no evidence of deep venous thrombosis or  superficial thrombophlebitis of right or left lower extremities.     Comments: Bilateral lower extremity venous duplex exam was performed  using color Doppler flow, Doppler waveform analysis, and grayscale  imaging, with and without compression. There is no evidence of deep  venous thrombosis in the common femoral, superficial femoral, popliteal,  peroneal, anterior tibial, and posterior tibial veins bilaterally. No  thrombus is identified in the saphenofemoral junctions and greater  saphenous veins bilaterally.         This report was finalized on 05/17/2020 12:20 by Dr. Michael Cartagena MD.    MRI Lumbar Spine With & Without Contrast [[id number]] Collected:  05/17/20 1102     Updated:  05/17/20 1110    Narrative:       EXAMINATION: MRI LUMBAR SPINE W WO CONTRAST- 5/17/2020 11:02 AM CDT     HISTORY: Progressive weakness     COMPARISON: None     Technical: Multiplanar, multisequence imaging was performed through the  lumbar spine before and after the administration of IV contrast.     FINDINGS:  Review of the visualized paraspinal soft tissues demonstrates no acute  abnormalities.     There are presumed to be 5 lumbar-type vertebral bodies. Assuming this,  the conus medullaris terminates normally at the level of L1-L2. The  visualized spinal cord appears normal in signal and morphology. 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     Alignment of the lumbar spine appears normal. Vertebral body heights  appear well maintained. No infiltrative  marrow process is identified.     Disc levels:  L1-L2: Mild diffuse disc bulge and facet hypertrophy without severe  thecal sac stenosis. There is mild left neuroforaminal narrowing.     L2-L3: Diffuse disc bulge and marked facet hypertrophy result in  effacement of the thecal sac with moderate thecal sac stenosis. No  severe neuroforaminal narrowing is identified.     L3-L4: Diffuse disc bulge and marked ligamentous and facet hypertrophy  result in effacement of the thecal sac with moderate to severe thecal  sac stenosis. Additionally, there is moderate right and mild left  neuroforaminal narrowing.     L4-L5: Diffuse disc bulge and ligamentous hypertrophy are present,  resulting in effacement of the thecal sac but no significant thecal sac  stenosis. There does appear to be moderate bilateral neuroforaminal  narrowing at this level.     L5-S1: Diffuse disc bulge and facet hypertrophy are present without  appreciable thecal sac stenosis. There is mild to moderate bilateral  neuroforaminal narrowing.     On postcontrast imaging, there is some enhancement at the facet joints  of L4-L5, suggesting a synovitis. Otherwise, no abnormal enhancement is  identified.       Impression:          1. 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  2. More focal areas of thecal sac stenosis are noted as detailed above.  3. Areas of mild to moderate neural foraminal narrowing are seen at  multiple levels.  4. Synovitis suspected of the facet joints at L4-L5. Otherwise, no  abnormal enhancement identified.  This report was finalized on 05/17/2020 11:07 by Dr. Jerry Chinchilla MD.        MRI brain:  MRI spine: MRI of the thoracic spine shows no acute fractures or malalignment, small central disc protrusion at T7-8 resulting in central canal narrowing without significant stenosis, large disc protrusion and degenerative changes at T10-11 resulting in T2 signal change within the central cord and severe central  canal stenosis.      MRI of the lumbar spine shows no acute fractures or malalignment, no bone marrow signal change, no significant STIR signal change, no T2 signal change within central cord, conus terminates at normal level, congenitally shortened pedicles and multilevel facet arthropathy and ligamentous flavum hypertrophy resulting in central canal and bilateral foraminal stenosis at L2-3, worse at L3-4 and L4-5.           CT Head:  CT c-spine:  CT t-spine:  CT l-spine:  X-ray:    I reviewed the patient's new clinical results.  Lab Results (last 24 hours)     Procedure Component Value Units Date/Time    POC Glucose Once [[id number]]  (Abnormal) Collected:  05/18/20 0748    Specimen:  Blood Updated:  05/18/20 0759     Glucose 151 mg/dL      Comment: : [id number] Delgado (Wynn) CarolynMeter ID: [id number]       Extra Tubes [[id number]] Collected:  05/18/20 0537    Specimen:  Blood, Venous Line Updated:  05/18/20 0700    Narrative:       The following orders were created for panel order Extra Tubes.  Procedure                               Abnormality         Status                     ---------                               -----------         ------                     Lavender Top[[id number]]                                     Final result                 Please view results for these tests on the individual orders.    Lavender Top [[id number]] Collected:  05/18/20 0537    Specimen:  Blood Updated:  05/18/20 0700     Extra Tube hold for add-on     Comment: Auto resulted       Basic Metabolic Panel [[id number]]  (Abnormal) Collected:  05/18/20 0537    Specimen:  Blood Updated:  05/18/20 0622     Glucose 158 mg/dL      BUN 14 mg/dL      Creatinine 0.46 mg/dL      Sodium 137 mmol/L      Potassium 4.1 mmol/L      Chloride 100 mmol/L      CO2 27.0 mmol/L      Calcium 9.1 mg/dL      eGFR Non African Amer 136 mL/min/1.73      BUN/Creatinine Ratio 30.4     Anion Gap 10.0 mmol/L     Narrative:       GFR Normal  >60  Chronic Kidney Disease <60  Kidney Failure <15      Protime-INR [[id number]]  (Abnormal) Collected:  05/18/20 0537    Specimen:  Blood Updated:  05/18/20 0617     Protime 16.7 Seconds      INR 1.39    POC Glucose Once [[id number]]  (Abnormal) Collected:  05/17/20 2044    Specimen:  Blood Updated:  05/17/20 2103     Glucose 226 mg/dL      Comment: : [id number] Nena HernandezerMeter ID: [id number]       POC Glucose Once [[id number]]  (Abnormal) Collected:  05/17/20 1704    Specimen:  Blood Updated:  05/17/20 1724     Glucose 187 mg/dL      Comment: : [id number] Moses Stewart ID: [id number]       Vitamin B6 [[id number]] Collected:  05/15/20 0900    Specimen:  Blood Updated:  05/17/20 1707     Vitamin B6 26.1 ug/L     Narrative:       Test(s) 004656-Vitamin B6  was developed and its performance characteristics determined  by [facility]. It has not been cleared or approved by the Food  and Drug Administration.  Performed at:  [address]  [id number]  : Luigi Vuong MD, Phone:  [phone]    POC Glucose Once [[id number]]  (Abnormal) Collected:  05/17/20 1112    Specimen:  Blood Updated:  05/17/20 1125     Glucose 215 mg/dL      Comment: : [id number] Moses Stewart ID: [id number]       POC Glucose Once [[id number]]  (Abnormal) Collected:  05/17/20 0816    Specimen:  Blood Updated:  05/17/20 0846     Glucose 154 mg/dL      Comment: : [id number] Moses Stewart ID: [id number]           Hal Lopez APRN

## 2020-05-18 NOTE — PLAN OF CARE
A&OX4. Reports spasms in bilateral lower extremities. MRI of cervical spine not completed because patient could not lie flat, even with premed administered. Incontinent of bladder, purwik in place. Possible surgery on 5-21 for stenosis of T10-T11. Consult placed for cardiology. Refuses turns at times. Waffle cushion placed to promote skin integrity.

## 2020-05-18 NOTE — CONSULTS
[facility]ART [facility] CONSULT NOTE    Referring Provider: Foreign Anna MD    Reason for Consultation: pre-op clearance    Chief complaint:   Chief Complaint   Patient presents with   • Leg Swelling   • BILATERAL LEG NUMBNESS       Subjective     History of present illness:  Anne-Marie Foreman is a 64 y.o. female with history of systolic heart failure with recent EF 2/20/2020 25-30% (no LifeVest, patient follows with Dr Betancourt at [facility]), chronic atrial fibrillation anticoagulated with Coumadin, hypertension, hyperlipidemia, mild Federico artery disease, non-insulin-dependant diabetes, obesity, chronic back pain, Jacobo disease, left sided lacunar infarction in 2006 with residual right-sided weakness. She presented complaining of 3 month history of lower extremity weakness. She was admitted to [facility] 2/20/2020 and underwent extensive testing. Was readmitted after a fall on 2/258/2020. Was discharged to [facility] but returned home with sons due to lasck of physical therapy received. She has been undergoing home PT per [facility]. She came to [facility] due to new urinary incontinence.  Neurosurgery saw patient this am and discussed stenosis of thoracic spine T10-11 and possible OR on Thursday. Patient is resting in bed today; drowsy after receiving medication for MRI today. Patient denies any chest pain. She reports that she is having an all around bad day due to news of possible surgery and feeling like [facility] missed this months ago. She denies any legs swelling, orthopnea or PND. Patient states that she follows with [facility] cardiology and sees them approximately every 6 months. Patient reports low back pain.     History  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HCC)    • Hyperlipidemia    • Hypertension    • Left sided lacunar infarction (CMS/HCC)    • Obesity, Class  II, BMI 35-39.9    • Systolic heart failure (CMS/HCC)    • Type 2 diabetes mellitus (CMS/HCC)    ,   Past Surgical History:   Procedure Laterality Date   • CARDIAC CATHETERIZATION  10/21/2009   • CARDIOVERSION  10/09/2013   • CATARACT EXTRACTION, BILATERAL     • CHOLECYSTECTOMY     • LAPAROSCOPIC TUBAL LIGATION     • RETINAL LASER PROCEDURE     ,   Family History   Problem Relation Age of Onset   • Heart failure Mother    • Hypertension Mother    • Diabetes Mother    • Liver cancer Father    ,   Social History     Tobacco Use   • Smoking status: Never Smoker   Substance Use Topics   • Alcohol use: Never     Frequency: Never   • Drug use: Never       Medications    Prior to Admission medications    Medication Sig Start Date End Date Taking? Authorizing Provider   albuterol sulfate  (90 Base) MCG/ACT inhaler Inhale 2 puffs Every 6 (Six) Hours As Needed for Wheezing.   Yes Tina Delaney MD   aspirin 81 MG chewable tablet Chew 81 mg Daily.   Yes Tina Delaney MD   atorvastatin (LIPITOR) 80 MG tablet Take 80 mg by mouth Every Night.   Yes Tina Delaney MD   carvedilol (COREG) 12.5 MG tablet Take 12.5 mg by mouth 2 (Two) Times a Day With Meals.   Yes Tina Delaney MD   cholecalciferol (VITAMIN D3) 25 MCG (1000 UT) tablet Take 1,000 Units by mouth Daily.   Yes Tina Delaney MD   digoxin (LANOXIN) 125 MCG tablet Take 250 mcg by mouth Daily.   Yes Tina Delaney MD   fluticasone (FLONASE) 50 MCG/ACT nasal spray 2 sprays into the nostril(s) as directed by provider Daily As Needed for Rhinitis.   Yes Tina Delaney MD   furosemide (LASIX) 20 MG tablet Take 20 mg by mouth Daily.   Yes Tina Delaney MD   glipizide (GLUCOTROL) 10 MG tablet Take 10 mg by mouth Every Morning.   Yes Tina Delaney MD   glipizide (GLUCOTROL) 5 MG tablet Take 5 mg by mouth Every Evening.   Yes Tina Delaney MD   hydroCHLOROthiazide (HYDRODIURIL) 12.5 MG tablet Take  12.5 mg by mouth Daily.   Yes ProviderTina MD   irbesartan (AVAPRO) 75 MG tablet Take 75 mg by mouth 2 (Two) Times a Day.   Yes ProviderTina MD   levothyroxine (SYNTHROID, LEVOTHROID) 125 MCG tablet Take 125 mcg by mouth Daily.   Yes ProviderTina MD   metFORMIN (GLUCOPHAGE) 500 MG tablet Take 1,000 mg by mouth 2 (Two) Times a Day With Meals.   Yes Tina Delaney MD   metoprolol succinate XL (TOPROL-XL) 50 MG 24 hr tablet Take 50 mg by mouth Daily.   Yes ProviderTina MD   Multiple Vitamins-Minerals (MULTIVITAMIN WITH MINERALS) tablet tablet Take 1 tablet by mouth Daily.   Yes ProviderTina MD   spironolactone (ALDACTONE) 25 MG tablet Take 25 mg by mouth Daily.   Yes ProviderTina MD   warfarin (COUMADIN) 7.5 MG tablet Take 3.75 mg by mouth Daily.   Yes ProviderTina MD       Current Facility-Administered Medications   Medication Dose Route Frequency Provider Last Rate Last Dose   • acetaminophen (TYLENOL) tablet 650 mg  650 mg Oral Q4H PRN Laurie Schwarz APRN   650 mg at 05/17/20 1151    Or   • acetaminophen (TYLENOL) 160 MG/5ML solution 650 mg  650 mg Oral Q4H PRN Laurie Schwarz APRN        Or   • acetaminophen (TYLENOL) suppository 650 mg  650 mg Rectal Q4H PRN Laurie Schwarz APRN       • albuterol (PROVENTIL) nebulizer solution 0.083% 2.5 mg/3mL  2.5 mg Nebulization Q4H PRN Foreign Anna MD       • aspirin chewable tablet 81 mg  81 mg Oral Daily Laurie Schwarz APRN   81 mg at 05/18/20 0823   • atorvastatin (LIPITOR) tablet 80 mg  80 mg Oral Nightly Laurie Schwarz APRN   80 mg at 05/17/20 2042   • carvedilol (COREG) tablet 12.5 mg  12.5 mg Oral BID With Meals Laurie Schwarz APRN   12.5 mg at 05/18/20 0823   • cholecalciferol (VITAMIN D3) tablet 1,000 Units  1,000 Units Oral Daily Laurie Schwarz APRN   1,000 Units at 05/18/20 0823   • cyanocobalamin injection 1,000 mcg  1,000 mcg Intramuscular Daily Alfredo Champion  MD Maisha   1,000 mcg at 05/18/20 0821   • dextrose (D50W) 25 g/ 50mL Intravenous Solution 25 g  25 g Intravenous Q15 Min PRN Laurie Schwarz APRN       • dextrose (GLUTOSE) oral gel 15 g  15 g Oral Q15 Min PRN Laurie Schwarz APRN       • digoxin (LANOXIN) tablet 250 mcg  250 mcg Oral Daily Laurie Schwarz APRN   250 mcg at 05/18/20 1316   • enoxaparin (LOVENOX) syringe 100 mg  1 mg/kg Subcutaneous Q12H Marty Kc DO   100 mg at 05/18/20 1316   • fluticasone (FLONASE) 50 MCG/ACT nasal spray 2 spray  2 spray Each Nare Daily Alfredo Champion MD   2 spray at 05/18/20 0822   • furosemide (LASIX) tablet 20 mg  20 mg Oral Daily Alfredo Champion MD   20 mg at 05/18/20 0822   • glipizide (GLUCOTROL) tablet 10 mg  10 mg Oral QAM Laurie Schwarz APRN   10 mg at 05/18/20 0823   • glucagon (human recombinant) (GLUCAGEN DIAGNOSTIC) injection 1 mg  1 mg Subcutaneous Q15 Min PRN Laurie Schwarz APRN       • insulin detemir (LEVEMIR) injection 15 Units  15 Units Subcutaneous Nightly Alfredo Champion MD   15 Units at 05/17/20 2051   • insulin lispro (humaLOG) injection 2-9 Units  2-9 Units Subcutaneous TID AC Laurie Schwarz APRN   2 Units at 05/18/20 1316   • levothyroxine (SYNTHROID, LEVOTHROID) tablet 125 mcg  125 mcg Oral Q AM Alfredo Champion MD   125 mcg at 05/18/20 0538   • LORazepam (ATIVAN) injection 1 mg  1 mg Intravenous Once Alfredo Champion MD       • losartan (COZAAR) tablet 25 mg  25 mg Oral Q24H Laurie Schwarz APRN   25 mg at 05/18/20 0822   • metoprolol succinate XL (TOPROL-XL) 24 hr tablet 50 mg  50 mg Oral Daily Laurie Schwarz APRN   50 mg at 05/18/20 0822   • mupirocin (BACTROBAN) 2 % ointment   Topical Q12H Alfredo Champion MD       • ondansetron (ZOFRAN) tablet 4 mg  4 mg Oral Q6H PRN Laurie Schwarz APRN        Or   • ondansetron (ZOFRAN) injection 4 mg  4 mg Intravenous Q6H PRN Laurie Schwarz APRN       • sodium  chloride 0.9 % flush 10 mL  10 mL Intravenous Q12H Laurie Schwarz APRN   10 mL at 05/18/20 0822   • sodium chloride 0.9 % flush 10 mL  10 mL Intravenous PRN Laurie Schwarz APRN       • tiZANidine (ZANAFLEX) tablet 4 mg  4 mg Oral Q8H PRN Laurie Schwarz APRN   4 mg at 05/18/20 1322     Allergies:  Aspartame and phenylalanine; Mushroom; Mushroom extract complex; Penicillins; Shellfish-derived products; and Ezetimibe    Review of Systems  Review of Systems   Constitution: Negative for decreased appetite, diaphoresis, malaise/fatigue and night sweats.   HENT: Negative for congestion and nosebleeds.    Eyes: Negative for visual disturbance.   Cardiovascular: Negative for chest pain, dyspnea on exertion, irregular heartbeat, leg swelling, near-syncope, orthopnea, palpitations, paroxysmal nocturnal dyspnea and syncope.   Respiratory: Negative for cough, shortness of breath and wheezing.    Hematologic/Lymphatic: Does not bruise/bleed easily.   Musculoskeletal: Positive for back pain and falls. Negative for arthritis and joint pain.   Gastrointestinal: Negative for abdominal pain and change in bowel habit.   Genitourinary: Negative for hematuria and urgency.   Neurological: Negative for dizziness, headaches, light-headedness, loss of balance and weakness.   Psychiatric/Behavioral: Negative for altered mental status. The patient is not nervous/anxious.        Objective     Physical Exam:  Patient Vitals for the past 24 hrs:   BP Temp Temp src Pulse Resp SpO2 Weight   05/18/20 1100 150/64 98.5 °F (36.9 °C) Oral 69 18 97 % --   05/18/20 0722 153/59 98.1 °F (36.7 °C) Oral 68 18 96 % --   05/18/20 0530 131/62 98 °F (36.7 °C) Oral 74 18 95 % --   05/18/20 0135 149/60 97.8 °F (36.6 °C) Oral 66 18 95 % --   05/17/20 1900 129/58 98 °F (36.7 °C) Oral 77 18 98 % 104 kg (229 lb 8 oz)   05/17/20 1851 -- -- -- 80 18 96 % --   05/17/20 1615 127/65 98.2 °F (36.8 °C) Oral 55 18 97 % --     Physical Exam   Constitutional: She is  oriented to person, place, and time. She appears well-developed and well-nourished. No distress.   HENT:   Head: Normocephalic and atraumatic.   Nose: Nose normal.   Eyes: Pupils are equal, round, and reactive to light.   Neck: Normal range of motion. Neck supple. Carotid bruit is not present.   Cardiovascular: Normal rate and normal heart sounds. An irregularly irregular rhythm present.   No murmur heard.  Pulmonary/Chest: Effort normal and breath sounds normal. No respiratory distress. She has no wheezes. She has no rales.   Abdominal: Soft. Normal appearance. She exhibits no distension.   Musculoskeletal: Normal range of motion. She exhibits no edema.   Neurological: She is alert and oriented to person, place, and time.   Drowsy but easily aroused and able to answer questions   Skin: Skin is warm. No rash noted. No erythema.   Psychiatric: She has a normal mood and affect. Her speech is normal and behavior is normal. Judgment and thought content normal.   Vitals reviewed.      Telemetry: Atrial fibrillation rate of 72    Results Review:   I reviewed the patient's new clinical results.    Lab Results (last 72 hours)     Procedure Component Value Units Date/Time    Immunofixation, Serum [[id number]]  (Abnormal) Collected:  05/15/20 1740    Specimen:  Blood Updated:  05/18/20 1208     Immunofixation Result, Serum Comment     Comment: No monoclonality detected.        IgG 971 mg/dL      IgA 396 mg/dL      IgM 53 mg/dL     Narrative:       Performed at:  [address] [place] [address]  [id number]  : Khalif Titus PhD, Phone:  [phone]    POC Glucose Once [[id number]]  (Abnormal) Collected:  05/18/20 1133    Specimen:  Blood Updated:  05/18/20 1145     Glucose 177 mg/dL      Comment: : [id number] Delgado Zina) CarolynMeter ID: [id number]       Vitamin B1, Whole Blood [[id number]] Collected:  05/15/20 0900    Specimen:  Blood Updated:  05/18/20 1008     Vitamin B1, Whole Blood  151.6 nmol/L     Narrative:       Test(s) 121188-Vit. B1, Whole Blood  was developed and its performance characteristics determined  by [facility]. It has not been cleared or approved by the Food  and Drug Administration.  Performed at:  [address]  [id number]  : Luigi Vuong MD, Phone:  [phone]    PTH, Intact [[id number]]  (Normal) Collected:  05/18/20 0502    Specimen:  Blood Updated:  05/18/20 0939     PTH, Intact 15.9 pg/mL     Narrative:       Results may be falsely decreased if patient taking Biotin.      Vitamin D 25 Hydroxy [[id number]] Collected:  05/18/20 0502    Specimen:  Blood Updated:  05/18/20 0923    POC Glucose Once [[id number]]  (Abnormal) Collected:  05/18/20 0748    Specimen:  Blood Updated:  05/18/20 0759     Glucose 151 mg/dL      Comment: : [id number] Sandy DohertyReynoso) CarolynMeter ID: [id number]       Extra Tubes [[id number]] Collected:  05/18/20 0537    Specimen:  Blood, Venous Line Updated:  05/18/20 0700    Narrative:       The following orders were created for panel order Extra Tubes.  Procedure                               Abnormality         Status                     ---------                               -----------         ------                     Lavender Top[[id number]]                                     Final result                 Please view results for these tests on the individual orders.    Lavender Top [[id number]] Collected:  05/18/20 0537    Specimen:  Blood Updated:  05/18/20 0700     Extra Tube hold for add-on     Comment: Auto resulted       Basic Metabolic Panel [[id number]]  (Abnormal) Collected:  05/18/20 0537    Specimen:  Blood Updated:  05/18/20 0622     Glucose 158 mg/dL      BUN 14 mg/dL      Creatinine 0.46 mg/dL      Sodium 137 mmol/L      Potassium 4.1 mmol/L      Chloride 100 mmol/L      CO2 27.0 mmol/L      Calcium 9.1 mg/dL      eGFR Non African Amer 136 mL/min/1.73      BUN/Creatinine Ratio 30.4      Anion Gap 10.0 mmol/L     Narrative:       GFR Normal >60  Chronic Kidney Disease <60  Kidney Failure <15      Protime-INR [[id number]]  (Abnormal) Collected:  05/18/20 0537    Specimen:  Blood Updated:  05/18/20 0617     Protime 16.7 Seconds      INR 1.39    POC Glucose Once [[id number]]  (Abnormal) Collected:  05/17/20 2044    Specimen:  Blood Updated:  05/17/20 2103     Glucose 226 mg/dL      Comment: : [id number] Nena Aponteeter ID: [id number]       POC Glucose Once [[id number]]  (Abnormal) Collected:  05/17/20 1704    Specimen:  Blood Updated:  05/17/20 1724     Glucose 187 mg/dL      Comment: : [id number] Moses Stewart ID: [id number]       Vitamin B6 [[id number]] Collected:  05/15/20 0900    Specimen:  Blood Updated:  05/17/20 1707     Vitamin B6 26.1 ug/L     Narrative:       Test(s) 004656-Vitamin B6  was developed and its performance characteristics determined  by [facility]. It has not been cleared or approved by the Food  and Drug Administration.  Performed at:  [address] [place] [address]  [id number]  : Luigi Vuong MD, Phone:  [phone]    POC Glucose Once [[id number]]  (Abnormal) Collected:  05/17/20 1112    Specimen:  Blood Updated:  05/17/20 1125     Glucose 215 mg/dL      Comment: : [id number] Moses Stewart ID: [id number]       POC Glucose Once [[id number]]  (Abnormal) Collected:  05/17/20 0816    Specimen:  Blood Updated:  05/17/20 0846     Glucose 154 mg/dL      Comment: : [id number] Moses Stewart ID: [id number]       Protime-INR [[id number]]  (Abnormal) Collected:  05/17/20 0602    Specimen:  Blood Updated:  05/17/20 0715     Protime 17.4 Seconds      INR 1.46    Basic Metabolic Panel [[id number]]  (Abnormal) Collected:  05/17/20 0602    Specimen:  Blood Updated:  05/17/20 0703     Glucose 159 mg/dL      BUN 19 mg/dL      Creatinine 0.47 mg/dL      Sodium 135 mmol/L      Potassium 3.9 mmol/L      Chloride 95 mmol/L      CO2 26.0 mmol/L       Calcium 9.2 mg/dL      eGFR Non African Amer 133 mL/min/1.73      BUN/Creatinine Ratio 40.4     Anion Gap 14.0 mmol/L     Narrative:       GFR Normal >60  Chronic Kidney Disease <60  Kidney Failure <15      CBC (No Diff) [[id number]]  (Normal) Collected:  05/17/20 0602    Specimen:  Blood Updated:  05/17/20 0656     WBC 7.32 10*3/mm3      RBC 4.36 10*6/mm3      Hemoglobin 13.0 g/dL      Hematocrit 37.7 %      MCV 86.5 fL      MCH 29.8 pg      MCHC 34.5 g/dL      RDW 13.2 %      RDW-SD 41.5 fl      MPV 11.1 fL      Platelets 186 10*3/mm3     Needlestick Pt Source [[id number]] Collected:  05/16/20 0502    Specimen:  Blood Updated:  05/16/20 2102    Narrative:       The following orders were created for panel order [place] Pt Source.  Procedure                               Abnormality         Status                     ---------                               -----------         ------                     Hepatitis B Surface Antigen[[id number]]  Normal              Final result               HIV-1 / O / 2 Ag / Antib...[[id number]]  Normal              Final result               Hepatitis C Antibody[[id number]]         Normal              Final result               Hep B Confirmation Tube[[id number]]                          Final result                 Please view results for these tests on the individual orders.    Hep B Confirmation Tube [[id number]] Collected:  05/16/20 1141    Specimen:  Blood Updated:  05/16/20 2102     Extra Tube Hold for add-ons.    POC Glucose Once [[id number]]  (Abnormal) Collected:  05/16/20 2018    Specimen:  Blood Updated:  05/16/20 2029     Glucose 199 mg/dL      Comment: : [id number] Nena Hough ID: [id number]       POC Glucose Once [[id number]]  (Abnormal) Collected:  05/16/20 1714    Specimen:  Blood Updated:  05/16/20 1726     Glucose 158 mg/dL      Comment: : [id number] Moses Stewart ID: [id number]       Hepatitis B Surface Antigen [[id number]]  (Normal) Collected:   05/16/20 0502    Specimen:  Blood Updated:  05/16/20 1235     Hepatitis B Surface Ag Non-Reactive    Narrative:       Results may be falsely decreased if patient taking Biotin.      HIV-1 / O / 2 Ag / Antibody 4th Generation [[id number]]  (Normal) Collected:  05/16/20 0502    Specimen:  Blood Updated:  05/16/20 1235     HIV-1/ HIV-2 Non-Reactive    Narrative:       The HIV antibody/antigen combo assay is a qualitative assay for HIV that includes the p24 antigen as well as antibodies to HIV types 1 and 2. This test is intended to be used as a screening assay in the diagnosis of HIV infection in patients over the age of 2.  Results may be falsely decreased if patient taking Biotin.      Hepatitis C Antibody [[id number]]  (Normal) Collected:  05/16/20 0502    Specimen:  Blood Updated:  05/16/20 1235     Hepatitis C Ab Non-Reactive    Narrative:       Results may be falsely decreased if patient taking Biotin.      Vitamin D 25 Hydroxy [[id number]]  (Normal) Collected:  05/16/20 0502    Specimen:  Blood Updated:  05/16/20 1213     25 Hydroxy, Vitamin D 35.4 ng/ml     Narrative:       Reference Range for Total Vitamin D 25(OH)     Deficiency <20.0 ng/mL   Insufficiency 21-29 ng/mL   Sufficiency  ng/mL  Toxicity >100 ng/ml    Results may be falsely increased if patient taking Biotin.      POC Glucose Once [[id number]]  (Abnormal) Collected:  05/16/20 1118    Specimen:  Blood Updated:  05/16/20 1130     Glucose 272 mg/dL      Comment: : [id number] Moyerestevan ID: [id number]       Basic Metabolic Panel [[id number]]  (Abnormal) Collected:  05/16/20 0502    Specimen:  Blood Updated:  05/16/20 1015     Glucose 154 mg/dL      BUN 14 mg/dL      Creatinine 0.47 mg/dL      Sodium 141 mmol/L      Potassium 3.8 mmol/L      Chloride 97 mmol/L      CO2 27.0 mmol/L      Calcium 9.3 mg/dL      eGFR Non African Amer 133 mL/min/1.73      BUN/Creatinine Ratio 29.8     Anion Gap 17.0 mmol/L     Narrative:       GFR Normal  >60  Chronic Kidney Disease <60  Kidney Failure <15      Protime-INR [[id number]]  (Abnormal) Collected:  05/16/20 0954    Specimen:  Blood Updated:  05/16/20 1009     Protime 21.1 Seconds      INR 1.85    POC Glucose Once [[id number]]  (Abnormal) Collected:  05/16/20 0756    Specimen:  Blood Updated:  05/16/20 0823     Glucose 165 mg/dL      Comment: : [id number] Moses Stewart ID: [id number]       Extra Tubes [[id number]] Collected:  05/16/20 0502    Specimen:  Blood, Venous Line Updated:  05/16/20 0800    Narrative:       The following orders were created for panel order Extra Tubes.  Procedure                               Abnormality         Status                     ---------                               -----------         ------                     Lavender Top[[id number]]                                     Final result                 Please view results for these tests on the individual orders.    Lavender Top [[id number]] Collected:  05/16/20 0502    Specimen:  Blood Updated:  05/16/20 0800     Extra Tube hold for add-on     Comment: Auto resulted       Extra Tubes [[id number]] Collected:  05/16/20 0502    Specimen:  Blood, Venous Line Updated:  05/16/20 0800    Narrative:       The following orders were created for panel order Extra Tubes.  Procedure                               Abnormality         Status                     ---------                               -----------         ------                     Gold Top - SST[[id number]]                                   Final result                 Please view results for these tests on the individual orders.    Gold Top - SST [[id number]] Collected:  05/16/20 0502    Specimen:  Blood Updated:  05/16/20 0800     Extra Tube Hold for add-ons.     Comment: Auto resulted.       Vitamin A & E [[id number]] Collected:  05/16/20 0502    Specimen:  Blood Updated:  05/16/20 0557    Vitamin B12 [[id number]]  (Abnormal) Collected:  05/15/20 0900    Specimen:  Blood  Updated:  05/15/20 2002     Vitamin B-12 <150 pg/mL     Narrative:       Results may be falsely increased if patient taking Biotin.      Folate [[id number]]  (Normal) Collected:  05/15/20 0900    Specimen:  Blood Updated:  05/15/20 2000     Folate >20.00 ng/mL     Narrative:       Results may be falsely increased if patient taking Biotin.      POC Glucose Once [[id number]]  (Abnormal) Collected:  05/15/20 1639    Specimen:  Blood Updated:  05/15/20 1650     Glucose 241 mg/dL      Comment: : [id number] Velasquezer ID: [id number]           Nuclear Stress Test 2/23/2020:  Impression:  There is no obvious infarct or ischemia, with a calculated ejection  fraction of 31 %.  Suggest: Clinical correlation with cardiomyopathy.  Signed by Dr Chris Nieves on 2/23/2020 9:50 AM    Imaging Results (Last 72 Hours)     Procedure Component Value Units Date/Time    US Venous Doppler Lower Extremity Bilateral (duplex) [[id number]] Collected:  05/17/20 1220     Updated:  05/17/20 1223    Narrative:       History: Swelling       Impression:       Impression: There is no evidence of deep venous thrombosis or  superficial thrombophlebitis of right or left lower extremities.     Comments: Bilateral lower extremity venous duplex exam was performed  using color Doppler flow, Doppler waveform analysis, and grayscale  imaging, with and without compression. There is no evidence of deep  venous thrombosis in the common femoral, superficial femoral, popliteal,  peroneal, anterior tibial, and posterior tibial veins bilaterally. No  thrombus is identified in the saphenofemoral junctions and greater  saphenous veins bilaterally.         This report was finalized on 05/17/2020 12:20 by Dr. Michael Cartagena MD.    MRI Lumbar Spine With & Without Contrast [[id number]] Collected:  05/17/20 1102     Updated:  05/17/20 1110    Narrative:       EXAMINATION: MRI LUMBAR SPINE W WO CONTRAST- 5/17/2020 11:02 AM CDT     HISTORY: Progressive weakness        COMPARISON: None     Technical: Multiplanar, multisequence imaging was performed through the  lumbar spine before and after the administration of IV contrast.     FINDINGS:  Review of the visualized paraspinal soft tissues demonstrates no acute  abnormalities.     There are presumed to be 5 lumbar-type vertebral bodies. Assuming this,  the conus medullaris terminates normally at the level of L1-L2. The  visualized spinal cord appears normal in signal and morphology. 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     Alignment of the lumbar spine appears normal. Vertebral body heights  appear well maintained. No infiltrative marrow process is identified.     Disc levels:  L1-L2: Mild diffuse disc bulge and facet hypertrophy without severe  thecal sac stenosis. There is mild left neuroforaminal narrowing.     L2-L3: Diffuse disc bulge and marked facet hypertrophy result in  effacement of the thecal sac with moderate thecal sac stenosis. No  severe neuroforaminal narrowing is identified.     L3-L4: Diffuse disc bulge and marked ligamentous and facet hypertrophy  result in effacement of the thecal sac with moderate to severe thecal  sac stenosis. Additionally, there is moderate right and mild left  neuroforaminal narrowing.     L4-L5: Diffuse disc bulge and ligamentous hypertrophy are present,  resulting in effacement of the thecal sac but no significant thecal sac  stenosis. There does appear to be moderate bilateral neuroforaminal  narrowing at this level.     L5-S1: Diffuse disc bulge and facet hypertrophy are present without  appreciable thecal sac stenosis. There is mild to moderate bilateral  neuroforaminal narrowing.     On postcontrast imaging, there is some enhancement at the facet joints  of L4-L5, suggesting a synovitis. Otherwise, no abnormal enhancement is  identified.       Impression:          1. 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  2. More focal areas of thecal sac stenosis are noted as detailed above.  3. Areas of mild to moderate neural foraminal narrowing are seen at  multiple levels.  4. Synovitis suspected of the facet joints at L4-L5. Otherwise, no  abnormal enhancement identified.  This report was finalized on 05/17/2020 11:07 by Dr. Jerry Chinchilla MD.    MRI Thoracic Spine Without Contrast [[id number]] Collected:  05/15/20 1614     Updated:  05/15/20 1621    Narrative:       EXAMINATION: MRI THORACIC SPINE WO CONTRAST-     5/15/2020 3:10 PM CDT     HISTORY: progressive weakness; R29.898-Other symptoms and signs  involving the musculoskeletal system; Z78.9-Other specified health  status     Noncontrast thoracic spine MR imaging.     Due to claustrophobia the patient was unable to tolerate additional  imaging with the use of contrast.     Curvature and alignment is appropriate.     Degenerative endplate spurring.     No compression fracture.     Small central disc protrusion at T7-8. Mild ventral cord effacement.  No foraminal stenosis.     Facet disease with mild foraminal narrowing at T9-T10.     Disc protrusion and facet disease with ligamentous hypertrophy at T10-11  with mild-to-moderate spinal canal stenosis and mild cord flattening.  Associated cord signal change compatible with myelomalacia.  Associated mild to moderate bilateral foraminal stenosis.     Summary:  1. Multifactorial spinal canal stenosis and foraminal stenosis with cord  flattening and myelomalacia at T10-11.        This report was finalized on 05/15/2020 16:18 by Dr. Eugenio Ball MD.          Assessment/Plan       Weakness of both lower extremities    Chronic atrial fibrillation    Essential hypertension    Long term current use of anticoagulant therapy    Mild CAD    Morbid obesity due to excess calories (CMS/HCC)    Systolic congestive heart failure (CMS/[facility])    Type 2 diabetes mellitus with microalbuminuria, without long-term current use of  "insulin (CMS/[facility])    Vitamin B 12 deficiency    Plan:   Risk assessment- from a revised cardiac risk index standpoint, patient is intermediate risk for a major cardiac event with this surgery. This is primarily because she has known history of congestive heart failure with reduced EF, cerebrovascular disease. Though the risk is intermediate (6.6%), it is not zero. However, this is non-modifiable because she has no signs or symptoms of the following \"active cardiac conditions\"- acute coronary syndrome, acutely decompensated congestive heart failure, uncontrolled arrhythmias, or significant valvular disease. Patient had a low risk Lexiscan done 2/23/2020 at [facility]. Therefore, recommend proceeding with the planned surgery without further delay. Would recommend resumption of Coumadin post-operatively when given the go ahead from neurosurgery.    Further orders per Dr Dumont    Thank you for asking us to follow this patient with you.     Please note this cardiology consultation note is the result of a face to face consultation with the patient, in addition to reviewing medical records at length by myself, TYSON Siegel.\    TYSON Siegel  05/18/20  14:53            "

## 2020-05-18 NOTE — PROGRESS NOTES
Neurology Progress Note      Chief Complaint:  F/u weakness and inability to walk and low back pain    Subjective     Subjective:  Lying in bed. PT at bedside. Patient states she is upset as neurosurgery had just been in and recommending surgical intervention on T10-11. Patient having low back pain.     Medications:  Current Facility-Administered Medications   Medication Dose Route Frequency Provider Last Rate Last Dose   • acetaminophen (TYLENOL) tablet 650 mg  650 mg Oral Q4H PRN Laurie Schwarz APRN   650 mg at 05/17/20 1151    Or   • acetaminophen (TYLENOL) 160 MG/5ML solution 650 mg  650 mg Oral Q4H PRN Laurie Schwarz APRN        Or   • acetaminophen (TYLENOL) suppository 650 mg  650 mg Rectal Q4H PRN Laurie Schwarz APRN       • albuterol (PROVENTIL) nebulizer solution 0.083% 2.5 mg/3mL  2.5 mg Nebulization Q4H PRN Foreign Anna MD       • aspirin chewable tablet 81 mg  81 mg Oral Daily Laurie Schwarz APRN   81 mg at 05/18/20 0823   • atorvastatin (LIPITOR) tablet 80 mg  80 mg Oral Nightly Laurie Schwarz APRN   80 mg at 05/17/20 2042   • carvedilol (COREG) tablet 12.5 mg  12.5 mg Oral BID With Meals Laurie Schwarz APRN   12.5 mg at 05/18/20 0823   • cholecalciferol (VITAMIN D3) tablet 1,000 Units  1,000 Units Oral Daily Laurie Schwarz APRN   1,000 Units at 05/18/20 0823   • cyanocobalamin injection 1,000 mcg  1,000 mcg Intramuscular Daily Alfredo Champion MD   1,000 mcg at 05/18/20 0821   • dextrose (D50W) 25 g/ 50mL Intravenous Solution 25 g  25 g Intravenous Q15 Min PRN Laurie Schwarz APRN       • dextrose (GLUTOSE) oral gel 15 g  15 g Oral Q15 Min PRN Laurie Schwarz APRN       • digoxin (LANOXIN) tablet 250 mcg  250 mcg Oral Daily Laurie Schwarz APRN   250 mcg at 05/17/20 1151   • fluticasone (FLONASE) 50 MCG/ACT nasal spray 2 spray  2 spray Each Nare Daily Alfredo Champion MD   2 spray at 05/18/20 0822   • furosemide (LASIX) tablet 20 mg  20 mg  Oral Daily Alfredo Champion MD   20 mg at 05/18/20 0822   • glipizide (GLUCOTROL) tablet 10 mg  10 mg Oral QAM Laurie Schwarz APRN   10 mg at 05/18/20 0823   • glucagon (human recombinant) (GLUCAGEN DIAGNOSTIC) injection 1 mg  1 mg Subcutaneous Q15 Min PRN Laurie Schwarz APRN       • insulin detemir (LEVEMIR) injection 15 Units  15 Units Subcutaneous Nightly Alfredo Champion MD   15 Units at 05/17/20 2051   • insulin lispro (humaLOG) injection 2-9 Units  2-9 Units Subcutaneous TID AC Laurie Schwarz APRN   2 Units at 05/18/20 0822   • levothyroxine (SYNTHROID, LEVOTHROID) tablet 125 mcg  125 mcg Oral Q AM Alfredo Chapmion MD   125 mcg at 05/18/20 0538   • LORazepam (ATIVAN) injection 1 mg  1 mg Intravenous Once Alfredo Champion MD       • losartan (COZAAR) tablet 25 mg  25 mg Oral Q24H Laurie Schwarz APRN   25 mg at 05/18/20 0822   • metoprolol succinate XL (TOPROL-XL) 24 hr tablet 50 mg  50 mg Oral Daily Laurie Schwarz APRN   50 mg at 05/18/20 0822   • mupirocin (BACTROBAN) 2 % ointment   Topical Q12H Alfredo Champion MD       • ondansetron (ZOFRAN) tablet 4 mg  4 mg Oral Q6H PRN Laurie Schwarz APRN        Or   • ondansetron (ZOFRAN) injection 4 mg  4 mg Intravenous Q6H PRN Laurie Schwarz APRN       • sodium chloride 0.9 % flush 10 mL  10 mL Intravenous Q12H Laurie Schwarz APRN   10 mL at 05/18/20 0822   • sodium chloride 0.9 % flush 10 mL  10 mL Intravenous PRN Laurie Schwarz APRN       • tiZANidine (ZANAFLEX) tablet 4 mg  4 mg Oral Q8H PRN Laurie Schwarz APRN   4 mg at 05/17/20 0018       Review of Systems:   -A 14 point review of systems is completed and is negative except for low back pain and lower extremity weakness.       Objective      Vital Signs  Temp:  [97.8 °F (36.6 °C)-98.2 °F (36.8 °C)] 98.1 °F (36.7 °C)  Heart Rate:  [55-81] 68  Resp:  [18-20] 18  BP: (127-153)/(58-69) 153/59    Physical Exam:  HEENT:  Neck  supple  CVS:  Regular rate and rhythm.  No murmurs  Carotid Examination:  No bruits  Lungs:  Clear to auscultation  Abdomen:  Non-tender, Non-distended  Extremities:  No signs of peripheral edema     Neurologic Exam:     -Somnolent but awakens easily  -No word finding difficulties  -No aphasia  -No dysarthria  -Follows simple and complex commands     Cranial nerves II through XII intact.  Opens eyes, tracks  EOMI  No facial sensory or facial motor asymmetry  Hearing intact to voice   Shoulder shrug symmetric  Motor: (strength out of 5:  1= minimal movement, 2 = movement in plane of gravity, 3 = movement against gravity, 4 = movement against some resistance, 5 = full strength)     -Right Upper Ext: Proximal: 5 Distal: 5  -Left Upper Ext: Proximal: 5   Distal: 5     -Right Lower Ext: Proximal: 5 Distal: 5  -Left Lower Ext: Proximal: 5   Distal: 5--she does not move her left leg spontaneously as much but there now appears to be full strength     DTR:  2+ throughout in bilateral upper extremities and areflexic bilateral lower extremities.   Upgoing toes but also withdrawal     Sensory:  She still reports decrease  light touch, pinprick below the umbilicus     Coordination/Gait:  -No ataxia  Normal finger to nose  - gait not attempted for safety reasons.        Results Review:    I reviewed the patient's new clinical results.    Results from last 7 days   Lab Units 05/17/20  0602 05/15/20  0352 05/14/20 2056   WBC 10*3/mm3 7.32 9.62 9.61   HEMOGLOBIN g/dL 13.0 12.3 14.2   HEMATOCRIT % 37.7 36.2 42.2   PLATELETS 10*3/mm3 186 177 216        Results from last 7 days   Lab Units 05/18/20  0537 05/17/20  0602 05/16/20  0502 05/15/20  0352 05/14/20 2056   SODIUM mmol/L 137 135* 141 138 132*   POTASSIUM mmol/L 4.1 3.9 3.8 3.9 4.6   CHLORIDE mmol/L 100 95* 97* 99 91*   CO2 mmol/L 27.0 26.0 27.0 25.0 26.0   BUN mg/dL 14 19 14 13 12   CREATININE mg/dL 0.46* 0.47* 0.47* 0.50* 0.61   CALCIUM mg/dL 9.1 9.2 9.3 9.1 9.4   BILIRUBIN  mg/dL  --   --   --  0.4 0.3   ALK PHOS U/L  --   --   --  43 55   ALT (SGPT) U/L  --   --   --  24 31   AST (SGOT) U/L  --   --   --  21 25   GLUCOSE mg/dL 158* 159* 154* 129* 247*        Lab Results   Component Value Date    PHOS 3.5 05/14/2020    MG 1.8 05/14/2020    PROTIME 16.7 (H) 05/18/2020    INR 1.39 (H) 05/18/2020     No components found for: POCGLUC  No components found for: A1C  Lab Results   Component Value Date    HDL 34 (L) 05/15/2020    LDL 63 05/15/2020     No components found for: B12  Lab Results   Component Value Date    TSH 3.910 05/15/2020       Assessment/Plan     Hospital Problem List      Weakness of both lower extremities    Chronic atrial fibrillation    Essential hypertension    Long term current use of anticoagulant therapy    Mild CAD    Morbid obesity due to excess calories (CMS/[facility])    Systolic congestive heart failure (CMS/[facility])    Type 2 diabetes mellitus with microalbuminuria, without long-term current use of insulin (CMS/[facility])    Vitamin B 12 deficiency    Impression:  1. Lumbar stenosis  2. Thoracic T10.11 stenosis  3. Peripheral polyneuropathy on background of DM that is not well controlled. And B12 not even measurable  4. B12 deficiency--severe < 150  5. A fib  6/ C/o muscle spasms and on PRN tizanidine    Plan:  1. Surgical intervention planned for 5/21/2020 on T10-11 stenosis.  2. Replace B12 1000 mcg IM for 5 days, first dose 5/16/2020/  3. Patient scheduled for MRI Cervical spine today and is requesting sedation again.  4. Serum KAREN pending  5. OT/PT  6. Patient chronic afib with chronic warfarin and neurosurgery requesting hospitalist to assist with surgical clearance.         Grazyna Ortega, APRN  05/18/20  09:34

## 2020-05-19 ENCOUNTER — APPOINTMENT (OUTPATIENT)
Dept: GENERAL RADIOLOGY | Facility: HOSPITAL | Age: 65
End: 2020-05-19

## 2020-05-19 ENCOUNTER — ANESTHESIA EVENT (OUTPATIENT)
Dept: PERIOP | Facility: HOSPITAL | Age: 65
End: 2020-05-19

## 2020-05-19 ENCOUNTER — ANESTHESIA (OUTPATIENT)
Dept: PERIOP | Facility: HOSPITAL | Age: 65
End: 2020-05-19

## 2020-05-19 PROBLEM — M51.04 INTERVERTEBRAL THORACIC DISC DISORDER WITH MYELOPATHY, THORACIC REGION: Status: ACTIVE | Noted: 2020-05-14

## 2020-05-19 LAB
A-A DO2: 70.4 MMHG
A-A DO2: ABNORMAL
ABO GROUP BLD: NORMAL
ANION GAP SERPL CALCULATED.3IONS-SCNC: 12 MMOL/L (ref 5–15)
ARTERIAL PATENCY WRIST A: ABNORMAL
ARTERIAL PATENCY WRIST A: ABNORMAL
ATMOSPHERIC PRESS: 746 MMHG
ATMOSPHERIC PRESS: 746 MMHG
BASE EXCESS BLDA CALC-SCNC: 1.2 MMOL/L (ref 0–2)
BASE EXCESS BLDA CALC-SCNC: 1.5 MMOL/L (ref 0–2)
BDY SITE: ABNORMAL
BDY SITE: ABNORMAL
BLD GP AB SCN SERPL QL: NEGATIVE
BODY TEMPERATURE: 37 C
BODY TEMPERATURE: 37 C
BUN BLD-MCNC: 13 MG/DL (ref 8–23)
BUN/CREAT SERPL: 37.1 (ref 7–25)
CA-I BLD-MCNC: 4.33 MG/DL (ref 4.6–5.4)
CA-I BLD-MCNC: 4.68 MG/DL (ref 4.6–5.4)
CALCIUM SPEC-SCNC: 8.9 MG/DL (ref 8.6–10.5)
CHLORIDE SERPL-SCNC: 100 MMOL/L (ref 98–107)
CO2 SERPL-SCNC: 25 MMOL/L (ref 22–29)
COHGB MFR BLD: 0.5 % (ref 0–5)
COHGB MFR BLD: 0.5 % (ref 0–5)
CREAT BLD-MCNC: 0.35 MG/DL (ref 0.57–1)
DEPRECATED RDW RBC AUTO: 43.4 FL (ref 37–54)
ERYTHROCYTE [DISTWIDTH] IN BLOOD BY AUTOMATED COUNT: 13.4 % (ref 12.3–15.4)
GFR SERPL CREATININE-BSD FRML MDRD: >150 ML/MIN/1.73
GLUCOSE BLD-MCNC: 187 MG/DL (ref 65–99)
GLUCOSE BLDC GLUCOMTR-MCNC: 187 MG/DL (ref 70–130)
GLUCOSE BLDC GLUCOMTR-MCNC: 188 MG/DL (ref 70–130)
GLUCOSE BLDC GLUCOMTR-MCNC: 194 MG/DL (ref 70–130)
HCO3 BLDA-SCNC: 25.1 MMOL/L (ref 20–26)
HCO3 BLDA-SCNC: 26.9 MMOL/L (ref 20–26)
HCT VFR BLD AUTO: 38.5 % (ref 34–46.6)
HCT VFR BLD CALC: 34.9 % (ref 38–51)
HCT VFR BLD CALC: 39.3 % (ref 38–51)
HGB BLD-MCNC: 12.8 G/DL (ref 12–15.9)
HGB BLDA-MCNC: 11.4 G/DL (ref 12–16)
HGB BLDA-MCNC: 12.8 G/DL (ref 12–16)
HOROWITZ INDEX BLD+IHG-RTO: 60 %
HOROWITZ INDEX BLD+IHG-RTO: 60 %
INR PPP: 1.37 (ref 0.91–1.09)
INR PPP: 1.38 (ref 0.91–1.09)
Lab: ABNORMAL
Lab: ABNORMAL
MCH RBC QN AUTO: 29.2 PG (ref 26.6–33)
MCHC RBC AUTO-ENTMCNC: 33.2 G/DL (ref 31.5–35.7)
MCV RBC AUTO: 87.7 FL (ref 79–97)
METHGB BLD QL: 1.1 % (ref 0–3)
METHGB BLD QL: 1.1 % (ref 0–3)
MODALITY: ABNORMAL
MODALITY: ABNORMAL
NOTE: ABNORMAL
NOTE: ABNORMAL
OXYHGB MFR BLDV: 98.3 % (ref 94–99)
OXYHGB MFR BLDV: 98.3 % (ref 94–99)
PCO2 BLDA: 36.6 MM HG (ref 35–45)
PCO2 BLDA: 44.8 MM HG (ref 35–45)
PCO2 TEMP ADJ BLD: ABNORMAL MM[HG]
PCO2 TEMP ADJ BLD: ABNORMAL MM[HG]
PH BLDA: 7.39 PH UNITS (ref 7.35–7.45)
PH BLDA: 7.45 PH UNITS (ref 7.35–7.45)
PH, TEMP CORRECTED: ABNORMAL
PH, TEMP CORRECTED: ABNORMAL
PLATELET # BLD AUTO: 185 10*3/MM3 (ref 140–450)
PMV BLD AUTO: 11.2 FL (ref 6–12)
PO2 BLDA: 257 MM HG (ref 83–108)
PO2 BLDA: 301 MM HG (ref 83–108)
PO2 TEMP ADJ BLD: ABNORMAL MM[HG]
PO2 TEMP ADJ BLD: ABNORMAL MM[HG]
POTASSIUM BLD-SCNC: 3.9 MMOL/L (ref 3.5–5.2)
POTASSIUM BLDA-SCNC: 3.8 MMOL/L (ref 3.5–5.2)
POTASSIUM BLDA-SCNC: 4.1 MMOL/L (ref 3.5–5.2)
PROTHROMBIN TIME: 16.5 SECONDS (ref 11.9–14.6)
PROTHROMBIN TIME: 16.6 SECONDS (ref 11.9–14.6)
RBC # BLD AUTO: 4.39 10*6/MM3 (ref 3.77–5.28)
RH BLD: POSITIVE
SAO2 % BLDCOA: 100 % (ref 94–99)
SAO2 % BLDCOA: 99.9 % (ref 94–99)
SARS-COV-2 RNA RESP QL NAA+PROBE: NOT DETECTED
SODIUM BLD-SCNC: 137 MMOL/L (ref 136–145)
SODIUM BLDA-SCNC: 139 MMOL/L (ref 136–145)
SODIUM BLDA-SCNC: 141 MMOL/L (ref 136–145)
T&S EXPIRATION DATE: NORMAL
VENTILATOR MODE: ABNORMAL
VENTILATOR MODE: ABNORMAL
WBC NRBC COR # BLD: 7.68 10*3/MM3 (ref 3.4–10.8)

## 2020-05-19 PROCEDURE — 87635 SARS-COV-2 COVID-19 AMP PRB: CPT | Performed by: NEUROLOGICAL SURGERY

## 2020-05-19 PROCEDURE — 25010000002 ONDANSETRON PER 1 MG: Performed by: NURSE ANESTHETIST, CERTIFIED REGISTERED

## 2020-05-19 PROCEDURE — 0RG607J FUSION OF THORACIC VERTEBRAL JOINT WITH AUTOLOGOUS TISSUE SUBSTITUTE, POSTERIOR APPROACH, ANTERIOR COLUMN, OPEN APPROACH: ICD-10-PCS | Performed by: NEUROLOGICAL SURGERY

## 2020-05-19 PROCEDURE — C1713 ANCHOR/SCREW BN/BN,TIS/BN: HCPCS | Performed by: NEUROLOGICAL SURGERY

## 2020-05-19 PROCEDURE — 82375 ASSAY CARBOXYHB QUANT: CPT

## 2020-05-19 PROCEDURE — 82805 BLOOD GASES W/O2 SATURATION: CPT

## 2020-05-19 PROCEDURE — 82962 GLUCOSE BLOOD TEST: CPT

## 2020-05-19 PROCEDURE — 86901 BLOOD TYPING SEROLOGIC RH(D): CPT | Performed by: ANESTHESIOLOGY

## 2020-05-19 PROCEDURE — 86850 RBC ANTIBODY SCREEN: CPT | Performed by: ANESTHESIOLOGY

## 2020-05-19 PROCEDURE — 94799 UNLISTED PULMONARY SVC/PX: CPT

## 2020-05-19 PROCEDURE — 25010000002 SUCCINYLCHOLINE PER 20 MG: Performed by: NURSE ANESTHETIST, CERTIFIED REGISTERED

## 2020-05-19 PROCEDURE — 0RT90ZZ RESECTION OF THORACIC VERTEBRAL DISC, OPEN APPROACH: ICD-10-PCS | Performed by: NEUROLOGICAL SURGERY

## 2020-05-19 PROCEDURE — 36430 TRANSFUSION BLD/BLD COMPNT: CPT

## 2020-05-19 PROCEDURE — P9017 PLASMA 1 DONOR FRZ W/IN 8 HR: HCPCS

## 2020-05-19 PROCEDURE — 25010000002 HEPARIN (PORCINE) PER 1000 UNITS: Performed by: NURSE PRACTITIONER

## 2020-05-19 PROCEDURE — 86900 BLOOD TYPING SEROLOGIC ABO: CPT | Performed by: ANESTHESIOLOGY

## 2020-05-19 PROCEDURE — 22532 ARTHRD LAT XTRCVTRY TQ THRC: CPT | Performed by: NEUROLOGICAL SURGERY

## 2020-05-19 PROCEDURE — 76380 CAT SCAN FOLLOW-UP STUDY: CPT

## 2020-05-19 PROCEDURE — 86927 PLASMA FRESH FROZEN: CPT

## 2020-05-19 PROCEDURE — 25010000002 VANCOMYCIN 10 G RECONSTITUTED SOLUTION: Performed by: NEUROLOGICAL SURGERY

## 2020-05-19 PROCEDURE — 25010000002 MIDAZOLAM PER 1 MG: Performed by: ANESTHESIOLOGY

## 2020-05-19 PROCEDURE — 25010000002 PROPOFOL 10 MG/ML EMULSION: Performed by: NURSE ANESTHETIST, CERTIFIED REGISTERED

## 2020-05-19 PROCEDURE — 25010000002 LORAZEPAM PER 2 MG: Performed by: INTERNAL MEDICINE

## 2020-05-19 PROCEDURE — 83050 HGB METHEMOGLOBIN QUAN: CPT

## 2020-05-19 PROCEDURE — 63047 LAM FACETEC & FORAMOT LUMBAR: CPT | Performed by: NEUROLOGICAL SURGERY

## 2020-05-19 PROCEDURE — 63710000001 INSULIN DETEMIR PER 5 UNITS: Performed by: NURSE PRACTITIONER

## 2020-05-19 PROCEDURE — 63101 REMOVE VERT BODY DCMPRN THRC: CPT | Performed by: NEUROLOGICAL SURGERY

## 2020-05-19 PROCEDURE — 85610 PROTHROMBIN TIME: CPT | Performed by: INTERNAL MEDICINE

## 2020-05-19 PROCEDURE — 25010000002 VANCOMYCIN 10 G RECONSTITUTED SOLUTION: Performed by: NURSE PRACTITIONER

## 2020-05-19 PROCEDURE — 85027 COMPLETE CBC AUTOMATED: CPT | Performed by: INTERNAL MEDICINE

## 2020-05-19 PROCEDURE — 99232 SBSQ HOSP IP/OBS MODERATE 35: CPT | Performed by: NURSE PRACTITIONER

## 2020-05-19 PROCEDURE — 25010000002 PHENYLEPHRINE 10 MG/ML SOLUTION 5 ML VIAL: Performed by: NURSE ANESTHETIST, CERTIFIED REGISTERED

## 2020-05-19 PROCEDURE — 61783 SCAN PROC SPINAL: CPT | Performed by: NEUROLOGICAL SURGERY

## 2020-05-19 PROCEDURE — 20931 SP BONE ALGRFT STRUCT ADD-ON: CPT | Performed by: NEUROLOGICAL SURGERY

## 2020-05-19 PROCEDURE — 25010000002 VITAMIN K1 PER 1 MG: Performed by: NURSE PRACTITIONER

## 2020-05-19 PROCEDURE — 80048 BASIC METABOLIC PNL TOTAL CA: CPT | Performed by: INTERNAL MEDICINE

## 2020-05-19 PROCEDURE — 85610 PROTHROMBIN TIME: CPT | Performed by: NEUROLOGICAL SURGERY

## 2020-05-19 DEVICE — IMPLANTABLE DEVICE: Type: IMPLANTABLE DEVICE | Site: SPINE LUMBAR | Status: FUNCTIONAL

## 2020-05-19 DEVICE — DBM T43103 2.5CC GRAFTON PUTTY
Type: IMPLANTABLE DEVICE | Site: SPINE LUMBAR | Status: FUNCTIONAL
Brand: GRAFTON®AND GRAFTON PLUS®DEMINERALIZED BONE MATRIX (DBM)

## 2020-05-19 RX ORDER — SODIUM CHLORIDE 0.9 % (FLUSH) 0.9 %
3 SYRINGE (ML) INJECTION EVERY 12 HOURS SCHEDULED
Status: DISCONTINUED | OUTPATIENT
Start: 2020-05-19 | End: 2020-05-19 | Stop reason: HOSPADM

## 2020-05-19 RX ORDER — AMOXICILLIN 250 MG
2 CAPSULE ORAL NIGHTLY
Status: DISCONTINUED | OUTPATIENT
Start: 2020-05-19 | End: 2020-05-20

## 2020-05-19 RX ORDER — PHYTONADIONE 10 MG/ML
10 INJECTION, EMULSION INTRAMUSCULAR; INTRAVENOUS; SUBCUTANEOUS ONCE
Status: COMPLETED | OUTPATIENT
Start: 2020-05-19 | End: 2020-05-19

## 2020-05-19 RX ORDER — HEPARIN SODIUM 5000 [USP'U]/ML
5000 INJECTION, SOLUTION INTRAVENOUS; SUBCUTANEOUS EVERY 12 HOURS SCHEDULED
Status: DISCONTINUED | OUTPATIENT
Start: 2020-05-19 | End: 2020-05-22 | Stop reason: HOSPADM

## 2020-05-19 RX ORDER — MIDAZOLAM HYDROCHLORIDE 1 MG/ML
1 INJECTION INTRAMUSCULAR; INTRAVENOUS
Status: DISCONTINUED | OUTPATIENT
Start: 2020-05-19 | End: 2020-05-19 | Stop reason: HOSPADM

## 2020-05-19 RX ORDER — MAGNESIUM HYDROXIDE 1200 MG/15ML
LIQUID ORAL AS NEEDED
Status: DISCONTINUED | OUTPATIENT
Start: 2020-05-19 | End: 2020-05-19 | Stop reason: HOSPADM

## 2020-05-19 RX ORDER — ONDANSETRON 2 MG/ML
4 INJECTION INTRAMUSCULAR; INTRAVENOUS AS NEEDED
Status: DISCONTINUED | OUTPATIENT
Start: 2020-05-19 | End: 2020-05-19 | Stop reason: HOSPADM

## 2020-05-19 RX ORDER — SUCCINYLCHOLINE CHLORIDE 20 MG/ML
INJECTION INTRAMUSCULAR; INTRAVENOUS AS NEEDED
Status: DISCONTINUED | OUTPATIENT
Start: 2020-05-19 | End: 2020-05-19 | Stop reason: SURG

## 2020-05-19 RX ORDER — ONDANSETRON 2 MG/ML
INJECTION INTRAMUSCULAR; INTRAVENOUS AS NEEDED
Status: DISCONTINUED | OUTPATIENT
Start: 2020-05-19 | End: 2020-05-19 | Stop reason: SURG

## 2020-05-19 RX ORDER — OXYCODONE AND ACETAMINOPHEN 10; 325 MG/1; MG/1
1 TABLET ORAL EVERY 4 HOURS PRN
Status: DISCONTINUED | OUTPATIENT
Start: 2020-05-19 | End: 2020-05-22 | Stop reason: HOSPADM

## 2020-05-19 RX ORDER — ACETAMINOPHEN 500 MG
1000 TABLET ORAL ONCE
Status: COMPLETED | OUTPATIENT
Start: 2020-05-19 | End: 2020-05-19

## 2020-05-19 RX ORDER — SODIUM CHLORIDE 0.9 % (FLUSH) 0.9 %
10 SYRINGE (ML) INJECTION AS NEEDED
Status: DISCONTINUED | OUTPATIENT
Start: 2020-05-19 | End: 2020-05-22 | Stop reason: HOSPADM

## 2020-05-19 RX ORDER — VANCOMYCIN HYDROCHLORIDE 1 G/20ML
INJECTION, POWDER, LYOPHILIZED, FOR SOLUTION INTRAVENOUS AS NEEDED
Status: DISCONTINUED | OUTPATIENT
Start: 2020-05-19 | End: 2020-05-19

## 2020-05-19 RX ORDER — FENTANYL CITRATE 50 UG/ML
25 INJECTION, SOLUTION INTRAMUSCULAR; INTRAVENOUS
Status: DISCONTINUED | OUTPATIENT
Start: 2020-05-19 | End: 2020-05-19 | Stop reason: HOSPADM

## 2020-05-19 RX ORDER — OXYCODONE AND ACETAMINOPHEN 7.5; 325 MG/1; MG/1
2 TABLET ORAL ONCE AS NEEDED
Status: DISCONTINUED | OUTPATIENT
Start: 2020-05-19 | End: 2020-05-19 | Stop reason: HOSPADM

## 2020-05-19 RX ORDER — SODIUM CHLORIDE 0.9 % (FLUSH) 0.9 %
10 SYRINGE (ML) INJECTION EVERY 12 HOURS SCHEDULED
Status: DISCONTINUED | OUTPATIENT
Start: 2020-05-19 | End: 2020-05-20

## 2020-05-19 RX ORDER — KETAMINE HYDROCHLORIDE 50 MG/ML
INJECTION, SOLUTION, CONCENTRATE INTRAMUSCULAR; INTRAVENOUS AS NEEDED
Status: DISCONTINUED | OUTPATIENT
Start: 2020-05-19 | End: 2020-05-19 | Stop reason: SURG

## 2020-05-19 RX ORDER — ROCURONIUM BROMIDE 10 MG/ML
INJECTION, SOLUTION INTRAVENOUS AS NEEDED
Status: DISCONTINUED | OUTPATIENT
Start: 2020-05-19 | End: 2020-05-19 | Stop reason: SURG

## 2020-05-19 RX ORDER — LABETALOL HYDROCHLORIDE 5 MG/ML
5 INJECTION, SOLUTION INTRAVENOUS
Status: DISCONTINUED | OUTPATIENT
Start: 2020-05-19 | End: 2020-05-19 | Stop reason: HOSPADM

## 2020-05-19 RX ORDER — SODIUM CHLORIDE 0.9 % (FLUSH) 0.9 %
3-10 SYRINGE (ML) INJECTION AS NEEDED
Status: DISCONTINUED | OUTPATIENT
Start: 2020-05-19 | End: 2020-05-19 | Stop reason: HOSPADM

## 2020-05-19 RX ORDER — LIDOCAINE HYDROCHLORIDE 10 MG/ML
0.5 INJECTION, SOLUTION EPIDURAL; INFILTRATION; INTRACAUDAL; PERINEURAL ONCE AS NEEDED
Status: DISCONTINUED | OUTPATIENT
Start: 2020-05-19 | End: 2020-05-19 | Stop reason: HOSPADM

## 2020-05-19 RX ORDER — FAMOTIDINE 10 MG/ML
20 INJECTION, SOLUTION INTRAVENOUS
Status: COMPLETED | OUTPATIENT
Start: 2020-05-19 | End: 2020-05-19

## 2020-05-19 RX ORDER — LIDOCAINE HYDROCHLORIDE 40 MG/ML
SOLUTION TOPICAL AS NEEDED
Status: DISCONTINUED | OUTPATIENT
Start: 2020-05-19 | End: 2020-05-19 | Stop reason: SURG

## 2020-05-19 RX ORDER — SUFENTANIL CITRATE 50 UG/ML
INJECTION EPIDURAL; INTRAVENOUS AS NEEDED
Status: DISCONTINUED | OUTPATIENT
Start: 2020-05-19 | End: 2020-05-19 | Stop reason: SURG

## 2020-05-19 RX ORDER — FLUMAZENIL 0.1 MG/ML
0.2 INJECTION INTRAVENOUS AS NEEDED
Status: DISCONTINUED | OUTPATIENT
Start: 2020-05-19 | End: 2020-05-19 | Stop reason: HOSPADM

## 2020-05-19 RX ORDER — DOCUSATE SODIUM 100 MG/1
100 CAPSULE, LIQUID FILLED ORAL 2 TIMES DAILY
Status: DISCONTINUED | OUTPATIENT
Start: 2020-05-19 | End: 2020-05-22 | Stop reason: HOSPADM

## 2020-05-19 RX ORDER — MIDAZOLAM HYDROCHLORIDE 1 MG/ML
2 INJECTION INTRAMUSCULAR; INTRAVENOUS
Status: DISCONTINUED | OUTPATIENT
Start: 2020-05-19 | End: 2020-05-19 | Stop reason: HOSPADM

## 2020-05-19 RX ORDER — SODIUM CHLORIDE, SODIUM LACTATE, POTASSIUM CHLORIDE, CALCIUM CHLORIDE 600; 310; 30; 20 MG/100ML; MG/100ML; MG/100ML; MG/100ML
20 INJECTION, SOLUTION INTRAVENOUS CONTINUOUS
Status: DISCONTINUED | OUTPATIENT
Start: 2020-05-19 | End: 2020-05-19

## 2020-05-19 RX ORDER — NALOXONE HCL 0.4 MG/ML
0.04 VIAL (ML) INJECTION AS NEEDED
Status: DISCONTINUED | OUTPATIENT
Start: 2020-05-19 | End: 2020-05-19 | Stop reason: HOSPADM

## 2020-05-19 RX ORDER — SODIUM CHLORIDE, SODIUM LACTATE, POTASSIUM CHLORIDE, CALCIUM CHLORIDE 600; 310; 30; 20 MG/100ML; MG/100ML; MG/100ML; MG/100ML
100 INJECTION, SOLUTION INTRAVENOUS CONTINUOUS
Status: DISCONTINUED | OUTPATIENT
Start: 2020-05-19 | End: 2020-05-19

## 2020-05-19 RX ORDER — OXYCODONE AND ACETAMINOPHEN 10; 325 MG/1; MG/1
1 TABLET ORAL ONCE AS NEEDED
Status: DISCONTINUED | OUTPATIENT
Start: 2020-05-19 | End: 2020-05-19 | Stop reason: HOSPADM

## 2020-05-19 RX ORDER — OXYCODONE AND ACETAMINOPHEN 7.5; 325 MG/1; MG/1
1 TABLET ORAL EVERY 4 HOURS PRN
Status: DISCONTINUED | OUTPATIENT
Start: 2020-05-19 | End: 2020-05-22 | Stop reason: HOSPADM

## 2020-05-19 RX ORDER — MORPHINE SULFATE 2 MG/ML
2 INJECTION, SOLUTION INTRAMUSCULAR; INTRAVENOUS
Status: DISCONTINUED | OUTPATIENT
Start: 2020-05-19 | End: 2020-05-19 | Stop reason: HOSPADM

## 2020-05-19 RX ORDER — LIDOCAINE HYDROCHLORIDE 20 MG/ML
INJECTION, SOLUTION INFILTRATION; PERINEURAL AS NEEDED
Status: DISCONTINUED | OUTPATIENT
Start: 2020-05-19 | End: 2020-05-19 | Stop reason: SURG

## 2020-05-19 RX ADMIN — CARVEDILOL 12.5 MG: 6.25 TABLET, FILM COATED ORAL at 21:17

## 2020-05-19 RX ADMIN — CARVEDILOL 12.5 MG: 6.25 TABLET, FILM COATED ORAL at 08:52

## 2020-05-19 RX ADMIN — VANCOMYCIN HYDROCHLORIDE 1500 MG: 10 INJECTION, POWDER, LYOPHILIZED, FOR SOLUTION INTRAVENOUS at 09:49

## 2020-05-19 RX ADMIN — MUPIROCIN 1 APPLICATION: 20 OINTMENT TOPICAL at 08:54

## 2020-05-19 RX ADMIN — ONDANSETRON HYDROCHLORIDE 4 MG: 2 SOLUTION INTRAMUSCULAR; INTRAVENOUS at 15:54

## 2020-05-19 RX ADMIN — SUFENTANIL CITRATE 10 MCG: 50 INJECTION EPIDURAL; INTRAVENOUS at 10:01

## 2020-05-19 RX ADMIN — SUFENTANIL CITRATE 10 MCG: 50 INJECTION EPIDURAL; INTRAVENOUS at 14:37

## 2020-05-19 RX ADMIN — LIDOCAINE HYDROCHLORIDE 1 EACH: 40 SOLUTION TOPICAL at 10:04

## 2020-05-19 RX ADMIN — KETAMINE HYDROCHLORIDE 2 MCG/KG/MIN: 100 INJECTION INTRAMUSCULAR; INTRAVENOUS at 10:10

## 2020-05-19 RX ADMIN — SUFENTANIL CITRATE 10 MCG: 50 INJECTION EPIDURAL; INTRAVENOUS at 10:02

## 2020-05-19 RX ADMIN — LIDOCAINE HYDROCHLORIDE 100 MG: 20 INJECTION, SOLUTION INFILTRATION; PERINEURAL at 10:03

## 2020-05-19 RX ADMIN — SODIUM CHLORIDE, POTASSIUM CHLORIDE, SODIUM LACTATE AND CALCIUM CHLORIDE 20 ML/HR: 600; 310; 30; 20 INJECTION, SOLUTION INTRAVENOUS at 09:37

## 2020-05-19 RX ADMIN — SODIUM CHLORIDE, PRESERVATIVE FREE 10 ML: 5 INJECTION INTRAVENOUS at 21:18

## 2020-05-19 RX ADMIN — SUFENTANIL CITRATE 10 MCG: 50 INJECTION EPIDURAL; INTRAVENOUS at 11:04

## 2020-05-19 RX ADMIN — PHYTONADIONE 10 MG: 10 INJECTION, EMULSION INTRAMUSCULAR; INTRAVENOUS; SUBCUTANEOUS at 09:25

## 2020-05-19 RX ADMIN — SUCCINYLCHOLINE CHLORIDE 120 MG: 20 INJECTION, SOLUTION INTRAMUSCULAR; INTRAVENOUS at 10:04

## 2020-05-19 RX ADMIN — VASOPRESSIN 0.5 ML: 20 INJECTION INTRAVENOUS at 16:04

## 2020-05-19 RX ADMIN — ACETAMINOPHEN 1000 MG: 500 TABLET, FILM COATED ORAL at 09:40

## 2020-05-19 RX ADMIN — SUFENTANIL CITRATE 10 MCG: 50 INJECTION EPIDURAL; INTRAVENOUS at 11:54

## 2020-05-19 RX ADMIN — OXYCODONE HYDROCHLORIDE AND ACETAMINOPHEN 1 TABLET: 10; 325 TABLET ORAL at 20:49

## 2020-05-19 RX ADMIN — SUFENTANIL CITRATE 10 MCG: 50 INJECTION EPIDURAL; INTRAVENOUS at 10:03

## 2020-05-19 RX ADMIN — METOPROLOL SUCCINATE 50 MG: 50 TABLET, EXTENDED RELEASE ORAL at 08:52

## 2020-05-19 RX ADMIN — FAMOTIDINE 20 MG: 10 INJECTION, SOLUTION INTRAVENOUS at 09:44

## 2020-05-19 RX ADMIN — ATORVASTATIN CALCIUM 80 MG: 40 TABLET, FILM COATED ORAL at 21:17

## 2020-05-19 RX ADMIN — SODIUM CHLORIDE, POTASSIUM CHLORIDE, SODIUM LACTATE AND CALCIUM CHLORIDE: 600; 310; 30; 20 INJECTION, SOLUTION INTRAVENOUS at 09:54

## 2020-05-19 RX ADMIN — INSULIN DETEMIR 15 UNITS: 100 INJECTION, SOLUTION SUBCUTANEOUS at 21:21

## 2020-05-19 RX ADMIN — KETAMINE HYDROCHLORIDE 50 MG: 50 INJECTION, SOLUTION INTRAMUSCULAR; INTRAVENOUS at 10:40

## 2020-05-19 RX ADMIN — LORAZEPAM 1 MG: 2 INJECTION INTRAMUSCULAR; INTRAVENOUS at 08:57

## 2020-05-19 RX ADMIN — VASOPRESSIN 0.5 ML: 20 INJECTION INTRAVENOUS at 16:15

## 2020-05-19 RX ADMIN — KETAMINE HYDROCHLORIDE 50 MG: 50 INJECTION, SOLUTION INTRAMUSCULAR; INTRAVENOUS at 14:53

## 2020-05-19 RX ADMIN — SUFENTANIL CITRATE 10 MCG: 50 INJECTION EPIDURAL; INTRAVENOUS at 13:41

## 2020-05-19 RX ADMIN — TIZANIDINE 4 MG: 4 TABLET ORAL at 23:03

## 2020-05-19 RX ADMIN — PHENYLEPHRINE HYDROCHLORIDE 0.2 MCG/KG/MIN: 10 INJECTION INTRAVENOUS at 10:25

## 2020-05-19 RX ADMIN — VANCOMYCIN HYDROCHLORIDE 1500 MG: 10 INJECTION, POWDER, LYOPHILIZED, FOR SOLUTION INTRAVENOUS at 21:09

## 2020-05-19 RX ADMIN — SUFENTANIL CITRATE 10 MCG: 50 INJECTION EPIDURAL; INTRAVENOUS at 15:13

## 2020-05-19 RX ADMIN — DOCUSATE SODIUM 100 MG: 100 CAPSULE ORAL at 21:17

## 2020-05-19 RX ADMIN — KETAMINE HYDROCHLORIDE 50 MG: 50 INJECTION, SOLUTION INTRAMUSCULAR; INTRAVENOUS at 10:03

## 2020-05-19 RX ADMIN — SODIUM CHLORIDE, POTASSIUM CHLORIDE, SODIUM LACTATE AND CALCIUM CHLORIDE 100 ML/HR: 600; 310; 30; 20 INJECTION, SOLUTION INTRAVENOUS at 09:20

## 2020-05-19 RX ADMIN — MIDAZOLAM 2 MG: 1 INJECTION INTRAMUSCULAR; INTRAVENOUS at 09:45

## 2020-05-19 RX ADMIN — HEPARIN SODIUM 5000 UNITS: 5000 INJECTION, SOLUTION INTRAVENOUS; SUBCUTANEOUS at 21:17

## 2020-05-19 RX ADMIN — ROCURONIUM BROMIDE 5 MG: 10 INJECTION INTRAVENOUS at 10:03

## 2020-05-19 RX ADMIN — SUFENTANIL CITRATE 10 MCG: 50 INJECTION EPIDURAL; INTRAVENOUS at 15:30

## 2020-05-19 RX ADMIN — DOCUSATE SODIUM 50 MG AND SENNOSIDES 8.6 MG 2 TABLET: 8.6; 5 TABLET, FILM COATED ORAL at 23:03

## 2020-05-19 RX ADMIN — PROPOFOL 100 MCG/KG/MIN: 10 INJECTION, EMULSION INTRAVENOUS at 10:07

## 2020-05-19 NOTE — ANESTHESIA PROCEDURE NOTES
Airway  Urgency: elective    Date/Time: 5/19/2020 10:11 AM  Airway not difficult    General Information and Staff    Patient location during procedure: OR  CRNA: Jerry Cabrera CRNA    Indications and Patient Condition  Indications for airway management: airway protection    Preoxygenated: yes  Mask difficulty assessment: 2 - vent by mask + OA or adjuvant +/- NMBA    Final Airway Details  Final airway type: endotracheal airway      Successful airway: ETT  Cuffed: yes   Successful intubation technique: direct laryngoscopy  Endotracheal tube insertion site: oral  Blade: Chinchilla  Blade size: 2  ETT size (mm): 7.0  Cormack-Lehane Classification: grade IIa - partial view of glottis  Placement verified by: chest auscultation and capnometry   Measured from: lips  ETT/EBT  to lips (cm): 21  Number of attempts at approach: 1    Additional Comments  Atraumatic intubation

## 2020-05-19 NOTE — PROGRESS NOTES
NEUROSURGERY DAILY PROGRESS NOTE    ASSESSMENT:   Anne-Marie Foreman is a 64 y.o. with a significant medical history of A. fib, chronic anticoagulation on Coumadin and aspirin, CAD, hypertension, diabetes, hyperlipidemia, Fabry's disease, and obesity.  She presents today with a new problem of lower extremity numbness and weakness that began post fall on February 28, 2020.  Physical exam findings of increased LE tone, bilateral lower extremity weakness, left greater than right (LEFT: IP 2, quad 3, dorsiflex 4-, plantarflex 4+, EHL 2) (RIGHT: IP 5, quads 4-, dorsiflex 4-, plantarflex 4+, EHL 4+), absent bilateral LE reflexes, positive bilateral Babinski's, 2 beats of clonus on the left.  Their imaging shows no acute fractures or malalignment, small central disc protrusion at T7-8 resulting in central canal narrowing without significant stenosis, large disc protrusion and degenerative changes at T10-11 resulting in T2 signal change within the central cord and severe central canal stenosis.  MRI of the lumbar spine shows no acute fractures or malalignment, no bone marrow signal change, no significant STIR signal change, no T2 signal change within central cord, conus terminates at normal level, congenitally shortened pedicles and multilevel facet arthropathy and ligamentous flavum hypertrophy resulting in central canal and bilateral foraminal stenosis at L2-3, worse at L3-4 and L4-5.  Ultrasound of the bilateral lower extremities show no DVT or superficial thrombus.     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HCC)    • Hyperlipidemia    • Hypertension    • Left sided lacunar infarction (CMS/HCC)    • Obesity, Class II, BMI 35-39.9    • Systolic heart failure (CMS/HCC)    • Type 2 diabetes mellitus (CMS/HCC)      Active Hospital Problems    Diagnosis   • **Weakness of both lower  extremities   • Vitamin B 12 deficiency   • Essential hypertension   • Mild CAD   • Intervertebral thoracic disc disorder with myelopathy, thoracic region     Added automatically from request for surgery [id number]     • Chronic atrial fibrillation   • Morbid obesity due to excess calories (CMS/[facility])   • Systolic congestive heart failure (CMS/[facility])   • Type 2 diabetes mellitus with microalbuminuria, without long-term current use of insulin (CMS/[facility])   • Long term current use of anticoagulant therapy     HPI: Bilateral lower extremity weakness and numbness unchanged.  Complains of increased muscle spasms to the left lower extremity.  No acute events overnight    PLAN:   Neuro: Stable.  Unchanged   Plan for transpedicular T10-11 discectomy today per Dr. Diaz.   Continue neuro checks every 4 hours    Dr. Diaz discussed treatment options such as watchful waiting with close observation/ follow-up, as well as proceeding with an elective surgical intervention (transpedicular T10-11 discectomy).   The benefits and risk of continued conservative management versus minimally invasive hemilaminectomy/discectomy versus open laminectomy/discectomy versus fusion.  At 1-year 73% of patients have complete relief of leg pain and 63% have complete relief of back pain.  At 5-10 years the number was 62% for both with 86% of patients having some level of improvement.  Only 5% of patients qualified as having failed back surgery syndrome.   Dr. Diaz discussed and explained the procedure, as well as the surgical risk, benefits, and recovery time.  The risk include superficial wound infection (0.9-5%), deep infection (<1%), increased motor deficit (1-8%), unintended durotomy (0.3-13%), CSF fistula requiring operative repair 1:1,000, pseudomeningocele (0.7-2%), recurrent herniated disc (4% at 10 yrs), postoperative urinary retention, postoperative wound hematoma (<1%), positioning neuropathies (<1%), complex regional pain syndrome  "(<1%), damage to the great vessels (<<<1%).  Anne-Marie has completed an adequate trial of conservative therapy and still has significant radicular pain that correlates with MRI findings.  Ms. Foreman verbally acknowledged her understanding of the surgical benefits, as well and the potential surgical risks and complications.  After considering options, Ms. Foreman is requesting that we proceed with the elective procedure.     Hospitalist consulted.  Cleared for OR by Dr. Kc   Cardiology consulted.  Cleared for OR    CV: No acute issues.   Nuclear med stress test performed on 2/23/2020 at [facility]   Impression: There is no obvious infarct or ischemia, with a calculated ejection fraction of 31%.  Clinical correlation with cardiomyopathy.   Ultrasound bilateral lower extremities negative for DVT or superficial thrombophlebitis.  Pulm: No acute issues.  Maintaining O2 sat.  : [place] for strict I&O  FEN: NPO since midnight  GI: Prophylaxis  ID: No acute issues.  COVID swab obtained, results pending  Heme:  DVT prophylaxis, SCD's   Chronic anticoagulation on Coumadin for A. fib, NOW on Lovenox per Hospitalist; INR 1.38; IV vitamin K ordered  Pain: Tolerable at present  Dispo: OR today for transpedicular T10-11 discectomy   PT/OT to follow    CHIEF COMPLAINT:   Bilateral LE weakness and numbness    Subjective  Symptoms stable.    Temp:  [97.9 °F (36.6 °C)-98.5 °F (36.9 °C)] 98 °F (36.7 °C)  Heart Rate:  [69-88] 88  Resp:  [16-18] 18  BP: (112-150)/(49-75) 133/73    Objective:  General Appearance:  Well-appearing and in no acute distress.    Vital signs: (most recent): Blood pressure 133/73, pulse 88, temperature 98 °F (36.7 °C), temperature source Oral, resp. rate 18, height 165.1 cm (65\"), weight 103 kg (227 lb 11.2 oz), SpO2 95 %.        Neurologic Exam     Mental Status   Oriented to person, place, and time.   Attention: normal. Concentration: normal.   Speech: speech is normal   Level of consciousness: alert    Bright " and awake; O x 3; Follows commands without prompting.      Cranial Nerves     CN II   Visual fields full to confrontation.     CN III, IV, VI   Pupils are equal, round, and reactive to light.  Extraocular motions are normal.     CN V   Facial sensation intact.     CN VII   Facial expression full, symmetric.     CN VIII   CN VIII normal.     CN IX, X   CN IX normal.     CN XI   CN XI normal.     Motor Exam   Right arm tone: normal  Left arm tone: normal  Right arm pronator drift: absent  Left arm pronator drift: absent  Right leg tone: increased  Left leg tone: increased    Strength   Right deltoid: 5/5  Left deltoid: 5/5  Right biceps: 5/5  Left biceps: 5/5  Right triceps: 5/5  Left triceps: 5/5  Right wrist extension: 5/5  Left wrist extension: 5/5  Right iliopsoas: 5/5  Left iliopsoas: 2/5  Right quadriceps: 4/5  Left quadriceps: 3/5  Right anterior tibial: 4/5  Left anterior tibial: 4/5  Right posterior tibial: 4/5  Left posterior tibial: 4/5  Left EHL 2  Right EHL 4     Sensory Exam   Right arm light touch: normal  Left arm light touch: normal  Right leg light touch: decreased from knee  Left leg light touch: decreased from knee    Gait, Coordination, and Reflexes     Tremor   Resting tremor: absent  Intention tremor: absent  Action tremor: absent    Reflexes   Right : 4+  Left : 4+  Right plantar: upgoing  Left plantar: upgoing  Right Krueger: absent  Left Krueger: absent  Right ankle clonus: absent  Left ankle clonus: present  Right pendular knee jerk: absent  Left pendular knee jerk: absent    Drains:   External Urinary Catheter (Active)   Site Assessment Clean;Skin intact 5/18/2020  8:19 PM   Application/Removal external catheter changed;skin care provided 5/19/2020  4:05 AM   Collection Container Wall suction 5/18/2020  8:19 PM   Wall suction (mmHG) 45 mmHG 5/18/2020  6:39 PM   Securement Method Securing device 5/18/2020  8:19 PM   Output (mL) 900 mL 5/19/2020 12:24 AM     Imaging Results (Last 24  Hours)     ** No results found for the last 24 hours. **        Lab Results (last 24 hours)     Procedure Component Value Units Date/Time    POC Glucose Once [[id number]]  (Abnormal) Collected:  05/19/20 0851    Specimen:  Blood Updated:  05/19/20 0903     Glucose 187 mg/dL      Comment: : [id number] Moses Stewart ID: [id number]       SARS-CoV-2 PCR (COR,FAVIAN,PAD IN-HOUSE)(OR EMERGENT/ADD-ON) - Swab, Nasopharynx [[id number]]  (Normal) Collected:  05/19/20 0749    Specimen:  Swab from Nasopharynx Updated:  05/19/20 0853     COVID19 Not Detected    Basic Metabolic Panel [[id number]]  (Abnormal) Collected:  05/19/20 0318    Specimen:  Blood Updated:  05/19/20 0418     Glucose 187 mg/dL      BUN 13 mg/dL      Creatinine 0.35 mg/dL      Sodium 137 mmol/L      Potassium 3.9 mmol/L      Chloride 100 mmol/L      CO2 25.0 mmol/L      Calcium 8.9 mg/dL      eGFR Non African Amer >150 mL/min/1.73      BUN/Creatinine Ratio 37.1     Anion Gap 12.0 mmol/L     Narrative:       GFR Normal >60  Chronic Kidney Disease <60  Kidney Failure <15      Protime-INR [[id number]]  (Abnormal) Collected:  05/19/20 0318    Specimen:  Blood Updated:  05/19/20 0406     Protime 16.6 Seconds      INR 1.38    CBC (No Diff) [[id number]]  (Normal) Collected:  05/19/20 0318    Specimen:  Blood Updated:  05/19/20 0405     WBC 7.68 10*3/mm3      RBC 4.39 10*6/mm3      Hemoglobin 12.8 g/dL      Hematocrit 38.5 %      MCV 87.7 fL      MCH 29.2 pg      MCHC 33.2 g/dL      RDW 13.4 %      RDW-SD 43.4 fl      MPV 11.2 fL      Platelets 185 10*3/mm3     Vitamin D 25 Hydroxy [[id number]]  (Normal) Collected:  05/18/20 0502    Specimen:  Blood Updated:  05/18/20 2047     25 Hydroxy, Vitamin D 30.2 ng/ml     Narrative:       Reference Range for Total Vitamin D 25(OH)     Deficiency <20.0 ng/mL   Insufficiency 21-29 ng/mL   Sufficiency  ng/mL  Toxicity >100 ng/ml    Results may be falsely increased if patient taking Biotin.      POC Glucose Once [[id number]]   (Abnormal) Collected:  05/18/20 1959    Specimen:  Blood Updated:  05/18/20 2020     Glucose 212 mg/dL      Comment: : [id number] Rhonda KristaMeter ID: [id number]       POC Glucose Once [[id number]]  (Abnormal) Collected:  05/18/20 1709    Specimen:  Blood Updated:  05/18/20 1720     Glucose 207 mg/dL      Comment: : [id number] Sandy Izaguirre) CarolynMeter ID: [id number]       Immunofixation, Serum [[id number]]  (Abnormal) Collected:  05/15/20 1740    Specimen:  Blood Updated:  05/18/20 1208     Immunofixation Result, Serum Comment     Comment: No monoclonality detected.        IgG 971 mg/dL      IgA 396 mg/dL      IgM 53 mg/dL     Narrative:       Performed at:  [address]  [id number]  : Khalif Titus PhD, Phone:  [phone]    POC Glucose Once [[id number]]  (Abnormal) Collected:  05/18/20 1133    Specimen:  Blood Updated:  05/18/20 1145     Glucose 177 mg/dL      Comment: : [id number] Sandy (Edgardo) CarolynMeter ID: [id number]       Vitamin B1, Whole Blood [[id number]] Collected:  05/15/20 0900    Specimen:  Blood Updated:  05/18/20 1008     Vitamin B1, Whole Blood 151.6 nmol/L     Narrative:       Test(s) 121188-Vit. B1, Whole Blood  was developed and its performance characteristics determined  by [facility]. It has not been cleared or approved by the Food  and Drug Administration.  Performed at:  [address]  [id number]  : Luigi Vuong MD, Phone:  [phone]    PTH, Intact [[id number]]  (Normal) Collected:  05/18/20 0502    Specimen:  Blood Updated:  05/18/20 0939     PTH, Intact 15.9 pg/mL     Narrative:       Results may be falsely decreased if patient taking Biotin.          [id number]  Hal Lopez, APRN

## 2020-05-19 NOTE — PLAN OF CARE
Problem: Patient Care Overview  Goal: Plan of Care Review  Outcome: Ongoing (interventions implemented as appropriate)  Flowsheets (Taken 5/19/2020 0408)  Progress: no change  Plan of Care Reviewed With: patient  Outcome Summary: Pt still refuses to turn on her sides.  She will shift her weight some on her own.  coccyx/buttocks red but blanchable.  Waffle cushion under buttocks.  Incontinent large volume urine via primafit.  Primafit changed and bed change performed at 0400.  Pt c/o pain but refuses any meds.  NPO since midnight for possible surgery today.  No orders yet.  No neuro changes.  Muscle strength grading:  upper ext 5/5, left leg 2/5, right leg 3/5.  Difficult to gauge muscle strength in legs because pt does not want them touched much because it causes muscle spasms.

## 2020-05-19 NOTE — ANESTHESIA PREPROCEDURE EVALUATION
Anesthesia Evaluation     Patient summary reviewed and Nursing notes reviewed   no history of anesthetic complications:  NPO Solid Status: > 8 hours  NPO Liquid Status: > 8 hours           Airway   Mallampati: II  TM distance: >3 FB  Neck ROM: full  No difficulty expected  Dental      Pulmonary    (+) asthma (mild),  (-) not a smoker  Cardiovascular   Exercise tolerance: poor (<4 METS)    ECG reviewed  PT is on anticoagulation therapy  Patient on routine beta blocker and Beta blocker given within 24 hours of surgery    (+) hypertension, CAD, dysrhythmias Atrial Fib, CHF Systolic <55%, hyperlipidemia,     ROS comment: Low risk stress test 2/2020,   Intermediate risk per cardiology, ok to hold anticoagulation and proceed    Echo     Summary   Mild left ventricular enlargement with global hypokinesis and an abnormal   contraction pattern (suggestive of bundle branch block) with an estimated   ejection fraction of 25-30%   Abnormal rhythm precludes assessment of diastolic function   Moderately dilated left atrium   Mildly thickened tricuspid aortic valve leaflets with adequate cusp   separation and no significant stenosis or insufficiency   Severe mitral annular calcification (particularly posteriorly) that also   involves papillary muscles with moderately thickened mitral leaflets which   demonstrated reduced mobility, no significant stenosis and mild   regurgitation   Trace pulmonic insufficiency   Moderately dilated right atrium with normal right ventricular size and   systolic function   Mild tricuspid regurgitation with RVSP estimate of 28 mmHg   Normal IVC dimensions with reduced respiratory motion consistent with   elevated right atrial filling pressures of 11-15 mmHg   No significant pericardial effusion and aortic dimensions are within   normal limits   Definity contrast utilized to better define endocardial surface    Neuro/Psych  (+) 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 Presents for surgery today.   Abdominal    Substance History      OB/GYN          Other                        Anesthesia Plan    ASA 3     general     intravenous induction     Anesthetic plan, all risks, benefits, and alternatives have been provided, discussed and informed consent has been obtained with: patient.

## 2020-05-19 NOTE — PROGRESS NOTES
[facility] Medicine Services  INPATIENT PROGRESS NOTE    Patient Name: Anne-Marie Foreman  Date of Admission: 5/14/2020  Today's Date: 05/19/20  Length of Stay: 5  Primary Care Physician: Lorrie Wilhelm MD    Subjective   Chief Complaint: Follow-up lower extremity weakness    HPI   Patient seen and examined.  No new issues or complaints.  Remains with lower extremity weakness.  No current chest pain or shortness of breath.  No acute events overnight.        Review of Systems   All pertinent negatives and positives are as above. All other systems have been reviewed and are negative unless otherwise stated.     Objective    Temp:  [97.9 °F (36.6 °C)-98.5 °F (36.9 °C)] 98 °F (36.7 °C)  Heart Rate:  [69-88] 88  Resp:  [16-18] 18  BP: (112-150)/(49-75) 133/73  Physical Exam  GEN: Awake, alert, interactive, in NAD  HEENT: Atraumatic, PERRLA, EOMI, Anicteric, Trachea midline  Lungs: CTAB, no wheezing/rales/rhonchi  Heart: irreg/irreg, +S1/s2, no rub  ABD: soft, nt/nd, +BS, no guarding/rebound  Extremities: no cyanosis, b/l LE edema  Skin: no rashes or lesions  Neuro: AAOx3, CN intact, normal UE exam b/l        Results Review:  I have reviewed the labs, radiology results, and diagnostic studies.    Laboratory Data:   Results from last 7 days   Lab Units 05/19/20 0318 05/17/20  0602 05/15/20  0352   WBC 10*3/mm3 7.68 7.32 9.62   HEMOGLOBIN g/dL 12.8 13.0 12.3   HEMATOCRIT % 38.5 37.7 36.2   PLATELETS 10*3/mm3 185 186 177        Results from last 7 days   Lab Units 05/19/20 0318 05/18/20  0537 05/17/20  0602  05/15/20  0352 05/14/20 2056   SODIUM mmol/L 137 137 135*   < > 138 132*   POTASSIUM mmol/L 3.9 4.1 3.9   < > 3.9 4.6   CHLORIDE mmol/L 100 100 95*   < > 99 91*   CO2 mmol/L 25.0 27.0 26.0   < > 25.0 26.0   BUN mg/dL 13 14 19   < > 13 12   CREATININE mg/dL 0.35* 0.46* 0.47*   < > 0.50* 0.61   CALCIUM mg/dL 8.9 9.1 9.2   < > 9.1 9.4   BILIRUBIN mg/dL  --   --   --   --  0.4 0.3   ALK PHOS  U/L  --   --   --   --  43 55   ALT (SGPT) U/L  --   --   --   --  24 31   AST (SGOT) U/L  --   --   --   --  21 25   GLUCOSE mg/dL 187* 158* 159*   < > 129* 247*    < > = values in this interval not displayed.       Culture Data:   No results found for: BLOODCX, URINECX, WOUNDCX, MRSACX, RESPCX, STOOLCX    Radiology Data:   Imaging Results (Last 24 Hours)     ** No results found for the last 24 hours. **          I have reviewed the patient's current medications.     Assessment/Plan     Active Hospital Problems    Diagnosis   • **Weakness of both lower extremities   • Vitamin B 12 deficiency   • Essential hypertension   • Mild CAD   • Intervertebral thoracic disc disorder with myelopathy, thoracic region     Added automatically from request for surgery [id number]     • Chronic atrial fibrillation   • Morbid obesity due to excess calories (CMS/[facility])   • Systolic congestive heart failure (CMS/[facility])   • Type 2 diabetes mellitus with microalbuminuria, without long-term current use of insulin (CMS/[facility])   • Long term current use of anticoagulant therapy       #1 bilateral lower extremity weakness -has been having issues since February per report.  Does have a history of neuropathy in the setting of diabetes.  Does have B12 deficiency which is being replaced with 1000 micrograms daily injections.  MRI of the thoracic spine did have evidence for stenosis with some cord flattening and myelomalacia at t10, was seen by cardiology yesterday who have cleared for OR, neurosurgery had an opening in there schedule and plan to take her today. Will f/u post op recs. Continue supportive care.     #2 chronic A. Fib -continue digoxin and Toprol-XL.  INR was 2.65 on arrival but has fallen subtherapeutic.  As above holding further Coumadin due to OR needs.  Was started on Lovenox yesterday as plan was for OR in 2 days but will hold it now as well given OR plans have changed to today, last dose was given last evening, NSx aware. INR 1.38 which  they are also aware of.     #3 chronic systolic heart failure -with low EF of 25%.  No LifeVest.  Follows with cardiology at [facility].  Continue Toprol-XL, losartan, Lasix.  Not sure why patient is not on Aldactone but defer to her outpatient cardiologist.  Patient also appears to be on Coreg and Toprol and I am not sure why she is on dual therapy, was seen by cardiology recommended continuing current treatment however.    #4 DM 2 -on Levemir and sliding scale coverage.  Continue hypoglycemia protocol.    #5 history of CAD -no current issues with chest pain.  Recent negative stress in 2/20. On beta-blocker, aspirin, statin.    Discharge Planning: Ongoing pending OR and recommendations.  Initial patient plans were for home health but patient will likely need SNF placement based on therapy notes and current diagnosis, she understands this and is agreeable.  CM consulted.    Marty Kc DO   05/19/20   08:06

## 2020-05-19 NOTE — NURSING NOTE
Ronel in place from or.L radial- dressing D/I, brisk cap refill in hand, leveled, zeroed and flushed

## 2020-05-19 NOTE — PROGRESS NOTES
RT Nebulizer Protocol    Assessment tool to be used for patients with existing breathing treatments ordered by hospitalists                                                                  0  1  2  3  4      Respiratory History   No Smoking   X   Smoking History      1 Pack/Day      Pulmonary Disease      Exacerbation        Respiratory Rate   Normal   X   20-25      Dyspneic      Accessory Muscles      Severe Dyspnea        Breath Sounds   Clear   X   Crackles      Crackles/ Rhonchi      Wheezing      Absent/ Severe Wheezing        Chest   X-ray   Clear      1 Lobe Infiltration/ Consolidation/ PE      2 Lobe Same Lung Infiltration/ Consolidation/ PE       2 Lobe Infiltration/ Both Lungs/ Consolidation/ PE      Both Lungs/ More Than 1 Lobe/ Atelectasis/ Consolidation/  PE        Cough   Strong Non- Productive   X   Excessive Secretions/ Strong Cough      Excessive Secretions/ Weak Cough      Thick Bronchial Secretions/ Weak Cough      Thick Bronchial Secretions/ No Cough        Total Patient Score =  0  0-4=Q4 PRN  5-9=TID and Q4 PRN  10-14=QID and Q3 PRN  15-19=Q4 and Q2 PRN  20=Q3 and Q2 PRN    Bronchopulmonary Hygiene (CPT)   Q4 ATC Copious secretions, dyspnea, unable to sleep, mucus plug    QID & Q4 PRN Moderate secretions    TID Small amounts of secretions w/ poor cough and history of secretions    BID Unable to deep breathe and cough spontaneously       Lung Expansion Therapy (PEP)   Q4 & PRN at night Severe atelectasis, poor oxygenation    QID  High risk for persistent atelectasis, existence of same    TID At risk for developing atelectasis    BID Unable to deep breathe and cough spontaneously     Instruct, 1 follow up Patients able to perform well on their own      THERE IS NO CURRENT CXR. PT DOES TAKE AN ALBUTEROL MDI Q6PRN. WILL ORDER ALBUTEROL 0.5MG Q4PRN

## 2020-05-19 NOTE — NURSING NOTE
Dr Diaz @ bedside, Patient able to  weakly and wiggle toes weakly on left side. Unable to  or wiggle toes on the left. Gross motor movement w painful stimuli present in r arm. Positive babinski in R leg.

## 2020-05-19 NOTE — PROGRESS NOTES
Continued Stay Note  [facility]     Patient Name: Anne-Marie Foreman  MRN: [id number]  Today's Date: 5/19/2020    Admit Date: 5/14/2020    Discharge Plan     Row Name 05/19/20 0901       Plan    Plan  SNF    Provided Post Acute Provider List?  Yes    Post Acute Provider List  Inpatient Rehab    Delivered To  Patient    Method of Delivery  Telephone    Patient/Family in Agreement with Plan  yes    Plan Comments  PT requires rehab upon dc. PT is adamant that she does not want to go to SNF but is aware that this will be the only setting she is able to go for inpatient rehab. PT has selected [facility] from provider list. PT has been listed, will await bed offer or denial.         Discharge Codes    No documentation.             SALOMON Trujillo

## 2020-05-20 ENCOUNTER — APPOINTMENT (OUTPATIENT)
Dept: CT IMAGING | Facility: HOSPITAL | Age: 65
End: 2020-05-20

## 2020-05-20 LAB
ANION GAP SERPL CALCULATED.3IONS-SCNC: 10 MMOL/L (ref 5–15)
BH BB BLOOD EXPIRATION DATE: NORMAL
BH BB BLOOD EXPIRATION DATE: NORMAL
BH BB BLOOD TYPE BARCODE: 6200
BH BB BLOOD TYPE BARCODE: 6200
BH BB DISPENSE STATUS: NORMAL
BH BB DISPENSE STATUS: NORMAL
BH BB PRODUCT CODE: NORMAL
BH BB PRODUCT CODE: NORMAL
BH BB UNIT NUMBER: NORMAL
BH BB UNIT NUMBER: NORMAL
BUN BLD-MCNC: 11 MG/DL (ref 8–23)
BUN/CREAT SERPL: 33.3 (ref 7–25)
CALCIUM SPEC-SCNC: 8.6 MG/DL (ref 8.6–10.5)
CHLORIDE SERPL-SCNC: 100 MMOL/L (ref 98–107)
CO2 SERPL-SCNC: 27 MMOL/L (ref 22–29)
CREAT BLD-MCNC: 0.33 MG/DL (ref 0.57–1)
DEPRECATED RDW RBC AUTO: 43.8 FL (ref 37–54)
ERYTHROCYTE [DISTWIDTH] IN BLOOD BY AUTOMATED COUNT: 13.5 % (ref 12.3–15.4)
GFR SERPL CREATININE-BSD FRML MDRD: >150 ML/MIN/1.73
GLUCOSE BLD-MCNC: 151 MG/DL (ref 65–99)
GLUCOSE BLDC GLUCOMTR-MCNC: 139 MG/DL (ref 70–130)
GLUCOSE BLDC GLUCOMTR-MCNC: 213 MG/DL (ref 70–130)
GLUCOSE BLDC GLUCOMTR-MCNC: 247 MG/DL (ref 70–130)
HCT VFR BLD AUTO: 31.4 % (ref 34–46.6)
HGB BLD-MCNC: 10.4 G/DL (ref 12–15.9)
INR PPP: 1.18 (ref 0.91–1.09)
MCH RBC QN AUTO: 29.5 PG (ref 26.6–33)
MCHC RBC AUTO-ENTMCNC: 33.1 G/DL (ref 31.5–35.7)
MCV RBC AUTO: 89.2 FL (ref 79–97)
PLATELET # BLD AUTO: 164 10*3/MM3 (ref 140–450)
PMV BLD AUTO: 11.1 FL (ref 6–12)
POTASSIUM BLD-SCNC: 4.4 MMOL/L (ref 3.5–5.2)
PROTHROMBIN TIME: 14.6 SECONDS (ref 11.9–14.6)
RBC # BLD AUTO: 3.52 10*6/MM3 (ref 3.77–5.28)
SODIUM BLD-SCNC: 137 MMOL/L (ref 136–145)
UNIT  ABO: NORMAL
UNIT  ABO: NORMAL
UNIT  RH: NORMAL
UNIT  RH: NORMAL
WBC NRBC COR # BLD: 9.45 10*3/MM3 (ref 3.4–10.8)

## 2020-05-20 PROCEDURE — 97530 THERAPEUTIC ACTIVITIES: CPT | Performed by: PHYSICAL THERAPIST

## 2020-05-20 PROCEDURE — 82962 GLUCOSE BLOOD TEST: CPT

## 2020-05-20 PROCEDURE — 99024 POSTOP FOLLOW-UP VISIT: CPT | Performed by: NURSE PRACTITIONER

## 2020-05-20 PROCEDURE — 85610 PROTHROMBIN TIME: CPT | Performed by: NEUROLOGICAL SURGERY

## 2020-05-20 PROCEDURE — 97535 SELF CARE MNGMENT TRAINING: CPT

## 2020-05-20 PROCEDURE — 80048 BASIC METABOLIC PNL TOTAL CA: CPT | Performed by: NEUROLOGICAL SURGERY

## 2020-05-20 PROCEDURE — 25010000002 VANCOMYCIN 10 G RECONSTITUTED SOLUTION: Performed by: NURSE PRACTITIONER

## 2020-05-20 PROCEDURE — 99232 SBSQ HOSP IP/OBS MODERATE 35: CPT | Performed by: PSYCHIATRY & NEUROLOGY

## 2020-05-20 PROCEDURE — 25010000002 HEPARIN (PORCINE) PER 1000 UNITS: Performed by: NEUROLOGICAL SURGERY

## 2020-05-20 PROCEDURE — 85027 COMPLETE CBC AUTOMATED: CPT | Performed by: NURSE PRACTITIONER

## 2020-05-20 PROCEDURE — 94799 UNLISTED PULMONARY SVC/PX: CPT

## 2020-05-20 PROCEDURE — 97168 OT RE-EVAL EST PLAN CARE: CPT

## 2020-05-20 PROCEDURE — 93005 ELECTROCARDIOGRAM TRACING: CPT | Performed by: FAMILY MEDICINE

## 2020-05-20 PROCEDURE — 63710000001 INSULIN LISPRO (HUMAN) PER 5 UNITS: Performed by: NEUROLOGICAL SURGERY

## 2020-05-20 PROCEDURE — 97164 PT RE-EVAL EST PLAN CARE: CPT | Performed by: PHYSICAL THERAPIST

## 2020-05-20 PROCEDURE — 72128 CT CHEST SPINE W/O DYE: CPT

## 2020-05-20 PROCEDURE — 63710000001 INSULIN DETEMIR PER 5 UNITS: Performed by: NEUROLOGICAL SURGERY

## 2020-05-20 PROCEDURE — 93010 ELECTROCARDIOGRAM REPORT: CPT | Performed by: INTERNAL MEDICINE

## 2020-05-20 RX ORDER — FAMOTIDINE 20 MG/1
20 TABLET, FILM COATED ORAL
Status: DISCONTINUED | OUTPATIENT
Start: 2020-05-20 | End: 2020-05-22 | Stop reason: HOSPADM

## 2020-05-20 RX ORDER — AMOXICILLIN 250 MG
2 CAPSULE ORAL NIGHTLY
Status: DISCONTINUED | OUTPATIENT
Start: 2020-05-20 | End: 2020-05-22 | Stop reason: HOSPADM

## 2020-05-20 RX ORDER — CALCIUM CARBONATE 200(500)MG
2 TABLET,CHEWABLE ORAL 3 TIMES DAILY PRN
Status: DISCONTINUED | OUTPATIENT
Start: 2020-05-20 | End: 2020-05-20

## 2020-05-20 RX ORDER — CALCIUM CARBONATE 200(500)MG
2 TABLET,CHEWABLE ORAL 4 TIMES DAILY PRN
Status: DISCONTINUED | OUTPATIENT
Start: 2020-05-20 | End: 2020-05-22 | Stop reason: HOSPADM

## 2020-05-20 RX ADMIN — FLUTICASONE PROPIONATE 2 SPRAY: 50 SPRAY, METERED NASAL at 09:11

## 2020-05-20 RX ADMIN — INSULIN LISPRO 4 UNITS: 100 INJECTION, SOLUTION INTRAVENOUS; SUBCUTANEOUS at 12:15

## 2020-05-20 RX ADMIN — DIGOXIN 250 MCG: 250 TABLET ORAL at 12:15

## 2020-05-20 RX ADMIN — OXYCODONE HYDROCHLORIDE AND ACETAMINOPHEN 1 TABLET: 7.5; 325 TABLET ORAL at 12:35

## 2020-05-20 RX ADMIN — MUPIROCIN: 20 OINTMENT TOPICAL at 09:10

## 2020-05-20 RX ADMIN — FUROSEMIDE 20 MG: 20 TABLET ORAL at 09:11

## 2020-05-20 RX ADMIN — CALCIUM CARBONATE 2 TABLET: 500 TABLET, CHEWABLE ORAL at 14:41

## 2020-05-20 RX ADMIN — GLIPIZIDE 10 MG: 10 TABLET ORAL at 09:12

## 2020-05-20 RX ADMIN — SODIUM CHLORIDE, PRESERVATIVE FREE 10 ML: 5 INJECTION INTRAVENOUS at 21:33

## 2020-05-20 RX ADMIN — HEPARIN SODIUM 5000 UNITS: 5000 INJECTION, SOLUTION INTRAVENOUS; SUBCUTANEOUS at 09:11

## 2020-05-20 RX ADMIN — DOCUSATE SODIUM 100 MG: 100 CAPSULE ORAL at 21:33

## 2020-05-20 RX ADMIN — DOCUSATE SODIUM 100 MG: 100 CAPSULE ORAL at 09:11

## 2020-05-20 RX ADMIN — OXYCODONE HYDROCHLORIDE AND ACETAMINOPHEN 1 TABLET: 7.5; 325 TABLET ORAL at 06:15

## 2020-05-20 RX ADMIN — INSULIN DETEMIR 15 UNITS: 100 INJECTION, SOLUTION SUBCUTANEOUS at 22:00

## 2020-05-20 RX ADMIN — CARVEDILOL 12.5 MG: 6.25 TABLET, FILM COATED ORAL at 09:12

## 2020-05-20 RX ADMIN — CARVEDILOL 12.5 MG: 6.25 TABLET, FILM COATED ORAL at 17:25

## 2020-05-20 RX ADMIN — OXYCODONE HYDROCHLORIDE AND ACETAMINOPHEN 1 TABLET: 10; 325 TABLET ORAL at 21:55

## 2020-05-20 RX ADMIN — ATORVASTATIN CALCIUM 80 MG: 40 TABLET, FILM COATED ORAL at 21:33

## 2020-05-20 RX ADMIN — INSULIN LISPRO 4 UNITS: 100 INJECTION, SOLUTION INTRAVENOUS; SUBCUTANEOUS at 17:25

## 2020-05-20 RX ADMIN — MUPIROCIN: 20 OINTMENT TOPICAL at 21:33

## 2020-05-20 RX ADMIN — METOPROLOL SUCCINATE 50 MG: 50 TABLET, EXTENDED RELEASE ORAL at 09:11

## 2020-05-20 RX ADMIN — LEVOTHYROXINE SODIUM 125 MCG: 125 TABLET ORAL at 06:12

## 2020-05-20 RX ADMIN — DOCUSATE SODIUM 50 MG AND SENNOSIDES 8.6 MG 2 TABLET: 8.6; 5 TABLET, FILM COATED ORAL at 21:55

## 2020-05-20 RX ADMIN — VITAMIN D 1000 UNITS: 25 TAB ORAL at 09:35

## 2020-05-20 RX ADMIN — VANCOMYCIN HYDROCHLORIDE 1500 MG: 10 INJECTION, POWDER, LYOPHILIZED, FOR SOLUTION INTRAVENOUS at 09:11

## 2020-05-20 RX ADMIN — LOSARTAN POTASSIUM 25 MG: 25 TABLET, FILM COATED ORAL at 12:15

## 2020-05-20 RX ADMIN — OXYCODONE HYDROCHLORIDE AND ACETAMINOPHEN 1 TABLET: 10; 325 TABLET ORAL at 02:02

## 2020-05-20 RX ADMIN — CALCIUM CARBONATE 2 TABLET: 500 TABLET, CHEWABLE ORAL at 19:43

## 2020-05-20 RX ADMIN — FAMOTIDINE 20 MG: 20 TABLET, FILM COATED ORAL at 21:33

## 2020-05-20 RX ADMIN — HEPARIN SODIUM 5000 UNITS: 5000 INJECTION, SOLUTION INTRAVENOUS; SUBCUTANEOUS at 21:32

## 2020-05-20 NOTE — PROGRESS NOTES
Neurology Progress Note      Chief Complaint:  F/u weakness and inability to walk and low back pain    Subjective     Subjective:  Sitting up in bed. S/p discectomy, laminectomy T10/11. Complaints of pain 4/10.     Medications:  Current Facility-Administered Medications   Medication Dose Route Frequency Provider Last Rate Last Dose   • acetaminophen (TYLENOL) tablet 650 mg  650 mg Oral Q4H PRN Willy Diaz MD   650 mg at 05/17/20 1151    Or   • acetaminophen (TYLENOL) 160 MG/5ML solution 650 mg  650 mg Oral Q4H PRN Willy Diaz MD        Or   • acetaminophen (TYLENOL) suppository 650 mg  650 mg Rectal Q4H PRN Willy Diaz MD       • albuterol (PROVENTIL) nebulizer solution 0.083% 2.5 mg/3mL  2.5 mg Nebulization Q4H PRN Willy Diaz MD       • atorvastatin (LIPITOR) tablet 80 mg  80 mg Oral Nightly Willy Diaz MD   80 mg at 05/19/20 2117   • calcium carbonate (TUMS) chewable tablet 500 mg (200 mg elemental)  2 tablet Oral 4x Daily PRN Raudel Grigsby MD   2 tablet at 05/20/20 1943   • carvedilol (COREG) tablet 12.5 mg  12.5 mg Oral BID With Meals Willy Diaz MD   12.5 mg at 05/20/20 1725   • cholecalciferol (VITAMIN D3) tablet 1,000 Units  1,000 Units Oral Daily Willy Diaz MD   1,000 Units at 05/20/20 0935   • dextrose (D50W) 25 g/ 50mL Intravenous Solution 25 g  25 g Intravenous Q15 Min PRN Willy Diaz MD       • dextrose (GLUTOSE) oral gel 15 g  15 g Oral Q15 Min PRN iWlly Diaz MD       • digoxin (LANOXIN) tablet 250 mcg  250 mcg Oral Daily Willy Diaz MD   250 mcg at 05/20/20 1215   • docusate sodium (COLACE) capsule 100 mg  100 mg Oral BID Willy Diaz MD   100 mg at 05/20/20 0911   • famotidine (PEPCID) tablet 20 mg  20 mg Oral BID AC Raudel Grigsby MD       • fluticasone (FLONASE) 50 MCG/ACT nasal spray 2 spray  2 spray Each Nare Daily Willy Diaz MD    2 spray at 05/20/20 0911   • furosemide (LASIX) tablet 20 mg  20 mg Oral Daily Willy Diaz MD   20 mg at 05/20/20 0911   • glipizide (GLUCOTROL) tablet 10 mg  10 mg Oral QAM Willy Diaz MD   10 mg at 05/20/20 0912   • glucagon (human recombinant) (GLUCAGEN DIAGNOSTIC) injection 1 mg  1 mg Subcutaneous Q15 Min PRN Willy Diaz MD       • heparin (porcine) 5000 UNIT/ML injection 5,000 Units  5,000 Units Subcutaneous Q12H Willy Diaz MD   5,000 Units at 05/20/20 0911   • insulin detemir (LEVEMIR) injection 15 Units  15 Units Subcutaneous Nightly Willy Diaz MD   15 Units at 05/19/20 2121   • insulin lispro (humaLOG) injection 2-9 Units  2-9 Units Subcutaneous TID AC Willy Diaz MD   4 Units at 05/20/20 1725   • levothyroxine (SYNTHROID, LEVOTHROID) tablet 125 mcg  125 mcg Oral Q AM Willy Diaz MD   125 mcg at 05/20/20 0612   • losartan (COZAAR) tablet 25 mg  25 mg Oral Q24H Willy Diaz MD   25 mg at 05/20/20 1215   • metoprolol succinate XL (TOPROL-XL) 24 hr tablet 50 mg  50 mg Oral Daily Willy iDaz MD   50 mg at 05/20/20 0911   • mupirocin (BACTROBAN) 2 % ointment   Topical Q12H Willy Diaz MD       • ondansetron (ZOFRAN) tablet 4 mg  4 mg Oral Q6H PRN Willy Diaz MD        Or   • ondansetron (ZOFRAN) injection 4 mg  4 mg Intravenous Q6H PRN Willy Diaz MD       • oxyCODONE-acetaminophen (PERCOCET)  MG per tablet 1 tablet  1 tablet Oral Q4H PRN Willy Diaz MD   1 tablet at 05/20/20 0202   • oxyCODONE-acetaminophen (PERCOCET) 7.5-325 MG per tablet 1 tablet  1 tablet Oral Q4H PRN Willy Diaz MD   1 tablet at 05/20/20 1235   • sennosides-docusate (PERICOLACE) 8.6-50 MG per tablet 2 tablet  2 tablet Oral Nightly Willy Diaz MD   2 tablet at 05/19/20 0861   • sodium chloride 0.9 % flush 10 mL  10 mL Intravenous PRN Willy Diaz,  MD       • tiZANidine (ZANAFLEX) tablet 4 mg  4 mg Oral Q8H PRN Willy Diaz MD   4 mg at 05/19/20 0592       Review of Systems:   -A 14 point review of systems is completed and is negative except for bilateral lower extremity weakness and post surgical pain.       Objective      Vital Signs  Temp:  [97.7 °F (36.5 °C)-99.1 °F (37.3 °C)] 98.5 °F (36.9 °C)  Heart Rate:  [74-91] 87  Resp:  [16-20] 20  BP: (108-135)/(53-63) 108/53    Physical Exam:  HEENT:  Neck supple  CVS:  Regular rate and rhythm.  No murmurs  Carotid Examination:  No bruits  Lungs:  Clear to auscultation  Abdomen:  Non-tender, Non-distended  Extremities:  No signs of peripheral edema     Neurologic Exam:     -Somnolent but awakens easily  -No word finding difficulties  -No aphasia  -No dysarthria  -Follows simple and complex commands     Cranial nerves II through XII intact.  Opens eyes, tracks  EOMI  No facial sensory or facial motor asymmetry  Hearing intact to voice   Shoulder shrug symmetric  Motor: (strength out of 5:  1= minimal movement, 2 = movement in plane of gravity, 3 = movement against gravity, 4 = movement against some resistance, 5 = full strength)     -Right Upper Ext: Proximal: 5 Distal: 5  -Left Upper Ext: Proximal: 5   Distal: 5     -Right Lower Ext: Proximal: 5 Distal: 5  -Left Lower Ext: Proximal: 5   Distal: 5--she does not move her left leg spontaneously as much but there now appears to be full strength     DTR:  2+ throughout in bilateral upper extremities and areflexic bilateral lower extremities.   Upgoing toes but also withdrawal     Sensory:  She still reports decrease  light touch, pinprick below the umbilicus     Coordination/Gait:  -No ataxia  Normal finger to nose  - gait not attempted for safety reasons.        Results Review:    I reviewed the patient's new clinical results.    Results from last 7 days   Lab Units 05/20/20  0500 05/19/20  0318 05/17/20  0602   WBC 10*3/mm3 9.45 7.68 7.32   HEMOGLOBIN g/dL  10.4* 12.8 13.0   HEMATOCRIT % 31.4* 38.5 37.7   PLATELETS 10*3/mm3 164 185 186        Results from last 7 days   Lab Units 05/20/20  0500 05/19/20  1345 05/19/20  1050 05/19/20  0318 05/18/20  0537  05/15/20  0352 05/14/20 2056   SODIUM mmol/L 137  --   --  137 137   < > 138 132*   SODIUM, ARTERIAL mmol/L  --  141 139  --   --   --   --   --    POTASSIUM mmol/L 4.4  --   --  3.9 4.1   < > 3.9 4.6   CHLORIDE mmol/L 100  --   --  100 100   < > 99 91*   CO2 mmol/L 27.0  --   --  25.0 27.0   < > 25.0 26.0   BUN mg/dL 11  --   --  13 14   < > 13 12   CREATININE mg/dL 0.33*  --   --  0.35* 0.46*   < > 0.50* 0.61   CALCIUM mg/dL 8.6  --   --  8.9 9.1   < > 9.1 9.4   BILIRUBIN mg/dL  --   --   --   --   --   --  0.4 0.3   ALK PHOS U/L  --   --   --   --   --   --  43 55   ALT (SGPT) U/L  --   --   --   --   --   --  24 31   AST (SGOT) U/L  --   --   --   --   --   --  21 25   GLUCOSE mg/dL 151*  --   --  187* 158*   < > 129* 247*    < > = values in this interval not displayed.        Lab Results   Component Value Date    PHOS 3.5 05/14/2020    MG 1.8 05/14/2020    PROTIME 14.6 05/20/2020    INR 1.18 (H) 05/20/2020     No components found for: POCGLUC  No components found for: A1C  Lab Results   Component Value Date    HDL 34 (L) 05/15/2020    LDL 63 05/15/2020     No components found for: B12  Lab Results   Component Value Date    TSH 3.910 05/15/2020       Assessment/Plan     Hospital Problem List      Weakness of both lower extremities    Chronic atrial fibrillation    Essential hypertension    Long term current use of anticoagulant therapy    Mild CAD    Morbid obesity due to excess calories (CMS/HCC)    Systolic congestive heart failure (CMS/HCC)    Type 2 diabetes mellitus with microalbuminuria, without long-term current use of insulin (CMS/HCC)    Vitamin B 12 deficiency    Intervertebral thoracic disc disorder with myelopathy, thoracic region    Impression:  1. Lumbar stenosis  2. Thoracic T10.11 stenosis  3. Her  repeat NCS was performed due to edema and potential false + peripheral neuropathy and in fact she did not have neuropathy but did have Needle electrode exam + for bilateral S1 denervation--please see repeated exam under media  4. B12 deficiency--severe < 150  5. A fib  6/ C/o muscle spasms and on PRN tizanidine    Plan:  1. Surgical intervention 5/20/2020 on T10-11 stenosis.  2. Replace B12 1000 mcg IM for 5 days, first dose 5/16/2020/  3. Patient was not able to tolerate MRI CS on 5/17/20 and may need to have this completed in the future if she is willing.   4. Serum KAREN shows normal IgG, IgM. IgA slightly elevated 396.   5. OT/PT  6. Patient chronic Afib with chronic warfarin and will need to be resumed when cleared surgically.  7. Neurology will sign off. Please re consult if needed. F/u in 4 weeks and then likely PRN.       Francisca Manrique MD

## 2020-05-20 NOTE — PROGRESS NOTES
RT Nebulizer Protocol    Assessment tool to be used for patients with existing breathing treatments ordered by hospitalists                                                                  0  1  2  3  4      Respiratory History   No Smoking      Smoking History    1 Pack/Day    Pulmonary Disease X   Exacerbation      Respiratory Rate   Normal X   20-25    Dyspneic    Accessory Muscles    Severe Dyspnea      Breath Sounds   Clear X   Crackles    Crackles/ Rhonchi    Wheezing    Absent/ Severe Wheezing      Chest   X-ray   Clear/no current chest xray  X   1 Lobe Infiltration/ Consolidation/ PE    2 Lobe Same Lung Infiltration/ Consolidation/ PE     2 Lobe Infiltration/ Both Lungs/ Consolidation/ PE    Both Lungs/ More Than 1 Lobe/ Atelectasis/ Consolidation/  PE      Cough   Strong Non- Productive X   Excessive Secretions/ Strong Cough    Excessive Secretions/ Weak Cough    Thick Bronchial Secretions/ Weak Cough    Thick Bronchial Secretions/ No Cough      Total Patient Score = 3  0-4=Q4 PRN  5-9=TID and Q4 PRN  10-14=QID and Q3 PRN  15-19=Q4 and Q2 PRN  20=Q3 and Q2 PRN    Bronchopulmonary Hygiene (CPT)   Q4 ATC Copious secretions, dyspnea, unable to sleep, mucus plug    QID & Q4 PRN Moderate secretions    TID Small amounts of secretions w/ poor cough and history of secretions    BID Unable to deep breathe and cough spontaneously       Lung Expansion Therapy (PEP)   Q4 & PRN at night Severe atelectasis, poor oxygenation    QID  High risk for persistent atelectasis, existence of same    TID At risk for developing atelectasis    BID Unable to deep breathe and cough spontaneously     Instruct, 1 follow up Patients able to perform well on their own          Pt has history of asthma. No current chest xray

## 2020-05-20 NOTE — PLAN OF CARE
Problem: Patient Care Overview  Goal: Plan of Care Review  Outcome: Ongoing (interventions implemented as appropriate)  Flowsheets (Taken 5/20/2020 0572)  Progress: improving  Plan of Care Reviewed With: patient  Outcome Summary: Movement of legs has improved.  Both are still weak, but movement is better.  Pt has been very labile emotionally.  Has been drowsy, with slurred speech at times.  Pt has rated pain 5-10/10 when awake.  Percocet given twice and zanaflex once.  SCD removed from left leg per discussion with Dr. Diaz at bedside.  Pt still refuses to turn on her sides and stay that way.  Will shift her buttocks.  Buttocks red/blanchable.  A-fib 65-85bpm with PVCs per telemetry.  O2 WNL on RA.  2 DEENA drains in place with bloody drainage.

## 2020-05-20 NOTE — PROGRESS NOTES
Continued Stay Note   Felice     Patient Name: Anne-Marie Foreman  MRN: [id number]  Today's Date: 5/20/2020    Admit Date: 5/14/2020    Discharge Plan     Row Name 05/20/20 1557       Plan    Plan Comments  PT has a bed offer with [facility] if PT continues to be willing to go. She will have to have a COVID test within 48 hours of dc to SNF. [facility] [phone].        Discharge Codes    No documentation.             SALOMON Trujillo

## 2020-05-20 NOTE — PLAN OF CARE
Patient gone for surgery most of the day, drowsy when back to floor before shift change, SCDs on, IVF going, ovalles at bedside, arouses but falls to sleep easily, moans when turning but not able to number pain, 2 DEENA drains noted and dressing to back is intact. VSS, will continue to monitor and notify MD of any changes

## 2020-05-20 NOTE — PLAN OF CARE
Problem: Patient Care Overview  Goal: Plan of Care Review  Outcome: Ongoing (interventions implemented as appropriate)  Flowsheets (Taken 5/20/2020 1105)  Outcome Summary: PT re-evaluation completed s/p thoracic discectomy T10/11 due to spinal cord compression. The patient presents with improved movement of B LEs today and improved light touch sensation. She is still very fearful of falling which limits her effort when going to stand. She was able to clear her bottom paritally from the bed today. She will benefit from skilled PT services to work on strengthening, balance, and transfers.  Recommend SNF or inpt rehab upon discharge.

## 2020-05-20 NOTE — OP NOTE
NEUROSURGERY OPERATIVE NOTE    Anne-Marie Foreman  OR Date: 5/19/2020    Pre-op Diagnosis:   Intervertebral thoracic disc disorder with myelopathy, thoracic region [M51.04]    Post-op Diagnosis:     Post-Op Diagnosis Codes:     * Intervertebral thoracic disc disorder with myelopathy, thoracic region [M51.04]          Surgeon(s):  Willy Diaz MD    Anesthesia: General    Staff:   Circulator: Reji Snyder RN; Lovely Delgado RN; Pauline Barba RN; Bernard Schwarz RN  Scrub Person: Sarah Jensen; Hallie Whitley; Patty Markham  Assistant: Radha Beck; Jazz Hartman; Janessa Ramsey    Procedure(s):  Arthrodesis   1. 22630 - Arthrodesis, posterior interbody technique, including laminectomy and/or discectomy to prepare interspace, single interspace; thoracic T10/11    Decompression  2.  Lateral extracavitary approach to the spinal cord  3. Partial corpectomy of T10 and T11  4.  63047 - Discectomy laminectomy, facetectomy and foraminotomy (unilateral or bilateral) single level, lumbar    Intervertebral Cage  None    Posterior Instrumentation  None    Application of Bone Graft  5.  20930 -allograft, morselized, or placement of osteopenic mode of material  6.  20931 -allograft, structural  7. 20936 -autograft, local    8. 13901 - stereotactic computer assisted procedure; spinal  9. Use of intraoperative neuromonitoring.     INDICATIONS FOR PROCEDURE:   Anne-Marie Foreman is a 64 y.o. with a significant medical history of A. fib, chronic anticoagulation on Coumadin and aspirin, CAD, hypertension, diabetes, hyperlipidemia, Fabry's disease, and obesity.  She presents today with a new problem of lower extremity numbness and weakness that began post fall on February 28, 2020.  Physical exam findings of increased LE tone, bilateral lower extremity weakness, left greater than right (LEFT: IP 2, quad 3, dorsiflex 4-, plantarflex 4+, EHL 2) (RIGHT: IP 5, quads 4-, dorsiflex 4-, plantarflex 4+, EHL  4+), absent bilateral LE reflexes, positive bilateral Babinski's, 2 beats of clonus on the left.  Their imaging shows a large disc protrusion and degenerative changes at T10-11 resulting in T2 signal change within the central cord and severe central canal stenosis.  MRI of the lumbar spine shows congenitally shortened pedicles and degenerative changes that result in moderate stenosis at L2-3, worse at L3-4 and L4-5.      The risks and benefits of the procedure were discussed. The patient accepted and understood all potential risks and complications including infection, CSF leak, failure of hardware, hematoma, damage to nerve roots or spinal cord, bowel or bladder incontinence, difficulty walking or weakness.  After considering options, the patient requested that we proceed with the procedure.    DESCRIPTION OF OPERATION AND FINDINGS:   On the day of surgery, the patient was brought to the preoperative holding area where IV access was obtained. Prophylactic intravenous antibiotics were administered. The patient was then brought to the major operative suite.     While on the [facility], the patient underwent an uneventful induction of general anesthetic with placement of endotracheal tube. TEDs and SCD hoses were applied.  SSEP and EMG monitoring leads were placed. The patient was then placed in prone position on a Johnny table.  All pressure points were inspected and appropriately padded, and the patient was secured to the table.  Post position monitoring was stable and showed no motor response in the lower extremities and very weak sensory signals in the right lower extremity consistent with her exam.      The back was sterilely prepped with alcohol.  With the patient's relevant imaging dominantly displayed, lateral fluoroscopy was brought into the field and used to approximate T10/11. A CT scan was performed with the O-arm and the disc was found to be calcified.  Therefore we elected to proceed with a lateral  extracavitary approach.  A hockeystick  Incision was planned over the thoricolumbar spine approximately 10 cm long.  DuraPrep was then applied and allowed to dry for 5 minutes, and the patient was draped in the usual fashion.  At this point a WHO surgical timeout was performed with all members of the surgical team.      Lateral Extracavitary Approach  The skin was infiltrated with quarter percent Marcaine with epinephrine.  Skin was incised with a #10 scalpel.  Bovie was then used to clear away soft tissue and cut through the lumbodorsal fascia.  At the midpoint the incision was dog legged to the right.        We began by exposing the midline anatomy.  Self-retaining retractors were placed and subsequently readjusted as needed. Standard subperiosteal dissection was then carried out to expose the posterior and posterolateral elements from T9 to T11 on the left.   The right muscularture was left as intact as possible.  with lateral exposure carried out to the lateral aspect of the transverse processes and the rib heads.    Next the lattisumus dorsi muscle was reflected laterally.  The tendenoius inferior attachment was transected to facilitate lateral translation.  This was done until I was able to palpate the  Rib heads 6 cm off of midline.  Next I methodically worked from a lateral to medial position dissecting the posterior serratus off of the T10 and T11 ribs and undermining the erector spinae muscles until a penrose drain could be placed under the erector spinae muscle to elevate it.  This allowed for direct lateral visualization of the spine.      Naviagation  A Stealth Frame was then attached to the T9 spinous process and O-Arm was brought into the field.  CT image was obtained and used to comfirm the operative levels.  The O-Arm was then broken and the patient unswaddled.  Accuracy of the frame-less stereotaxis was made by localization of a Stealth frame and spinous processes.     Hemilaminectomy, Facetectomy,  and Foraminotomy  Using a matchstick joana, the left T10 lamina was removed beginning at the laminofacet junction.  Next the left T10 inferior facet and then left T11 superior facet were removed in block.  This  Allowed exposure  of the left T10 exiting nerve root.  Next the superior half of the left T11 pedicle was drilled off with a high speed drill.  Using a microcurrett and 1mm Kerrison the ligament of flavum was removed by pushing outward.  This exposed the lateral dura.      T10 and T11 Corpectomy and Discectomy  Next hemostasis was achieved with bipolar cautery.  This calcified disc could be seen compressing the dural ventrally and the exiting nerve root.  Due to the calcified nature we had to perform a partial corpectomy of T10 and 11.  Without placing traction on the dura we entered the superior aspect of T11 vertebral body just below the disc space.  A 5mm corpectomy was cut moving from posterior to anterior.  This process was repeated on the inferior aspect of the T10 vertebral body.      I then had the assistant elevate the erector spinae muscle and worked underneither approaching the lateral aspect of the spine.  This offered excelent visualization of the ventral dural of the spinal cord.  The T10 and T11 corpectomies were carried across to the contralateral side until a 5mm trough had be cut and the ossified disc fragment was isolated.      I then used a down going currette to fracture the ossified disc fragment anteriorly.  Once it was freed, the disc fragment was removed with a pituitary.  The discectomy was the completed. The corpectomies were filled with flow seal to obtain hemostasis.     Intervertebral Strut Graft and Interbody arthrodesis  Next a cadeveric fibular strut graft was milled to size.  The base of the corpectomy was filled with luis demineralized bone matrix and autologous bone.  The milled fibular strut graft was then hammered into place from a lateral position using a bone tamp  until it fit snuggly in the defect.  Neuromonitoring was checked and remained stable.     Closure  The wound was thoroughly irrigated with 1 L irrigant and dried with satisfactory hemostasis noted.       A 7 Ukrainian flat drain was placed and tunneled out below the incision.  The deep paraspinal musculature was closed medially with 0-Vicryl sutures.  A second 7 Croatian flat drain was tunneled out above the incision and was left under the lattisumus dorsi muscle from a medial to lateral direction.  The cut inferior aspect of the lattismus was reapproximated.  1 gram of vancomycin powder was sprinkled into the wound.  The muscle fascia was closed with 0-Vicryl sutures as well.  Subcutaneous tissues were closed with inverted 2-0 Vicryl sutures and the skin was closed with 4-0 Monocryl.  The incision was covered with xeroform and Ioban.  All counts were noted to be correct at the end of the case.  They were placed back on the hospital r[facility]raymer and extubated. The patient was taken to the recovery room in stable condition.    OPERATIVE FINDINGS:   T10/11 calcified disk protrusion with configuration as anticipated from preoperative imaging studies. Spacer snugness and positioning appeared satisfactory on imaging. Electrophysiological monitoring was carried out throughout the procedure and remained stable with no undue changes reported.      Estimated Blood Loss: 400 mL    Complications: None    Implants:   Implant Name Type Inv. Item Serial No.  Lot No. LRB No. Used   FIBULA STRUT - [id number] Implant FIBULA STRUT  [place] [employer] [id number] N/A 1   PUTTY DBM CONNOR 2.5CC - [id number] Implant PUTTY DBM CONNOR 2.5CC  [employer] [id number] N/A 1   GRFT BONE MAGNIFUSE PC 1X5CM - [id number] Implant GRFT BONE MAGNIFUSE PC 1X5CM  [employer] [id number] N/A 1       Specimens:                None      Drains:   Closed/Suction Drain 1 Inferior;Left Back Bulb 7 Fr. (Active)   Site  Description Unable to view 5/19/2020  8:49 PM   Dressing Status Clean;Dry;Intact 5/19/2020  8:49 PM   Drainage Appearance Bloody 5/19/2020  8:49 PM   Status To bulb suction 5/19/2020  8:49 PM   Output (mL) 50 mL 5/20/2020  1:16 AM       Closed/Suction Drain 2 Inferior;Right Back Bulb 7 Fr. (Active)   Site Description Unable to view 5/19/2020  8:49 PM   Dressing Status Clean;Dry;Intact 5/19/2020  8:49 PM   Drainage Appearance Bloody 5/19/2020  8:49 PM   Status To bulb suction 5/19/2020  8:49 PM   Output (mL) 90 mL 5/20/2020  1:16 AM       Urethral Catheter Non-latex;Silicone 16 Fr. (Active)   Daily Indications Required activity restriction from trauma, surgery, (e.g. unstable spine, fracture, hemodynamics) 5/19/2020  8:49 PM   Site Assessment Clean;Skin intact 5/19/2020  8:49 PM   Collection Container Standard drainage bag 5/19/2020  8:49 PM   Securement Method Securing device 5/19/2020  8:49 PM   Catheter care complete Yes 5/19/2020  8:00 PM   Output (mL) 800 mL 5/19/2020 10:49 PM       [REMOVED] External Urinary Catheter (Removed)   Site Assessment Skin intact;Pink 5/19/2020  8:00 AM   Application/Removal skin care provided 5/19/2020  8:00 AM   Collection Container Wall suction 5/18/2020  8:19 PM   Wall suction (mmHG) 45 mmHG 5/18/2020  6:39 PM   Securement Method Securing device 5/18/2020  8:19 PM   Output (mL) 900 mL 5/19/2020 12:24 AM

## 2020-05-20 NOTE — PLAN OF CARE
Oriented x4, drowsy with pain medication. Pt refused pain medication this morning due to drowsiness. This RN encouraged her to take midday due to increased pain in back, effective. Purwik in place, bladder scan completed as ordered. Voiding adequately at this time.Dressing c/d/I. DEENA x2 in place. Safety maintained.

## 2020-05-20 NOTE — ANESTHESIA POSTPROCEDURE EVALUATION
Patient: Anne-Marie Foreman    Procedure Summary     Date:  05/19/20 Room / [facility]:   [facility]  /  [facility]    Anesthesia Start:  0955 Anesthesia Stop:  1638    Procedure:  Thoracic  DISCECTOMY, T10/11.  Costotransversectomy. Neuromonitoring (N/A Spine Lumbar) Diagnosis:       Intervertebral thoracic disc disorder with myelopathy, thoracic region      (Intervertebral thoracic disc disorder with myelopathy, thoracic region [M51.04])    Surgeon:  Willy Diaz MD Provider:  Jerry Cabrrea CRNA    Anesthesia Type:  general ASA Status:  3          Anesthesia Type: general    Vitals  Vitals Value Taken Time   /71 5/19/2020  6:08 PM   Temp 97.5 °F (36.4 °C) 5/19/2020  5:15 PM   Pulse 88 5/19/2020  6:09 PM   Resp 14 5/19/2020  6:00 PM   SpO2 94 % 5/19/2020  6:09 PM   Vitals shown include unvalidated device data.        Post Anesthesia Care and Evaluation    Patient location during evaluation: PACU  Patient participation: complete - patient participated  Level of consciousness: responsive to light touch  Pain management: adequate  Airway patency: patent  Anesthetic complications: No anesthetic complications  PONV Status: none  Cardiovascular status: acceptable  Respiratory status: acceptable  Hydration status: acceptable

## 2020-05-20 NOTE — THERAPY RE-EVALUATION
Patient Name: Anne-Marie Foreman  : 1955    MRN: [id number]                              Today's Date: 2020       Admit Date: 2020    Visit Dx:     ICD-10-CM ICD-9-CM   1. Intervertebral thoracic disc disorder with myelopathy, thoracic region M51.04 722.72   2. Weakness of both lower extremities R29.898 729.89   3. Decreased activities of daily living (ADL) Z78.9 V49.89     Patient Active Problem List   Diagnosis   • Weakness of both lower extremities   • Chronic atrial fibrillation   • Essential hypertension   • Long term current use of anticoagulant therapy   • Mild CAD   • Mixed hyperlipidemia   • Morbid obesity due to excess calories (CMS/HCC)   • Systolic congestive heart failure (CMS/HCC)   • Type 2 diabetes mellitus with microalbuminuria, without long-term current use of insulin (CMS/HCC)   • Acquired hypothyroidism   • Vitamin B 12 deficiency   • Intervertebral thoracic disc disorder with myelopathy, thoracic region     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HCC)    • Hyperlipidemia    • Hypertension    • Left sided lacunar infarction (CMS/HCC)    • Obesity, Class II, BMI 35-39.9    • Systolic heart failure (CMS/HCC)    • Type 2 diabetes mellitus (CMS/HCC)      Past Surgical History:   Procedure Laterality Date   • CARDIAC CATHETERIZATION  10/21/2009   • CARDIOVERSION  10/09/2013   • CATARACT EXTRACTION, BILATERAL     • CHOLECYSTECTOMY     • LAPAROSCOPIC TUBAL LIGATION     • RETINAL LASER PROCEDURE       General Information     Row Name 20 0830          PT Evaluation Time/Intention    Document Type  re-evaluation s/p thoracic discectomy T10/11, costotransversectomy intervertebral thoracic disc disorder wth myelopathy, new LE numbness and weakness L>R begaan post fall in Feb  -MS     Mode of Treatment  physical therapy;co-treatment  -MS     Row  Name 05/20/20 0830          General Information    Patient Profile Reviewed?  yes  -MS     Existing Precautions/Restrictions  fall;spinal 2 DEENA drains  -MS     Barriers to Rehab  physical barrier;medically complex;previous functional deficit  -MS     Row Name 05/20/20 0830          Cognitive Assessment/Intervention- PT/OT    Orientation Status (Cognition)  oriented x 4  -MS     Row Name 05/20/20 0830          Safety Issues, Functional Mobility    Impairments Affecting Function (Mobility)  motor control;strength;sensation/sensory awareness;pain;range of motion (ROM);balance;endurance/activity tolerance  -MS       User Key  (r) = Recorded By, (t) = Taken By, (c) = Cosigned By    Initials Name Provider Type    Ramandeep Nelson, PT, DPT, NCS Physical Therapist        Mobility     Row Name 05/20/20 0830          Bed Mobility Assessment/Treatment    Rolling Left [place] (Bed Mobility)  contact guard;verbal cues  -MS     Rolling Right [place] (Bed Mobility)  minimum assist (75% patient effort);verbal cues  -MS     Sidelying-Sit [place] (Bed Mobility)  minimum assist (75% patient effort);verbal cues  -MS     Sit-Sidelying [place] (Bed Mobility)  minimum assist (75% patient effort);2 person assist;verbal cues  -MS     Assistive Device (Bed Mobility)  bed rails;draw sheet  -MS     Row Name 05/20/20 0830          Transfer Assessment/Treatment    Comment (Transfers)  pt attempted to stand twice. First attempt the patient was able to clear her bottom from the bed partially, Second attempt, she did not clear and did not give full effort citing fear  -MS     Row Name 05/20/20 0830          Sit-Stand Transfer    Sit-Stand [place] (Transfers)  maximum assist (25% patient effort);2 person assist;verbal cues;nonverbal cues (demo/gesture)  -MS       User Key  (r) = Recorded By, (t) = Taken By, (c) = Cosigned By    Initials Name Provider Type    Ramandeep Nelson, PT, DPT, NCS Physical Therapist         Obj/Interventions     Row Name 05/20/20 0730          General ROM    GENERAL ROM COMMENTS  B UE AROM WFL, R LE Hip flexion impaired 25%, R knee flex/ext, dorsiflexion WFL. L LE: hip flexion impaired 75%, knee flex/ext impaired 75%, dorsiflexion impaired 75%  -MS     Row Name 05/20/20 0730          MMT (Manual Muscle Testing)    General MMT Comments  R hip flexion 3+/5, knee flex/ext 3+/5, dorsiflex and EHL 5/5. L hip flexion 1/5, knee flex/ext 2/5, dorsiflex and EHL 3-/5  -MS     [facility] Name 05/20/20 0730          Static Sitting Balance    Level of [place] (Unsupported Sitting, Static Balance)  standby assist  -MS     Sitting Position (Unsupported Sitting, Static Balance)  sitting on edge of bed  -MS     Comment (Unsupported Sitting, Static Balance)  B UE support on bed, posterior lean when she is drowsy  -MS     [facility] Name 05/20/20 0730          Dynamic Sitting Balance    Level of [place], Reaches Outside Midline (Sitting, Dynamic Balance)  contact guard assist  -MS     Sitting Position, Reaches Outside Midline (Sitting, Dynamic Balance)  sitting on edge of bed  -MS     Row Name 05/20/20 0730          Sensory Assessment/Intervention    Sensory General Assessment  -- R and L LE intact for light touch, no other sensation tested at this time  -MS       User Key  (r) = Recorded By, (t) = Taken By, (c) = Cosigned By    Initials Name Provider Type    MS Ramandeep Goodwin R, PT, DPT, NCS Physical Therapist        Goals/Plan     Row Name 05/20/20 0730          Bed Mobility Goal 1 (PT)    [place] Level/Cues Needed (Bed Mobility Goal 1, PT)  contact guard assist;tactile cues required;verbal cues required  -MS     Time Frame (Bed Mobility Goal 1, PT)  long term goal (LTG);by discharge  -MS     Progress/Outcomes (Bed Mobility Goal 1, PT)  goal not met;goal ongoing  -MS     [facility] Name 05/20/20 0730          Transfer Goal 1 (PT)    Activity/Assistive Device (Transfer Goal 1, PT)   sit-to-stand/stand-to-sit;bed-to-chair/chair-to-bed;sliding board  -MS     [place] Level/Cues Needed (Transfer Goal 1, PT)  moderate assist (50-74% patient effort);2 person assist  -MS     Time Frame (Transfer Goal 1, PT)  long term goal (LTG);by discharge  -MS     Progress/Outcome (Transfer Goal 1, PT)  goal revised this date  -MS     Row Name 05/20/20 0730          Patient Education Goal (PT)    Activity (Patient Education Goal, PT)  pt will demonstrate grossly 3-/5 strength in the L LE  -MS     [place]/Cues/Accuracy (Memory Goal 2, PT)  demonstrates adequately  -MS     Time Frame (Patient Education Goal, PT)  long term goal (LTG);by discharge  -MS     Progress/Outcome (Patient Education Goal, PT)  goal ongoing  -MS       User Key  (r) = Recorded By, (t) = Taken By, (c) = Cosigned By    Initials Name Provider Type    MS Ramandeep Goodwin R, PT, DPT, NCS Physical Therapist        Clinical Impression     Row Name 05/20/20 0730          Pain Assessment    Additional Documentation  Pain Scale: Numbers Pre/Post-Treatment (Group)  -MS     Row Name 05/20/20 0730          Pain Scale: Numbers Pre/Post-Treatment    Pain Scale: Numbers, Pretreatment  3/10  -MS     Pain Scale: Numbers, Post-Treatment  7/10  -MS     Pain [facility] - Orientation  lower  -MS     Pain [facility]  back  -MS     Pre/Post Treatment Pain Comment  B feet  -MS     Pain Intervention(s)  Medication (See MAR);Repositioned;Ambulation/increased activity  -MS     Row Name 05/20/20 0730          Plan of Care Review    Plan of Care Reviewed With  patient  -MS     Progress  improving  -MS     Outcome Summary  PT re-evaluation completed s/p thoracic discectomy T10/11 due to spinal cord compression. The patient presents with improved movement of B LEs today and improved light touch sensation. She is still very fearful of falling which limits her effort when going to stand. She was able to clear her bottom paritally from the bed today. She will benefit from  skilled PT services to work on strengthening, balance, and transfers.  Recommend SNF or inpt rehab upon discharge.  -MS     Row Name 05/20/20 0730          Physical Therapy Clinical Impression    Patient/Family Goals Statement (PT Clinical Impression)  improve strength  -MS     Criteria for Skilled Interventions Met (PT Clinical Impression)  yes;treatment indicated  -MS     Rehab Potential (PT Clinical Summary)  good, to achieve stated therapy goals  -MS     Predicted Duration of Therapy (PT)  until discharge  -MS     Row Name 05/20/20 0730          Positioning and Restraints    Post Treatment Position  bed  -MS     In Bed  fowlers;call light within reach;encouraged to call for assist;side rails up x2;SCD pump applied  -MS       User Key  (r) = Recorded By, (t) = Taken By, (c) = Cosigned By    Initials Name Provider Type    Ramandeep Nelson, PT, DPT, NCS Physical Therapist        Outcome Measures     Row Name 05/20/20 0730          How much help from another person do you currently need...    Turning from your back to your side while in flat bed without using bedrails?  2  -MS     Moving from lying on back to sitting on the side of a flat bed without bedrails?  2  -MS     Moving to and from a bed to a chair (including a wheelchair)?  1  -MS     Standing up from a chair using your arms (e.g., wheelchair, bedside chair)?  1  -MS     Climbing 3-5 steps with a railing?  1  -MS     To walk in hospital room?  1  -MS     AM-PAC 6 Clicks Score (PT)  8  -MS     Row Name 05/20/20 0730          Functional Assessment    Outcome Measure Options  AM-PAC 6 Clicks Basic Mobility (PT)  -MS       User Key  (r) = Recorded By, (t) = Taken By, (c) = Cosigned By    Initials Name Provider Type    Ramandeep Nelson, PT, DPT, NCS Physical Therapist        Physical Therapy Education                 Title: PT OT SLP Therapies (In Progress)     Topic: Physical Therapy (In Progress)     Point: Mobility training (Done)     Description:    Instruct learner(s) on safety and technique for assisting patient out of bed, chair or wheelchair.  Instruct in the proper use of assistive devices, such as walker, crutches, cane or brace.              Patient Friendly Description:   It's important to get you on your feet again, but we need to do so in a way that is safe for you. Falling has serious consequences, and your personal safety is the most important thing of all.        When it's time to get out of bed, one of us or a family member will sit next to you on the bed to give you support.     If your doctor or nurse tells you to use a walker, crutches, a cane, or a brace, be sure you use it every time you get out of bed, even if you think you don't need it.    Learning Progress Summary           Patient Acceptance, E, VU by MS at 5/20/2020 1104    Comment:  spinal restrictions    Acceptance, E, NR by MS at 5/15/2020 1315    Comment:  role of PT in her care                   Point: Home exercise program (Not Started)     Description:   Instruct learner(s) on appropriate technique for monitoring, assisting and/or progressing patient with therapeutic exercises and activities.              Learner Progress:   Not documented in this visit.          Point: Body mechanics (Not Started)     Description:   Instruct learner(s) on proper positioning and spine alignment for patient and/or caregiver during mobility tasks and/or exercises.              Learner Progress:   Not documented in this visit.          Point: Precautions (Not Started)     Description:   Instruct learner(s) on prescribed precautions during mobility and gait tasks              Learner Progress:   Not documented in this visit.                      User Key     Initials Effective Dates Name Provider Type Discipline    MS 06/19/18 -  Ramandeep Goodwin, PT, DPT, NCS Physical Therapist PT              PT Recommendation and Plan  Planned Therapy Interventions (PT Eval): balance training, bed mobility  training, patient/family education, strengthening, transfer training, ROM (range of motion)  Outcome Summary/Treatment Plan (PT)  Anticipated Discharge Disposition (PT): skilled nursing facility, inpatient rehabilitation facility  Plan of Care Reviewed With: patient  Progress: improving  Outcome Summary: PT re-evaluation completed s/p thoracic discectomy T10/11 due to spinal cord compression. The patient presents with improved movement of B LEs today and improved light touch sensation. She is still very fearful of falling which limits her effort when going to stand. She was able to clear her bottom paritally from the bed today. She will benefit from skilled PT services to work on strengthening, balance, and transfers.  Recommend SNF or inpt rehab upon discharge.     Time Calculation:   PT Charges     Row Name 05/20/20 1103             Time Calculation    Start Time  0730  -MS      Stop Time  0815  -MS      Time Calculation (min)  45 min  -MS      PT Received On  05/20/20  -MS      PT Goal Re-Cert Due Date  05/30/20  -MS         Time Calculation- PT    Total Timed Code Minutes- PT  10 minute(s)  -MS        User Key  (r) = Recorded By, (t) = Taken By, (c) = Cosigned By    Initials Name Provider Type    Ramandeep Nelson, PT, DPT, NCS Physical Therapist        Therapy Charges for Today     Code Description Service Date Service Provider Modifiers Qty    [id number]  PT RE-EVAL ESTABLISHED PLAN 2 5/20/2020 Ramandeep Goodwin PT, DPT, NCS GP 1    [id number]  PT THERAPEUTIC ACT EA 15 MIN 5/20/2020 Ramandeep Goodwin PT, DPT, NCS GP 1          PT G-Codes  Outcome Measure Options: AM-PAC 6 Clicks Daily Activity (OT)  AM-PAC 6 Clicks Score (PT): 8  AM-PAC 6 Clicks Score (OT): 13    Ramandeep Goodwin, PT, DPT, NCS  5/20/2020

## 2020-05-20 NOTE — PLAN OF CARE
Problem: Patient Care Overview  Goal: Plan of Care Review  Flowsheets (Taken 5/20/2020 0750)  Progress: improving  Plan of Care Reviewed With: patient  Outcome Summary: OT post-op re-evaluation completed.  Pt continues to demonstrate LE weakness, however it has improved along with resolution of her senation in her legs since sx.  Pt completed transition to sitting from supine with min A and sit to supine with min x2.  Pt attempted to stand x2 with max A x2 however was unable to acheive full standing position.  Pt demonstrates equal and normal BUE strength.  Pt is limited today due to her decreased endurance and lethargy, impaired balance, motor control, BLE weakness, and decreased endurance.  OT will cont to follow however it is recommended for pt to discharge to SNF upon discharge from hospital.

## 2020-05-20 NOTE — PROGRESS NOTES
[facility] Medicine Services  INPATIENT PROGRESS NOTE    Patient Name: Anne-Marie Foreman  Date of Admission: 5/14/2020  Today's Date: 05/20/20  Length of Stay: 6  Primary Care Physician: Lorrie Wilhelm MD    Subjective   Chief Complaint: Follow-up lower extremity weakness    HPI:  Patient seen and examined.  No chest pain or shortness of breath.  Complaining of some back stiffness and pain.  Still has numbness and tingling in her feet but is able to move her lower extremities a little more, right greater than left.  No acute overnight events noted.        Review of Systems   All pertinent negatives and positives are as above. All other systems have been reviewed and are negative unless otherwise stated.     Objective    Temp:  [97.5 °F (36.4 °C)-98.5 °F (36.9 °C)] 98.4 °F (36.9 °C)  Heart Rate:  [] 74  Resp:  [12-20] 18  BP: (108-142)/(53-81) 118/53  Arterial Line BP: (114-157)/(56-78) 157/70  Physical Exam  GEN: Awake, alert, interactive, in NAD  HEENT: Atraumatic, PERRLA, EOMI, Anicteric, Trachea midline  Lungs: CTAB, no wheezing/rales/rhonchi  Heart: irreg/irreg, +S1/s2, no rub  ABD: soft, nt/nd, +BS, no guarding/rebound  Extremities: no cyanosis, b/l LE edema  Skin: no rashes or lesions  Neuro: AAOx3, CN intact, normal UE exam b/l, bilateral lower extremity weakness left > than right        Results Review:  I have reviewed the labs, radiology results, and diagnostic studies.    Laboratory Data:   Results from last 7 days   Lab Units 05/20/20  0500 05/19/20  0318 05/17/20  0602   WBC 10*3/mm3 9.45 7.68 7.32   HEMOGLOBIN g/dL 10.4* 12.8 13.0   HEMATOCRIT % 31.4* 38.5 37.7   PLATELETS 10*3/mm3 164 185 186        Results from last 7 days   Lab Units 05/20/20  0500 05/19/20  1345 05/19/20  1050 05/19/20  0318 05/18/20  0537  05/15/20  0352 05/14/20 2056   SODIUM mmol/L 137  --   --  137 137   < > 138 132*   SODIUM, ARTERIAL mmol/L  --  141 139  --   --   --   --   --    POTASSIUM  mmol/L 4.4  --   --  3.9 4.1   < > 3.9 4.6   CHLORIDE mmol/L 100  --   --  100 100   < > 99 91*   CO2 mmol/L 27.0  --   --  25.0 27.0   < > 25.0 26.0   BUN mg/dL 11  --   --  13 14   < > 13 12   CREATININE mg/dL 0.33*  --   --  0.35* 0.46*   < > 0.50* 0.61   CALCIUM mg/dL 8.6  --   --  8.9 9.1   < > 9.1 9.4   BILIRUBIN mg/dL  --   --   --   --   --   --  0.4 0.3   ALK PHOS U/L  --   --   --   --   --   --  43 55   ALT (SGPT) U/L  --   --   --   --   --   --  24 31   AST (SGOT) U/L  --   --   --   --   --   --  21 25   GLUCOSE mg/dL 151*  --   --  187* 158*   < > 129* 247*    < > = values in this interval not displayed.       Culture Data:   No results found for: BLOODCX, URINECX, WOUNDCX, MRSACX, RESPCX, STOOLCX    Radiology Data:   Imaging Results (Last 24 Hours)     Procedure Component Value Units Date/Time    CT Thoracic Spine Without Contrast [[id number]] Resulted:  05/20/20 1055     Updated:  05/20/20 1108    CT Limited Localized Follow Up Study [[id number]] Collected:  05/19/20 1649     Updated:  05/20/20 0801    Narrative:       CT LIMITED LOCALIZED FOLLOW UP STUDY- 5/19/2020 10:51 AM CDT     HISTORY: discectomy; M51.04-Intervertebral disc disorders with  myelopathy, thoracic region; R29.898-Other symptoms and signs involving  the musculoskeletal system; Z78.9-Other specified health status     COMPARISON: None     FLUOROSCOPY TIME: 26.43 seconds     NUMBER OF IMAGES: 384     FLUORO CT DLP: 576 mGy*cm       Impression:          Intraoperative fluoroscopic images and fluoro CT obtained during  discectomy.     Please refer to the operative note for more details.  This report was finalized on 05/19/2020 16:51 by Dr Omero Alvarado, .    FL O Arm During Surgery [[id number]] Collected:  05/19/20 1649     Updated:  05/20/20 0801    Narrative:       CT LIMITED LOCALIZED FOLLOW UP STUDY- 5/19/2020 10:51 AM CDT     HISTORY: discectomy; M51.04-Intervertebral disc disorders with  myelopathy, thoracic region; R29.898-Other  symptoms and signs involving  the musculoskeletal system; Z78.9-Other specified health status     COMPARISON: None     FLUOROSCOPY TIME: 26.43 seconds     NUMBER OF IMAGES: 384     FLUORO CT DLP: 576 mGy*cm       Impression:          Intraoperative fluoroscopic images and fluoro CT obtained during  discectomy.     Please refer to the operative note for more details.  This report was finalized on 05/19/2020 16:51 by Dr Omero Alvarado, .          I have reviewed the patient's current medications.     Assessment/Plan     Active Hospital Problems    Diagnosis   • **Weakness of both lower extremities   • Vitamin B 12 deficiency   • Essential hypertension   • Mild CAD   • Intervertebral thoracic disc disorder with myelopathy, thoracic region     Added automatically from request for surgery [id number]     • Chronic atrial fibrillation   • Morbid obesity due to excess calories (CMS/[facility])   • Systolic congestive heart failure (CMS/[facility])   • Type 2 diabetes mellitus with microalbuminuria, without long-term current use of insulin (CMS/[facility])   • Long term current use of anticoagulant therapy       #1 bilateral lower extremity weakness -has been having issues since February per report.  Does have a history of neuropathy in the setting of diabetes.  Does have B12 deficiency which was replaced with multiple 1000 micrograms daily injections.  MRI of the thoracic spine did have evidence for stenosis with some cord flattening and myelomalacia at t10, s/p OR on 5/19 with neurosurgery for thoracic discectomy and costotransversectomy. Drains and post op care per nsx.     #2 chronic A. Fib -continue digoxin and Toprol-XL.  INR was 2.65 on arrival but has fallen subtherapeutic with holding for the OR. Now 1.18. Restart lovenox and coumadin when ok with nsx.     #3 chronic systolic heart failure - with low EF of 25%.  No LifeVest.  Follows with [facility]ology at [facility].  Continue Toprol-XL, losartan, Lasix.  Not sure why patient is not on Aldactone  but defer to her outpatient cardiologist.  Patient also appears to be on Coreg and Toprol and I am not sure why she is on dual therapy, was seen by cardiology recommended continuing current treatment however.    #4 DM 2 -on Levemir and sliding scale coverage.  Continue hypoglycemia protocol.    #5 history of CAD -no current issues with chest pain.  Recent negative stress in 2/20. On beta-blocker, aspirin, statin.    Discharge Planning: Ongoing pending post op course. Plan for SNF at d/c.  CM consulted.    Marty Kc DO   05/20/20   11:22

## 2020-05-20 NOTE — THERAPY RE-EVALUATION
Acute Care - Occupational Therapy Re-Evaluation  [facility]     Patient Name: Anne-Marie Foreman  : 1955  MRN: [id number]  Today's Date: 2020  Onset of Illness/Injury or Date of Surgery: 20  Date of Referral to OT: 20  Referring Physician: TYSON Alanis    Admit Date: 2020       ICD-10-CM ICD-9-CM   1. Intervertebral thoracic disc disorder with myelopathy, thoracic region M51.04 722.72   2. Weakness of both lower extremities R29.898 729.89   3. Decreased activities of daily living (ADL) Z78.9 V49.89     Patient Active Problem List   Diagnosis   • Weakness of both lower extremities   • Chronic atrial fibrillation   • Essential hypertension   • Long term current use of anticoagulant therapy   • Mild CAD   • Mixed hyperlipidemia   • Morbid obesity due to excess calories (CMS/HCC)   • Systolic congestive heart failure (CMS/HCC)   • Type 2 diabetes mellitus with microalbuminuria, without long-term current use of insulin (CMS/HCC)   • Acquired hypothyroidism   • Vitamin B 12 deficiency   • Intervertebral thoracic disc disorder with myelopathy, thoracic region     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HCC)    • Hyperlipidemia    • Hypertension    • Left sided lacunar infarction (CMS/HCC)    • Obesity, Class II, BMI 35-39.9    • Systolic heart failure (CMS/HCC)    • Type 2 diabetes mellitus (CMS/HCC)      Past Surgical History:   Procedure Laterality Date   • CARDIAC CATHETERIZATION  10/21/2009   • CARDIOVERSION  10/09/2013   • CATARACT EXTRACTION, BILATERAL     • CHOLECYSTECTOMY     • LAPAROSCOPIC TUBAL LIGATION     • RETINAL LASER PROCEDURE            OT ASSESSMENT FLOWSHEET (last 12 hours)      Occupational Therapy Evaluation     Row Name 20 0750                   OT Evaluation Time/Intention    Subjective Information  complains of;pain  -CS         Document Type  re-evaluation  -CS        Mode of Treatment  occupational therapy  -CS           General Information    Patient Profile Reviewed?  yes  -CS        Referring Physician  TYSON Alanis  -CS        Patient Observations  cooperative;agree to therapy;lethargic  -CS        Patient/Family Observations  no family present  -CS        General Observations of Patient  Pt fowlers in bed, 2 DEENA drains, cont pulse ox  -CS        Pertinent History of Current Functional Problem  s/p partial corpectomy, discectomy, laminectomy, facetectomy, foraminotomy T10 and T 11.  -CS        Existing Precautions/Restrictions  fall;spinal  -CS        Risks Reviewed  patient:;LOB;nausea/vomiting;dizziness;increased discomfort  -CS        Benefits Reviewed  patient:;improve function;increase independence;increase strength;increase balance;decrease pain  -CS        Barriers to Rehab  physical barrier;medically complex;previous functional deficit  -CS           Cognitive Assessment/Intervention- PT/OT    Affect/Mental Status (Cognitive)  low arousal/lethargic  -CS        Orientation Status (Cognition)  oriented x 4  -CS           Safety Issues, Functional Mobility    Impairments Affecting Function (Mobility)  motor control;strength;sensation/sensory awareness;pain;range of motion (ROM);balance;endurance/activity tolerance  -CS           Bed Mobility Assessment/Treatment    Bed Mobility Assessment/Treatment  rolling right;sidelying-sit;sit-sidelying;rolling left  -CS        Rolling Left [place] (Bed Mobility)  contact guard;verbal cues  -CS        Rolling Right [place] (Bed Mobility)  minimum assist (75% patient effort);verbal cues  -CS        Sidelying-Sit [place] (Bed Mobility)  minimum assist (75% patient effort);verbal cues  -CS        Sit-Sidelying [place] (Bed Mobility)  minimum assist (75% patient effort);2 person assist;verbal cues  -CS        Bed Mobility, Safety Issues  decreased use of legs for  bridging/pushing  -CS        Assistive Device (Bed Mobility)  bed rails;draw sheet  -CS           Functional Mobility    Functional Mobility- Comment  unable to perform due to weakness  -CS           Transfer Assessment/Treatment    Transfer Assessment/Treatment  sit-stand transfer  -CS        Comment (Transfers)  Pt attempted to stand x2 however was unable to come to full standing x2  -CS           Sit-Stand Transfer    Sit-Stand [place] (Transfers)  maximum assist (25% patient effort);2 person assist;verbal cues;nonverbal cues (demo/gesture)  -CS           Lower Body Dressing Assessment/Training    Lower Body Dressing [place] Level  don;doff;socks;maximum assist (25% patient effort);verbal cues  -CS        Lower Body Dressing Position  edge of bed sitting  -CS           BADL Safety/Performance    Impairments, BADL Safety/Performance  balance;endurance/activity tolerance;motor control;pain;strength;trunk/postural control;range of motion  -CS           General ROM    GENERAL ROM COMMENTS  BUE AROM WFL  -CS           MMT (Manual Muscle Testing)    General MMT Comments  BUE strength: 4+/5  -CS           Motor Assessment/Interventions    Additional Documentation  Balance (Group)  -CS           Balance    Balance  static sitting balance;dynamic sitting balance  -CS           Static Sitting Balance    Level of [place] (Unsupported Sitting, Static Balance)  standby assist  -CS        Sitting Position (Unsupported Sitting, Static Balance)  sitting on edge of bed  -CS        Comment (Unsupported Sitting, Static Balance)  BUE support on bed  -CS           Dynamic Sitting Balance    Level of [place], Reaches Outside Midline (Sitting, Dynamic Balance)  contact guard assist  -CS        Sitting Position, Reaches Outside Midline (Sitting, Dynamic Balance)  sitting on edge of bed  -CS        Comment, Reaches Outside Midline (Sitting, Dynamic Balance)  UE support required to minimize posterior lean  -CS            Sensory Assessment/Intervention    Sensory General Assessment  no sensation deficits identified  -CS           Positioning and Restraints    Pre-Treatment Position  in bed  -CS        Post Treatment Position  bed  -CS        In Bed  fowlers;call light within reach;encouraged to call for assist;side rails up x2;SCD pump applied  -CS           Pain Assessment    Additional Documentation  Pain Scale: Numbers Pre/Post-Treatment (Group)  -CS           Pain Scale: Numbers Pre/Post-Treatment    Pain Scale: Numbers, Pretreatment  3/10  -CS        Pain [facility] - Orientation  lower  -CS        Pain [facility]  back  -CS        Pre/Post Treatment Pain Comment  julieth feet  -CS        Pain Intervention(s)  Medication (See MAR);Repositioned;Ambulation/increased activity  -CS           Wound 05/14/20 2300 Right lower leg     Wound - Properties Group Date first assessed: 05/14/20  -TC Time first assessed: 2300  -TC Present on Hospital Admission: Y  -TC Side: Right  -TC Orientation: lower  -TC [facility]: leg  -TC Primary Wound Type: --  -TC, redness/scab        Wound 05/19/20 1126 posterior thoracic spine Incision    Wound - Properties Group Date first assessed: 05/19/20  -CAROLYN Time first assessed: 1126  -CAROLYN Orientation: posterior  -CAROLYN [facility]: thoracic spine  -CAROLYN Primary Wound Type: Incision  -CAROLYN       Plan of Care Review    Plan of Care Reviewed With  patient  -CS        Progress  improving  -CS        Outcome Summary  OT post-op re-evaluation completed.  Pt continues to demonstrate LE weakness, however it has improved along with resolution of her senation in her legs since sx.  Pt completed transition to sitting from supine with min A and sit to supine with min x2.  Pt attempted to stand x2 with max A x2 however was unable to acheive full standing position.  Pt demonstrates equal and normal BUE strength.  Pt is limited today due to her decreased endurance and lethargy, impaired balance, motor control, BLE weakness, and decreased  endurance.  OT will cont to follow however it is recommended for pt to discharge to SNF upon discharge from hospital.  -CS           Clinical Impression (OT)    Date of Referral to OT  05/19/20  -CS        OT Diagnosis  Impaired ADLs and functional mobility  -CS        Patient/Family Goals Statement (OT Eval)  to go home  -CS        Criteria for Skilled Therapeutic Interventions Met (OT Eval)  yes;treatment indicated  -CS        Rehab Potential (OT Eval)  good, to achieve stated therapy goals  -CS        Therapy Frequency (OT Eval)  5 times/wk  -CS        Predicted Duration of Therapy Intervention (Therapy Eval)  until hospital discharge  -CS        Care Plan Review (OT)  evaluation/treatment results reviewed;care plan/treatment goals reviewed;risks/benefits reviewed;current/potential barriers reviewed  -CS        Anticipated Discharge Disposition (OT)  skilled nursing facility  -CS           Planned OT Interventions    Planned Therapy Interventions (OT Eval)  activity tolerance training;adaptive equipment training;BADL retraining;functional balance retraining;IADL retraining;transfer/mobility retraining;strengthening exercise;patient/caregiver education/training;occupation/activity based interventions  -CS           OT Goals    Transfer Goal Selection (OT)  transfer, OT goal 1  -CS        Bathing Goal Selection (OT)  bathing, OT goal 1  -CS        Dressing Goal Selection (OT)  dressing, OT goal 1  -CS        Balance Goal Selection (OT)  balance, OT goal 1  -CS           Transfer Goal 1 (OT)    Activity/Assistive Device (Transfer Goal 1, OT)  sit-to-stand/stand-to-sit;bed-to-chair/chair-to-bed;toilet;commode, bedside without drop arms  -CS        [place] Level/Cues Needed (Transfer Goal 1, OT)  moderate assist (50-74% patient effort)  -CS        Time Frame (Transfer Goal 1, OT)  10 days  -CS        Progress/Outcome (Transfer Goal 1, OT)  goal ongoing  -CS           Bathing Goal 1 (OT)    Activity/Assistive  Device (Bathing Goal 1, OT)  bathing skills, all;long-handled sponge  -CS        [place] Level/Cues Needed (Bathing Goal 1, OT)  minimum assist (75% or more patient effort)  -CS        Time Frame (Bathing Goal 1, OT)  10 days  -CS        Progress/Outcomes (Bathing Goal 1, OT)  goal ongoing  -CS           Dressing Goal 1 (OT)    Activity/Assistive Device (Dressing Goal 1, OT)  dressing skills, all;long handled shoe horn;reacher;sock-aid  -CS        [place]/Cues Needed (Dressing Goal 1, OT)  minimum assist (75% or more patient effort);verbal cues required  -CS        Time Frame (Dressing Goal 1, OT)  10 days  -CS        Progress/Outcome (Dressing Goal 1, OT)  goal ongoing  -CS           Balance Goal 1 (OT)    Activity/Assistive Device (Balance Goal 1, OT)  sitting, static;sitting, dynamic  -CS        [place] Level/Cues Needed (Balance Goal 1, OT)  independent  -CS        Time Frame (Balance Goal 1, OT)  10 days  -CS        Progress/Outcomes (Balance Goal 1, OT)  goal ongoing  -CS          User Key  (r) = Recorded By, (t) = Taken By, (c) = Cosigned By    Initials Name Effective Dates    CS Kim Abdi, OTR/L, CNT 04/02/19 -     TC Castleman, Tamara K, RN 08/02/16 -     Reji Cameron, LANETTE 08/02/16 -          Occupational Therapy Education                 Title: PT OT SLP Therapies (In Progress)     Topic: Occupational Therapy (Done)     Point: ADL training (Done)     Description:   Instruct learner(s) on proper safety adaptation and remediation techniques during self care or transfers.   Instruct in proper use of assistive devices.              Learning Progress Summary           Patient Acceptance, E,TB, VU,DU,NR by CJ at 5/17/2020 6409    Comment:  Pt. performed bathing/dressing with 2 people assisting with transfers t0-from shower chair -bed!                   Point: Home exercise program (Done)     Description:   Instruct learner(s) on appropriate technique for monitoring, assisting and/or  progressing therapeutic exercises/activities.              Learning Progress Summary           Patient Acceptance, E,TB, VU,DU,NR by  at 5/16/2020 1142    Comment:  Pt. performed ue exs to increase her bed mobility and tranfer ability!                   Point: Precautions (Done)     Description:   Instruct learner(s) on prescribed precautions during self-care and functional transfers.              Learning Progress Summary           Patient Acceptance, E,TB, VU,DU,NR by  at 5/17/2020 1459    Comment:  Pt. performed bathing/dressing with 2 people assisting with transfers t0-from shower chair -bed!    Acceptance, E,TB, VU,DU,NR by  at 5/16/2020 1142    Comment:  Pt. performed ue exs to increase her bed mobility and tranfer ability!                   Point: Body mechanics (Done)     Description:   Instruct learner(s) on proper positioning and spine alignment during self-care, functional mobility activities and/or exercises.              Learning Progress Summary           Patient Acceptance, E,TB, VU,DU,NR by  at 5/17/2020 1459    Comment:  Pt. performed bathing/dressing with 2 people assisting with transfers t0-from shower chair -bed!    Acceptance, E,TB, VU,DU,NR by  at 5/16/2020 1142    Comment:  Pt. performed ue exs to increase her bed mobility and tranfer ability!                               User Key     Initials Effective Dates Name Provider Type Discipline     08/02/16 -  Se Gil COTA/L Occupational Therapy Assistant OT                  OT Recommendation and Plan  Outcome Summary/Treatment Plan (OT)  Anticipated Equipment Needs at Discharge (OT): patient lift(if pt discharges home)  Anticipated Discharge Disposition (OT): skilled nursing facility  Planned Therapy Interventions (OT Eval): activity tolerance training, adaptive equipment training, BADL retraining, functional balance retraining, IADL retraining, transfer/mobility retraining, strengthening exercise, patient/caregiver  education/training, occupation/activity based interventions  Therapy Frequency (OT Eval): 5 times/wk  Plan of Care Review  Plan of Care Reviewed With: patient  Plan of Care Reviewed With: patient  Outcome Summary: OT post-op re-evaluation completed.  Pt continues to demonstrate LE weakness, however it has improved along with resolution of her senation in her legs since sx.  Pt completed transition to sitting from supine with min A and sit to supine with min x2.  Pt attempted to stand x2 with max A x2 however was unable to acheive full standing position.  Pt demonstrates equal and normal BUE strength.  Pt is limited today due to her decreased endurance and lethargy, impaired balance, motor control, BLE weakness, and decreased endurance.  OT will cont to follow however it is recommended for pt to discharge to SNF upon discharge from hospital.    Outcome Measures     Row Name 05/20/20 0750 05/17/20 1330          How much help from another is currently needed...    Putting on and taking off regular lower body clothing?  1  -CS  1  -CJ     Bathing (including washing, rinsing, and drying)  2  -CS  2  -CJ     Toileting (which includes using toilet bed pan or urinal)  1  -CS  1  -CJ     Putting on and taking off regular upper body clothing  3  -CS  3  -CJ     Taking care of personal grooming (such as brushing teeth)  3  -CS  3  -CJ     Eating meals  3  -CS  3  -CJ     AM-PAC 6 Clicks Score (OT)  13  -CS  13  -CJ        Functional Assessment    Outcome Measure Options  AM-PAC 6 Clicks Daily Activity (OT)  -CS  AM-PAC 6 Clicks Daily Activity (OT)  -CJ       User Key  (r) = Recorded By, (t) = Taken By, (c) = Cosigned By    Initials Name Provider Type    CJ Se Gil, SHERIFF/L Occupational Therapy Assistant    Kim Arce, OTR/L, CNT Occupational Therapist          Time Calculation:   Time Calculation- OT     Row Name 05/20/20 09             Time Calculation- OT    OT Start Time  0750  -CS      OT Stop Time   0829  -      OT Time Calculation (min)  39 min  -CS      Total Timed Code Minutes- OT  9 minute(s)  -CS      OT Received On  05/20/20  -CS      OT Goal Re-Cert Due Date  05/30/20  -CS         Timed Charges    76483 - OT Self Care/Mgmt Minutes  9  -CS        User Key  (r) = Recorded By, (t) = Taken By, (c) = Cosigned By    Initials Name Provider Type    CS Kim Abdi, OTR/L, CNT Occupational Therapist        Therapy Charges for Today     Code Description Service Date Service Provider Modifiers Qty    11733183352 HC OT SELF CARE/MGMT/TRAIN EA 15 MIN 5/20/2020 Kim Abdi OTR/L, CNT GO 1    89689555855 HC OT RE-EVAL 2 5/20/2020 Kim Abdi OTR/L, CNT GO 1               Kim Abdi OTR/L, CNT  5/20/2020

## 2020-05-21 LAB
ANION GAP SERPL CALCULATED.3IONS-SCNC: 10 MMOL/L (ref 5–15)
BUN BLD-MCNC: 7 MG/DL (ref 8–23)
BUN/CREAT SERPL: 18.9 (ref 7–25)
CALCIUM SPEC-SCNC: 8.8 MG/DL (ref 8.6–10.5)
CHLORIDE SERPL-SCNC: 99 MMOL/L (ref 98–107)
CO2 SERPL-SCNC: 27 MMOL/L (ref 22–29)
CREAT BLD-MCNC: 0.37 MG/DL (ref 0.57–1)
DEPRECATED RDW RBC AUTO: 43.8 FL (ref 37–54)
ERYTHROCYTE [DISTWIDTH] IN BLOOD BY AUTOMATED COUNT: 13.6 % (ref 12.3–15.4)
GFR SERPL CREATININE-BSD FRML MDRD: >150 ML/MIN/1.73
GLUCOSE BLD-MCNC: 176 MG/DL (ref 65–99)
GLUCOSE BLDC GLUCOMTR-MCNC: 155 MG/DL (ref 70–130)
GLUCOSE BLDC GLUCOMTR-MCNC: 220 MG/DL (ref 70–130)
GLUCOSE BLDC GLUCOMTR-MCNC: 267 MG/DL (ref 70–130)
HCT VFR BLD AUTO: 31 % (ref 34–46.6)
HGB BLD-MCNC: 10.3 G/DL (ref 12–15.9)
INR PPP: 1.11 (ref 0.91–1.09)
MCH RBC QN AUTO: 29.5 PG (ref 26.6–33)
MCHC RBC AUTO-ENTMCNC: 33.2 G/DL (ref 31.5–35.7)
MCV RBC AUTO: 88.8 FL (ref 79–97)
PLATELET # BLD AUTO: 158 10*3/MM3 (ref 140–450)
PMV BLD AUTO: 11.3 FL (ref 6–12)
POTASSIUM BLD-SCNC: 4 MMOL/L (ref 3.5–5.2)
PROTHROMBIN TIME: 13.9 SECONDS (ref 11.9–14.6)
RBC # BLD AUTO: 3.49 10*6/MM3 (ref 3.77–5.28)
SODIUM BLD-SCNC: 136 MMOL/L (ref 136–145)
WBC NRBC COR # BLD: 9.3 10*3/MM3 (ref 3.4–10.8)

## 2020-05-21 PROCEDURE — 63710000001 INSULIN LISPRO (HUMAN) PER 5 UNITS: Performed by: NEUROLOGICAL SURGERY

## 2020-05-21 PROCEDURE — 97110 THERAPEUTIC EXERCISES: CPT

## 2020-05-21 PROCEDURE — 82962 GLUCOSE BLOOD TEST: CPT

## 2020-05-21 PROCEDURE — 85027 COMPLETE CBC AUTOMATED: CPT | Performed by: INTERNAL MEDICINE

## 2020-05-21 PROCEDURE — 80048 BASIC METABOLIC PNL TOTAL CA: CPT | Performed by: NEUROLOGICAL SURGERY

## 2020-05-21 PROCEDURE — 97530 THERAPEUTIC ACTIVITIES: CPT

## 2020-05-21 PROCEDURE — 99024 POSTOP FOLLOW-UP VISIT: CPT | Performed by: NURSE PRACTITIONER

## 2020-05-21 PROCEDURE — 25010000002 HEPARIN (PORCINE) PER 1000 UNITS: Performed by: NEUROLOGICAL SURGERY

## 2020-05-21 PROCEDURE — 63710000001 INSULIN DETEMIR PER 5 UNITS: Performed by: NEUROLOGICAL SURGERY

## 2020-05-21 PROCEDURE — 85610 PROTHROMBIN TIME: CPT | Performed by: NEUROLOGICAL SURGERY

## 2020-05-21 RX ADMIN — INSULIN LISPRO 6 UNITS: 100 INJECTION, SOLUTION INTRAVENOUS; SUBCUTANEOUS at 12:58

## 2020-05-21 RX ADMIN — HEPARIN SODIUM 5000 UNITS: 5000 INJECTION, SOLUTION INTRAVENOUS; SUBCUTANEOUS at 09:59

## 2020-05-21 RX ADMIN — MUPIROCIN: 20 OINTMENT TOPICAL at 10:00

## 2020-05-21 RX ADMIN — OXYCODONE HYDROCHLORIDE AND ACETAMINOPHEN 1 TABLET: 10; 325 TABLET ORAL at 06:35

## 2020-05-21 RX ADMIN — INSULIN DETEMIR 15 UNITS: 100 INJECTION, SOLUTION SUBCUTANEOUS at 20:37

## 2020-05-21 RX ADMIN — SODIUM CHLORIDE, PRESERVATIVE FREE 10 ML: 5 INJECTION INTRAVENOUS at 20:34

## 2020-05-21 RX ADMIN — FUROSEMIDE 20 MG: 20 TABLET ORAL at 10:00

## 2020-05-21 RX ADMIN — CARVEDILOL 12.5 MG: 6.25 TABLET, FILM COATED ORAL at 17:25

## 2020-05-21 RX ADMIN — CARVEDILOL 12.5 MG: 6.25 TABLET, FILM COATED ORAL at 10:00

## 2020-05-21 RX ADMIN — OXYCODONE HYDROCHLORIDE AND ACETAMINOPHEN 1 TABLET: 10; 325 TABLET ORAL at 13:00

## 2020-05-21 RX ADMIN — DOCUSATE SODIUM 100 MG: 100 CAPSULE ORAL at 10:00

## 2020-05-21 RX ADMIN — DOCUSATE SODIUM 50 MG AND SENNOSIDES 8.6 MG 2 TABLET: 8.6; 5 TABLET, FILM COATED ORAL at 20:32

## 2020-05-21 RX ADMIN — VITAMIN D 1000 UNITS: 25 TAB ORAL at 10:00

## 2020-05-21 RX ADMIN — TIZANIDINE 4 MG: 4 TABLET ORAL at 20:33

## 2020-05-21 RX ADMIN — LEVOTHYROXINE SODIUM 125 MCG: 125 TABLET ORAL at 06:18

## 2020-05-21 RX ADMIN — FLUTICASONE PROPIONATE 2 SPRAY: 50 SPRAY, METERED NASAL at 10:02

## 2020-05-21 RX ADMIN — METOPROLOL SUCCINATE 50 MG: 50 TABLET, EXTENDED RELEASE ORAL at 10:00

## 2020-05-21 RX ADMIN — MUPIROCIN: 20 OINTMENT TOPICAL at 20:34

## 2020-05-21 RX ADMIN — DIGOXIN 250 MCG: 250 TABLET ORAL at 12:58

## 2020-05-21 RX ADMIN — GLIPIZIDE 10 MG: 10 TABLET ORAL at 10:00

## 2020-05-21 RX ADMIN — ATORVASTATIN CALCIUM 80 MG: 40 TABLET, FILM COATED ORAL at 20:33

## 2020-05-21 RX ADMIN — INSULIN LISPRO 2 UNITS: 100 INJECTION, SOLUTION INTRAVENOUS; SUBCUTANEOUS at 09:59

## 2020-05-21 RX ADMIN — FAMOTIDINE 20 MG: 20 TABLET, FILM COATED ORAL at 10:00

## 2020-05-21 RX ADMIN — LOSARTAN POTASSIUM 25 MG: 25 TABLET, FILM COATED ORAL at 10:00

## 2020-05-21 RX ADMIN — OXYCODONE HYDROCHLORIDE AND ACETAMINOPHEN 1 TABLET: 10; 325 TABLET ORAL at 20:33

## 2020-05-21 RX ADMIN — DOCUSATE SODIUM 100 MG: 100 CAPSULE ORAL at 20:33

## 2020-05-21 RX ADMIN — INSULIN LISPRO 4 UNITS: 100 INJECTION, SOLUTION INTRAVENOUS; SUBCUTANEOUS at 17:24

## 2020-05-21 RX ADMIN — HEPARIN SODIUM 5000 UNITS: 5000 INJECTION, SOLUTION INTRAVENOUS; SUBCUTANEOUS at 20:33

## 2020-05-21 RX ADMIN — FAMOTIDINE 20 MG: 20 TABLET, FILM COATED ORAL at 17:25

## 2020-05-21 NOTE — NURSING NOTE
RT Nebulizer Protocol    Assessment tool to be used for patients with existing breathing treatments ordered by hospitalists                                                                  0  1  2  3  4      Respiratory History   No Smoking      Smoking History      1 Pack/Day      Pulmonary Disease   x   Exacerbation        Respiratory Rate   Normal   x   20-25      Dyspneic      Accessory Muscles      Severe Dyspnea        Breath Sounds   Clear   x   Crackles      Crackles/ Rhonchi      Wheezing      Absent/ Severe Wheezing        Chest   X-ray   Clear/no xrray   x   1 Lobe Infiltration/ Consolidation/ PE      2 Lobe Same Lung Infiltration/ Consolidation/ PE       2 Lobe Infiltration/ Both Lungs/ Consolidation/ PE      Both Lungs/ More Than 1 Lobe/ Atelectasis/ Consolidation/  PE        Cough   Strong Non- Productive   x   Excessive Secretions/ Strong Cough      Excessive Secretions/ Weak Cough      Thick Bronchial Secretions/ Weak Cough      Thick Bronchial Secretions/ No Cough        Total Patient Score = 3   0-4=Q4 PRN  5-9=TID and Q4 PRN  10-14=QID and Q3 PRN  15-19=Q4 and Q2 PRN  20=Q3 and Q2 PRN    Bronchopulmonary Hygiene (CPT)   Q4 ATC Copious secretions, dyspnea, unable to sleep, mucus plug    QID & Q4 PRN Moderate secretions    TID Small amounts of secretions w/ poor cough and history of secretions    BID Unable to deep breathe and cough spontaneously       Lung Expansion Therapy (PEP)   Q4 & PRN at night Severe atelectasis, poor oxygenation    QID  High risk for persistent atelectasis, existence of same    TID At risk for developing atelectasis    BID Unable to deep breathe and cough spontaneously     Instruct, 1 follow up Patients able to perform well on their own      Has Hx of Asthma.  Continue with prn Q4 proventil

## 2020-05-21 NOTE — PROGRESS NOTES
NEUROSURGERY DAILY PROGRESS NOTE    ASSESSMENT:   Anne-Marie Foreman is a 64 y.o. with a significant medical history of A. fib, chronic anticoagulation on Coumadin and aspirin, CAD, hypertension, diabetes, hyperlipidemia, Fabry's disease, and obesity.  She presents today with a new problem of lower extremity numbness and weakness that began post fall on February 28, 2020.  Physical exam findings of increased LE tone, bilateral lower extremity weakness, left greater than right (LEFT: IP 2, quad 3, dorsiflex 4-, plantarflex 4+, EHL 2) (RIGHT: IP 5, quads 4-, dorsiflex 4-, plantarflex 4+, EHL 4+), absent bilateral LE reflexes, positive bilateral Babinski's, 2 beats of clonus on the left.  Their imaging shows no acute fractures or malalignment, small central disc protrusion at T7-8 resulting in central canal narrowing without significant stenosis, large disc protrusion and degenerative changes at T10-11 resulting in T2 signal change within the central cord and severe central canal stenosis.  MRI of the lumbar spine shows no acute fractures or malalignment, no bone marrow signal change, no significant STIR signal change, no T2 signal change within central cord, conus terminates at normal level, congenitally shortened pedicles and multilevel facet arthropathy and ligamentous flavum hypertrophy resulting in central canal and bilateral foraminal stenosis at L2-3, worse at L3-4 and L4-5.  Ultrasound of the bilateral lower extremities show no DVT or superficial thrombus.     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HCC)    • Hyperlipidemia    • Hypertension    • Left sided lacunar infarction (CMS/HCC)    • Obesity, Class II, BMI 35-39.9    • Systolic heart failure (CMS/HCC)    • Type 2 diabetes mellitus (CMS/HCC)      Active Hospital Problems    Diagnosis   • **Weakness of both lower  "extremities   • Vitamin B 12 deficiency   • Essential hypertension   • Mild CAD   • Intervertebral thoracic disc disorder with myelopathy, thoracic region     Added automatically from request for surgery [id number]     • Chronic atrial fibrillation   • Morbid obesity due to excess calories (CMS/[facility])   • Systolic congestive heart failure (CMS/[facility])   • Type 2 diabetes mellitus with microalbuminuria, without long-term current use of insulin (CMS/[facility])   • Long term current use of anticoagulant therapy     HPI: Bilateral lower extremity weakness and numbness unchanged.  Complains of increased muscle spasms to the left lower extremity.  No acute events overnight    PLAN:   Neuro: Stable.  Unchanged, subtle improvements in lower extremity strength and sensation   POD # 2 (5/19/2020) Thoracic  DISCECTOMY, T10/11. Costotransversectomy   Continue neurochecks every 4 hours   DEENA (LEFT) = 70 ml; removed   DEENA (RIGHT) = 100 ml; keep 1 more day      Postop CT thoracic: stable     CV: No acute issues.  Pulm: No acute issues.  Maintaining O2 sat.  : Pure wick catheter, strict Q4 hours bladder scans, in/out cath per policy  FEN: Tolerating regular diet  GI: Prophylaxis  ID: Covering undetected.  23-hour postoperative prophylactic antibiotics complete  Heme:  DVT prophylaxis, SCD's   Chronic anticoagulation on Coumadin for A. Fib  Pain: Tolerable at present  Dispo: Max PT/OT   Most likely require inpatient therapy at discharge    CHIEF COMPLAINT:   Bilateral LE weakness and numbness    Subjective  Symptoms stable.    Temp:  [98.1 °F (36.7 °C)-99.1 °F (37.3 °C)] 98.1 °F (36.7 °C)  Heart Rate:  [80-88] 84  Resp:  [16-20] 18  BP: (108-147)/(53-68) 124/57    Objective:  General Appearance:  Well-appearing and in no acute distress.    Vital signs: (most recent): Blood pressure 124/57, pulse 84, temperature 98.1 °F (36.7 °C), temperature source Oral, resp. rate 18, height 165.1 cm (65\"), weight 103 kg (227 lb 11.2 oz), SpO2 96 %.        "       Neurologic Exam     Mental Status   Oriented to person, place, and time.   Attention: normal. Concentration: normal.   Speech: speech is normal   Level of consciousness: alert    Bright and awake; O x 3; Follows commands without prompting.      Cranial Nerves     CN II   Visual fields full to confrontation.     CN III, IV, VI   Pupils are equal, round, and reactive to light.  Extraocular motions are normal.     CN V   Facial sensation intact.     CN VII   Facial expression full, symmetric.     CN VIII   CN VIII normal.     CN IX, X   CN IX normal.     CN XI   CN XI normal.     Motor Exam   Right arm tone: normal  Left arm tone: normal  Right arm pronator drift: absent  Left arm pronator drift: absent  Right leg tone: increased  Left leg tone: increased    Strength   Right deltoid: 5/5  Left deltoid: 5/5  Right biceps: 5/5  Left biceps: 5/5  Right triceps: 5/5  Left triceps: 5/5  Right wrist extension: 5/5  Left wrist extension: 5/5  Right iliopsoas: 5/5  Left iliopsoas: 4/5  Right quadriceps: 4/5  Left quadriceps: 1/5  Right anterior tibial: 4/5  Left anterior tibial: 3/5  Right posterior tibial: 4/5  Left posterior tibial: 4/5  Left EHL 2  Right EHL 4     Sensory Exam   Right arm light touch: normal  Left arm light touch: normal  Right leg light touch: decreased from knee  Left leg light touch: decreased from knee    Gait, Coordination, and Reflexes     Tremor   Resting tremor: absent  Intention tremor: absent  Action tremor: absent    Reflexes   Right : 4+  Left : 4+  Right plantar: upgoing  Left plantar: upgoing  Right Krueger: absent  Left Krueger: absent  Right ankle clonus: absent  Left ankle clonus: present  Right pendular knee jerk: absent  Left pendular knee jerk: absent    Drains:   External Urinary Catheter (Active)   Site Assessment Clean;Skin intact 5/18/2020  8:19 PM   Application/Removal external catheter changed;skin care provided 5/19/2020  4:05 AM   Collection Container Wall suction  5/18/2020  8:19 PM   Wall suction (mmHG) 45 mmHG 5/18/2020  6:39 PM   Securement Method Securing device 5/18/2020  8:19 PM   Output (mL) 900 mL 5/19/2020 12:24 AM     Imaging Results (Last 24 Hours)     Procedure Component Value Units Date/Time    CT Thoracic Spine Without Contrast [[id number]] Collected:  05/20/20 1126     Updated:  05/20/20 1141    Narrative:       CT THORACIC SPINE WO CONTRAST- 5/20/2020 10:55 AM CDT     HISTORY: post op; M51.04-Intervertebral disc disorders with myelopathy,  thoracic region; R29.898-Other symptoms and signs involving the  musculoskeletal system; Z78.9-Other specified health status     COMPARISON: MRI 5/15/2020      DOSE LENGTH PRODUCT: 942 mGy cm. Automated exposure control was also  utilized to decrease patient radiation dose.     TECHNIQUE: Serial helical tomographic images of the thoracic spine were  obtained without the use of intravenous contrast. Multiplanar  reformatted images were provided for review.      FINDINGS:   The patient is status post disc replacement at T10-T11. Postoperative  changes are noted. 2 drains are seen in the operative bed. No  malalignment is seen. Vertebral bodies are normal in height. Diffuse  anterior osteophytes are again seen. There is no bony narrowing of the  spinal canal.     The paravertebral soft tissues are unremarkable. The visualized lungs  are clear. The heart is enlarged. Mitral valve and coronary artery  calcifications are seen.       Impression:       1. Postoperative changes with drains in place. No definite postoperative  complication.  This report was finalized on 05/20/2020 11:38 by Dr. Odilon Rojo MD.        Lab Results (last 24 hours)     Procedure Component Value Units Date/Time    POC Glucose Once [[id number]]  (Abnormal) Collected:  05/21/20 0746    Specimen:  Blood Updated:  05/21/20 0757     Glucose 155 mg/dL      Comment: : [id number] Emre Beller ID: [id number]       Protime-INR [[id number]]  (Abnormal)  Collected:  05/21/20 0447    Specimen:  Blood Updated:  05/21/20 0553     Protime 13.9 Seconds      INR 1.11    Basic Metabolic Panel [[id number]]  (Abnormal) Collected:  05/21/20 0447    Specimen:  Blood Updated:  05/21/20 0550     Glucose 176 mg/dL      BUN 7 mg/dL      Creatinine 0.37 mg/dL      Sodium 136 mmol/L      Potassium 4.0 mmol/L      Chloride 99 mmol/L      CO2 27.0 mmol/L      Calcium 8.8 mg/dL      eGFR Non African Amer >150 mL/min/1.73      BUN/Creatinine Ratio 18.9     Anion Gap 10.0 mmol/L     Narrative:       GFR Normal >60  Chronic Kidney Disease <60  Kidney Failure <15      CBC (No Diff) [[id number]]  (Abnormal) Collected:  05/21/20 0447    Specimen:  Blood Updated:  05/21/20 0543     WBC 9.30 10*3/mm3      RBC 3.49 10*6/mm3      Hemoglobin 10.3 g/dL      Hematocrit 31.0 %      MCV 88.8 fL      MCH 29.5 pg      MCHC 33.2 g/dL      RDW 13.6 %      RDW-SD 43.8 fl      MPV 11.3 fL      Platelets 158 10*3/mm3     POC Glucose Once [[id number]]  (Abnormal) Collected:  05/20/20 1701    Specimen:  Blood Updated:  05/20/20 1714     Glucose 247 mg/dL      Comment: : [id number] Izaguirre) CarolynMeter ID: [id number]       POC Glucose Once [[id number]]  (Abnormal) Collected:  05/20/20 1114    Specimen:  Blood Updated:  05/20/20 1142     Glucose 213 mg/dL      Comment: : [id number] Izaguirre) CarolynMeter ID: [id number]           [id number]  Hal Lopez, APRN

## 2020-05-21 NOTE — THERAPY TREATMENT NOTE
Acute Care - Physical Therapy Treatment Note  [facility]     Patient Name: Anne-Marie Foreman  : 1955  MRN: [id number]  Today's Date: 2020  Onset of Illness/Injury or Date of Surgery: 20     Referring Physician: TYSON Alanis    Admit Date: 2020    Visit Dx:    ICD-10-CM ICD-9-CM   1. Intervertebral thoracic disc disorder with myelopathy, thoracic region M51.04 722.72   2. Weakness of both lower extremities R29.898 729.89   3. Decreased activities of daily living (ADL) Z78.9 V49.89     Patient Active Problem List   Diagnosis   • Weakness of both lower extremities   • Chronic atrial fibrillation   • Essential hypertension   • Long term current use of anticoagulant therapy   • Mild CAD   • Mixed hyperlipidemia   • Morbid obesity due to excess calories (CMS/HCC)   • Systolic congestive heart failure (CMS/HCC)   • Type 2 diabetes mellitus with microalbuminuria, without long-term current use of insulin (CMS/HCC)   • Acquired hypothyroidism   • Vitamin B 12 deficiency   • Intervertebral thoracic disc disorder with myelopathy, thoracic region       Therapy Treatment    Rehabilitation Treatment Summary     Row Name 20 1048             Treatment Time/Intention    Discipline  physical therapy assistant  -AB      Document Type  therapy note (daily note)  -AB      Subjective Information  complains of;pain  -AB2      Mode of Treatment  physical therapy  -AB2      Existing Precautions/Restrictions  fall;spinal  -AB2      Treatment Considerations/Comments  1 DEENA drain  -AB2      Recorded by [AB] Mariella Hernandez, PTA 20 1101  [AB2] Mariella Hernandez, PTA 20 1145      Row Name 20 1048             Bed Mobility Assessment/Treatment    Sidelying-Sit [place] (Bed Mobility)  verbal cues;minimum assist (75% patient effort);moderate assist (50% patient effort)  -AB      Sit-Sidelying [place] (Bed Mobility)  verbal cues;minimum assist (75% patient effort);moderate assist (50% patient  effort)  -AB      Assistive Device (Bed Mobility)  bed rails;draw sheet  -AB      Recorded by [AB] Mariella Hernandez, PTA 05/21/20 1145      Row Name 05/21/20 1048             Transfer Assessment/Treatment    Comment (Transfers)  Refused to try  -AB      Recorded by [AB] Mariella Hernandez, PTA 05/21/20 1145      Row Name 05/21/20 1048             Therapeutic Exercise    Lower Extremity (Therapeutic Exercise)  LAQ (long arc quad), bilateral  -AB      Lower Extremity Range of Motion (Therapeutic Exercise)  hip flexion/extension, bilateral;hip abduction/adduction, bilateral;ankle dorsiflexion/plantar flexion, bilateral  -AB      Exercise Type (Therapeutic Exercise)  AROM (active range of motion);AAROM (active assistive range of motion) AROM R LE and AA-AROM L LE  -AB      Position (Therapeutic Exercise)  seated  -AB      Sets/Reps (Therapeutic Exercise)  20 reps 2 sets  -AB      Recorded by [AB] Mariella Hernandez PTA 05/21/20 1145      Row Name 05/21/20 1048             Positioning and Restraints    Pre-Treatment Position  in bed  -AB      Post Treatment Position  bed  -AB      In Bed  fowlers;call light within reach  -AB      Recorded by [AB] Mariella Hernandez, PTA 05/21/20 1145      Row Name                Wound 05/14/20 2300 Right lower leg     Wound - Properties Group Date first assessed: 05/14/20 [TC] Time first assessed: 2300 [TC] Present on Hospital Admission: Y [TC] Side: Right [TC] Orientation: lower [TC] [facility]: leg [TC] Primary Wound Type: -- [TC], redness/scab  Recorded by:  [TC] Castleman, Tamara K, RN 05/15/20 0329    Row Name                Wound 05/19/20 1126 posterior thoracic spine Incision    Wound - Properties Group Date first assessed: 05/19/20 [CAROLYN] Time first assessed: 1126 [CAROLYN] Orientation: posterior [CAROLYN] [facility]: thoracic spine [CAROLYN] Primary Wound Type: Incision [CAROLYN] Recorded by:  [CAROLYN] Reji Snyder RN 05/19/20 1126      User Key  (r) = Recorded By, (t) = Taken By, (c) = Cosigned By    Initials  Name Effective Dates Discipline    AB Mariella Hernandez, PTA 08/02/16 -  PT    TC Castleman, Tamara K, RN 08/02/16 -  Nurse    CAROLYN Reji Snyder, RN 08/02/16 -  Nurse          Wound 05/14/20 2300 Right lower leg  (Active)   Dressing Appearance dry;intact 5/20/2020  7:55 PM   Base scab 5/20/2020  7:55 PM   Periwound pink 5/20/2020  7:55 PM   Periwound Temperature warm 5/20/2020  7:55 PM   Drainage Amount none 5/20/2020  7:55 PM   Care, Wound antimicrobial agent applied 5/20/2020  7:55 PM   Dressing Care, Wound foam 5/20/2020  7:55 PM       Wound 05/19/20 1126 posterior thoracic spine Incision (Active)   Dressing Appearance dry;intact 5/20/2020  7:55 PM   Closure TATO 5/20/2020  7:55 PM   Periwound intact;dry 5/20/2020  7:55 PM   Periwound Temperature warm 5/20/2020  7:55 PM   Periwound Skin Turgor soft 5/20/2020  7:55 PM   Dressing Care, Wound gauze 5/20/2020  7:55 PM           Physical Therapy Education                 Title: PT OT SLP Therapies (In Progress)     Topic: Physical Therapy (In Progress)     Point: Mobility training (Done)     Description:   Instruct learner(s) on safety and technique for assisting patient out of bed, chair or wheelchair.  Instruct in the proper use of assistive devices, such as walker, crutches, cane or brace.              Patient Friendly Description:   It's important to get you on your feet again, but we need to do so in a way that is safe for you. Falling has serious consequences, and your personal safety is the most important thing of all.        When it's time to get out of bed, one of us or a family member will sit next to you on the bed to give you support.     If your doctor or nurse tells you to use a walker, crutches, a cane, or a brace, be sure you use it every time you get out of bed, even if you think you don't need it.    Learning Progress Summary           Patient Acceptance, E, VU by MS at 5/20/2020 1104    Comment:  spinal restrictions    Acceptance, E, NR by MS at  5/15/2020 1315    Comment:  role of PT in her care                   Point: Home exercise program (Not Started)     Description:   Instruct learner(s) on appropriate technique for monitoring, assisting and/or progressing patient with therapeutic exercises and activities.              Learner Progress:   Not documented in this visit.          Point: Body mechanics (Not Started)     Description:   Instruct learner(s) on proper positioning and spine alignment for patient and/or caregiver during mobility tasks and/or exercises.              Learner Progress:   Not documented in this visit.          Point: Precautions (Not Started)     Description:   Instruct learner(s) on prescribed precautions during mobility and gait tasks              Learner Progress:   Not documented in this visit.                      User Key     Initials Effective Dates Name Provider Type Discipline    MS 06/19/18 -  Ramandeep Goodwin, PT, DPT, NCS Physical Therapist PT                PT Recommendation and Plan     Progress: improving  Outcome Summary: She was CGA with bedrail and HOB elevated to get to EOB. She sat EOB Independently and performed 20 reps sitting exercises AROM Rod LE. She needed min assist with L LE getting back in bed and Max assist of 2 to scoot up in bed.  Outcome Measures     Row Name 05/20/20 0750             How much help from another is currently needed...    Putting on and taking off regular lower body clothing?  1  -CS      Bathing (including washing, rinsing, and drying)  2  -CS      Toileting (which includes using toilet bed pan or urinal)  1  -CS      Putting on and taking off regular upper body clothing  3  -CS      Taking care of personal grooming (such as brushing teeth)  3  -CS      Eating meals  3  -CS      AM-PAC 6 Clicks Score (OT)  13  -CS         Functional Assessment    Outcome Measure Options  AM-PAC 6 Clicks Daily Activity (OT)  -CS        User Key  (r) = Recorded By, (t) = Taken By, (c) = Cosigned By     Initials Name Provider Type    CS Kim Abdi S, OTR/L, CNT Occupational Therapist         Time Calculation:   PT Charges     Row Name 05/21/20 1048             Time Calculation    Start Time  1048  -AB      Stop Time  1142  -AB      Time Calculation (min)  54 min  -AB      PT Received On  05/21/20  -AB         Time Calculation- PT    Total Timed Code Minutes- PT  54 minute(s)  -AB        User Key  (r) = Recorded By, (t) = Taken By, (c) = Cosigned By    Initials Name Provider Type    Mariella Mcclain, PTA Physical Therapy Assistant        Therapy Charges for Today     Code Description Service Date Service Provider Modifiers Qty    20361028829 HC PT THER PROC EA 15 MIN 5/21/2020 Mariella Hernandez, RORO GP 2    37369815336 HC PT THERAPEUTIC ACT EA 15 MIN 5/21/2020 Mariella Hernandez, RORO GP 2          PT G-Codes  Outcome Measure Options: AM-PAC 6 Clicks Daily Activity (OT)  AM-PAC 6 Clicks Score (PT): 8  AM-PAC 6 Clicks Score (OT): 13    Mariella Hernandez PTA  5/21/2020

## 2020-05-21 NOTE — THERAPY TREATMENT NOTE
Acute Care - Occupational Therapy Treatment Note  [facility]     Patient Name: Anne-Marie Foreman  : 1955  MRN: [id number]  Today's Date: 2020  Onset of Illness/Injury or Date of Surgery: 20  Date of Referral to OT: 20  Referring Physician: TYSON Alanis    Admit Date: 2020       ICD-10-CM ICD-9-CM   1. Intervertebral thoracic disc disorder with myelopathy, thoracic region M51.04 722.72   2. Weakness of both lower extremities R29.898 729.89   3. Decreased activities of daily living (ADL) Z78.9 V49.89     Patient Active Problem List   Diagnosis   • Weakness of both lower extremities   • Chronic atrial fibrillation   • Essential hypertension   • Long term current use of anticoagulant therapy   • Mild CAD   • Mixed hyperlipidemia   • Morbid obesity due to excess calories (CMS/HCC)   • Systolic congestive heart failure (CMS/HCC)   • Type 2 diabetes mellitus with microalbuminuria, without long-term current use of insulin (CMS/HCC)   • Acquired hypothyroidism   • Vitamin B 12 deficiency   • Intervertebral thoracic disc disorder with myelopathy, thoracic region     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HCC)    • Hyperlipidemia    • Hypertension    • Left sided lacunar infarction (CMS/HCC)    • Obesity, Class II, BMI 35-39.9    • Systolic heart failure (CMS/HCC)    • Type 2 diabetes mellitus (CMS/HCC)      Past Surgical History:   Procedure Laterality Date   • CARDIAC CATHETERIZATION  10/21/2009   • CARDIOVERSION  10/09/2013   • CATARACT EXTRACTION, BILATERAL     • CHOLECYSTECTOMY     • LAPAROSCOPIC TUBAL LIGATION     • LUMBAR DISCECTOMY N/A 2020    Procedure: Thoracic  DISCECTOMY, T10/11.  Costotransversectomy. Neuromonitoring;  Surgeon: Willy Diaz MD;  [facility]: [facility];  Service: Neurosurgery;  Laterality: N/A;   • RETINAL LASER  PROCEDURE         Therapy Treatment    Rehabilitation Treatment Summary     Row Name 05/21/20 1300 05/21/20 1048          Treatment Time/Intention    Discipline  occupational therapy assistant  -  physical therapy assistant  -AB     Document Type  therapy note (daily note)  -  therapy note (daily note)  -AB     Subjective Information  complains of;weakness;fatigue;pain  -CJ  complains of;pain  -AB2     Mode of Treatment  occupational therapy  -  physical therapy  -AB2     Patient Effort  adequate  -  --     Existing Precautions/Restrictions  fall;spinal Pain!  -CJ  fall;spinal  -AB2     Treatment Considerations/Comments  --  1 DEENA drain  -AB2     Recorded by [CJ] Se Gil COTA/L 05/21/20 1443 [AB] Mariella Hernandez, PTA 05/21/20 1101  [AB2] Mariella Hernandez, PTA 05/21/20 1145     Row Name 05/21/20 1300 05/21/20 1048          Bed Mobility Assessment/Treatment    Sidelying-Sit Lander (Bed Mobility)  --  verbal cues;minimum assist (75% patient effort);moderate assist (50% patient effort)  -AB     Sit-Sidelying Lander (Bed Mobility)  --  verbal cues;minimum assist (75% patient effort);moderate assist (50% patient effort)  -AB     Assistive Device (Bed Mobility)  --  bed rails;draw sheet  -AB     Comment (Bed Mobility)  fowlers in bed1  -CJ  --     Recorded by [CJ] Se Gil COTA/L 05/21/20 1443 [AB] Mariella Hernandez, PTA 05/21/20 1145     Row Name 05/21/20 1048             Transfer Assessment/Treatment    Comment (Transfers)  Refused to try  -AB      Recorded by [AB] Mariella Hernandez, PTA 05/21/20 1145      Row Name 05/21/20 1300             Motor Skills Assessment/Interventions    Additional Documentation  Therapeutic Exercise (Group)  -CJ      Recorded by [CJ] Se Gil COTA/L 05/21/20 1443      Row Name 05/21/20 1300 05/21/20 1048          Therapeutic Exercise    Upper Extremity (Therapeutic Exercise)  bicep curl, bilateral;wand exercises  -  --     Upper Extremity  Range of Motion (Therapeutic Exercise)  shoulder flexion/extension, bilateral;elbow flexion/extension, bilateral;wrist flexion/extension, bilateral  -CJ  --     Lower Extremity (Therapeutic Exercise)  --  LAQ (long arc quad), bilateral  -AB     Lower Extremity Range of Motion (Therapeutic Exercise)  --  hip flexion/extension, bilateral;hip abduction/adduction, bilateral;ankle dorsiflexion/plantar flexion, bilateral  -AB     Weight/Resistance (Therapeutic Exercise)  2 pounds;3 pounds  -CJ  --     Exercise Type (Therapeutic Exercise)  AROM (active range of motion)  -CJ  AROM (active range of motion);AAROM (active assistive range of motion) AROM R LE and AA-AROM L LE  -AB     Position (Therapeutic Exercise)  seated  -CJ  seated  -AB     Sets/Reps (Therapeutic Exercise)  1 set x 15 reps!  -CJ  20 reps 2 sets  -AB     Equipment (Therapeutic Exercise)  dowel essie/wand;free weight, barbell  -CJ  --     Expected Outcome (Therapeutic Exercise)  facilitate normal movement patterns;improve functional stability;improve functional tolerance, self-care activity;improve motor control;improve neuromuscular control;improve performance, BADLs;improve performance, fine motor coordination skills;improve performance, gait skills;improve performance, transfer skills;improve postural control;increase active range of motion  -  --     Recorded by [CJ] Se Gil COTA/L 05/21/20 1443 [AB] Mariella Hernandez, PTA 05/21/20 1145     Row Name 05/21/20 1300 05/21/20 1048          Positioning and Restraints    Pre-Treatment Position  in bed  -CJ  in bed  -AB     Post Treatment Position  bed  -  bed  -AB     In Bed  fowlers;call light within reach;encouraged to call for assist;side rails up x2;exit alarm on  -CJ  fowlers;call light within reach  -AB     Recorded by [CJ] Se Gil COTA/L 05/21/20 1443 [AB] Mariella Hernandez, PTA 05/21/20 1145     Row Name 05/21/20 1300             Pain Assessment    Additional Documentation  Pain  Scale 2: Word Pre/Post-Treatment (Group)  -CJ      Recorded by [CJ] Se Gil SHERIFF/L 05/21/20 1443      Row Name 05/21/20 1300             Pain Scale: Numbers Pre/Post-Treatment    Pain Scale: Numbers, Pretreatment  3/10  -CJ      Pain Scale: Numbers, Post-Treatment  3/10  -      Pain [facility] - Side  Bilateral  -CJ      Pain [facility] - Orientation  lower  -CJ      Pain [facility]  back  -CJ      Pain Intervention(s)  Rest  -CJ      Recorded by [CJ] Se Gil SHERIFF/L 05/21/20 1443      Row Name                Wound 05/14/20 2300 Right lower leg     Wound - Properties Group Date first assessed: 05/14/20 [TC] Time first assessed: 2300 [TC] Present on Hospital Admission: Y [TC] Side: Right [TC] Orientation: lower [TC] [facility]: leg [TC] Primary Wound Type: -- [TC], redness/scab  Recorded by:  [TC] Castleman, Tamara K, RN 05/15/20 0329    Row Name                Wound 05/19/20 1126 posterior thoracic spine Incision    Wound - Properties Group Date first assessed: 05/19/20 [CAROLYN] Time first assessed: 1126 [CAROLYN] Orientation: posterior [CAROLYN] [facility]: thoracic spine [CAROLYN] Primary Wound Type: Incision [CAROLYN] Recorded by:  [CAROLYN] Reji Snyder RN 05/19/20 1126    Row Name 05/21/20 1300             Outcome Summary/Treatment Plan (OT)    Daily Summary of Progress (OT)  progress towards functional goals is fair  -      Barriers to Overall Progress (OT)  Fall/pain/spinal!  -CJ      Plan for Continued Treatment (OT)  continue with ot poc!  -CJ      Recorded by [CJ] Se Gil SHERIFF/JAVI 05/21/20 1443        User Key  (r) = Recorded By, (t) = Taken By, (c) = Cosigned By    Initials Name Effective Dates Discipline    AB Mairella Hernandez, PTA 08/02/16 -  PT    CJ Se Gil COTA/L 08/02/16 -  OT    TC Castleman, Tamara K, RN 08/02/16 -  Nurse    CAROLYN Reji Snyder RN 08/02/16 -  Nurse        Wound 05/14/20 2300 Right lower leg  (Active)   Dressing Appearance dry;intact 5/21/2020  7:50 AM   Base scab  5/20/2020  7:55 PM   Periwound pink 5/20/2020  7:55 PM   Periwound Temperature warm 5/20/2020  7:55 PM   Drainage Amount none 5/21/2020  7:50 AM   Care, Wound antimicrobial agent applied 5/21/2020  7:50 AM   Dressing Care, Wound dressing changed;dressing applied;foam 5/21/2020  7:50 AM       Wound 05/19/20 1126 posterior thoracic spine Incision (Active)   Dressing Appearance dry;intact 5/21/2020  7:50 AM   Closure TATO 5/21/2020  7:50 AM   Periwound intact;dry 5/20/2020  7:55 PM   Periwound Temperature warm 5/20/2020  7:55 PM   Periwound Skin Turgor soft 5/20/2020  7:55 PM   Dressing Care, Wound other (see comments) 5/21/2020  7:50 AM       Occupational Therapy Education                 Title: PT OT SLP Therapies (In Progress)     Topic: Occupational Therapy (Done)     Point: ADL training (Done)     Description:   Instruct learner(s) on proper safety adaptation and remediation techniques during self care or transfers.   Instruct in proper use of assistive devices.              Learning Progress Summary           Patient Acceptance, E,TB, VU,DU,NR by  at 5/17/2020 1459    Comment:  Pt. performed bathing/dressing with 2 people assisting with transfers t0-from shower chair -bed!                   Point: Home exercise program (Done)     Description:   Instruct learner(s) on appropriate technique for monitoring, assisting and/or progressing therapeutic exercises/activities.              Learning Progress Summary           Patient Acceptance, E,TB, VU,DU,NR by  at 5/21/2020 1444    Comment:  Pt. performed ue exs to increase her transfers and bed mobility!    Acceptance, E,TB, VU,DU,NR by  at 5/16/2020 1142    Comment:  Pt. performed ue exs to increase her bed mobility and tranfer ability!                   Point: Precautions (Done)     Description:   Instruct learner(s) on prescribed precautions during self-care and functional transfers.              Learning Progress Summary           Patient Acceptance, E,TB,  VU,DU,NR by  at 5/21/2020 1444    Comment:  Pt. performed ue exs to increase her transfers and bed mobility!    Acceptance, E,TB, VU,DU,NR by  at 5/17/2020 1459    Comment:  Pt. performed bathing/dressing with 2 people assisting with transfers t0-from shower chair -bed!    Acceptance, E,TB, VU,DU,NR by  at 5/16/2020 1142    Comment:  Pt. performed ue exs to increase her bed mobility and tranfer ability!                   Point: Body mechanics (Done)     Description:   Instruct learner(s) on proper positioning and spine alignment during self-care, functional mobility activities and/or exercises.              Learning Progress Summary           Patient Acceptance, E,TB, VU,DU,NR by  at 5/21/2020 1444    Comment:  Pt. performed ue exs to increase her transfers and bed mobility!    Acceptance, E,TB, VU,DU,NR by  at 5/17/2020 1459    Comment:  Pt. performed bathing/dressing with 2 people assisting with transfers t0-from shower chair -bed!    Acceptance, E,TB, VU,DU,NR by  at 5/16/2020 1142    Comment:  Pt. performed ue exs to increase her bed mobility and tranfer ability!                               User Key     Initials Effective Dates Name Provider Type Discipline     08/02/16 -  Se Gil COTA/L Occupational Therapy Assistant OT                OT Recommendation and Plan  Outcome Summary/Treatment Plan (OT)  Daily Summary of Progress (OT): progress towards functional goals is fair  Barriers to Overall Progress (OT): Fall/pain/spinal!  Plan for Continued Treatment (OT): continue with ot poc!  Daily Summary of Progress (OT): progress towards functional goals is fair  Plan of Care Review  Plan of Care Reviewed With: patient  Plan of Care Reviewed With: patient  Outcome Measures     Row Name 05/21/20 1300 05/20/20 9082          How much help from another is currently needed...    Putting on and taking off regular lower body clothing?  1  -  1  -CS     Bathing (including washing, rinsing, and  drying)  2  -CJ  2  -CS     Toileting (which includes using toilet bed pan or urinal)  1  -  1  -CS     Putting on and taking off regular upper body clothing  3  -CJ  3  -CS     Taking care of personal grooming (such as brushing teeth)  3  -CJ  3  -CS     Eating meals  3  -CJ  3  -CS     AM-PAC 6 Clicks Score (OT)  13  -  13  -CS        Functional Assessment    Outcome Measure Options  AM-PAC 6 Clicks Daily Activity (OT)  -CJ  AM-PAC 6 Clicks Daily Activity (OT)  -CS       User Key  (r) = Recorded By, (t) = Taken By, (c) = Cosigned By    Initials Name Provider Type    Se Landers COTA/L Occupational Therapy Assistant    CS Kim Abdi, OTR/L, DIONTE Occupational Therapist           Time Calculation:   Time Calculation- OT     Row Name 05/21/20 1300             Time Calculation- OT    OT Start Time  1300  -      OT Stop Time  1317  -      OT Time Calculation (min)  17 min  -      Total Timed Code Minutes- OT  17 minute(s)  -      TCU Minutes- OT  17 min  -      OT Received On  05/21/20  -      OT Goal Re-Cert Due Date  05/30/20  -        User Key  (r) = Recorded By, (t) = Taken By, (c) = Cosigned By    Initials Name Provider Type    Se Landers COTA/L Occupational Therapy Assistant        Therapy Charges for Today     Code Description Service Date Service Provider Modifiers Qty    81716042863 HC OT THER PROC EA 15 MIN 5/21/2020 Se Gil COTA/L GO 1               JAZIEL Pascual/JAVI  5/21/2020

## 2020-05-21 NOTE — PLAN OF CARE
Problem: Patient Care Overview  Goal: Plan of Care Review  Outcome: Ongoing (interventions implemented as appropriate)  Flowsheets (Taken 5/21/2020 0809)  Progress: no change  Plan of Care Reviewed With: patient  Note:   A/Ox4. C/o pain in her back and LLE. Pain medications were administered to managed pain. Frequent repositioning was encouraged/assisted, however, at times pt refused to turn. Purwic in place and changed a couple times during shift. Drain(s) care performed. Dressing c/d/I. PPP. Safety maintained.

## 2020-05-21 NOTE — PLAN OF CARE
Problem: Patient Care Overview  Goal: Plan of Care Review  Flowsheets (Taken 5/21/2020 8167)  Progress: improving  Plan of Care Reviewed With: patient  Note:   Pt. Performed ue exs to increase her bed mobility and transfers, pt. Required vc's for reinforcement, states that she has exercised too much today!

## 2020-05-21 NOTE — PROGRESS NOTES
[facility] Medicine Services  INPATIENT PROGRESS NOTE    Patient Name: Anne-Marie Foreman  Date of Admission: 5/14/2020  Today's Date: 05/21/20  Length of Stay: 7  Primary Care Physician: Lorrie Wilhelm MD    Subjective   Chief Complaint: Follow-up lower extremity weakness    HPI:  Patient seen in bed.  Tearful.  Says she feels her feet are swelling and burning.  She feels she is getting worse.  Still has pain in her back.  No shortness of breath, cough, nausea, vomiting, diarrhea.  Tolerating p.o.      Review of Systems   All pertinent negatives and positives are as above. All other systems have been reviewed and are negative unless otherwise stated.     Objective    Temp:  [98.1 °F (36.7 °C)-99.1 °F (37.3 °C)] 98.1 °F (36.7 °C)  Heart Rate:  [80-88] 84  Resp:  [16-20] 18  BP: (108-147)/(53-68) 124/57  Physical Exam  GEN: Awake, alert, interactive, in NAD  HEENT: Atraumatic, PERRLA, EOMI, Anicteric, Trachea midline  Lungs: CTAB, no wheezing/rales/rhonchi  Heart: irreg/irreg, +S1/s2, no rub  ABD: soft, nt/nd, +BS, no guarding/rebound  Extremities: no cyanosis, b/l LE edema unchanged from prior  Skin: no rashes or lesions  Neuro: AAOx3, CN intact, normal UE exam b/l, bilateral lower extremity weakness left > than right, improving form prior      Results Review:  I have reviewed the labs, radiology results, and diagnostic studies.    Laboratory Data:   Results from last 7 days   Lab Units 05/21/20  0447 05/20/20  0500 05/19/20  0318   WBC 10*3/mm3 9.30 9.45 7.68   HEMOGLOBIN g/dL 10.3* 10.4* 12.8   HEMATOCRIT % 31.0* 31.4* 38.5   PLATELETS 10*3/mm3 158 164 185        Results from last 7 days   Lab Units 05/21/20  0447 05/20/20  0500 05/19/20  1345  05/19/20  0318  05/15/20  0352 05/14/20  2056   SODIUM mmol/L 136 137  --   --  137   < > 138 132*   SODIUM, ARTERIAL mmol/L  --   --  141   < >  --   --   --   --    POTASSIUM mmol/L 4.0 4.4  --   --  3.9   < > 3.9 4.6   CHLORIDE mmol/L 99 100   --   --  100   < > 99 91*   CO2 mmol/L 27.0 27.0  --   --  25.0   < > 25.0 26.0   BUN mg/dL 7* 11  --   --  13   < > 13 12   CREATININE mg/dL 0.37* 0.33*  --   --  0.35*   < > 0.50* 0.61   CALCIUM mg/dL 8.8 8.6  --   --  8.9   < > 9.1 9.4   BILIRUBIN mg/dL  --   --   --   --   --   --  0.4 0.3   ALK PHOS U/L  --   --   --   --   --   --  43 55   ALT (SGPT) U/L  --   --   --   --   --   --  24 31   AST (SGOT) U/L  --   --   --   --   --   --  21 25   GLUCOSE mg/dL 176* 151*  --   --  187*   < > 129* 247*    < > = values in this interval not displayed.       Culture Data:   No results found for: BLOODCX, URINECX, WOUNDCX, MRSACX, RESPCX, STOOLCX    Radiology Data:   Imaging Results (Last 24 Hours)     Procedure Component Value Units Date/Time    CT Thoracic Spine Without Contrast [[id number]] Collected:  05/20/20 1126     Updated:  05/20/20 1141    Narrative:       CT THORACIC SPINE WO CONTRAST- 5/20/2020 10:55 AM CDT     HISTORY: post op; M51.04-Intervertebral disc disorders with myelopathy,  thoracic region; R29.898-Other symptoms and signs involving the  musculoskeletal system; Z78.9-Other specified health status     COMPARISON: MRI 5/15/2020      DOSE LENGTH PRODUCT: 942 mGy cm. Automated exposure control was also  utilized to decrease patient radiation dose.     TECHNIQUE: Serial helical tomographic images of the thoracic spine were  obtained without the use of intravenous contrast. Multiplanar  reformatted images were provided for review.      FINDINGS:   The patient is status post disc replacement at T10-T11. Postoperative  changes are noted. 2 drains are seen in the operative bed. No  malalignment is seen. Vertebral bodies are normal in height. Diffuse  anterior osteophytes are again seen. There is no bony narrowing of the  spinal canal.     The paravertebral soft tissues are unremarkable. The visualized lungs  are clear. The heart is enlarged. Mitral valve and coronary artery  calcifications are seen.        Impression:       1. Postoperative changes with drains in place. No definite postoperative  complication.  This report was finalized on 05/20/2020 11:38 by Dr. Odilon Rojo MD.          I have reviewed the patient's current medications.     Assessment/Plan     Active Hospital Problems    Diagnosis   • **Weakness of both lower extremities   • Vitamin B 12 deficiency   • Essential hypertension   • Mild CAD   • Intervertebral thoracic disc disorder with myelopathy, thoracic region     Added automatically from request for surgery [id number]     • Chronic atrial fibrillation   • Morbid obesity due to excess calories (CMS/[facility])   • Systolic congestive heart failure (CMS/[facility])   • Type 2 diabetes mellitus with microalbuminuria, without long-term current use of insulin (CMS/[facility])   • Long term current use of anticoagulant therapy       #1 bilateral lower extremity weakness -has been having issues since February per report.  Does have a history of neuropathy in the setting of diabetes.  Does have B12 deficiency which was replaced with multiple 1000 micrograms daily injections.  MRI of the thoracic spine did have evidence for stenosis with some cord flattening and myelomalacia at t10, s/p OR on 5/19 with neurosurgery for thoracic discectomy and costotransversectomy. Drains and post op care per nsx.  Patient seems to have more movement over the last 2 days postop.  Swelling is stable.  Will need SNF at discharge.    #2 chronic A. Fib -continue digoxin and Toprol-XL.  INR was 2.65 on arrival but has fallen subtherapeutic with holding for the OR. Now 1.18.  Currently getting prophylactic heparin only.  Per neurosurgery okay to resume Coumadin 1 week postop.    #3 chronic systolic heart failure - with low EF of 25%.  No LifeVest.  Follows with cardiology at [facility].  Continue Toprol-XL, losartan, Lasix.  Not sure why patient is not on Aldactone but defer to her outpatient cardiologist.  Patient also appears to be on Coreg and Toprol  and I am not sure why she is on dual therapy, was seen by cardiology recommended continuing current treatment however.    #4 DM 2 -on Levemir and sliding scale coverage.  Continue hypoglycemia protocol.    #5 history of CAD -no current issues with chest pain.  Recent negative stress in 2/20. On beta-blocker, aspirin, statin.    Discharge Planning: Ongoing pending post op course. Plan for SNF at d/c, will plan for discharge when okay with neurosurgery.  CM following.  Patient has bed offer at [place].    Marty Kc DO   05/21/20   08:59

## 2020-05-21 NOTE — PLAN OF CARE
Pt AAO x4. Medicated prn for pain x1, pt refused pain meds most of the shift. Voiding, incontinent with purewick. Bladder scanned q4hrs. Up with PT only. L DEENA drain removed today by TYSON Alanis. VSS. Safety maintained. Will continue to monitor.  Problem: Patient Care Overview  Goal: Plan of Care Review  Outcome: Ongoing (interventions implemented as appropriate)  Flowsheets  Taken 5/21/2020 1814 by Anikta Dumas RN  Progress: improving  Taken 5/21/2020 1445 by Se Gil COTA/L  Plan of Care Reviewed With: patient

## 2020-05-22 VITALS
TEMPERATURE: 98.2 F | OXYGEN SATURATION: 94 % | BODY MASS INDEX: 37.94 KG/M2 | RESPIRATION RATE: 16 BRPM | HEIGHT: 65 IN | DIASTOLIC BLOOD PRESSURE: 55 MMHG | WEIGHT: 227.7 LBS | SYSTOLIC BLOOD PRESSURE: 115 MMHG | HEART RATE: 81 BPM

## 2020-05-22 LAB
A-TOCOPHEROL VIT E SERPL-MCNC: 10.4 MG/L (ref 9–29)
GAMMA TOCOPHEROL SERPL-MCNC: 1.7 MG/L (ref 0.5–4.9)
GLUCOSE BLDC GLUCOMTR-MCNC: 193 MG/DL (ref 70–130)
GLUCOSE BLDC GLUCOMTR-MCNC: 270 MG/DL (ref 70–130)
SARS-COV-2 RNA RESP QL NAA+PROBE: NOT DETECTED
VIT A SERPL-MCNC: 50.3 UG/DL (ref 22–69.5)

## 2020-05-22 PROCEDURE — 82962 GLUCOSE BLOOD TEST: CPT

## 2020-05-22 PROCEDURE — 97530 THERAPEUTIC ACTIVITIES: CPT

## 2020-05-22 PROCEDURE — 25010000002 HEPARIN (PORCINE) PER 1000 UNITS: Performed by: NEUROLOGICAL SURGERY

## 2020-05-22 PROCEDURE — 99024 POSTOP FOLLOW-UP VISIT: CPT | Performed by: NURSE PRACTITIONER

## 2020-05-22 PROCEDURE — 97110 THERAPEUTIC EXERCISES: CPT

## 2020-05-22 PROCEDURE — 87635 SARS-COV-2 COVID-19 AMP PRB: CPT | Performed by: INTERNAL MEDICINE

## 2020-05-22 PROCEDURE — 97535 SELF CARE MNGMENT TRAINING: CPT

## 2020-05-22 PROCEDURE — 63710000001 INSULIN LISPRO (HUMAN) PER 5 UNITS: Performed by: NEUROLOGICAL SURGERY

## 2020-05-22 RX ORDER — OXYCODONE AND ACETAMINOPHEN 10; 325 MG/1; MG/1
1 TABLET ORAL EVERY 4 HOURS PRN
Qty: 18 TABLET | Refills: 0 | Status: SHIPPED | OUTPATIENT
Start: 2020-05-22 | End: 2020-05-25

## 2020-05-22 RX ORDER — TIZANIDINE 4 MG/1
4 TABLET ORAL EVERY 8 HOURS PRN
Start: 2020-05-22 | End: 2020-07-21 | Stop reason: SDUPTHER

## 2020-05-22 RX ORDER — CALCIUM CARBONATE 200(500)MG
2 TABLET,CHEWABLE ORAL 4 TIMES DAILY PRN
Start: 2020-05-22

## 2020-05-22 RX ORDER — PSEUDOEPHEDRINE HCL 30 MG
100 TABLET ORAL 2 TIMES DAILY
Start: 2020-05-22

## 2020-05-22 RX ORDER — WARFARIN SODIUM 7.5 MG/1
3.75 TABLET ORAL
Start: 2020-05-26

## 2020-05-22 RX ORDER — LOSARTAN POTASSIUM 25 MG/1
25 TABLET ORAL
Start: 2020-05-23

## 2020-05-22 RX ORDER — INSULIN GLARGINE 100 [IU]/ML
15 INJECTION, SOLUTION SUBCUTANEOUS NIGHTLY
Refills: 12
Start: 2020-05-22

## 2020-05-22 RX ORDER — IPRATROPIUM BROMIDE AND ALBUTEROL SULFATE 2.5; .5 MG/3ML; MG/3ML
3 SOLUTION RESPIRATORY (INHALATION) EVERY 4 HOURS PRN
Status: DISCONTINUED | OUTPATIENT
Start: 2020-05-22 | End: 2020-05-22 | Stop reason: HOSPADM

## 2020-05-22 RX ORDER — AMOXICILLIN 250 MG
2 CAPSULE ORAL NIGHTLY
Start: 2020-05-22

## 2020-05-22 RX ORDER — POLYETHYLENE GLYCOL 3350 17 G/17G
17 POWDER, FOR SOLUTION ORAL DAILY PRN
Start: 2020-05-22

## 2020-05-22 RX ADMIN — FLUTICASONE PROPIONATE 2 SPRAY: 50 SPRAY, METERED NASAL at 08:28

## 2020-05-22 RX ADMIN — GLIPIZIDE 10 MG: 10 TABLET ORAL at 08:25

## 2020-05-22 RX ADMIN — FUROSEMIDE 20 MG: 20 TABLET ORAL at 08:25

## 2020-05-22 RX ADMIN — HEPARIN SODIUM 5000 UNITS: 5000 INJECTION, SOLUTION INTRAVENOUS; SUBCUTANEOUS at 08:26

## 2020-05-22 RX ADMIN — MUPIROCIN: 20 OINTMENT TOPICAL at 08:28

## 2020-05-22 RX ADMIN — METOPROLOL SUCCINATE 50 MG: 50 TABLET, EXTENDED RELEASE ORAL at 08:26

## 2020-05-22 RX ADMIN — INSULIN LISPRO 2 UNITS: 100 INJECTION, SOLUTION INTRAVENOUS; SUBCUTANEOUS at 08:36

## 2020-05-22 RX ADMIN — LEVOTHYROXINE SODIUM 125 MCG: 125 TABLET ORAL at 06:15

## 2020-05-22 RX ADMIN — DIGOXIN 250 MCG: 250 TABLET ORAL at 13:04

## 2020-05-22 RX ADMIN — DOCUSATE SODIUM 100 MG: 100 CAPSULE ORAL at 08:25

## 2020-05-22 RX ADMIN — INSULIN LISPRO 6 UNITS: 100 INJECTION, SOLUTION INTRAVENOUS; SUBCUTANEOUS at 13:04

## 2020-05-22 RX ADMIN — VITAMIN D 1000 UNITS: 25 TAB ORAL at 08:25

## 2020-05-22 RX ADMIN — OXYCODONE HYDROCHLORIDE AND ACETAMINOPHEN 1 TABLET: 10; 325 TABLET ORAL at 08:24

## 2020-05-22 RX ADMIN — FAMOTIDINE 20 MG: 20 TABLET, FILM COATED ORAL at 08:25

## 2020-05-22 RX ADMIN — POLYETHYLENE GLYCOL 3350 17 G: 17 POWDER, FOR SOLUTION ORAL at 10:40

## 2020-05-22 NOTE — PLAN OF CARE
Problem: Patient Care Overview  Goal: Plan of Care Review  Outcome: Ongoing (interventions implemented as appropriate)  Flowsheets (Taken 5/22/2020 0312)  Progress: improving  Plan of Care Reviewed With: patient  Note:   A/Ox4. C/o of some pain this shift. Pain medication offered, at times will refuse to take. Frequent turning encouraged/assisted. Bladder scanned q 4 hours. Pt is able to move and sit herself much better than previous shift. Incision is c/d/I. Safety maintained.

## 2020-05-22 NOTE — PLAN OF CARE
Problem: Patient Care Overview  Goal: Plan of Care Review  Outcome: Ongoing (interventions implemented as appropriate)  Flowsheets (Taken 5/22/2020 1043)  Progress: no change  Outcome Summary: With head of bed elevated she was min assist to get to EOB, T/F to and from shower chair was max-dependent x 2. To lay back down in bed was Max assist of 2 and to roll side to side was Mod assist of 2. Pt is not safe to T/F to a chair, She didn't try help and push through her legs.

## 2020-05-22 NOTE — DISCHARGE INSTRUCTIONS
[facility] Neurosurgery    Postoperative care following spine surgery  Dear Patient,  You have recently undergone spine surgery (Thoracic  DISCECTOMY, T10/11. Costotransversectomy) and are now ready to go home. These written instructions are intended to help you to recover quickly.  • If you have ANY QUESTIONS about your condition prior to discharge please ask Dr. Diaz. In particular, if you have concerns about going home discuss them now. We do not want you to go until you are completely comfortable leaving the hospital.   • If you have ANY QUESTIONS about your condition after you go home call your doctor. The number is [phone] which is answered 24 hours a day. During regular working hours a  will connect you to your doctor, one of his partners, or one of our nurses. At night or on weekends the answering service will connect you with the physician on call. DO NOT HESITATE to call. We want to help you with any problems.     Deep vein thrombosis/ pulmonary embolus  Some patients who undergo surgery develop blood clots in the veins of the legs. These clots can cause pain or swelling in the legs, or may cause no obvious problem. They can break free from the legs and travel to the lungs causing shortness of breath and/or chest pain.you develop pain or swelling in your legs after surgery, call your doctor. If you develop breathing problems or chest pain after surgery, call 911.    If you are being discharged to inpatient rehabilitation we will keep you on a drug to thin your blood called heparin. You have been receiving this while you were in the hospital.     Neurological Deficit  Neurological deficits are problems with brain function like speech difficulty, weakness, numbness, imbalance, etc. These deficits may be present before or after spine surgery. Prior to discharge your doctor will make sure that all treatment needed to help you recover from such deficits has been instituted. He will also  make sure that these deficits are stable or improving. After you go home, if you think any of your spine problems are getting worse, not better, it may be a sign of bleeding, infection, or other problems. Call your doctor. He will order tests and prescribe treatment as needed.    Activity Restrictions  In general after surgery you will be on restricted activities for several weeks to several months depending on the nature of your operation. For six weeks you should avoid heavy lifting (over 8 lbs or roughly a gallon of milk), bending, or stooping. You may be released by Dr. Diaz earlier. You may return to driving when you feel comfortable enough that you can safely handle your vehicle AND you are not taking narcotic pain medications. This may be as early as 10 - 14 days, but could be longer as instructed by your neurosurgeon. After surgery you may gradually increase your activities, as you are able to tolerate. Walking is an excellent low impact exercise to begin after surgery. You should try to make regular walks of 15 to 30 minutes part of your postoperative recovery as you become able to do so. It typically requires a period of days to a few weeks to reach this level of activity and should be done in a gradual fashion. Your return to work will depend on your job requirements. In general, you may return to light duty work as soon as you feel comfortable and are not taking routine narcotic pain medications.    Medication  It is important to take your medication EXACTLY as prescribed. Some patients are reluctant to take pain medication. It is perfectly fine to take pain medication for several weeks after surgery. We want to eliminate pain whenever possible. Many pain medications can cause nausea (sick to your stomach), constipation (inability to poop), or itching. Nausea may be minimized by taking the medication with food. Constipation can be relieved by taking stool softeners and/ or laxatives that you can  purchase over the counter as needed.    It is important to realize that no pain after surgery is an unrealistic expectation.  Pain medication will never reduce your pain score to zero.  The goal of pain medicine is to reduce your pain to the point you can move, take care of yourself, and participate in therapy.  Make sure to work with your caregiver to determine what is an adequate level of pain control to promote healthy movement and then take your medication to reach this goal.      Please taper your pain medications to avoid long term addiction.  Week 1: Take your pain medication no more than ever 4 hours as needed for severe pain.  Week 2: Take your pain medication no more than ever 6 hours as needed for severe pain.  Week 3: Take your pain medication no more than ever 8 hours as needed for severe pain.  Week 4: Take your pain medication no more than ever 12 hours as needed for severe pain.  Week 5: Take your pain medication no more than ever 24 hours as needed for severe pain.  By Week 6 you should be off of all pain medication.     Wound Care  Your incision is held together with dissolvable sutures that do not need to be removed.     Seventy-two hours after surgery it is OK to get the wound wet, so you can take a shower or bath.     You do not need to put any medication (like Neosporin or Vitamin E) on the wound. Scrubbing the wound should be avoided until the staples nondissolvable sutures come out.     Heating pads have the potential to cause very serious burns, especially in patients using narcotic pain medications (e.g. Oxycodone, Oxycontin, etc.) Do not use heating pads during your recovery.      No nicotine products, including second-hand smoke, gum or patches. Nicotine will delay healing and increase the likelihood of a surgical complication. For help quitting, call the Quitline: 1-401.164.1909     Potential wound problems include the following:  • Infection--If the wound becomes red, tender, swollen,  or warm it may be infected. Infection is often accompanied by fever. If you think your wound might be infected you should call your doctor. Often you can send us a picture of the wound so we can better evaluate it.   • Drainage--Fluid should not drain from your wound. If it does, call your doctor. Colored fluid may indicate infection. Clear fluid may indicate leakage of spinal fluid.   • Dehiscence--If the wound does not heal properly it may open up along the staple line. This is called dehiscence. Call us immediately.   • Sutures--Occasionally, one of the buried threads (sutures) may work through the skin. If you think this has happened call your doctor.   • Swelling--Spinal fluid or blood may collect under the skin. This is usually harmless, but needs to be evaluated. Call your doctor.     How to contact your doctor  Dr. Diaz and his team did your surgery and, therefore, are likely to know more about your condition than any other physicians. We are immediately available to help you with any problems after surgery. Please call us for any concerns at the following numbers:  • Doctor’s office:  [phone] (answered 24 hours a day)   • [facility] : [phone] (alternative emergency number for on-call neurosurgeon)     Specific instructions related to your surgery  Diet: no restrictions, eat a heart healthy diet. If you are diabetic, tightly controlling your blood sugar over the next 2 weeks is crucial in controlling infection.     Activity: as tolerated, no heavy lifting or strenuous exercise for at least 2 weeks.    Please do not use NSAIDs (Ibuprofen, Toradol, etc) for 4 weeks following spinal fusion, as this can prevent bone growth. Tylenol is acceptable as an over the counter pain management.     Follow up:   Follow up with with Hal MEHTA in 2 weeks for post op wound check and with Dr. Diaz in the neurosurgery clinic ([phone]) in 6-8 weeks. X-rays of the thoracic spine  prior to arrival. We will schedule this appointment for you.            Sincerely,        Major Diaz MD, PhD, MPH

## 2020-05-22 NOTE — PROGRESS NOTES
RT Nebulizer Protocol    Assessment tool to be used for patients with existing breathing treatments ordered by hospitalists                                                                  0  1  2  3  4      Respiratory History   No Smoking    Smoking History      1 Pack/Day      Pulmonary Disease   x   Exacerbation        Respiratory Rate   Normal   x   20-25      Dyspneic      Accessory Muscles      Severe Dyspnea        Breath Sounds   Clear x   Crackles      Crackles/ Rhonchi      Wheezing      Absent/ Severe Wheezing      Chest   X-ray   Clear      1 Lobe Infiltration/ Consolidation/ PE      2 Lobe Same Lung Infiltration/ Consolidation/ PE       2 Lobe Infiltration/ Both Lungs/ Consolidation/ PE      Both Lungs/ More Than 1 Lobe/ Atelectasis/ Consolidation/  PE        Cough   Strong Non- Productive x   Excessive Secretions/ Strong Cough      Excessive Secretions/ Weak Cough      Thick Bronchial Secretions/ Weak Cough    Thick Bronchial Secretions/ No Cough          20=Q3 and Q2 PRN    Bronchopulmonary Hygiene (CPT)   Q4 ATC Copious secretions, dyspnea, unable to sleep, mucus plug    QID & Q4 PRN Moderate secretions    TID Small amounts of secretions w/ poor cough and history of secretions    BID Unable to deep breathe and cough spontaneously       Lung Expansion Therapy (PEP)   Q4 & PRN at night Severe atelectasis, poor oxygenation    QID  High risk for persistent atelectasis, existence of same    TID At risk for developing atelectasis    BID Unable to deep breathe and cough spontaneously     Instruct, 1 follow up Patients able to perform well on their own        Cont Duoneb q4 prn

## 2020-05-22 NOTE — THERAPY TREATMENT NOTE
Acute Care - Physical Therapy Treatment Note  [facility]     Patient Name: Anne-Marie Foreman  : 1955  MRN: [id number]  Today's Date: 2020  Onset of Illness/Injury or Date of Surgery: 20     Referring Physician: TYSON Alanis    Admit Date: 2020    Visit Dx:    ICD-10-CM ICD-9-CM   1. Intervertebral thoracic disc disorder with myelopathy, thoracic region M51.04 722.72   2. Weakness of both lower extremities R29.898 729.89   3. Decreased activities of daily living (ADL) Z78.9 V49.89   4. Status post thoracic spinal fusion Z98.1 V45.4     Patient Active Problem List   Diagnosis   • Weakness of both lower extremities   • Chronic atrial fibrillation   • Essential hypertension   • Long term current use of anticoagulant therapy   • Mild CAD   • Mixed hyperlipidemia   • Morbid obesity due to excess calories (CMS/[facility])   • Systolic congestive heart failure (CMS/[facility])   • Type 2 diabetes mellitus with microalbuminuria, without long-term current use of insulin (CMS/[facility])   • Acquired hypothyroidism   • Vitamin B 12 deficiency   • Intervertebral thoracic disc disorder with myelopathy, thoracic region       Therapy Treatment    Rehabilitation Treatment Summary     Row Name 20 1049 20 1021          Treatment Time/Intention    Discipline  physical therapy assistant  -AB  occupational therapy assistant  -CJ     Document Type  therapy note (daily note)  -AB  therapy note (daily note)  -CJ     Subjective Information  complains of;weakness;fatigue;pain  -AB2  complains of;weakness;fatigue;pain  -CJ     Mode of Treatment  physical therapy  -AB2  occupational therapy  -CJ     Patient Effort  --  adequate  -CJ     Existing Precautions/Restrictions  fall;spinal  -AB2  fall;spinal  -CJ     Recorded by [AB] Mariella Hernandez, PTA 20 1051  [AB2] Mariella Hernandez, PTA 20 1148 [CJ] Se Gil COTA/L 20 1144     Row Name 20 1049 20 1021          Bed Mobility Assessment/Treatment     Scooting/Bridging [place] (Bed Mobility)  maximum assist (25% patient effort);2 person assist  -AB  maximum assist (25% patient effort)  -CJ     Supine-Sit [place] (Bed Mobility)  verbal cues;minimum assist (75% patient effort)  -AB  moderate assist (50% patient effort);2 person assist  -CJ     Sit-Supine [place] (Bed Mobility)  maximum assist (25% patient effort);dependent (less than 25% patient effort);2 person assist  -AB  maximum assist (25% patient effort);2 person assist  -CJ     Bed Mobility, Safety Issues  decreased use of legs for bridging/pushing  -AB  --     Assistive Device (Bed Mobility)  bed rails;draw sheet;head of bed elevated  -AB  --     Recorded by [AB] Mariella Hernandez, PTA 05/22/20 1148 [CJ] Se Gil SHERIFF/L 05/22/20 1144     Row Name 05/22/20 1049 05/22/20 1021          Bed-Chair Transfer    Bed-Chair [place] (Transfers)  verbal cues;maximum assist (25% patient effort);dependent (less than 25% patient effort);2 person assist x2  -AB  set up;verbal cues;maximum assist (25% patient effort);2 person assist  -CJ     Recorded by [AB] Mariella Hernandez, PTA 05/22/20 1148 [CJ] Se Gil SHERFIF/L 05/22/20 1144     Row Name 05/22/20 1049 05/22/20 1021          Sit-Stand Transfer    Sit-Stand [place] (Transfers)  other (see comments) couldn't fully stand  -AB  set up;verbal cues;maximum assist (25% patient effort);2 person assist  -CJ     Recorded by [AB] Mariella Hernandez, PTA 05/22/20 1148 [CJ] Se Gil SHERIFF/L 05/22/20 1144     Row Name 05/22/20 1021             ADL Assessment/Intervention    40454 - OT Self Care/Mgmt Minutes  40  -CJ      BADL Assessment/Intervention  bathing;upper body dressing;lower body dressing  -CJ      Recorded by [CJ] Se Gil SHERIFF/L 05/22/20 1144      Row Name 05/22/20 1021             Bathing Assessment/Intervention    Bathing [place] Level  bathing skills;chest/trunk;set up;verbal cues;moderate assist  (50% patient effort);lower body;distal lower extremities/feet;upper body;upper extremities;proximal lower extremities;perineal area;supervision  -CJ      Assistive Devices (Bathing)  bath mitt;grab bars/tub rail;hand-held shower spray hose;shower chair  -CJ      Bathing Position  supported sitting  -CJ      Recorded by [CJ] Se Gil COTA/L 05/22/20 1144      Row Name 05/22/20 1021             Upper Body Dressing Assessment/Training    Upper Body Dressing [place] Level  doff;don;front opening garment;set up;verbal cues;minimum assist (75% patient effort)  -CJ      Upper Body Dressing Position  supported sitting  -CJ      Recorded by [CJ] Se Gil COTA/L 05/22/20 1144      Row Name 05/22/20 1021             Lower Body Dressing Assessment/Training    Lower Body Dressing [place] Level  doff;don;socks;set up;verbal cues;maximum assist (25% patient effort)  -CJ      Lower Body Dressing Position  supported sitting  -CJ      Recorded by [CJ] Se Gil COTA/L 05/22/20 1144      Row Name 05/22/20 1021             Motor Skills Assessment/Interventions    Additional Documentation  Therapeutic Exercise (Group)  -CJ      Recorded by [CJ] Se Gil COTA/L 05/22/20 1144      Row Name 05/22/20 1021             Therapeutic Exercise    Upper Extremity (Therapeutic Exercise)  bicep curl, bilateral;wand exercises  -      Upper Extremity Range of Motion (Therapeutic Exercise)  shoulder flexion/extension, bilateral;elbow flexion/extension, bilateral;wrist flexion/extension, bilateral  -      Weight/Resistance (Therapeutic Exercise)  2 pounds;3 pounds  -      Exercise Type (Therapeutic Exercise)  AROM (active range of motion)  -CJ      Position (Therapeutic Exercise)  seated  -CJ      Sets/Reps (Therapeutic Exercise)  2 sets x 20 reps!  -      Equipment (Therapeutic Exercise)  dowel essie/wand;free weight, barbell  -      Expected Outcome (Therapeutic Exercise)  facilitate normal  movement patterns;improve functional stability;improve functional tolerance, self-care activity;improve motor control;improve neuromuscular control;improve performance, BADLs;improve performance, fine motor coordination skills;improve performance, gait skills;improve performance, transfer skills;improve postural control;increase active range of motion  -CJ      Recorded by [CJ] Se Gil COTA/L 05/22/20 1144      Row Name 05/22/20 1049 05/22/20 1021          Positioning and Restraints    Pre-Treatment Position  in bed  -AB  in bed  -CJ     Post Treatment Position  bed  -AB  bed  -CJ     In Bed  fowlers;call light within reach  -AB  fowlers;call light within reach;encouraged to call for assist;side rails up x2  -CJ     Recorded by [AB] Mariella Hernandez, PTA 05/22/20 1148 [CJ] Se Gil COTA/L 05/22/20 1144     Row Name 05/22/20 1021             Pain Assessment    Additional Documentation  Pain Scale 2: Word Pre/Post-Treatment (Group)  -CJ      Recorded by [CJ] Se iGl COTA/L 05/22/20 1144      Row Name 05/22/20 1049 05/22/20 1021          Pain Scale: Numbers Pre/Post-Treatment    Pain Scale: Numbers, Pretreatment  4/10  -AB  4/10  -CJ     Pain Scale: Numbers, Post-Treatment  4/10  -AB  4/10  -CJ     Pain [facility] - Side  --  Bilateral  -CJ     Pain [facility] - Orientation  lower  -AB  lower  -CJ     Pain [facility]  back  -AB  back  -CJ     Pain Intervention(s)  Repositioned  -AB  Repositioned;Rest;Shower  -CJ     Recorded by [AB] Mariella Hernandez, PTA 05/22/20 1148 [CJ] Se Gil SHERIFF/L 05/22/20 1144     Row Name                Wound 05/14/20 2300 Right lower leg     Wound - Properties Group Date first assessed: 05/14/20 [TC] Time first assessed: 2300 [TC] Present on Hospital Admission: Y [TC] Side: Right [TC] Orientation: lower [TC] [facility]: leg [TC] Primary Wound Type: -- [TC], redness/scab  Recorded by:  [TC] Castleman, Tamara K, RN 05/15/20 0329    Row Name                 Wound 05/19/20 1126 posterior thoracic spine Incision    Wound - Properties Group Date first assessed: 05/19/20 [CAROLYN] Time first assessed: 1126 [CAROLYN] Orientation: posterior [CAROLYN] [facility]: thoracic spine [CAROLYN] Primary Wound Type: Incision [CAROLYN] Recorded by:  [CAROLYN] Reji Snyder RN 05/19/20 1126    Row Name 05/22/20 1021             Outcome Summary/Treatment Plan (OT)    Daily Summary of Progress (OT)  progress towards functional goals is fair  -      Barriers to Overall Progress (OT)  Fall/spinal/pain!  -CJ      Plan for Continued Treatment (OT)  plan d/c!  -CJ      Recorded by [CJ] Se Gil, SHERIFF/L 05/22/20 1144        User Key  (r) = Recorded By, (t) = Taken By, (c) = Cosigned By    Initials Name Effective Dates Discipline    AB Mariella Hernandez, PTA 08/02/16 -  PT    CJ Se Gil, SHERIFF/L 08/02/16 -  OT    TC Castleman, Tamara K, RN 08/02/16 -  Nurse    Reji Cameron RN 08/02/16 -  Nurse          Wound 05/14/20 2300 Right lower leg  (Active)   Dressing Appearance dry;intact 5/22/2020  8:00 AM   Base scab 5/22/2020  8:00 AM   Periwound pink 5/21/2020  8:10 PM   Periwound Temperature warm 5/21/2020  8:10 PM   Drainage Amount none 5/22/2020  8:00 AM   Care, Wound antimicrobial agent applied 5/21/2020  8:10 PM   Dressing Care, Wound foam 5/22/2020  8:00 AM       Wound 05/19/20 1126 posterior thoracic spine Incision (Active)   Dressing Appearance no drainage 5/22/2020  8:00 AM   Closure TATO 5/22/2020  8:00 AM   Periwound intact;dry 5/21/2020  8:10 PM   Periwound Temperature warm 5/21/2020  8:10 PM   Periwound Skin Turgor soft 5/21/2020  8:10 PM   Dressing Care, Wound other (see comments) 5/21/2020  8:10 PM   Periwound Care, Wound dry periwound area maintained 5/21/2020  8:10 PM           Physical Therapy Education                 Title: PT OT SLP Therapies (In Progress)     Topic: Physical Therapy (In Progress)     Point: Mobility training (Done)     Description:   Instruct learner(s) on safety  and technique for assisting patient out of bed, chair or wheelchair.  Instruct in the proper use of assistive devices, such as walker, crutches, cane or brace.              Patient Friendly Description:   It's important to get you on your feet again, but we need to do so in a way that is safe for you. Falling has serious consequences, and your personal safety is the most important thing of all.        When it's time to get out of bed, one of us or a family member will sit next to you on the bed to give you support.     If your doctor or nurse tells you to use a walker, crutches, a cane, or a brace, be sure you use it every time you get out of bed, even if you think you don't need it.    Learning Progress Summary           Patient Acceptance, E, VU by MS at 5/20/2020 1104    Comment:  spinal restrictions    Acceptance, E, NR by MS at 5/15/2020 1315    Comment:  role of PT in her care                   Point: Home exercise program (Not Started)     Description:   Instruct learner(s) on appropriate technique for monitoring, assisting and/or progressing patient with therapeutic exercises and activities.              Learner Progress:   Not documented in this visit.          Point: Body mechanics (Not Started)     Description:   Instruct learner(s) on proper positioning and spine alignment for patient and/or caregiver during mobility tasks and/or exercises.              Learner Progress:   Not documented in this visit.          Point: Precautions (Not Started)     Description:   Instruct learner(s) on prescribed precautions during mobility and gait tasks              Learner Progress:   Not documented in this visit.                      User Key     Initials Effective Dates Name Provider Type Discipline    MS 06/19/18 -  Ramandeep Goodwin, PT, DPT, NCS Physical Therapist PT                PT Recommendation and Plan     Progress: no change  Outcome Summary: With head of bed elevated she was min assist to get to EOB, T/F to  and from shower chair was max-dependent x 2. To lay back down in bed was Max assist of 2 and to roll side to side was Mod assist of 2. Pt is not safe to T/F to a chair, She didn't try help and push through her legs.  Outcome Measures     Row Name 05/22/20 1021 05/21/20 1300 05/20/20 0750       How much help from another is currently needed...    Putting on and taking off regular lower body clothing?  1  -CJ  1  -CJ  1  -CS    Bathing (including washing, rinsing, and drying)  2  -CJ  2  -CJ  2  -CS    Toileting (which includes using toilet bed pan or urinal)  1  -CJ  1  -CJ  1  -CS    Putting on and taking off regular upper body clothing  3  -CJ  3  -CJ  3  -CS    Taking care of personal grooming (such as brushing teeth)  3  -CJ  3  -CJ  3  -CS    Eating meals  3  -CJ  3  -CJ  3  -CS    AM-PAC 6 Clicks Score (OT)  13  -CJ  13  -CJ  13  -CS       Functional Assessment    Outcome Measure Options  --  AM-PAC 6 Clicks Daily Activity (OT)  -CJ  AM-PAC 6 Clicks Daily Activity (OT)  -CS      User Key  (r) = Recorded By, (t) = Taken By, (c) = Cosigned By    Initials Name Provider Type    CJ Se Gil, SHERIFF/L Occupational Therapy Assistant    CS Kim Abdi S, OTR/L, CNT Occupational Therapist         Time Calculation:   PT Charges     Row Name 05/22/20 1049             Time Calculation    Start Time  1049  -AB      Stop Time  1140  -AB      Time Calculation (min)  51 min  -AB      PT Received On  05/22/20  -AB         Time Calculation- PT    Total Timed Code Minutes- PT  39 minute(s)  -AB        User Key  (r) = Recorded By, (t) = Taken By, (c) = Cosigned By    Initials Name Provider Type    AB Mariella Hernandez PTA Physical Therapy Assistant        Therapy Charges for Today     Code Description Service Date Service Provider Modifiers Qty    90111143513 HC PT THER PROC EA 15 MIN 5/21/2020 Mariella Hernandez PTA GP 2    30624888440 HC PT THERAPEUTIC ACT EA 15 MIN 5/21/2020 Mariella Hernandez PTA GP 2    [id number]  HC PT THERAPEUTIC ACT EA 15 MIN 5/22/2020 Mariella Hernandez, PTA GP 2    79185982751 HC PT THER PROC EA 15 MIN 5/22/2020 Mariella Hernandez, PTA GP 1          PT G-Codes  Outcome Measure Options: AM-PAC 6 Clicks Daily Activity (OT)  AM-PAC 6 Clicks Score (PT): 8  AM-PAC 6 Clicks Score (OT): 13    Mariella Hernandez, RORO  5/22/2020

## 2020-05-22 NOTE — PROGRESS NOTES
NEUROSURGERY DAILY PROGRESS NOTE    ASSESSMENT:   Anne-Marie Foreman is a 64 y.o. with a significant medical history of A. fib, chronic anticoagulation on Coumadin and aspirin, CAD, hypertension, diabetes, hyperlipidemia, Fabry's disease, and obesity.  She presents today with a new problem of lower extremity numbness and weakness that began post fall on February 28, 2020.  Physical exam findings of increased LE tone, bilateral lower extremity weakness, left greater than right (LEFT: IP 2, quad 3, dorsiflex 4-, plantarflex 4+, EHL 2) (RIGHT: IP 5, quads 4-, dorsiflex 4-, plantarflex 4+, EHL 4+), absent bilateral LE reflexes, positive bilateral Babinski's, 2 beats of clonus on the left.  Their imaging shows no acute fractures or malalignment, small central disc protrusion at T7-8 resulting in central canal narrowing without significant stenosis, large disc protrusion and degenerative changes at T10-11 resulting in T2 signal change within the central cord and severe central canal stenosis.  MRI of the lumbar spine shows no acute fractures or malalignment, no bone marrow signal change, no significant STIR signal change, no T2 signal change within central cord, conus terminates at normal level, congenitally shortened pedicles and multilevel facet arthropathy and ligamentous flavum hypertrophy resulting in central canal and bilateral foraminal stenosis at L2-3, worse at L3-4 and L4-5.  Ultrasound of the bilateral lower extremities show no DVT or superficial thrombus.     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HCC)    • Hyperlipidemia    • Hypertension    • Left sided lacunar infarction (CMS/HCC)    • Obesity, Class II, BMI 35-39.9    • Systolic heart failure (CMS/HCC)    • Type 2 diabetes mellitus (CMS/HCC)      Active Hospital Problems    Diagnosis   • **Weakness of both lower  "extremities   • Vitamin B 12 deficiency   • Essential hypertension   • Mild CAD   • Intervertebral thoracic disc disorder with myelopathy, thoracic region     Added automatically from request for surgery [id number]     • Chronic atrial fibrillation   • Morbid obesity due to excess calories (CMS/[facility])   • Systolic congestive heart failure (CMS/[facility])   • Type 2 diabetes mellitus with microalbuminuria, without long-term current use of insulin (CMS/[facility])   • Long term current use of anticoagulant therapy     PLAN:   Neuro: Stable.  Unchanged, subtle improvements in lower extremity strength and sensation   POD # 3 (5/19/2020) Thoracic  DISCECTOMY, T10/11. Costotransversectomy   Continue neurochecks every 4 hours   Incision: Clean, dry, intact   DEENA (LEFT) = 70 ml; removed   DEENA (RIGHT) = 30 ml; removed      Postop CT thoracic: stable     CV: No acute issues.  Pulm: No acute issues.  Maintaining O2 sat.  : Pure wick catheter, strict Q4 hours bladder scans, in/out cath per policy  FEN: Tolerating regular diet  GI: Prophylaxis  ID: Covering undetected.  23-hour postoperative prophylactic antibiotics complete  Heme:  DVT prophylaxis, SCD's   Chronic anticoagulation on Coumadin for A. Fib  Pain: Tolerable at present  Dispo: Max PT/OT   Accepted for inpatient placement upon discharge   Ready for DC from a neurosurgical perspective.    CHIEF COMPLAINT:   Bilateral LE weakness and numbness    Subjective  Symptoms stable.    Temp:  [98 °F (36.7 °C)-98.8 °F (37.1 °C)] 98.2 °F (36.8 °C)  Heart Rate:  [74-85] 81  Resp:  [14-18] 16  BP: (115-137)/(51-61) 115/55    Objective:  General Appearance:  Well-appearing and in no acute distress.    Vital signs: (most recent): Blood pressure 115/55, pulse 81, temperature 98.2 °F (36.8 °C), temperature source Oral, resp. rate 16, height 165.1 cm (65\"), weight 103 kg (227 lb 11.2 oz), SpO2 94 %.        Neurologic Exam     Mental Status   Oriented to person, place, and time.   Attention: normal. " Concentration: normal.   Speech: speech is normal   Level of consciousness: alert    Bright and awake; O x 3; Follows commands without prompting.      Cranial Nerves     CN II   Visual fields full to confrontation.     CN III, IV, VI   Pupils are equal, round, and reactive to light.  Extraocular motions are normal.     CN V   Facial sensation intact.     CN VII   Facial expression full, symmetric.     CN VIII   CN VIII normal.     CN IX, X   CN IX normal.     CN XI   CN XI normal.     Motor Exam   Right arm tone: normal  Left arm tone: normal  Right arm pronator drift: absent  Left arm pronator drift: absent  Right leg tone: increased  Left leg tone: increased    Strength   Right deltoid: 5/5  Left deltoid: 5/5  Right biceps: 5/5  Left biceps: 5/5  Right triceps: 5/5  Left triceps: 5/5  Right wrist extension: 5/5  Left wrist extension: 5/5  Right iliopsoas: 5/5  Left iliopsoas: 4/5  Right quadriceps: 4/5  Left quadriceps: 1/5  Right anterior tibial: 4/5  Left anterior tibial: 3/5  Right posterior tibial: 4/5  Left posterior tibial: 4/5  Left EHL 2  Right EHL 4     Sensory Exam   Right arm light touch: normal  Left arm light touch: normal  Right leg light touch: decreased from knee  Left leg light touch: decreased from knee    Gait, Coordination, and Reflexes     Tremor   Resting tremor: absent  Intention tremor: absent  Action tremor: absent    Reflexes   Right : 4+  Left : 4+  Right plantar: upgoing  Left plantar: upgoing  Right Krueger: absent  Left Krueger: absent  Right ankle clonus: absent  Left ankle clonus: present  Right pendular knee jerk: absent  Left pendular knee jerk: absent    Drains:   External Urinary Catheter (Active)   Site Assessment Clean;Skin intact 5/18/2020  8:19 PM   Application/Removal external catheter changed;skin care provided 5/19/2020  4:05 AM   Collection Container Wall suction 5/18/2020  8:19 PM   Wall suction (mmHG) 45 mmHG 5/18/2020  6:39 PM   Securement Method Securing  device 5/18/2020  8:19 PM   Output (mL) 900 mL 5/19/2020 12:24 AM     Imaging Results (Last 24 Hours)     ** No results found for the last 24 hours. **        Lab Results (last 24 hours)     Procedure Component Value Units Date/Time    Vitamin A & E [[id number]] Collected:  05/16/20 0502    Specimen:  Blood Updated:  05/22/20 0409     Vitamin A 50.3 ug/dL      Comment: Reference intervals for vitamin A determined from [facility] internal  studies. Individuals with vitamin A less than 20 ug/dL are considered  vitamin A deficient and those with serum concentrations less than  10 ug/dL are considered severely deficient.  This test was developed and its performance characteristics  determined by [facility]. It has not been cleared or approved  by the Food and Drug Administration.        Vitamin E (Alpha Tocopherol) 10.4 mg/L      Vitamin E (Gamma Tocopherol) 1.7 mg/L      Comment: Reference intervals for alpha and gamma-tocopherol determined from  National Health and Nutrition Examination Survey, 1159-0837.  Individuals with alpha-tocopherol levels less than 5.0 mg/L are  considered vitamin E deficient.       Narrative:       Test(s) 070141-Vitamin E(Alpha Tocopherol); 995206-  Vitamin E(Gamma Tocopherol)  was developed and its performance characteristics determined  by [facility]. It has not been cleared or approved by the Food  and Drug Administration.  Performed at:  [address]  [id number]  : Luigi Vuong MD, Phone:  [phone]    POC Glucose Once [[id number]]  (Abnormal) Collected:  05/21/20 1709    Specimen:  Blood Updated:  05/21/20 1719     Glucose 220 mg/dL      Comment: : [id number] Piter Hartman ID: [id number]       POC Glucose Once [[id number]]  (Abnormal) Collected:  05/21/20 1122    Specimen:  Blood Updated:  05/21/20 1134     Glucose 267 mg/dL      Comment: : [id number] An Sethi ID: [id number]           [id number]  Hal Lopez APRN

## 2020-05-22 NOTE — PROGRESS NOTES
Continued Stay Note  [facility]     Patient Name: Anne-Marie Foreman  MRN: [id number]  Today's Date: 5/22/2020    Admit Date: 5/14/2020    Discharge Plan     Row Name 05/22/20 1043       Plan    Final Discharge Disposition Code  03 - skilled nursing facility (SNF)    Final Note  Pt is able to discharge to [facility] today if COVID-19 testing comes back negative.   Phone: [phone]         Fax: [phone]            Row Name 05/22/20 0830       Plan    Plan Comments  Pt is able to discharge to [facility] when medically stable but will need a negative COVID-19 test within 48 hours prior to discharge.         Discharge Codes    No documentation.       Expected Discharge Date and Time     Expected Discharge Date Expected Discharge Time    May 22, 2020             Nancy Beck

## 2020-05-22 NOTE — PLAN OF CARE
Problem: Patient Care Overview  Goal: Plan of Care Review  Flowsheets (Taken 5/22/2020 2188)  Progress: improving  Plan of Care Reviewed With: patient  Note:   Pt. Performed ue exs to increase her act. Pro and adl ability! After ue exs, pt. Desired a shower, this morrell/l and Pta stated that pt. Was unable to transfer safely without a chance of injury to therapist or pt.  Was wanting pt. To have a shower, thus 3 people transfered pt. To and from rolling shower chair for shower, pt. Made no attempt during transfer to assist !In shower pt. Required mod. Assist with shower, max. Assist Le dressing, min. Assist ue, pt. In bed in fowlers position!

## 2020-05-22 NOTE — THERAPY TREATMENT NOTE
Acute Care - Occupational Therapy Treatment Note  [facility]     Patient Name: Anne-Marie Foreman  : 1955  MRN: [id number]  Today's Date: 2020  Onset of Illness/Injury or Date of Surgery: 20  Date of Referral to OT: 20  Referring Physician: TYSON Alanis    Admit Date: 2020       ICD-10-CM ICD-9-CM   1. Intervertebral thoracic disc disorder with myelopathy, thoracic region M51.04 722.72   2. Weakness of both lower extremities R29.898 729.89   3. Decreased activities of daily living (ADL) Z78.9 V49.89   4. Status post thoracic spinal fusion Z98.1 V45.4     Patient Active Problem List   Diagnosis   • Weakness of both lower extremities   • Chronic atrial fibrillation   • Essential hypertension   • Long term current use of anticoagulant therapy   • Mild CAD   • Mixed hyperlipidemia   • Morbid obesity due to excess calories (CMS/HCC)   • Systolic congestive heart failure (CMS/HCC)   • Type 2 diabetes mellitus with microalbuminuria, without long-term current use of insulin (CMS/HCC)   • Acquired hypothyroidism   • Vitamin B 12 deficiency   • Intervertebral thoracic disc disorder with myelopathy, thoracic region     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HCC)    • Hyperlipidemia    • Hypertension    • Left sided lacunar infarction (CMS/HCC)    • Obesity, Class II, BMI 35-39.9    • Systolic heart failure (CMS/HCC)    • Type 2 diabetes mellitus (CMS/HCC)      Past Surgical History:   Procedure Laterality Date   • CARDIAC CATHETERIZATION  10/21/2009   • CARDIOVERSION  10/09/2013   • CATARACT EXTRACTION, BILATERAL     • CHOLECYSTECTOMY     • LAPAROSCOPIC TUBAL LIGATION     • LUMBAR DISCECTOMY N/A 2020    Procedure: Thoracic  DISCECTOMY, T10/11.  Costotransversectomy. Neuromonitoring;  Surgeon: Willy Diaz MD;  [facility]: [facility] OR;   Service: Neurosurgery;  Laterality: N/A;   • RETINAL LASER PROCEDURE         Therapy Treatment    Rehabilitation Treatment Summary     Row Name 05/22/20 1049 05/22/20 1021          Treatment Time/Intention    Discipline  physical therapy assistant  -AB  occupational therapy assistant  -CJ     Document Type  therapy note (daily note)  -AB  therapy note (daily note)  -CJ     Subjective Information  complains of;weakness;fatigue;pain  -AB2  complains of;weakness;fatigue;pain  -CJ     Mode of Treatment  physical therapy  -AB2  occupational therapy  -CJ     Patient Effort  --  adequate  -CJ     Existing Precautions/Restrictions  fall;spinal  -AB2  fall;spinal  -CJ     Recorded by [AB] Mariella Hernandez, PTA 05/22/20 1051  [AB2] Mariella Hernandez, [facility] 05/22/20 1148 [CJ] Se Gil COTA/L 05/22/20 1144     Row Name 05/22/20 1049 05/22/20 1021          Bed Mobility Assessment/Treatment    Scooting/Bridging [place] (Bed Mobility)  maximum assist (25% patient effort);2 person assist  -AB  maximum assist (25% patient effort)  -CJ     Supine-Sit [place] (Bed Mobility)  verbal cues;minimum assist (75% patient effort)  -AB  moderate assist (50% patient effort);2 person assist  -CJ     Sit-Supine [place] (Bed Mobility)  maximum assist (25% patient effort);dependent (less than 25% patient effort);2 person assist  -AB  maximum assist (25% patient effort);2 person assist  -CJ     Bed Mobility, Safety Issues  decreased use of legs for bridging/pushing  -AB  --     Assistive Device (Bed Mobility)  bed rails;draw sheet;head of bed elevated  -AB  --     Recorded by [AB] Mariella Hernandez, PTA 05/22/20 1148 [CJ] Se Gil COTA/L 05/22/20 1144     Row Name 05/22/20 1049 05/22/20 1021          Bed-Chair Transfer    Bed-Chair [place] (Transfers)  verbal cues;maximum assist (25% patient effort);dependent (less than 25% patient effort);2 person assist x2  -AB  set up;verbal cues;maximum assist (25% patient  effort);2 person assist  -CJ     Recorded by [AB] Mariella Hernandez, PTA 05/22/20 1148 [CJ] Se Gil, SHERIFF/L 05/22/20 1144     Row Name 05/22/20 1049 05/22/20 1021          Sit-Stand Transfer    Sit-Stand [place] (Transfers)  other (see comments) couldn't fully stand  -AB  set up;verbal cues;maximum assist (25% patient effort);2 person assist  -CJ     Recorded by [AB] Mariella Hernandez, PTA 05/22/20 1148 [CJ] Se Gil SHERIFF/L 05/22/20 1144     Row Name 05/22/20 1021             ADL Assessment/Intervention    27359 - OT Self Care/Mgmt Minutes  40  -      BADL Assessment/Intervention  bathing;upper body dressing;lower body dressing  -CJ      Recorded by [CJ] Se Gil SHERIFF/L 05/22/20 1144      Row Name 05/22/20 1021             Bathing Assessment/Intervention    Bathing [place] Level  bathing skills;chest/trunk;set up;verbal cues;moderate assist (50% patient effort);lower body;distal lower extremities/feet;upper body;upper extremities;proximal lower extremities;perineal area;supervision  -CJ      Assistive Devices (Bathing)  bath mitt;grab bars/tub rail;hand-held shower spray hose;shower chair  -CJ      Bathing Position  supported sitting  -CJ      Recorded by [CJ] Se Gil SHERIFF/L 05/22/20 1144      Row Name 05/22/20 1021             Upper Body Dressing Assessment/Training    Upper Body Dressing [place] Level  doff;don;front opening garment;set up;verbal cues;minimum assist (75% patient effort)  -CJ      Upper Body Dressing Position  supported sitting  -CJ      Recorded by [CJ] Se Gil SHERIFF/L 05/22/20 1144      Row Name 05/22/20 1021             Lower Body Dressing Assessment/Training    Lower Body Dressing [place] Level  doff;don;socks;set up;verbal cues;maximum assist (25% patient effort)  -CJ      Lower Body Dressing Position  supported sitting  -CJ      Recorded by [CJ] Se Gil SHERIFF/L 05/22/20 1144      Row Name 05/22/20 1021              Motor Skills Assessment/Interventions    Additional Documentation  Therapeutic Exercise (Group)  -CJ      Recorded by [CJ] Se Gil COTA/L 05/22/20 1144      Row Name 05/22/20 1021             Therapeutic Exercise    Upper Extremity (Therapeutic Exercise)  bicep curl, bilateral;wand exercises  -CJ      Upper Extremity Range of Motion (Therapeutic Exercise)  shoulder flexion/extension, bilateral;elbow flexion/extension, bilateral;wrist flexion/extension, bilateral  -CJ      Weight/Resistance (Therapeutic Exercise)  2 pounds;3 pounds  -CJ      Exercise Type (Therapeutic Exercise)  AROM (active range of motion)  -CJ      Position (Therapeutic Exercise)  seated  -CJ      Sets/Reps (Therapeutic Exercise)  2 sets x 20 reps!  -CJ      Equipment (Therapeutic Exercise)  dowel essie/wand;free weight, barbell  -CJ      Expected Outcome (Therapeutic Exercise)  facilitate normal movement patterns;improve functional stability;improve functional tolerance, self-care activity;improve motor control;improve neuromuscular control;improve performance, BADLs;improve performance, fine motor coordination skills;improve performance, gait skills;improve performance, transfer skills;improve postural control;increase active range of motion  -CJ      Recorded by [CJ] Se Gil COTA/L 05/22/20 1144      Row Name 05/22/20 1049 05/22/20 1021          Positioning and Restraints    Pre-Treatment Position  in bed  -AB  in bed  -CJ     Post Treatment Position  bed  -AB  bed  -CJ     In Bed  fowlers;call light within reach  -AB  fowlers;call light within reach;encouraged to call for assist;side rails up x2  -CJ     Recorded by [AB] Mariella Hernandez, PTA 05/22/20 1148 [CJ] Se Gil COTA/L 05/22/20 1144     Row Name 05/22/20 1021             Pain Assessment    Additional Documentation  Pain Scale 2: Word Pre/Post-Treatment (Group)  -CJ      Recorded by [CJ] Se Gil COTA/L 05/22/20 1144      Row Name  05/22/20 1049 05/22/20 1021          Pain Scale: Numbers Pre/Post-Treatment    Pain Scale: Numbers, Pretreatment  4/10  -AB  4/10  -CJ     Pain Scale: Numbers, Post-Treatment  4/10  -AB  4/10  -CJ     Pain [facility] - Side  --  Bilateral  -CJ     Pain [facility] - Orientation  lower  -AB  lower  -CJ     Pain [facility]  back  -AB  back  -CJ     Pain Intervention(s)  Repositioned  -AB  Repositioned;Rest;Shower  -CJ     Recorded by [AB] Mariella Hernandez, PTA 05/22/20 1148 [CJ] Se Gil, SHERIFF/L 05/22/20 1144     Row Name                Wound 05/14/20 2300 Right lower leg     Wound - Properties Group Date first assessed: 05/14/20 [TC] Time first assessed: 2300 [TC] Present on Hospital Admission: Y [TC] Side: Right [TC] Orientation: lower [TC] [facility]: leg [TC] Primary Wound Type: -- [TC], redness/scab  Recorded by:  [TC] Castleman, Tamara K, RN 05/15/20 0329    Row Name                Wound 05/19/20 1126 posterior thoracic spine Incision    Wound - Properties Group Date first assessed: 05/19/20 [CAROLYN] Time first assessed: 1126 [CAORLYN] Orientation: posterior [CAROLYN] [facility]: thoracic spine [CAROLYN] Primary Wound Type: Incision [CAROLYN] Recorded by:  [CAROLYN] Reji Snyder RN 05/19/20 1126    Row Name 05/22/20 1021             Outcome Summary/Treatment Plan (OT)    Daily Summary of Progress (OT)  progress towards functional goals is fair  -      Barriers to Overall Progress (OT)  Fall/spinal/pain!  -      Plan for Continued Treatment (OT)  plan d/c!  -      Recorded by [CJ] Se Gil SHERIFF/L 05/22/20 1144        User Key  (r) = Recorded By, (t) = Taken By, (c) = Cosigned By    Initials Name Effective Dates Discipline    AB Mariella Hernandez, PTA 08/02/16 -  PT    CJ Se Gil, SHERIFF/L 08/02/16 -  OT    TC Castleman, Tamara K, RN 08/02/16 -  Nurse    CAROLYN Reji Snyder RN 08/02/16 -  Nurse        Wound 05/14/20 2300 Right lower leg  (Active)   Dressing Appearance dry;intact 5/22/2020  8:00 AM   Base scab  5/22/2020  8:00 AM   Periwound pink 5/21/2020  8:10 PM   Periwound Temperature warm 5/21/2020  8:10 PM   Drainage Amount none 5/22/2020  8:00 AM   Care, Wound antimicrobial agent applied 5/21/2020  8:10 PM   Dressing Care, Wound foam 5/22/2020  8:00 AM       Wound 05/19/20 1126 posterior thoracic spine Incision (Active)   Dressing Appearance no drainage 5/22/2020  8:00 AM   Closure TATO 5/22/2020  8:00 AM   Periwound intact;dry 5/21/2020  8:10 PM   Periwound Temperature warm 5/21/2020  8:10 PM   Periwound Skin Turgor soft 5/21/2020  8:10 PM   Dressing Care, Wound other (see comments) 5/21/2020  8:10 PM   Periwound Care, Wound dry periwound area maintained 5/21/2020  8:10 PM       Occupational Therapy Education                 Title: PT OT SLP Therapies (In Progress)     Topic: Occupational Therapy (Done)     Point: ADL training (Done)     Description:   Instruct learner(s) on proper safety adaptation and remediation techniques during self care or transfers.   Instruct in proper use of assistive devices.              Learning Progress Summary           Patient Acceptance, E,TB, VU,DU,NR by  at 5/22/2020 1145    Comment:  Pt. performed ue exs and adl bathing/dressing!    Acceptance, E,TB, VU,DU,NR by  at 5/17/2020 1459    Comment:  Pt. performed bathing/dressing with 2 people assisting with transfers t0-from shower chair -bed!                   Point: Home exercise program (Done)     Description:   Instruct learner(s) on appropriate technique for monitoring, assisting and/or progressing therapeutic exercises/activities.              Learning Progress Summary           Patient Acceptance, E,TB, VU,DU,NR by  at 5/22/2020 1145    Comment:  Pt. performed ue exs and adl bathing/dressing!    Acceptance, E,TB, VU,DU,NR by  at 5/21/2020 1444    Comment:  Pt. performed ue exs to increase her transfers and bed mobility!    Acceptance, E,TB, VU,DU,NR by  at 5/16/2020 1142    Comment:  Pt. performed ue exs to increase  her bed mobility and tranfer ability!                   Point: Precautions (Done)     Description:   Instruct learner(s) on prescribed precautions during self-care and functional transfers.              Learning Progress Summary           Patient Acceptance, E,TB, VU,DU,NR by  at 5/22/2020 1145    Comment:  Pt. performed ue exs and adl bathing/dressing!    Acceptance, E,TB, VU,DU,NR by  at 5/21/2020 1444    Comment:  Pt. performed ue exs to increase her transfers and bed mobility!    Acceptance, E,TB, VU,DU,NR by  at 5/17/2020 1459    Comment:  Pt. performed bathing/dressing with 2 people assisting with transfers t0-from shower chair -bed!    Acceptance, E,TB, VU,DU,NR by  at 5/16/2020 1142    Comment:  Pt. performed ue exs to increase her bed mobility and tranfer ability!                   Point: Body mechanics (Done)     Description:   Instruct learner(s) on proper positioning and spine alignment during self-care, functional mobility activities and/or exercises.              Learning Progress Summary           Patient Acceptance, E,TB, VU,DU,NR by  at 5/22/2020 1145    Comment:  Pt. performed ue exs and adl bathing/dressing!    Acceptance, E,TB, VU,DU,NR by  at 5/21/2020 1444    Comment:  Pt. performed ue exs to increase her transfers and bed mobility!    Acceptance, E,TB, VU,DU,NR by  at 5/17/2020 1459    Comment:  Pt. performed bathing/dressing with 2 people assisting with transfers t0-from shower chair -bed!    Acceptance, E,TB, VU,DU,NR by  at 5/16/2020 1142    Comment:  Pt. performed ue exs to increase her bed mobility and tranfer ability!                               User Key     Initials Effective Dates Name Provider Type Discipline     08/02/16 -  Se Gil COTA/L Occupational Therapy Assistant OT                OT Recommendation and Plan  Outcome Summary/Treatment Plan (OT)  Daily Summary of Progress (OT): progress towards functional goals is fair  Barriers to Overall Progress  (OT): Fall/spinal/pain!  Plan for Continued Treatment (OT): plan d/c!  Daily Summary of Progress (OT): progress towards functional goals is fair  Plan of Care Review  Plan of Care Reviewed With: patient  Plan of Care Reviewed With: patient  Outcome Measures     Row Name 05/22/20 1021 05/21/20 1300 05/20/20 0750       How much help from another is currently needed...    Putting on and taking off regular lower body clothing?  1  -CJ  1  -CJ  1  -CS    Bathing (including washing, rinsing, and drying)  2  -CJ  2  -CJ  2  -CS    Toileting (which includes using toilet bed pan or urinal)  1  -CJ  1  -CJ  1  -CS    Putting on and taking off regular upper body clothing  3  -CJ  3  -CJ  3  -CS    Taking care of personal grooming (such as brushing teeth)  3  -CJ  3  -CJ  3  -CS    Eating meals  3  -CJ  3  -CJ  3  -CS    AM-PAC 6 Clicks Score (OT)  13  -  13  -  13  -CS       Functional Assessment    Outcome Measure Options  --  AM-PAC 6 Clicks Daily Activity (OT)  -CJ  AM-PAC 6 Clicks Daily Activity (OT)  -CS      User Key  (r) = Recorded By, (t) = Taken By, (c) = Cosigned By    Initials Name Provider Type    Se Landers COTA/L Occupational Therapy Assistant    CS Kim Abdi, OTR/L, CNT Occupational Therapist           Time Calculation:   Time Calculation- OT     Row Name 05/22/20 1021             Time Calculation- OT    OT Start Time  1021  -      OT Stop Time  1145  -      OT Time Calculation (min)  84 min  -      Total Timed Code Minutes- OT  84 minute(s)  -      TCU Minutes- OT  84 min  -      OT Received On  05/22/20  -      OT Goal Re-Cert Due Date  05/30/20  -         Timed Charges    32341 - OT Self Care/Mgmt Minutes  40  -        User Key  (r) = Recorded By, (t) = Taken By, (c) = Cosigned By    Initials Name Provider Type    Se Landers COTA/L Occupational Therapy Assistant        Therapy Charges for Today     Code Description Service Date Service Provider Modifiers Qty     34042129083 HC OT THER PROC EA 15 MIN 5/21/2020 Se Gil COTA/L GO 1    08965932056 HC OT SELF CARE/MGMT/TRAIN EA 15 MIN 5/22/2020 Se Gil COTA/L GO 3    80858314613 HC OT THER PROC EA 15 MIN 5/22/2020 Se Gil COTA/L GO 2               ALEX Pascual  5/22/2020

## 2020-05-22 NOTE — DISCHARGE SUMMARY
[facility] Medicine Services  DISCHARGE SUMMARY       Date of Admission: 5/14/2020  Date of Discharge:  5/22/2020  Primary Care Physician: Lorrie Wilhelm MD    Presenting Problem/History of Present Illness:  Weakness of both lower extremities [R29.898]     Final Discharge Diagnoses:  Active Hospital Problems    Diagnosis   • **Weakness of both lower extremities   • Vitamin B 12 deficiency   • Essential hypertension   • Mild CAD   • Intervertebral thoracic disc disorder with myelopathy, thoracic region     Added automatically from request for surgery [id number]     • Chronic atrial fibrillation   • Morbid obesity due to excess calories (CMS/[facility])   • Systolic congestive heart failure (CMS/[facility])   • Type 2 diabetes mellitus with microalbuminuria, without long-term current use of insulin (CMS/[facility])   • Long term current use of anticoagulant therapy       Consults:   #1 Dr. Vaughn Manrique, neurology  #2 Dr. Diaz, neurosurgery  #3 Dr. Dumont, cardiology    Procedures Performed:   #1 thoracic discectomy and costotransversectomy by Dr. Diaz on 5/19    Pertinent Test Results:   Procedure Component Value Units Date/Time   CT Thoracic Spine Without Contrast [[id number]] Jake as Reviewed   Order Status: Completed Collected: 05/20/20 1126    Updated: 05/20/20 1141   Narrative:     CT THORACIC SPINE WO CONTRAST- 5/20/2020 10:55 AM CDT     HISTORY: post op; M51.04-Intervertebral disc disorders with myelopathy,  thoracic region; R29.898-Other symptoms and signs involving the  musculoskeletal system; Z78.9-Other specified health status     COMPARISON: MRI 5/15/2020      DOSE LENGTH PRODUCT: 942 mGy cm. Automated exposure control was also  utilized to decrease patient radiation dose.     TECHNIQUE: Serial helical tomographic images of the thoracic spine were  obtained without the use of intravenous contrast. Multiplanar  reformatted images were provided for review.      FINDINGS:   The patient is  status post disc replacement at T10-T11. Postoperative  changes are noted. 2 drains are seen in the operative bed. No  malalignment is seen. Vertebral bodies are normal in height. Diffuse  anterior osteophytes are again seen. There is no bony narrowing of the  spinal canal.     The paravertebral soft tissues are unremarkable. The visualized lungs  are clear. The heart is enlarged. Mitral valve and coronary artery  calcifications are seen.      Impression:     1. Postoperative changes with drains in place. No definite postoperative  complication.  This report was finalized on 05/20/2020 11:38 by Dr. Odilon Rojo MD.   CT Limited Localized Follow Up Study [[id number]] Jake as Reviewed   Order Status: Completed Collected: 05/19/20 1649    Updated: 05/20/20 0801   Narrative:     CT LIMITED LOCALIZED FOLLOW UP STUDY- 5/19/2020 10:51 AM CDT     HISTORY: discectomy; M51.04-Intervertebral disc disorders with  myelopathy, thoracic region; R29.898-Other symptoms and signs involving  the musculoskeletal system; Z78.9-Other specified health status     COMPARISON: None     FLUOROSCOPY TIME: 26.43 seconds     NUMBER OF IMAGES: 384     FLUORO CT DLP: 576 mGy*cm      Impression:        Intraoperative fluoroscopic images and fluoro CT obtained during  discectomy.     Please refer to the operative note for more details.  This report was finalized on 05/19/2020 16:51 by Dr Omero Alvarado, .   FL O Arm During Surgery [[id number]] Jake as Reviewed   Order Status: Completed Collected: 05/19/20 1649    Updated: 05/20/20 0801   Narrative:     CT LIMITED LOCALIZED FOLLOW UP STUDY- 5/19/2020 10:51 AM CDT     HISTORY: discectomy; M51.04-Intervertebral disc disorders with  myelopathy, thoracic region; R29.898-Other symptoms and signs involving  the musculoskeletal system; Z78.9-Other specified health status     COMPARISON: None     FLUOROSCOPY TIME: 26.43 seconds     NUMBER OF IMAGES: 384     FLUORO CT DLP: 576 mGy*cm      Impression:         Intraoperative fluoroscopic images and fluoro CT obtained during  discectomy.     Please refer to the operative note for more details.  This report was finalized on 05/19/2020 16:51 by Dr Omero Alvarado, .   MRI Lumbar Spine With & Without Contrast [[id number]] Jake as Reviewed   Order Status: Completed Collected: 05/17/20 1102    Updated: 05/17/20 1110   Narrative:     EXAMINATION: MRI LUMBAR SPINE W WO CONTRAST- 5/17/2020 11:02 AM CDT     HISTORY: Progressive weakness     COMPARISON: None     Technical: Multiplanar, multisequence imaging was performed through the  lumbar spine before and after the administration of IV contrast.     FINDINGS:  Review of the visualized paraspinal soft tissues demonstrates no acute  abnormalities.     There are presumed to be 5 lumbar-type vertebral bodies. Assuming this,  the conus medullaris terminates normally at the level of L1-L2. The  visualized spinal cord appears normal in signal and morphology. 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     Alignment of the lumbar spine appears normal. Vertebral body heights  appear well maintained. No infiltrative marrow process is identified.     Disc levels:  L1-L2: Mild diffuse disc bulge and facet hypertrophy without severe  thecal sac stenosis. There is mild left neuroforaminal narrowing.     L2-L3: Diffuse disc bulge and marked facet hypertrophy result in  effacement of the thecal sac with moderate thecal sac stenosis. No  severe neuroforaminal narrowing is identified.     L3-L4: Diffuse disc bulge and marked ligamentous and facet hypertrophy  result in effacement of the thecal sac with moderate to severe thecal  sac stenosis. Additionally, there is moderate right and mild left  neuroforaminal narrowing.     L4-L5: Diffuse disc bulge and ligamentous hypertrophy are present,  resulting in effacement of the thecal sac but no significant thecal sac  stenosis. There does appear to be  moderate bilateral neuroforaminal  narrowing at this level.     L5-S1: Diffuse disc bulge and facet hypertrophy are present without  appreciable thecal sac stenosis. There is mild to moderate bilateral  neuroforaminal narrowing.     On postcontrast imaging, there is some enhancement at the facet joints  of L4-L5, suggesting a synovitis. Otherwise, no abnormal enhancement is  identified.      Impression:        1. 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  2. More focal areas of thecal sac stenosis are noted as detailed above.  3. Areas of mild to moderate neural foraminal narrowing are seen at  multiple levels.  4. Synovitis suspected of the facet joints at L4-L5. Otherwise, no  abnormal enhancement identified.  This report was finalized on 05/17/2020 11:07 by Dr. Jerry Chinchilla MD.   US Venous Doppler Lower Extremity Bilateral (duplex) [[id number]] Jake as Reviewed   Order Status: Completed Collected: 05/17/20 1220    Updated: 05/17/20 1223   Narrative:     History: Swelling      Impression:     Impression: There is no evidence of deep venous thrombosis or  superficial thrombophlebitis of right or left lower extremities.     Comments: Bilateral lower extremity venous duplex exam was performed  using color Doppler flow, Doppler waveform analysis, and grayscale  imaging, with and without compression. There is no evidence of deep  venous thrombosis in the common femoral, superficial femoral, popliteal,  peroneal, anterior tibial, and posterior tibial veins bilaterally. No  thrombus is identified in the saphenofemoral junctions and greater  saphenous veins bilaterally.         This report was finalized on 05/17/2020 12:20 by Dr. Michael Cartagena MD.   MRI Thoracic Spine Without Contrast [[id number]] Jake as Reviewed   Order Status: Completed Collected: 05/15/20 1614    Updated: 05/15/20 1621   Narrative:     EXAMINATION: MRI THORACIC SPINE WO CONTRAST-     5/15/2020 3:10 PM CDT     HISTORY:  progressive weakness; R29.898-Other symptoms and signs  involving the musculoskeletal system; Z78.9-Other specified health  status     Noncontrast thoracic spine MR imaging.     Due to claustrophobia the patient was unable to tolerate additional  imaging with the use of contrast.     Curvature and alignment is appropriate.     Degenerative endplate spurring.     No compression fracture.     Small central disc protrusion at T7-8. Mild ventral cord effacement.  No foraminal stenosis.     Facet disease with mild foraminal narrowing at T9-T10.     Disc protrusion and facet disease with ligamentous hypertrophy at T10-11  with mild-to-moderate spinal canal stenosis and mild cord flattening.  Associated cord signal change compatible with myelomalacia.  Associated mild to moderate bilateral foraminal stenosis.     Summary:  1. Multifactorial spinal canal stenosis and foraminal stenosis with cord  flattening and myelomalacia at T10-11.        This report was finalized on 05/15/2020 16:18 by Dr. Eugenio Ball MD.       Chief Complaint on Day of Discharge: Follow-up lower extremity weakness    History of Present Illness on Day of Discharge: Patient seen and examined.  She is elevated today.  No chest pain or shortness of breath.  No nausea or vomiting.  Tolerating p.o.  Pain controlled.    Hospital Course:  The patient is a 64 y.o. female with past medical history of combined CHF, EF 25 to 30%, chronic A. fib, CAD, diabetes, prior CVA who presented to [facility] on 5/14 complaining of 3-month history of progressive lower extremity weakness with new urinary incontinence.  She had sustained a fall 3 months ago and was seen at [facility] that time and was treated and discharged.  After admission patient had a MRI of her thoracic spine and she was found to have multi-factorial spinal canal stenosis with foraminal stenosis and cord flattening/myelomalacia at T10-T11.  Neurosurgery was consulted and additional imaging.  Her Coumadin  "was held and cardiology was consulted.  She was cleared for the OR and taken on 5/19 for discectomy.  Subsequently had drains in placed.  Postoperatively she had improvement in her lower extremity sensation and strength.  She also had improvement in her urinary output/incontinence.  Her drains are now out.  Her labs are otherwise stable and there are no signs of infection.  She is doing well is been cleared for discharge to skilled nursing facility for ongoing rehabilitation.  Of note patient's Coumadin had to be held during hospitalization for this surgery.  Her INR is currently normalized.  She is getting Lovenox subcu for DVT prophylaxis and per neurosurgery can resume her Coumadin 1 week postop which would be Tuesday 5/26.  She was on Coumadin for A. fib and has been rate controlled throughout the hospital stay without any issues.    Condition on Discharge: Stable/improved    Physical Exam on Discharge:  /55 (BP [facility]: Left arm, Patient Position: Lying)   Pulse 81   Temp 98.2 °F (36.8 °C) (Oral)   Resp 16   Ht 165.1 cm (65\")   Wt 103 kg (227 lb 11.2 oz)   SpO2 94%   BMI 37.89 kg/m²   Physical Exam  GEN: Awake, alert, interactive, in NAD  HEENT: Atraumatic, PERRLA, EOMI, Anicteric, Trachea midline  Lungs: CTAB, no wheezing/rales/rhonchi  Heart: irreg/irreg, +S1/s2, no rub  ABD: soft, nt/nd, +BS, no guarding/rebound  Extremities: no cyanosis, b/l LE edema unchanged from prior  Skin: no rashes or lesions  Neuro: AAOx3, CN intact, normal UE exam b/l, bilateral lower extremity weakness left > than right, improving form prior    Discharge Disposition:  Skilled Nursing Facility (DC - External)    Discharge Medications:     Discharge Medications      New Medications      Instructions Start Date   calcium carbonate 500 MG chewable tablet  Commonly known as:  TUMS   2 tablets, Oral, 4 Times Daily PRN      docusate sodium 100 MG capsule   100 mg, Oral, 2 Times Daily      enoxaparin 40 MG/0.4ML solution " syringe  Commonly known as:  LOVENOX   40 mg, Subcutaneous, Every 24 Hours Scheduled   Start Date:  May 23, 2020     insulin glargine 100 UNIT/ML injection  Commonly known as:  Lantus   15 Units, Subcutaneous, Nightly      losartan 25 MG tablet  Commonly known as:  COZAAR  Replaces:  irbesartan 75 MG tablet   25 mg, Oral, Every 24 Hours Scheduled   Start Date:  May 23, 2020     mupirocin 2 % ointment  Commonly known as:  BACTROBAN   Topical, Every 12 Hours Scheduled      oxyCODONE-acetaminophen  MG per tablet  Commonly known as:  PERCOCET   1 tablet, Oral, Every 4 Hours PRN      polyethylene glycol 17 g packet  Commonly known as:  MIRALAX   17 g, Oral, Daily PRN      sennosides-docusate 8.6-50 MG per tablet  Commonly known as:  PERICOLACE   2 tablets, Oral, Nightly      tiZANidine 4 MG tablet  Commonly known as:  ZANAFLEX   4 mg, Oral, Every 8 Hours PRN         Changes to Medications      Instructions Start Date   glipizide 10 MG tablet  Commonly known as:  GLUCOTROL  What changed:  Another medication with the same name was removed. Continue taking this medication, and follow the directions you see here.   10 mg, Oral, Every Morning      warfarin 7.5 MG tablet  Commonly known as:  COUMADIN  What changed:  These instructions start on May 26, 2020. If you are unsure what to do until then, ask your doctor or other care provider.   3.75 mg, Oral, Daily Warfarin   Start Date:  May 26, 2020        Continue These Medications      Instructions Start Date   albuterol sulfate  (90 Base) MCG/ACT inhaler  Commonly known as:  PROVENTIL HFA;VENTOLIN HFA;PROAIR HFA   2 puffs, Inhalation, Every 6 Hours PRN      aspirin 81 MG chewable tablet   81 mg, Oral, Daily      atorvastatin 80 MG tablet  Commonly known as:  LIPITOR   80 mg, Oral, Nightly      carvedilol 12.5 MG tablet  Commonly known as:  COREG   12.5 mg, Oral, 2 Times Daily With Meals      cholecalciferol 25 MCG (1000 UT) tablet  Commonly known as:  VITAMIN D3    1,000 Units, Oral, Daily      digoxin 125 MCG tablet  Commonly known as:  LANOXIN   250 mcg, Oral, Daily Digoxin      fluticasone 50 MCG/ACT nasal spray  Commonly known as:  FLONASE   2 sprays, Nasal, Daily PRN      furosemide 20 MG tablet  Commonly known as:  LASIX   20 mg, Oral, Daily      levothyroxine 125 MCG tablet  Commonly known as:  SYNTHROID, LEVOTHROID   125 mcg, Oral, Daily      metFORMIN 500 MG tablet  Commonly known as:  GLUCOPHAGE   1,000 mg, Oral, 2 Times Daily With Meals      metoprolol succinate XL 50 MG 24 hr tablet  Commonly known as:  TOPROL-XL   50 mg, Oral, Daily      multivitamin with minerals tablet tablet   1 tablet, Oral, Daily      spironolactone 25 MG tablet  Commonly known as:  ALDACTONE   25 mg, Oral, Daily         Stop These Medications    hydroCHLOROthiazide 12.5 MG tablet  Commonly known as:  HYDRODIURIL     irbesartan 75 MG tablet  Commonly known as:  AVAPRO  Replaced by:  losartan 25 MG tablet            Discharge Diet:    Dietary Orders (From admission, onward)     Start     Ordered    05/19/20 1853  Diet Regular  Diet Effective Now     Question:  Diet Texture / Consistency  Answer:  Regular    05/19/20 1852                Discharge Care Plan/Instructions:   Follow-up with PCP, follow-up with neurosurgery, follow-up with cardiology    Follow-up Appointments:   No future appointments.    Test Results Pending at Discharge: None    Marty Kc DO  05/22/20  08:58    Time: 35 minutes

## 2020-05-22 NOTE — PROGRESS NOTES
Continued Stay Note   Felice     Patient Name: Anne-Marie Foreman  MRN: [id number]  Today's Date: 5/22/2020    Admit Date: 5/14/2020    Discharge Plan     Row Name 05/22/20 0830       Plan    Plan Comments  Pt is able to discharge to [facility] when medically stable but will need a negative COVID-19 test within 48 hours prior to discharge.         Discharge Codes    No documentation.             Nancy Beck

## 2020-05-23 NOTE — THERAPY DISCHARGE NOTE
Acute Care - Occupational Therapy Discharge Summary  [facility]     Patient Name: Anne-Marie Foreman  : 1955  MRN: [id number]    Today's Date: 2020  Onset of Illness/Injury or Date of Surgery: 20    Date of Referral to OT: 20  Referring Physician: TYSON Alanis      Admit Date: 2020        OT Recommendation and Plan    Visit Dx:    ICD-10-CM ICD-9-CM   1. Intervertebral thoracic disc disorder with myelopathy, thoracic region M51.04 722.72   2. Weakness of both lower extremities R29.898 729.89   3. Decreased activities of daily living (ADL) Z78.9 V49.89   4. Status post thoracic spinal fusion Z98.1 V45.4               Rehab Goal Summary     Row Name 20 0800             Transfer Goal 1 (OT)    Activity/Assistive Device (Transfer Goal 1, OT)  sit-to-stand/stand-to-sit;bed-to-chair/chair-to-bed;toilet;commode, bedside without drop arms  -TS      [place] Level/Cues Needed (Transfer Goal 1, OT)  moderate assist (50-74% patient effort)  -TS      Time Frame (Transfer Goal 1, OT)  10 days  -TS      Progress/Outcome (Transfer Goal 1, OT)  goal not met  -TS         Bathing Goal 1 (OT)    Activity/Assistive Device (Bathing Goal 1, OT)  bathing skills, all;long-handled sponge  -TS      [place] Level/Cues Needed (Bathing Goal 1, OT)  minimum assist (75% or more patient effort)  -TS      Time Frame (Bathing Goal 1, OT)  10 days  -TS      Progress/Outcomes (Bathing Goal 1, OT)  goal not met  -TS         Dressing Goal 1 (OT)    Activity/Assistive Device (Dressing Goal 1, OT)  dressing skills, all;long handled shoe horn;reacher;sock-aid  -TS      [place]/Cues Needed (Dressing Goal 1, OT)  minimum assist (75% or more patient effort);verbal cues required  -TS      Time Frame (Dressing Goal 1, OT)  10 days  -TS      Progress/Outcome (Dressing Goal 1, OT)  goal not met  -TS         Balance Goal 1 (OT)    Activity/Assistive Device (Balance Goal 1, OT)  sitting, static;sitting, dynamic  -TS       [place] Level/Cues Needed (Balance Goal 1, OT)  independent  -TS      Time Frame (Balance Goal 1, OT)  10 days  -TS      Progress/Outcomes (Balance Goal 1, OT)  goal not met  -TS        User Key  (r) = Recorded By, (t) = Taken By, (c) = Cosigned By    Initials Name Provider Type Discipline     Bertha Siddiqui COTA/L Occupational Therapy Assistant OT          Outcome Measures     Row Name 05/22/20 1021 05/21/20 1300          How much help from another is currently needed...    Putting on and taking off regular lower body clothing?  1  -CJ  1  -CJ     Bathing (including washing, rinsing, and drying)  2  -CJ  2  -CJ     Toileting (which includes using toilet bed pan or urinal)  1  -CJ  1  -CJ     Putting on and taking off regular upper body clothing  3  -CJ  3  -CJ     Taking care of personal grooming (such as brushing teeth)  3  -CJ  3  -CJ     Eating meals  3  -CJ  3  -CJ     AM-PAC 6 Clicks Score (OT)  13  -  13  -        Functional Assessment    Outcome Measure Options  --  AM-PAC 6 Clicks Daily Activity (OT)  -       User Key  (r) = Recorded By, (t) = Taken By, (c) = Cosigned By    Initials Name Provider Type    CJ Se Gil COTA/L Occupational Therapy Assistant          Therapy Suggested Charges     Code   Minutes Charges    12656 (CPT®) Hc Ot Neuromusc Re Education Ea 15 Min      50333 (CPT®) Hc Ot Ther Proc Ea 15 Min      56766 (CPT®) Hc Ot Therapeutic Act Ea 15 Min      55719 (CPT®) Hc Ot Manual Therapy Ea 15 Min      88376 (CPT®) Hc Ot Iontophoresis Ea 15 Min      83602 (CPT®) Hc Ot Elec Stim Ea-Per 15 Min      53206 (CPT®) Hc Ot Ultrasound Ea 15 Min      56902 (CPT®) Hc Ot Self Care/Mgmt/Train Ea 15 Min 40 3    Total  40 3              OT Discharge Summary  Reason for Discharge: Discharge from facility  Outcomes Achieved: Refer to plan of care for updates on goals achieved  Discharge Destination: [facility]      ALEX Arteaga  5/23/2020

## 2020-05-23 NOTE — THERAPY DISCHARGE NOTE
Acute Care - Physical Therapy Discharge Summary  [facility]       Patient Name: Anne-Marie Foreman  : 1955  MRN: [id number]    Today's Date: 2020  Onset of Illness/Injury or Date of Surgery: 20       Referring Physician: TYSON Alanis      Admit Date: 2020      PT Recommendation and Plan    Visit Dx:    ICD-10-CM ICD-9-CM   1. Intervertebral thoracic disc disorder with myelopathy, thoracic region M51.04 722.72   2. Weakness of both lower extremities R29.898 729.89   3. Decreased activities of daily living (ADL) Z78.9 V49.89   4. Status post thoracic spinal fusion Z98.1 V45.4       Outcome Measures     Row Name 20 1021 20 1300          How much help from another is currently needed...    Putting on and taking off regular lower body clothing?  1  -CJ  1  -CJ     Bathing (including washing, rinsing, and drying)  2  -CJ  2  -CJ     Toileting (which includes using toilet bed pan or urinal)  1  -CJ  1  -CJ     Putting on and taking off regular upper body clothing  3  -CJ  3  -CJ     Taking care of personal grooming (such as brushing teeth)  3  -CJ  3  -CJ     Eating meals  3  -CJ  3  -CJ     AM-PAC 6 Clicks Score (OT)  13  -  13  -        Functional Assessment    Outcome Measure Options  --  AM-PAC 6 Clicks Daily Activity (OT)  -       User Key  (r) = Recorded By, (t) = Taken By, (c) = Cosigned By    Initials Name Provider Type     Se Gil COTA/L Occupational Therapy Assistant              Rehab Goal Summary     Row Name 20 0830 20 0800          Bed Mobility Goal 1 (PT)    [place] Level/Cues Needed (Bed Mobility Goal 1, PT)  contact guard assist;tactile cues required;verbal cues required  -CAROLYN  --     Time Frame (Bed Mobility Goal 1, PT)  long term goal (LTG);by discharge  -CAROLYN  --     Progress/Outcomes (Bed Mobility Goal 1, PT)  goal not met  -CAROLYN  --        Transfer Goal 1 (PT)    Activity/Assistive Device (Transfer Goal 1, PT)   sit-to-stand/stand-to-sit;bed-to-chair/chair-to-bed;sliding board  -CAROLNY  --     [place] Level/Cues Needed (Transfer Goal 1, PT)  moderate assist (50-74% patient effort);2 person assist  -CAROLYN  --     Time Frame (Transfer Goal 1, PT)  long term goal (LTG);by discharge  -CAROLYN  --     Progress/Outcome (Transfer Goal 1, PT)  goal not met  -CAROLYN  --        Patient Education Goal (PT)    Activity (Patient Education Goal, PT)  pt will demonstrate grossly 3-/5 strength in the L LE  -CAROLYN  --     [place]/Cues/Accuracy (Memory Goal 2, PT)  demonstrates adequately  -CAROLYN  --     Time Frame (Patient Education Goal, PT)  long term goal (LTG);by discharge  -CAROLYN  --     Progress/Outcome (Patient Education Goal, PT)  goal not met  -CAROLYN  --        Transfer Goal 1 (OT)    Activity/Assistive Device (Transfer Goal 1, OT)  --  sit-to-stand/stand-to-sit;bed-to-chair/chair-to-bed;toilet;commode, bedside without drop arms  -TS     [place] Level/Cues Needed (Transfer Goal 1, OT)  --  moderate assist (50-74% patient effort)  -TS     Time Frame (Transfer Goal 1, OT)  --  10 days  -TS     Progress/Outcome (Transfer Goal 1, OT)  --  goal not met  -TS        Bathing Goal 1 (OT)    Activity/Assistive Device (Bathing Goal 1, OT)  --  bathing skills, all;long-handled sponge  -TS     [place] Level/Cues Needed (Bathing Goal 1, OT)  --  minimum assist (75% or more patient effort)  -TS     Time Frame (Bathing Goal 1, OT)  --  10 days  -TS     Progress/Outcomes (Bathing Goal 1, OT)  --  goal not met  -TS        Dressing Goal 1 (OT)    Activity/Assistive Device (Dressing Goal 1, OT)  --  dressing skills, all;long handled shoe horn;reacher;sock-aid  -TS     [place]/Cues Needed (Dressing Goal 1, OT)  --  minimum assist (75% or more patient effort);verbal cues required  -TS     Time Frame (Dressing Goal 1, OT)  --  10 days  -TS     Progress/Outcome (Dressing Goal 1, OT)  --  goal not met  -TS        Balance Goal 1 (OT)    Activity/Assistive  Device (Balance Goal 1, OT)  --  sitting, static;sitting, dynamic  -TS     [place] Level/Cues Needed (Balance Goal 1, OT)  --  independent  -TS     Time Frame (Balance Goal 1, OT)  --  10 days  -TS     Progress/Outcomes (Balance Goal 1, OT)  --  goal not met  -TS       User Key  (r) = Recorded By, (t) = Taken By, (c) = Cosigned By    Initials Name Provider Type Discipline    TS Bertha Siddiqui, SHERIFF/L Occupational Therapy Assistant OT    Mikey Mirza, RORO Physical Therapy Assistant PT              PT Discharge Summary  Anticipated Discharge Disposition (PT): skilled nursing facility  Reason for Discharge: Discharge from facility  Outcomes Achieved: Refer to plan of care for updates on goals achieved  Discharge Destination: SNF      Mikey Ordonez PTA   5/23/2020

## 2020-05-27 ENCOUNTER — TELEPHONE (OUTPATIENT)
Dept: INTERNAL MEDICINE | Age: 65
End: 2020-05-27

## 2020-05-29 ENCOUNTER — TELEPHONE (OUTPATIENT)
Dept: INTERNAL MEDICINE | Age: 65
End: 2020-05-29

## 2020-05-29 NOTE — TELEPHONE ENCOUNTER
[id number] [address]    Per M.  Brendon Harris, staff at [facility], patient is still there, no known plans for d/c

## 2020-06-04 ENCOUNTER — TELEPHONE (OUTPATIENT)
Dept: NEUROSURGERY | Facility: CLINIC | Age: 65
End: 2020-06-04

## 2020-06-04 ENCOUNTER — OFFICE VISIT (OUTPATIENT)
Dept: NEUROSURGERY | Facility: CLINIC | Age: 65
End: 2020-06-04

## 2020-06-04 VITALS — BODY MASS INDEX: 36.82 KG/M2 | WEIGHT: 221 LBS | HEIGHT: 65 IN

## 2020-06-04 DIAGNOSIS — Z98.890 S/P DISCECTOMY: ICD-10-CM

## 2020-06-04 DIAGNOSIS — M51.04 INTERVERTEBRAL THORACIC DISC DISORDER WITH MYELOPATHY, THORACIC REGION: Primary | ICD-10-CM

## 2020-06-04 DIAGNOSIS — T81.49XA SURGICAL WOUND INFECTION: ICD-10-CM

## 2020-06-04 DIAGNOSIS — E66.01 MORBID OBESITY DUE TO EXCESS CALORIES (HCC): ICD-10-CM

## 2020-06-04 PROCEDURE — 99024 POSTOP FOLLOW-UP VISIT: CPT | Performed by: NURSE PRACTITIONER

## 2020-06-04 RX ORDER — IRBESARTAN 75 MG/1
75 TABLET ORAL
COMMUNITY
Start: 2020-03-20

## 2020-06-04 RX ORDER — HYDROCHLOROTHIAZIDE 25 MG/1
12.5 TABLET ORAL
COMMUNITY
Start: 2020-04-23

## 2020-06-04 RX ORDER — CARVEDILOL 12.5 MG/1
TABLET ORAL
COMMUNITY
Start: 2020-04-23

## 2020-06-04 RX ORDER — SULFAMETHOXAZOLE AND TRIMETHOPRIM 800; 160 MG/1; MG/1
1 TABLET ORAL 2 TIMES DAILY
Qty: 20 TABLET | Refills: 0 | Status: SHIPPED | OUTPATIENT
Start: 2020-06-04 | End: 2020-06-14

## 2020-06-04 NOTE — PROGRESS NOTES
"Chief complaint:   Chief Complaint   Patient presents with   • Post-op     Patient is here today for a thoracic discectomy post-op      Subjective     HPI:   Interval History: Anne-Marie Foreman is a 64 y.o.  female who presents today for post operative follow-up from a Thoracic  DISCECTOMY, T10/11.  Costotransversectomy. Neuromonitoring on 5/19/2020 per Dr. Diaz.  Ms. Foreman has done fairly well since we last saw her.  Upon discharge she was transferred to [place] to continue inpatient PT/OT.  She continues to complain of intermittent mid thoracic back pain, however improved since onset.  She denies upper or lower extremity radicular pain.  She continues to complain of numbness and \"pressure\" to the bilateral lower extremities in a stocking glove distribution from the waist.  She reports some movement in her lower extremity strength.  She arrives in a wheelchair today as she remains unable to ambulate.  She states her bladder dysfunction has resolved.  She denies fevers, chills, night sweats, unexplained weight loss, or bowel dysfunction.  She currently rates the severity of her symptoms 5/10.  No additional concerns at this time.    Oswestry Disability Index = 54%   Score   Pain Intensity Moderate pain-2   Personal Care I need daily help in most areas-4   Lifting Medium weights off a table-3   Walking Bed bound and crawl to toilet-5   Sitting Sit as long as I like-0   Standing I can not stand at all-0   Sleeping Can only sleep < 4 hrs-3   Sex Life (if applicable) Not applicable-0   Social Life No social life because of pain-5   Traveling Travel without pain-0   (Ahsahka et al, 1980)    SCORE INTERPRETATION OF THE OSWESTRY LBP DISABILITY QUESTIONNAIRE     0-20% Minimal disability Can cope with most ADLs. Usually no treatment is needed, apart from advice on lifting, sitting, posture, physical fitness, and diet. In this group,  some patients have particular difficulty with sitting and this may be " important if their occupation is sedentary (, , etc.)       20-40% Moderate disability This group experiences more pain and problems with sitting, lifting, and standing. Travel and social life are more difficult and they may well be off  work. Personal care, sexual activity, and sleeping are not grossly affected, and the back condition can usually be managed by conservative means.       40-60% Severe disability Pain remains the main problem in this group of patients, but travel, personal care, social life, sexual activity, and sleep are also affected.  These patients require detailed investigation.     60-80% Crippled Back pain impinges on all aspects of these patients’ lives both at home and at work. Positive intervention is required.     % These patients are either bed-bound or exaggerating their symptoms. This can be evaluated by careful observation of the patient during the  medical examination.    PFSH:  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HCC)    • Hyperlipidemia    • Hypertension    • Left sided lacunar infarction (CMS/HCC)    • Obesity, Class II, BMI 35-39.9    • Systolic heart failure (CMS/HCC)    • Type 2 diabetes mellitus (CMS/HCC)      Past Surgical History:   Procedure Laterality Date   • CARDIAC CATHETERIZATION  10/21/2009   • CARDIOVERSION  10/09/2013   • CATARACT EXTRACTION, BILATERAL     • CHOLECYSTECTOMY     • LAPAROSCOPIC TUBAL LIGATION     • LUMBAR DISCECTOMY N/A 5/19/2020    Procedure: Thoracic  DISCECTOMY, T10/11.  Costotransversectomy. Neuromonitoring;  Surgeon: Willy Diaz MD;  [facility]: [facility];  Service: Neurosurgery;  Laterality: N/A;   • RETINAL LASER PROCEDURE       Objective      Current Outpatient Medications   Medication Sig Dispense Refill   • albuterol sulfate  (90 Base) MCG/ACT inhaler Inhale 2  puffs Every 6 (Six) Hours As Needed for Wheezing.     • aspirin 81 MG chewable tablet Chew 81 mg Daily.     • atorvastatin (LIPITOR) 80 MG tablet Take 80 mg by mouth Every Night.     • calcium carbonate (TUMS) 500 MG chewable tablet Chew 1,000 mg 4 (Four) Times a Day As Needed for Indigestion or Heartburn.     • carvedilol (COREG) 12.5 MG tablet Take 12.5 mg by mouth 2 (Two) Times a Day With Meals.     • cholecalciferol (VITAMIN D3) 25 MCG (1000 UT) tablet Take 1,000 Units by mouth Daily.     • digoxin (LANOXIN) 125 MCG tablet Take 250 mcg by mouth Daily.     • docusate sodium 100 MG capsule Take 100 mg by mouth 2 (Two) Times a Day.     • enoxaparin (LOVENOX) 40 MG/0.4ML solution syringe Inject 0.4 mL under the skin into the appropriate area as directed Daily. 11.2 mL    • fluticasone (FLONASE) 50 MCG/ACT nasal spray 2 sprays into the nostril(s) as directed by provider Daily As Needed for Rhinitis.     • furosemide (LASIX) 20 MG tablet Take 20 mg by mouth Daily.     • glipizide (GLUCOTROL) 10 MG tablet Take 10 mg by mouth Every Morning.     • insulin glargine (Lantus) 100 UNIT/ML injection Inject 15 Units under the skin into the appropriate area as directed Every Night.  12   • levothyroxine (SYNTHROID, LEVOTHROID) 125 MCG tablet Take 125 mcg by mouth Daily.     • losartan (COZAAR) 25 MG tablet Take 1 tablet by mouth Daily.     • metFORMIN (GLUCOPHAGE) 500 MG tablet Take 1,000 mg by mouth 2 (Two) Times a Day With Meals.     • metoprolol succinate XL (TOPROL-XL) 50 MG 24 hr tablet Take 50 mg by mouth Daily.     • Multiple Vitamins-Minerals (MULTIVITAMIN WITH MINERALS) tablet tablet Take 1 tablet by mouth Daily.     • mupirocin (BACTROBAN) 2 % ointment Apply  topically to the appropriate area as directed Every 12 (Twelve) Hours.     • polyethylene glycol (MIRALAX) 17 g packet Take 17 g by mouth Daily As Needed (constipation).     • sennosides-docusate (PERICOLACE) 8.6-50 MG per tablet Take 2 tablets by mouth Every  "Night.     • spironolactone (ALDACTONE) 25 MG tablet Take 25 mg by mouth Daily.     • tiZANidine (ZANAFLEX) 4 MG tablet Take 1 tablet by mouth Every 8 (Eight) Hours As Needed for Muscle Spasms.     • warfarin (COUMADIN) 7.5 MG tablet Take 0.5 tablets by mouth Daily.       No current facility-administered medications for this visit.      Vital Signs  Ht 165.1 cm (65\")   Wt 100 kg (221 lb)   BMI 36.78 kg/m²   Physical Exam   Constitutional: She is oriented to person, place, and time. Vital signs are normal. She appears well-developed and well-nourished. She is cooperative.  Non-toxic appearance. She does not have a sickly appearance. She does not appear ill. No distress.   BMI 36.8   HENT:   Head: Normocephalic and atraumatic.   Right Ear: Hearing normal.   Left Ear: Hearing normal.   Mouth/Throat: Mucous membranes are normal.   Eyes: Pupils are equal, round, and reactive to light. Conjunctivae and EOM are normal.   Neck: Trachea normal and full passive range of motion without pain. Neck supple.   Cardiovascular: Normal rate and regular rhythm.   Pulmonary/Chest: Effort normal. No accessory muscle usage. No apnea, no tachypnea and no bradypnea. No respiratory distress.   Abdominal: Soft. Normal appearance.   Neurological: She is alert and oriented to person, place, and time. GCS eye subscore is 4. GCS verbal subscore is 5. GCS motor subscore is 6.   Reflex Scores:       Tricep reflexes are 1+ on the right side and 1+ on the left side.       Bicep reflexes are 1+ on the right side and 1+ on the left side.       Brachioradialis reflexes are 1+ on the right side and 1+ on the left side.       Patellar reflexes are 0 on the right side and 0 on the left side.       Achilles reflexes are 0 on the right side and 0 on the left side.  Skin: Skin is warm, dry and intact. She is not diaphoretic.        Psychiatric: She has a normal mood and affect. Her speech is normal and behavior is normal.   Nursing note and vitals " reviewed.    Neurologic Exam     Mental Status   Oriented to person, place, and time.   Attention: normal. Concentration: normal.   Speech: speech is normal   Level of consciousness: alert    Cranial Nerves     CN II   Visual fields full to confrontation.     CN III, IV, VI   Pupils are equal, round, and reactive to light.  Extraocular motions are normal.     CN V   Facial sensation intact.     CN VII   Facial expression full, symmetric.     CN VIII   CN VIII normal.     CN IX, X   CN IX normal.     CN XI   CN XI normal.     Motor Exam   Right arm tone: normal  Left arm tone: normal  Right arm pronator drift: absent  Left arm pronator drift: absent  Right leg tone: normal  Left leg tone: normal    Strength   Right deltoid: 5/5  Left deltoid: 5/5  Right biceps: 5/5  Left biceps: 5/5  Right triceps: 5/5  Left triceps: 5/5  Right wrist extension: 5/5  Left wrist extension: 5/5  Right iliopsoas: 4/5  Left iliopsoas: 2/5  Right quadriceps: 4/5  Left quadriceps: 4/5  Right anterior tibial: 5/5  Left anterior tibial: 4/5  Right posterior tibial: 5/5  Left posterior tibial: 4/5    Sensory Exam   Right arm light touch: normal  Left arm light touch: normal    Gait, Coordination, and Reflexes     Gait  Gait: (Nonambulatory)    Tremor   Resting tremor: absent  Intention tremor: absent  Action tremor: absent    Reflexes   Right brachioradialis: 1+  Left brachioradialis: 1+  Right biceps: 1+  Left biceps: 1+  Right triceps: 1+  Left triceps: 1+  Right patellar: 0  Left patellar: 0  Right achilles: 0  Left achilles: 0  Right Krueger: absent  Left Krueger: absent  Right ankle clonus: absent  Left ankle clonus: absent  Right pendular knee jerk: absent  Left pendular knee jerk: absent    Incision: Scan on 6/4/2020 1125 by Hal Lopez, APRN: Postop  (Consent to obtain photo of postoperative site for documentation purposes only obtained verbally by Ms. Foreman)    Results Review: no new imaging      Assessment/Plan:   1. Intervertebral  thoracic disc disorder with myelopathy, thoracic region    2. S/P discectomy    3. Surgical wound infection    4. Morbid obesity due to excess calories (CMS/[facility])      Status post thoracic discectomy for thoracic stenosis with myelopathy  Concern for surgical wound infection  Ms. Foreman has done fairly well since we last saw her.  She presents today for post operative wound check following a Thoracic  DISCECTOMY, T10/11.  Costotransversectomy. Neuromonitoring on 5/19/2020 per Dr. Diaz.  She states her back discomfort is tolerable, her bladder dysfunction has resolved, and despite her inability to ambulate her lower extremity strength is in proving.  Her lower extremity numbness is however relatively unchanged.  Her postoperative incision is concerning for an early infection with surrounding edema and erythema.  No drainage or discharge noted at this time.  She may continue her current pain medications with tapering dosages previously instructed.  Benefits, risk, adverse effects, discussed.  Per most recent labs, 5/21/2020, her creatinine clearance was calculated at 240 mL/min.  A prescription for Bactrim was prescribed.  Benefits, risk, adverse effects, and use discussed.  I would also like to attain a pre-albumin, stone [place] notified.  Additionally, a prescription was provided for wound care management through [facility].  I requested she be evaluated for need of a wound VAC.  We will have her return for a wound reassessment in 10 days.  Otherwise, I advised that she keep her previously scheduled appointment with Dr. Diaz for reassessment.  Call to return sooner for any new or additional concerns.    Obese Class II: 35-39.9kg/m2  Body mass index is 36.78 kg/m².  Information on the DASH diet provided in the AVS.  We will continue to provided diet and exercise information with the goal of weight loss at each scheduled appointment.     Anne-Marie was seen today for post-op.    Diagnoses and all orders for this  visit:    Intervertebral thoracic disc disorder with myelopathy, thoracic region    S/P discectomy    Surgical wound infection  -     sulfamethoxazole-trimethoprim (BACTRIM DS,SEPTRA DS) 800-160 MG per tablet; Take 1 tablet by mouth 2 (Two) Times a Day for 10 days.    Morbid obesity due to excess calories (CMS/HCC)      Return in 10 days for wound reassessment    I discussed the patients findings and my recommendations with patient    Hal Lopez APRN

## 2020-06-04 NOTE — TELEPHONE ENCOUNTER
Called nursing home spoke with Kenyatta at [facility]. Kenyatta is the DON I advised her that Hal is requesting for the patient to have a prealbumin lab draw and a wound consult for a wound vac. Kneyatta stated she would get this ordered

## 2020-06-04 NOTE — PATIENT INSTRUCTIONS
"DASH Eating Plan  DASH stands for \"Dietary Approaches to Stop Hypertension.\" The DASH eating plan is a healthy eating plan that has been shown to reduce high blood pressure (hypertension). It may also reduce your risk for type 2 diabetes, heart disease, and stroke. The DASH eating plan may also help with weight loss.  What are tips for following this plan?    General guidelines  · Avoid eating more than 2,300 mg (milligrams) of salt (sodium) a day. If you have hypertension, you may need to reduce your sodium intake to 1,500 mg a day.  · Limit alcohol intake to no more than 1 drink a day for nonpregnant women and 2 drinks a day for men. One drink equals 12 oz of beer, 5 oz of wine, or 1½ oz of hard liquor.  · Work with your health care provider to maintain a healthy body weight or to lose weight. Ask what an ideal weight is for you.  · Get at least 30 minutes of exercise that causes your heart to beat faster (aerobic exercise) most days of the week. Activities may include walking, swimming, or biking.  · Work with your health care provider or diet and nutrition specialist (dietitian) to adjust your eating plan to your individual calorie needs.  Reading food labels    · Check food labels for the amount of sodium per serving. Choose foods with less than 5 percent of the Daily Value of sodium. Generally, foods with less than 300 mg of sodium per serving fit into this eating plan.  · To find whole grains, look for the word \"whole\" as the first word in the ingredient list.  Shopping  · Buy products labeled as \"low-sodium\" or \"no salt added.\"  · Buy fresh foods. Avoid canned foods and premade or frozen meals.  Cooking  · Avoid adding salt when cooking. Use salt-free seasonings or herbs instead of table salt or sea salt. Check with your health care provider or pharmacist before using salt substitutes.  · Do not veronica foods. Cook foods using healthy methods such as baking, boiling, grilling, and broiling instead.  · Cook with " heart-healthy oils, such as olive, canola, soybean, or sunflower oil.  Meal planning  · Eat a balanced diet that includes:  ? 5 or more servings of fruits and vegetables each day. At each meal, try to fill half of your plate with fruits and vegetables.  ? Up to 6-8 servings of whole grains each day.  ? Less than 6 oz of lean meat, poultry, or fish each day. A 3-oz serving of meat is about the same size as a deck of cards. One egg equals 1 oz.  ? 2 servings of low-fat dairy each day.  ? A serving of nuts, seeds, or beans 5 times each week.  ? Heart-healthy fats. Healthy fats called Omega-3 fatty acids are found in foods such as flaxseeds and coldwater fish, like sardines, salmon, and mackerel.  · Limit how much you eat of the following:  ? Canned or prepackaged foods.  ? Food that is high in trans fat, such as fried foods.  ? Food that is high in saturated fat, such as fatty meat.  ? Sweets, desserts, sugary drinks, and other foods with added sugar.  ? Full-fat dairy products.  · Do not salt foods before eating.  · Try to eat at least 2 vegetarian meals each week.  · Eat more home-cooked food and less restaurant, buffet, and fast food.  · When eating at a restaurant, ask that your food be prepared with less salt or no salt, if possible.  What foods are recommended?  The items listed may not be a complete list. Talk with your dietitian about what dietary choices are best for you.  Grains  Whole-grain or whole-wheat bread. Whole-grain or whole-wheat pasta. Brown rice. Oatmeal. Quinoa. Bulgur. Whole-grain and low-sodium cereals. Alanis bread. Low-fat, low-sodium crackers. Whole-wheat flour tortillas.  Vegetables  Fresh or frozen vegetables (raw, steamed, roasted, or grilled). Low-sodium or reduced-sodium tomato and vegetable juice. Low-sodium or reduced-sodium tomato sauce and tomato paste. Low-sodium or reduced-sodium canned vegetables.  Fruits  All fresh, dried, or frozen fruit. Canned fruit in natural juice (without  added sugar).  Meat and other protein foods  Skinless chicken or turkey. Ground chicken or turkey. Pork with fat trimmed off. Fish and seafood. Egg whites. Dried beans, peas, or lentils. Unsalted nuts, nut butters, and seeds. Unsalted canned beans. Lean cuts of beef with fat trimmed off. Low-sodium, lean deli meat.  Dairy  Low-fat (1%) or fat-free (skim) milk. Fat-free, low-fat, or reduced-fat cheeses. Nonfat, low-sodium ricotta or cottage cheese. Low-fat or nonfat yogurt. Low-fat, low-sodium cheese.  Fats and oils  Soft margarine without trans fats. Vegetable oil. Low-fat, reduced-fat, or light mayonnaise and salad dressings (reduced-sodium). Canola, safflower, olive, soybean, and sunflower oils. Avocado.  Seasoning and other foods  Herbs. Spices. Seasoning mixes without salt. Unsalted popcorn and pretzels. Fat-free sweets.  What foods are not recommended?  The items listed may not be a complete list. Talk with your dietitian about what dietary choices are best for you.  Grains  Baked goods made with fat, such as croissants, muffins, or some breads. Dry pasta or rice meal packs.  Vegetables  Creamed or fried vegetables. Vegetables in a cheese sauce. Regular canned vegetables (not low-sodium or reduced-sodium). Regular canned tomato sauce and paste (not low-sodium or reduced-sodium). Regular tomato and vegetable juice (not low-sodium or reduced-sodium). Pickles. Olives.  Fruits  Canned fruit in a light or heavy syrup. Fried fruit. Fruit in cream or butter sauce.  Meat and other protein foods  Fatty cuts of meat. Ribs. Fried meat. [place]. Sausage. Bologna and other processed lunch meats. Salami. Fatback. Hotdogs. Bratwurst. Salted nuts and seeds. Canned beans with added salt. Canned or smoked fish. Whole eggs or egg yolks. Chicken or turkey with skin.  Dairy  Whole or 2% milk, cream, and half-and-half. Whole or full-fat cream cheese. Whole-fat or sweetened yogurt. Full-fat cheese. Nondairy creamers. Whipped toppings.  Processed cheese and cheese spreads.  Fats and oils  Butter. Stick margarine. Lard. Shortening. Ghee. [place] fat. Tropical oils, such as coconut, palm kernel, or palm oil.  Seasoning and other foods  Salted popcorn and pretzels. Onion salt, garlic salt, seasoned salt, table salt, and sea salt. Worcestershire sauce. Tartar sauce. Barbecue sauce. Teriyaki sauce. Soy sauce, including reduced-sodium. Steak sauce. Canned and packaged gravies. Fish sauce. Oyster sauce. Cocktail sauce. Horseradish that you find on the shelf. Ketchup. Mustard. Meat flavorings and tenderizers. Bouillon cubes. Hot sauce and Tabasco sauce. Premade or packaged marinades. Premade or packaged taco seasonings. Relishes. Regular salad dressings.  Where to find more information:  · National Heart, Lung, and Blood [place]: www.nhlbi.nih.gov  · American Heart Association: www.heart.org  Summary  · The DASH eating plan is a healthy eating plan that has been shown to reduce high blood pressure (hypertension). It may also reduce your risk for type 2 diabetes, heart disease, and stroke.  · With the DASH eating plan, you should limit salt (sodium) intake to 2,300 mg a day. If you have hypertension, you may need to reduce your sodium intake to 1,500 mg a day.  · When on the DASH eating plan, aim to eat more fresh fruits and vegetables, whole grains, lean proteins, low-fat dairy, and heart-healthy fats.  · Work with your health care provider or diet and nutrition specialist (dietitian) to adjust your eating plan to your individual calorie needs.  This information is not intended to replace advice given to you by your health care provider. Make sure you discuss any questions you have with your health care provider.  Document Released: 12/06/2012 Document Revised: 11/30/2018 Document Reviewed: 12/11/2017  Elsevier Patient Education © 2020 [employer]      Smoking Tobacco Information, Adult  Smoking tobacco can be harmful to your health. Tobacco contains a  poisonous (toxic), colorless chemical called nicotine. Nicotine is addictive. It changes the brain and can make it hard to stop smoking. Tobacco also has other toxic chemicals that can hurt your body and raise your risk of many cancers.  How can smoking tobacco affect me?  Smoking tobacco puts you at risk for:  · Cancer. Smoking is most commonly associated with lung cancer, but can also lead to cancer in other parts of the body.  · Chronic obstructive pulmonary disease (COPD). This is a long-term lung condition that makes it hard to breathe. It also gets worse over time.  · High blood pressure (hypertension), heart disease, stroke, or heart attack.  · Lung infections, such as pneumonia.  · Cataracts. This is when the lenses in the eyes become clouded.  · Digestive problems. This may include peptic ulcers, heartburn, and gastroesophageal reflux disease (GERD).  · Oral health problems, such as gum disease and tooth loss.  · Loss of taste and smell.  Smoking can affect your appearance by causing:  · Wrinkles.  · Yellow or stained teeth, fingers, and fingernails.  Smoking tobacco can also affect your social life, because:  · It may be challenging to find places to smoke when away from home. Many workplaces, restaurants, hotels, and public places are tobacco-free.  · Smoking is expensive. This is due to the cost of tobacco and the long-term costs of treating health problems from smoking.  · Secondhand smoke may affect those around you. Secondhand smoke can cause lung cancer, breathing problems, and heart disease. Children of smokers have a higher risk for:  ? Sudden infant death syndrome (SIDS).  ? Ear infections.  ? Lung infections.  If you currently smoke tobacco, quitting now can help you:  · Lead a longer and healthier life.  · Look, smell, breathe, and feel better over time.  · Save money.  · Protect others from the harms of secondhand smoke.  What actions can I take to prevent health problems?  Quit smoking    · Do  not start smoking. Quit if you already do.  · Make a plan to quit smoking and commit to it. Look for programs to help you and ask your health care provider for recommendations and ideas.  · Set a date and write down all the reasons you want to quit.  · Let your friends and family know you are quitting so they can help and support you. Consider finding friends who also want to quit. It can be easier to quit with someone else, so that you can support each other.  · Talk with your health care provider about using nicotine replacement medicines to help you quit, such as gum, lozenges, patches, sprays, or pills.  · Do not replace cigarette smoking with electronic cigarettes, which are commonly called e-cigarettes. The safety of e-cigarettes is not known, and some may contain harmful chemicals.  · If you try to quit but return to smoking, stay positive. It is common to slip up when you first quit, so take it one day at a time.  · Be prepared for cravings. When you feel the urge to smoke, chew gum or suck on hard candy.  Lifestyle  · Stay busy and take care of your body.  · Drink enough fluid to keep your urine pale yellow.  · Get plenty of exercise and eat a healthy diet. This can help prevent weight gain after quitting.  · Monitor your eating habits. Quitting smoking can cause you to have a larger appetite than when you smoke.  · Find ways to relax. Go out with friends or family to a movie or a restaurant where people do not smoke.  · Ask your health care provider about having regular tests (screenings) to check for cancer. This may include blood tests, imaging tests, and other tests.  · Find ways to manage your stress, such as meditation, yoga, or exercise.  Where to find support  To get support to quit smoking, consider:  · Asking your health care provider for more information and resources.  · Taking classes to learn more about quitting smoking.  · Looking for local organizations that offer resources about quitting  smoking.  · Joining a support group for people who want to quit smoking in your local community.  · Calling the smokefree.gov counselor helpline: 1-800-Quit-Now (1-526.278.1626)  Where to find more information  You may find more information about quitting smoking from:  · HelpGuide.org: www.helpguide.org  · Smokefree.gov: smokefree.gov  · American Lung Association: www.lung.org  Contact a health care provider if you:  · Have problems breathing.  · Notice that your lips, nose, or fingers turn blue.  · Have chest pain.  · Are coughing up blood.  · Feel faint or you pass out.  · Have other health changes that cause you to worry.  Summary  · Smoking tobacco can negatively affect your health, the health of those around you, your finances, and your social life.  · Do not start smoking. Quit if you already do. If you need help quitting, ask your health care provider.  · Think about joining a support group for people who want to quit smoking in your local community. There are many effective programs that will help you to quit this behavior.  This information is not intended to replace advice given to you by your health care provider. Make sure you discuss any questions you have with your health care provider.  Document Released: 01/02/2018 Document Revised: 02/06/2019 Document Reviewed: 01/02/2018  Elsevier Patient Education © 2020 [employer] Inc.      Advance Care Planning and Advance Directives     You make decisions on a daily basis - decisions about where you want to live, your career, your home, your life. Perhaps one of the most important decisions you face is your choice for future medical care. Take time to talk with your family and your healthcare team and start planning today.  Advance Care Planning is a process that can help you:  · Understand possible future healthcare decisions in light of your own experiences  · Reflect on those decision in light of your goals and values  · Discuss your decisions with those  closest to you and the healthcare professionals that care for you  · Make a plan by creating a document that reflects your wishes    Surrogate Decision Maker  In the event of a medical emergency, which has left you unable to communicate or to make your own decisions, you would need someone to make decisions for you.  It is important to discuss your preferences for medical treatment with this person while you are in good health.     Qualities of a surrogate decision maker:  • Willing to take on this role and responsibility  • Knows what you want for future medical care  • Willing to follow your wishes even if they don't agree with them  • Able to make difficult medical decisions under stressful circumstances    Advance Directives  These are legal documents you can create that will guide your healthcare team and decision maker(s) when needed. These documents can be stored in the electronic medical record.    · Living Will - a legal document to guide your care if you have a terminal condition or a serious illness and are unable to communicate. States vary by statute in document names/types, but most forms may include one or more of the following:        -  Directions regarding life-prolonging treatments        -  Directions regarding artificially provided nutrition/hydration        -  Choosing a healthcare decision maker        -  Direction regarding organ/tissue donation    · Durable Power of  for Healthcare - this document names an -in-fact to make medical decisions for you, but it may also allow this person to make personal and financial decisions for you. Please seek the advice of an  if you need this type of document.    **Advance Directives are not required and no one may discriminate against you if you do not sign one.    Medical Orders  Many states allow specific forms/orders signed by your physician to record your wishes for medical treatment in your current state of health. This form,  signed in personal communication with your physician, addresses resuscitation and other medical interventions that you may or may not want.      For more information or to schedule a time with a [facility] Advance Care Planning Facilitator contact: [facility] or call [phone] and someone will contact you directly.

## 2020-06-17 ENCOUNTER — TELEPHONE (OUTPATIENT)
Dept: NEUROSURGERY | Facility: CLINIC | Age: 65
End: 2020-06-17

## 2020-06-17 NOTE — PROGRESS NOTES
Chief complaint:   Chief Complaint   Patient presents with   • Post-op     Patient is here today for a s/p discectomy and a wound check.     Subjective     HPI:  6/5/2020.    Status post thoracic discectomy for thoracic stenosis with myelopathy  Concern for surgical wound infection  Ms. Foreman has done fairly well since we last saw her.  She presents today for post operative wound check following a Thoracic  DISCECTOMY, T10/11.  Costotransversectomy. Neuromonitoring on 5/19/2020 per Dr. Diaz.  She states her back discomfort is tolerable, her bladder dysfunction has resolved, and despite her inability to ambulate her lower extremity strength is in proving.  Her lower extremity numbness is however relatively unchanged.  Her postoperative incision is concerning for an early infection with surrounding edema and erythema.  No drainage or discharge noted at this time.  She may continue her current pain medications with tapering dosages previously instructed.  Benefits, risk, adverse effects, discussed.  Per most recent labs, 5/21/2020, her creatinine clearance was calculated at 240 mL/min.  A prescription for Bactrim was prescribed.  Benefits, risk, adverse effects, and use discussed.  I would also like to attain a pre-albumin, stone [place] notified.  Additionally, a prescription was provided for wound care management through [facility].  I requested she be evaluated for need of a wound VAC.  We will have her return for a wound reassessment in 10 days.  Otherwise, I advised that she keep her previously scheduled appointment with Dr. Diaz for reassessment.  Call to return sooner for any new or additional concerns.     Obese Class II: 35-39.9kg/m2  Body mass index is 36.78 kg/m².  Information on the DASH diet provided in the AVS.  We will continue to provided diet and exercise information with the goal of weight loss at each scheduled appointment.     Interval History: Anne-Marie Foreman is a 64 y.o.  female who  presents today for post operative follow-up from a Thoracic  DISCECTOMY, T10/11.  Costotransversectomy. Neuromonitoring on 5/19/2020 per Dr. Diaz.  Ms. Foreman has done fairly well since we last saw her.  She remains an inpatient at [facility] where she participates him physical and occupational therapy daily.  She states her back discomfort and right lower extremity strength continues to improve daily.  She additionally reports her loss of sensation, which was initially from the waist down, now only involves the lower extremities from the knees down.  She reports a return of sensation to urinate, however remains incontinent due to the inability to toilet transfer independently.  She denies lower extremity radicular pain.  She remains nonambulatory and expresses intense fear of falling.  She denies fevers, chills, night sweats, saddle anesthesia, bowel dysfunction.  For concern for a postoperative incisional infection, she was previously prescribed Bactrim DS which she completed without complications.  She reports [facility] facility staff are addressing her wound daily.  She currently rates the severity of her symptoms 5/10.  No additional concerns at this time.    Oswestry Disability Index = 64%   Score   Pain Intensity Moderate pain-2   Personal Care I need daily help in most areas-4   Lifting Only very light weights-4   Walking Bed bound and crawl to toilet-5   Sitting Sit as long as I like-0   Standing I can not stand at all-0   Sleeping Can only sleep < 4 hrs-3   Sex Life (if applicable) Not applicable-0   Social Life Pain restricts me to my home-4   Traveling Pain prevents travel except for doctors offices-5   (Cypress et al, 1980)    SCORE INTERPRETATION OF THE OSWESTRY LBP DISABILITY QUESTIONNAIRE     0-20% Minimal disability Can cope with most ADLs. Usually no treatment is needed, apart from advice on lifting, sitting, posture, physical fitness, and diet. In this group,  some patients have  particular difficulty with sitting and this may be important if their occupation is sedentary (, , etc.)       20-40% Moderate disability This group experiences more pain and problems with sitting, lifting, and standing. Travel and social life are more difficult and they may well be off  work. Personal care, sexual activity, and sleeping are not grossly affected, and the back condition can usually be managed by conservative means.       40-60% Severe disability Pain remains the main problem in this group of patients, but travel, personal care, social life, sexual activity, and sleep are also affected.  These patients require detailed investigation.     60-80% Crippled Back pain impinges on all aspects of these patients’ lives both at home and at work. Positive intervention is required.     % These patients are either bed-bound or exaggerating their symptoms. This can be evaluated by careful observation of the patient during the  medical examination.    PFSH:  Past Medical History:   Diagnosis Date   • Asthma    • CAD (coronary artery disease)    • Chronic atrial fibrillation (CMS/HCC)    • Chronic back pain    • Chronic fatigue    • Essential hypertension 5/14/2020   • 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CMS/HCC)    • Hyperlipidemia    • Hypertension    • Left sided lacunar infarction (CMS/HCC)    • Mixed hyperlipidemia 9/26/2017   • Obesity, Class II, BMI 35-39.9    • S/P discectomy 6/4/2020   • Systolic heart failure (CMS/HCC)    • Type 2 diabetes mellitus (CMS/HCC)    • Type 2 diabetes mellitus with microalbuminuria, without long-term current use of insulin (CMS/HCC) 9/26/2017   • Vitamin B 12 deficiency 5/17/2020     Past Surgical History:   Procedure Laterality Date   • CARDIAC CATHETERIZATION  10/21/2009   • CARDIOVERSION  10/09/2013   • CATARACT EXTRACTION, BILATERAL     • CHOLECYSTECTOMY     • LAPAROSCOPIC TUBAL LIGATION     • LUMBAR DISCECTOMY N/A 5/19/2020    Procedure: Thoracic  DISCECTOMY, T10/11.   Costotransversectomy. Neuromonitoring;  Surgeon: Willy Diaz MD;  [facility]: [facility];  Service: Neurosurgery;  Laterality: N/A;   • RETINAL LASER PROCEDURE       Objective      Current Outpatient Medications   Medication Sig Dispense Refill   • albuterol sulfate  (90 Base) MCG/ACT inhaler Inhale 2 puffs Every 6 (Six) Hours As Needed for Wheezing.     • aspirin 81 MG chewable tablet Chew 81 mg Daily.     • atorvastatin (LIPITOR) 80 MG tablet Take 80 mg by mouth Every Night.     • calcium carbonate (TUMS) 500 MG chewable tablet Chew 1,000 mg 4 (Four) Times a Day As Needed for Indigestion or Heartburn.     • carvedilol (COREG) 12.5 MG tablet Take 12.5 mg by mouth 2 (Two) Times a Day With Meals.     • cholecalciferol (VITAMIN D3) 25 MCG (1000 UT) tablet Take 1,000 Units by mouth Daily.     • digoxin (LANOXIN) 125 MCG tablet Take 250 mcg by mouth Daily.     • docusate sodium 100 MG capsule Take 100 mg by mouth 2 (Two) Times a Day.     • enoxaparin (LOVENOX) 40 MG/0.4ML solution syringe Inject 0.4 mL under the skin into the appropriate area as directed Daily. 11.2 mL    • fluticasone (FLONASE) 50 MCG/ACT nasal spray 2 sprays into the nostril(s) as directed by provider Daily As Needed for Rhinitis.     • furosemide (LASIX) 20 MG tablet Take 20 mg by mouth Daily.     • glipizide (GLUCOTROL) 10 MG tablet Take 10 mg by mouth Every Morning.     • hydroCHLOROthiazide (HYDRODIURIL) 25 MG tablet Take 12.5 mg by mouth.     • insulin glargine (Lantus) 100 UNIT/ML injection Inject 15 Units under the skin into the appropriate area as directed Every Night.  12   • irbesartan (AVAPRO) 75 MG tablet Take 75 mg by mouth.     • levothyroxine (SYNTHROID, LEVOTHROID) 125 MCG tablet Take 125 mcg by mouth Daily.     • losartan (COZAAR) 25 MG tablet Take 1 tablet by mouth Daily.     • metFORMIN (GLUCOPHAGE) 500 MG tablet Take 1,000 mg by mouth 2 (Two) Times a Day With Meals.     • metoprolol succinate XL (TOPROL-XL) 50 MG 24  "hr tablet Take 50 mg by mouth Daily.     • Multiple Vitamins-Minerals (MULTIVITAMIN WITH MINERALS) tablet tablet Take 1 tablet by mouth Daily.     • mupirocin (BACTROBAN) 2 % ointment Apply  topically to the appropriate area as directed Every 12 (Twelve) Hours.     • oxyCODONE-acetaminophen (PERCOCET)  MG per tablet Take 1 tablet by mouth Every 6 (Six) Hours As Needed for Moderate Pain .     • polyethylene glycol (MIRALAX) 17 g packet Take 17 g by mouth Daily As Needed (constipation).     • sennosides-docusate (PERICOLACE) 8.6-50 MG per tablet Take 2 tablets by mouth Every Night.     • spironolactone (ALDACTONE) 25 MG tablet Take 25 mg by mouth Daily.     • warfarin (COUMADIN) 7.5 MG tablet Take 0.5 tablets by mouth Daily.     • carvedilol (COREG) 12.5 MG tablet      • tiZANidine (ZANAFLEX) 4 MG tablet Take 1 tablet by mouth Every 8 (Eight) Hours As Needed for Muscle Spasms.       No current facility-administered medications for this visit.      Vital Signs  Ht 165.1 cm (65\")   Wt 100 kg (221 lb)   BMI 36.78 kg/m²   Physical Exam   Constitutional: She is oriented to person, place, and time. Vital signs are normal. She appears well-developed and well-nourished. She is cooperative.  Non-toxic appearance. She does not have a sickly appearance. She does not appear ill. No distress.   BMI 36.8   HENT:   Head: Normocephalic and atraumatic.   Right Ear: Hearing normal.   Left Ear: Hearing normal.   Mouth/Throat: Mucous membranes are normal.   Eyes: Pupils are equal, round, and reactive to light. Conjunctivae and EOM are normal.   Neck: Trachea normal and full passive range of motion without pain. Neck supple.   Cardiovascular: Normal rate and regular rhythm.   Pulmonary/Chest: Effort normal. No accessory muscle usage. No apnea, no tachypnea and no bradypnea. No respiratory distress.   Abdominal: Soft. Normal appearance.   Neurological: She is alert and oriented to person, place, and time. GCS eye subscore is 4. GCS " verbal subscore is 5. GCS motor subscore is 6.   Reflex Scores:       Tricep reflexes are 2+ on the right side and 2+ on the left side.       Bicep reflexes are 2+ on the right side and 2+ on the left side.       Brachioradialis reflexes are 2+ on the right side and 2+ on the left side.       Patellar reflexes are 0 on the right side and 0 on the left side.       Achilles reflexes are 0 on the right side and 0 on the left side.  Skin: Skin is warm, dry and intact. She is not diaphoretic.        Psychiatric: She has a normal mood and affect. Her speech is normal and behavior is normal.   Nursing note and vitals reviewed.    Neurologic Exam     Mental Status   Oriented to person, place, and time.   Attention: normal. Concentration: normal.   Speech: speech is normal   Level of consciousness: alert    Cranial Nerves     CN II   Visual fields full to confrontation.     CN III, IV, VI   Pupils are equal, round, and reactive to light.  Extraocular motions are normal.     CN V   Facial sensation intact.     CN VII   Facial expression full, symmetric.     CN VIII   CN VIII normal.     CN IX, X   CN IX normal.     CN XI   CN XI normal.     Motor Exam   Right arm tone: normal  Left arm tone: normal  Right arm pronator drift: absent  Left arm pronator drift: absent  Right leg tone: normal  Left leg tone: normal    Strength   Right deltoid: 5/5  Left deltoid: 5/5  Right biceps: 5/5  Left biceps: 5/5  Right triceps: 5/5  Left triceps: 5/5  Right wrist extension: 5/5  Left wrist extension: 5/5  Right iliopsoas: 4/5  Left iliopsoas: 3/5  Right quadriceps: 5/5  Left quadriceps: 4/5  Right anterior tibial: 4/5  Left anterior tibial: 4/5  Right posterior tibial: 4/5  Left posterior tibial: 4/5    Sensory Exam   Right arm light touch: normal  Left arm light touch: normal  Right leg light touch: decreased from knee  Left leg light touch: decreased from knee    Gait, Coordination, and Reflexes     Tremor   Resting tremor:  absent  Intention tremor: absent  Action tremor: absent    Reflexes   Right brachioradialis: 2+  Left brachioradialis: 2+  Right biceps: 2+  Left biceps: 2+  Right triceps: 2+  Left triceps: 2+  Right patellar: 0  Left patellar: 0  Right achilles: 0  Left achilles: 0  Right : 4+  Left : 4+  Right Krueger: absent  Left Krueger: absent  Right ankle clonus: absent  Left ankle clonus: absent  Right pendular knee jerk: absent  Left pendular knee jerk: absentNonambulatory      INCISION:  6/4/2020 6/18/2020    (Consent to obtain photo of postoperative site for documentation purposes only obtained verbally by Ms. Foreman)    Results Review: no new imaging      Assessment/Plan:   1. Intervertebral thoracic disc disorder with myelopathy, thoracic region    2. S/P discectomy    3. Long term current use of anticoagulant therapy    4. Class 2 severe obesity due to excess calories with serious comorbidity and body mass index (BMI) of 36.0 to 36.9 in adult (CMS/[facility])    5. Current non-smoker      Status post thoracic discectomy for thoracic stenosis with myelopathy  Ms. Foreman has done well since we last saw her.  She presents today for post operative wound check following a Thoracic  DISCECTOMY, T10/11.  Costotransversectomy. Neuromonitoring on 5/19/2020 per Dr. Diaz.  Her back pain is resolving and her lower extremity strength and sensation is improving.  SUSAN: 63.  Recently completed a dedicated course of Bactrim without complications for a concern for postoperative incision infection.  Her post operative incision appears to have improved healing.  No wound drainage or discharge noted.  She may continue current pain medications with tapering dosages as previously instructed.  B/R/AE discussed. I advised the patient to keep scheduled appointment with Dr. Diaz for reassessment on 7/22/2020.  Obtain x-rays of the thoracic spine prior to appointment. Call to return sooner for any additional concerns.      Obese  Class II: 35-39.9kg/m2  Body mass index is 36.78 kg/m².  Information on the DASH diet provided in the AVS.  We will continue to provided diet and exercise information with the goal of weight loss at each scheduled appointment.     Anne-Marie was seen today for post-op.    Diagnoses and all orders for this visit:    Intervertebral thoracic disc disorder with myelopathy, thoracic region    S/P discectomy    Long term current use of anticoagulant therapy    Class 2 severe obesity due to excess calories with serious comorbidity and body mass index (BMI) of 36.0 to 36.9 in adult (CMS/[facility])    Current non-smoker      Return for Keep scheduled appointment with Dr. Diaz.    I discussed the patients findings and my recommendations with patient    TYSON Doe

## 2020-06-17 NOTE — TELEPHONE ENCOUNTER
Patient is a resident at [facility] I called [facility] to advise her nurse that she is scheduled for a post-op follow up with us. Nursing [place] stated she would be here for her apt

## 2020-06-18 ENCOUNTER — OFFICE VISIT (OUTPATIENT)
Dept: NEUROSURGERY | Facility: CLINIC | Age: 65
End: 2020-06-18

## 2020-06-18 ENCOUNTER — TELEPHONE (OUTPATIENT)
Dept: INTERNAL MEDICINE | Age: 65
End: 2020-06-18

## 2020-06-18 VITALS — BODY MASS INDEX: 36.82 KG/M2 | WEIGHT: 221 LBS | HEIGHT: 65 IN

## 2020-06-18 DIAGNOSIS — E66.01 CLASS 2 SEVERE OBESITY DUE TO EXCESS CALORIES WITH SERIOUS COMORBIDITY AND BODY MASS INDEX (BMI) OF 36.0 TO 36.9 IN ADULT (HCC): ICD-10-CM

## 2020-06-18 DIAGNOSIS — M51.04 INTERVERTEBRAL THORACIC DISC DISORDER WITH MYELOPATHY, THORACIC REGION: Primary | ICD-10-CM

## 2020-06-18 DIAGNOSIS — Z79.01 LONG TERM CURRENT USE OF ANTICOAGULANT THERAPY: ICD-10-CM

## 2020-06-18 DIAGNOSIS — Z98.890 S/P DISCECTOMY: ICD-10-CM

## 2020-06-18 DIAGNOSIS — Z78.9 CURRENT NON-SMOKER: ICD-10-CM

## 2020-06-18 PROCEDURE — 99024 POSTOP FOLLOW-UP VISIT: CPT | Performed by: NURSE PRACTITIONER

## 2020-06-18 RX ORDER — OXYCODONE AND ACETAMINOPHEN 10; 325 MG/1; MG/1
1 TABLET ORAL EVERY 6 HOURS PRN
COMMUNITY

## 2020-06-18 NOTE — PATIENT INSTRUCTIONS
Fall Prevention in the Home, Adult  Falls can cause injuries. They can happen to people of all ages. There are many things you can do to make your home safe and to help prevent falls. Ask for help when making these changes, if needed.  What actions can I take to prevent falls?  General Instructions  · Use good lighting in all rooms. Replace any light bulbs that burn out.  · Turn on the lights when you go into a dark area. Use night-lights.  · Keep items that you use often in easy-to-reach places. Lower the shelves around your home if necessary.  · Set up your furniture so you have a clear path. Avoid moving your furniture around.  · Do not have throw rugs and other things on the floor that can make you trip.  · Avoid walking on wet floors.  · If any of your floors are uneven, fix them.  · Add color or contrast paint or tape to clearly sli and help you see:  ? Any grab bars or handrails.  ? First and last steps of stairways.  ? Where the edge of each step is.  · If you use a stepladder:  ? Make sure that it is fully opened. Do not climb a closed stepladder.  ? Make sure that both sides of the stepladder are locked into place.  ? Ask someone to hold the stepladder for you while you use it.  · If there are any pets around you, be aware of where they are.  What can I do in the bathroom?         · Keep the floor dry. Clean up any water that spills onto the floor as soon as it happens.  · Remove soap buildup in the tub or shower regularly.  · Use non-skid mats or decals on the floor of the tub or shower.  · Attach bath mats securely with double-sided, non-slip rug tape.  · If you need to sit down in the shower, use a plastic, non-slip stool.  · Install grab bars by the toilet and in the tub and shower. Do not use towel bars as grab bars.  What can I do in the bedroom?  · Make sure that you have a light by your bed that is easy to reach.  · Do not use any sheets or blankets that are too big for your bed. They should not  hang down onto the floor.  · Have a firm chair that has side arms. You can use this for support while you get dressed.  What can I do in the kitchen?  · Clean up any spills right away.  · If you need to reach something above you, use a strong step stool that has a grab bar.  · Keep electrical cords out of the way.  · Do not use floor polish or wax that makes floors slippery. If you must use wax, use non-skid floor wax.  What can I do with my stairs?  · Do not leave any items on the stairs.  · Make sure that you have a light switch at the top of the stairs and the bottom of the stairs. If you do not have them, ask someone to add them for you.  · Make sure that there are handrails on both sides of the stairs, and use them. Fix handrails that are broken or loose. Make sure that handrails are as long as the stairways.  · Install non-slip stair treads on all stairs in your home.  · Avoid having throw rugs at the top or bottom of the stairs. If you do have throw rugs, attach them to the floor with carpet tape.  · Choose a carpet that does not hide the edge of the steps on the stairway.  · Check any carpeting to make sure that it is firmly attached to the stairs. Fix any carpet that is loose or worn.  What can I do on the outside of my home?  · Use bright outdoor lighting.  · Regularly fix the edges of walkways and driveways and fix any cracks.  · Remove anything that might make you trip as you walk through a door, such as a raised step or threshold.  · Trim any bushes or trees on the path to your home.  · Regularly check to see if handrails are loose or broken. Make sure that both sides of any steps have handrails.  · Install guardrails along the edges of any raised decks and porches.  · Clear walking paths of anything that might make someone trip, such as tools or rocks.  · Have any leaves, snow, or ice cleared regularly.  · Use sand or salt on walking paths during winter.  · Clean up any spills in your garage right  "away. This includes grease or oil spills.  What other actions can I take?  · Wear shoes that:  ? Have a low heel. Do not wear high heels.  ? Have rubber bottoms.  ? Are comfortable and fit you well.  ? Are closed at the toe. Do not wear open-toe sandals.  · Use tools that help you move around (mobility aids) if they are needed. These include:  ? Canes.  ? Walkers.  ? Scooters.  ? Crutches.  · Review your medicines with your doctor. Some medicines can make you feel dizzy. This can increase your chance of falling.  Ask your doctor what other things you can do to help prevent falls.  Where to find more information  · Centers for Disease Control and Prevention, [place]: https://cdc.gov  · National [place] on Aging: https://vf8vtfh.giana.nih.gov  Contact a doctor if:  · You are afraid of falling at home.  · You feel weak, drowsy, or dizzy at home.  · You fall at home.  Summary  · There are many simple things that you can do to make your home safe and to help prevent falls.  · Ways to make your home safe include removing tripping hazards and installing grab bars in the bathroom.  · Ask for help when making these changes in your home.  This information is not intended to replace advice given to you by your health care provider. Make sure you discuss any questions you have with your health care provider.  Document Released: 10/14/2010 Document Revised: 04/09/2020 Document Reviewed: 08/02/2018  [employer] Patient Education © 2020 [employer]      DASH Eating Plan  DASH stands for \"Dietary Approaches to Stop Hypertension.\" The DASH eating plan is a healthy eating plan that has been shown to reduce high blood pressure (hypertension). It may also reduce your risk for type 2 diabetes, heart disease, and stroke. The DASH eating plan may also help with weight loss.  What are tips for following this plan?    General guidelines  · Avoid eating more than 2,300 mg (milligrams) of salt (sodium) a day. If you have hypertension, you may " "need to reduce your sodium intake to 1,500 mg a day.  · Limit alcohol intake to no more than 1 drink a day for nonpregnant women and 2 drinks a day for men. One drink equals 12 oz of beer, 5 oz of wine, or 1½ oz of hard liquor.  · Work with your health care provider to maintain a healthy body weight or to lose weight. Ask what an ideal weight is for you.  · Get at least 30 minutes of exercise that causes your heart to beat faster (aerobic exercise) most days of the week. Activities may include walking, swimming, or biking.  · Work with your health care provider or diet and nutrition specialist (dietitian) to adjust your eating plan to your individual calorie needs.  Reading food labels    · Check food labels for the amount of sodium per serving. Choose foods with less than 5 percent of the Daily Value of sodium. Generally, foods with less than 300 mg of sodium per serving fit into this eating plan.  · To find whole grains, look for the word \"whole\" as the first word in the ingredient list.  Shopping  · Buy products labeled as \"low-sodium\" or \"no salt added.\"  · Buy fresh foods. Avoid canned foods and premade or frozen meals.  Cooking  · Avoid adding salt when cooking. Use salt-free seasonings or herbs instead of table salt or sea salt. Check with your health care provider or pharmacist before using salt substitutes.  · Do not veronica foods. Cook foods using healthy methods such as baking, boiling, grilling, and broiling instead.  · Cook with heart-healthy oils, such as olive, canola, soybean, or sunflower oil.  Meal planning  · Eat a balanced diet that includes:  ? 5 or more servings of fruits and vegetables each day. At each meal, try to fill half of your plate with fruits and vegetables.  ? Up to 6-8 servings of whole grains each day.  ? Less than 6 oz of lean meat, poultry, or fish each day. A 3-oz serving of meat is about the same size as a deck of cards. One egg equals 1 oz.  ? 2 servings of low-fat dairy each " day.  ? A serving of nuts, seeds, or beans 5 times each week.  ? Heart-healthy fats. Healthy fats called Omega-3 fatty acids are found in foods such as flaxseeds and coldwater fish, like sardines, salmon, and mackerel.  · Limit how much you eat of the following:  ? Canned or prepackaged foods.  ? Food that is high in trans fat, such as fried foods.  ? Food that is high in saturated fat, such as fatty meat.  ? Sweets, desserts, sugary drinks, and other foods with added sugar.  ? Full-fat dairy products.  · Do not salt foods before eating.  · Try to eat at least 2 vegetarian meals each week.  · Eat more home-cooked food and less restaurant, buffet, and fast food.  · When eating at a restaurant, ask that your food be prepared with less salt or no salt, if possible.  What foods are recommended?  The items listed may not be a complete list. Talk with your dietitian about what dietary choices are best for you.  Grains  Whole-grain or whole-wheat bread. Whole-grain or whole-wheat pasta. Brown rice. Oatmeal. Quinoa. Bulgur. Whole-grain and low-sodium cereals. Alanis bread. Low-fat, low-sodium crackers. Whole-wheat flour tortillas.  Vegetables  Fresh or frozen vegetables (raw, steamed, roasted, or grilled). Low-sodium or reduced-sodium tomato and vegetable juice. Low-sodium or reduced-sodium tomato sauce and tomato paste. Low-sodium or reduced-sodium canned vegetables.  Fruits  All fresh, dried, or frozen fruit. Canned fruit in natural juice (without added sugar).  Meat and other protein foods  Skinless chicken or turkey. Ground chicken or turkey. Pork with fat trimmed off. Fish and seafood. Egg whites. Dried beans, peas, or lentils. Unsalted nuts, nut butters, and seeds. Unsalted canned beans. Lean cuts of beef with fat trimmed off. Low-sodium, lean deli meat.  Dairy  Low-fat (1%) or fat-free (skim) milk. Fat-free, low-fat, or reduced-fat cheeses. Nonfat, low-sodium ricotta or cottage cheese. Low-fat or nonfat yogurt.  Low-fat, low-sodium cheese.  Fats and oils  Soft margarine without trans fats. Vegetable oil. Low-fat, reduced-fat, or light mayonnaise and salad dressings (reduced-sodium). Canola, safflower, olive, soybean, and sunflower oils. Avocado.  Seasoning and other foods  Herbs. Spices. Seasoning mixes without salt. Unsalted popcorn and pretzels. Fat-free sweets.  What foods are not recommended?  The items listed may not be a complete list. Talk with your dietitian about what dietary choices are best for you.  Grains  Baked goods made with fat, such as croissants, muffins, or some breads. Dry pasta or rice meal packs.  Vegetables  Creamed or fried vegetables. Vegetables in a cheese sauce. Regular canned vegetables (not low-sodium or reduced-sodium). Regular canned tomato sauce and paste (not low-sodium or reduced-sodium). Regular tomato and vegetable juice (not low-sodium or reduced-sodium). Pickles. Olives.  Fruits  Canned fruit in a light or heavy syrup. Fried fruit. Fruit in cream or butter sauce.  Meat and other protein foods  Fatty cuts of meat. Ribs. Fried meat. [place]. Sausage. Bologna and other processed lunch meats. Salami. Fatback. Hotdogs. Bratwurst. Salted nuts and seeds. Canned beans with added salt. Canned or smoked fish. Whole eggs or egg yolks. Chicken or turkey with skin.  Dairy  Whole or 2% milk, cream, and half-and-half. Whole or full-fat cream cheese. Whole-fat or sweetened yogurt. Full-fat cheese. Nondairy creamers. Whipped toppings. Processed cheese and cheese spreads.  Fats and oils  Butter. Stick margarine. Lard. Shortening. Ghee. [place] fat. Tropical oils, such as coconut, palm kernel, or palm oil.  Seasoning and other foods  Salted popcorn and pretzels. Onion salt, garlic salt, seasoned salt, table salt, and sea salt. Worcestershire sauce. Tartar sauce. Barbecue sauce. Teriyaki sauce. Soy sauce, including reduced-sodium. Steak sauce. Canned and packaged gravies. Fish sauce. Oyster sauce. Cocktail  sauce. Horseradish that you find on the shelf. Ketchup. Mustard. Meat flavorings and tenderizers. Bouillon cubes. Hot sauce and Tabasco sauce. Premade or packaged marinades. Premade or packaged taco seasonings. Relishes. Regular salad dressings.  Where to find more information:  · National Heart, Lung, and Blood [place]: www.nhlbi.nih.gov  · American Heart Association: www.heart.org  Summary  · The DASH eating plan is a healthy eating plan that has been shown to reduce high blood pressure (hypertension). It may also reduce your risk for type 2 diabetes, heart disease, and stroke.  · With the DASH eating plan, you should limit salt (sodium) intake to 2,300 mg a day. If you have hypertension, you may need to reduce your sodium intake to 1,500 mg a day.  · When on the DASH eating plan, aim to eat more fresh fruits and vegetables, whole grains, lean proteins, low-fat dairy, and heart-healthy fats.  · Work with your health care provider or diet and nutrition specialist (dietitian) to adjust your eating plan to your individual calorie needs.  This information is not intended to replace advice given to you by your health care provider. Make sure you discuss any questions you have with your health care provider.  Document Released: 12/06/2012 Document Revised: 11/30/2018 Document Reviewed: 12/11/2017  Elsevier Patient Education © 2020 [employer]

## 2020-06-26 ENCOUNTER — TELEPHONE (OUTPATIENT)
Dept: INTERNAL MEDICINE | Age: 65
End: 2020-06-26

## 2020-06-26 NOTE — TELEPHONE ENCOUNTER
get a hot meal. She went into great detail about how terrible her care was at [facility]. Bowels and bladder are moving ok, she is using a bedside commode. She has all of her medications and is taking them as directed. I attempted to schedule her for an in office appointment, she states she cannot come into the office at this time. She is wheelchair bound and has no way of transporting at this time. She will call the office when she is able to come in and schedule an appointment. For now, she is requesting a telephone visit. She does not have internet access or a smart phone for a video visit.        Scheduled appointment with PCP within 7-14 days    Follow Up  Future Appointments   Date Time Provider Terry Jacqueline   8/18/2020  9:00 AM Renny Rios MD [facility] [address] [facility], [address] [place] [address]

## 2020-06-29 ENCOUNTER — TELEPHONE (OUTPATIENT)
Dept: INTERNAL MEDICINE | Age: 65
End: 2020-06-29

## 2020-06-29 LAB — INR BLD: 1.9

## 2020-06-30 ENCOUNTER — ANTI-COAG VISIT (OUTPATIENT)
Dept: INTERNAL MEDICINE | Age: 65
End: 2020-06-30
Payer: MEDICARE

## 2020-06-30 PROCEDURE — 93793 ANTICOAG MGMT PT WARFARIN: CPT | Performed by: PHYSICIAN ASSISTANT

## 2020-07-07 ENCOUNTER — ANTI-COAG VISIT (OUTPATIENT)
Dept: INTERNAL MEDICINE | Age: 65
End: 2020-07-07
Payer: MEDICARE

## 2020-07-07 LAB — INR BLD: 3.7

## 2020-07-07 PROCEDURE — 93793 ANTICOAG MGMT PT WARFARIN: CPT | Performed by: INTERNAL MEDICINE

## 2020-07-07 NOTE — PROGRESS NOTES
HOME MONITORING REPORT    INR today:   Results for orders placed or performed in visit on 07/07/20   Protime-INR   Result Value Ref Range    INR 3.70        INR Goal: 2.0-3.0    Dosing Plan  As of 7/7/2020    TTR:   76.9 % (2.9 y)   Full warfarin instructions:   7/7: Hold; 7/8: 3 mg; Otherwise 6 mg every day              PLAN: ADVISED PATIENT TO HOLD TONIGHT AND TAKE 1/2 TABLET TOMORROW, THEN RESUME CURRENT DOSE AND RECHECK IN ONE WEEK    [facility], [facility] NOTIFIED. I have reviewed nursing plan for Coumadin management and agree with plan.

## 2020-07-08 ENCOUNTER — VIRTUAL VISIT (OUTPATIENT)
Dept: INTERNAL MEDICINE | Age: 65
End: 2020-07-08
Payer: MEDICARE

## 2020-07-08 PROBLEM — Z98.890 HISTORY OF BACK SURGERY: Status: ACTIVE | Noted: 2020-07-08

## 2020-07-08 PROBLEM — M51.04 INTERVERTEBRAL THORACIC DISC DISORDER WITH MYELOPATHY, THORACIC REGION: Status: ACTIVE | Noted: 2020-07-08

## 2020-07-08 PROCEDURE — 99495 TRANSJ CARE MGMT MOD F2F 14D: CPT | Performed by: INTERNAL MEDICINE

## 2020-07-08 RX ORDER — LOSARTAN POTASSIUM 25 MG/1
25 TABLET ORAL DAILY
COMMUNITY
End: 2020-07-08 | Stop reason: SDUPTHER

## 2020-07-08 RX ORDER — GLIPIZIDE 5 MG/1
TABLET ORAL
Qty: 120 TABLET | Refills: 5 | Status: SHIPPED | OUTPATIENT
Start: 2020-07-08

## 2020-07-08 RX ORDER — SPIRONOLACTONE 25 MG/1
25 TABLET ORAL DAILY
Qty: 30 TABLET | Refills: 5 | Status: SHIPPED | OUTPATIENT
Start: 2020-07-08 | End: 2021-01-27 | Stop reason: SDUPTHER

## 2020-07-08 RX ORDER — LOSARTAN POTASSIUM 25 MG/1
25 TABLET ORAL DAILY
Qty: 30 TABLET | Refills: 5 | Status: SHIPPED | OUTPATIENT
Start: 2020-07-08 | End: 2021-01-19

## 2020-07-08 RX ORDER — WARFARIN SODIUM 6 MG/1
TABLET ORAL
Qty: 30 TABLET | Refills: 5 | Status: SHIPPED | OUTPATIENT
Start: 2020-07-08 | End: 2021-01-27 | Stop reason: SDUPTHER

## 2020-07-08 RX ORDER — METOPROLOL SUCCINATE 50 MG/1
TABLET, EXTENDED RELEASE ORAL
Qty: 30 TABLET | Refills: 5 | Status: SHIPPED | OUTPATIENT
Start: 2020-07-08 | End: 2020-07-29 | Stop reason: CLARIF

## 2020-07-08 RX ORDER — TIZANIDINE 4 MG/1
4 TABLET ORAL EVERY 8 HOURS PRN
COMMUNITY
End: 2020-07-21 | Stop reason: SDUPTHER

## 2020-07-08 RX ORDER — FUROSEMIDE 20 MG/1
20 TABLET ORAL DAILY
Qty: 30 TABLET | Refills: 5 | Status: SHIPPED | OUTPATIENT
Start: 2020-07-08 | End: 2021-01-27

## 2020-07-08 ASSESSMENT — ENCOUNTER SYMPTOMS
BACK PAIN: 1
CHEST TIGHTNESS: 0
COUGH: 0
SORE THROAT: 0
ABDOMINAL PAIN: 0
CONSTIPATION: 0
WHEEZING: 0

## 2020-07-08 ASSESSMENT — PATIENT HEALTH QUESTIONNAIRE - PHQ9
2. FEELING DOWN, DEPRESSED OR HOPELESS: 0
1. LITTLE INTEREST OR PLEASURE IN DOING THINGS: 0
SUM OF ALL RESPONSES TO PHQ9 QUESTIONS 1 & 2: 0
SUM OF ALL RESPONSES TO PHQ QUESTIONS 1-9: 0
SUM OF ALL RESPONSES TO PHQ QUESTIONS 1-9: 0

## 2020-07-08 NOTE — PROGRESS NOTES
Hospital Follow-up Visit    TELEHEALTH EVALUATION -- Audio/Visual (During CZDXX-08 public health emergency)  Patient location-home  Pursuant to the emergency declaration under the [address] [place] [address] waiver authority and the [employer] Act, this Virtual  Visit was conducted, with patient's consent, to reduce the patient's risk of exposure to COVID-19 and provide continuity of care for an established patient. Services were provided through a video synchronous discussion virtually to substitute for in-person clinic visit. Fabi Garcia   YOB: 1955    Date of Visit:  2020    There were no vitals filed for this visit. There is no height or weight on file to calculate BMI. TCM phone call:  Saul Townsend MA      20 9:17 AM   Note      [facility] Transitions Initial Follow Up Call     Outreach made within 2 business days of discharge: Yes     Patient: Fabi Garcia         Patient : 1955   MRN: [id number]  Reason for Admission: Admitted 20 for Intervertebral thoracic disc disorder with myelopathy, thoracic region (Primary Dx); Weakness of both lower extremities; Decreased activities of daily living (ADL); Status post thoracic spinal fusion  Discharge Date: 20 from [facility] then transferred to [facility] for short term rehab.  Discharged 20   Discharge Diagnosis    Weakness of both lower extremities    Vitamin B 12 deficiency    Essential hypertension    Mild CAD    Intervertebral thoracic disc disorder with myelopathy, thoracic region   Added automatically from request for surgery [id number]     Chronic atrial fibrillation    Morbid obesity due to excess calories (CMS/[facility])    Systolic congestive heart failure (CMS/[facility])    Type 2 diabetes mellitus with microalbuminuria, without long-term current use of insulin (CMS/[facility])    Long term current use of anticoagulant therapy                 Spoke with: patient      Discharge department/facility: [facility] Interactive Patient Contact:  Was patient able to fill all prescriptions: Yes  Was patient instructed to bring all medications to the follow-up visit: Yes  Is patient taking all medications as directed in the discharge summary? Yes  Does patient understand their discharge instructions: Yes  Does patient have questions or concerns that need addressed prior to 7-14 day follow up office visit: no     I spoke with Mrs. Blanco. She came home from the nursing home yesterday. Home health is coming out to do therapy with her. She is doing the recommended exercises on her own. She is still completely bound to a wheel chair. She states she is partial feeling in her legs but cannot feel her feet. Her appetite is much better since coming home, she states it was the best thing ever to finally get a hot meal. She went into great detail about how terrible her care was at [facility]. Bowels and bladder are moving ok, she is using a bedside commode. She has all of her medications and is taking them as directed. I attempted to schedule her for an in office appointment, she states she cannot come into the office at this time. She is wheelchair bound and has no way of transporting at this time. She will call the office when she is able to come in and schedule an appointment. For now, she is requesting a telephone visit.  She does not have internet access or a smart phone for a video visit.         Scheduled appointment with PCP within 7-14 days     Follow Up         Future Appointments   Date Time Provider Terry Phillips   8/18/2020  9:00 AM Lucio Rocha MD [facility] [place]         [facility] Readings from Last 3 Encounters:   02/28/20 233 lb (105.7 kg)   02/27/20 233 lb (105.7 kg)   02/20/20 231 lb (104.8 kg)     BP Readings from Last 3 Encounters:   04/23/20 124/82 mouth daily.  Vitamin D (CHOLECALCIFEROL) 1000 UNITS CAPS capsule Take 1,000 Units by mouth 2 times daily       aspirin 81 MG EC tablet Take 81 mg by mouth daily. CC:  [facility]   [address] [facility] [place] [address] [place] [facility] 5/22/20         Discharge Summaries    Myra Henning DO - 05/22/2020 8:57 AM CDT  Formatting of this note might be different from the original.      [address] SUMMARY     Date of Admission: 5/14/2020  Date of Discharge: 5/22/2020  Primary Care Physician: Ata Jacques MD    Presenting Problem/History of Present Illness:  Weakness of both lower extremities [R29.898]     Final Discharge Diagnoses:   Active Hospital Problems   Diagnosis    **Weakness of both lower extremities    Vitamin B 12 deficiency    Essential hypertension    Mild CAD    Intervertebral thoracic disc disorder with myelopathy, thoracic region   Added automatically from request for surgery [id number]     Chronic atrial fibrillation    Morbid obesity due to excess calories (CMS/[facility])    Systolic congestive heart failure (CMS/[facility])    Type 2 diabetes mellitus with microalbuminuria, without long-term current use of insulin (CMS/[facility])    Long term current use of anticoagulant therapy     Consults:   #1 Dr. Christine Fung, neurology  #2 Dr. Bryson Ellis, neurosurgery  #3 Dr. Darlene Canchola, cardiology    Procedures Performed:   #1 thoracic discectomy and costotransversectomy by Dr. Bryson Ellis on 5/19    Pertinent Test Results:   Procedure Component Value Units Date/Time   CT Thoracic Spine Without Contrast [[id number]] Tori Murray as Reviewed   Order Status: Completed Collected: 05/20/20 1126   Updated: 05/20/20 1141   Narrative:     CT THORACIC SPINE WO CONTRAST- 5/20/2020 10:55 AM CDT     HISTORY: post op; M51.04-Intervertebral disc disorders with myelopathy,  thoracic region; R29.898-Other symptoms and signs involving the  musculoskeletal system; Z78.9-Other specified health status     COMPARISON: MRI 5/15/2020      DOSE LENGTH PRODUCT: 942 mGy cm. Automated exposure control was also  utilized to decrease patient radiation dose.     TECHNIQUE: Serial helical tomographic images of the thoracic spine were  obtained without the use of intravenous contrast. Multiplanar  reformatted images were provided for review.      FINDINGS:   The patient is status post disc replacement at T10-T11. Postoperative  changes are noted. 2 drains are seen in the operative bed. No  malalignment is seen. Vertebral bodies are normal in height. Diffuse  anterior osteophytes are again seen. There is no bony narrowing of the  spinal canal.     The paravertebral soft tissues are unremarkable. The visualized lungs  are clear. The heart is enlarged. Mitral valve and coronary artery  calcifications are seen.      Impression:     1. Postoperative changes with drains in place. No definite postoperative  complication. This report was finalized on 05/20/2020 11:38 by Dr. Kait Whitman MD.   Elier Tai [[id number]] Dale Jones as Reviewed   Order Status: Completed Collected: 05/19/20 1649   Updated: 05/20/20 0801   Narrative:     CT LIMITED LOCALIZED FOLLOW UP STUDY- 5/19/2020 10:51 AM CDT     HISTORY: discectomy; M51.04-Intervertebral disc disorders with  myelopathy, thoracic region; R29.898-Other symptoms and signs involving  the musculoskeletal system; Z78.9-Other specified health status     COMPARISON: None     FLUOROSCOPY TIME: 26.43 seconds     NUMBER OF IMAGES: 384     FLUORO CT DLP: 576 mGy*cm      Impression:        Intraoperative fluoroscopic images and fluoro CT obtained during  discectomy.     Please refer to the operative note for more details. This report was finalized on 05/19/2020 16:51 by Dr Dana Brooks, .    FL O Arm During Surgery [[id number]] Dale Jones as Reviewed   Order Status: Completed Collected: 05/19/20 1649   Updated: 05/20/20 0801   Narrative:     CT LIMITED LOCALIZED FOLLOW UP STUDY- 5/19/2020 10:51 AM CDT     HISTORY: discectomy; M51.04-Intervertebral disc disorders with  myelopathy, thoracic region; R29.898-Other symptoms and signs involving  the musculoskeletal system; Z78.9-Other specified health status      Impression:        1. 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 2. More focal areas of thecal sac stenosis are noted as detailed above. 3. Areas of mild to moderate neural foraminal narrowing are seen at  multiple levels. 4. Synovitis suspected of the facet joints at L4-L5. Otherwise, no  abnormal enhancement identified. 05/17/20 1223   Narrative:     History: Swelling      Impression:     Impression: There is no evidence of deep venous thrombosis or  superficial thrombophlebitis of right or left lower extremities.     Comments: Bilateral lower extremity venous duplex exam was performed  using color Doppler flow, Doppler waveform analysis, and grayscale  imaging, with and without compression. There is no evidence of deep  venous thrombosis in the common femoral, superficial femoral, popliteal,  peroneal, anterior tibial, and posterior tibial veins bilaterally. No  thrombus is identified in the saphenofemoral junctions and greater  saphenous veins bilaterally.     : MRI THORACIC SPINE WO CONTRAST-     5/15/2020 3:10 PM CDT     Summary:  1. Multifactorial spinal canal stenosis and foraminal stenosis with cord  flattening and myelomalacia at T10-11. Hospital Course: The patient is a 71 y.o. female with past medical history of combined CHF, EF 25 to 30%, chronic A. fib, CAD, diabetes, prior CVA who presented to [facility] ER on 5/14 complaining of 3-month history of progressive lower extremity weakness with new urinary incontinence. She had sustained a fall 3 months ago and was seen at [facility] that time and was treated and discharged.  After admission patient had a MRI of her thoracic spine and she was found to have multi-factorial spinal canal stenosis with foraminal stenosis and cord flattening/myelomalacia at T10-T11. Neurosurgery was consulted and additional imaging. Her Coumadin was held and cardiology was consulted. She was cleared for the OR and taken on 5/19 for discectomy. Subsequently had drains in placed. Postoperatively she had improvement in her lower extremity sensation and strength. She also had improvement in her urinary output/incontinence. Her drains are now out. Her labs are otherwise stable and there are no signs of infection. She is doing well is been cleared for discharge to skilled nursing facility for ongoing rehabilitation. Of note patient's Coumadin had to be held during hospitalization for this surgery. Her INR is currently normalized. She is getting Lovenox subcu for DVT prophylaxis and per neurosurgery can resume her Coumadin 1 week postop which would be Tuesday 5/26. She was on Coumadin for A. fib and has been rate controlled throughout the hospital stay without any issues. Condition on Discharge: Stable/improved    Medications are reconciled and reviewed. Inpatient course: Discharge summary reviewed- see chart. Current status: Stable, patient remains wheelchair-bound/with weakness bilateral lower extremities    Lab data review  Most recent available lab  6/29/2020 A1c 8.1  5/21/2020 hemoglobin 10.3/hematocrit 31  5/21/2020 GFR normal  5/15/2020 LFTs normal    I do not see any lab results since that date    Review of Systems   Constitutional: Positive for fatigue. Negative for chills and fever. HENT: Negative for congestion, ear pain, nosebleeds, postnasal drip and sore throat. Respiratory: Negative for cough, chest tightness and wheezing. Cardiovascular: Negative for chest pain, palpitations and leg swelling. Gastrointestinal: Negative for abdominal pain and constipation. Genitourinary: Negative for dysuria and urgency.    Musculoskeletal: weakness, bilateral  History of back surgery  #1 thoracic discectomy and costotransversectomy by Dr. Casiano Dose on 5/19    Patient will continue physical therapy as per neurosurgery recommendations  Has upcoming appointment follow-up with Dr. Casiano Dose in 2 weeks    Type 2 diabetes mellitus with microalbuminuria, without long-term current use of insulin ([facility]  Most recent hemoglobin A1c 8.1 - 6/29/20  She is taking metformin 500- 2 bid and glipizide 10 am/ 5 pm  Her most recent BS last 3 days:  AM readings 130's  Before lunch readings: 132  Before dinner:167  Before bedtime 131; 274/243  Recommnedations:  * metformin 1000 bid  * increase glipizide to 5 mg- 2 tabs BID    PAF (paroxysmal atrial fibrillation) (HCC)  Chronic anticoagulation  INR 3.7 on 7/7/2020  Patient was to hold on 7/7, take half tablet on 7/8 then resume 6 mg daily and repeat in 1 week    Chronic systolic congestive heart failure (HCC)  ej fraction fraction 20%  Patient remains on losartan 25 mg daily, Lasix 20 mg daily digoxin 0.125 daily Coreg 12.5 twice daily metoprolol ER 50 mg daily and spironolactone 25 mg daily    Essential hypertension  Per home health blood pressure has been in good range, patient on medications as above for CHF and also taking hydrochlorothiazide 20 5/2 tablet daily  Pt is taking both metoprolol + coreg  Has upcoming appt with cardiology will discuss  HT has been 86-90 range    Acquired hypothyroidism  Recent lab check is seen  Need to have TSH and free T4 drawn        Diagnostic test results reviewed    Lab is needed    Orders Placed This Encounter   Procedures    CBC Auto Differential     Standing Status:   Future     Standing Expiration Date:   7/8/2021    Comprehensive Metabolic Panel     Standing Status:   Future     Standing Expiration Date:   7/8/2021    Lipid Panel     Standing Status:   Future     Standing Expiration Date:   7/8/2021     Order Specific Question:   Is Patient Fasting?/# of Hours     Answer:   yes

## 2020-07-09 ENCOUNTER — TELEPHONE (OUTPATIENT)
Dept: CASE MANAGEMENT | Age: 65
End: 2020-07-09

## 2020-07-09 NOTE — TELEPHONE ENCOUNTER
Please let Dr. Thong Zuniga know that LATONIA FLAHERTY [facility] attempted to draw labs for the following order we received yesterday:  Labs to be drawn on Thursday 7/9/2020:  CBC w diff, CMP, Lipid Panel, TSH without Reflex, Free T4, Digoxin Level. Results to Dr. Thong Zuniga. Upon Skilled Nurse arrival to patient home, she forgot and had eaten breakfast.  No labs drawn today since labs were to be fasting. SN reminded patient not to eat on Friday AM until after nurse arrives. Will attempt to draw the above labs in the morning on 7/10/2020.     Thank you  Electronically signed by Laura Alejandro RN on 7/9/2020 at 12:00 PM

## 2020-07-10 ENCOUNTER — TELEPHONE (OUTPATIENT)
Dept: CASE MANAGEMENT | Age: 65
End: 2020-07-10

## 2020-07-14 ENCOUNTER — TELEPHONE (OUTPATIENT)
Dept: INTERNAL MEDICINE CLINIC | Age: 65
End: 2020-07-14

## 2020-07-14 ENCOUNTER — ANTI-COAG VISIT (OUTPATIENT)
Dept: INTERNAL MEDICINE | Age: 65
End: 2020-07-14
Payer: MEDICARE

## 2020-07-14 ENCOUNTER — TELEPHONE (OUTPATIENT)
Dept: INTERNAL MEDICINE | Age: 65
End: 2020-07-14

## 2020-07-14 LAB — INR BLD: 2.1

## 2020-07-14 PROCEDURE — 93793 ANTICOAG MGMT PT WARFARIN: CPT | Performed by: INTERNAL MEDICINE

## 2020-07-21 ENCOUNTER — ANTI-COAG VISIT (OUTPATIENT)
Dept: INTERNAL MEDICINE | Age: 65
End: 2020-07-21
Payer: MEDICARE

## 2020-07-21 ENCOUNTER — TELEPHONE (OUTPATIENT)
Dept: NEUROSURGERY | Facility: CLINIC | Age: 65
End: 2020-07-21

## 2020-07-21 ENCOUNTER — TELEPHONE (OUTPATIENT)
Dept: INTERNAL MEDICINE | Age: 65
End: 2020-07-21

## 2020-07-21 DIAGNOSIS — Z98.890 S/P DISCECTOMY: ICD-10-CM

## 2020-07-21 DIAGNOSIS — M51.04 INTERVERTEBRAL THORACIC DISC DISORDER WITH MYELOPATHY, THORACIC REGION: Primary | ICD-10-CM

## 2020-07-21 LAB — INR BLD: 2.6

## 2020-07-21 PROCEDURE — 93793 ANTICOAG MGMT PT WARFARIN: CPT | Performed by: INTERNAL MEDICINE

## 2020-07-21 RX ORDER — TIZANIDINE 4 MG/1
4 TABLET ORAL EVERY 8 HOURS PRN
Qty: 30 TABLET | Refills: 0 | Status: SHIPPED | OUTPATIENT
Start: 2020-07-21 | End: 2021-01-27

## 2020-07-21 RX ORDER — TIZANIDINE 4 MG/1
4 TABLET ORAL EVERY 8 HOURS PRN
Qty: 45 TABLET | Refills: 0 | Status: SHIPPED | OUTPATIENT
Start: 2020-07-21 | End: 2020-08-05

## 2020-07-21 NOTE — TELEPHONE ENCOUNTER
----- Message from Ava Springer sent at 7/20/2020  9:43 AM CDT -----  Regarding: PT CANCELLED POST OP DUE TO TRANSPORTATION  PT CALLED AND CANCELLED HER POST OP FOR 7/22/20. HAS TO USE PATS AT THIS TIME AND IT WILL COST HER $125 FOR THEM TO BRING HER. SHE CANNOT AFFORD THAT AND MUST WAIT UNTIL SHE CAN DRIVE HERSELF.     PT STATES SHE WILL CALL US TO R/S. I TOLD HER I WOULD NOTIFY YOU.     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

## 2020-07-21 NOTE — PROGRESS NOTES
HOME MONITORING REPORT    INR today:   Results for orders placed or performed in visit on 07/21/20   Protime-INR   Result Value Ref Range    INR 2.60        INR Goal: 2.0-3.0    Dosing Plan  As of 7/21/2020    TTR:   76.9 % (3 y)   Full warfarin instructions:   6 mg every day              PLAN:PATIENT NOTIFIED TO  CONTINUE CURRENT DOSE AND RECHECK IN ONE WEEK. John with home health notified also    I have reviewed nursing plan for Coumadin management and agree with plan.

## 2020-07-28 ENCOUNTER — ANTI-COAG VISIT (OUTPATIENT)
Dept: INTERNAL MEDICINE | Age: 65
End: 2020-07-28
Payer: MEDICARE

## 2020-07-28 ENCOUNTER — TELEPHONE (OUTPATIENT)
Dept: INTERNAL MEDICINE | Age: 65
End: 2020-07-28

## 2020-07-28 LAB — INR BLD: 2.1

## 2020-07-28 PROCEDURE — 93793 ANTICOAG MGMT PT WARFARIN: CPT | Performed by: INTERNAL MEDICINE

## 2020-07-29 ENCOUNTER — VIRTUAL VISIT (OUTPATIENT)
Dept: INTERNAL MEDICINE | Age: 65
End: 2020-07-29
Payer: MEDICARE

## 2020-07-29 PROCEDURE — 3017F COLORECTAL CA SCREEN DOC REV: CPT | Performed by: INTERNAL MEDICINE

## 2020-07-29 PROCEDURE — 4040F PNEUMOC VAC/ADMIN/RCVD: CPT | Performed by: INTERNAL MEDICINE

## 2020-07-29 PROCEDURE — 1123F ACP DISCUSS/DSCN MKR DOCD: CPT | Performed by: INTERNAL MEDICINE

## 2020-07-29 PROCEDURE — 3052F HG A1C>EQUAL 8.0%<EQUAL 9.0%: CPT | Performed by: INTERNAL MEDICINE

## 2020-07-29 PROCEDURE — 2022F DILAT RTA XM EVC RTNOPTHY: CPT | Performed by: INTERNAL MEDICINE

## 2020-07-29 PROCEDURE — 1036F TOBACCO NON-USER: CPT | Performed by: INTERNAL MEDICINE

## 2020-07-29 PROCEDURE — G8400 PT W/DXA NO RESULTS DOC: HCPCS | Performed by: INTERNAL MEDICINE

## 2020-07-29 PROCEDURE — 99214 OFFICE O/P EST MOD 30 MIN: CPT | Performed by: INTERNAL MEDICINE

## 2020-07-29 PROCEDURE — 1090F PRES/ABSN URINE INCON ASSESS: CPT | Performed by: INTERNAL MEDICINE

## 2020-07-29 PROCEDURE — G8417 CALC BMI ABV UP PARAM F/U: HCPCS | Performed by: INTERNAL MEDICINE

## 2020-07-29 PROCEDURE — G8427 DOCREV CUR MEDS BY ELIG CLIN: HCPCS | Performed by: INTERNAL MEDICINE

## 2020-07-29 RX ORDER — CARVEDILOL 12.5 MG/1
12.5 TABLET ORAL 2 TIMES DAILY WITH MEALS
Qty: 60 TABLET | Refills: 3 | Status: SHIPPED | OUTPATIENT
Start: 2020-07-29 | End: 2020-12-18

## 2020-07-29 RX ORDER — CYANOCOBALAMIN (VITAMIN B-12) 500 MCG
500 TABLET ORAL DAILY
COMMUNITY

## 2020-07-29 RX ORDER — ATORVASTATIN CALCIUM 80 MG/1
TABLET, FILM COATED ORAL
Qty: 30 TABLET | Refills: 5 | Status: SHIPPED | OUTPATIENT
Start: 2020-07-29 | End: 2021-12-14 | Stop reason: SDUPTHER

## 2020-07-29 ASSESSMENT — ENCOUNTER SYMPTOMS
BACK PAIN: 1
COUGH: 0
WHEEZING: 0
ABDOMINAL PAIN: 0
SORE THROAT: 0
CONSTIPATION: 0
CHEST TIGHTNESS: 0

## 2020-07-29 ASSESSMENT — PATIENT HEALTH QUESTIONNAIRE - PHQ9
SUM OF ALL RESPONSES TO PHQ QUESTIONS 1-9: 0
2. FEELING DOWN, DEPRESSED OR HOPELESS: 0
SUM OF ALL RESPONSES TO PHQ9 QUESTIONS 1 & 2: 0
SUM OF ALL RESPONSES TO PHQ QUESTIONS 1-9: 0
1. LITTLE INTEREST OR PLEASURE IN DOING THINGS: 0

## 2020-07-29 NOTE — PROGRESS NOTES
Chief Complaint   Patient presents with    1 Month Follow-Up     Pt states that she did not go to the Neurology appt due to transportation     History of presenting illness:  Zuleyma Lucia is a67 y.o. female who presents today for follow up on her chronic medical conditions as noted below. Intervertebral thoracic disc disorder with myelopathy, thoracic region  Leg weakness, bilateral  History of back surgery- thoracic discectomy and costotransversectomy by Dr. Angelo Madsen on 5/19  She continues to have significant lower extremity weakness  Continues home physical therapy     Essential hypertension- Patient reports her Bp has been well controlled ( systolic below 572; diastolic below 90) at home when checked with home/ store equipment. 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  Has been taking  cholesterol lowering medication as prescribed and does not report any side effects./She takes atorvastatin 80 mg daily     PAF  On coumadin  inr checked regularly  Patient Active Problem List    Diagnosis Date Noted    History of back surgery 07/08/2020     Overview Note:     thoracic discectomy and costotransversectomy by Dr. Agnelo Madsen on 5/20      Intervertebral thoracic disc disorder with myelopathy, thoracic region 07/08/2020    Generalized weakness 03/02/2020    Palliative care patient 02/21/2020    Stroke-like symptoms 02/20/2020    Chronic atrial fibrillation 05/21/2018    Acquired hypothyroidism 10/17/2017    Morbid obesity due to excess calories ([address] 09/26/2017    Vitamin D deficiency 10/20/0459    Systolic congestive heart failure ([facility] 09/26/2017     Overview Note:     3/12 echo ef 40%      H/O bone density study 09/26/2017     Overview Note:     2015 nl      Mixed hyperlipidemia 09/26/2017    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    Type 2 diabetes mellitus with microalbuminuria, without long-term current use of insulin ([facility] 09/26/2017    Long term current use of anticoagulant therapy 06/29/2017     Overview Note:     Updating Deprecated Diagnoses      Essential hypertension     Mild CAD      Past Medical History:   Diagnosis Date    Acute laryngitis     Acute sinusitis     Allergic rhinitis     Ankle pain     Asthma     Asthmatic bronchitis     Atrial fibrillation ([facility] 9/13/12, 10/9/13    cardioversion    Atrial flutter (HCC)     paroxysmal     CAD (coronary artery disease)     Cerebral artery occlusion with cerebral infarction [facility])     2006    CHF (congestive heart failure) (HCC)     Chronic back pain     Cough     Diabetes     Dizzy     Dysuria     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[address]     Fatigue     Foot pain     Hx of amiodarone therapy 9/24/2014    Hyperlipidemia     PCP manages cholesterol    Hypertension     Menopause     Obesity     Palliative care patient 02/21/2020    Skin rash     Type II or unspecified type diabetes mellitus without mention of complication, not stated as uncontrolled     Umbilical hernia     URI (upper respiratory infection)       Past Surgical History:   Procedure Laterality Date    CARDIAC CATHETERIZATION  10/21/09    EF 35% left ventricle is moderately enlarged     CARDIOVERSION  10/9/13    CHOLECYSTECTOMY      EYE SURGERY      retina and cataracts     GALLBLADDER SURGERY      TUBAL LIGATION       Current Outpatient Medications   Medication Sig Dispense Refill    Cyanocobalamin (VITAMIN B 12) 500 MCG TABS Take 500 mcg by mouth daily      atorvastatin (LIPITOR) 80 MG tablet TAKE 1 TABLET BY MOUTH ONCE DAILY. 30 tablet 5    carvedilol (COREG) 12.5 MG tablet Take 1 tablet by mouth 2 times daily (with meals) 60 tablet 3    tiZANidine (ZANAFLEX) 4 MG tablet Take 1 tablet by mouth every 8 hours as needed (muscle spasm) 30 tablet 0    Bed Trapeze MISC by Does not apply route 1 each 0    glipiZIDE (GLUCOTROL) 5 MG tablet TAKE 2 TABLETS BY MOUTH IN THE MORNING & 2 TABLETS IN THE EVENING 120 tablet 5    furosemide (LASIX) 20 MG tablet Take 1 tablet by mouth daily 30 tablet 5    losartan (COZAAR) 25 MG tablet Take 1 tablet by mouth daily 30 tablet 5    metFORMIN (GLUCOPHAGE) 500 MG tablet TAKE 2 TABLETS BY MOUTH TWICE DAILY WITH MEALS 120 tablet 5    spironolactone (ALDACTONE) 25 MG tablet Take 1 tablet by mouth daily 30 tablet 5    warfarin (COUMADIN) 6 MG tablet Take 6mg daily by mouth daily 30 tablet 5    levothyroxine (SYNTHROID) 125 MCG tablet Take 1 tablet by mouth Daily 30 tablet 5    hydroCHLOROthiazide (HYDRODIURIL) 25 MG tablet Take 0.5 tablets by mouth daily 1/2 daily 45 tablet 3    digoxin (LANOXIN) 125 MCG tablet TAKE 2 TABLETS BY MOUTH EVERY DAY (Patient taking differently: 125 mcg daily ) 60 tablet 0    albuterol sulfate HFA (VENTOLIN HFA) 108 (90 Base) MCG/ACT inhaler Inhale 2 puffs into the lungs every 6 hours as needed for Wheezing 1 Inhaler 5    Multiple Vitamins-Minerals (THERAPEUTIC MULTIVITAMIN-MINERALS) tablet Take 1 tablet by mouth daily.  Vitamin D (CHOLECALCIFEROL) 1000 UNITS CAPS capsule Take 1,000 Units by mouth 2 times daily       aspirin 81 MG EC tablet Take 81 mg by mouth daily. No current facility-administered medications for this visit.       Allergies   Allergen Reactions    Aspartame And Phenylalanine Anaphylaxis, Shortness Of Breath and Swelling    Mushroom Extract Complex Anaphylaxis, Shortness Of Breath and Swelling    Penicillins Anaphylaxis, Hives, Shortness Of Breath, Itching, Swelling and Rash     TOLERATES ROCEPHIN    Shellfish-Derived Products Anaphylaxis, Shortness Of Breath and Swelling    Zetia [Ezetimibe] Nausea Only     Social History     Tobacco Use    Smoking status: Never Smoker    Smokeless tobacco: Never Used   Substance Use Topics    Alcohol use: No      Family History   Problem Relation Age of Onset    Diabetes Mother     Heart Failure Mother     High Blood Pressure Mother     Liver Cancer Father     Colon Cancer Neg Hx     Colon Polyps Neg Hx        Review of Systems   Constitutional: Positive for fatigue. Negative for chills and fever. HENT: Negative for congestion, ear pain, nosebleeds, postnasal drip and sore throat. Respiratory: Negative for cough, chest tightness and wheezing. Cardiovascular: Negative for chest pain, palpitations and leg swelling. Gastrointestinal: Negative for abdominal pain and constipation. Genitourinary: Negative for dysuria and urgency. Musculoskeletal: Positive for arthralgias and back pain. Skin: Negative for rash. Neurological: Positive for weakness and numbness. Negative for dizziness and headaches. Psychiatric/Behavioral: Negative. There were no vitals filed for this visit. There is no height or weight on file to calculate BMI.   Patient-Reported Vitals 7/29/2020   Patient-Reported Systolic 188   Patient-Reported Diastolic 81   Patient-Reported Pulse 79        Physical Exam  PHYSICAL EXAMINATION:  [ INSTRUCTIONS:  \"[x]\" Indicates a positive item  \"[]\" Indicates a negative item  -- DELETE ALL ITEMS NOT EXAMINED]  [x] Alert  [x] Oriented to person/place/time    [x] No apparent distress  [] Toxic appearing    [] Face flushed appearing [] Sclera clear  [] Lips are cyanotic      [x] Breathing appears normal  [] Appears tachypneic      [] Rash on visible skin    [x] Cranial Nerves II-XII grossly intact    [x] Motor grossly intact in visible upper extremities    [x] Motor grossly intact in visible lower extremities    [x] Normal Mood  [] Anxious appearing    [] Depressed appearing  [] Confused appearing      [] Poor short term memory  [] Poor long term memory    [] OTHER:      Due to this being a TeleHealth encounter, evaluation of the following organ systems is limited: Vitals/Constitutional/EENT/Resp/CV/GI//MS/Neuro/Skin/Heme-Lymph-Imm.     Lab Review   Anti-coag visit on 07/28/2020   Component Date Value    INR 07/28/2020 2.10    Anti-coag visit on 07/21/2020   Component Date Value    INR 07/21/2020 2.60    Anti-coag visit on 07/14/2020   Component Date Value    INR 07/14/2020 2.10    Anti-coag visit on 07/07/2020   Component Date Value    INR 07/07/2020 3.70    Anti-coag visit on 06/30/2020   Component Date Value    INR 06/29/2020 1.90            ASSESSMENT/PLAN:    Intervertebral thoracic disc disorder with myelopathy, thoracic region  Leg weakness, bilateral  History of back surgery- thoracic discectomy and costotransversectomy by Dr. Isai Garcia on 5/19     Patient will continue physical therapy as per neurosurgery recommendations    Type 2 diabetes mellitus with microalbuminuria, without long-term current use of insulin ([facility]  Most recent hemoglobin A1c 8.1 - 6/29/20  She is taking metformin 500- 2 bid and glipizide 10 am/ 5 pm  Her most recent BS last 3 days:  AM readings 130's  Before lunch readings: 132  Before dinner:167  Before bedtime 131; 274/243  Recommnedations:  * metformin 1000 bid  * glipizide to 5 mg- 2 tabs BID  *Initiate strict diet-Long discussion the patient  *Follow-up for blood sugar check in 1 week-likely will need to use additional medication like Invokana    PAF (paroxysmal atrial fibrillation) (HCC)  Chronic anticoagulation  INR has been in good range  I reviewed every single INR when they come in at least once weekly     Chronic systolic congestive heart failure (HCC)  ej fraction fraction 20%  Patient remains on losartan 25 mg daily, Lasix 20 mg daily digoxin 0.125 daily Coreg 12.5 twice daily metoprolol ER 50 mg daily and spironolactone 25 mg

## 2020-08-04 ENCOUNTER — TELEPHONE (OUTPATIENT)
Dept: INTERNAL MEDICINE CLINIC | Age: 65
End: 2020-08-04

## 2020-08-04 ENCOUNTER — ANTI-COAG VISIT (OUTPATIENT)
Dept: INTERNAL MEDICINE | Age: 65
End: 2020-08-04
Payer: MEDICARE

## 2020-08-04 LAB — INR BLD: 2

## 2020-08-04 PROCEDURE — 93793 ANTICOAG MGMT PT WARFARIN: CPT | Performed by: INTERNAL MEDICINE

## 2020-08-04 NOTE — PROGRESS NOTES
HOME MONITORING REPORT    INR today:   Results for orders placed or performed in visit on 08/04/20   Protime-INR   Result Value Ref Range    INR 2.00        INR Goal: 2.0-3.0    Dosing Plan  As of 8/4/2020    TTR:   77.2 % (3 y)   Full warfarin instructions:   6 mg every day              PLAN:Home Health  NOTIFIED TO  CONTINUE CURRENT DOSE AND RECHECK IN ONE WEEK. I have reviewed nursing plan for Coumadin management and agree with plan.

## 2020-08-04 NOTE — TELEPHONE ENCOUNTER
[address] [address] nurse reporting todays PT/INR   INR 2.0  PT 23.8  Pt is taking Coumadin 6mg daily     Please advise

## 2020-08-05 ENCOUNTER — TELEPHONE (OUTPATIENT)
Dept: INTERNAL MEDICINE | Age: 65
End: 2020-08-05

## 2020-08-05 NOTE — TELEPHONE ENCOUNTER
We need to add additional prescription since her blood sugars remain elevated  Suggest farxiga 10 mg daily-we can give her 3 weeks samples  Pt needs to check which of the following medications will be covered on her insurance  Farxiga 10 mg  Invokana 300 mg  Jardiance 25 mg  Besides the current medications that she is taking glipizide and metformin there is no other generic medication available

## 2020-08-05 NOTE — TELEPHONE ENCOUNTER
Spoke to pt concerning this. She will see if her son can stop by and pick this up for her. He works in [address] [place] [address]. I explained to the pt concerning the importance of getting this medication and to let us know which medication her insurance prefers for her to take.

## 2020-08-05 NOTE — TELEPHONE ENCOUNTER
Pt is currently trying to see if she can get the Jardiance 25mg. I have given her some samples of this. Also explained to pt the importance of really getting on a diet and controlling what foods she is eating, and the amounts that she is eating. PT voiced understanding and states that she really is trying to get this under control. Pt will send us readings in 2 weeks to see how her sugars are doing.

## 2020-08-05 NOTE — TELEPHONE ENCOUNTER
Called pt and get her BS readings:    08/05/2020- 183 am     NONE  08/04/2020- 182 am    144pm (middle of day)  08/03/2020-144am      182 pm (middle of the day  08/02/2020- 177am      205pm ( middle of the day  08/01/2020- 142am     NONE   07/31/2020-  152am      193pm (middle of day)  07/30/2020- 121am       176pm Middle of day  07/29/2020- 156am        159pm midle of the day

## 2020-08-11 ENCOUNTER — TELEPHONE (OUTPATIENT)
Dept: INTERNAL MEDICINE CLINIC | Age: 65
End: 2020-08-11

## 2020-08-11 ENCOUNTER — ANTI-COAG VISIT (OUTPATIENT)
Dept: INTERNAL MEDICINE | Age: 65
End: 2020-08-11
Payer: MEDICARE

## 2020-08-11 LAB — INR BLD: 2.4

## 2020-08-11 PROCEDURE — 93793 ANTICOAG MGMT PT WARFARIN: CPT | Performed by: INTERNAL MEDICINE

## 2020-08-11 NOTE — TELEPHONE ENCOUNTER
[address] [id number] nurse reporting todays PT/INR is   PT : 28.6  INR : 2.4     patient currently taking coumadin 6mg PO each evening    Please advise   UA is being taken to [address] [place] [address]

## 2020-08-11 NOTE — TELEPHONE ENCOUNTER
[address] [address] nurse reporting that pt is complaining of pain and burning on urination   Is it okay for them to  a UA/ C&S ?

## 2020-08-11 NOTE — PROGRESS NOTES
HOME HEALTH INR MONITORING REPORT    INR today:   Results for orders placed or performed in visit on 08/11/20   Protime-INR   Result Value Ref Range    INR 2.40        INR Goal: 2.0-3.0    Dosing Plan  As of 8/11/2020    TTR:   77.3 % (3 y)   Full warfarin instructions:   6 mg every day              PLAN: notified [place] to continue current does and recheck in one week. I have reviewed nursing plan for Coumadin management and agree with plan.

## 2020-08-18 ENCOUNTER — TELEPHONE (OUTPATIENT)
Dept: INTERNAL MEDICINE CLINIC | Age: 65
End: 2020-08-18

## 2020-08-18 NOTE — TELEPHONE ENCOUNTER
[address] [address] nurse reporting todays PT/INR is 24.0 \ 2.0   Pt is taking coumadin 6 mg po daily     Please advise

## 2020-08-21 ENCOUNTER — TELEPHONE (OUTPATIENT)
Dept: INTERNAL MEDICINE CLINIC | Age: 65
End: 2020-08-21

## 2020-08-21 NOTE — TELEPHONE ENCOUNTER
[facility] PT eval done and  Per PT Pt. has been followed by PT for strengthening, transfer safety training, standing/balance activities. Pt. has shown progress, now able to stand with assist for 3 reps. up to 1 min. Needs further PT services to progress with transfers, standing and beginning gait training as tolerated   Recommend continued PT services 2wk4 beginning 08/26/20.     Please advise if approved

## 2020-08-21 NOTE — TELEPHONE ENCOUNTER
Does that Mean that [facility] needs to obtain the labs that are ordered on 7/29 that are \"future \"   And do they just need to get the next visit out there  Or when  ?

## 2020-08-25 ENCOUNTER — TELEPHONE (OUTPATIENT)
Dept: INTERNAL MEDICINE | Age: 65
End: 2020-08-25

## 2020-08-25 ENCOUNTER — TELEPHONE (OUTPATIENT)
Dept: INTERNAL MEDICINE CLINIC | Age: 65
End: 2020-08-25

## 2020-08-25 ENCOUNTER — ANTI-COAG VISIT (OUTPATIENT)
Dept: INTERNAL MEDICINE | Age: 65
End: 2020-08-25
Payer: MEDICARE

## 2020-08-25 LAB — INR BLD: 1.8

## 2020-08-25 PROCEDURE — 93793 ANTICOAG MGMT PT WARFARIN: CPT | Performed by: INTERNAL MEDICINE

## 2020-08-25 NOTE — TELEPHONE ENCOUNTER
I reviewed her blood sugar readings  Her blood sugars are fairly good before meals like before breakfast and before dinner and elevated when she checks it after meals  At this time she can continue her current medications  If her A1c has not improved on the lab in October we would need to add additional prescription

## 2020-08-25 NOTE — TELEPHONE ENCOUNTER
DOROTHY DUVALL [facility] nurse reporting todays PT/INR per fingerstick is 21.3 / 1.8    patient currently taking coumadin 6mg PO each evening.   Please advise     I will be delivering pt blood sugar logs to your office   Pt additional labs done today and delivered to [facility] lab

## 2020-09-01 ENCOUNTER — ANTI-COAG VISIT (OUTPATIENT)
Dept: INTERNAL MEDICINE | Age: 65
End: 2020-09-01
Payer: MEDICARE

## 2020-09-01 ENCOUNTER — TELEPHONE (OUTPATIENT)
Dept: INTERNAL MEDICINE CLINIC | Age: 65
End: 2020-09-01

## 2020-09-01 LAB — INR BLD: 1.8

## 2020-09-01 PROCEDURE — 93793 ANTICOAG MGMT PT WARFARIN: CPT | Performed by: INTERNAL MEDICINE

## 2020-09-01 NOTE — TELEPHONE ENCOUNTER
CHANGE TO 9MG ON TUES AND THURS, AND 6MG ALL OTHER DAYS. RECHECK IN ONE WEEK. Patient notified. I will fill out the form for Home INR testing.

## 2020-09-01 NOTE — PROGRESS NOTES
Home Health INR ResultsINR today:   Results for orders placed or performed in visit on 09/01/20   Protime-INR   Result Value Ref Range    INR 1.80        INR Goal: 2.0-3.0    Dosing Plan  As of 9/1/2020    TTR:   76.7 % (3.1 y)   Full warfarin instructions:   9 mg every Tue, Thu; 6 mg all other days              PLAN:  CHANGE TO 9MG ON TUES AND THURS, AND 6MG ALL OTHER DAYS. RECHECK IN ONE WEEK. PATIENT NOTIFIED AND IS INTERESTED IN A HOME INR MACHINE. Nicho Hameed NOTIFIED. I have reviewed nursing plan for Coumadin management and agree with plan.

## 2020-09-01 NOTE — TELEPHONE ENCOUNTER
[facility]OR[facility] nurse reporting todays PT/INR is 21.9 / 1.8    Patient reports that she did increase coumadin to 9mg for Tuesday evening then she resumed coumadin 6mg PO each evening. Please advise      Also they are asking  is there a way she can get her own coag machine under her insurance.

## 2020-09-16 ENCOUNTER — TELEPHONE (OUTPATIENT)
Dept: INTERNAL MEDICINE CLINIC | Age: 65
End: 2020-09-16

## 2020-09-17 ENCOUNTER — TELEPHONE (OUTPATIENT)
Dept: PRIMARY CARE CLINIC | Age: 65
End: 2020-09-17

## 2020-09-17 ENCOUNTER — ANTI-COAG VISIT (OUTPATIENT)
Dept: PRIMARY CARE CLINIC | Age: 65
End: 2020-09-17
Payer: MEDICARE

## 2020-09-17 LAB — INR BLD: 1.9

## 2020-09-17 PROCEDURE — 93793 ANTICOAG MGMT PT WARFARIN: CPT | Performed by: INTERNAL MEDICINE

## 2020-09-17 NOTE — PROGRESS NOTES
HOME MONITORING REPORT    INR today:   Results for orders placed or performed in visit on 09/17/20   Protime-INR   Result Value Ref Range    INR 1.90        INR Goal: 2.0-3.0    Dosing Plan  As of 9/17/2020    TTR:   75.6 % (3.1 y)   Full warfarin instructions:   9 mg every Tue, Thu; 6 mg all other days              PLAN: Advised patient/caregiver to continue current dose and recheck in one week. Patient/Caregiver voiced understanding      This is patient's first home INR testing result  I have reviewed nursing plan for Coumadin management and agree with plan.

## 2020-09-17 NOTE — TELEPHONE ENCOUNTER
INR 1.9    Continue current dose ( 9mg every Tue, Thu; 6 mg all other days) and recheck next Tues    I notified patient

## 2020-09-22 ENCOUNTER — ANTI-COAG VISIT (OUTPATIENT)
Dept: INTERNAL MEDICINE | Age: 65
End: 2020-09-22
Payer: MEDICARE

## 2020-09-22 LAB — INR BLD: 1.9

## 2020-09-22 PROCEDURE — 93793 ANTICOAG MGMT PT WARFARIN: CPT | Performed by: INTERNAL MEDICINE

## 2020-09-22 NOTE — PROGRESS NOTES
HOME MONITORING REPORT    INR today:   Results for orders placed or performed in visit on 09/22/20   Protime-INR   Result Value Ref Range    INR 1.90        INR Goal: 2.0-3.0    Dosing Plan  As of 9/22/2020    TTR:   75.3 % (3.2 y)   Full warfarin instructions:   9 mg every Tue, Thu, Sat; 6 mg all other days              PLAN:  Advised patient to increase dose to [facility], Thurs, Sat, and 6mg all other days and recheck in one week. Patient was agreeable to plan. I have reviewed nursing plan for Coumadin management and agree with plan.

## 2020-09-29 ENCOUNTER — ANTI-COAG VISIT (OUTPATIENT)
Dept: INTERNAL MEDICINE | Age: 65
End: 2020-09-29
Payer: MEDICARE

## 2020-09-29 LAB — INR BLD: 2.4

## 2020-09-29 PROCEDURE — 93793 ANTICOAG MGMT PT WARFARIN: CPT | Performed by: INTERNAL MEDICINE

## 2020-09-29 NOTE — PROGRESS NOTES
HOME MONITORING REPORT    INR today:   Results for orders placed or performed in visit on 09/29/20   Protime-INR   Result Value Ref Range    INR 2.40        INR Goal: 2.0-3.0    Dosing Plan  As of 9/29/2020    TTR:   75.3 % (3.2 y)   Full warfarin instructions:   9 mg every Tue, Thu, Sat; 6 mg all other days              PLAN: Advised patient/caregiver to continue current dose and recheck in one week. Patient/Caregiver voiced understanding    I have reviewed nursing plan for Coumadin management and agree with plan.

## 2020-10-01 ENCOUNTER — TELEPHONE (OUTPATIENT)
Dept: INTERNAL MEDICINE CLINIC | Age: 65
End: 2020-10-01

## 2020-10-01 NOTE — TELEPHONE ENCOUNTER
[facility] PT re eval done and is recommending to continue PT 2x/wk x4 wks    Please advise if approved

## 2020-10-06 ENCOUNTER — ANTI-COAG VISIT (OUTPATIENT)
Dept: INTERNAL MEDICINE | Age: 65
End: 2020-10-06
Payer: MEDICARE

## 2020-10-06 LAB — INR BLD: 2.6

## 2020-10-06 PROCEDURE — 93793 ANTICOAG MGMT PT WARFARIN: CPT | Performed by: INTERNAL MEDICINE

## 2020-10-06 NOTE — PROGRESS NOTES
HOME MONITORING REPORT    INR today:   Results for orders placed or performed in visit on 10/06/20   Protime-INR   Result Value Ref Range    INR 2.60        INR Goal: 2.0-3.0    Dosing Plan  As of 10/6/2020    TTR:   75.5 % (3.2 y)   Full warfarin instructions:   9 mg every Tue, Thu, Sat; 6 mg all other days              PLAN: Advised patient/caregiver to continue current dose and recheck in one week.   Patient/Caregiver voiced understanding

## 2020-10-08 ENCOUNTER — TELEPHONE (OUTPATIENT)
Dept: INTERNAL MEDICINE | Age: 65
End: 2020-10-08

## 2020-10-08 NOTE — TELEPHONE ENCOUNTER
I have added the labs for [facility] to obtain : MICROALBUMIN / CREATININE URINE RATIO   VITAMIN  D 25 HYDROXY  T4, FREE   TSH WITHOUT REFLEX  LIPID PANEL  HGB A1C  CMP    Thanks

## 2020-10-08 NOTE — TELEPHONE ENCOUNTER
Cathie,   Can you have [facility] draw the labs needed in the chart for the pt's appt that is on 10/22. I believe they are scheduled to go out next week to the pt's home.  It is all of the labs that were ordered on 07/29/2020

## 2020-10-13 LAB — INR BLD: 2.7

## 2020-10-14 ENCOUNTER — ANTI-COAG VISIT (OUTPATIENT)
Dept: INTERNAL MEDICINE | Age: 65
End: 2020-10-14

## 2020-10-14 LAB
ALBUMIN SERPL-MCNC: 3.7 G/DL
ALP BLD-CCNC: 56 U/L
ALT SERPL-CCNC: 32 U/L
ANION GAP SERPL CALCULATED.3IONS-SCNC: 12 MMOL/L
AST SERPL-CCNC: 20 U/L
AVERAGE GLUCOSE: NORMAL
BILIRUB SERPL-MCNC: 0.5 MG/DL (ref 0.1–1.4)
BUN BLDV-MCNC: 10 MG/DL
CALCIUM SERPL-MCNC: 9.6 MG/DL
CHLORIDE BLD-SCNC: 101 MMOL/L
CHOLESTEROL, TOTAL: 135 MG/DL
CHOLESTEROL/HDL RATIO: ABNORMAL
CO2: 27 MMOL/L
CREAT SERPL-MCNC: 0.64 MG/DL
CREATININE, URINE: NORMAL
GFR CALCULATED: NORMAL
GLUCOSE BLD-MCNC: 142 MG/DL
HBA1C MFR BLD: 9.1 %
HDLC SERPL-MCNC: 31 MG/DL (ref 35–70)
LDL CHOLESTEROL CALCULATED: 58 MG/DL (ref 0–160)
MICROALBUMIN/CREAT 24H UR: NORMAL MG/G{CREAT}
MICROALBUMIN/CREAT UR-RTO: 1.7
NONHDLC SERPL-MCNC: ABNORMAL MG/DL
POTASSIUM SERPL-SCNC: 3.8 MMOL/L
SODIUM BLD-SCNC: 136 MMOL/L
T4 FREE: 1.52
TOTAL PROTEIN: 8
TRIGL SERPL-MCNC: 229 MG/DL
TSH SERPL DL<=0.05 MIU/L-ACNC: 3.13 UIU/ML
VITAMIN D 25-HYDROXY: 33.4
VITAMIN D2, 25 HYDROXY: NORMAL
VITAMIN D3,25 HYDROXY: NORMAL
VLDLC SERPL CALC-MCNC: ABNORMAL MG/DL

## 2020-10-14 NOTE — PROGRESS NOTES
HOME MONITORING REPORT    INR today:   Results for orders placed or performed in visit on 10/14/20   Protime-INR   Result Value Ref Range    INR 2.70        INR Goal: 2.0-3.0    Dosing Plan  As of 10/14/2020    TTR:   75.6 % (3.2 y)   Full warfarin instructions:   9 mg every Tue, Thu, Sat; 6 mg all other days              PLAN: Advised patient/caregiver to continue current dose and recheck in one week. Patient/Caregiver voiced understanding  I have reviewed nursing plan for Coumadin management and agree with plan.

## 2020-10-20 ENCOUNTER — ANTI-COAG VISIT (OUTPATIENT)
Dept: INTERNAL MEDICINE | Age: 65
End: 2020-10-20
Payer: MEDICARE

## 2020-10-20 LAB — INR BLD: 2.9

## 2020-10-20 PROCEDURE — 93793 ANTICOAG MGMT PT WARFARIN: CPT | Performed by: INTERNAL MEDICINE

## 2020-10-22 ENCOUNTER — TELEPHONE (OUTPATIENT)
Dept: INTERNAL MEDICINE CLINIC | Age: 65
End: 2020-10-22

## 2020-10-22 NOTE — TELEPHONE ENCOUNTER
[address] [address] nurse reporting that pt discharged from homecare today 10/22/20. Nursing goals have been met. Therapy has discharged as well. According to pt, she has improved with therapy. Pt has a F/U appt with PCP on 11/2/20.     adalberto

## 2020-10-27 LAB — INR BLD: 2.7

## 2020-10-29 ENCOUNTER — ANTI-COAG VISIT (OUTPATIENT)
Dept: INTERNAL MEDICINE | Age: 65
End: 2020-10-29
Payer: MEDICARE

## 2020-10-29 PROCEDURE — 93793 ANTICOAG MGMT PT WARFARIN: CPT | Performed by: INTERNAL MEDICINE

## 2020-11-02 ENCOUNTER — VIRTUAL VISIT (OUTPATIENT)
Dept: INTERNAL MEDICINE | Age: 65
End: 2020-11-02
Payer: MEDICARE

## 2020-11-02 PROCEDURE — 3046F HEMOGLOBIN A1C LEVEL >9.0%: CPT | Performed by: INTERNAL MEDICINE

## 2020-11-02 PROCEDURE — 99214 OFFICE O/P EST MOD 30 MIN: CPT | Performed by: INTERNAL MEDICINE

## 2020-11-02 PROCEDURE — G8400 PT W/DXA NO RESULTS DOC: HCPCS | Performed by: INTERNAL MEDICINE

## 2020-11-02 PROCEDURE — 4040F PNEUMOC VAC/ADMIN/RCVD: CPT | Performed by: INTERNAL MEDICINE

## 2020-11-02 PROCEDURE — 1123F ACP DISCUSS/DSCN MKR DOCD: CPT | Performed by: INTERNAL MEDICINE

## 2020-11-02 PROCEDURE — G8417 CALC BMI ABV UP PARAM F/U: HCPCS | Performed by: INTERNAL MEDICINE

## 2020-11-02 PROCEDURE — 2022F DILAT RTA XM EVC RTNOPTHY: CPT | Performed by: INTERNAL MEDICINE

## 2020-11-02 PROCEDURE — 3017F COLORECTAL CA SCREEN DOC REV: CPT | Performed by: INTERNAL MEDICINE

## 2020-11-02 PROCEDURE — G8484 FLU IMMUNIZE NO ADMIN: HCPCS | Performed by: INTERNAL MEDICINE

## 2020-11-02 PROCEDURE — 1090F PRES/ABSN URINE INCON ASSESS: CPT | Performed by: INTERNAL MEDICINE

## 2020-11-02 PROCEDURE — 1036F TOBACCO NON-USER: CPT | Performed by: INTERNAL MEDICINE

## 2020-11-02 PROCEDURE — G8427 DOCREV CUR MEDS BY ELIG CLIN: HCPCS | Performed by: INTERNAL MEDICINE

## 2020-11-02 RX ORDER — EMPAGLIFLOZIN 25 MG/1
25 TABLET, FILM COATED ORAL DAILY
COMMUNITY
End: 2021-01-27

## 2020-11-02 RX ORDER — FLUTICASONE PROPIONATE 50 MCG
1 SPRAY, SUSPENSION (ML) NASAL DAILY
COMMUNITY

## 2020-11-02 RX ORDER — LORATADINE 10 MG/1
10 CAPSULE, LIQUID FILLED ORAL DAILY
COMMUNITY

## 2020-11-02 ASSESSMENT — ENCOUNTER SYMPTOMS
BACK PAIN: 1
CONSTIPATION: 0
CHEST TIGHTNESS: 0
SORE THROAT: 0
COUGH: 0
ABDOMINAL PAIN: 0
WHEEZING: 0

## 2020-11-02 NOTE — PROGRESS NOTES
Chief Complaint   Patient presents with    Follow-up     History of presenting illness:  Susu Mistry is a78 y.o. female     TELEHEALTH EVALUATION -- Audio/Visual (During FYWZS-45 public health emergency)  Patient location-home  Pursuant to the emergency declaration under the [facility] [place] [address] [facility] authority and the [employer] Act, this Virtual  Visit was conducted, with patient's consent, to reduce the patient's risk of exposure to COVID-19 and provide continuity of care for an established patient. Services were provided through a video synchronous discussion virtually to substitute for in-person clinic visit. Reason for VV:    Intervertebral thoracic disc disorder with myelopathy, thoracic region  Leg weakness, bilateral  History of back surgery- thoracic discectomy and costotransversectomy by Dr. Ana Lilia Bridges on 5/19  She continues to have significant lower extremity weakness  States that she is unable to transfer without assistance-her sons, 2 sons help her at home  She has no way to get to our office for appointments, in fact I have not seen this woman in person for over 6 months  Neurology appointment that was made for her to evaluate for continued weakness was not kept secondary patient unable to make it appointment/she also has canceled neurosurgery follow-up appointment at [facility], has not had any follow-up with them since 6/18/2020      Essential hypertension- Patient reports her Bp has been well controlled ( systolic below 314; diastolic below 90) at home when checked with home/ store equipment.  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[address]- better disease ([facility]     Fatigue     Foot pain     Hx of amiodarone therapy 9/24/2014    Hyperlipidemia     PCP manages cholesterol    Hypertension     Menopause     Obesity     Palliative care patient 02/21/2020    Skin rash     Type II or unspecified type diabetes mellitus without mention of complication, not stated as uncontrolled     Umbilical hernia     URI (upper respiratory infection)       Past Surgical History:   Procedure Laterality Date    CARDIAC CATHETERIZATION  10/21/09    EF 35% left ventricle is moderately enlarged     CARDIOVERSION  10/9/13    CHOLECYSTECTOMY      EYE SURGERY      retina and cataracts     GALLBLADDER SURGERY      TUBAL LIGATION       Current Outpatient Medications   Medication Sig Dispense Refill    fluticasone (FLONASE) 50 MCG/ACT nasal spray 1 spray by Each Nostril route daily      loratadine (CLARITIN) 10 MG capsule Take 10 mg by mouth daily      empagliflozin (JARDIANCE) 25 MG tablet Take 25 mg by mouth daily      Cyanocobalamin (VITAMIN B 12) 500 MCG TABS Take 500 mcg by mouth daily      atorvastatin (LIPITOR) 80 MG tablet TAKE 1 TABLET BY MOUTH ONCE DAILY.  30 tablet 5    carvedilol (COREG) 12.5 MG tablet Take 1 tablet by mouth 2 times daily (with meals) 60 tablet 3    Bed Trapeze MISC by Does not apply route 1 each 0    glipiZIDE (GLUCOTROL) 5 MG tablet TAKE 2 TABLETS BY MOUTH IN THE MORNING & 2 TABLETS IN THE EVENING 120 tablet 5    furosemide (LASIX) 20 MG tablet Take 1 tablet by mouth daily 30 tablet 5    losartan (COZAAR) 25 MG tablet Take 1 tablet by mouth daily 30 tablet 5    metFORMIN (GLUCOPHAGE) 500 MG tablet TAKE 2 TABLETS BY MOUTH TWICE DAILY WITH MEALS 120 tablet 5    spironolactone (ALDACTONE) 25 MG tablet Take 1 tablet by mouth daily 30 tablet 5    warfarin (COUMADIN) 6 MG tablet Take 6mg daily by mouth daily 30 tablet 5    levothyroxine (SYNTHROID) 125 MCG tablet Take 1 tablet by mouth Daily 30 tablet 5    digoxin (LANOXIN) 125 MCG tablet TAKE 2 TABLETS BY MOUTH EVERY DAY (Patient taking differently: 125 mcg daily ) 60 tablet 0    albuterol sulfate HFA (VENTOLIN HFA) 108 (90 Base) MCG/ACT inhaler Inhale 2 puffs into the lungs every 6 hours as needed for Wheezing 1 Inhaler 5    Multiple Vitamins-Minerals (THERAPEUTIC MULTIVITAMIN-MINERALS) tablet Take 1 tablet by mouth daily.  Vitamin D (CHOLECALCIFEROL) 1000 UNITS CAPS capsule Take 1,000 Units by mouth 2 times daily       aspirin 81 MG EC tablet Take 81 mg by mouth daily.  tiZANidine (ZANAFLEX) 4 MG tablet Take 1 tablet by mouth every 8 hours as needed (muscle spasm) (Patient not taking: Reported on 11/2/2020) 30 tablet 0    hydroCHLOROthiazide (HYDRODIURIL) 25 MG tablet Take 0.5 tablets by mouth daily 1/2 daily (Patient not taking: Reported on 11/2/2020) 45 tablet 3     No current facility-administered medications for this visit. Allergies   Allergen Reactions    Aspartame And Phenylalanine Anaphylaxis, Shortness Of Breath and Swelling    Mushroom Extract Complex Anaphylaxis, Shortness Of Breath and Swelling    Penicillins Anaphylaxis, Hives, Shortness Of Breath, Itching, Swelling and Rash     TOLERATES ROCEPHIN    Shellfish-Derived Products Anaphylaxis, Shortness Of Breath and Swelling    Zetia [Ezetimibe] Nausea Only     Social History     Tobacco Use    Smoking status: Never Smoker    Smokeless tobacco: Never Used   Substance Use Topics    Alcohol use: No      Family History   Problem Relation Age of Onset    Diabetes Mother     Heart Failure Mother     High Blood Pressure Mother     Liver Cancer Father     Colon Cancer Neg Hx     Colon Polyps Neg Hx        Review of Systems   Constitutional: Positive for fatigue. Negative for chills and fever. HENT: Negative for congestion, ear pain, nosebleeds, postnasal drip and sore throat. Respiratory: Negative for cough, chest tightness and wheezing.     Cardiovascular: Negative for chest pain, palpitations and leg swelling. Gastrointestinal: Negative for abdominal pain and constipation. Genitourinary: Negative for dysuria and urgency. Musculoskeletal: Positive for arthralgias and back pain. Unable to walk related to weakness per patient   Skin: Negative for rash. Neurological: Positive for weakness. Negative for dizziness and headaches. Psychiatric/Behavioral: Negative. There were no vitals filed for this visit. There is no height or weight on file to calculate BMI. Patient-Reported Vitals 11/2/2020   Patient-Reported Weight Unable to weight   Patient-Reported Height 5 6   Patient-Reported Systolic 280   Patient-Reported Diastolic 81   Patient-Reported Pulse 95   Patient-Reported Temperature 97.7        Physical Exam  PHYSICAL EXAMINATION:  [ INSTRUCTIONS:  \"[x]\" Indicates a positive item  \"[]\" Indicates a negative item  -- DELETE ALL ITEMS NOT EXAMINED]  [x] Alert  [x] Oriented to person/place/time    [x] No apparent distress  [] Toxic appearing    [] Face flushed appearing [] Sclera clear  [] Lips are cyanotic      [x] Breathing appears normal  [] Appears tachypneic      [] Rash on visible skin    [x] Cranial Nerves II-XII grossly intact    [x] Motor grossly intact in visible upper extremities    [] Motor grossly intact in visible lower extremities    [] Normal Mood  [x] Anxious appearing    [] Depressed appearing  [] Confused appearing      [] Poor short term memory  [] Poor long term memory    [] OTHER:      Due to this being a TeleHealth encounter, evaluation of the following organ systems is limited: Vitals/Constitutional/EENT/Resp/CV/GI//MS/Neuro/Skin/Heme-Lymph-Imm.     Lab Review   Anti-coag visit on 10/29/2020   Component Date Value    INR 10/27/2020 2.70    Anti-coag visit on 10/20/2020   Component Date Value    INR 10/20/2020 2.90    Orders Only on 10/15/2020   Component Date Value    Cholesterol, Total 10/14/2020 135     HDL 10/14/2020 31*    LDL Calculated 10/14/2020 58     Triglycerides 10/14/2020 229     Vit D, 25-Hydroxy 10/14/2020 33.4     TSH 10/14/2020 3.13     T4 Free 10/14/2020 1.52     Hemoglobin A1C 10/14/2020 9.1     Sodium 10/14/2020 136     Chloride 10/14/2020 101     Potassium 10/14/2020 3.8     BUN 10/14/2020 10     CREATININE 10/14/2020 0.64     Glucose 10/14/2020 142     AST 10/14/2020 20     ALT 10/14/2020 32     Calcium 10/14/2020 9.6     Total Protein 10/14/2020 8.0     CO2 10/14/2020 27     Alb 10/14/2020 3.7     Alkaline Phosphatase 10/14/2020 56     Total Bilirubin 10/14/2020 0.5     Anion Gap 10/14/2020 12.0     Microalbumin Creatinine * 10/14/2020 1.7    Anti-coag visit on 10/14/2020   Component Date Value    INR 10/13/2020 2.70    Anti-coag visit on 10/06/2020   Component Date Value    INR 10/06/2020 2.60    Anti-coag visit on 09/29/2020   Component Date Value    INR 09/29/2020 2.40    Anti-coag visit on 09/22/2020   Component Date Value    INR 09/22/2020 1.90    Anti-coag visit on 09/17/2020   Component Date Value    INR 09/17/2020 1.90            ASSESSMENT/PLAN:      Intervertebral thoracic disc disorder with myelopathy, thoracic region  Leg weakness, bilateral  History of back surgery- thoracic discectomy and costotransversectomy by Dr. Silver Marquis on 5/19  She continues to have significant lower extremity weakness  States that she is unable to transfer without assistance-her sons, 2 sons help her at home  She has no way to get to our office for appointments, in fact I have not seen this woman in person for over 7 months  Neurology appointment that was made for her to evaluate for continued weakness was not kept secondary patient unable to make it appointment/she also has canceled neurosurgery follow-up appointment at [facility], has not had any follow-up with them since 6/18/2020         Type 2 diabetes mellitus with microalbuminuria, without long-term current use of insulin ([address]  Most recent hemoglobin A1c 9.1 ( 8.1 - 6/29/20  She is taking metformin 500- 2 bid and glipizide 10 am/ 5 pm  In past she has refused additional prescriptions related to high cost and her inability to afford any medications  At this time I have discussed with her following  Sugar is very poorly controlled, A1c now over 9  We will need to see her at the office for office visit and with diabetic counseling visit/likely consider starting insulin   Patient has transportation issues as described in detail below  I have not seen her at office since 2/27/2020.     PAF (paroxysmal atrial fibrillation) (HCC)  Chronic anticoagulation  INR has been in good range  I reviewed every single INR when they come in at least once weekly     Chronic systolic congestive heart failure (HCC)  ej fraction fraction 20%  Patient remains on losartan 25 mg daily, Lasix 20 mg daily digoxin 0.125 daily Coreg 12.5 twice daily metoprolol ER 50 mg daily and spironolactone 25 mg daily    Vitamin D deficiency  Vitamin D level is 33  Continue vitamin D 2000 IU daily       Essential hypertension  Per home health blood pressure has been in good range, patient on medications as above for CHF and also taking hydrochlorothiazide 20 5/2 tablet daily  Pt is taking both metoprolol + coreg  Has upcoming appt with cardiology will discuss  HT has been 86-90 range     Acquired hypothyroidism  Recent lab 2 weeks ago TSH and T4 in good range  Continue current Synthroid     Hyperlipidemia  LDL well controlled at 58 (60)  Continue current prescription      She continues to have significant lower extremity weakness  States that she is unable to transfer without assistance-her sons, 2 sons help her at home  She has no way to get to our office for appointments, in fact I have not seen this woman in person for over 6 months  Neurology appointment that was made for her to evaluate for continued weakness was not kept secondary patient unable to make it appointment/she also has canceled neurosurgery follow-up appointment at [facility], has not had any follow-up with them since 6/18/2020    I feel that with ongoing problems that she has with significant weakness, inability to see any physicians in [facility] could be significant errors/management plans  To be able to help her appropriately & diagnose and manage her ongoing problems-this patient needs an office appointment with me, her sugars are uncontrolled and will likely need to start her on insulin injections with A1c now over 9.1. She would also need to have appointment with diabetic educator while at our office  This patient also needs to have appointment to see her neurosurgeon since she continues to significant weakness/has not improved with physical therapy, at this time remains so weak that she is unable to transfer without to assist.  She has canceled and not kept her appointments for follow-up with neurosurgery at [facility]   We need   Farshad Silverman worker and home health help to be able to solve the situation with this patient. It is impossible to appropriately manage her difficult and complicated case via phone visits. I have repeatedly asked patient to come to the office with the help of her sons, instead she has canceled all in person visits including requested and recommended follow-ups with neurosurgery after the surgery that she had in May 2020    No orders of the defined types were placed in this encounter. New Prescriptions    No medications on file         No follow-ups on file. There are no Patient Instructions on file for this visit. EMR Dragon/transcription disclaimer:Significant part of this  encounter note is electronic transcription/translationof spoken language to printed text. The electronic translation of spoken language may be erroneous, or at times, nonsensical words or phrases may be inadvertently transcribed.  Although I have reviewed the note for sucherrors, some may still exist.

## 2020-11-03 ENCOUNTER — ANTI-COAG VISIT (OUTPATIENT)
Dept: INTERNAL MEDICINE | Age: 65
End: 2020-11-03
Payer: MEDICARE

## 2020-11-03 ENCOUNTER — CARE COORDINATION (OUTPATIENT)
Dept: CARE COORDINATION | Age: 65
End: 2020-11-03

## 2020-11-03 ENCOUNTER — ANTI-COAG VISIT (OUTPATIENT)
Dept: PRIMARY CARE CLINIC | Age: 65
End: 2020-11-03

## 2020-11-03 LAB
INR BLD: 2.5
INR BLD: 2.5

## 2020-11-03 PROCEDURE — 93793 ANTICOAG MGMT PT WARFARIN: CPT | Performed by: INTERNAL MEDICINE

## 2020-11-04 NOTE — CARE COORDINATION
Received an in basket message from Dr Arva Duverney stating patient needs transportation assistance. Submitted by Alex/ALFONZO    Reviewed patient's chart. Transportation has been addressed by ANNAMARIA Smith with [facility] and my self in Spring 2020. Patient does not have health care coverage with transportation benefits.     - Patient will need to schedule transportation with [address] ([facility]). She will need to give a 24 hour notice. - [facility] for non-emergent transport. Patient will have to pay out-of-pocket for the transport. - Patient's sons will need to transport her to her appointments. Submitted by Alex/ALFONZO    Telephoned Ash Langston LPN with [address]). Asked if the patient was still receiving home health services. Marnie Louis stated patient was discharged from home health on 10.22.2020. Submitted by Alex/CHW Hermina Paget. Collected and discussed the following:    - Robert Staley lives in [place] with her two sons. Manisha Waggoner is the oldest son. He works, and has built an apartment attached to her home. Malvina Gosselin is her youngest son. He is does not work because he has a type of dyslexia that hinders his ability to work. She stated he has applied for disability but has been denied. He is her full-time caregiver. Dodie Joshi stated she is unable to get from her home to the car; transition from her wheelchair to the car seat; and is unable to transition back out of the car. She stated she had hoped [address] [facility] would have helped her with this but she has been discharged from their service. - I asked if Robert Staley is able to use a walker. She stated she had gotten to where she could stand for \"two to three minutes\". She made this progress with assistance from Physical Therapy. - Reminded Robert Staley of our conversation about transportation. Robert Staley said she cannot afford to ride [facility].   She stated she was quoted $125 for round trip from her home to doctor appointments. After speaking with Chinmay Monterroso for some time, I addressed other issues in her life:    - I asked Chinmay Monterroso if she has applied for [facility]. She said she makes too much money between her Social Security payment and pension from her previous employer.  - I asked if she had thought of moving to [place] to be closer to amenities including health care providers. She said she would not move. She owns her home in [place]. She does not have a mortgage. - I asked if her bathroom was safe for her to use. Did she have a shower chair, hand rails, and hand held shower head. She said she is unable to get into her bathroom due to the doorway. Her home was built in 1930's. Her wheelchair is too wide. She is unable to ambulate with her walker.   -  Patient uses a bedside commode. - Her youngest son helps her address her personal hygiene at the kitchen sink.   - Patient stated she cries often. She feels abandoned since home health stopped services. I asked her what her goal was with physical therapy. She stated to be able to leave her home and ride in a car. She said she told her therapists that. - Spoke with Chinmay Monterroso about her mental health. She feels her mental health would improve if she could get strength in her legs so she can leave home. We talked about the importance of addressing mental health. - Talked about her chronic conditions, DM Type II and CHF. She is concerned about her diabetes. She states she is exercising 5 times a day. She is being conscientious of the food she is eating, and she is taking her medication as prescribed. We discussed needing to get her A1C under 7. I asked Chinmay Monterroso if I could invite my co-worker, Mercy Sosa RN ACM, to contact patient to work with her one-on-one in addressing her diabetes and CHF. Chinmay Monterroso agreed. - Patient stated she has \"loss of feeling\" in her legs.   - Reminded the patient that Dr Destin Santana will need to do a face-to-face visit with her. Patient said she understands that but she cannot afford to pay for bus transportation, and she cannot get in the car. I told her I would ask Dr Zahraa Whaley to consider [address] to help her with her PT goal.  She agreed. Told Ambrocio Mendez I would ask Meg Baltazar to call her to discuss her chronic conditions, especially diabetes, and I will send Dr Zahraa Whaley a message about today's conversation with consideration of new referral to home health. She verbalized understanding. Will ask Meg Baltazar to talk to Ambrocio Mendez about mental health, as well. Possibly ask Dr Zahraa Whaley for referral to Shaye Padron LCSW in the office to do virtual visits with Ambrocio Mendez. I feel my conversation with Ambrocio Mendez went well given the circumstances of her current situation. Transportation and inability to use bathroom for personal hygiene are her largest obstacles. Patient spoke highly of her sons in taking care of her.      Submitted by Alex/DELONTEW

## 2020-11-04 NOTE — CARE COORDINATION
Telephoned Ananda Campo to let her know about Juliet Mosher (Saeed Bunn / [phone]). Gave her their phone number. Also, provided her with the name and number to the [employer] for [address] [place] [address] ([address])  in [facility] ([phone]). Suggested she call to ask about assistance in widening her bathroom door. Patient stated her home is constructed of cinder blocks. I asked her to call [facility] to ask if there are resources to add a bathroom to her home. She verbalized understanding. Submitted by Alex/DELONTEW    Message sent to Dr Azeem Chavez asking for consideration of ordering [facility] for Physical Therapy services. Message sent to Greta Rendon asking her to contact patient to address chronic conditions, including mental health.     Submitted by Alex/CHW

## 2020-11-10 ENCOUNTER — ANTI-COAG VISIT (OUTPATIENT)
Dept: INTERNAL MEDICINE | Age: 65
End: 2020-11-10
Payer: MEDICARE

## 2020-11-10 LAB — INR BLD: 2.4

## 2020-11-10 PROCEDURE — 93793 ANTICOAG MGMT PT WARFARIN: CPT | Performed by: INTERNAL MEDICINE

## 2020-11-10 NOTE — PROGRESS NOTES
HOME MONITORING REPORT    INR today:   Results for orders placed or performed in visit on 11/10/20   Protime-INR   Result Value Ref Range    INR 2.40        INR Goal: 2.0-3.0    Dosing Plan  As of 11/10/2020    TTR:   76.2 % (3.3 y)   Full warfarin instructions:   9 mg every Thu, Sat; 6 mg all other days              PLAN: Advised patient/caregiver to continue current dose and recheck in one week. Patient/Caregiver voiced understanding    I have reviewed nursing plan for Coumadin management and agree with plan.

## 2020-11-17 ENCOUNTER — ANTI-COAG VISIT (OUTPATIENT)
Dept: INTERNAL MEDICINE | Age: 65
End: 2020-11-17
Payer: MEDICARE

## 2020-11-17 LAB — INR BLD: 2.1

## 2020-11-17 PROCEDURE — 93793 ANTICOAG MGMT PT WARFARIN: CPT | Performed by: INTERNAL MEDICINE

## 2020-11-17 NOTE — PROGRESS NOTES
HOME MONITORING REPORT    INR today:   Results for orders placed or performed in visit on 11/17/20   Protime-INR   Result Value Ref Range    INR 2.10        INR Goal: 2.0-3.0    Dosing Plan  As of 11/17/2020    TTR:   76.3 % (3.3 y)   Full warfarin instructions:   9 mg every Thu, Sat; 6 mg all other days              PLAN: Advised patient/caregiver to continue current dose and recheck in one week. Patient/Caregiver voiced understanding    I have reviewed nursing plan for Coumadin management and agree with plan.

## 2020-11-24 ENCOUNTER — ANTI-COAG VISIT (OUTPATIENT)
Dept: INTERNAL MEDICINE | Age: 65
End: 2020-11-24
Payer: MEDICARE

## 2020-11-24 LAB — INR BLD: 2.2

## 2020-11-24 PROCEDURE — 93793 ANTICOAG MGMT PT WARFARIN: CPT | Performed by: INTERNAL MEDICINE

## 2020-12-01 ENCOUNTER — ANTI-COAG VISIT (OUTPATIENT)
Dept: INTERNAL MEDICINE | Age: 65
End: 2020-12-01
Payer: MEDICARE

## 2020-12-01 LAB — INR BLD: 2.4

## 2020-12-01 PROCEDURE — 93793 ANTICOAG MGMT PT WARFARIN: CPT | Performed by: INTERNAL MEDICINE

## 2020-12-01 NOTE — PROGRESS NOTES
HOME MONITORING REPORT    INR today:   Results for orders placed or performed in visit on 12/01/20   Protime-INR   Result Value Ref Range    INR 2.40        INR Goal: 2.0-3.0    Dosing Plan  As of 12/1/2020    TTR:   76.6 % (3.4 y)   Full warfarin instructions:   9 mg every Thu, Sat; 6 mg all other days              PLAN: Advised patient/caregiver to continue current dose and recheck in one week. Patient/Caregiver voiced understanding    I have reviewed nursing plan for Coumadin management and agree with plan.

## 2020-12-10 ENCOUNTER — ANTI-COAG VISIT (OUTPATIENT)
Dept: INTERNAL MEDICINE | Age: 65
End: 2020-12-10
Payer: MEDICARE

## 2020-12-10 LAB — INR BLD: 2

## 2020-12-10 PROCEDURE — 93793 ANTICOAG MGMT PT WARFARIN: CPT | Performed by: INTERNAL MEDICINE

## 2020-12-10 NOTE — PROGRESS NOTES
HOME MONITORING REPORT    INR today:   Results for orders placed or performed in visit on 12/10/20   Protime-INR   Result Value Ref Range    INR 2.00        INR Goal: 2.0-3.0    Dosing Plan  As of 12/10/2020    TTR:   76.8 % (3.4 y)   Full warfarin instructions:   9 mg every Thu, Sat; 6 mg all other days              PLAN: Advised patient/caregiver to continue current dose and recheck in one week. Patient/Caregiver voiced understanding  I have reviewed nursing plan for Coumadin management and agree with plan.

## 2020-12-15 ENCOUNTER — ANTI-COAG VISIT (OUTPATIENT)
Dept: INTERNAL MEDICINE | Age: 65
End: 2020-12-15

## 2020-12-15 LAB — INR BLD: 2.3

## 2020-12-15 NOTE — PROGRESS NOTES
HOME MONITORING REPORT    INR today:   Results for orders placed or performed in visit on 12/15/20   Protime-INR   Result Value Ref Range    INR 2.30        INR Goal: 2.0-3.0    Dosing Plan  As of 12/15/2020    TTR:  76.9 % (3.4 y)   Full warfarin instructions:  9 mg every Thu, Sat; 6 mg all other days              PLAN: Advised patient/caregiver to continue current dose and recheck in one week. Patient/Caregiver voiced understanding    I have reviewed nursing plan for Coumadin management and agree with plan.

## 2020-12-18 RX ORDER — CARVEDILOL 12.5 MG/1
TABLET ORAL
Qty: 60 TABLET | Refills: 1 | Status: SHIPPED | OUTPATIENT
Start: 2020-12-18 | End: 2021-01-27 | Stop reason: SDUPTHER

## 2020-12-18 NOTE — TELEPHONE ENCOUNTER
Hortensia Avila called requesting a refill of the below medication which has been pended for you:     Requested Prescriptions     Pending Prescriptions Disp Refills    carvedilol (COREG) 12.5 MG tablet [Pharmacy Med Name: Carvedilol 12.5 MG Oral Tablet] 60 tablet 1     Sig: TAKE 1 TABLET BY MOUTH TWICE DAILY WITH MEALS       Last Appointment Date: 11/2/2020  Next Appointment Date: Visit date not found    Allergies   Allergen Reactions    Aspartame And Phenylalanine Anaphylaxis, Shortness Of Breath and Swelling    Mushroom Extract Complex Anaphylaxis, Shortness Of Breath and Swelling    Penicillins Anaphylaxis, Hives, Shortness Of Breath, Itching, Swelling and Rash     TOLERATES ROCEPHIN    Shellfish-Derived Products Anaphylaxis, Shortness Of Breath and Swelling    Zetia [Ezetimibe] Nausea Only

## 2020-12-23 ENCOUNTER — ANTI-COAG VISIT (OUTPATIENT)
Dept: INTERNAL MEDICINE | Age: 65
End: 2020-12-23
Payer: MEDICARE

## 2020-12-23 ENCOUNTER — CARE COORDINATION (OUTPATIENT)
Dept: CARE COORDINATION | Age: 65
End: 2020-12-23

## 2020-12-23 LAB — INR BLD: 2

## 2020-12-23 PROCEDURE — 93793 ANTICOAG MGMT PT WARFARIN: CPT | Performed by: INTERNAL MEDICINE

## 2020-12-23 NOTE — CARE COORDINATION
Patient has been on my Patient Panel since November 2020. Reviewed chart for additional assistance. None recognized. Removing myself patient's Care Team effective 12.24.2020. Will add patient to my panel if requested by Dr Adrian Holden or MISSAEL CRESPO.     Submitted by Alex/ALFONZO

## 2020-12-23 NOTE — PROGRESS NOTES
HOME MONITORING REPORT    INR today:   Results for orders placed or performed in visit on 12/23/20   Protime-INR   Result Value Ref Range    INR 2.00        INR Goal: 2.0-3.0    Dosing Plan  As of 12/23/2020    TTR:  77.0 % (3.4 y)   Full warfarin instructions:  9 mg every Thu, Sat; 6 mg all other days              PLAN: Advised patient/caregiver to continue current dose and recheck in one week. Patient/Caregiver voiced understanding  I have reviewed nursing plan for Coumadin management and agree with plan.

## 2020-12-29 ENCOUNTER — ANTI-COAG VISIT (OUTPATIENT)
Dept: INTERNAL MEDICINE | Age: 65
End: 2020-12-29
Payer: MEDICARE

## 2020-12-29 LAB — INR BLD: 2.4

## 2020-12-29 PROCEDURE — 93793 ANTICOAG MGMT PT WARFARIN: CPT | Performed by: INTERNAL MEDICINE

## 2021-01-06 ENCOUNTER — ANTI-COAG VISIT (OUTPATIENT)
Dept: INTERNAL MEDICINE | Age: 66
End: 2021-01-06
Payer: MEDICARE

## 2021-01-06 DIAGNOSIS — Z79.01 LONG TERM CURRENT USE OF ANTICOAGULANT THERAPY: ICD-10-CM

## 2021-01-06 LAB — INR BLD: 2.2

## 2021-01-06 PROCEDURE — 93793 ANTICOAG MGMT PT WARFARIN: CPT | Performed by: INTERNAL MEDICINE

## 2021-01-06 NOTE — PROGRESS NOTES
HOME MONITORING REPORT    INR today:   Results for orders placed or performed in visit on 01/06/21   Protime-INR   Result Value Ref Range    INR 2.20        INR Goal: 2.0-3.0    Dosing Plan  As of 1/6/2021    TTR:  77.3 % (3.4 y)   Full warfarin instructions:  9 mg every Thu, Sat; 6 mg all other days              PLAN: Advised patient/caregiver to continue current dose and recheck in one week. Patient/Caregiver voiced understanding    I have reviewed nursing plan for Coumadin management and agree with plan.

## 2021-01-12 ENCOUNTER — ANTI-COAG VISIT (OUTPATIENT)
Dept: INTERNAL MEDICINE | Age: 66
End: 2021-01-12
Payer: MEDICARE

## 2021-01-12 DIAGNOSIS — Z79.01 LONG TERM CURRENT USE OF ANTICOAGULANT THERAPY: ICD-10-CM

## 2021-01-12 LAB — INR BLD: 2.5

## 2021-01-12 PROCEDURE — 93793 ANTICOAG MGMT PT WARFARIN: CPT | Performed by: INTERNAL MEDICINE

## 2021-01-12 NOTE — PROGRESS NOTES
HOME MONITORING REPORT    INR today:   Results for orders placed or performed in visit on 01/12/21   Protime-INR   Result Value Ref Range    INR 2.50        INR Goal: 2.0-3.0    Dosing Plan  As of 1/12/2021    TTR:  77.4 % (3.5 y)   Full warfarin instructions:  9 mg every Thu, Sat; 6 mg all other days              PLAN: Advised patient/caregiver to continue current dose and recheck in one week.   Patient/Caregiver voiced understanding

## 2021-01-19 RX ORDER — LOSARTAN POTASSIUM 25 MG/1
TABLET ORAL
Qty: 30 TABLET | Refills: 0 | Status: SHIPPED | OUTPATIENT
Start: 2021-01-19 | End: 2021-01-27 | Stop reason: SDUPTHER

## 2021-01-19 NOTE — TELEPHONE ENCOUNTER
Jaret Johnson called requesting a refill of the below medication which has been pended for you:     Requested Prescriptions     Pending Prescriptions Disp Refills    losartan (COZAAR) 25 MG tablet [Pharmacy Med Name: Losartan Potassium 25 MG Oral Tablet] 30 tablet 2     Sig: Take 1 tablet by mouth once daily       Last Appointment Date: 11/2/2020  Next Appointment Date: Visit date not found    Allergies   Allergen Reactions    Aspartame And Phenylalanine Anaphylaxis, Shortness Of Breath and Swelling    Mushroom Extract Complex Anaphylaxis, Shortness Of Breath and Swelling    Penicillins Anaphylaxis, Hives, Shortness Of Breath, Itching, Swelling and Rash     TOLERATES ROCEPHIN    Shellfish-Derived Products Anaphylaxis, Shortness Of Breath and Swelling    Zetia [Ezetimibe] Nausea Only

## 2021-01-22 ENCOUNTER — ANTI-COAG VISIT (OUTPATIENT)
Dept: INTERNAL MEDICINE | Age: 66
End: 2021-01-22
Payer: MEDICARE

## 2021-01-22 DIAGNOSIS — Z79.01 LONG TERM CURRENT USE OF ANTICOAGULANT THERAPY: ICD-10-CM

## 2021-01-22 LAB — INR BLD: 2.3

## 2021-01-22 PROCEDURE — 93793 ANTICOAG MGMT PT WARFARIN: CPT | Performed by: INTERNAL MEDICINE

## 2021-01-22 NOTE — PROGRESS NOTES
HOME MONITORING REPORT    INR today:   Results for orders placed or performed in visit on 01/22/21   Protime-INR   Result Value Ref Range    INR 2.30        INR Goal: 2.0-3.0    Dosing Plan  As of 1/22/2021    TTR:  77.6 % (3.5 y)   Full warfarin instructions:  9 mg every Thu, Sat; 6 mg all other days              PLAN: Advised patient/caregiver to continue current dose and recheck in one week. Patient/Caregiver voiced understanding    I have reviewed nursing plan for Coumadin management and agree with plan.

## 2021-01-27 ENCOUNTER — OFFICE VISIT (OUTPATIENT)
Dept: CARDIOLOGY CLINIC | Age: 66
End: 2021-01-27
Payer: MEDICARE

## 2021-01-27 VITALS
BODY MASS INDEX: 38.77 KG/M2 | DIASTOLIC BLOOD PRESSURE: 78 MMHG | HEIGHT: 65 IN | SYSTOLIC BLOOD PRESSURE: 112 MMHG | HEART RATE: 92 BPM

## 2021-01-27 DIAGNOSIS — I25.10 MILD CAD: ICD-10-CM

## 2021-01-27 DIAGNOSIS — R60.0 EDEMA OF BOTH LOWER EXTREMITIES: ICD-10-CM

## 2021-01-27 DIAGNOSIS — I10 ESSENTIAL HYPERTENSION: ICD-10-CM

## 2021-01-27 DIAGNOSIS — I48.20 CHRONIC ATRIAL FIBRILLATION (HCC): ICD-10-CM

## 2021-01-27 DIAGNOSIS — I50.23 ACUTE ON CHRONIC SYSTOLIC CONGESTIVE HEART FAILURE (HCC): Primary | ICD-10-CM

## 2021-01-27 PROCEDURE — 1123F ACP DISCUSS/DSCN MKR DOCD: CPT | Performed by: CLINICAL NURSE SPECIALIST

## 2021-01-27 PROCEDURE — G8400 PT W/DXA NO RESULTS DOC: HCPCS | Performed by: CLINICAL NURSE SPECIALIST

## 2021-01-27 PROCEDURE — G8427 DOCREV CUR MEDS BY ELIG CLIN: HCPCS | Performed by: CLINICAL NURSE SPECIALIST

## 2021-01-27 PROCEDURE — G8417 CALC BMI ABV UP PARAM F/U: HCPCS | Performed by: CLINICAL NURSE SPECIALIST

## 2021-01-27 PROCEDURE — 1036F TOBACCO NON-USER: CPT | Performed by: CLINICAL NURSE SPECIALIST

## 2021-01-27 PROCEDURE — G8484 FLU IMMUNIZE NO ADMIN: HCPCS | Performed by: CLINICAL NURSE SPECIALIST

## 2021-01-27 PROCEDURE — 93000 ELECTROCARDIOGRAM COMPLETE: CPT | Performed by: CLINICAL NURSE SPECIALIST

## 2021-01-27 PROCEDURE — 4040F PNEUMOC VAC/ADMIN/RCVD: CPT | Performed by: CLINICAL NURSE SPECIALIST

## 2021-01-27 PROCEDURE — 3017F COLORECTAL CA SCREEN DOC REV: CPT | Performed by: CLINICAL NURSE SPECIALIST

## 2021-01-27 PROCEDURE — 99214 OFFICE O/P EST MOD 30 MIN: CPT | Performed by: CLINICAL NURSE SPECIALIST

## 2021-01-27 PROCEDURE — 1090F PRES/ABSN URINE INCON ASSESS: CPT | Performed by: CLINICAL NURSE SPECIALIST

## 2021-01-27 RX ORDER — LOSARTAN POTASSIUM 25 MG/1
25 TABLET ORAL DAILY
Qty: 90 TABLET | Refills: 3 | Status: SHIPPED | OUTPATIENT
Start: 2021-01-27 | End: 2021-02-26

## 2021-01-27 RX ORDER — SPIRONOLACTONE 25 MG/1
25 TABLET ORAL DAILY
Qty: 90 TABLET | Refills: 3 | Status: SHIPPED | OUTPATIENT
Start: 2021-01-27 | End: 2021-12-14 | Stop reason: SDUPTHER

## 2021-01-27 RX ORDER — WARFARIN SODIUM 6 MG/1
TABLET ORAL
Qty: 60 TABLET | Refills: 5 | Status: SHIPPED | OUTPATIENT
Start: 2021-01-27 | End: 2021-12-01

## 2021-01-27 RX ORDER — CARVEDILOL 12.5 MG/1
12.5 TABLET ORAL 2 TIMES DAILY
Qty: 180 TABLET | Refills: 3 | Status: SHIPPED | OUTPATIENT
Start: 2021-01-27 | End: 2021-12-14 | Stop reason: SDUPTHER

## 2021-01-27 RX ORDER — DIGOXIN 125 MCG
125 TABLET ORAL DAILY
Qty: 90 TABLET | Refills: 3 | Status: SHIPPED | OUTPATIENT
Start: 2021-01-27 | End: 2021-12-14 | Stop reason: SDUPTHER

## 2021-01-27 RX ORDER — FUROSEMIDE 40 MG/1
40 TABLET ORAL DAILY
Qty: 90 TABLET | Refills: 3 | Status: SHIPPED | OUTPATIENT
Start: 2021-01-27 | End: 2021-12-14 | Stop reason: SDUPTHER

## 2021-01-27 ASSESSMENT — ENCOUNTER SYMPTOMS
CHEST TIGHTNESS: 0
WHEEZING: 0
NAUSEA: 0
EYE REDNESS: 0
VOMITING: 0
FACIAL SWELLING: 0
SHORTNESS OF BREATH: 1
ABDOMINAL PAIN: 0
COUGH: 0
BACK PAIN: 1

## 2021-01-27 NOTE — PROGRESS NOTES
As stated previously, she is unable to weigh so she is unsure if she has gained weight. Her legs are tight, and she has significant lower extremity edema. She denies shortness of breath, orthopnea or PND. She states she is mindful about following a low-sodium diet, but does drink a lot of fluids throughout the day    She denies any fast heart rates or near syncope. She denies chest pain or unusual dyspnea. She has a history of left ventricular systolic dysfunction with ejection fraction of 40% in 2012. She denies any signs of fluid retention such as weight gain, edema, orthopnea, or PND. She continues to work full-time and is hoping to retire next year. Diamantina Hashimoto, MD is PCP.   Kalyn Diamond has the following history as recorded in [facility]:    Patient Active Problem List    Diagnosis Date Noted    History of back surgery 07/08/2020    Intervertebral thoracic disc disorder with myelopathy, thoracic region 07/08/2020    Generalized weakness 03/02/2020    Palliative care patient 02/21/2020    Stroke-like symptoms 02/20/2020    Chronic atrial fibrillation 05/21/2018    Acquired hypothyroidism 10/17/2017    Morbid obesity due to excess calories ([address] 09/26/2017    Vitamin D deficiency 03/14/2344    Systolic congestive heart failure ([address] 09/26/2017    H/O bone density study 09/26/2017    Mixed hyperlipidemia 09/26/2017    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    Type 2 diabetes mellitus with microalbuminuria, without long-term current use of insulin ([address] 09/26/2017    Long term current use of anticoagulant therapy 06/29/2017    Essential hypertension     Mild CAD      Past Medical History:   Diagnosis Date    Acute laryngitis     Acute sinusitis     Allergic rhinitis     Ankle pain     Asthma     Asthmatic bronchitis     Atrial fibrillation ([address] 9/13/12, 10/9/13    cardioversion    Atrial flutter (HCC)     paroxysmal     CAD (coronary artery disease)  Cerebral artery occlusion with cerebral infarction [facility])     2006    CHF (congestive heart failure) (HCC)     Chronic back pain     Cough     Diabetes     Dizzy     Dysuria     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[address]     Fatigue     Foot pain     Hx of amiodarone therapy 9/24/2014    Hyperlipidemia     PCP manages cholesterol    Hypertension     Menopause     Obesity     Palliative care patient 02/21/2020    Skin rash     Type II or unspecified type diabetes mellitus without mention of complication, not stated as uncontrolled     Umbilical hernia     URI (upper respiratory infection)      Past Surgical History:   Procedure Laterality Date    CARDIAC CATHETERIZATION  10/21/2009    EF 35% left ventricle is moderately enlarged     CARDIOVERSION  10/09/2013    CHOLECYSTECTOMY      EYE SURGERY      retina and cataracts     GALLBLADDER SURGERY      SPINE SURGERY  05/2020    TUBAL LIGATION       Family History   Problem Relation Age of Onset    Diabetes Mother     Heart Failure Mother     High Blood Pressure Mother     Liver Cancer Father     Colon Cancer Neg Hx     Colon Polyps Neg Hx      Social History     Tobacco Use    Smoking status: Never Smoker    Smokeless tobacco: Never Used   Substance Use Topics    Alcohol use: No      Current Outpatient Medications   Medication Sig Dispense Refill    furosemide (LASIX) 40 MG tablet Take 1 tablet by mouth daily 90 tablet 3    warfarin (COUMADIN) 6 MG tablet Take 6mg daily by mouth daily, except 9mg on Thu and Sat 60 tablet 5    losartan (COZAAR) 25 MG tablet Take 1 tablet by mouth daily 90 tablet 3    spironolactone (ALDACTONE) 25 MG tablet Take 1 tablet by mouth daily 90 tablet 3    carvedilol (COREG) 12.5 MG tablet Take 1 tablet by mouth 2 times daily 180 tablet 3    digoxin (LANOXIN) 125 MCG tablet Take 1 tablet by mouth daily 90 tablet 3    fluticasone (FLONASE) 50 MCG/ACT nasal spray 1 spray by Each Nostril route daily  loratadine (CLARITIN) 10 MG capsule Take 10 mg by mouth daily      Cyanocobalamin (VITAMIN B 12) 500 MCG TABS Take 500 mcg by mouth daily      atorvastatin (LIPITOR) 80 MG tablet TAKE 1 TABLET BY MOUTH ONCE DAILY. 30 tablet 5    Bed Trapeze MISC by Does not apply route 1 each 0    glipiZIDE (GLUCOTROL) 5 MG tablet TAKE 2 TABLETS BY MOUTH IN THE MORNING & 2 TABLETS IN THE EVENING 120 tablet 5    metFORMIN (GLUCOPHAGE) 500 MG tablet TAKE 2 TABLETS BY MOUTH TWICE DAILY WITH MEALS 120 tablet 5    levothyroxine (SYNTHROID) 125 MCG tablet Take 1 tablet by mouth Daily 30 tablet 5    Multiple Vitamins-Minerals (THERAPEUTIC MULTIVITAMIN-MINERALS) tablet Take 1 tablet by mouth daily.  Vitamin D (CHOLECALCIFEROL) 1000 UNITS CAPS capsule Take 1,000 Units by mouth 2 times daily       aspirin 81 MG EC tablet Take 81 mg by mouth daily. No current facility-administered medications for this visit. Allergies: Aspartame and phenylalanine, Mushroom extract complex, Penicillins, Shellfish-derived products, and Zetia [ezetimibe]    Review of Systems  Review of Systems   Constitutional: Positive for fatigue. Negative for activity change, diaphoresis, fever and unexpected weight change. HENT: Negative for facial swelling and nosebleeds. Eyes: Negative for redness and visual disturbance. Respiratory: Positive for shortness of breath (with over exertion). Negative for cough, chest tightness and wheezing. Cardiovascular: Positive for leg swelling (occasional). Negative for chest pain and palpitations. Gastrointestinal: Negative for abdominal pain, nausea and vomiting. Endocrine: Negative for cold intolerance and heat intolerance. Genitourinary: Negative for dysuria and hematuria. Musculoskeletal: Positive for back pain and gait problem. Negative for arthralgias and myalgias. Complains of immobility   Skin: Negative for pallor and rash. Neurological: Negative for dizziness, seizures, syncope, weakness and light-headedness. Hematological: Does not bruise/bleed easily. Psychiatric/Behavioral: Negative for agitation. The patient is not nervous/anxious. Objective  Vital Signs - /78   Pulse 92   Ht 5' 5\" (1.651 m)   LMP  (LMP Unknown)   BMI 38.77 kg/m²      BP Readings from Last 3 Encounters:   01/27/21 112/78   04/23/20 124/82   04/02/20 130/75    Pulse Readings from Last 3 Encounters:   01/27/21 92   04/02/20 76   03/02/20 96        Wt Readings from Last 3 Encounters:   02/28/20 233 lb (105.7 kg)   02/27/20 233 lb (105.7 kg)   02/20/20 231 lb (104.8 kg)      Physical Exam  Vitals signs and nursing note reviewed. Constitutional:       General: She is not in acute distress. Appearance: She is well-developed. She is not diaphoretic. Comments: Deconditioned   HENT:      Head: Normocephalic and atraumatic. Right Ear: Hearing and external ear normal.      Left Ear: Hearing and external ear normal.      Nose: Nose normal.   Eyes:      General:         Right eye: No discharge. Left eye: No discharge. Pupils: Pupils are equal, round, and reactive to light. Neck:      Musculoskeletal: Neck supple. No muscular tenderness. Thyroid: No thyromegaly. Vascular: No carotid bruit or JVD. Trachea: No tracheal deviation. Cardiovascular:      Rate and Rhythm: Normal rate. Rhythm irregular. Heart sounds: Normal heart sounds. No murmur. No friction rub. No gallop. Comments: No carotid bruit  Irregularly irregular rhythm  Pulmonary:      Effort: Pulmonary effort is normal. No respiratory distress. Breath sounds: Normal breath sounds. No wheezing or rales. Abdominal:      Palpations: Abdomen is soft. Tenderness: There is no abdominal tenderness. Musculoskeletal:         General: No swelling or deformity. Right lower leg: Edema (3+ tight) present. Left lower leg: Edema (3+ tight) present. Comments: In wheelchair   Skin:     General: Skin is warm and dry. Findings: No rash. Neurological:      General: No focal deficit present. Mental Status: She is alert and oriented to person, place, and time. Cranial Nerves: No cranial nerve deficit.    Psychiatric:         Mood and Affect: Mood normal.         Behavior: Behavior normal.         Judgment: Judgment normal.         Data:  Lab Results   Component Value Date    CHOL 135 10/14/2020    TRIG 229 10/14/2020    HDL 31 (A) 10/14/2020    LDLCALC 58 10/14/2020     Lab Results   Component Value Date    ALT 32 10/14/2020    AST 20 10/14/2020     EKG shows atrial fibrillation rate 92    Echo 2/20/20  Summary   Mild left ventricular enlargement with global hypokinesis and an abnormal   contraction pattern [suggestive of bundle branch block] with an estimated   ejection fraction of 25-30%   Abnormal rhythm precludes assessment of diastolic function   Moderately dilated left atrium   Mildly thickened tricuspid aortic valve leaflets with adequate cusp   separation and no significant stenosis or insufficiency   Severe mitral annular calcification [particularly posteriorly] that also   involves papillary muscles with moderately thickened mitral leaflets which   demonstrated reduced mobility, no significant stenosis and mild   regurgitation   Trace pulmonic insufficiency   Moderately dilated right atrium with normal right ventricular size and   systolic function   Mild tricuspid regurgitation with RVSP estimate of 28 mmHg   Normal IVC dimensions with reduced respiratory motion consistent with   elevated right atrial filling pressures of 11-15 mmHg   No significant pericardial effusion and aortic dimensions are within   normal limits   Definity contrast utilized to better define endocardial surface    Laura 2/22/20  Impression:   There is no obvious infarct or ischemia, with a calculated ejection fraction of 31 %. Suggest: Clinical correlation with cardiomyopathy. Signed by Dr Adrienne Neville on 2/23/2020 9:50 AM         Assessment:     Diagnosis Orders   1. Acute on chronic systolic congestive heart failure (HCC)  ECHO Complete 2D W Doppler W Color   2. Chronic atrial fibrillation     3. Mild CAD     4. Essential hypertension  EKG 12 lead   5. Edema of both lower extremities         Chronic atrial fibrillationrate controlled and anticoagulated without bleeding issues. Coumadin monitored via home INR monitoring. Her PCP previously monitored it, she would like us to monitor at our office. She will have next reading sent to our office for review    Acute on chronic systolic heart failure NYHA class II-likely has some fluid overload as evidenced by significant lower extremity edema. She had labs done at her PCP office yesterday and does not want to repeat labs today. We will request these labs. Encouraged her to increase her Lasix to 40 mg daily and continue spironolactone at 25 mg daily. She is on guideline directed medical therapy with carvedilol and losartan. She needs a repeat echocardiogram to reevaluate for a potential defibrillator. We discussed low-sodium diet and fluid restriction of 6 cups daily    CADno angina symptoms, nonocclusive disease per heart cath in the past.  Nuclear stress test in February 2020 showed no evidence of ischemia    Hypertensionwell controlled on current regimen    Morbid obesity/deconditioning very immobile due to back surgery earlier in 2020. Barely able to stand. Needs much assistance for transfers etc.  Her son is with her today    Plan    Return in about 3 weeks (around 2/17/2021). Sched Echocardiogram  Increase Lasix (FUROSEMIDE) to 40mg daily  Our FAX -[phone]- fax INR  Request labs done yesterday at PCP office    - Weigh daily and report weight gain of 3lbs or more in 24hrs or 5lbs in one week. - Call for increasing shortness of breath or increasing swelling in feet and legs. (This could mean you are retaining too much fluid)  - 2000mg low sodium diet  - Fluid restriction of 1500ml per day (about 6 cups of fluid per day)    Call with any questionsor concerns  Follow up with Karine Faye MD for non cardiac problems and coumadin management  Report any new problems  Cardiovascular Fitness-Exercise as tolerated. Strive for 15 minutes of exercise most days of the week. Cardiac / HealthyDiet  Continue current medications as directed  Continue plan of treatment  It is always recommended that you bring your medicationsbottles with you to each visit - this is for your safety!        ADELITA Rodriguez

## 2021-01-27 NOTE — PATIENT INSTRUCTIONS
Return in about 3 weeks (around 2/17/2021). Sched Echocardiogram  Increase Lasix (FUROSEMIDE) to 40mg daily  Our FAX -[phone]- fax INR      - Weigh daily and report weight gain of 3lbs or more in 24hrs or 5lbs in one week. - Call for increasing shortness of breath or increasing swelling in feet and legs. (This could mean you are retaining too much fluid)  - 2000mg low sodium diet  - Fluid restriction of 1500ml per day (about 6 cups of fluid per day)    [facility] at the [address] [place] [address] and [address] located on the first floor of [place] [address] through hospital main entrance and turn immediately to your left. Date/Time: 02/08/21 8:15 Monday    Patient's contact number:  [phone] (home) [phone] (work)    Echocardiogram -  No prep. Takes approximately 30 min. An echocardiogram uses sound waves to produce images of your heart. This commonly used test allows your doctor to see how your heart is beating and pumping blood. Your doctor can use the images from an echocardiogram to identify various abnormalities in the heart muscle and valves. This test has 2 parts:   Ø You will be asked to disrobe from the waist up and given a gown to wear. The technologist will then hook up an EKG monitor to you for the entire exam.   Ø You will then have an ultrasound of your heart (echocardiogram) to assess the heart muscle, heart valves and heart function. You may eat and take any medicines before the exam.     If you need to change your appointment, please call outpatient scheduling at [phone].

## 2021-01-28 ENCOUNTER — ANTI-COAG VISIT (OUTPATIENT)
Dept: INTERNAL MEDICINE | Age: 66
End: 2021-01-28
Payer: MEDICARE

## 2021-01-28 DIAGNOSIS — Z79.01 LONG TERM CURRENT USE OF ANTICOAGULANT THERAPY: ICD-10-CM

## 2021-01-28 LAB — INR BLD: 2.5

## 2021-01-28 PROCEDURE — 93793 ANTICOAG MGMT PT WARFARIN: CPT | Performed by: INTERNAL MEDICINE

## 2021-01-29 ENCOUNTER — TELEPHONE (OUTPATIENT)
Dept: CARDIOLOGY CLINIC | Age: 66
End: 2021-01-29

## 2021-01-29 NOTE — TELEPHONE ENCOUNTER
Zuri-I have requested labs from [facility] internal medicine on this patient. It looks like all they sent over warfarin INRs. I am looking for BNP, CMP and or BMP that patient states was drawn recently. Can you please request and get from office?   Thank you

## 2021-01-29 NOTE — TELEPHONE ENCOUNTER
Faxed records request twice regarding these cholesterol labs. Will scan in chart when they are received. Delsa Severance

## 2021-02-05 ENCOUNTER — TELEPHONE (OUTPATIENT)
Dept: INTERNAL MEDICINE | Age: 66
End: 2021-02-05

## 2021-02-05 ENCOUNTER — ANTI-COAG VISIT (OUTPATIENT)
Dept: INTERNAL MEDICINE | Age: 66
End: 2021-02-05
Payer: MEDICARE

## 2021-02-05 DIAGNOSIS — Z79.01 LONG TERM CURRENT USE OF ANTICOAGULANT THERAPY: ICD-10-CM

## 2021-02-05 LAB — INR BLD: 2.1

## 2021-02-05 PROCEDURE — 93793 ANTICOAG MGMT PT WARFARIN: CPT | Performed by: INTERNAL MEDICINE

## 2021-02-05 NOTE — TELEPHONE ENCOUNTER
Teresa Kim, can you please send out the Discharge letter for this patient. Below is the reasoning why.    Letter has been printed and signed and placed in the basket to be mailed out. I find it necessary to inform you that I must withdraw my professional commitment to you as a primary care physician due to a breakdown in the doctor/patient relationship. Since you have multiple serious health conditions, In order to provide the best care possible, I have to be able to see you as a patient in person.  I have not seen you in the office since February 27, 2020 and have asked for in-person visits with you numerous times with no compliance to this request.

## 2021-02-05 NOTE — PROGRESS NOTES
HOME MONITORING REPORT    INR today:   Results for orders placed or performed in visit on 02/05/21   Protime-INR   Result Value Ref Range    INR 2.10        INR Goal: 2.0-3.0    Dosing Plan  As of 2/5/2021    TTR:  77.8 % (3.5 y)   Full warfarin instructions:  9 mg every Thu, Sat; 6 mg all other days              PLAN: Advised patient/caregiver to continue current dose and recheck in one week. Patient/Caregiver voiced understanding    I have reviewed nursing plan for Coumadin management and agree with plan.

## 2021-02-08 ENCOUNTER — HOSPITAL ENCOUNTER (OUTPATIENT)
Dept: NON INVASIVE DIAGNOSTICS | Age: 66
Discharge: HOME | End: 2021-02-08
Payer: MEDICARE

## 2021-02-08 DIAGNOSIS — I50.23 ACUTE ON CHRONIC SYSTOLIC CONGESTIVE HEART FAILURE (HCC): ICD-10-CM

## 2021-02-08 LAB
LV EF: 28 %
LVEF MODALITY: NORMAL

## 2021-02-08 PROCEDURE — C8929 TTE W OR WO FOL WCON,DOPPLER: HCPCS

## 2021-02-08 PROCEDURE — 6360000004 HC RX CONTRAST MEDICATION: Performed by: INTERNAL MEDICINE

## 2021-02-08 RX ADMIN — PERFLUTREN 1.65 MG: 6.52 INJECTION, SUSPENSION INTRAVENOUS at 08:44

## 2021-02-10 LAB — INR BLD: 2.3

## 2021-02-11 ENCOUNTER — ANTI-COAG VISIT (OUTPATIENT)
Dept: INTERNAL MEDICINE | Age: 66
End: 2021-02-11
Payer: MEDICARE

## 2021-02-11 DIAGNOSIS — Z79.01 LONG TERM CURRENT USE OF ANTICOAGULANT THERAPY: ICD-10-CM

## 2021-02-11 PROCEDURE — 93793 ANTICOAG MGMT PT WARFARIN: CPT | Performed by: INTERNAL MEDICINE

## 2021-02-11 NOTE — PROGRESS NOTES
Pt was notified of normal INR and to continue current dose of coumadin and recheck in 1 week. I have reviewed nursing plan for Coumadin management and agree with plan.

## 2021-02-12 NOTE — PROGRESS NOTES
HOME MONITORING REPORT    INR today:   Results for orders placed or performed in visit on 02/11/21   Protime-INR   Result Value Ref Range    INR 2.30        INR Goal: 2.0-3.0    Dosing Plan  As of 2/11/2021    TTR:  77.9 % (3.5 y)   Full warfarin instructions:  9 mg every Thu, Sat; 6 mg all other days              PLAN: Advised patient/caregiver to continue current dose and recheck in one week.   Patient/Caregiver voiced understanding

## 2021-02-18 ENCOUNTER — TELEPHONE (OUTPATIENT)
Dept: CARDIOLOGY CLINIC | Age: 66
End: 2021-02-18

## 2021-02-18 LAB — INR BLD: 2.1

## 2021-02-22 ENCOUNTER — ANTI-COAG VISIT (OUTPATIENT)
Dept: INTERNAL MEDICINE | Age: 66
End: 2021-02-22

## 2021-02-22 ENCOUNTER — TELEPHONE (OUTPATIENT)
Dept: CARDIOLOGY CLINIC | Age: 66
End: 2021-02-22

## 2021-02-22 DIAGNOSIS — Z79.01 LONG TERM CURRENT USE OF ANTICOAGULANT THERAPY: ICD-10-CM

## 2021-02-22 NOTE — TELEPHONE ENCOUNTER
Tried to call patient regarding recent 2D echo. No way to LVM. Patient does have follow up on 02/26. Please keep follow up and see message below regarding recent 2D echo.     ----- Message from ADELITA Painter sent at 2/10/2021 12:46 PM CST -----  Please let patient know I reviewed her recent echo. Her ejection fraction continues to remain low at 25 to 30%. She will qualify for a defibrillator due to her low heart function. Would also like to discuss starting Entresto to replace her valsartan. Be sure she keeps her appointment on the 17th for follow-up. Ask her how she is doing in regards to fluid overload? Is she doing better?  Let me know

## 2021-02-22 NOTE — PROGRESS NOTES
HOME MONITORING REPORT    INR today:   Results for orders placed or performed in visit on 02/22/21   Protime-INR   Result Value Ref Range    INR 2.10        INR Goal: 2.0-3.0    Dosing Plan  As of 2/22/2021    TTR:  78.0 % (3.6 y)   Full warfarin instructions:  9 mg every Thu, Sat; 6 mg all other days              PLAN: Advised patient/caregiver to continue current dose and recheck in one week. Patient/Caregiver voiced understanding      Brittany Longo, sending this to you since it was already done. They should be coming to you from now on.

## 2021-02-26 ENCOUNTER — OFFICE VISIT (OUTPATIENT)
Dept: CARDIOLOGY CLINIC | Age: 66
End: 2021-02-26
Payer: MEDICARE

## 2021-02-26 VITALS
HEIGHT: 66 IN | BODY MASS INDEX: 37.61 KG/M2 | DIASTOLIC BLOOD PRESSURE: 84 MMHG | SYSTOLIC BLOOD PRESSURE: 128 MMHG | HEART RATE: 64 BPM

## 2021-02-26 DIAGNOSIS — I50.42 CHRONIC COMBINED SYSTOLIC AND DIASTOLIC CHF (CONGESTIVE HEART FAILURE) (HCC): Primary | ICD-10-CM

## 2021-02-26 DIAGNOSIS — I48.20 CHRONIC ATRIAL FIBRILLATION (HCC): ICD-10-CM

## 2021-02-26 DIAGNOSIS — R53.81 PHYSICAL DECONDITIONING: ICD-10-CM

## 2021-02-26 DIAGNOSIS — I10 ESSENTIAL HYPERTENSION: ICD-10-CM

## 2021-02-26 DIAGNOSIS — I25.10 MILD CAD: ICD-10-CM

## 2021-02-26 DIAGNOSIS — E66.01 MORBID OBESITY DUE TO EXCESS CALORIES (HCC): ICD-10-CM

## 2021-02-26 PROCEDURE — G8417 CALC BMI ABV UP PARAM F/U: HCPCS | Performed by: CLINICAL NURSE SPECIALIST

## 2021-02-26 PROCEDURE — G8427 DOCREV CUR MEDS BY ELIG CLIN: HCPCS | Performed by: CLINICAL NURSE SPECIALIST

## 2021-02-26 PROCEDURE — 3017F COLORECTAL CA SCREEN DOC REV: CPT | Performed by: CLINICAL NURSE SPECIALIST

## 2021-02-26 PROCEDURE — 1123F ACP DISCUSS/DSCN MKR DOCD: CPT | Performed by: CLINICAL NURSE SPECIALIST

## 2021-02-26 PROCEDURE — 4040F PNEUMOC VAC/ADMIN/RCVD: CPT | Performed by: CLINICAL NURSE SPECIALIST

## 2021-02-26 PROCEDURE — G8400 PT W/DXA NO RESULTS DOC: HCPCS | Performed by: CLINICAL NURSE SPECIALIST

## 2021-02-26 PROCEDURE — G8484 FLU IMMUNIZE NO ADMIN: HCPCS | Performed by: CLINICAL NURSE SPECIALIST

## 2021-02-26 PROCEDURE — 99214 OFFICE O/P EST MOD 30 MIN: CPT | Performed by: CLINICAL NURSE SPECIALIST

## 2021-02-26 PROCEDURE — 1090F PRES/ABSN URINE INCON ASSESS: CPT | Performed by: CLINICAL NURSE SPECIALIST

## 2021-02-26 PROCEDURE — 1036F TOBACCO NON-USER: CPT | Performed by: CLINICAL NURSE SPECIALIST

## 2021-02-26 RX ORDER — SACUBITRIL AND VALSARTAN 24; 26 MG/1; MG/1
1 TABLET, FILM COATED ORAL 2 TIMES DAILY
Qty: 60 TABLET | Refills: 5 | Status: SHIPPED | OUTPATIENT
Start: 2021-02-26

## 2021-02-26 ASSESSMENT — ENCOUNTER SYMPTOMS
BACK PAIN: 1
VOMITING: 0
SHORTNESS OF BREATH: 1
CHEST TIGHTNESS: 0
FACIAL SWELLING: 0
WHEEZING: 0
EYE REDNESS: 0
NAUSEA: 0
ABDOMINAL PAIN: 0
COUGH: 0

## 2021-02-26 NOTE — PROGRESS NOTES
[facility], [address] [place] [address], [id number]  Phone: [phone]  Fax: [phone]    OFFICE VISIT:  2021    Kalyn Diamond - : 1955    Reason For Visit:  Meka Barber is a 76 y.o. female who is here for Follow-up (Patient is here follow up of echo. ) and Congestive Heart Failure     Diagnosis Orders   1. Chronic combined systolic and diastolic CHF (congestive heart failure) ([address]     2. Chronic atrial fibrillation     3. Mild CAD     4. Essential hypertension     5. Morbid obesity due to excess calories ([address]     6. Physical deconditioning          HPI  Patient is here for follow-up for chronic atrial fibrillation, hypertension, systolic heart failure, non-occlusive CAD, morbid obesity. She is rate controlled with digoxin and metoprolol. She is anticoagulated with Coumadin and does home INR monitoring to be transferred to our office    At her last visit on 2021, she has not been seen in our office since 2019. She was hospitalized in 2020. She states she ended up having a back surgery at [facility] in May 2020. She is still convalescing from this. She is unable to walk at all. She states she is unable to stand, even to weigh. She has difficulty moving her right leg r/t previous stroke. She spends most of her day in a wheelchair. Her sons help her do physical therapy at home. She no longer has a therapist coming to the house    During her hospitalization in 2020, she was found to have a further depressed ejection fraction, down to 25-30%, previously 40%. She also had a nuclear stress test that showed no evidence of ischemia. She was to follow-up in the office to discuss the potential of a defibrillator. She never followed up due to issues with her back and the back surgery that occurred in the spring. At last visit, she was sent back to our office by her PCP for concern about lower extremity edema and weight gain.   Her  Hypertension     Menopause     Obesity     Palliative care patient 02/21/2020    Skin rash     Type II or unspecified type diabetes mellitus without mention of complication, not stated as uncontrolled     Umbilical hernia     URI (upper respiratory infection)      Past Surgical History:   Procedure Laterality Date    CARDIAC CATHETERIZATION  10/21/2009    EF 35% left ventricle is moderately enlarged     CARDIOVERSION  10/09/2013    CHOLECYSTECTOMY      EYE SURGERY      retina and cataracts     GALLBLADDER SURGERY      SPINE SURGERY  05/2020    TUBAL LIGATION       Family History   Problem Relation Age of Onset    Diabetes Mother     Heart Failure Mother     High Blood Pressure Mother     Liver Cancer Father     Colon Cancer Neg Hx     Colon Polyps Neg Hx      Social History     Tobacco Use    Smoking status: Never Smoker    Smokeless tobacco: Never Used   Substance Use Topics    Alcohol use: No      Current Outpatient Medications   Medication Sig Dispense Refill    sacubitril-valsartan (ENTRESTO) 24-26 MG per tablet Take 1 tablet by mouth 2 times daily 60 tablet 5    furosemide (LASIX) 40 MG tablet Take 1 tablet by mouth daily 90 tablet 3    warfarin (COUMADIN) 6 MG tablet Take 6mg daily by mouth daily, except 9mg on Thu and Sat 60 tablet 5    spironolactone (ALDACTONE) 25 MG tablet Take 1 tablet by mouth daily 90 tablet 3    carvedilol (COREG) 12.5 MG tablet Take 1 tablet by mouth 2 times daily 180 tablet 3    digoxin (LANOXIN) 125 MCG tablet Take 1 tablet by mouth daily 90 tablet 3    fluticasone (FLONASE) 50 MCG/ACT nasal spray 1 spray by Each Nostril route daily      loratadine (CLARITIN) 10 MG capsule Take 10 mg by mouth daily      Cyanocobalamin (VITAMIN B 12) 500 MCG TABS Take 500 mcg by mouth daily      atorvastatin (LIPITOR) 80 MG tablet TAKE 1 TABLET BY MOUTH ONCE DAILY.  30 tablet 5    Bed Trapeze MISC by Does not apply route 1 each 0    glipiZIDE (GLUCOTROL) 5 MG tablet TAKE 2 TABLETS BY MOUTH IN THE MORNING & 2 TABLETS IN THE EVENING 120 tablet 5    metFORMIN (GLUCOPHAGE) 500 MG tablet TAKE 2 TABLETS BY MOUTH TWICE DAILY WITH MEALS 120 tablet 5    levothyroxine (SYNTHROID) 125 MCG tablet Take 1 tablet by mouth Daily 30 tablet 5    Multiple Vitamins-Minerals (THERAPEUTIC MULTIVITAMIN-MINERALS) tablet Take 1 tablet by mouth daily.  Vitamin D (CHOLECALCIFEROL) 1000 UNITS CAPS capsule Take 1,000 Units by mouth 2 times daily       aspirin 81 MG EC tablet Take 81 mg by mouth daily. No current facility-administered medications for this visit. Allergies: Aspartame and phenylalanine, Mushroom extract complex, Penicillins, Shellfish-derived products, and Zetia [ezetimibe]    Review of Systems  Review of Systems   Constitutional: Positive for fatigue. Negative for activity change, diaphoresis, fever and unexpected weight change. HENT: Negative for facial swelling and nosebleeds. Eyes: Negative for redness and visual disturbance. Respiratory: Positive for shortness of breath (with over exertion). Negative for cough, chest tightness and wheezing. Cardiovascular: Positive for leg swelling (better). Negative for chest pain and palpitations. Gastrointestinal: Negative for abdominal pain, nausea and vomiting. Endocrine: Negative for cold intolerance and heat intolerance. Genitourinary: Negative for dysuria and hematuria. Musculoskeletal: Positive for back pain and gait problem. Negative for arthralgias and myalgias. Complains of immobility   Skin: Negative for pallor and rash. Neurological: Negative for dizziness, seizures, syncope, weakness and light-headedness. Hematological: Does not bruise/bleed easily. Psychiatric/Behavioral: Negative for agitation. The patient is not nervous/anxious.         Objective  Vital Signs - /84   Pulse 64   Ht 5' 6\" (1.676 m)   LMP  (LMP Unknown)   BMI 37.61 kg/m²      BP Readings from Last 3 Encounters:   02/26/21 128/84   01/27/21 112/78   04/23/20 124/82    Pulse Readings from Last 3 Encounters:   02/26/21 64   01/27/21 92   04/02/20 76        Wt Readings from Last 3 Encounters:   02/28/20 233 lb (105.7 kg)   02/27/20 233 lb (105.7 kg)   02/20/20 231 lb (104.8 kg)      Physical Exam  Vitals signs and nursing note reviewed. Constitutional:       General: She is not in acute distress. Appearance: She is well-developed. She is not diaphoretic. Comments: Deconditioned   HENT:      Head: Normocephalic and atraumatic. Right Ear: Hearing and external ear normal.      Left Ear: Hearing and external ear normal.      Nose: Nose normal.   Eyes:      General:         Right eye: No discharge. Left eye: No discharge. Pupils: Pupils are equal, round, and reactive to light. Neck:      Musculoskeletal: Neck supple. No muscular tenderness. Thyroid: No thyromegaly. Vascular: No carotid bruit or JVD. Trachea: No tracheal deviation. Cardiovascular:      Rate and Rhythm: Normal rate. Rhythm irregular. Heart sounds: Normal heart sounds. No murmur. No friction rub. No gallop. Comments: No carotid bruit  Irregularly irregular rhythm  Pulmonary:      Effort: Pulmonary effort is normal. No respiratory distress. Breath sounds: Normal breath sounds. No wheezing or rales. Abdominal:      Palpations: Abdomen is soft. Tenderness: There is no abdominal tenderness. Musculoskeletal:         General: No swelling or deformity. Right lower leg: Edema (3+ tight) present. Left lower leg: Edema (3+ tight) present. Comments: In wheelchair   Skin:     General: Skin is warm and dry. Findings: No rash. Neurological:      General: No focal deficit present. Mental Status: She is alert and oriented to person, place, and time. Cranial Nerves: No cranial nerve deficit.    Psychiatric:         Mood and Affect: Mood normal.         Behavior: Behavior normal.         Judgment: Judgment normal.         Data:  Lab Results   Component Value Date    CHOL 135 10/14/2020    TRIG 229 10/14/2020    HDL 31 (A) 10/14/2020    LDLCALC 58 10/14/2020     Lab Results   Component Value Date    ALT 32 10/14/2020    AST 20 10/14/2020     Data:  Echo 2/20/20  Summary   Mild left ventricular enlargement with global hypokinesis and an abnormal   contraction pattern [suggestive of bundle branch block] with an estimated   ejection fraction of 25-30%   Abnormal rhythm precludes assessment of diastolic function   Moderately dilated left atrium   Mildly thickened tricuspid aortic valve leaflets with adequate cusp   separation and no significant stenosis or insufficiency   Severe mitral annular calcification [particularly posteriorly] that also   involves papillary muscles with moderately thickened mitral leaflets which   demonstrated reduced mobility, no significant stenosis and mild   regurgitation   Trace pulmonic insufficiency   Moderately dilated right atrium with normal right ventricular size and   systolic function   Mild tricuspid regurgitation with RVSP estimate of 28 mmHg   Normal IVC dimensions with reduced respiratory motion consistent with   elevated right atrial filling pressures of 11-15 mmHg   No significant pericardial effusion and aortic dimensions are within   normal limits   Definity contrast utilized to better define endocardial surface    Laura 2/22/20  Impression:   There is no obvious infarct or ischemia, with a calculated ejection   fraction of 31 %. Suggest: Clinical correlation with cardiomyopathy. Signed by Dr Bruno Sullivan on 2/23/2020 9:50 AM     Echo 2/8/61  Summary   LV is moderately dilated with severely reduced LV systolic function. LV   ejection fraction estimated at 25 to 30%. Mid to apical septal, apical segment, mid to apical lateral and anterior   wall are severely hypokinetic. No obvious thrombus noted in left ventricle.    Diastolic function not therapy and she has had to do it on her own. We discussed that if she ever wants to become more mobile, she will need the help of a therapist.  She will discuss with her PCP next week    We will add Entresto to her medication regimen and discontinue losartan. We discussed the benefits including 20% decrease in MACE and hospitalization. We will likely need to work on patient assistance. She will notify us of what her cost for this medication will be and we can start the process    CAD-no angina symptoms, nonocclusive disease per heart cath in the past.  Nuclear stress test in February 2020 showed no evidence of ischemia    Hypertension-well controlled on current regimen    Morbid obesity/deconditioning -very immobile due to back surgery earlier in 2020 (see above). Barely able to stand. Needs much assistance for transfers etc.  Her son is with her today    Plan    Return in about 1 month (around 3/26/2021) for ADELITA. Discuss Physical Therapy with your PCP  Recommend implanted defibrillator to prevent sudden cardiac death    Stop Losartan  Start Entresto 24/26mg twice a day    - Weigh daily and report weight gain of 3lbs or more in 24hrs or 5lbs in one week. - Call for increasing shortness of breath or increasing swelling in feet and legs. (This could mean you are retaining too much fluid)  - 2000mg low sodium diet  - Fluid restriction of 1500ml per day (about 6 cups of fluid per day)    Call with any questionsor concerns  Follow up with ADELITA Steward - NP for non cardiac problems and coumadin management  Report any new problems  Cardiovascular Fitness-Exercise as tolerated. Strive for 15 minutes of exercise most days of the week. Cardiac / HealthyDiet  Continue current medications as directed  Continue plan of treatment  It is always recommended that you bring your medicationsbottles with you to each visit - this is for your safety!        ADELITA Caban

## 2021-02-26 NOTE — PATIENT INSTRUCTIONS
Return in about 1 month (around 4/9/2021) for APRN. Discuss Physical Therapy with your PCP  Recommend implanted defibrillator to prevent sudden cardiac death    Stop Losartan  Start Entresto 24/26mg twice a day    - Weigh daily and report weight gain of 3lbs or more in 24hrs or 5lbs in one week. - Call for increasing shortness of breath or increasing swelling in feet and legs. (This could mean you are retaining too much fluid)  - 2000mg low sodium diet  - Fluid restriction of 1500ml per day (about 6 cups of fluid per day)  Patient Education        Learning About an ICD (Implantable Cardioverter-Defibrillator)  What is an ICD? An ICD (implantable cardioverter-defibrillator) is a small, battery-powered device. It fixes serious changes in your heartbeat. ICDs are used in people who've had a life-threatening heart rhythm or are at high risk of having one. The ICD is placed under the skin of the chest. It's attached to one or two wires (called leads). These leads go into the heart through a vein (transvenous). How does it work? An ICD is always checking your heart rate and rhythm. If the ICD detects a life-threatening, rapid heart rhythm, it tries to slow the rhythm back to normal using electrical pulses. If the bad rhythm doesn't stop, the ICD sends an electric shock to the heart. This restores a normal rhythm. The device then goes back to its watchful mode. Some ICDs can also fix a heart rate that is too slow. The ICD does this without using a shock. It has wires that go inside the heart. It can use these wires to send out electrical pulses to speed up a heart rate that's too slow. Your doctor will check the ICD regularly to make sure that it's working right and isn't causing any problems. Your doctor will also check the battery to see if it needs to be replaced. How is it placed? Your doctor will put the ICD under the skin in your chest during minor surgery.  You will likely have medicine to make you feel relaxed and sleepy during the surgery. Your doctor makes a small cut (incision) in your upper chest. The doctor puts one or two leads (wires) through the cut. The leads go into a large blood vessel in the upper chest. Then your doctor guides the leads through the blood vessel into your heart. Your doctor connects the leads to the ICD and places it in your chest. Then the incision is closed. Your doctor also programs the ICD. Most people spend the night in the hospital, just to make sure that the device is working and that there are no problems from the surgery. How does it feel to get a shock? The shock from an ICD hurts briefly. People feel it in different ways. It's been described as feeling like a punch in the chest. But the shock is a sign that the ICD is doing its job. It's there to save your life. You won't feel any pain if the ICD uses electrical pulses to fix a heart rate that is too fast or too slow. There's no way to know how often a shock might occur. It might never happen. Not knowing when or if a shock might happen may make you nervous. Knowing what to do if you get shocked can help. Ask your doctor for an action plan. This plan will guide you step-by-step if a shock happens. How can you live well with an ICD? You can live a normal life with your ICD. Here are a few tips for living well with your ICD. · Use certain electric devices with caution. Some electric devices have a strong electromagnetic field. This field can keep your ICD from working right for a short time. Check with your doctor about what you need to avoid and what you need to keep a short distance away from your ICD. Many household and office electronics do not affect your ICD. · Be sure that your health professionals know that you have an ICD. This includes any doctor, dentist, or other health professional you see. · Always carry a card that tells what kind of device you have.   · Wear medical alert jewelry that says you have an ICD. You can buy this at most drugstores. · If you get a shock, follow your action plan for what to do. · Ask your doctor what sort of activity and intensity is safe for you. You can lead an active life with an ICD. As you plan for your future and the end of life, be sure to include plans for your ICD. You can make the decision to turn off your ICD as part of the medical treatment you want at the end of life. Follow-up care is a key part of your treatment and safety. Be sure to make and go to all appointments, and call your doctor if you are having problems. It's also a good idea to know your test results and keep a list of the medicines you take. Where can you learn more? Go to https://Adaptics.Case Commons. org and sign in to your eshtery account. Enter F743 in the Prelert box to learn more about \"Learning About an ICD (Implantable Cardioverter-Defibrillator). \"     If you do not have an account, please click on the \"Sign Up Now\" link. Current as of: December 16, 2019               Content Version: 12.6  © 3974-4623 [employer]. Care instructions adapted under license by [facility] ([facility]). If you have questions about a medical condition or this instruction, always ask your healthcare professional. [address] any warranty or liability for your use of this information.

## 2021-03-02 ENCOUNTER — TELEPHONE (OUTPATIENT)
Dept: CARDIOLOGY CLINIC | Age: 66
End: 2021-03-02

## 2021-03-02 NOTE — TELEPHONE ENCOUNTER
Patient states she read on the entresto bottle and it states not to take it you are diabetic. She is diabetic and wants to know if it is safe to take. Please advise.

## 2021-03-02 NOTE — TELEPHONE ENCOUNTER
Patient notified okay to take entresto with diabetes. She also said ou wanted to know if she qualifies for any money saving programs and she said she checked and she does not qualify at this time.

## 2021-03-02 NOTE — TELEPHONE ENCOUNTER
She was supposed to have gone to her pharmacy and had run under her insurance to see what her coverage will be.   If it is unaffordable, she is to let us know and then we can work on assistance

## 2021-03-03 NOTE — TELEPHONE ENCOUNTER
Called and spoke with patient, advised I was working on [employer] patient assistance application and that I needed her signature and proof of income for anyone that lives in the household.   She advised she would bring it by

## 2021-03-08 ENCOUNTER — ANTI-COAG VISIT (OUTPATIENT)
Dept: PRIMARY CARE CLINIC | Age: 66
End: 2021-03-08

## 2021-03-08 DIAGNOSIS — Z79.01 LONG TERM CURRENT USE OF ANTICOAGULANT THERAPY: ICD-10-CM

## 2021-03-11 ENCOUNTER — BULK ORDERING (OUTPATIENT)
Dept: CASE MANAGEMENT | Facility: OTHER | Age: 66
End: 2021-03-11

## 2021-03-11 DIAGNOSIS — Z23 IMMUNIZATION DUE: ICD-10-CM

## 2021-03-30 ENCOUNTER — OFFICE VISIT (OUTPATIENT)
Dept: CARDIOLOGY CLINIC | Age: 66
End: 2021-03-30
Payer: MEDICARE

## 2021-03-30 ENCOUNTER — TELEPHONE (OUTPATIENT)
Dept: CARDIOLOGY CLINIC | Age: 66
End: 2021-03-30

## 2021-03-30 VITALS
DIASTOLIC BLOOD PRESSURE: 74 MMHG | BODY MASS INDEX: 38.77 KG/M2 | OXYGEN SATURATION: 100 % | HEART RATE: 77 BPM | HEIGHT: 65 IN | SYSTOLIC BLOOD PRESSURE: 134 MMHG

## 2021-03-30 DIAGNOSIS — I48.20 CHRONIC ATRIAL FIBRILLATION (HCC): ICD-10-CM

## 2021-03-30 DIAGNOSIS — R53.81 PHYSICAL DECONDITIONING: ICD-10-CM

## 2021-03-30 DIAGNOSIS — I50.22 CHRONIC SYSTOLIC HEART FAILURE (HCC): Primary | ICD-10-CM

## 2021-03-30 DIAGNOSIS — I25.10 MILD CAD: ICD-10-CM

## 2021-03-30 DIAGNOSIS — E66.01 MORBID OBESITY DUE TO EXCESS CALORIES (HCC): ICD-10-CM

## 2021-03-30 DIAGNOSIS — I50.22 CHRONIC SYSTOLIC HEART FAILURE (HCC): ICD-10-CM

## 2021-03-30 LAB
ANION GAP SERPL CALCULATED.3IONS-SCNC: 12 MMOL/L (ref 7–19)
BUN BLDV-MCNC: 14 MG/DL (ref 8–23)
CALCIUM SERPL-MCNC: 9.2 MG/DL (ref 8.8–10.2)
CHLORIDE BLD-SCNC: 97 MMOL/L (ref 98–111)
CO2: 28 MMOL/L (ref 22–29)
CREAT SERPL-MCNC: 0.5 MG/DL (ref 0.5–0.9)
GFR AFRICAN AMERICAN: >59
GFR NON-AFRICAN AMERICAN: >60
GLUCOSE BLD-MCNC: 272 MG/DL (ref 74–109)
POTASSIUM SERPL-SCNC: 4.3 MMOL/L (ref 3.5–5)
SODIUM BLD-SCNC: 137 MMOL/L (ref 136–145)

## 2021-03-30 PROCEDURE — G8400 PT W/DXA NO RESULTS DOC: HCPCS | Performed by: CLINICAL NURSE SPECIALIST

## 2021-03-30 PROCEDURE — 1036F TOBACCO NON-USER: CPT | Performed by: CLINICAL NURSE SPECIALIST

## 2021-03-30 PROCEDURE — G8427 DOCREV CUR MEDS BY ELIG CLIN: HCPCS | Performed by: CLINICAL NURSE SPECIALIST

## 2021-03-30 PROCEDURE — 1090F PRES/ABSN URINE INCON ASSESS: CPT | Performed by: CLINICAL NURSE SPECIALIST

## 2021-03-30 PROCEDURE — 1123F ACP DISCUSS/DSCN MKR DOCD: CPT | Performed by: CLINICAL NURSE SPECIALIST

## 2021-03-30 PROCEDURE — 4040F PNEUMOC VAC/ADMIN/RCVD: CPT | Performed by: CLINICAL NURSE SPECIALIST

## 2021-03-30 PROCEDURE — 3017F COLORECTAL CA SCREEN DOC REV: CPT | Performed by: CLINICAL NURSE SPECIALIST

## 2021-03-30 PROCEDURE — 99214 OFFICE O/P EST MOD 30 MIN: CPT | Performed by: CLINICAL NURSE SPECIALIST

## 2021-03-30 PROCEDURE — G8417 CALC BMI ABV UP PARAM F/U: HCPCS | Performed by: CLINICAL NURSE SPECIALIST

## 2021-03-30 PROCEDURE — G8484 FLU IMMUNIZE NO ADMIN: HCPCS | Performed by: CLINICAL NURSE SPECIALIST

## 2021-03-30 RX ORDER — ERTUGLIFLOZIN 5 MG/1
5 TABLET, FILM COATED ORAL DAILY
COMMUNITY
End: 2021-08-06

## 2021-03-30 ASSESSMENT — ENCOUNTER SYMPTOMS
NAUSEA: 0
FACIAL SWELLING: 0
ABDOMINAL PAIN: 0
WHEEZING: 0
SHORTNESS OF BREATH: 1
EYE REDNESS: 0
BACK PAIN: 1
VOMITING: 0
COUGH: 0
CHEST TIGHTNESS: 0

## 2021-03-30 NOTE — PATIENT INSTRUCTIONS
Return in 1 month (on 4/30/2021) for APRN. Follow up with Physical Therapy with your PCP  Recommend implanted defibrillator to prevent sudden cardiac death    Continue Entresto 24/26mg twice a day    - Weigh daily and report weight gain of 3lbs or more in 24hrs or 5lbs in one week. - Call for increasing shortness of breath or increasing swelling in feet and legs. (This could mean you are retaining too much fluid)  - 2000mg low sodium diet  - Fluid restriction of 1500ml per day (about 6 cups of fluid per day)    Patient Education        Limiting Sodium With Heart Failure: Care Instructions  Your Care Instructions     Sodium causes your body to hold on to extra water. This may cause your heart failure symptoms to get worse. Limiting sodium may help you feel better. People get most of their sodium from processed foods. Fast food and restaurant meals also tend to be very high in sodium. Your doctor may suggest that you limit sodium. Your doctor can tell you how much sodium is right for you. An example is less than 3,000 mg a day. This includes all the salt you eat in cooked or packaged foods. Follow-up care is a key part of your treatment and safety. Be sure to make and go to all appointments, and call your doctor if you are having problems. It's also a good idea to know your test results and keep a list of the medicines you take. How can you care for yourself at home? Read food labels  · Read food labels on cans and food packages. The labels tell you how much sodium is in each serving. Make sure that you look at the serving size. If you eat more than the serving size, you have eaten more sodium than is listed for one serving. · Food labels also tell you the Percent Daily Value for sodium. Choose products with low Percent Daily Values for sodium. · Be aware that sodium can come in forms other than salt, including monosodium glutamate (MSG), sodium citrate, and sodium bicarbonate (baking soda).  MSG is often added to Asian food. You can sometimes ask for food without MSG or salt. Buy low-sodium foods  · Buy foods that are labeled \"unsalted\" (no salt added), \"sodium-free\" (less than 5 mg of sodium per serving), or \"low-sodium\" (140 mg or less of sodium per serving). A food labeled \"light sodium\" has less than half of the full-sodium version of that food. Foods labeled \"reduced-sodium\" may still have too much sodium. · Buy fresh vegetables or plain, frozen vegetables. Buy low-sodium versions of canned vegetables, soups, and other canned goods. Prepare low-sodium meals  · Use less salt each day when cooking. Reducing salt in this way will help you adjust to the taste. Do not add salt after cooking. Take the salt shaker off the table. · Flavor your food with garlic, lemon juice, onion, vinegar, herbs, and spices instead of salt. Do not use soy sauce, steak sauce, onion salt, garlic salt, or ketchup on your food. · Make your own salad dressings, sauces, and ketchup without adding salt. · Use less salt (or none) when recipes call for it. You can often use half the salt a recipe calls for without losing flavor. Other dishes like rice, pasta, and grains do not need added salt. · Rinse canned vegetables. This removes somebut not allof the salt. · Avoid water that has a naturally high sodium content or that has been treated with water softeners, which add sodium. If you buy bottled water, read the label and choose a sodium-free brand. Avoid high-sodium foods, such as:  · Smoked, cured, salted, and canned meat, fish, and poultry. · Ham, mathew, hot dogs, and luncheon meats. · Regular, hard, and processed cheese and regular peanut butter. · Crackers with salted tops. · Frozen prepared meals. · Canned and dried soups, broths, and bouillon, unless labeled sodium-free or low-sodium. · Canned vegetables, unless labeled sodium-free or low-sodium. · Salted snack foods such as chips and pretzels.   · [employer] fries, pizza, tacos, and other fast foods. · Pickles, olives, ketchup, and other condiments, especially soy sauce, unless labeled sodium-free or low-sodium. If you cannot cook for yourself  · Have family members or friends help you, or have someone cook low-sodium meals. · Check with your local senior nutrition program to find out where meals are served and whether they offer a low-sodium option. You can often find these programs through your local health department or hospital.  · Have meals delivered to your home. Most [facility] have a [employer] on [employer]. These programs provide one hot meal a day for older adults, delivered to their homes. Ask whether these meals are low-sodium. Let them know that you are on a low-sodium diet. Where can you learn more? Go to https://TekLinksflorianeb.Voltaix. org and sign in to your Podimetrics account. Enter A166 in the [facility] box to learn more about \"Limiting Sodium With Heart Failure: Care Instructions. \"     If you do not have an account, please click on the \"Sign Up Now\" link. Current as of: August 31, 2020               Content Version: 12.8  © 6607-2018 [employer], [employer]. Care instructions adapted under license by [facility] ([facility]). If you have questions about a medical condition or this instruction, always ask your healthcare professional. [address] any warranty or liability for your use of this information.

## 2021-03-30 NOTE — PROGRESS NOTES
[facility], [address] [place] [address], [id number]  Phone: [phone]  Fax: [phone]    OFFICE VISIT:  3/30/2021    Annmarie Young - : 1955    Reason For Visit:  Ruddy Mike is a 76 y.o. female who is here for 1 Month Follow-Up (Patient has been taking entresto. States that it is too expensive. Made her blood sugar go up. ) and Congestive Heart Failure     Diagnosis Orders   1. Chronic systolic heart failure (HCC)  Basic Metabolic Panel   2. Chronic atrial fibrillation     3. Mild CAD     4. Morbid obesity due to excess calories ([address]     5. Physical deconditioning          HPI  Patient is here for follow-up for chronic atrial fibrillation, hypertension, systolic heart failure, non-occlusive CAD, morbid obesity. She is rate controlled with digoxin and metoprolol. She is anticoagulated with Coumadin and does home INR monitoring to be transferred to our office    At a rewcent visit on 2021, she has not been seen in our office since 2019. She was hospitalized in 2020. She states she ended up having a back surgery at [facility] in May 2020. She is still convalescing from this. She is unable to walk at all. She states she is unable to stand, even to weigh. She has difficulty moving her right leg r/t previous stroke. She spends most of her day in a wheelchair. Her sons help her do physical therapy at home. She no longer has a therapist coming to the house    During her hospitalization in 2020, she was found to have a further depressed ejection fraction, down to 25-30%, previously 40%. She also had a nuclear stress test that showed no evidence of ischemia. She was to follow-up in the office to discuss the potential of a defibrillator. She never followed up due to issues with her back and the back surgery that occurred in the spring.     At last visit, she was sent back to our office by her PCP for concern about lower extremity edema and weight gain. Her Lasix was increased. She has some improvement in her lower extremity edema    As stated previously, she is unable to stand to weigh. She denies shortness of breath, orthopnea or PND. She states she is mindful about following a low-sodium diet, but does drink a lot of fluids throughout the day    She is also here today to discuss results of her recent echo     ADELITA Gil - NP is PCP.   Edvin Salazar has the following history as [facility] in [facility]:    Patient Active Problem List    Diagnosis Date Noted    History of back surgery 07/08/2020    Intervertebral thoracic disc disorder with myelopathy, thoracic region 07/08/2020    Generalized weakness 03/02/2020    Palliative care patient 02/21/2020    Stroke-like symptoms 02/20/2020    Chronic atrial fibrillation 05/21/2018    Acquired hypothyroidism 10/17/2017    Morbid obesity due to excess calories ([address] 09/26/2017    Vitamin D deficiency 49/26/7634    Systolic congestive heart failure ([address] 09/26/2017    H/O bone density study 09/26/2017    Mixed hyperlipidemia 09/26/2017    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    Type 2 diabetes mellitus with microalbuminuria, without long-term current use of insulin ([address] 09/26/2017    Essential hypertension     Mild CAD      Past Medical History:   Diagnosis Date    Acute laryngitis     Acute sinusitis     Allergic rhinitis     Ankle pain     Asthma     Asthmatic bronchitis     Atrial fibrillation ([address] 9/13/12, 10/9/13    cardioversion    Atrial flutter (HCC)     paroxysmal     CAD (coronary artery disease)     Cerebral artery occlusion with cerebral infarction ([address]     2006    CHF (congestive heart failure) (HCC)     Chronic back pain     Cough     Diabetes     Dizzy     Dysuria     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[address]     Fatigue     Foot pain     Hx of amiodarone therapy 9/24/2014    Hyperlipidemia     PCP manages cholesterol    Hypertension     Menopause     Obesity  Palliative care patient 02/21/2020    Skin rash     Type II or unspecified type diabetes mellitus without mention of complication, not stated as uncontrolled     Umbilical hernia     URI (upper respiratory infection)      Past Surgical History:   Procedure Laterality Date    CARDIAC CATHETERIZATION  10/21/2009    EF 35% left ventricle is moderately enlarged     CARDIOVERSION  10/09/2013    CHOLECYSTECTOMY      EYE SURGERY      retina and cataracts     GALLBLADDER SURGERY      SPINE SURGERY  05/2020    TUBAL LIGATION       Family History   Problem Relation Age of Onset    Diabetes Mother     Heart Failure Mother     High Blood Pressure Mother     Liver Cancer Father     Colon Cancer Neg Hx     Colon Polyps Neg Hx      Social History     Tobacco Use    Smoking status: Never Smoker    Smokeless tobacco: Never Used   Substance Use Topics    Alcohol use: No      Current Outpatient Medications   Medication Sig Dispense Refill    Ertugliflozin L-PyroglutamicAc (STEGLATRO) 5 MG TABS Take 5 mg by mouth daily      sacubitril-valsartan (ENTRESTO) 24-26 MG per tablet Take 1 tablet by mouth 2 times daily 60 tablet 5    furosemide (LASIX) 40 MG tablet Take 1 tablet by mouth daily 90 tablet 3    warfarin (COUMADIN) 6 MG tablet Take 6mg daily by mouth daily, except 9mg on Thu and Sat 60 tablet 5    spironolactone (ALDACTONE) 25 MG tablet Take 1 tablet by mouth daily 90 tablet 3    carvedilol (COREG) 12.5 MG tablet Take 1 tablet by mouth 2 times daily 180 tablet 3    digoxin (LANOXIN) 125 MCG tablet Take 1 tablet by mouth daily 90 tablet 3    fluticasone (FLONASE) 50 MCG/ACT nasal spray 1 spray by Each Nostril route daily      loratadine (CLARITIN) 10 MG capsule Take 10 mg by mouth daily      Cyanocobalamin (VITAMIN B 12) 500 MCG TABS Take 500 mcg by mouth daily      atorvastatin (LIPITOR) 80 MG tablet TAKE 1 TABLET BY MOUTH ONCE DAILY.  30 tablet 5    Bed Trapeze MISC by Does not apply route 1 each 0    glipiZIDE (GLUCOTROL) 5 MG tablet TAKE 2 TABLETS BY MOUTH IN THE MORNING & 2 TABLETS IN THE EVENING 120 tablet 5    metFORMIN (GLUCOPHAGE) 500 MG tablet TAKE 2 TABLETS BY MOUTH TWICE DAILY WITH MEALS 120 tablet 5    levothyroxine (SYNTHROID) 125 MCG tablet Take 1 tablet by mouth Daily 30 tablet 5    Multiple Vitamins-Minerals (THERAPEUTIC MULTIVITAMIN-MINERALS) tablet Take 1 tablet by mouth daily.  Vitamin D (CHOLECALCIFEROL) 1000 UNITS CAPS capsule Take 1,000 Units by mouth 2 times daily       aspirin 81 MG EC tablet Take 81 mg by mouth daily. No current facility-administered medications for this visit. Allergies: Aspartame and phenylalanine, Mushroom extract complex, Penicillins, Shellfish-derived products, and Zetia [ezetimibe]    Review of Systems  Review of Systems   Constitutional: Positive for fatigue. Negative for activity change, diaphoresis, fever and unexpected weight change. HENT: Negative for facial swelling and nosebleeds. Eyes: Negative for redness and visual disturbance. Respiratory: Positive for shortness of breath (with over exertion). Negative for cough, chest tightness and wheezing. Cardiovascular: Positive for leg swelling (better). Negative for chest pain and palpitations. Gastrointestinal: Negative for abdominal pain, nausea and vomiting. Endocrine: Negative for cold intolerance and heat intolerance. Genitourinary: Negative for dysuria and hematuria. Musculoskeletal: Positive for back pain and gait problem. Negative for arthralgias and myalgias. Complains of immobility   Skin: Negative for pallor and rash. Neurological: Negative for dizziness, seizures, syncope, weakness and light-headedness. Hematological: Does not bruise/bleed easily. Psychiatric/Behavioral: Negative for agitation. The patient is not nervous/anxious.         Objective  Vital Signs - /74   Pulse 77   Ht 5' 5\" (1.651 m)   LMP  (LMP Unknown)   SpO2 100%   BMI 38.77 kg/m²      BP Readings from Last 3 Encounters:   03/30/21 134/74   02/26/21 128/84   01/27/21 112/78    Pulse Readings from Last 3 Encounters:   03/30/21 77   02/26/21 64   01/27/21 92        Wt Readings from Last 3 Encounters:   02/28/20 233 lb (105.7 kg)   02/27/20 233 lb (105.7 kg)   02/20/20 231 lb (104.8 kg)      Physical Exam  Vitals signs and nursing note reviewed. Constitutional:       General: She is not in acute distress. Appearance: She is well-developed. She is not diaphoretic. Comments: Deconditioned   HENT:      Head: Normocephalic and atraumatic. Right Ear: Hearing and external ear normal.      Left Ear: Hearing and external ear normal.      Nose: Nose normal.   Eyes:      General:         Right eye: No discharge. Left eye: No discharge. Pupils: Pupils are equal, round, and reactive to light. Neck:      Musculoskeletal: Neck supple. No muscular tenderness. Thyroid: No thyromegaly. Vascular: No carotid bruit or JVD. Trachea: No tracheal deviation. Cardiovascular:      Rate and Rhythm: Normal rate. Rhythm irregular. Heart sounds: Normal heart sounds. No murmur. No friction rub. No gallop. Comments: No carotid bruit  Irregularly irregular rhythm  Pulmonary:      Effort: Pulmonary effort is normal. No respiratory distress. Breath sounds: Normal breath sounds. No wheezing or rales. Abdominal:      Palpations: Abdomen is soft. Tenderness: There is no abdominal tenderness. Musculoskeletal:         General: No swelling or deformity. Right lower leg: Edema (1+, legs not as tight) present. Left lower leg: Edema (1+  legs not as tight) present. Comments: In wheelchair   Skin:     General: Skin is warm and dry. Findings: No rash. Neurological:      General: No focal deficit present. Mental Status: She is alert and oriented to person, place, and time. Cranial Nerves: No cranial nerve deficit. Psychiatric:         Mood and Affect: Mood normal.         Behavior: Behavior normal.         Judgment: Judgment normal.         Data:  Lab Results   Component Value Date    CHOL 135 10/14/2020    TRIG 229 10/14/2020    HDL 31 (A) 10/14/2020    LDLCALC 58 10/14/2020     Lab Results   Component Value Date    ALT 32 10/14/2020    AST 20 10/14/2020     Data:  Echo 2/20/20  Summary   Mild left ventricular enlargement with global hypokinesis and an abnormal   contraction pattern [suggestive of bundle branch block] with an estimated   ejection fraction of 25-30%   Abnormal rhythm precludes assessment of diastolic function   Moderately dilated left atrium   Mildly thickened tricuspid aortic valve leaflets with adequate cusp   separation and no significant stenosis or insufficiency   Severe mitral annular calcification [particularly posteriorly] that also   involves papillary muscles with moderately thickened mitral leaflets which   demonstrated reduced mobility, no significant stenosis and mild   regurgitation   Trace pulmonic insufficiency   Moderately dilated right atrium with normal right ventricular size and   systolic function   Mild tricuspid regurgitation with RVSP estimate of 28 mmHg   Normal IVC dimensions with reduced respiratory motion consistent with   elevated right atrial filling pressures of 11-15 mmHg   No significant pericardial effusion and aortic dimensions are within   normal limits   Definity contrast utilized to better define endocardial surface    Laura 2/22/20  Impression:   There is no obvious infarct or ischemia, with a calculated ejection   fraction of 31 %. Suggest: Clinical correlation with cardiomyopathy. Signed by Dr Aide Mckeon on 2/23/2020 9:50 AM     Echo 2/8/21  Summary   LV is moderately dilated with severely reduced LV systolic function. LV   ejection fraction estimated at 25 to 30%.    Mid to apical septal, apical segment, mid to apical lateral and anterior   wall are severely hypokinetic. No obvious thrombus noted in left ventricle. Diastolic function not well ascertained due to rhythm. RV is normal in size with normal systolic function. Mild to moderate left atrial enlargement. Normal right atrial size. Mitral valve with severe posterior leaflet and posterior mitral annular   calcification. No stenosis. Trace mitral regurgitation. Aortic valve is not well visualized. No significant stenosis or   regurgitation noted. No significant tricuspid regurgitation appreciated   No significant pericardial effusion. Assessment:     Diagnosis Orders   1. Chronic systolic heart failure (HCC)  Basic Metabolic Panel   2. Chronic atrial fibrillation     3. Mild CAD     4. Morbid obesity due to excess calories ([address]     5. Physical deconditioning         Chronic atrial fibrillationrate controlled and anticoagulated without bleeding issues. Coumadin monitored via home INR monitoring. We are working on getting approval and results sent to our office for her home INR monitoring. Chronic systolic heart failure NYHA class II,  EF 25-30%-    She will qualify for a biventricular defibrillator, but declines as she is unable to walk and states she depends on her arms to be able to help her transfer and she does not want anything that will interfere with this. She states she would have to go to a nursing home if she had any kind of procedure and she is not willing to do this at this time. We discussed the risk of sudden cardiac death related to her heart function. She verbalizes understanding. She does not a defibrillator at this time, but may consider in the future if her physical conditioning improves. Tolerating Entresto- but will need pt assitance to afford. Edema is much improved. She is unable to weight due to inability to stand. Continue low-sodium diet and fluid restrictions of 8 ounces daily.     CADno angina symptoms, nonocclusive disease per heart cath in the past. Nuclear stress test in February 2020 showed no evidence of ischemia    Hypertensionwell controlled on current regimen    Morbid obesity/deconditioning very immobile due to back surgery earlier in 2020 (see above). Barely able to stand. Needs much assistance for transfers etc. She has been to her PCP since her last visit here and she states they are supposed to be arranging physical therapy. She needs to follow through with this    Plan    Return in 1 month (on 4/30/2021) for ADELITA. Follow up with Physical Therapy with your PCP  Recommend implanted defibrillator to prevent sudden cardiac death  Lab- BMP today    Continue Entresto 24/26mg twice a day- samples provided. Work on pt assistance for Tess- pt brought in [facility] daily and report weight gain of 3lbs or more in 24hrs or 5lbs in one week. - Call for increasing shortness of breath or increasing swelling in feet and legs. (This could mean you are retaining too much fluid)  - 2000mg low sodium diet  - Fluid restriction of 1500ml per day (about 6 cups of fluid per day)    Call with any questionsor concerns  Follow up with ADELITA Baires - NP for non cardiac problems and coumadin management  Report any new problems  Cardiovascular Fitness-Exercise as tolerated. Strive for 15 minutes of exercise most days of the week. Cardiac / HealthyDiet  Continue current medications as directed  Continue plan of treatment  It is always recommended that you bring your medicationsbottles with you to each visit - this is for your safety!        ADELITA Florence

## 2021-03-31 ENCOUNTER — TELEPHONE (OUTPATIENT)
Dept: CARDIOLOGY CLINIC | Age: 66
End: 2021-03-31

## 2021-03-31 NOTE — TELEPHONE ENCOUNTER
Sydnie-this was the patient I talked with you about yesterday while you were in clinic. She needs assistance for Entresto. Her financials were scanned into the media tab. If you could please get with patient regarding what ever else is needed for patient assistance.   Thank you

## 2021-04-08 ENCOUNTER — ANTI-COAG VISIT (OUTPATIENT)
Dept: CARDIOLOGY CLINIC | Age: 66
End: 2021-04-08

## 2021-04-08 LAB — INR BLD: 1.8

## 2021-04-09 ENCOUNTER — ANTI-COAG VISIT (OUTPATIENT)
Dept: INTERNAL MEDICINE | Age: 66
End: 2021-04-09
Payer: MEDICARE

## 2021-04-09 DIAGNOSIS — I48.20 CHRONIC ATRIAL FIBRILLATION (HCC): ICD-10-CM

## 2021-04-09 PROCEDURE — 93793 ANTICOAG MGMT PT WARFARIN: CPT | Performed by: INTERNAL MEDICINE

## 2021-04-09 NOTE — PROGRESS NOTES
HOME MONITORING REPORT    INR today:   Results for orders placed or performed in visit on 04/09/21   Protime-INR   Result Value Ref Range    INR 1.80        INR Goal: 2.0-3.0    Dosing Plan  As of 4/9/2021    TTR:     Full warfarin instructions:  4/9: 9 mg; Otherwise 9 mg every Tue, Thu, Sat; 6 mg all other days              PLAN: Advised patient/caregiver to continue current dose and recheck in one week. Patient/Caregiver voiced understanding      Electronically signed by Jordyn García MD on 4/9/2021 at 6:04 PM    I have reviewed nursing plan for Coumadin management and agree with plan.

## 2021-04-14 ENCOUNTER — ANTI-COAG VISIT (OUTPATIENT)
Dept: CARDIOLOGY CLINIC | Age: 66
End: 2021-04-14

## 2021-04-14 LAB — INR BLD: 1.9

## 2021-04-22 ENCOUNTER — ANTI-COAG VISIT (OUTPATIENT)
Dept: CARDIOLOGY CLINIC | Age: 66
End: 2021-04-22

## 2021-04-22 LAB — INR BLD: 2.6

## 2021-04-28 ENCOUNTER — ANTI-COAG VISIT (OUTPATIENT)
Dept: CARDIOLOGY CLINIC | Age: 66
End: 2021-04-28

## 2021-04-28 LAB — INR BLD: 2.7

## 2021-05-05 ENCOUNTER — ANTI-COAG VISIT (OUTPATIENT)
Dept: CARDIOLOGY CLINIC | Age: 66
End: 2021-05-05

## 2021-05-05 LAB — INR BLD: 2.4

## 2021-05-07 ENCOUNTER — OFFICE VISIT (OUTPATIENT)
Dept: CARDIOLOGY CLINIC | Age: 66
End: 2021-05-07
Payer: MEDICARE

## 2021-05-07 VITALS
BODY MASS INDEX: 38.77 KG/M2 | HEIGHT: 65 IN | DIASTOLIC BLOOD PRESSURE: 74 MMHG | SYSTOLIC BLOOD PRESSURE: 120 MMHG | HEART RATE: 68 BPM

## 2021-05-07 DIAGNOSIS — I48.20 CHRONIC ATRIAL FIBRILLATION (HCC): Primary | ICD-10-CM

## 2021-05-07 DIAGNOSIS — E66.01 MORBID OBESITY DUE TO EXCESS CALORIES (HCC): ICD-10-CM

## 2021-05-07 DIAGNOSIS — R53.81 PHYSICAL DECONDITIONING: ICD-10-CM

## 2021-05-07 DIAGNOSIS — I10 ESSENTIAL HYPERTENSION: ICD-10-CM

## 2021-05-07 DIAGNOSIS — I25.10 MILD CAD: ICD-10-CM

## 2021-05-07 DIAGNOSIS — I50.22 CHRONIC SYSTOLIC HEART FAILURE (HCC): ICD-10-CM

## 2021-05-07 PROCEDURE — G8400 PT W/DXA NO RESULTS DOC: HCPCS | Performed by: CLINICAL NURSE SPECIALIST

## 2021-05-07 PROCEDURE — 3017F COLORECTAL CA SCREEN DOC REV: CPT | Performed by: CLINICAL NURSE SPECIALIST

## 2021-05-07 PROCEDURE — G8417 CALC BMI ABV UP PARAM F/U: HCPCS | Performed by: CLINICAL NURSE SPECIALIST

## 2021-05-07 PROCEDURE — G8427 DOCREV CUR MEDS BY ELIG CLIN: HCPCS | Performed by: CLINICAL NURSE SPECIALIST

## 2021-05-07 PROCEDURE — 99214 OFFICE O/P EST MOD 30 MIN: CPT | Performed by: CLINICAL NURSE SPECIALIST

## 2021-05-07 PROCEDURE — 1090F PRES/ABSN URINE INCON ASSESS: CPT | Performed by: CLINICAL NURSE SPECIALIST

## 2021-05-07 PROCEDURE — 4040F PNEUMOC VAC/ADMIN/RCVD: CPT | Performed by: CLINICAL NURSE SPECIALIST

## 2021-05-07 PROCEDURE — 1036F TOBACCO NON-USER: CPT | Performed by: CLINICAL NURSE SPECIALIST

## 2021-05-07 PROCEDURE — 1123F ACP DISCUSS/DSCN MKR DOCD: CPT | Performed by: CLINICAL NURSE SPECIALIST

## 2021-05-07 ASSESSMENT — ENCOUNTER SYMPTOMS
CHEST TIGHTNESS: 0
COUGH: 0
EYE REDNESS: 0
NAUSEA: 0
BACK PAIN: 1
FACIAL SWELLING: 0
ABDOMINAL PAIN: 0
VOMITING: 0
SHORTNESS OF BREATH: 1
WHEEZING: 0

## 2021-05-07 NOTE — Clinical Note
Sancho Tabares pt is still having difficulty getting her home monitoring INR supplies approved. Can you please call her?   Thanks

## 2021-05-07 NOTE — PATIENT INSTRUCTIONS
Return in about 3 months (around 8/7/2021) for APRN. Work on home monitoring INR approval- Tiffanie Lott to call you    Continue [facility] 24/26mg twice a day- samples provided. Work on pt assistance for [facility]- pt brought in [facility] 177 daily and report weight gain of 3lbs or more in 24hrs or 5lbs in one week. - Call for increasing shortness of breath or increasing swelling in feet and legs. (This could mean you are retaining too much fluid)  - 2000mg low sodium diet  - Fluid restriction of 1500ml per day (about 6 cups of fluid per day)  Patient Education        Heart Failure: Care Instructions  Your Care Instructions     Heart failure occurs when your heart does not pump as much blood as the body needs. Failure does not mean that the heart has stopped pumping but rather that it is not pumping as well as it should. Over time, this causes fluid buildup in your lungs and other parts of your body. Fluid buildup can cause shortness of breath, fatigue, swollen ankles, and other problems. By taking medicines regularly, reducing sodium (salt) in your diet, checking your weight every day, and making lifestyle changes, you can feel better and live longer. Follow-up care is a key part of your treatment and safety. Be sure to make and go to all appointments, and call your doctor if you are having problems. It's also a good idea to know your test results and keep a list of the medicines you take. How can you care for yourself at home? Medicines    · Be safe with medicines. Take your medicines exactly as prescribed. Call your doctor if you think you are having a problem with your medicine.     · Do not take any vitamins, over-the-counter medicine, or herbal products without talking to your doctor first. Virgene He not take ibuprofen (Advil or Motrin) and naproxen (Aleve) without talking to your doctor first. They could make your heart failure worse.     · You may take some of the following medicine.   ? Angiotensin-converting enzyme inhibitors (ACEIs) or angiotensin II receptor blockers (ARBs) reduce the heart's workload, lower blood pressure, and reduce swelling. Taking an ACEI or ARB may lower your chance of needing to be hospitalized. ? Beta-blockers can slow heart rate, decrease blood pressure, and improve your condition. Taking a beta-blocker may lower your chance of needing to be hospitalized. ? Diuretics, also called water pills, reduce swelling. You will get more details on the specific medicines your doctor prescribes. Diet    · Your doctor may suggest that you limit sodium. Your doctor can tell you how much sodium is right for you. An example is less than 3,000 mg a day. This includes all the salt you eat in cooking or in packaged foods. People get most of their sodium from processed foods. Fast food and restaurant meals also tend to be very high in sodium.     · Ask your doctor how much liquid you can drink each day. You may have to limit liquids. Weight    · Weigh yourself without clothing at the same time each day. Record your weight. Call your doctor if you have a sudden weight gain, such as more than 2 to 3 pounds in a day or 5 pounds in a week. (Your doctor may suggest a different range of weight gain.) A sudden weight gain may mean that your heart failure is getting worse. Activity level    · Start light exercise (if your doctor says it is okay). Even if you can only do a small amount, exercise will help you get stronger, have more energy, and manage your weight and your stress. Walking is an easy way to get exercise. Start out by walking a little more than you did before. Bit by bit, increase the amount you walk.     · When you exercise, watch for signs that your heart is working too hard. You are pushing yourself too hard if you cannot talk while you are exercising.  If you become short of breath or dizzy or have chest pain, stop, sit down, and rest.     · If you feel \"wiped out\" the day after you exercise, walk slower or for a shorter distance until you can work up to a better pace.     · Get enough rest at night. Sleeping with 1 or 2 pillows under your upper body and head may help you breathe easier. Lifestyle changes    · Do not smoke. Smoking can make a heart condition worse. If you need help quitting, talk to your doctor about stop-smoking programs and medicines. These can increase your chances of quitting for good. Quitting smoking may be the most important step you can take to protect your heart.     · Limit alcohol to 2 drinks a day for men and 1 drink a day for women. Too much alcohol can cause health problems.     · Avoid getting sick from colds and the flu. Get a pneumococcal vaccine shot. If you have had one before, ask your doctor whether you need another dose. Get a flu shot each year. If you must be around people with colds or the flu, wash your hands often. When should you call for help? Call 911 if you have symptoms of sudden heart failure such as:    · You have severe trouble breathing.     · You cough up pink, foamy mucus.     · You have a new irregular or rapid heartbeat. Call your doctor now or seek immediate medical care if:    · You have new or increased shortness of breath.     · You are dizzy or lightheaded, or you feel like you may faint.     · You have sudden weight gain, such as more than 2 to 3 pounds in a day or 5 pounds in a week. (Your doctor may suggest a different range of weight gain.)     · You have increased swelling in your legs, ankles, or feet.     · You are suddenly so tired or weak that you cannot do your usual activities. Watch closely for changes in your health, and be sure to contact your doctor if you develop new symptoms. Where can you learn more? Go to https://sadie.[employer]. org and sign in to your [employer] account. Enter T839 in the [employer] box to learn more about \"Heart Failure: Care Instructions. \"     If you do not have an account, please click on the \"Sign Up Now\" link. Current as of: August 31, 2020               Content Version: 12.8  © 2006-2021 [employer]. Care instructions adapted under license by [address] If you have questions about a medical condition or this instruction, always ask your healthcare professional. [address] any warranty or liability for your use of this information.

## 2021-05-07 NOTE — PROGRESS NOTES
[facility] [id number]  Phone: [phone]  Fax: [phone]    OFFICE VISIT:  2021    Mukul Srinivasan - : 1955    Reason For Visit:  Mark Rasmussen is a 76 y.o. female who is here for 1 Month Follow-Up (No cardiac symptoms), Congestive Heart Failure, and Coronary Artery Disease     Diagnosis Orders   1. Chronic atrial fibrillation     2. Chronic systolic heart failure ([address]     3. Mild CAD     4. Morbid obesity due to excess calories ([address]     5. Physical deconditioning     6. Essential hypertension          HPI  Patient is here for follow-up for chronic atrial fibrillation, hypertension, systolic heart failure, non-occlusive CAD, morbid obesity. She is rate controlled with digoxin and metoprolol. She is anticoagulated with Coumadin and does home INR monitoring to be transferred to our office    During her hospitalization in 2020, she was found to have a further depressed ejection fraction, down to 25-30%, previously 40%. She also had a nuclear stress test that showed no evidence of ischemia. She was to follow-up in the office to discuss the potential of a defibrillator. She never followed up due to issues with her back and the back surgery that occurred in the spring. At a rewcent visit on 2021, she has not been seen in our office since 2019. She was hospitalized in 2020. She states she ended up having a back surgery at [facility] in May 2020. She is still convalescing from this and was really unable to walk. Since her last visit here, she has started physical therapy and states she is able to walk 8 feet and able to do some transferring. She is slowly making improvement. She has declined a defibrillator due to her impaired mobility. Most recent echo in 2020 continues to show an ejection fraction of 25 to 30%. She was started on Entresto around that time. She feels she is doing better.   She is breathing more easily and lower extremity edema has improved. She is unable to weigh due to difficulty with standing. She denies orthopnea, PND. Lower extremity edema is improved. She reports she is having difficulty with home INR monitoring getting transferred to our office and covered by her insurance     Carole Vasiliy, ADELITA - NP is PCP.   Jose Miguel Iglesias has the following history as recorded in [facility]:    Patient Active Problem List    Diagnosis Date Noted    History of back surgery 07/08/2020    Intervertebral thoracic disc disorder with myelopathy, thoracic region 07/08/2020    Generalized weakness 03/02/2020    Palliative care patient 02/21/2020    Stroke-like symptoms 02/20/2020    Chronic atrial fibrillation 05/21/2018    Acquired hypothyroidism 10/17/2017    Morbid obesity due to excess calories ([address] 09/26/2017    Vitamin D deficiency 54/39/2014    Systolic congestive heart failure ([address] 09/26/2017    H/O bone density study 09/26/2017    Mixed hyperlipidemia 09/26/2017    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    Type 2 diabetes mellitus with microalbuminuria, without long-term current use of insulin ([address] 09/26/2017    Essential hypertension     Mild CAD      Past Medical History:   Diagnosis Date    Acute laryngitis     Acute sinusitis     Allergic rhinitis     Ankle pain     Asthma     Asthmatic bronchitis     Atrial fibrillation ([address] 9/13/12, 10/9/13    cardioversion    Atrial flutter (HCC)     paroxysmal     CAD (coronary artery disease)     Cerebral artery occlusion with cerebral infarction ([address]     2006    CHF (congestive heart failure) (HCC)     Chronic back pain     Cough     Diabetes     Dizzy     Dysuria     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[address]     Fatigue     Foot pain     Hx of amiodarone therapy 9/24/2014    Hyperlipidemia     PCP manages cholesterol    Hypertension     Menopause     Obesity     Palliative care patient 02/21/2020    Skin rash     Type II or unspecified type diabetes mellitus without mention of complication, not stated as uncontrolled     Umbilical hernia     URI (upper respiratory infection)      Past Surgical History:   Procedure Laterality Date    CARDIAC CATHETERIZATION  10/21/2009    EF 35% left ventricle is moderately enlarged     CARDIOVERSION  10/09/2013    CHOLECYSTECTOMY      EYE SURGERY      retina and cataracts     GALLBLADDER SURGERY      SPINE SURGERY  05/2020    TUBAL LIGATION       Family History   Problem Relation Age of Onset    Diabetes Mother     Heart Failure Mother     High Blood Pressure Mother     Liver Cancer Father     Colon Cancer Neg Hx     Colon Polyps Neg Hx      Social History     Tobacco Use    Smoking status: Never Smoker    Smokeless tobacco: Never Used   Substance Use Topics    Alcohol use: No      Current Outpatient Medications   Medication Sig Dispense Refill    Ertugliflozin L-PyroglutamicAc (STEGLATRO) 5 MG TABS Take 5 mg by mouth daily      sacubitril-valsartan (ENTRESTO) 24-26 MG per tablet Take 1 tablet by mouth 2 times daily 60 tablet 5    furosemide (LASIX) 40 MG tablet Take 1 tablet by mouth daily 90 tablet 3    warfarin (COUMADIN) 6 MG tablet Take 6mg daily by mouth daily, except 9mg on Thu and Sat 60 tablet 5    spironolactone (ALDACTONE) 25 MG tablet Take 1 tablet by mouth daily 90 tablet 3    carvedilol (COREG) 12.5 MG tablet Take 1 tablet by mouth 2 times daily 180 tablet 3    digoxin (LANOXIN) 125 MCG tablet Take 1 tablet by mouth daily 90 tablet 3    fluticasone (FLONASE) 50 MCG/ACT nasal spray 1 spray by Each Nostril route daily      loratadine (CLARITIN) 10 MG capsule Take 10 mg by mouth daily      Cyanocobalamin (VITAMIN B 12) 500 MCG TABS Take 500 mcg by mouth daily      atorvastatin (LIPITOR) 80 MG tablet TAKE 1 TABLET BY MOUTH ONCE DAILY.  30 tablet 5    Bed Trapeze MISC by Does not apply route 1 each 0    glipiZIDE (GLUCOTROL) 5 MG tablet TAKE 2 TABLETS BY MOUTH IN THE MORNING & 2 TABLETS IN THE EVENING 120 tablet 5    metFORMIN (GLUCOPHAGE) 500 MG tablet TAKE 2 TABLETS BY MOUTH TWICE DAILY WITH MEALS 120 tablet 5    levothyroxine (SYNTHROID) 125 MCG tablet Take 1 tablet by mouth Daily 30 tablet 5    Multiple Vitamins-Minerals (THERAPEUTIC MULTIVITAMIN-MINERALS) tablet Take 1 tablet by mouth daily.  Vitamin D (CHOLECALCIFEROL) 1000 UNITS CAPS capsule Take 1,000 Units by mouth 2 times daily       aspirin 81 MG EC tablet Take 81 mg by mouth daily. No current facility-administered medications for this visit. Allergies: Aspartame and phenylalanine, Mushroom extract complex, Penicillins, Shellfish-derived products, and Zetia [ezetimibe]    Review of Systems  Review of Systems   Constitutional: Positive for fatigue. Negative for activity change, diaphoresis, fever and unexpected weight change. HENT: Negative for facial swelling and nosebleeds. Eyes: Negative for redness and visual disturbance. Respiratory: Positive for shortness of breath (with over exertion). Negative for cough, chest tightness and wheezing. Cardiovascular: Positive for leg swelling (improving). Negative for chest pain and palpitations. Gastrointestinal: Negative for abdominal pain, nausea and vomiting. Endocrine: Negative for cold intolerance and heat intolerance. Genitourinary: Negative for dysuria and hematuria. Musculoskeletal: Positive for back pain and gait problem. Negative for arthralgias and myalgias. Complains of immobility   Skin: Negative for pallor and rash. Neurological: Negative for dizziness, seizures, syncope, weakness and light-headedness. Hematological: Does not bruise/bleed easily. Psychiatric/Behavioral: Negative for agitation. The patient is not nervous/anxious.         Objective  Vital Signs - /74   Pulse 68   Ht 5' 5\" (1.651 m)   LMP  (LMP Unknown)   BMI 38.77 kg/m²      BP Readings from Last 3 Encounters:   05/07/21 120/74 03/30/21 134/74   02/26/21 128/84    Pulse Readings from Last 3 Encounters:   05/07/21 68   03/30/21 77   02/26/21 64        Wt Readings from Last 3 Encounters:   02/28/20 233 lb (105.7 kg)   02/27/20 233 lb (105.7 kg)   02/20/20 231 lb (104.8 kg)      Physical Exam  Vitals signs and nursing note reviewed. Constitutional:       General: She is not in acute distress. Appearance: She is well-developed. She is not diaphoretic. Comments: Deconditioned   HENT:      Head: Normocephalic and atraumatic. Right Ear: Hearing and external ear normal.      Left Ear: Hearing and external ear normal.      Nose: Nose normal.   Eyes:      General:         Right eye: No discharge. Left eye: No discharge. Pupils: Pupils are equal, round, and reactive to light. Neck:      Musculoskeletal: Neck supple. No muscular tenderness. Thyroid: No thyromegaly. Vascular: No carotid bruit or JVD. Trachea: No tracheal deviation. Cardiovascular:      Rate and Rhythm: Normal rate. Rhythm irregular. Heart sounds: Normal heart sounds. No murmur. No friction rub. No gallop. Comments: No carotid bruit  Irregularly irregular rhythm  Pulmonary:      Effort: Pulmonary effort is normal. No respiratory distress. Breath sounds: Normal breath sounds. No wheezing or rales. Abdominal:      Palpations: Abdomen is soft. Tenderness: There is no abdominal tenderness. Musculoskeletal:         General: No swelling or deformity. Right lower leg: Edema (1+, legs not as tight) present. Left lower leg: Edema (1+  legs not as tight) present. Comments: In wheelchair   Skin:     General: Skin is warm and dry. Findings: No rash. Neurological:      General: No focal deficit present. Mental Status: She is alert and oriented to person, place, and time. Cranial Nerves: No cranial nerve deficit.    Psychiatric:         Mood and Affect: Mood normal.         Behavior: Behavior normal.         Judgment: Judgment normal.         Data:  Lab Results   Component Value Date    CHOL 135 10/14/2020    TRIG 229 10/14/2020    HDL 31 (A) 10/14/2020    LDLCALC 58 10/14/2020     Lab Results   Component Value Date    ALT 32 10/14/2020    AST 20 10/14/2020     Data:  Echo 2/20/20  Summary   Mild left ventricular enlargement with global hypokinesis and an abnormal   contraction pattern [suggestive of bundle branch block] with an estimated   ejection fraction of 25-30%   Abnormal rhythm precludes assessment of diastolic function   Moderately dilated left atrium   Mildly thickened tricuspid aortic valve leaflets with adequate cusp   separation and no significant stenosis or insufficiency   Severe mitral annular calcification [particularly posteriorly] that also   involves papillary muscles with moderately thickened mitral leaflets which   demonstrated reduced mobility, no significant stenosis and mild   regurgitation   Trace pulmonic insufficiency   Moderately dilated right atrium with normal right ventricular size and   systolic function   Mild tricuspid regurgitation with RVSP estimate of 28 mmHg   Normal IVC dimensions with reduced respiratory motion consistent with   elevated right atrial filling pressures of 11-15 mmHg   No significant pericardial effusion and aortic dimensions are within   normal limits   Definity contrast utilized to better define endocardial surface    Laura 2/22/20  Impression:   There is no obvious infarct or ischemia, with a calculated ejection   fraction of 31 %. Suggest: Clinical correlation with cardiomyopathy. Signed by Dr Wallace Gale on 2/23/2020 9:50 AM     Echo 2/8/21  Summary   LV is moderately dilated with severely reduced LV systolic function. LV   ejection fraction estimated at 25 to 30%. Mid to apical septal, apical segment, mid to apical lateral and anterior   wall are severely hypokinetic. No obvious thrombus noted in left ventricle.    Diastolic function not well ascertained due to rhythm. RV is normal in size with normal systolic function. Mild to moderate left atrial enlargement. Normal right atrial size. Mitral valve with severe posterior leaflet and posterior mitral annular   calcification. No stenosis. Trace mitral regurgitation. Aortic valve is not well visualized. No significant stenosis or   regurgitation noted. No significant tricuspid regurgitation appreciated   No significant pericardial effusion. Assessment:     Diagnosis Orders   1. Chronic atrial fibrillation     2. Chronic systolic heart failure ([address]     3. Mild CAD     4. Morbid obesity due to excess calories ([address]     5. Physical deconditioning     6. Essential hypertension         Chronic atrial fibrillationrate controlled and anticoagulated without bleeding issues. Coumadin monitored via home INR monitoring. We are working on getting approval and results sent to our office for her home INR monitoring- still having issues. INR 2.4 on Wed    Chronic systolic heart failure NYHA class II,  EF 25-30%-    She will qualify for a biventricular defibrillator, but declines due to immobility. She is now doing physical therapy and slowly improving and may consider a defibrillator in the future. Encouraged her to weigh daily now that she is able to stand. Continue low-sodium diet    She has received a letter for patient assistance with her [facility] and shipment is coming, but has not arrived yet. We will supplement her with samples today    CADno angina symptoms, nonocclusive disease per heart cath in the past.  Nuclear stress test in February 2020 showed no evidence of ischemia    Hypertensionwell controlled on current regimen    Morbid obesity/deconditioning has been doing physical therapy for the last 3 weeks and is slowly improving. She can now walk 8 feet and is beginning to transfer. Continue therapy    Plan    Return in about 3 months (around 8/7/2021) for APRN.    Work on home monitoring INR approval- Kane Lyon to call you    Continue [facility] 24/26mg twice a day- samples provided. Work on pt assistance for [facility]- [facility] and report weight gain of 3lbs or more in 24hrs or 5lbs in one week. - Call for increasing shortness of breath or increasing swelling in feet and legs. (This could mean you are retaining too much fluid)  - 2000mg low sodium diet  - Fluid restriction of 1500ml per day (about 6 cups of fluid per day)    Call with any questionsor concerns  Follow up with ADELITA Collins - NP for non cardiac problems and coumadin management  Report any new problems  Cardiovascular Fitness-Exercise as tolerated. Strive for 15 minutes of exercise most days of the week. Cardiac / HealthyDiet  Continue current medications as directed  Continue plan of treatment  It is always recommended that you bring your medicationsbottles with you to each visit - this is for your safety!        ADELITA Goodman

## 2021-06-24 ENCOUNTER — ANTI-COAG VISIT (OUTPATIENT)
Dept: CARDIOLOGY CLINIC | Age: 66
End: 2021-06-24

## 2021-06-24 LAB — INR BLD: 2.5

## 2021-07-01 ENCOUNTER — ANTI-COAG VISIT (OUTPATIENT)
Dept: CARDIOLOGY CLINIC | Age: 66
End: 2021-07-01

## 2021-07-01 LAB — INR BLD: 3.1

## 2021-07-07 ENCOUNTER — ANTI-COAG VISIT (OUTPATIENT)
Dept: CARDIOLOGY CLINIC | Age: 66
End: 2021-07-07

## 2021-07-07 LAB — INR BLD: 2.6

## 2021-07-16 ENCOUNTER — ANTI-COAG VISIT (OUTPATIENT)
Dept: CARDIOLOGY CLINIC | Age: 66
End: 2021-07-16

## 2021-07-16 LAB — INR BLD: 2.7

## 2021-07-21 ENCOUNTER — ANTI-COAG VISIT (OUTPATIENT)
Dept: CARDIOLOGY CLINIC | Age: 66
End: 2021-07-21

## 2021-07-21 LAB — INR BLD: 2.7

## 2021-08-05 ENCOUNTER — ANTI-COAG VISIT (OUTPATIENT)
Dept: CARDIOLOGY CLINIC | Age: 66
End: 2021-08-05

## 2021-08-05 LAB
INR BLD: 2.6
INR BLD: 2.7

## 2021-08-06 ENCOUNTER — OFFICE VISIT (OUTPATIENT)
Dept: CARDIOLOGY CLINIC | Age: 66
End: 2021-08-06
Payer: MEDICARE

## 2021-08-06 VITALS
OXYGEN SATURATION: 96 % | HEIGHT: 65 IN | SYSTOLIC BLOOD PRESSURE: 110 MMHG | DIASTOLIC BLOOD PRESSURE: 72 MMHG | HEART RATE: 62 BPM | BODY MASS INDEX: 38.77 KG/M2

## 2021-08-06 DIAGNOSIS — I48.20 CHRONIC ATRIAL FIBRILLATION (HCC): Primary | ICD-10-CM

## 2021-08-06 DIAGNOSIS — I25.10 CORONARY ARTERY DISEASE INVOLVING NATIVE CORONARY ARTERY OF NATIVE HEART WITHOUT ANGINA PECTORIS: ICD-10-CM

## 2021-08-06 DIAGNOSIS — R53.81 PHYSICAL DECONDITIONING: ICD-10-CM

## 2021-08-06 DIAGNOSIS — E66.9 CLASS 2 OBESITY WITHOUT SERIOUS COMORBIDITY WITH BODY MASS INDEX (BMI) OF 38.0 TO 38.9 IN ADULT, UNSPECIFIED OBESITY TYPE: ICD-10-CM

## 2021-08-06 DIAGNOSIS — I50.22 CHRONIC SYSTOLIC HEART FAILURE (HCC): ICD-10-CM

## 2021-08-06 DIAGNOSIS — I10 ESSENTIAL HYPERTENSION: ICD-10-CM

## 2021-08-06 PROCEDURE — 3017F COLORECTAL CA SCREEN DOC REV: CPT | Performed by: CLINICAL NURSE SPECIALIST

## 2021-08-06 PROCEDURE — 4040F PNEUMOC VAC/ADMIN/RCVD: CPT | Performed by: CLINICAL NURSE SPECIALIST

## 2021-08-06 PROCEDURE — 1090F PRES/ABSN URINE INCON ASSESS: CPT | Performed by: CLINICAL NURSE SPECIALIST

## 2021-08-06 PROCEDURE — 1036F TOBACCO NON-USER: CPT | Performed by: CLINICAL NURSE SPECIALIST

## 2021-08-06 PROCEDURE — G8417 CALC BMI ABV UP PARAM F/U: HCPCS | Performed by: CLINICAL NURSE SPECIALIST

## 2021-08-06 PROCEDURE — 1123F ACP DISCUSS/DSCN MKR DOCD: CPT | Performed by: CLINICAL NURSE SPECIALIST

## 2021-08-06 PROCEDURE — 99214 OFFICE O/P EST MOD 30 MIN: CPT | Performed by: CLINICAL NURSE SPECIALIST

## 2021-08-06 PROCEDURE — G8427 DOCREV CUR MEDS BY ELIG CLIN: HCPCS | Performed by: CLINICAL NURSE SPECIALIST

## 2021-08-06 PROCEDURE — G8400 PT W/DXA NO RESULTS DOC: HCPCS | Performed by: CLINICAL NURSE SPECIALIST

## 2021-08-06 ASSESSMENT — ENCOUNTER SYMPTOMS
VOMITING: 0
SHORTNESS OF BREATH: 1
EYE REDNESS: 0
WHEEZING: 0
NAUSEA: 0
ABDOMINAL PAIN: 0
CHEST TIGHTNESS: 0
FACIAL SWELLING: 0
COUGH: 0
BACK PAIN: 1

## 2021-08-06 NOTE — PATIENT INSTRUCTIONS
Return in about 4 months (around 12/6/2021) for APRN. Continue home monitoring INR    Continue Entresto 24/26mg twice a day- samples provided. Work on pt assistance for [facility]- pt brought in [address] daily and report weight gain of 3lbs or more in 24hrs or 5lbs in one week. - Call for increasing shortness of breath or increasing swelling in feet and legs. (This could mean you are retaining too much fluid)  - 2000mg low sodium diet  - Fluid restriction of 1500ml per day (about 6 cups of fluid per day)  Patient Education        Heart Failure: Care Instructions  Your Care Instructions     Heart failure occurs when your heart does not pump as much blood as the body needs. Failure does not mean that the heart has stopped pumping but rather that it is not pumping as well as it should. Over time, this causes fluid buildup in your lungs and other parts of your body. Fluid buildup can cause shortness of breath, fatigue, swollen ankles, and other problems. By taking medicines regularly, reducing sodium (salt) in your diet, checking your weight every day, and making lifestyle changes, you can feel better and live longer. Follow-up care is a key part of your treatment and safety. Be sure to make and go to all appointments, and call your doctor if you are having problems. It's also a good idea to know your test results and keep a list of the medicines you take. How can you care for yourself at home? Medicines    · Be safe with medicines. Take your medicines exactly as prescribed. Call your doctor if you think you are having a problem with your medicine.     · Do not take any vitamins, over-the-counter medicine, or herbal products without talking to your doctor first. Osiris Clevelander not take ibuprofen (Advil or Motrin) and naproxen (Aleve) without talking to your doctor first. They could make your heart failure worse.     · You may take some of the following medicine. ?  Angiotensin-converting enzyme inhibitors (ACEIs) or angiotensin II receptor blockers (ARBs) reduce the heart's workload, lower blood pressure, and reduce swelling. Taking an ACEI or ARB may lower your chance of needing to be hospitalized. ? Beta-blockers can slow heart rate, decrease blood pressure, and improve your condition. Taking a beta-blocker may lower your chance of needing to be hospitalized. ? Diuretics, also called water pills, reduce swelling. You will get more details on the specific medicines your doctor prescribes. Diet    · Your doctor may suggest that you limit sodium. Your doctor can tell you how much sodium is right for you. An example is less than 3,000 mg a day. This includes all the salt you eat in cooking or in packaged foods. People get most of their sodium from processed foods. Fast food and restaurant meals also tend to be very high in sodium.     · Ask your doctor how much liquid you can drink each day. You may have to limit liquids. Weight    · Weigh yourself without clothing at the same time each day. Record your weight. Call your doctor if you have a sudden weight gain, such as more than 2 to 3 pounds in a day or 5 pounds in a week. (Your doctor may suggest a different range of weight gain.) A sudden weight gain may mean that your heart failure is getting worse. Activity level    · Start light exercise (if your doctor says it is okay). Even if you can only do a small amount, exercise will help you get stronger, have more energy, and manage your weight and your stress. Walking is an easy way to get exercise. Start out by walking a little more than you did before. Bit by bit, increase the amount you walk.     · When you exercise, watch for signs that your heart is working too hard. You are pushing yourself too hard if you cannot talk while you are exercising.  If you become short of breath or dizzy or have chest pain, stop, sit down, and rest.     · If you feel \"wiped out\" the day after you exercise, walk slower or for a shorter distance until you can work up to a better pace.     · Get enough rest at night. Sleeping with 1 or 2 pillows under your upper body and head may help you breathe easier. Lifestyle changes    · Do not smoke. Smoking can make a heart condition worse. If you need help quitting, talk to your doctor about stop-smoking programs and medicines. These can increase your chances of quitting for good. Quitting smoking may be the most important step you can take to protect your heart.     · Limit alcohol to 2 drinks a day for men and 1 drink a day for women. Too much alcohol can cause health problems.     · Avoid getting sick from colds and the flu. Get a pneumococcal vaccine shot. If you have had one before, ask your doctor whether you need another dose. Get a flu shot each year. If you must be around people with colds or the flu, wash your hands often. When should you call for help? Call 911  if you have symptoms of sudden heart failure such as:    · You have severe trouble breathing.     · You cough up pink, foamy mucus.     · You have a new irregular or rapid heartbeat. Call your doctor now or seek immediate medical care if:    · You have new or increased shortness of breath.     · You are dizzy or lightheaded, or you feel like you may faint.     · You have sudden weight gain, such as more than 2 to 3 pounds in a day or 5 pounds in a week. (Your doctor may suggest a different range of weight gain.)     · You have increased swelling in your legs, ankles, or feet.     · You are suddenly so tired or weak that you cannot do your usual activities. Watch closely for changes in your health, and be sure to contact your doctor if you develop new symptoms. Where can you learn more? Go to https://[email or web address].[employer]Microweber. org and sign in to your [employer] account. Enter N142 in the [employer] box to learn more about \"Heart Failure: Care Instructions. \"     If you do not have an account, please click on the \"Sign Up Now\" link. Current as of: August 31, 2020               Content Version: 12.9  © 2006-2021 [employer]wise[employer]. Care instructions adapted under license by [facility] ([facility]). If you have questions about a medical condition or this instruction, always ask your healthcare professional. [place] [address] any warranty or liability for your use of this information.

## 2021-08-06 NOTE — PROGRESS NOTES
ejection fraction of 25 to 30%. She was started on Entresto around that time. She feels she is doing better. She is breathing more easily and lower extremity edema has improved. She is unable to weigh due to difficulty with standing. She denies orthopnea, PND. Lower extremity edema is improved. ADELITA Mills - NP is PCP.   Vlad Rasheed has the following history as recorded in [facility]:    Patient Active Problem List    Diagnosis Date Noted    History of back surgery 07/08/2020    Intervertebral thoracic disc disorder with myelopathy, thoracic region 07/08/2020    Generalized weakness 03/02/2020    Palliative care patient 02/21/2020    Stroke-like symptoms 02/20/2020    Chronic atrial fibrillation 05/21/2018    Acquired hypothyroidism 10/17/2017    Morbid obesity due to excess calories ([address] 09/26/2017    Vitamin D deficiency 53/15/2560    Systolic congestive heart failure ([address] 09/26/2017    H/O bone density study 09/26/2017    Mixed hyperlipidemia 09/26/2017    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    Type 2 diabetes mellitus with microalbuminuria, without long-term current use of insulin ([address] 09/26/2017    Essential hypertension     Mild CAD      Past Medical History:   Diagnosis Date    Acute laryngitis     Acute sinusitis     Allergic rhinitis     Ankle pain     Asthma     Asthmatic bronchitis     Atrial fibrillation ([address] 9/13/12, 10/9/13    cardioversion    Atrial flutter (HCC)     paroxysmal     CAD (coronary artery disease)     Cerebral artery occlusion with cerebral infarction ([address]     2006    CHF (congestive heart failure) (HCC)     Chronic back pain     Cough     Diabetes     Dizzy     Dysuria     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[address]     Fatigue     Foot pain     Hx of amiodarone therapy 9/24/2014    Hyperlipidemia     PCP manages cholesterol    Hypertension     Menopause     Obesity     Palliative care patient 02/21/2020    Skin rash     Type II or MG tablet TAKE 2 TABLETS BY MOUTH TWICE DAILY WITH MEALS (Patient taking differently: Take 1,000 mg by mouth 2 times daily (with meals) TAKE 2 TABLETS BY MOUTH TWICE DAILY WITH MEALS) 120 tablet 5    levothyroxine (SYNTHROID) 125 MCG tablet Take 1 tablet by mouth Daily 30 tablet 5    Multiple Vitamins-Minerals (THERAPEUTIC MULTIVITAMIN-MINERALS) tablet Take 1 tablet by mouth daily.  Vitamin D (CHOLECALCIFEROL) 1000 UNITS CAPS capsule Take 1,000 Units by mouth 2 times daily       aspirin 81 MG EC tablet Take 81 mg by mouth daily. No current facility-administered medications for this visit. Allergies: Aspartame and phenylalanine, Mushroom extract complex, Penicillins, Shellfish-derived products, and Zetia [ezetimibe]    Review of Systems  Review of Systems   Constitutional: Positive for fatigue. Negative for activity change, diaphoresis, fever and unexpected weight change. HENT: Negative for facial swelling and nosebleeds. Eyes: Negative for redness and visual disturbance. Respiratory: Positive for shortness of breath (with over exertion). Negative for cough, chest tightness and wheezing. Cardiovascular: Positive for leg swelling (improving). Negative for chest pain and palpitations. Gastrointestinal: Negative for abdominal pain, nausea and vomiting. Endocrine: Negative for cold intolerance and heat intolerance. Genitourinary: Negative for dysuria and hematuria. Musculoskeletal: Positive for back pain and gait problem. Negative for arthralgias and myalgias. Complains of immobility   Skin: Negative for pallor and rash. Neurological: Negative for dizziness, seizures, syncope, weakness and light-headedness. Hematological: Does not bruise/bleed easily. Psychiatric/Behavioral: Negative for agitation. The patient is not nervous/anxious.         Objective  Vital Signs - /72   Pulse 62   Ht 5' 5\" (1.651 m)   LMP  (LMP Unknown)   SpO2 96%   BMI 38.77 kg/m²      BP Readings from Last 3 Encounters:   08/06/21 110/72   05/07/21 120/74   03/30/21 134/74    Pulse Readings from Last 3 Encounters:   08/06/21 62   05/07/21 68   03/30/21 77        Wt Readings from Last 3 Encounters:   02/28/20 233 lb (105.7 kg)   02/27/20 233 lb (105.7 kg)   02/20/20 231 lb (104.8 kg)      Physical Exam  Vitals and nursing note reviewed. Constitutional:       General: She is not in acute distress. Appearance: She is well-developed. She is not diaphoretic. Comments: Deconditioned   HENT:      Head: Normocephalic and atraumatic. Right Ear: Hearing and external ear normal.      Left Ear: Hearing and external ear normal.      Nose: Nose normal.   Eyes:      General:         Right eye: No discharge. Left eye: No discharge. Pupils: Pupils are equal, round, and reactive to light. Neck:      Thyroid: No thyromegaly. Vascular: No carotid bruit or JVD. Trachea: No tracheal deviation. Cardiovascular:      Rate and Rhythm: Normal rate. Rhythm irregular. Heart sounds: Normal heart sounds. No murmur heard. No friction rub. No gallop. Comments: No carotid bruit  Irregularly irregular rhythm  Pulmonary:      Effort: Pulmonary effort is normal. No respiratory distress. Breath sounds: Normal breath sounds. No wheezing or rales. Abdominal:      Palpations: Abdomen is soft. Tenderness: There is no abdominal tenderness. Musculoskeletal:         General: No swelling or deformity. Cervical back: Neck supple. No muscular tenderness. Right lower leg: Edema (trace) present. Left lower leg: Edema (trace) present. Comments: In wheelchair   Skin:     General: Skin is warm and dry. Findings: No rash. Neurological:      General: No focal deficit present. Mental Status: She is alert and oriented to person, place, and time. Cranial Nerves: No cranial nerve deficit.    Psychiatric:         Mood and Affect: Mood normal. Behavior: Behavior normal.         Judgment: Judgment normal.         Data:  Lab Results   Component Value Date    CHOL 135 10/14/2020    TRIG 229 10/14/2020    HDL 31 (A) 10/14/2020    LDLCALC 58 10/14/2020     Lab Results   Component Value Date    ALT 32 10/14/2020    AST 20 10/14/2020     Data:  Echo 2/20/20  Summary   Mild left ventricular enlargement with global hypokinesis and an abnormal   contraction pattern [suggestive of bundle branch block] with an estimated   ejection fraction of 25-30%   Abnormal rhythm precludes assessment of diastolic function   Moderately dilated left atrium   Mildly thickened tricuspid aortic valve leaflets with adequate cusp   separation and no significant stenosis or insufficiency   Severe mitral annular calcification [particularly posteriorly] that also   involves papillary muscles with moderately thickened mitral leaflets which   demonstrated reduced mobility, no significant stenosis and mild   regurgitation   Trace pulmonic insufficiency   Moderately dilated right atrium with normal right ventricular size and   systolic function   Mild tricuspid regurgitation with RVSP estimate of 28 mmHg   Normal IVC dimensions with reduced respiratory motion consistent with   elevated right atrial filling pressures of 11-15 mmHg   No significant pericardial effusion and aortic dimensions are within   normal limits   Definity contrast utilized to better define endocardial surface    Laura 2/22/20  Impression:   There is no obvious infarct or ischemia, with a calculated ejection   fraction of 31 %. Suggest: Clinical correlation with cardiomyopathy. Signed by Dr Weston Collado on 2/23/2020 9:50 AM     Echo 2/8/21  Summary   LV is moderately dilated with severely reduced LV systolic function. LV   ejection fraction estimated at 25 to 30%. Mid to apical septal, apical segment, mid to apical lateral and anterior   wall are severely hypokinetic. No obvious thrombus noted in left ventricle.

## 2021-08-16 ENCOUNTER — TRANSCRIBE ORDERS (OUTPATIENT)
Dept: ADMINISTRATIVE | Facility: HOSPITAL | Age: 66
End: 2021-08-16

## 2021-08-16 DIAGNOSIS — M25.561 RIGHT KNEE PAIN, UNSPECIFIED CHRONICITY: Primary | ICD-10-CM

## 2021-08-17 ENCOUNTER — HOSPITAL ENCOUNTER (OUTPATIENT)
Dept: MRI IMAGING | Facility: HOSPITAL | Age: 66
Discharge: HOME | End: 2021-08-17
Admitting: NURSE PRACTITIONER

## 2021-08-17 DIAGNOSIS — M25.561 RIGHT KNEE PAIN, UNSPECIFIED CHRONICITY: ICD-10-CM

## 2021-08-17 PROCEDURE — 73721 MRI JNT OF LWR EXTRE W/O DYE: CPT

## 2021-09-08 ENCOUNTER — ANTI-COAG VISIT (OUTPATIENT)
Dept: CARDIOLOGY CLINIC | Age: 66
End: 2021-09-08

## 2021-09-08 LAB — INR BLD: 2.9

## 2021-09-09 ENCOUNTER — ANTI-COAG VISIT (OUTPATIENT)
Dept: CARDIOLOGY CLINIC | Age: 66
End: 2021-09-09

## 2021-09-09 LAB — INR BLD: 2.6

## 2021-09-16 ENCOUNTER — ANTI-COAG VISIT (OUTPATIENT)
Dept: CARDIOLOGY CLINIC | Age: 66
End: 2021-09-16
Payer: MEDICARE

## 2021-09-16 DIAGNOSIS — I48.20 CHRONIC ATRIAL FIBRILLATION (HCC): Primary | ICD-10-CM

## 2021-09-16 LAB
INTERNATIONAL NORMALIZATION RATIO, POC: 2.3
PROTHROMBIN TIME, POC: NORMAL

## 2021-09-16 PROCEDURE — 85610 PROTHROMBIN TIME: CPT | Performed by: CLINICAL NURSE SPECIALIST

## 2021-09-16 NOTE — PROGRESS NOTES
Pt's INR is 2.3, which is in range. Pt will continue her normal dosing and recheck in 1 week. Discussed this with the Pt, Pt voiced her understanding. Pt is okay will being called monthly unless she is out of range. Next call will be 10/16/21.

## 2021-09-23 ENCOUNTER — ANTI-COAG VISIT (OUTPATIENT)
Dept: CARDIOLOGY CLINIC | Age: 66
End: 2021-09-23

## 2021-09-23 LAB — INR BLD: 2.5

## 2021-09-29 ENCOUNTER — ANTI-COAG VISIT (OUTPATIENT)
Dept: CARDIOLOGY CLINIC | Age: 66
End: 2021-09-29
Payer: MEDICARE

## 2021-09-29 DIAGNOSIS — I48.20 CHRONIC ATRIAL FIBRILLATION (HCC): Primary | ICD-10-CM

## 2021-09-29 LAB
INTERNATIONAL NORMALIZATION RATIO, POC: 2.2
PROTHROMBIN TIME, POC: NORMAL

## 2021-09-29 PROCEDURE — 99999 PR OFFICE/OUTPT VISIT,PROCEDURE ONLY: CPT | Performed by: CLINICAL NURSE SPECIALIST

## 2021-09-29 PROCEDURE — 85610 PROTHROMBIN TIME: CPT | Performed by: CLINICAL NURSE SPECIALIST

## 2021-09-29 NOTE — PROGRESS NOTES
Pt's INR is 2.2 today, which is in range. Pt is to continue her normal dosing and recheck in 1 week. Pt is okay with being monthly unless she is out of range. Pt's next call will be 10/7/21.

## 2021-10-06 ENCOUNTER — ANTI-COAG VISIT (OUTPATIENT)
Dept: CARDIOLOGY CLINIC | Age: 66
End: 2021-10-06

## 2021-10-06 LAB — INR BLD: 2.1

## 2021-10-13 ENCOUNTER — ANTI-COAG VISIT (OUTPATIENT)
Dept: CARDIOLOGY CLINIC | Age: 66
End: 2021-10-13

## 2021-10-13 LAB — INR BLD: 1.8

## 2021-10-21 ENCOUNTER — ANTI-COAG VISIT (OUTPATIENT)
Dept: CARDIOLOGY CLINIC | Age: 66
End: 2021-10-21

## 2021-10-21 LAB
INR BLD: 2.4
INR BLD: 2.4

## 2021-10-27 ENCOUNTER — ANTI-COAG VISIT (OUTPATIENT)
Dept: CARDIOLOGY CLINIC | Age: 66
End: 2021-10-27

## 2021-10-27 LAB — INR BLD: 2.5

## 2021-11-03 ENCOUNTER — ANTI-COAG VISIT (OUTPATIENT)
Dept: CARDIOLOGY CLINIC | Age: 66
End: 2021-11-03
Payer: MEDICARE

## 2021-11-03 DIAGNOSIS — I48.20 CHRONIC ATRIAL FIBRILLATION (HCC): Primary | ICD-10-CM

## 2021-11-03 LAB
INTERNATIONAL NORMALIZATION RATIO, POC: 2.4
PROTHROMBIN TIME, POC: NORMAL

## 2021-11-03 PROCEDURE — 85610 PROTHROMBIN TIME: CPT | Performed by: CLINICAL NURSE SPECIALIST

## 2021-11-03 NOTE — PROGRESS NOTES
Pt's INR is 2.4, which is in her range. Pt will continue her normal dosage and recheck in 1 week. Pt is okay with being called monthly unless her INR is out of range, Pt next call is due 11/13/21.

## 2021-11-11 ENCOUNTER — ANTI-COAG VISIT (OUTPATIENT)
Dept: CARDIOLOGY CLINIC | Age: 66
End: 2021-11-11

## 2021-11-11 LAB — INR BLD: 2.4

## 2021-11-17 ENCOUNTER — ANTI-COAG VISIT (OUTPATIENT)
Dept: CARDIOLOGY CLINIC | Age: 66
End: 2021-11-17
Payer: MEDICARE

## 2021-11-17 DIAGNOSIS — I48.20 CHRONIC ATRIAL FIBRILLATION (HCC): Primary | ICD-10-CM

## 2021-11-17 LAB
INTERNATIONAL NORMALIZATION RATIO, POC: 2.1
PROTHROMBIN TIME, POC: NORMAL

## 2021-11-17 PROCEDURE — 85610 PROTHROMBIN TIME: CPT | Performed by: CLINICAL NURSE SPECIALIST

## 2021-11-17 NOTE — PROGRESS NOTES
Pt's INR is 2.1, which is in range. Pt is to continue her normal dose and recheck in 1 week. PT voiced her understanding. Pt is okay with being called monthly if INR is in range. Next call will be 12/17/21.

## 2021-11-24 ENCOUNTER — ANTI-COAG VISIT (OUTPATIENT)
Dept: CARDIOLOGY CLINIC | Age: 66
End: 2021-11-24
Payer: MEDICARE

## 2021-11-24 DIAGNOSIS — I48.20 CHRONIC ATRIAL FIBRILLATION (HCC): Primary | ICD-10-CM

## 2021-11-24 LAB
INTERNATIONAL NORMALIZATION RATIO, POC: 2.2
PROTHROMBIN TIME, POC: NORMAL

## 2021-11-24 PROCEDURE — 85610 PROTHROMBIN TIME: CPT | Performed by: NURSE PRACTITIONER

## 2021-11-24 NOTE — PROGRESS NOTES
Pt's INR is 2.2, which is in range. Pt will continue her normal dose and recheck in 1 week. Pt is fine with being called monthly unless her INR  Is out of range, her next call is due on 12/17/21.

## 2021-12-01 RX ORDER — WARFARIN SODIUM 6 MG/1
TABLET ORAL
Qty: 60 TABLET | Refills: 5 | Status: SHIPPED | OUTPATIENT
Start: 2021-12-01 | End: 2021-12-14 | Stop reason: SDUPTHER

## 2021-12-02 ENCOUNTER — ANTI-COAG VISIT (OUTPATIENT)
Dept: CARDIOLOGY CLINIC | Age: 66
End: 2021-12-02

## 2021-12-02 LAB — INR BLD: 2.6

## 2021-12-02 NOTE — PROGRESS NOTES
RT Nebulizer Protocol    Assessment tool to be used for patients with existing breathing treatments ordered by hospitalists                                                                  0  1  2  3  4      Respiratory History   No Smoking      Smoking History      1 Pack/Day      Pulmonary Disease   x   Exacerbation        Respiratory Rate   Normal   x   20-25      Dyspneic      Accessory Muscles      Severe Dyspnea        Breath Sounds   Clear   x   Crackles      Crackles/ Rhonchi      Wheezing      Absent/ Severe Wheezing        Chest   X-ray   Clear/no xray   x   1 Lobe Infiltration/ Consolidation/ PE      2 Lobe Same Lung Infiltration/ Consolidation/ PE       2 Lobe Infiltration/ Both Lungs/ Consolidation/ PE      Both Lungs/ More Than 1 Lobe/ Atelectasis/ Consolidation/  PE        Cough   Strong Non- Productive   x   Excessive Secretions/ Strong Cough      Excessive Secretions/ Weak Cough      Thick Bronchial Secretions/ Weak Cough      Thick Bronchial Secretions/ No Cough        Total Patient Score =  3  0-4=Q4 PRN  5-9=TID and Q4 PRN  10-14=QID and Q3 PRN  15-19=Q4 and Q2 PRN  20=Q3 and Q2 PRN    Bronchopulmonary Hygiene (CPT)   Q4 ATC Copious secretions, dyspnea, unable to sleep, mucus plug    QID & Q4 PRN Moderate secretions    TID Small amounts of secretions w/ poor cough and history of secretions    BID Unable to deep breathe and cough spontaneously       Lung Expansion Therapy (PEP)   Q4 & PRN at night Severe atelectasis, poor oxygenation    QID  High risk for persistent atelectasis, existence of same    TID At risk for developing atelectasis    BID Unable to deep breathe and cough spontaneously     Instruct, 1 follow up Patients able to perform well on their own      Continue present order of Q4 prn albuterol       No

## 2021-12-09 ENCOUNTER — ANTI-COAG VISIT (OUTPATIENT)
Dept: CARDIOLOGY CLINIC | Age: 66
End: 2021-12-09
Payer: MEDICARE

## 2021-12-09 DIAGNOSIS — I48.20 CHRONIC ATRIAL FIBRILLATION (HCC): Primary | ICD-10-CM

## 2021-12-09 LAB
INTERNATIONAL NORMALIZATION RATIO, POC: 2.9
PROTHROMBIN TIME, POC: NORMAL

## 2021-12-09 PROCEDURE — 85610 PROTHROMBIN TIME: CPT | Performed by: CLINICAL NURSE SPECIALIST

## 2021-12-09 NOTE — PROGRESS NOTES
Pt's INR is 2.9, which is in range. Pt is to continue her normal dose and recheck in 1 week. Pt is okay with being called monthly unless her INR is out of range. Pt's next call is due 12/17/21.

## 2021-12-14 ENCOUNTER — OFFICE VISIT (OUTPATIENT)
Dept: CARDIOLOGY CLINIC | Age: 66
End: 2021-12-14
Payer: MEDICARE

## 2021-12-14 VITALS
HEART RATE: 77 BPM | HEIGHT: 66 IN | WEIGHT: 242 LBS | SYSTOLIC BLOOD PRESSURE: 122 MMHG | OXYGEN SATURATION: 97 % | BODY MASS INDEX: 38.89 KG/M2 | DIASTOLIC BLOOD PRESSURE: 82 MMHG

## 2021-12-14 DIAGNOSIS — E66.01 MORBID OBESITY DUE TO EXCESS CALORIES (HCC): ICD-10-CM

## 2021-12-14 DIAGNOSIS — R53.81 PHYSICAL DECONDITIONING: ICD-10-CM

## 2021-12-14 DIAGNOSIS — I48.20 CHRONIC ATRIAL FIBRILLATION (HCC): Primary | ICD-10-CM

## 2021-12-14 DIAGNOSIS — I25.10 CORONARY ARTERY DISEASE INVOLVING NATIVE CORONARY ARTERY OF NATIVE HEART WITHOUT ANGINA PECTORIS: ICD-10-CM

## 2021-12-14 DIAGNOSIS — I50.22 CHRONIC SYSTOLIC HEART FAILURE (HCC): ICD-10-CM

## 2021-12-14 DIAGNOSIS — I10 ESSENTIAL HYPERTENSION: ICD-10-CM

## 2021-12-14 PROCEDURE — G8484 FLU IMMUNIZE NO ADMIN: HCPCS | Performed by: CLINICAL NURSE SPECIALIST

## 2021-12-14 PROCEDURE — 4040F PNEUMOC VAC/ADMIN/RCVD: CPT | Performed by: CLINICAL NURSE SPECIALIST

## 2021-12-14 PROCEDURE — 1036F TOBACCO NON-USER: CPT | Performed by: CLINICAL NURSE SPECIALIST

## 2021-12-14 PROCEDURE — 99214 OFFICE O/P EST MOD 30 MIN: CPT | Performed by: CLINICAL NURSE SPECIALIST

## 2021-12-14 PROCEDURE — G8400 PT W/DXA NO RESULTS DOC: HCPCS | Performed by: CLINICAL NURSE SPECIALIST

## 2021-12-14 PROCEDURE — G8427 DOCREV CUR MEDS BY ELIG CLIN: HCPCS | Performed by: CLINICAL NURSE SPECIALIST

## 2021-12-14 PROCEDURE — 1123F ACP DISCUSS/DSCN MKR DOCD: CPT | Performed by: CLINICAL NURSE SPECIALIST

## 2021-12-14 PROCEDURE — G8417 CALC BMI ABV UP PARAM F/U: HCPCS | Performed by: CLINICAL NURSE SPECIALIST

## 2021-12-14 PROCEDURE — 1090F PRES/ABSN URINE INCON ASSESS: CPT | Performed by: CLINICAL NURSE SPECIALIST

## 2021-12-14 PROCEDURE — 3017F COLORECTAL CA SCREEN DOC REV: CPT | Performed by: CLINICAL NURSE SPECIALIST

## 2021-12-14 RX ORDER — SPIRONOLACTONE 25 MG/1
25 TABLET ORAL DAILY
Qty: 90 TABLET | Refills: 3 | Status: SHIPPED | OUTPATIENT
Start: 2021-12-14

## 2021-12-14 RX ORDER — DIGOXIN 125 MCG
125 TABLET ORAL DAILY
Qty: 90 TABLET | Refills: 3 | Status: SHIPPED | OUTPATIENT
Start: 2021-12-14

## 2021-12-14 RX ORDER — CARVEDILOL 12.5 MG/1
12.5 TABLET ORAL 2 TIMES DAILY
Qty: 180 TABLET | Refills: 3 | Status: SHIPPED | OUTPATIENT
Start: 2021-12-14

## 2021-12-14 RX ORDER — ATORVASTATIN CALCIUM 80 MG/1
TABLET, FILM COATED ORAL
Qty: 90 TABLET | Refills: 3 | Status: SHIPPED | OUTPATIENT
Start: 2021-12-14

## 2021-12-14 RX ORDER — FUROSEMIDE 40 MG/1
40 TABLET ORAL DAILY
Qty: 90 TABLET | Refills: 3 | Status: SHIPPED | OUTPATIENT
Start: 2021-12-14 | End: 2022-01-19

## 2021-12-14 RX ORDER — WARFARIN SODIUM 6 MG/1
TABLET ORAL
Qty: 180 TABLET | Refills: 3 | Status: SHIPPED | OUTPATIENT
Start: 2021-12-14

## 2021-12-14 ASSESSMENT — ENCOUNTER SYMPTOMS
WHEEZING: 0
NAUSEA: 0
FACIAL SWELLING: 0
EYE REDNESS: 0
SHORTNESS OF BREATH: 1
BACK PAIN: 1
VOMITING: 0
CHEST TIGHTNESS: 0
ABDOMINAL PAIN: 0
COUGH: 0

## 2021-12-14 NOTE — PROGRESS NOTES
[facility], [address]  [id number]  Phone: [phone]  Fax: [phone]    OFFICE VISIT:  2021    Ingrid Farragut - : 1955    Reason For Visit:  Cristina Martinez is a 78 y.o. female who is here for chronic atrial fibrillation, systolic heart failure   Diagnosis Orders   1. Chronic atrial fibrillation ([address]     2. Chronic systolic heart failure ([address]     3. Coronary artery disease involving native coronary artery of native heart without angina pectoris     4. Essential hypertension     5. Physical deconditioning     6. Morbid obesity due to excess calories (HCC)          HPI  Patient is here for follow-up for chronic atrial fibrillation, hypertension, systolic heart failure, non-occlusive CAD, morbid obesity. She is rate controlled with digoxin and metoprolol. She is anticoagulated with Coumadin and does home INR monitoring. INR this week was 2.9    During her hospitalization in 2020, she was found to have a further depressed ejection fraction, down to 25-30%, previously 40%. She also had a nuclear stress test that showed no evidence of ischemia. She was to follow-up in the office to discuss the potential of a defibrillator. She never followed up due to issues with her back and the back surgery that occurred in the spring. At a recent visit on 2021, she has not been seen in our office since 2019. She was hospitalized in 2020. She states she ended up having a back surgery at [facility] in May 2020. She is still convalescing from this and was really unable to walk for a period of time. She has done physical therapy over recent months and is regaining her strength and doing more walking in her home and feels she is improving. .    She has declined a defibrillator due to her impaired mobility. Most recent echo in 2021 continues to show an ejection fraction of 25 to 30%.   She was started on Entresto around that time.     She denies orthopnea, PND, sudden weight gain. She does not weigh daily, but does weigh several days a week and she is noticing no sudden changes. She does have some chronic lower extremity edema which is unchanged. ADELITA Montgomery is PCP.   Wendy Diamond has the following history as recorded in [facility]:    Patient Active Problem List    Diagnosis Date Noted    History of back surgery 07/08/2020    Intervertebral thoracic disc disorder with myelopathy, thoracic region 07/08/2020    Generalized weakness 03/02/2020    Palliative care patient 02/21/2020    Stroke-like symptoms 02/20/2020    Chronic atrial fibrillation 05/21/2018    Acquired hypothyroidism 10/17/2017    Morbid obesity due to excess calories ([address] 09/26/2017    Vitamin D deficiency 71/77/5807    Systolic congestive heart failure ([address] 09/26/2017    H/O bone density study 09/26/2017    Mixed hyperlipidemia 09/26/2017    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    Type 2 diabetes mellitus with microalbuminuria, without long-term current use of insulin ([address] 09/26/2017    Essential hypertension     Mild CAD      Past Medical History:   Diagnosis Date    Acute laryngitis     Acute sinusitis     Allergic rhinitis     Ankle pain     Asthma     Asthmatic bronchitis     Atrial fibrillation ([address] 9/13/12, 10/9/13    cardioversion    Atrial flutter (HCC)     paroxysmal     CAD (coronary artery disease)     Cerebral artery occlusion with cerebral infarction ([address]     2006    CHF (congestive heart failure) (HCC)     Chronic back pain     Cough     Diabetes     Dizzy     Dysuria     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[address]     Fatigue     Foot pain     Hx of amiodarone therapy 9/24/2014    Hyperlipidemia     PCP manages cholesterol    Hypertension     Menopause     Obesity     Palliative care patient 02/21/2020    Skin rash     Type II or unspecified type diabetes mellitus without mention of complication, not stated as uncontrolled     Umbilical hernia     URI (upper respiratory infection)      Past Surgical History:   Procedure Laterality Date    CARDIAC CATHETERIZATION  10/21/2009    EF 35% left ventricle is moderately enlarged     CARDIOVERSION  10/09/2013    CHOLECYSTECTOMY      EYE SURGERY      retina and cataracts     GALLBLADDER SURGERY      SPINE SURGERY  05/2020    TUBAL LIGATION       Family History   Problem Relation Age of Onset    Diabetes Mother     Heart Failure Mother     High Blood Pressure Mother     Liver Cancer Father     Colon Cancer Neg Hx     Colon Polyps Neg Hx      Social History     Tobacco Use    Smoking status: Never Smoker    Smokeless tobacco: Never Used   Substance Use Topics    Alcohol use: No      Current Outpatient Medications   Medication Sig Dispense Refill    warfarin (COUMADIN) 6 MG tablet As directed 180 tablet 3    furosemide (LASIX) 40 MG tablet Take 1 tablet by mouth daily 90 tablet 3    spironolactone (ALDACTONE) 25 MG tablet Take 1 tablet by mouth daily 90 tablet 3    atorvastatin (LIPITOR) 80 MG tablet TAKE 1 TABLET BY MOUTH ONCE DAILY.  90 tablet 3    carvedilol (COREG) 12.5 MG tablet Take 1 tablet by mouth 2 times daily 180 tablet 3    digoxin (LANOXIN) 125 MCG tablet Take 1 tablet by mouth daily 90 tablet 3    sacubitril-valsartan (ENTRESTO) 24-26 MG per tablet Take 1 tablet by mouth 2 times daily 60 tablet 5    fluticasone (FLONASE) 50 MCG/ACT nasal spray 1 spray by Each Nostril route daily      loratadine (CLARITIN) 10 MG capsule Take 10 mg by mouth daily      Cyanocobalamin (VITAMIN B 12) 500 MCG TABS Take 500 mcg by mouth daily      Bed Trapeze MISC by Does not apply route 1 each 0    glipiZIDE (GLUCOTROL) 5 MG tablet TAKE 2 TABLETS BY MOUTH IN THE MORNING & 2 TABLETS IN THE EVENING 120 tablet 5    metFORMIN (GLUCOPHAGE) 500 MG tablet TAKE 2 TABLETS BY MOUTH TWICE DAILY WITH MEALS (Patient taking differently: Take 1,000 mg by mouth 2 times daily (with meals) TAKE 2 TABLETS BY MOUTH TWICE DAILY WITH MEALS) 120 tablet 5    levothyroxine (SYNTHROID) 125 MCG tablet Take 1 tablet by mouth Daily 30 tablet 5    Multiple Vitamins-Minerals (THERAPEUTIC MULTIVITAMIN-MINERALS) tablet Take 1 tablet by mouth daily.  Vitamin D (CHOLECALCIFEROL) 1000 UNITS CAPS capsule Take 1,000 Units by mouth 2 times daily       aspirin 81 MG EC tablet Take 81 mg by mouth daily. No current facility-administered medications for this visit. Allergies: Aspartame and phenylalanine, Mushroom extract complex, Penicillins, Shellfish-derived products, and Zetia [ezetimibe]    Review of Systems  Review of Systems   Constitutional: Positive for fatigue. Negative for activity change, diaphoresis, fever and unexpected weight change. HENT: Negative for facial swelling and nosebleeds. Eyes: Negative for redness and visual disturbance. Respiratory: Positive for shortness of breath (with over exertion). Negative for cough, chest tightness and wheezing. Cardiovascular: Positive for leg swelling (improving). Negative for chest pain and palpitations. Gastrointestinal: Negative for abdominal pain, nausea and vomiting. Endocrine: Negative for cold intolerance and heat intolerance. Genitourinary: Negative for dysuria and hematuria. Musculoskeletal: Positive for back pain and gait problem. Negative for arthralgias and myalgias. Complains of mobility issues, but continues to improve gradually   Skin: Negative for pallor and rash. Neurological: Negative for dizziness, seizures, syncope, weakness and light-headedness. Hematological: Does not bruise/bleed easily. Psychiatric/Behavioral: Negative for agitation. The patient is not nervous/anxious.         Objective  Vital Signs - /82   Pulse 77   Ht 5' 5.5\" (1.664 m)   Wt 242 lb (109.8 kg)   LMP  (LMP Unknown)   SpO2 97%   BMI 39.66 kg/m²      BP Readings from Last 3 Encounters:   12/14/21 122/82 08/06/21 110/72   05/07/21 120/74    Pulse Readings from Last 3 Encounters:   12/14/21 77   08/06/21 62   05/07/21 68        Wt Readings from Last 3 Encounters:   12/14/21 242 lb (109.8 kg)   02/28/20 233 lb (105.7 kg)   02/27/20 233 lb (105.7 kg)      Physical Exam  Vitals and nursing note reviewed. Constitutional:       General: She is not in acute distress. Appearance: She is well-developed. She is obese. She is not diaphoretic. Comments: Deconditioned   HENT:      Head: Normocephalic and atraumatic. Right Ear: Hearing and external ear normal.      Left Ear: Hearing and external ear normal.      Nose: Nose normal.   Eyes:      General:         Right eye: No discharge. Left eye: No discharge. Pupils: Pupils are equal, round, and reactive to light. Neck:      Thyroid: No thyromegaly. Vascular: No carotid bruit or JVD. Trachea: No tracheal deviation. Cardiovascular:      Rate and Rhythm: Normal rate. Rhythm irregular. Heart sounds: Normal heart sounds. No murmur heard. No friction rub. No gallop. Comments: No carotid bruit  Irregularly irregular rhythm  Pulmonary:      Effort: Pulmonary effort is normal. No respiratory distress. Breath sounds: Normal breath sounds. No wheezing or rales. Abdominal:      Palpations: Abdomen is soft. Tenderness: There is no abdominal tenderness. Musculoskeletal:         General: No swelling or deformity. Cervical back: Neck supple. No muscular tenderness. Right lower leg: Edema (1+) present. Left lower leg: Edema (1+) present. Comments: In wheelchair   Skin:     General: Skin is warm and dry. Findings: No rash. Neurological:      General: No focal deficit present. Mental Status: She is alert and oriented to person, place, and time. Cranial Nerves: No cranial nerve deficit.    Psychiatric:         Mood and Affect: Mood normal.         Behavior: Behavior normal.         Judgment: Judgment normal.         Data:  Lab Results   Component Value Date    CHOL 135 10/14/2020    TRIG 229 10/14/2020    HDL 31 (A) 10/14/2020    LDLCALC 58 10/14/2020     Lab Results   Component Value Date    ALT 32 10/14/2020    AST 20 10/14/2020     Data:  Echo 2/20/20  Summary   Mild left ventricular enlargement with global hypokinesis and an abnormal   contraction pattern [suggestive of bundle branch block] with an estimated   ejection fraction of 25-30%   Abnormal rhythm precludes assessment of diastolic function   Moderately dilated left atrium   Mildly thickened tricuspid aortic valve leaflets with adequate cusp   separation and no significant stenosis or insufficiency   Severe mitral annular calcification [particularly posteriorly] that also   involves papillary muscles with moderately thickened mitral leaflets which   demonstrated reduced mobility, no significant stenosis and mild   regurgitation   Trace pulmonic insufficiency   Moderately dilated right atrium with normal right ventricular size and   systolic function   Mild tricuspid regurgitation with RVSP estimate of 28 mmHg   Normal IVC dimensions with reduced respiratory motion consistent with   elevated right atrial filling pressures of 11-15 mmHg   No significant pericardial effusion and aortic dimensions are within   normal limits   Definity contrast utilized to better define endocardial surface    Laura 2/22/20  Impression:   There is no obvious infarct or ischemia, with a calculated ejection   fraction of 31 %. Suggest: Clinical correlation with cardiomyopathy. Signed by Dr Michael Vazquez on 2/23/2020 9:50 AM     Echo 2/8/21  Summary   LV is moderately dilated with severely reduced LV systolic function. LV   ejection fraction estimated at 25 to 30%. Mid to apical septal, apical segment, mid to apical lateral and anterior   wall are severely hypokinetic. No obvious thrombus noted in left ventricle.    Diastolic function not well ascertained due to rhythm. RV is normal in size with normal systolic function. Mild to moderate left atrial enlargement. Normal right atrial size. Mitral valve with severe posterior leaflet and posterior mitral annular   calcification. No stenosis. Trace mitral regurgitation. Aortic valve is not well visualized. No significant stenosis or   regurgitation noted. No significant tricuspid regurgitation appreciated   No significant pericardial effusion. Assessment:     Diagnosis Orders   1. Chronic atrial fibrillation ([address]     2. Chronic systolic heart failure ([address]     3. Coronary artery disease involving native coronary artery of native heart without angina pectoris     4. Essential hypertension     5. Physical deconditioning     6. Morbid obesity due to excess calories (HCC)         Chronic atrial fibrillation-rate controlled and anticoagulated without bleeding issues. Coumadin monitored via home INR monitoring with INR 2.9  this week. Chronic systolic heart failure NYHA class II,  EF 25-30%-appears euvolemic on guideline directed medical therapy with carvedilol, Entresto, spironolactone. She will qualify for a biventricular defibrillator, but declines due to immobility. She is concerned that the recovery process would do the too much because she depends on pushing with her arms for transferring. She understands the risk of sudden cardiac death. It is difficult for her to balance on the scale, so therefore she does not weigh regularly, but is doing so more often. Encouraged her to do this as much as possible to monitor fluid status.   Continue low-sodium diet and fluid restrictions of 6 cups daily    CAD-no angina symptoms, nonocclusive disease per heart cath in the past.  Nuclear stress test in February 2020 showed no evidence of ischemia    Hypertension-well controlled on current regimen with carvedilol and Entresto    Morbid obesity/deconditioning -has been doing physical therapy for the last 3 weeks and is slowly improving. She is walking longer distances in her home. Plan    Return in about 6 months (around 6/14/2022) for ADELITA. Continue home monitoring INR monitoring  Consider defibrillator- call the office if you xochitl;nge your mind    - Weigh daily and report weight gain of 3lbs or more in 24hrs or 5lbs in one week. - Call for increasing shortness of breath or increasing swelling in feet and legs. (This could mean you are retaining too much fluid)  - 2000mg low sodium diet  - Fluid restriction of 1500ml per day (about 6 cups of fluid per day)    Call with any questionsor concerns  Follow up with ADELITA Rojo for non cardiac problems and coumadin management  Report any new problems  Cardiovascular Fitness-Exercise as tolerated. Strive for 15 minutes of exercise most days of the week. Cardiac / HealthyDiet  Continue current medications as directed  Continue plan of treatment  It is always recommended that you bring your medicationsbottles with you to each visit - this is for your safety!        ADELITA Sánchez

## 2021-12-14 NOTE — PATIENT INSTRUCTIONS
Mary Proper in about 6 months (around 6/14/2022) for APRN. Continue home monitoring INR monitoring  Consider defibrillator- call the office if you xochitl;nge your mind    - Weigh daily and report weight gain of 3lbs or more in 24hrs or 5lbs in one week. - Call for increasing shortness of breath or increasing swelling in feet and legs. (This could mean you are retaining too much fluid)  - 2000mg low sodium diet  - Fluid restriction of 1500ml per day (about 6 cups of fluid per day)  Patient Education        Heart Failure: Care Instructions  Your Care Instructions     Heart failure occurs when your heart does not pump as much blood as the body needs. Failure does not mean that the heart has stopped pumping but rather that it is not pumping as well as it should. Over time, this causes fluid buildup in your lungs and other parts of your body. Fluid buildup can cause shortness of breath, fatigue, swollen ankles, and other problems. By taking medicines regularly, reducing sodium (salt) in your diet, checking your weight every day, and making lifestyle changes, you can feel better and live longer. Follow-up care is a key part of your treatment and safety. Be sure to make and go to all appointments, and call your doctor if you are having problems. It's also a good idea to know your test results and keep a list of the medicines you take. How can you care for yourself at home? Medicines    · Be safe with medicines. Take your medicines exactly as prescribed. Call your doctor if you think you are having a problem with your medicine.     · Do not take any vitamins, over-the-counter medicine, or herbal products without talking to your doctor first. [place] not take ibuprofen (Advil or Motrin) and naproxen (Aleve) without talking to your doctor first. They could make your heart failure worse.     · You may take some of the following medicine. ?  Angiotensin-converting enzyme inhibitors (ACEIs) or angiotensin II receptor blockers (ARBs) 2021               Content Version: 13.0  © 0902-8505 [employer]. Care instructions adapted under license by [facility] ([facility]). If you have questions about a medical condition or this instruction, always ask your healthcare professional. [address] any warranty or liability for your use of this information.

## 2021-12-16 LAB
INTERNATIONAL NORMALIZATION RATIO, POC: 2.9
PROTHROMBIN TIME, POC: NORMAL

## 2021-12-20 ENCOUNTER — ANTI-COAG VISIT (OUTPATIENT)
Dept: CARDIOLOGY CLINIC | Age: 66
End: 2021-12-20
Payer: MEDICARE

## 2021-12-20 DIAGNOSIS — I48.20 CHRONIC ATRIAL FIBRILLATION (HCC): Primary | ICD-10-CM

## 2021-12-20 PROCEDURE — 85610 PROTHROMBIN TIME: CPT | Performed by: NURSE PRACTITIONER

## 2021-12-20 NOTE — PROGRESS NOTES
Pt's INR is 2.9, which is in range. PT is to continue her normal dosage and recheck in 1 week. Pt voiced her understanding. Pt is okay with being called monthly unless her INR is out of range, her next call will be due 01/20/22.

## 2021-12-29 ENCOUNTER — ANTI-COAG VISIT (OUTPATIENT)
Dept: CARDIOLOGY CLINIC | Age: 66
End: 2021-12-29
Payer: MEDICARE

## 2021-12-29 DIAGNOSIS — I48.20 CHRONIC ATRIAL FIBRILLATION (HCC): Primary | ICD-10-CM

## 2021-12-29 LAB
INTERNATIONAL NORMALIZATION RATIO, POC: 2.8
PROTHROMBIN TIME, POC: NORMAL

## 2021-12-29 PROCEDURE — 85610 PROTHROMBIN TIME: CPT | Performed by: CLINICAL NURSE SPECIALIST

## 2021-12-29 NOTE — PROGRESS NOTES
Pt's INR is 2.8, which is in range. Pt is to continue her normal dose and recheck in 1 week. Pt likes to be called monthly unless her INR is out of range, she is not due to call until 01/20/22.

## 2022-01-05 ENCOUNTER — ANTI-COAG VISIT (OUTPATIENT)
Dept: CARDIOLOGY CLINIC | Age: 67
End: 2022-01-05

## 2022-01-05 LAB — INR BLD: 2.3

## 2022-01-12 ENCOUNTER — ANTI-COAG VISIT (OUTPATIENT)
Dept: CARDIOLOGY CLINIC | Age: 67
End: 2022-01-12

## 2022-01-12 LAB — INR BLD: 2.6

## 2022-01-18 ENCOUNTER — ANTI-COAG VISIT (OUTPATIENT)
Dept: CARDIOLOGY CLINIC | Age: 67
End: 2022-01-18

## 2022-01-18 LAB — INR BLD: 2.6

## 2022-01-19 RX ORDER — FUROSEMIDE 40 MG/1
TABLET ORAL
Qty: 90 TABLET | Refills: 0 | Status: SHIPPED | OUTPATIENT
Start: 2022-01-19

## 2022-01-26 ENCOUNTER — ANTI-COAG VISIT (OUTPATIENT)
Dept: CARDIOLOGY CLINIC | Age: 67
End: 2022-01-26

## 2022-01-26 LAB — INR BLD: 2.2

## 2022-02-01 ENCOUNTER — ANTI-COAG VISIT (OUTPATIENT)
Dept: CARDIOLOGY CLINIC | Age: 67
End: 2022-02-01

## 2022-02-01 LAB — INR BLD: 2.2

## 2022-02-08 ENCOUNTER — ANTI-COAG VISIT (OUTPATIENT)
Dept: CARDIOLOGY CLINIC | Age: 67
End: 2022-02-08

## 2022-02-08 LAB — INR BLD: 2.1

## 2022-02-15 LAB — INR BLD: 2.1

## 2022-02-22 LAB — INR BLD: 2.1

## 2022-03-01 ENCOUNTER — ANTI-COAG VISIT (OUTPATIENT)
Dept: CARDIOLOGY CLINIC | Age: 67
End: 2022-03-01
Payer: MEDICARE

## 2022-03-01 DIAGNOSIS — I48.20 CHRONIC ATRIAL FIBRILLATION (HCC): Primary | ICD-10-CM

## 2022-03-01 LAB
INTERNATIONAL NORMALIZATION RATIO, POC: 2
PROTHROMBIN TIME, POC: NORMAL

## 2022-03-01 PROCEDURE — 85610 PROTHROMBIN TIME: CPT | Performed by: NURSE PRACTITIONER

## 2022-03-01 NOTE — PROGRESS NOTES
Pt's INR is 2.0, which is in range. Pt will continue the same dose and recheck in a week. PT likes to be called monthly unless her INR is out of range. I called and discussed this with the PT, Pt voiced her understanding. Next call will be due on 4/1/22.

## 2022-03-08 ENCOUNTER — ANTI-COAG VISIT (OUTPATIENT)
Dept: CARDIOLOGY CLINIC | Age: 67
End: 2022-03-08
Payer: MEDICARE

## 2022-03-08 DIAGNOSIS — I48.20 CHRONIC ATRIAL FIBRILLATION (HCC): Primary | ICD-10-CM

## 2022-03-08 LAB
INTERNATIONAL NORMALIZATION RATIO, POC: 2.3
PROTHROMBIN TIME, POC: NORMAL

## 2022-03-08 PROCEDURE — 85610 PROTHROMBIN TIME: CPT | Performed by: CLINICAL NURSE SPECIALIST

## 2022-03-08 NOTE — PROGRESS NOTES
Patient's INR today was 2.3 which is normal range. Patient to continue normal dosing and recheck in 1 week. I did leave message for patient notifying her it was received.

## 2022-03-15 ENCOUNTER — TELEPHONE (OUTPATIENT)
Dept: CARDIOLOGY CLINIC | Age: 67
End: 2022-03-15

## 2022-03-15 LAB
INTERNATIONAL NORMALIZATION RATIO, POC: 2.4
PROTHROMBIN TIME, POC: NORMAL

## 2022-03-15 NOTE — TELEPHONE ENCOUNTER
Pt called stating since she started Jardiance her pulse has been in the 40. Pt states she isn't dizzy, just tired. Pls advise. Her PCP gave her the jardiance.

## 2022-03-16 NOTE — TELEPHONE ENCOUNTER
Ilean Guise would not lower heart rate. May be other medications. Could be she is checking her heart rate. If having PVCs may not always count and have falsely lowered heart rate noted on the monitor.

## 2022-03-21 ENCOUNTER — ANTI-COAG VISIT (OUTPATIENT)
Dept: CARDIOLOGY CLINIC | Age: 67
End: 2022-03-21
Payer: MEDICARE

## 2022-03-21 DIAGNOSIS — I48.20 CHRONIC ATRIAL FIBRILLATION (HCC): Primary | ICD-10-CM

## 2022-03-21 PROCEDURE — 85610 PROTHROMBIN TIME: CPT | Performed by: CLINICAL NURSE SPECIALIST

## 2022-03-29 ENCOUNTER — ANTI-COAG VISIT (OUTPATIENT)
Dept: CARDIOLOGY CLINIC | Age: 67
End: 2022-03-29
Payer: MEDICARE

## 2022-03-29 DIAGNOSIS — I48.20 CHRONIC ATRIAL FIBRILLATION (HCC): Primary | ICD-10-CM

## 2022-03-29 LAB
INTERNATIONAL NORMALIZATION RATIO, POC: 2.2
PROTHROMBIN TIME, POC: NORMAL

## 2022-03-29 PROCEDURE — 85610 PROTHROMBIN TIME: CPT | Performed by: CLINICAL NURSE SPECIALIST

## 2022-04-08 ENCOUNTER — ANTI-COAG VISIT (OUTPATIENT)
Dept: CARDIOLOGY CLINIC | Age: 67
End: 2022-04-08
Payer: MEDICARE

## 2022-04-08 DIAGNOSIS — I48.20 CHRONIC ATRIAL FIBRILLATION (HCC): Primary | ICD-10-CM

## 2022-04-08 LAB
INTERNATIONAL NORMALIZATION RATIO, POC: 2.1
PROTHROMBIN TIME, POC: NORMAL

## 2022-04-08 PROCEDURE — 85610 PROTHROMBIN TIME: CPT | Performed by: CLINICAL NURSE SPECIALIST

## 2022-04-13 LAB — INR BLD: 2.2

## 2022-04-14 ENCOUNTER — ANTI-COAG VISIT (OUTPATIENT)
Dept: CARDIOLOGY CLINIC | Age: 67
End: 2022-04-14
Payer: MEDICARE

## 2022-04-14 PROCEDURE — 85610 PROTHROMBIN TIME: CPT | Performed by: NURSE PRACTITIONER

## 2022-04-14 NOTE — PROGRESS NOTES
Pt's INR is 2.2, which is in range. Pt will continue her normal dose and recheck in 1 week. Pt likes to be called monthly unless her INR is out of range, Pt is due for a call on 5/8/22.

## 2022-04-26 ENCOUNTER — ANTI-COAG VISIT (OUTPATIENT)
Dept: CARDIOLOGY CLINIC | Age: 67
End: 2022-04-26
Payer: MEDICARE

## 2022-04-26 DIAGNOSIS — I48.20 CHRONIC ATRIAL FIBRILLATION (HCC): Primary | ICD-10-CM

## 2022-04-26 LAB
INTERNATIONAL NORMALIZATION RATIO, POC: 2
PROTHROMBIN TIME, POC: NORMAL

## 2022-04-26 PROCEDURE — 93793 ANTICOAG MGMT PT WARFARIN: CPT | Performed by: NURSE PRACTITIONER

## 2022-04-26 PROCEDURE — 85610 PROTHROMBIN TIME: CPT | Performed by: NURSE PRACTITIONER

## 2022-05-10 ENCOUNTER — ANTI-COAG VISIT (OUTPATIENT)
Dept: CARDIOLOGY CLINIC | Age: 67
End: 2022-05-10
Payer: MEDICARE

## 2022-05-10 DIAGNOSIS — I48.20 CHRONIC ATRIAL FIBRILLATION (HCC): Primary | ICD-10-CM

## 2022-05-10 LAB
INTERNATIONAL NORMALIZATION RATIO, POC: 2.3
PROTHROMBIN TIME, POC: NORMAL

## 2022-05-10 PROCEDURE — 93793 ANTICOAG MGMT PT WARFARIN: CPT | Performed by: CLINICAL NURSE SPECIALIST

## 2022-05-10 PROCEDURE — 85610 PROTHROMBIN TIME: CPT | Performed by: CLINICAL NURSE SPECIALIST

## 2022-05-18 ENCOUNTER — ANTI-COAG VISIT (OUTPATIENT)
Dept: CARDIOLOGY CLINIC | Age: 67
End: 2022-05-18

## 2022-05-18 LAB — INR BLD: 2.3

## 2022-05-24 ENCOUNTER — ANTI-COAG VISIT (OUTPATIENT)
Dept: CARDIOLOGY CLINIC | Age: 67
End: 2022-05-24
Payer: MEDICARE

## 2022-05-24 DIAGNOSIS — I48.20 CHRONIC ATRIAL FIBRILLATION (HCC): Primary | ICD-10-CM

## 2022-05-24 PROCEDURE — 85610 PROTHROMBIN TIME: CPT | Performed by: CLINICAL NURSE SPECIALIST

## 2022-05-31 ENCOUNTER — ANTI-COAG VISIT (OUTPATIENT)
Dept: CARDIOLOGY CLINIC | Age: 67
End: 2022-05-31
Payer: MEDICARE

## 2022-05-31 DIAGNOSIS — I48.20 CHRONIC ATRIAL FIBRILLATION (HCC): Primary | ICD-10-CM

## 2022-05-31 LAB
INTERNATIONAL NORMALIZATION RATIO, POC: 1.9
PROTHROMBIN TIME, POC: NORMAL

## 2022-05-31 PROCEDURE — 85610 PROTHROMBIN TIME: CPT | Performed by: CLINICAL NURSE SPECIALIST

## 2022-06-07 ENCOUNTER — ANTI-COAG VISIT (OUTPATIENT)
Dept: CARDIOLOGY CLINIC | Age: 67
End: 2022-06-07

## 2022-06-07 LAB
INR BLD: 1.8
INR BLD: 1.8
INR BLD: 1.9

## 2022-06-15 ENCOUNTER — OFFICE VISIT (OUTPATIENT)
Dept: CARDIOLOGY CLINIC | Age: 67
End: 2022-06-15
Payer: MEDICARE

## 2022-06-15 VITALS
DIASTOLIC BLOOD PRESSURE: 84 MMHG | BODY MASS INDEX: 40.18 KG/M2 | HEART RATE: 91 BPM | SYSTOLIC BLOOD PRESSURE: 122 MMHG | HEIGHT: 66 IN | WEIGHT: 250 LBS

## 2022-06-15 DIAGNOSIS — I10 ESSENTIAL HYPERTENSION: ICD-10-CM

## 2022-06-15 DIAGNOSIS — I48.20 CHRONIC ATRIAL FIBRILLATION (HCC): Primary | ICD-10-CM

## 2022-06-15 DIAGNOSIS — I25.10 CORONARY ARTERY DISEASE INVOLVING NATIVE CORONARY ARTERY OF NATIVE HEART WITHOUT ANGINA PECTORIS: ICD-10-CM

## 2022-06-15 DIAGNOSIS — E66.9 CLASS 2 OBESITY WITHOUT SERIOUS COMORBIDITY WITH BODY MASS INDEX (BMI) OF 38.0 TO 38.9 IN ADULT, UNSPECIFIED OBESITY TYPE: ICD-10-CM

## 2022-06-15 DIAGNOSIS — R53.81 PHYSICAL DECONDITIONING: ICD-10-CM

## 2022-06-15 DIAGNOSIS — I50.22 CHRONIC SYSTOLIC HEART FAILURE (HCC): ICD-10-CM

## 2022-06-15 LAB
INTERNATIONAL NORMALIZATION RATIO, POC: 2.1
PROTHROMBIN TIME, POC: NORMAL

## 2022-06-15 PROCEDURE — G8417 CALC BMI ABV UP PARAM F/U: HCPCS | Performed by: CLINICAL NURSE SPECIALIST

## 2022-06-15 PROCEDURE — 85610 PROTHROMBIN TIME: CPT | Performed by: CLINICAL NURSE SPECIALIST

## 2022-06-15 PROCEDURE — G8427 DOCREV CUR MEDS BY ELIG CLIN: HCPCS | Performed by: CLINICAL NURSE SPECIALIST

## 2022-06-15 PROCEDURE — G8400 PT W/DXA NO RESULTS DOC: HCPCS | Performed by: CLINICAL NURSE SPECIALIST

## 2022-06-15 PROCEDURE — 1090F PRES/ABSN URINE INCON ASSESS: CPT | Performed by: CLINICAL NURSE SPECIALIST

## 2022-06-15 PROCEDURE — 99214 OFFICE O/P EST MOD 30 MIN: CPT | Performed by: CLINICAL NURSE SPECIALIST

## 2022-06-15 PROCEDURE — 93000 ELECTROCARDIOGRAM COMPLETE: CPT | Performed by: CLINICAL NURSE SPECIALIST

## 2022-06-15 PROCEDURE — 1036F TOBACCO NON-USER: CPT | Performed by: CLINICAL NURSE SPECIALIST

## 2022-06-15 PROCEDURE — 1123F ACP DISCUSS/DSCN MKR DOCD: CPT | Performed by: CLINICAL NURSE SPECIALIST

## 2022-06-15 PROCEDURE — 3017F COLORECTAL CA SCREEN DOC REV: CPT | Performed by: CLINICAL NURSE SPECIALIST

## 2022-06-15 RX ORDER — EMPAGLIFLOZIN 10 MG/1
TABLET, FILM COATED ORAL
COMMUNITY
Start: 2022-05-04

## 2022-06-15 RX ORDER — LINAGLIPTIN 5 MG/1
TABLET, FILM COATED ORAL
COMMUNITY
Start: 2022-06-01

## 2022-06-15 ASSESSMENT — ENCOUNTER SYMPTOMS
BACK PAIN: 1
ABDOMINAL PAIN: 0
NAUSEA: 0
CHEST TIGHTNESS: 0
VOMITING: 0
EYE REDNESS: 0
COUGH: 0
FACIAL SWELLING: 0
WHEEZING: 0
SHORTNESS OF BREATH: 1

## 2022-06-15 NOTE — PROGRESS NOTES
[facility], [address], [address] [address]  Phone: [phone]  Fax: [phone]    OFFICE VISIT:  6/15/2022    Druze  - : 1955    Reason For Visit:  Jesenia Ye is a 78 y.o. female who is here for chronic atrial fibrillation, systolic heart failure   Diagnosis Orders   1. Chronic atrial fibrillation (HCC)  EKG 12 lead        HPI  Patient is here for follow-up for chronic atrial fibrillation, hypertension, systolic heart failure, non-occlusive CAD, morbid obesity. She is rate controlled with digoxin and metoprolol. She is anticoagulated with Coumadin and does home INR monitoring. During her hospitalization in 2020, she was found to have a further depressed ejection fraction, down to 25-30%, previously 40%. She also had a nuclear stress test that showed no evidence of ischemia. She was to follow-up in the office to discuss the potential of a defibrillator, but never kept appts due to back surgery that occurred in May of 2020. She did not return to our office until 21    In 2021, she was started on Entresto with echo continuing to show an ejection fraction of 25 to 30%. She has continued to decline defibrillator. She has significant mobility issues and spends most of her day in the wheelchair. She needs to use her arms for transferring and feels the recovery following a defibrillator with limited use of the left arm would be too difficult. She understands the risk of sudden cardiac death. She denies orthopnea, PND, or sudden weight gain. She has gotten out of the habit of weighing regularly. Is difficult for her to get up on the scale. She reports her knees are \"bone-on-bone. \"  She does have some chronic lower extremity edema which is unchanged. Several months back she had a day or two with heart rate in the 40s according to her automatic BP cuff.   She states she might of felt a little weak at the time, but no syncope. ADELITA Dumas is PCP.   Noe Parish has the following history as recorded in [facility]:    Patient Active Problem List    Diagnosis Date Noted    History of back surgery 07/08/2020    Intervertebral thoracic disc disorder with myelopathy, thoracic region 07/08/2020    Generalized weakness 03/02/2020    Palliative care patient 02/21/2020    Stroke-like symptoms 02/20/2020    Chronic atrial fibrillation 05/21/2018    Acquired hypothyroidism 10/17/2017    Morbid obesity due to excess calories ([address] 09/26/2017    Vitamin D deficiency 52/31/8997    Systolic congestive heart failure ([address] 09/26/2017    H/O bone density study 09/26/2017    Mixed hyperlipidemia 09/26/2017    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    Type 2 diabetes mellitus with microalbuminuria, without long-term current use of insulin ([address] 09/26/2017    Essential hypertension     Mild CAD      Past Medical History:   Diagnosis Date    Acute laryngitis     Acute sinusitis     Allergic rhinitis     Ankle pain     Asthma     Asthmatic bronchitis     Atrial fibrillation ([address] 9/13/12, 10/9/13    cardioversion    Atrial flutter (HCC)     paroxysmal     CAD (coronary artery disease)     Cerebral artery occlusion with cerebral infarction ([address]     2006    CHF (congestive heart failure) (HCC)     Chronic back pain     Cough     Diabetes     Dizzy     Dysuria     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[address]     Fatigue     Foot pain     Hx of amiodarone therapy 9/24/2014    Hyperlipidemia     PCP manages cholesterol    Hypertension     Menopause     Obesity     Palliative care patient 02/21/2020    Skin rash     Type II or unspecified type diabetes mellitus without mention of complication, not stated as uncontrolled     Umbilical hernia     URI (upper respiratory infection)      Past Surgical History:   Procedure Laterality Date    CARDIAC CATHETERIZATION  10/21/2009    EF 35% left ventricle is moderately enlarged     CARDIOVERSION  10/09/2013    CHOLECYSTECTOMY      EYE SURGERY      retina and cataracts     GALLBLADDER SURGERY      SPINE SURGERY  05/2020    TUBAL LIGATION       Family History   Problem Relation Age of Onset    Diabetes Mother     Heart Failure Mother     High Blood Pressure Mother     Liver Cancer Father     Colon Cancer Neg Hx     Colon Polyps Neg Hx      Social History     Tobacco Use    Smoking status: Never Smoker    Smokeless tobacco: Never Used   Substance Use Topics    Alcohol use: No      Current Outpatient Medications   Medication Sig Dispense Refill    JARDIANCE 10 MG tablet TAKE 1 TABLET BY MOUTH ONCE DAILY START JARDIANCE STOP STEGLATRO      TRADJENTA 5 MG tablet TAKE 1 TABLET BY MOUTH ONCE DAILY      furosemide (LASIX) 40 MG tablet Take 1 tablet by mouth once daily 90 tablet 0    warfarin (COUMADIN) 6 MG tablet As directed 180 tablet 3    spironolactone (ALDACTONE) 25 MG tablet Take 1 tablet by mouth daily 90 tablet 3    atorvastatin (LIPITOR) 80 MG tablet TAKE 1 TABLET BY MOUTH ONCE DAILY.  90 tablet 3    carvedilol (COREG) 12.5 MG tablet Take 1 tablet by mouth 2 times daily 180 tablet 3    digoxin (LANOXIN) 125 MCG tablet Take 1 tablet by mouth daily 90 tablet 3    sacubitril-valsartan (ENTRESTO) 24-26 MG per tablet Take 1 tablet by mouth 2 times daily 60 tablet 5    fluticasone (FLONASE) 50 MCG/ACT nasal spray 1 spray by Each Nostril route daily      loratadine (CLARITIN) 10 MG capsule Take 10 mg by mouth daily      Cyanocobalamin (VITAMIN B 12) 500 MCG TABS Take 500 mcg by mouth daily      Bed Trapeze MISC by Does not apply route 1 each 0    glipiZIDE (GLUCOTROL) 5 MG tablet TAKE 2 TABLETS BY MOUTH IN THE MORNING & 2 TABLETS IN THE EVENING 120 tablet 5    metFORMIN (GLUCOPHAGE) 500 MG tablet TAKE 2 TABLETS BY MOUTH TWICE DAILY WITH MEALS (Patient taking differently: Take 1,000 mg by mouth 2 times daily (with meals) TAKE 2 TABLETS BY MOUTH TWICE DAILY WITH MEALS) 120 tablet 5    levothyroxine (SYNTHROID) 125 MCG tablet Take 1 tablet by mouth Daily 30 tablet 5    Multiple Vitamins-Minerals (THERAPEUTIC MULTIVITAMIN-MINERALS) tablet Take 1 tablet by mouth daily.  Vitamin D (CHOLECALCIFEROL) 1000 UNITS CAPS capsule Take 1,000 Units by mouth 2 times daily       aspirin 81 MG EC tablet Take 81 mg by mouth daily. No current facility-administered medications for this visit. Allergies: Aspartame and phenylalanine, Mushroom extract complex, Penicillins, Shellfish-derived products, and Zetia [ezetimibe]    Review of Systems  Review of Systems   Constitutional: Positive for fatigue. Negative for activity change, diaphoresis, fever and unexpected weight change. HENT: Negative for facial swelling and nosebleeds. Eyes: Negative for redness and visual disturbance. Respiratory: Positive for shortness of breath (with over exertion). Negative for cough, chest tightness and wheezing. Cardiovascular: Positive for leg swelling (improving). Negative for chest pain and palpitations. Gastrointestinal: Negative for abdominal pain, nausea and vomiting. Endocrine: Negative for cold intolerance and heat intolerance. Genitourinary: Negative for dysuria and hematuria. Musculoskeletal: Positive for back pain and gait problem. Negative for arthralgias and myalgias. Complains of mobility issues, but continues to improve gradually   Skin: Negative for pallor and rash. Neurological: Negative for dizziness, seizures, syncope, weakness and light-headedness. Hematological: Does not bruise/bleed easily. Psychiatric/Behavioral: Negative for agitation. The patient is not nervous/anxious.         Objective  Vital Signs - /84   Pulse 91   Ht 5' 6\" (1.676 m)   Wt 250 lb (113.4 kg)   LMP  (LMP Unknown)   BMI 40.35 kg/m²      BP Readings from Last 3 Encounters:   06/15/22 122/84   12/14/21 122/82   08/06/21 110/72    Pulse Readings from Last 3 Encounters: 06/15/22 91   12/14/21 77   08/06/21 62        Wt Readings from Last 3 Encounters:   06/15/22 250 lb (113.4 kg)   12/14/21 242 lb (109.8 kg)   02/28/20 233 lb (105.7 kg)      Physical Exam  Vitals and nursing note reviewed. Constitutional:       General: She is not in acute distress. Appearance: She is well-developed. She is obese. She is not diaphoretic. Comments: Deconditioned   HENT:      Head: Normocephalic and atraumatic. Right Ear: Hearing and external ear normal.      Left Ear: Hearing and external ear normal.      Nose: Nose normal.   Eyes:      General:         Right eye: No discharge. Left eye: No discharge. Pupils: Pupils are equal, round, and reactive to light. Neck:      Thyroid: No thyromegaly. Vascular: No carotid bruit or JVD. Trachea: No tracheal deviation. Cardiovascular:      Rate and Rhythm: Normal rate. Rhythm irregular. Heart sounds: Normal heart sounds. No murmur heard. No friction rub. No gallop. Comments: No carotid bruit  Irregularly irregular rhythm  Pulmonary:      Effort: Pulmonary effort is normal. No respiratory distress. Breath sounds: Normal breath sounds. No wheezing or rales. Abdominal:      Palpations: Abdomen is soft. Tenderness: There is no abdominal tenderness. Musculoskeletal:         General: No swelling or deformity. Cervical back: Neck supple. No muscular tenderness. Right lower leg: Edema (1+) present. Left lower leg: Edema (1+) present. Comments: In wheelchair   Skin:     General: Skin is warm and dry. Findings: No rash. Neurological:      General: No focal deficit present. Mental Status: She is alert and oriented to person, place, and time. Cranial Nerves: No cranial nerve deficit.    Psychiatric:         Mood and Affect: Mood normal.         Behavior: Behavior normal.         Judgment: Judgment normal.       Data:  Lab Results   Component Value Date    CHOL 135 10/14/2020    TRIG 229 10/14/2020    HDL 31 (A) 10/14/2020    LDLCALC 58 10/14/2020     Lab Results   Component Value Date    ALT 32 10/14/2020    AST 20 10/14/2020     Data:  Echo 2/20/20  Summary   Mild left ventricular enlargement with global hypokinesis and an abnormal   contraction pattern [suggestive of bundle branch block] with an estimated   ejection fraction of 25-30%   Abnormal rhythm precludes assessment of diastolic function   Moderately dilated left atrium   Mildly thickened tricuspid aortic valve leaflets with adequate cusp   separation and no significant stenosis or insufficiency   Severe mitral annular calcification [particularly posteriorly] that also   involves papillary muscles with moderately thickened mitral leaflets which   demonstrated reduced mobility, no significant stenosis and mild   regurgitation   Trace pulmonic insufficiency   Moderately dilated right atrium with normal right ventricular size and   systolic function   Mild tricuspid regurgitation with RVSP estimate of 28 mmHg   Normal IVC dimensions with reduced respiratory motion consistent with   elevated right atrial filling pressures of 11-15 mmHg   No significant pericardial effusion and aortic dimensions are within   normal limits   Definity contrast utilized to better define endocardial surface    Laura 2/22/20  Impression:   There is no obvious infarct or ischemia, with a calculated ejection   fraction of 31 %. Suggest: Clinical correlation with cardiomyopathy. Signed by Dr Rayne Nam on 2/23/2020 9:50 AM     Echo 2/8/21  Summary   LV is moderately dilated with severely reduced LV systolic function. LV   ejection fraction estimated at 25 to 30%. Mid to apical septal, apical segment, mid to apical lateral and anterior   wall are severely hypokinetic. No obvious thrombus noted in left ventricle. Diastolic function not well ascertained due to rhythm. RV is normal in size with normal systolic function.    Mild to moderate left atrial enlargement. Normal right atrial size. Mitral valve with severe posterior leaflet and posterior mitral annular   calcification. No stenosis. Trace mitral regurgitation. Aortic valve is not well visualized. No significant stenosis or   regurgitation noted. No significant tricuspid regurgitation appreciated   No significant pericardial effusion. EKG shows atrial fibrillation rate 91     Assessment:     Diagnosis Orders   1. Chronic atrial fibrillation (HCC)  EKG 12 lead       Chronic atrial fibrillationrate controlled and anticoagulated without bleeding issues. Coumadin monitored via home INR monitoring with INR 2.1  today. Chronic systolic heart failure NYHA class II,  EF 25-30%-appears euvolemic on guideline directed medical therapy with carvedilol, Entresto, spironolactone. She will qualify for a biventricular defibrillator, but declines due to immobility. She understands the risk of sudden cardiac death. It is difficult for her to balance on the scale, so therefore she does not weigh regularly. Encouraged her to get back in the habit of this to monitor fluid status. Continue low-sodium diet and fluid restrictions of 6 cups daily    CADno angina symptoms, nonocclusive disease per heart cath in the past.  Nuclear stress test in February 2020 showed no evidence of ischemia    Hypertensionwell controlled on current regimen with carvedilol and Entresto    Morbid obesity/deconditioning she states she tries to follow through with some of the exercises given to her by physical therapy, but mostly is just transferring from wheelchair    Plan    Return in about 6 months (around 12/15/2022) for APRN. Continue home monitoring INR monitoring  Consider defibrillator- call the office if you xochitl;nge your mind    - Weigh daily and report weight gain of 3lbs or more in 24hrs or 5lbs in one week. - Call for increasing shortness of breath or increasing swelling in feet and legs.     (This could mean you are retaining too much fluid)  - 2000mg low sodium diet  - Fluid restriction of 1500ml per day (about 6 cups of fluid per day)    Call with any questionsor concerns  Follow up with ADELITA Liao for non cardiac problems and coumadin management  Report any new problems  Cardiovascular Fitness-Exercise as tolerated. Strive for 15 minutes of exercise most days of the week. Cardiac / HealthyDiet  Continue current medications as directed  Continue plan of treatment  It is always recommended that you bring your medicationsbottles with you to each visit - this is for your safety!        ADELITA Grande

## 2022-06-15 NOTE — PATIENT INSTRUCTIONS
Return in about 6 months (around 12/15/2022) for APRN. Continue home monitoring INR monitoring  Consider defibrillator- call the office if you xochitl;nge your mind    - Weigh daily and report weight gain of 3lbs or more in 24hrs or 5lbs in one week. - Call for increasing shortness of breath or increasing swelling in feet and legs. (This could mean you are retaining too much fluid)  - 2000mg low sodium diet  - Fluid restriction of 1500ml per day (about 6 cups of fluid per day)  Patient Education        Heart Failure: Care Instructions  Your Care Instructions     Heart failure occurs when your heart does not pump as much blood as the body needs. Failure does not mean that the heart has stopped pumping but rather that it is not pumping as well as it should. Over time, this causes fluid buildup in your lungs and other parts of your body. Fluid buildup can cause shortness of breath, fatigue, swollen ankles, and other problems. By taking medicines regularly, reducing sodium (salt) in your diet, checking your weight every day,and making lifestyle changes, you can feel better and live longer. Follow-up care is a key part of your treatment and safety. Be sure to make and go to all appointments, and call your doctor if you are having problems. It's also a good idea to know your test results and keep alist of the medicines you take. How can you care for yourself at home? Medicines     Be safe with medicines. Take your medicines exactly as prescribed. Call your doctor if you think you are having a problem with your medicine.      Do not take any vitamins, over-the-counter medicine, or herbal products without talking to your doctor first. [place] not take ibuprofen (Advil or Motrin) and naproxen (Aleve) without talking to your doctor first. They could make your heart failure worse.      You may take some of the following medicine. ?  Angiotensin-converting enzyme inhibitors (ACEIs) or angiotensin II receptor blockers (ARBs) reduce the heart's workload, lower blood pressure, and reduce swelling. Taking an ACEI or ARB may lower your chance of needing to be hospitalized. ? Beta-blockers can slow heart rate, decrease blood pressure, and improve your condition. Taking a beta-blocker may lower your chance of needing to be hospitalized. ? Diuretics, also called water pills, reduce swelling. You will get more details on the specific medicines your doctor prescribes. Diet     Your doctor may suggest that you limit sodium. Your doctor can tell you how much sodium is right for you. An example is less than 3,000 mg a day. This includes all the salt you eat in cooking or in packaged foods. People get most of their sodium from processed foods. Fast food and restaurant meals also tend to be very high in sodium.      Ask your doctor how much liquid you can drink each day. You may have to limit liquids. Weight     Weigh yourself without clothing at the same time each day. Record your weight. Call your doctor if you have a sudden weight gain, such as more than 2 to 3 pounds in a day or 5 pounds in a week. (Your doctor may suggest a different range of weight gain.) A sudden weight gain may mean that your heart failure is getting worse. Activity level     Start light exercise (if your doctor says it is okay). Even if you can only do a small amount, exercise will help you get stronger, have more energy, and manage your weight and your stress. Walking is an easy way to get exercise. Start out by walking a little more than you did before. Bit by bit, increase the amount you walk.      When you exercise, watch for signs that your heart is working too hard. You are pushing yourself too hard if you cannot talk while you are exercising.  If you become short of breath or dizzy or have chest pain, stop, sit down, and rest.      If you feel \"wiped out\" the day after you exercise, walk slower or for a shorter distance until you can work up to a better pace.      Get enough rest at night. Sleeping with 1 or 2 pillows under your upper body and head may help you breathe easier. Lifestyle changes     Do not smoke. Smoking can make a heart condition worse. If you need help quitting, talk to your doctor about stop-smoking programs and medicines. These can increase your chances of quitting for good. Quitting smoking may be the most important step you can take to protect your heart.      Limit alcohol to 2 drinks a day for men and 1 drink a day for women. Too much alcohol can cause health problems.      Avoid getting sick from colds and the flu. Get a pneumococcal vaccine shot. If you have had one before, ask your doctor whether you need another dose. Get a flu shot each year. If you must be around people with colds or the flu, wash your hands often. When should you call for help? Call 911  if you have symptoms of sudden heart failure such as:     You have severe trouble breathing.      You cough up pink, foamy mucus.      You have a new irregular or rapid heartbeat. Call your doctor now or seek immediate medical care if:     You have new or increased shortness of breath.      You are dizzy or lightheaded, or you feel like you may faint.      You have sudden weight gain, such as more than 2 to 3 pounds in a day or 5 pounds in a week. (Your doctor may suggest a different range of weight gain.)      You have increased swelling in your legs, ankles, or feet.      You are suddenly so tired or weak that you cannot do your usual activities. Watch closely for changes in your health, and be sure to contact your doctor ifyou develop new symptoms. Where can you learn more? Go to [email or web address]. org and sign in to your [employer] account. Enter D444 in the Source[employer] box to learn more about \"Heart Failure: Care Instructions. \"     If you do not have an account, please click on the \"Sign Up Now\" link.   Current as of: January 10, 6241               JZMPCXG Version: 13.2  © 2624-8235 Healthwise, Incorporated. Care instructions adapted under license by [facility] ([facility]). If you have questions about a medical condition or this instruction, always ask your healthcare professional. [address] any warranty or liability for your use of this information.

## 2022-06-29 ENCOUNTER — ANTI-COAG VISIT (OUTPATIENT)
Dept: CARDIOLOGY CLINIC | Age: 67
End: 2022-06-29
Payer: MEDICARE

## 2022-06-29 DIAGNOSIS — I48.20 CHRONIC ATRIAL FIBRILLATION (HCC): Primary | ICD-10-CM

## 2022-06-29 LAB
INTERNATIONAL NORMALIZATION RATIO, POC: 2
PROTHROMBIN TIME, POC: NORMAL

## 2022-06-29 PROCEDURE — 85610 PROTHROMBIN TIME: CPT | Performed by: CLINICAL NURSE SPECIALIST

## 2022-07-06 ENCOUNTER — ANTI-COAG VISIT (OUTPATIENT)
Dept: CARDIOLOGY CLINIC | Age: 67
End: 2022-07-06

## 2022-07-06 LAB — INR BLD: 1.9

## 2022-07-13 ENCOUNTER — ANTI-COAG VISIT (OUTPATIENT)
Dept: CARDIOLOGY CLINIC | Age: 67
End: 2022-07-13

## 2022-07-13 LAB — INR BLD: 1.8

## 2022-07-20 ENCOUNTER — ANTI-COAG VISIT (OUTPATIENT)
Dept: CARDIOLOGY CLINIC | Age: 67
End: 2022-07-20

## 2022-07-20 LAB — INR BLD: 2.2

## 2022-07-27 ENCOUNTER — ANTI-COAG VISIT (OUTPATIENT)
Dept: CARDIOLOGY CLINIC | Age: 67
End: 2022-07-27

## 2022-07-27 ENCOUNTER — ANTI-COAG VISIT (OUTPATIENT)
Dept: CARDIOLOGY CLINIC | Age: 67
End: 2022-07-27
Payer: MEDICARE

## 2022-07-27 DIAGNOSIS — I48.20 CHRONIC ATRIAL FIBRILLATION (HCC): Primary | ICD-10-CM

## 2022-07-27 LAB
INTERNATIONAL NORMALIZATION RATIO, POC: 2.3
PROTHROMBIN TIME, POC: NORMAL

## 2022-07-27 PROCEDURE — 85610 PROTHROMBIN TIME: CPT | Performed by: CLINICAL NURSE SPECIALIST

## 2022-08-04 ENCOUNTER — ANTI-COAG VISIT (OUTPATIENT)
Dept: CARDIOLOGY CLINIC | Age: 67
End: 2022-08-04

## 2022-08-04 LAB — INR BLD: 2.7

## 2022-08-10 ENCOUNTER — ANTI-COAG VISIT (OUTPATIENT)
Dept: CARDIOLOGY CLINIC | Age: 67
End: 2022-08-10

## 2022-08-10 LAB — INR BLD: 2.7

## 2022-08-17 LAB
INTERNATIONAL NORMALIZATION RATIO, POC: 3
PROTHROMBIN TIME, POC: NORMAL

## 2022-08-18 ENCOUNTER — ANTI-COAG VISIT (OUTPATIENT)
Dept: CARDIOLOGY CLINIC | Age: 67
End: 2022-08-18
Payer: MEDICARE

## 2022-08-18 DIAGNOSIS — I48.20 CHRONIC ATRIAL FIBRILLATION (HCC): Primary | ICD-10-CM

## 2022-08-18 PROCEDURE — 85610 PROTHROMBIN TIME: CPT | Performed by: CLINICAL NURSE SPECIALIST

## 2022-08-31 ENCOUNTER — ANTI-COAG VISIT (OUTPATIENT)
Dept: CARDIOLOGY CLINIC | Age: 67
End: 2022-08-31

## 2022-08-31 LAB — INR BLD: 2.7

## 2022-09-07 ENCOUNTER — ANTI-COAG VISIT (OUTPATIENT)
Dept: CARDIOLOGY CLINIC | Age: 67
End: 2022-09-07
Payer: MEDICARE

## 2022-09-07 DIAGNOSIS — I48.20 CHRONIC ATRIAL FIBRILLATION (HCC): Primary | ICD-10-CM

## 2022-09-07 LAB
INTERNATIONAL NORMALIZATION RATIO, POC: 3
PROTHROMBIN TIME, POC: NORMAL

## 2022-09-07 PROCEDURE — 85610 PROTHROMBIN TIME: CPT | Performed by: CLINICAL NURSE SPECIALIST

## 2022-09-21 ENCOUNTER — ANTI-COAG VISIT (OUTPATIENT)
Dept: CARDIOLOGY CLINIC | Age: 67
End: 2022-09-21

## 2022-09-21 LAB — INR BLD: 2.3

## 2022-09-29 ENCOUNTER — ANTI-COAG VISIT (OUTPATIENT)
Dept: CARDIOLOGY CLINIC | Age: 67
End: 2022-09-29

## 2022-09-29 LAB — INR BLD: 2.8

## 2022-10-05 ENCOUNTER — ANTI-COAG VISIT (OUTPATIENT)
Dept: CARDIOLOGY CLINIC | Age: 67
End: 2022-10-05
Payer: MEDICARE

## 2022-10-05 DIAGNOSIS — I48.20 CHRONIC ATRIAL FIBRILLATION (HCC): Primary | ICD-10-CM

## 2022-10-05 LAB
INTERNATIONAL NORMALIZATION RATIO, POC: 2.8
PROTHROMBIN TIME, POC: NORMAL

## 2022-10-05 PROCEDURE — 85610 PROTHROMBIN TIME: CPT | Performed by: CLINICAL NURSE SPECIALIST

## 2022-10-12 ENCOUNTER — ANTI-COAG VISIT (OUTPATIENT)
Dept: CARDIOLOGY CLINIC | Age: 67
End: 2022-10-12
Payer: MEDICARE

## 2022-10-12 DIAGNOSIS — I48.20 CHRONIC ATRIAL FIBRILLATION (HCC): Primary | ICD-10-CM

## 2022-10-12 LAB
INTERNATIONAL NORMALIZATION RATIO, POC: 2.9
PROTHROMBIN TIME, POC: NORMAL

## 2022-10-12 PROCEDURE — 85610 PROTHROMBIN TIME: CPT | Performed by: CLINICAL NURSE SPECIALIST

## 2022-10-19 ENCOUNTER — ANTI-COAG VISIT (OUTPATIENT)
Dept: CARDIOLOGY CLINIC | Age: 67
End: 2022-10-19

## 2022-10-19 LAB — INR BLD: 2.7

## 2022-10-27 ENCOUNTER — ANTI-COAG VISIT (OUTPATIENT)
Dept: CARDIOLOGY CLINIC | Age: 67
End: 2022-10-27

## 2022-11-02 ENCOUNTER — ANTI-COAG VISIT (OUTPATIENT)
Dept: CARDIOLOGY CLINIC | Age: 67
End: 2022-11-02
Payer: MEDICARE

## 2022-11-02 DIAGNOSIS — I48.20 CHRONIC ATRIAL FIBRILLATION (HCC): Primary | ICD-10-CM

## 2022-11-02 LAB
INTERNATIONAL NORMALIZATION RATIO, POC: 2.6
PROTHROMBIN TIME, POC: NORMAL

## 2022-11-02 PROCEDURE — 85610 PROTHROMBIN TIME: CPT | Performed by: CLINICAL NURSE SPECIALIST

## 2022-11-10 ENCOUNTER — ANTI-COAG VISIT (OUTPATIENT)
Dept: CARDIOLOGY CLINIC | Age: 67
End: 2022-11-10

## 2022-11-17 RX ORDER — SACUBITRIL AND VALSARTAN 24; 26 MG/1; MG/1
1 TABLET, FILM COATED ORAL 2 TIMES DAILY
Qty: 180 TABLET | Refills: 3 | Status: SHIPPED | OUTPATIENT
Start: 2022-11-17

## 2022-11-23 ENCOUNTER — ANTI-COAG VISIT (OUTPATIENT)
Dept: CARDIOLOGY CLINIC | Age: 67
End: 2022-11-23
Payer: MEDICARE

## 2022-11-23 DIAGNOSIS — I48.20 CHRONIC ATRIAL FIBRILLATION (HCC): Primary | ICD-10-CM

## 2022-11-23 LAB
INTERNATIONAL NORMALIZATION RATIO, POC: 2.3
PROTHROMBIN TIME, POC: NORMAL

## 2022-11-23 PROCEDURE — 85610 PROTHROMBIN TIME: CPT | Performed by: CLINICAL NURSE SPECIALIST

## 2022-11-30 ENCOUNTER — ANTI-COAG VISIT (OUTPATIENT)
Dept: CARDIOLOGY CLINIC | Age: 67
End: 2022-11-30
Payer: MEDICARE

## 2022-11-30 DIAGNOSIS — I48.20 CHRONIC ATRIAL FIBRILLATION (HCC): Primary | ICD-10-CM

## 2022-11-30 LAB
INTERNATIONAL NORMALIZATION RATIO, POC: 3.8
PROTHROMBIN TIME, POC: NORMAL

## 2022-11-30 PROCEDURE — 85610 PROTHROMBIN TIME: CPT | Performed by: CLINICAL NURSE SPECIALIST

## 2022-12-07 ENCOUNTER — ANTI-COAG VISIT (OUTPATIENT)
Dept: CARDIOLOGY CLINIC | Age: 67
End: 2022-12-07
Payer: MEDICARE

## 2022-12-07 DIAGNOSIS — I48.20 CHRONIC ATRIAL FIBRILLATION (HCC): Primary | ICD-10-CM

## 2022-12-07 LAB
INTERNATIONAL NORMALIZATION RATIO, POC: 2.2
PROTHROMBIN TIME, POC: NORMAL

## 2022-12-07 PROCEDURE — 85610 PROTHROMBIN TIME: CPT | Performed by: CLINICAL NURSE SPECIALIST

## 2022-12-08 RX ORDER — CARVEDILOL 12.5 MG/1
TABLET ORAL
Qty: 180 TABLET | Refills: 2 | Status: SHIPPED | OUTPATIENT
Start: 2022-12-08

## 2022-12-08 RX ORDER — ATORVASTATIN CALCIUM 80 MG/1
TABLET, FILM COATED ORAL
Qty: 30 TABLET | Refills: 0 | Status: SHIPPED | OUTPATIENT
Start: 2022-12-08

## 2022-12-08 RX ORDER — SPIRONOLACTONE 25 MG/1
TABLET ORAL
Qty: 90 TABLET | Refills: 2 | Status: SHIPPED | OUTPATIENT
Start: 2022-12-08

## 2022-12-14 ENCOUNTER — OFFICE VISIT (OUTPATIENT)
Dept: CARDIOLOGY CLINIC | Age: 67
End: 2022-12-14
Payer: MEDICARE

## 2022-12-14 VITALS
HEIGHT: 66 IN | HEART RATE: 56 BPM | OXYGEN SATURATION: 95 % | DIASTOLIC BLOOD PRESSURE: 74 MMHG | BODY MASS INDEX: 40.35 KG/M2 | SYSTOLIC BLOOD PRESSURE: 108 MMHG

## 2022-12-14 DIAGNOSIS — E66.01 MORBID OBESITY DUE TO EXCESS CALORIES (HCC): ICD-10-CM

## 2022-12-14 DIAGNOSIS — I50.22 CHRONIC SYSTOLIC CHF (CONGESTIVE HEART FAILURE) (HCC): Primary | ICD-10-CM

## 2022-12-14 DIAGNOSIS — I10 ESSENTIAL HYPERTENSION: ICD-10-CM

## 2022-12-14 DIAGNOSIS — I50.22 CHRONIC SYSTOLIC CHF (CONGESTIVE HEART FAILURE) (HCC): ICD-10-CM

## 2022-12-14 DIAGNOSIS — I48.20 CHRONIC ATRIAL FIBRILLATION (HCC): ICD-10-CM

## 2022-12-14 DIAGNOSIS — I25.10 MILD CAD: ICD-10-CM

## 2022-12-14 DIAGNOSIS — R53.81 PHYSICAL DECONDITIONING: ICD-10-CM

## 2022-12-14 LAB
ANION GAP SERPL CALCULATED.3IONS-SCNC: 12 MMOL/L (ref 7–19)
BUN BLDV-MCNC: 12 MG/DL (ref 8–23)
CALCIUM SERPL-MCNC: 9.5 MG/DL (ref 8.8–10.2)
CHLORIDE BLD-SCNC: 99 MMOL/L (ref 98–111)
CO2: 26 MMOL/L (ref 22–29)
CREAT SERPL-MCNC: 0.5 MG/DL (ref 0.5–0.9)
DIGOXIN LEVEL: 1 NG/ML (ref 0.6–1.2)
GFR SERPL CREATININE-BSD FRML MDRD: >60 ML/MIN/{1.73_M2}
GLUCOSE BLD-MCNC: 242 MG/DL (ref 74–109)
POTASSIUM SERPL-SCNC: 4.3 MMOL/L (ref 3.5–5)
SODIUM BLD-SCNC: 137 MMOL/L (ref 136–145)

## 2022-12-14 PROCEDURE — 3074F SYST BP LT 130 MM HG: CPT | Performed by: CLINICAL NURSE SPECIALIST

## 2022-12-14 PROCEDURE — G8484 FLU IMMUNIZE NO ADMIN: HCPCS | Performed by: CLINICAL NURSE SPECIALIST

## 2022-12-14 PROCEDURE — G8400 PT W/DXA NO RESULTS DOC: HCPCS | Performed by: CLINICAL NURSE SPECIALIST

## 2022-12-14 PROCEDURE — G8417 CALC BMI ABV UP PARAM F/U: HCPCS | Performed by: CLINICAL NURSE SPECIALIST

## 2022-12-14 PROCEDURE — 99214 OFFICE O/P EST MOD 30 MIN: CPT | Performed by: CLINICAL NURSE SPECIALIST

## 2022-12-14 PROCEDURE — 3017F COLORECTAL CA SCREEN DOC REV: CPT | Performed by: CLINICAL NURSE SPECIALIST

## 2022-12-14 PROCEDURE — 1036F TOBACCO NON-USER: CPT | Performed by: CLINICAL NURSE SPECIALIST

## 2022-12-14 PROCEDURE — 1090F PRES/ABSN URINE INCON ASSESS: CPT | Performed by: CLINICAL NURSE SPECIALIST

## 2022-12-14 PROCEDURE — 1123F ACP DISCUSS/DSCN MKR DOCD: CPT | Performed by: CLINICAL NURSE SPECIALIST

## 2022-12-14 PROCEDURE — G8427 DOCREV CUR MEDS BY ELIG CLIN: HCPCS | Performed by: CLINICAL NURSE SPECIALIST

## 2022-12-14 PROCEDURE — 3078F DIAST BP <80 MM HG: CPT | Performed by: CLINICAL NURSE SPECIALIST

## 2022-12-14 RX ORDER — DIGOXIN 125 MCG
125 TABLET ORAL DAILY
Qty: 90 TABLET | Refills: 3 | Status: SHIPPED | OUTPATIENT
Start: 2022-12-14

## 2022-12-14 ASSESSMENT — ENCOUNTER SYMPTOMS
WHEEZING: 0
FACIAL SWELLING: 0
CHEST TIGHTNESS: 0
EYE REDNESS: 0
SHORTNESS OF BREATH: 1
BACK PAIN: 1
VOMITING: 0
NAUSEA: 0
ABDOMINAL PAIN: 0
COUGH: 0

## 2022-12-14 NOTE — PROGRESS NOTES
[facility], [address], [address]  [address]  Phone: [phone]  Fax: [phone]    OFFICE VISIT:  12/14/2022    [address]: 1955    Reason For Visit:  Albert Brown is a 75 y.o. female who is here for chronic atrial fibrillation, systolic heart failure   Diagnosis Orders   1. Chronic systolic CHF (congestive heart failure) (HCC)  Digoxin Level    Basic Metabolic Panel      2. Chronic atrial fibrillation (HCC)        3. Essential hypertension        4. Physical deconditioning        5. Mild CAD        6. Morbid obesity due to excess calories (HCC)               HPI  Patient is here for follow-up for chronic atrial fibrillation, hypertension, systolic heart failure, non-occlusive CAD, morbid obesity. She is rate controlled with digoxin and metoprolol. She is anticoagulated with Coumadin and does home INR monitoring. During her hospitalization in February 2020, she was found to have a further depressed ejection fraction, down to 25-30%, previously 40%. She also had a nuclear stress test that showed no evidence of ischemia. She was to follow-up in the office to discuss the potential of a defibrillator, but never kept appts due to back surgery that occurred in May of 2020. She did not return to our office until 1/27/21    In February 2021, she was started on Entresto with echo continuing to show an ejection fraction of 25 to 30%. She has continued to decline defibrillator. She has significant mobility issues and spends most of her day in the wheelchair. She is able to walk some short distances to the bathroom now. She needs to use her arms for transferring and feels the recovery following a defibrillator with limited use of the left arm would be too difficult. She understands the risk of sudden cardiac death. She denies orthopnea, PND, or sudden weight gain. She refuses to weigh in the office. She gets up on her scale at home about once per month. She states she is losing weight. Her diabetes control is improving. .  It is difficult for her to get up on the scale. She reports her knees are \"bone-on-bone. \"  She does have some mild, chronic lower extremity edema which is unchanged. She states she got a letter from the drug company that supplies her [employer] that her application for the year had some missing signatures. She wants to know about getting completed    Patient has had the flu recently with cough and congestion. She feels she is recovering. She wants to know what she can take for her sinus congestion    ADELITA Cruz is PCP.   Minh Combs has the following history as [facility] in [facility] [place]:    Patient Active Problem List    Diagnosis Date Noted    History of back surgery 07/08/2020    Intervertebral thoracic disc disorder with myelopathy, thoracic region 07/08/2020    Generalized weakness 03/02/2020    Palliative care patient 02/21/2020    Stroke-like symptoms 02/20/2020    Chronic atrial fibrillation 05/21/2018    Acquired hypothyroidism 10/17/2017    Morbid obesity due to excess calories ([address] 09/26/2017    Vitamin D deficiency 34/16/4610    Systolic congestive heart failure ([address] 09/26/2017    H/O bone density study 09/26/2017    Mixed hyperlipidemia 09/26/2017    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    Type 2 diabetes mellitus with microalbuminuria, without long-term current use of insulin ([address] 09/26/2017    Essential hypertension     Mild CAD      Past Medical History:   Diagnosis Date    Acute laryngitis     Acute sinusitis     Allergic rhinitis     Ankle pain     Asthma     Asthmatic bronchitis     Atrial fibrillation ([address] 9/13/12, 10/9/13    cardioversion    Atrial flutter (HCC)     paroxysmal     CAD (coronary artery disease)     Cerebral artery occlusion with cerebral infarction ([address]     2006    CHF (congestive heart failure) (HCC)     Chronic back pain     Cough     Diabetes     Dizzy     Dysuria     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     Fatigue     Foot pain     Hx of amiodarone therapy 9/24/2014    Hyperlipidemia     PCP manages cholesterol    Hypertension     Menopause     Obesity     Palliative care patient 02/21/2020    Skin rash     Type II or unspecified type diabetes mellitus without mention of complication, not stated as uncontrolled     Umbilical hernia     URI (upper respiratory infection)      Past Surgical History:   Procedure Laterality Date    CARDIAC CATHETERIZATION  10/21/2009    EF 35% left ventricle is moderately enlarged     CARDIOVERSION  10/09/2013    CHOLECYSTECTOMY      EYE SURGERY      retina and cataracts     GALLBLADDER SURGERY      SPINE SURGERY  05/2020    TUBAL LIGATION       Family History   Problem Relation Age of Onset    Diabetes Mother     Heart Failure Mother     High Blood Pressure Mother     Liver Cancer Father     Colon Cancer Neg Hx     Colon Polyps Neg Hx      Social History     Tobacco Use    Smoking status: Never    Smokeless tobacco: Never   Substance Use Topics    Alcohol use: No      Current Outpatient Medications   Medication Sig Dispense Refill    digoxin (LANOXIN) 125 MCG tablet Take 1 tablet by mouth daily 90 tablet 3    carvedilol (COREG) 12.5 MG tablet Take 1 tablet by mouth twice daily 180 tablet 2    spironolactone (ALDACTONE) 25 MG tablet Take 1 tablet by mouth once daily 90 tablet 2    atorvastatin (LIPITOR) 80 MG tablet Take 1 tablet by mouth once daily 30 tablet 0    sacubitril-valsartan (ENTRESTO) 24-26 MG per tablet Take 1 tablet by mouth 2 times daily 180 tablet 3    JARDIANCE 10 MG tablet TAKE 1 TABLET BY MOUTH ONCE DAILY START JARDIANCE STOP STEGLATRO      TRADJENTA 5 MG tablet TAKE 1 TABLET BY MOUTH ONCE DAILY      furosemide (LASIX) 40 MG tablet Take 1 tablet by mouth once daily 90 tablet 0    warfarin (COUMADIN) 6 MG tablet As directed 180 tablet 3    fluticasone (FLONASE) 50 MCG/ACT nasal spray 1 spray by Each Nostril route daily      loratadine (CLARITIN) 10 MG capsule Take 10 mg by mouth daily      Cyanocobalamin (VITAMIN B 12) 500 MCG TABS Take 500 mcg by mouth daily      Bed Trapeze MISC by Does not apply route 1 each 0    glipiZIDE (GLUCOTROL) 5 MG tablet TAKE 2 TABLETS BY MOUTH IN THE MORNING & 2 TABLETS IN THE EVENING 120 tablet 5    metFORMIN (GLUCOPHAGE) 500 MG tablet TAKE 2 TABLETS BY MOUTH TWICE DAILY WITH MEALS (Patient taking differently: Take 1,000 mg by mouth 2 times daily (with meals) TAKE 2 TABLETS BY MOUTH TWICE DAILY WITH MEALS) 120 tablet 5    levothyroxine (SYNTHROID) 125 MCG tablet Take 1 tablet by mouth Daily 30 tablet 5    Multiple Vitamins-Minerals (THERAPEUTIC MULTIVITAMIN-MINERALS) tablet Take 1 tablet by mouth daily. Vitamin D (CHOLECALCIFEROL) 1000 UNITS CAPS capsule Take 1,000 Units by mouth 2 times daily       aspirin 81 MG EC tablet Take 81 mg by mouth daily. No current facility-administered medications for this visit. Allergies: Aspartame and phenylalanine, Mushroom extract complex, Penicillins, Shellfish-derived products, and Zetia [ezetimibe]    Review of Systems  Review of Systems   Constitutional:  Positive for fatigue. Negative for activity change, diaphoresis, fever and unexpected weight change. HENT:  Positive for congestion. Negative for facial swelling and nosebleeds. Eyes:  Negative for redness and visual disturbance. Respiratory:  Positive for shortness of breath (with over exertion). Negative for cough, chest tightness and wheezing. Cardiovascular:  Positive for leg swelling (improving). Negative for chest pain and palpitations. Gastrointestinal:  Negative for abdominal pain, nausea and vomiting. Endocrine: Negative for cold intolerance and heat intolerance. Genitourinary:  Negative for dysuria and hematuria. Musculoskeletal:  Positive for back pain and gait problem. Negative for arthralgias and myalgias.         Complains of mobility issues, but continues to improve gradually   Skin:  Negative for pallor and rash. Neurological:  Negative for dizziness, seizures, syncope, weakness and light-headedness. Hematological:  Does not bruise/bleed easily. Psychiatric/Behavioral:  Negative for agitation. The patient is not nervous/anxious. Objective  Vital Signs - /74   Pulse 56   Ht 5' 6\" (1.676 m)   LMP  (LMP Unknown)   SpO2 95%   BMI 40.35 kg/m²      BP Readings from Last 3 Encounters:   12/14/22 108/74   06/15/22 122/84   12/14/21 122/82    Pulse Readings from Last 3 Encounters:   12/14/22 56   06/15/22 91   12/14/21 77        Wt Readings from Last 3 Encounters:   06/15/22 250 lb (113.4 kg)   12/14/21 242 lb (109.8 kg)   02/28/20 233 lb (105.7 kg)      Physical Exam  Vitals and nursing note reviewed. Constitutional:       General: She is not in acute distress. Appearance: She is well-developed. She is obese. She is not diaphoretic. Comments: Deconditioned   HENT:      Head: Normocephalic and atraumatic. Right Ear: Hearing and external ear normal.      Left Ear: Hearing and external ear normal.      Nose: Nose normal.   Eyes:      General:         Right eye: No discharge. Left eye: No discharge. Pupils: Pupils are equal, round, and reactive to light. Neck:      Thyroid: No thyromegaly. Vascular: No carotid bruit or JVD. Trachea: No tracheal deviation. Cardiovascular:      Rate and Rhythm: Normal rate. Rhythm irregular. Heart sounds: Normal heart sounds. No murmur heard. No friction rub. No gallop. Comments: No carotid bruit  Irregularly irregular rhythm  Pulmonary:      Effort: Pulmonary effort is normal. No respiratory distress. Breath sounds: Normal breath sounds. No wheezing or rales. Abdominal:      Palpations: Abdomen is soft. Tenderness: There is no abdominal tenderness. Musculoskeletal:         General: No swelling or deformity. Cervical back: Neck supple. No muscular tenderness.       Right lower leg: Edema (trace) present. Left lower leg: Edema (trace) present. Comments: In wheelchair   Skin:     General: Skin is warm and dry. Findings: No rash. Neurological:      General: No focal deficit present. Mental Status: She is alert and oriented to person, place, and time. Cranial Nerves: No cranial nerve deficit. Psychiatric:         Mood and Affect: Mood normal.         Behavior: Behavior normal.         Judgment: Judgment normal.     Data:  Lab Results   Component Value Date    CHOL 135 10/14/2020    TRIG 229 10/14/2020    HDL 31 (A) 10/14/2020    LDLCALC 58 10/14/2020     Lab Results   Component Value Date    ALT 32 10/14/2020    AST 20 10/14/2020     Data:  Echo 2/20/20  Summary   Mild left ventricular enlargement with global hypokinesis and an abnormal   contraction pattern [suggestive of bundle branch block] with an estimated   ejection fraction of 25-30%   Abnormal rhythm precludes assessment of diastolic function   Moderately dilated left atrium   Mildly thickened tricuspid aortic valve leaflets with adequate cusp   separation and no significant stenosis or insufficiency   Severe mitral annular calcification [particularly posteriorly] that also   involves papillary muscles with moderately thickened mitral leaflets which   demonstrated reduced mobility, no significant stenosis and mild   regurgitation   Trace pulmonic insufficiency   Moderately dilated right atrium with normal right ventricular size and   systolic function   Mild tricuspid regurgitation with RVSP estimate of 28 mmHg   Normal IVC dimensions with reduced respiratory motion consistent with   elevated right atrial filling pressures of 11-15 mmHg   No significant pericardial effusion and aortic dimensions are within   normal limits   Definity contrast utilized to better define endocardial surface    Laura 2/22/20  Impression:   There is no obvious infarct or ischemia, with a calculated ejection   fraction of 31 %.    Suggest: Clinical correlation with cardiomyopathy. Signed by Dr Ananth Oneill on 2/23/2020 9:50 AM     Echo 2/8/21  Summary   LV is moderately dilated with severely reduced LV systolic function. LV   ejection fraction estimated at 25 to 30%. Mid to apical septal, apical segment, mid to apical lateral and anterior   wall are severely hypokinetic. No obvious thrombus noted in left ventricle. Diastolic function not well ascertained due to rhythm. RV is normal in size with normal systolic function. Mild to moderate left atrial enlargement. Normal right atrial size. Mitral valve with severe posterior leaflet and posterior mitral annular   calcification. No stenosis. Trace mitral regurgitation. Aortic valve is not well visualized. No significant stenosis or   regurgitation noted. No significant tricuspid regurgitation appreciated   No significant pericardial effusion. Assessment:     Diagnosis Orders   1. Chronic systolic CHF (congestive heart failure) ([facility])  Digoxin Level    Basic Metabolic Panel      2. Chronic atrial fibrillation ([facility])        3. Essential hypertension        4. Physical deconditioning        5. Mild CAD        6. Morbid obesity due to excess calories ([facility])            Chronic systolic heart failure NYHA class II,  EF 25-30%- appears euvolemic on guideline directed medical therapy with carvedilol, Entresto, spironolactone, jardiance. She will qualify for a biventricular defibrillator, but declines due to immobility. She understands the risk of sudden cardiac death. It is difficult for her to balance on the scale, so therefore she does not weigh regularly. Encouraged her to get back in the habit of this to monitor fluid status. Continue low-sodium diet and fluid restrictions of 6 cups daily    Chronic atrial fibrillation-rate controlled and anticoagulated without bleeding issues.  Coumadin monitored via home INR monitoring and is staying in the 2-3 range per her report     CAD-no angina symptoms, nonocclusive disease per heart cath in the past.  Nuclear stress test in February 2020 showed no evidence of ischemia    Hypertension-well controlled on current regimen with carvedilol and Entresto    Morbid obesity/deconditioning -she states she tries to follow through with some of the exercises given to her by physical therapy. She is walking back and forth to the bathroom with her walker and can walk approximately 20 feet before tiring. He has made some dietary changes and feels she has lost some weight    Plan    Return in about 6 months (around 6/14/2023) for ADELITA. , Dr Quispe Overcast 12mo  Check digoxin level, BMP today    Paperwork for [facility] assistance was completed by Annette Ferreira in [address] last week    May take antihistamines for sinus congestion/ allergies such as Zyrtec, Claritin, Allegra, and Benadryl. Avoid decongestants such as Sudafed that may elevate blood pressure and heart rate. Continue home monitoring INR monitoring  Consider defibrillator- call the office if you xochitl;nge your mind    - Weigh daily and report weight gain of 3lbs or more in 24hrs or 5lbs in one week. - Call for increasing shortness of breath or increasing swelling in feet and legs. (This could mean you are retaining too much fluid)  - 2000mg low sodium diet  - Fluid restriction of 1500ml per day (about 6 cups of fluid per day)    Call with any questionsor concerns  Follow up with ADELITA Webber for non cardiac problems and coumadin management  Report any new problems  Cardiovascular Fitness-Exercise as tolerated. Strive for 15 minutes of exercise most days of the week. Cardiac / HealthyDiet  Continue current medications as directed  Continue plan of treatment  It is always recommended that you bring your medicationsbottles with you to each visit - this is for your safety!        Cristine Denver, APRN

## 2022-12-14 NOTE — PATIENT INSTRUCTIONS
Return in about 6 months (around 6/14/2023) for Dr William MASTERSON 12mo    May take antihistamines for sinus congestion/ allergies such as Zyrtec, Claritin, Allegra, and Benadryl. Avoid decongestants such as Sudafed that may elevate blood pressure and heart rate. Continue home monitoring INR monitoring  Consider defibrillator- call the office if you xochitl;nge your mind    - Weigh daily and report weight gain of 3lbs or more in 24hrs or 5lbs in one week. - Call for increasing shortness of breath or increasing swelling in feet and legs.     (This could mean you are retaining too much fluid)  - 2000mg low sodium diet  - Fluid restriction of 1500ml per day (about 6 cups of fluid per day)

## 2022-12-15 ENCOUNTER — ANTI-COAG VISIT (OUTPATIENT)
Dept: CARDIOLOGY CLINIC | Age: 67
End: 2022-12-15
Payer: MEDICARE

## 2022-12-15 DIAGNOSIS — I48.20 CHRONIC ATRIAL FIBRILLATION (HCC): Primary | ICD-10-CM

## 2022-12-15 LAB
INTERNATIONAL NORMALIZATION RATIO, POC: 2.7
PROTHROMBIN TIME, POC: NORMAL

## 2022-12-15 PROCEDURE — 85610 PROTHROMBIN TIME: CPT | Performed by: CLINICAL NURSE SPECIALIST

## 2022-12-21 ENCOUNTER — ANTI-COAG VISIT (OUTPATIENT)
Dept: CARDIOLOGY CLINIC | Age: 67
End: 2022-12-21

## 2022-12-21 LAB — INR BLD: 3

## 2022-12-28 ENCOUNTER — ANTI-COAG VISIT (OUTPATIENT)
Dept: CARDIOLOGY CLINIC | Age: 67
End: 2022-12-28

## 2022-12-28 LAB — INR BLD: 2.1

## 2023-01-01 DIAGNOSIS — R06.02 SHORTNESS OF BREATH: ICD-10-CM

## 2023-01-01 DIAGNOSIS — E87.1 HYPONATREMIA: ICD-10-CM

## 2023-01-01 LAB
ALBUMIN SERPL-MCNC: 3.4 G/DL (ref 3.5–5.2)
ALP SERPL-CCNC: 55 U/L (ref 35–104)
ALT SERPL-CCNC: 22 U/L (ref 5–33)
ANION GAP SERPL CALCULATED.3IONS-SCNC: 13 MMOL/L (ref 7–19)
AST SERPL-CCNC: 23 U/L (ref 5–32)
BILIRUB SERPL-MCNC: 0.9 MG/DL (ref 0.2–1.2)
BNP BLD-MCNC: 598 PG/ML (ref 0–124)
BUN SERPL-MCNC: 9 MG/DL (ref 8–23)
CALCIUM SERPL-MCNC: 9.6 MG/DL (ref 8.8–10.2)
CHLORIDE SERPL-SCNC: 85 MMOL/L (ref 98–111)
CO2 SERPL-SCNC: 26 MMOL/L (ref 22–29)
CREAT SERPL-MCNC: 0.4 MG/DL (ref 0.5–0.9)
GLUCOSE SERPL-MCNC: 185 MG/DL (ref 74–109)
POTASSIUM SERPL-SCNC: 4.4 MMOL/L (ref 3.5–5)
PROT SERPL-MCNC: 6.2 G/DL (ref 6.6–8.7)
SODIUM SERPL-SCNC: 124 MMOL/L (ref 136–145)

## 2023-01-11 ENCOUNTER — ANTI-COAG VISIT (OUTPATIENT)
Dept: CARDIOLOGY CLINIC | Age: 68
End: 2023-01-11

## 2023-01-11 LAB — INR BLD: 2.5

## 2023-01-11 RX ORDER — FUROSEMIDE 40 MG/1
TABLET ORAL
Qty: 90 TABLET | Refills: 3 | Status: SHIPPED | OUTPATIENT
Start: 2023-01-11

## 2023-01-19 ENCOUNTER — ANTI-COAG VISIT (OUTPATIENT)
Dept: CARDIOLOGY CLINIC | Age: 68
End: 2023-01-19
Payer: MEDICARE

## 2023-01-19 DIAGNOSIS — I48.20 CHRONIC ATRIAL FIBRILLATION (HCC): Primary | ICD-10-CM

## 2023-01-19 LAB
INTERNATIONAL NORMALIZATION RATIO, POC: 2.9
PROTHROMBIN TIME, POC: NORMAL

## 2023-01-19 PROCEDURE — 85610 PROTHROMBIN TIME: CPT | Performed by: CLINICAL NURSE SPECIALIST

## 2023-01-23 RX ORDER — SACUBITRIL AND VALSARTAN 24; 26 MG/1; MG/1
1 TABLET, FILM COATED ORAL 2 TIMES DAILY
Qty: 180 TABLET | Refills: 3 | Status: SHIPPED | OUTPATIENT
Start: 2023-01-23

## 2023-01-25 ENCOUNTER — ANTI-COAG VISIT (OUTPATIENT)
Dept: CARDIOLOGY CLINIC | Age: 68
End: 2023-01-25
Payer: MEDICARE

## 2023-01-25 DIAGNOSIS — I48.20 CHRONIC ATRIAL FIBRILLATION (HCC): Primary | ICD-10-CM

## 2023-01-25 LAB
INTERNATIONAL NORMALIZATION RATIO, POC: 2.5
PROTHROMBIN TIME, POC: NORMAL

## 2023-01-25 PROCEDURE — 85610 PROTHROMBIN TIME: CPT | Performed by: CLINICAL NURSE SPECIALIST

## 2023-02-01 ENCOUNTER — ANTI-COAG VISIT (OUTPATIENT)
Dept: CARDIOLOGY CLINIC | Age: 68
End: 2023-02-01
Payer: MEDICARE

## 2023-02-01 DIAGNOSIS — I48.20 CHRONIC ATRIAL FIBRILLATION (HCC): Primary | ICD-10-CM

## 2023-02-01 LAB
INTERNATIONAL NORMALIZATION RATIO, POC: 2.7
PROTHROMBIN TIME, POC: NORMAL

## 2023-02-01 PROCEDURE — 85610 PROTHROMBIN TIME: CPT | Performed by: CLINICAL NURSE SPECIALIST

## 2023-02-13 ENCOUNTER — ANTI-COAG VISIT (OUTPATIENT)
Dept: CARDIOLOGY CLINIC | Age: 68
End: 2023-02-13

## 2023-02-13 LAB — INR BLD: 2.7

## 2023-02-15 ENCOUNTER — ANTI-COAG VISIT (OUTPATIENT)
Dept: CARDIOLOGY CLINIC | Age: 68
End: 2023-02-15
Payer: MEDICARE

## 2023-02-15 DIAGNOSIS — I48.20 CHRONIC ATRIAL FIBRILLATION (HCC): Primary | ICD-10-CM

## 2023-02-15 LAB
INTERNATIONAL NORMALIZATION RATIO, POC: 2.2
PROTHROMBIN TIME, POC: NORMAL

## 2023-02-15 PROCEDURE — 93793 ANTICOAG MGMT PT WARFARIN: CPT | Performed by: CLINICAL NURSE SPECIALIST

## 2023-02-16 RX ORDER — SACUBITRIL AND VALSARTAN 24; 26 MG/1; MG/1
1 TABLET, FILM COATED ORAL 2 TIMES DAILY
Qty: 180 TABLET | Refills: 3 | Status: SHIPPED | OUTPATIENT
Start: 2023-02-16

## 2023-02-23 RX ORDER — ATORVASTATIN CALCIUM 80 MG/1
TABLET, FILM COATED ORAL
Qty: 30 TABLET | Refills: 0 | OUTPATIENT
Start: 2023-02-23

## 2023-02-23 RX ORDER — WARFARIN SODIUM 6 MG/1
TABLET ORAL
Qty: 96 TABLET | Refills: 0 | Status: SHIPPED | OUTPATIENT
Start: 2023-02-23

## 2023-03-01 ENCOUNTER — ANTI-COAG VISIT (OUTPATIENT)
Dept: CARDIOLOGY CLINIC | Age: 68
End: 2023-03-01

## 2023-03-01 LAB — INR BLD: 2.3

## 2023-03-08 LAB
INTERNATIONAL NORMALIZATION RATIO, POC: 2.8
PROTHROMBIN TIME, POC: NORMAL

## 2023-03-09 RX ORDER — ATORVASTATIN CALCIUM 80 MG/1
TABLET, FILM COATED ORAL
Qty: 30 TABLET | Refills: 0 | OUTPATIENT
Start: 2023-03-09

## 2023-03-10 ENCOUNTER — ANTI-COAG VISIT (OUTPATIENT)
Dept: CARDIOLOGY CLINIC | Age: 68
End: 2023-03-10
Payer: MEDICARE

## 2023-03-10 DIAGNOSIS — I48.20 CHRONIC ATRIAL FIBRILLATION (HCC): Primary | ICD-10-CM

## 2023-03-10 PROCEDURE — 85610 PROTHROMBIN TIME: CPT | Performed by: CLINICAL NURSE SPECIALIST

## 2023-03-15 ENCOUNTER — ANTI-COAG VISIT (OUTPATIENT)
Dept: CARDIOLOGY CLINIC | Age: 68
End: 2023-03-15
Payer: MEDICARE

## 2023-03-15 DIAGNOSIS — I48.20 CHRONIC ATRIAL FIBRILLATION (HCC): Primary | ICD-10-CM

## 2023-03-15 LAB
INTERNATIONAL NORMALIZATION RATIO, POC: 2.7
PROTHROMBIN TIME, POC: NORMAL

## 2023-03-15 PROCEDURE — 85610 PROTHROMBIN TIME: CPT | Performed by: CLINICAL NURSE SPECIALIST

## 2023-03-27 ENCOUNTER — ANTI-COAG VISIT (OUTPATIENT)
Dept: CARDIOLOGY CLINIC | Age: 68
End: 2023-03-27

## 2023-03-27 LAB — INR BLD: 2.3

## 2023-03-29 ENCOUNTER — ANTI-COAG VISIT (OUTPATIENT)
Dept: CARDIOLOGY CLINIC | Age: 68
End: 2023-03-29

## 2023-03-29 LAB — INR BLD: 2.5

## 2023-04-05 ENCOUNTER — ANTI-COAG VISIT (OUTPATIENT)
Dept: CARDIOLOGY CLINIC | Age: 68
End: 2023-04-05
Payer: MEDICARE

## 2023-04-05 DIAGNOSIS — I48.20 CHRONIC ATRIAL FIBRILLATION (HCC): Primary | ICD-10-CM

## 2023-04-05 LAB
INTERNATIONAL NORMALIZATION RATIO, POC: 2.1
PROTHROMBIN TIME, POC: NORMAL

## 2023-04-05 PROCEDURE — 85610 PROTHROMBIN TIME: CPT | Performed by: NURSE PRACTITIONER

## 2023-04-20 ENCOUNTER — ANTI-COAG VISIT (OUTPATIENT)
Dept: CARDIOLOGY CLINIC | Age: 68
End: 2023-04-20
Payer: MEDICARE

## 2023-04-20 DIAGNOSIS — I48.20 CHRONIC ATRIAL FIBRILLATION (HCC): Primary | ICD-10-CM

## 2023-04-20 LAB
INTERNATIONAL NORMALIZATION RATIO, POC: 2
PROTHROMBIN TIME, POC: NORMAL

## 2023-04-20 PROCEDURE — 85610 PROTHROMBIN TIME: CPT | Performed by: CLINICAL NURSE SPECIALIST

## 2023-04-26 ENCOUNTER — ANTI-COAG VISIT (OUTPATIENT)
Dept: CARDIOLOGY CLINIC | Age: 68
End: 2023-04-26
Payer: MEDICARE

## 2023-04-26 DIAGNOSIS — I48.20 CHRONIC ATRIAL FIBRILLATION (HCC): Primary | ICD-10-CM

## 2023-04-26 LAB
INTERNATIONAL NORMALIZATION RATIO, POC: 3.1
PROTHROMBIN TIME, POC: NORMAL

## 2023-04-26 PROCEDURE — 85610 PROTHROMBIN TIME: CPT | Performed by: CLINICAL NURSE SPECIALIST

## 2023-05-03 ENCOUNTER — ANTI-COAG VISIT (OUTPATIENT)
Dept: CARDIOLOGY CLINIC | Age: 68
End: 2023-05-03
Payer: MEDICARE

## 2023-05-03 DIAGNOSIS — I48.20 CHRONIC ATRIAL FIBRILLATION (HCC): Primary | ICD-10-CM

## 2023-05-03 LAB
INTERNATIONAL NORMALIZATION RATIO, POC: 2.6
PROTHROMBIN TIME, POC: NORMAL

## 2023-05-03 PROCEDURE — 85610 PROTHROMBIN TIME: CPT | Performed by: CLINICAL NURSE SPECIALIST

## 2023-05-10 ENCOUNTER — ANTI-COAG VISIT (OUTPATIENT)
Dept: CARDIOLOGY CLINIC | Age: 68
End: 2023-05-10

## 2023-05-10 LAB — INR BLD: 2.5

## 2023-05-17 LAB — INR BLD: 2.5

## 2023-05-18 ENCOUNTER — ANTI-COAG VISIT (OUTPATIENT)
Dept: CARDIOLOGY CLINIC | Age: 68
End: 2023-05-18
Payer: MEDICARE

## 2023-05-18 DIAGNOSIS — I48.20 CHRONIC ATRIAL FIBRILLATION (HCC): Primary | ICD-10-CM

## 2023-05-18 PROCEDURE — 93793 ANTICOAG MGMT PT WARFARIN: CPT | Performed by: NURSE PRACTITIONER

## 2023-05-24 ENCOUNTER — ANTI-COAG VISIT (OUTPATIENT)
Dept: CARDIOLOGY CLINIC | Age: 68
End: 2023-05-24

## 2023-05-24 LAB — INR BLD: 2.8

## 2023-06-01 ENCOUNTER — ANTI-COAG VISIT (OUTPATIENT)
Dept: CARDIOLOGY CLINIC | Age: 68
End: 2023-06-01

## 2023-06-01 LAB — INR BLD: 2.6

## 2023-06-21 LAB — INR BLD: 2.3

## 2023-06-22 ENCOUNTER — ANTI-COAG VISIT (OUTPATIENT)
Dept: CARDIOLOGY CLINIC | Age: 68
End: 2023-06-22
Payer: MEDICARE

## 2023-06-22 DIAGNOSIS — I48.20 CHRONIC ATRIAL FIBRILLATION (HCC): Primary | ICD-10-CM

## 2023-06-22 PROCEDURE — 93793 ANTICOAG MGMT PT WARFARIN: CPT | Performed by: CLINICAL NURSE SPECIALIST

## 2023-06-22 RX ORDER — WARFARIN SODIUM 6 MG/1
TABLET ORAL
Qty: 96 TABLET | Refills: 2 | Status: SHIPPED | OUTPATIENT
Start: 2023-06-22

## 2023-06-26 ENCOUNTER — HOSPITAL ENCOUNTER (OUTPATIENT)
Dept: NON INVASIVE DIAGNOSTICS | Age: 68
Discharge: HOME | End: 2023-06-26
Attending: INTERNAL MEDICINE
Payer: MEDICARE

## 2023-06-26 DIAGNOSIS — I48.20 CHRONIC ATRIAL FIBRILLATION (HCC): ICD-10-CM

## 2023-06-26 DIAGNOSIS — I10 ESSENTIAL HYPERTENSION: ICD-10-CM

## 2023-06-26 DIAGNOSIS — I50.22 CHRONIC SYSTOLIC CHF (CONGESTIVE HEART FAILURE) (HCC): ICD-10-CM

## 2023-06-26 LAB
LV EF: 20 %
LVEF MODALITY: NORMAL

## 2023-06-26 PROCEDURE — C8929 TTE W OR WO FOL WCON,DOPPLER: HCPCS

## 2023-06-26 PROCEDURE — 6360000004 HC RX CONTRAST MEDICATION: Performed by: INTERNAL MEDICINE

## 2023-06-26 RX ADMIN — PERFLUTREN 1.5 ML: 6.52 INJECTION, SUSPENSION INTRAVENOUS at 08:58

## 2023-06-28 ENCOUNTER — ANTI-COAG VISIT (OUTPATIENT)
Dept: CARDIOLOGY CLINIC | Age: 68
End: 2023-06-28

## 2023-06-28 LAB — INR BLD: 2.6

## 2023-07-05 ENCOUNTER — ANTI-COAG VISIT (OUTPATIENT)
Dept: CARDIOLOGY CLINIC | Age: 68
End: 2023-07-05
Payer: MEDICARE

## 2023-07-05 DIAGNOSIS — I48.20 CHRONIC ATRIAL FIBRILLATION (HCC): Primary | ICD-10-CM

## 2023-07-05 LAB
INTERNATIONAL NORMALIZATION RATIO, POC: 3.1
PROTHROMBIN TIME, POC: NORMAL

## 2023-07-05 PROCEDURE — 93793 ANTICOAG MGMT PT WARFARIN: CPT | Performed by: CLINICAL NURSE SPECIALIST

## 2023-07-12 ENCOUNTER — ANTI-COAG VISIT (OUTPATIENT)
Dept: CARDIOLOGY CLINIC | Age: 68
End: 2023-07-12
Payer: MEDICARE

## 2023-07-12 DIAGNOSIS — I48.20 CHRONIC ATRIAL FIBRILLATION (HCC): Primary | ICD-10-CM

## 2023-07-12 LAB
INTERNATIONAL NORMALIZATION RATIO, POC: 3
PROTHROMBIN TIME, POC: NORMAL

## 2023-07-12 PROCEDURE — 93793 ANTICOAG MGMT PT WARFARIN: CPT | Performed by: CLINICAL NURSE SPECIALIST

## 2023-07-19 ENCOUNTER — ANTI-COAG VISIT (OUTPATIENT)
Dept: CARDIOLOGY CLINIC | Age: 68
End: 2023-07-19
Payer: MEDICARE

## 2023-07-19 DIAGNOSIS — I48.20 CHRONIC ATRIAL FIBRILLATION (HCC): Primary | ICD-10-CM

## 2023-07-19 LAB — INR BLD: 3

## 2023-07-19 PROCEDURE — 93793 ANTICOAG MGMT PT WARFARIN: CPT | Performed by: CLINICAL NURSE SPECIALIST

## 2023-07-26 ENCOUNTER — ANTI-COAG VISIT (OUTPATIENT)
Dept: CARDIOLOGY CLINIC | Age: 68
End: 2023-07-26
Payer: MEDICARE

## 2023-07-26 DIAGNOSIS — I48.20 CHRONIC ATRIAL FIBRILLATION (HCC): Primary | ICD-10-CM

## 2023-07-26 LAB
INTERNATIONAL NORMALIZATION RATIO, POC: 3.2
PROTHROMBIN TIME, POC: NORMAL

## 2023-07-26 PROCEDURE — 93793 ANTICOAG MGMT PT WARFARIN: CPT | Performed by: CLINICAL NURSE SPECIALIST

## 2023-08-02 ENCOUNTER — ANTI-COAG VISIT (OUTPATIENT)
Dept: CARDIOLOGY CLINIC | Age: 68
End: 2023-08-02
Payer: MEDICARE

## 2023-08-02 DIAGNOSIS — I48.20 CHRONIC ATRIAL FIBRILLATION (HCC): Primary | ICD-10-CM

## 2023-08-02 LAB
INTERNATIONAL NORMALIZATION RATIO, POC: 3.3
PROTHROMBIN TIME, POC: NORMAL

## 2023-08-02 PROCEDURE — 93793 ANTICOAG MGMT PT WARFARIN: CPT | Performed by: CLINICAL NURSE SPECIALIST

## 2023-08-10 ENCOUNTER — ANTI-COAG VISIT (OUTPATIENT)
Dept: CARDIOLOGY CLINIC | Age: 68
End: 2023-08-10
Payer: MEDICARE

## 2023-08-10 DIAGNOSIS — I48.20 CHRONIC ATRIAL FIBRILLATION (HCC): Primary | ICD-10-CM

## 2023-08-10 LAB
INTERNATIONAL NORMALIZATION RATIO, POC: 2.7
PROTHROMBIN TIME, POC: NORMAL

## 2023-08-10 PROCEDURE — 93793 ANTICOAG MGMT PT WARFARIN: CPT | Performed by: CLINICAL NURSE SPECIALIST

## 2023-08-17 ENCOUNTER — ANTI-COAG VISIT (OUTPATIENT)
Dept: CARDIOLOGY CLINIC | Age: 68
End: 2023-08-17

## 2023-08-17 LAB — INR BLD: 2.6

## 2023-08-23 ENCOUNTER — ANTI-COAG VISIT (OUTPATIENT)
Dept: CARDIOLOGY CLINIC | Age: 68
End: 2023-08-23
Payer: MEDICARE

## 2023-08-23 DIAGNOSIS — I48.20 CHRONIC ATRIAL FIBRILLATION (HCC): Primary | ICD-10-CM

## 2023-08-23 LAB
INTERNATIONAL NORMALIZATION RATIO, POC: 2.1
PROTHROMBIN TIME, POC: NORMAL

## 2023-08-23 PROCEDURE — 93793 ANTICOAG MGMT PT WARFARIN: CPT | Performed by: CLINICAL NURSE SPECIALIST

## 2023-08-30 ENCOUNTER — ANTI-COAG VISIT (OUTPATIENT)
Dept: CARDIOLOGY CLINIC | Age: 68
End: 2023-08-30

## 2023-08-30 LAB — INR BLD: 1.8

## 2023-09-13 RX ORDER — SPIRONOLACTONE 25 MG/1
TABLET ORAL
Qty: 90 TABLET | Refills: 3 | Status: SHIPPED | OUTPATIENT
Start: 2023-09-13

## 2023-09-13 RX ORDER — CARVEDILOL 12.5 MG/1
TABLET ORAL
Qty: 180 TABLET | Refills: 3 | Status: SHIPPED | OUTPATIENT
Start: 2023-09-13

## 2023-09-20 ENCOUNTER — ANTI-COAG VISIT (OUTPATIENT)
Dept: CARDIOLOGY CLINIC | Age: 68
End: 2023-09-20
Payer: MEDICARE

## 2023-09-20 DIAGNOSIS — I48.20 CHRONIC ATRIAL FIBRILLATION (HCC): Primary | ICD-10-CM

## 2023-09-20 LAB
INTERNATIONAL NORMALIZATION RATIO, POC: 2.5
PROTHROMBIN TIME, POC: NORMAL

## 2023-09-20 PROCEDURE — 93793 ANTICOAG MGMT PT WARFARIN: CPT | Performed by: CLINICAL NURSE SPECIALIST

## 2023-09-27 ENCOUNTER — ANTI-COAG VISIT (OUTPATIENT)
Dept: CARDIOLOGY CLINIC | Age: 68
End: 2023-09-27

## 2023-09-27 LAB — INR BLD: 3.4

## 2023-09-28 RX ORDER — SACUBITRIL AND VALSARTAN 24; 26 MG/1; MG/1
1 TABLET, FILM COATED ORAL 2 TIMES DAILY
Qty: 180 TABLET | Refills: 3 | Status: SHIPPED | OUTPATIENT
Start: 2023-09-28

## 2023-10-04 ENCOUNTER — ANTI-COAG VISIT (OUTPATIENT)
Dept: CARDIOLOGY CLINIC | Age: 68
End: 2023-10-04

## 2023-10-04 LAB — INR BLD: 3.4

## 2023-10-11 ENCOUNTER — ANTI-COAG VISIT (OUTPATIENT)
Dept: CARDIOLOGY CLINIC | Age: 68
End: 2023-10-11

## 2023-10-11 ENCOUNTER — ANTI-COAG VISIT (OUTPATIENT)
Dept: CARDIOLOGY CLINIC | Age: 68
End: 2023-10-11
Payer: MEDICARE

## 2023-10-11 DIAGNOSIS — I48.20 CHRONIC ATRIAL FIBRILLATION (HCC): Primary | ICD-10-CM

## 2023-10-11 LAB
INTERNATIONAL NORMALIZATION RATIO, POC: 4.3
PROTHROMBIN TIME, POC: NORMAL

## 2023-10-11 PROCEDURE — 93793 ANTICOAG MGMT PT WARFARIN: CPT | Performed by: CLINICAL NURSE SPECIALIST

## 2023-10-18 LAB
INTERNATIONAL NORMALIZATION RATIO, POC: 1.6
PROTHROMBIN TIME, POC: NORMAL

## 2023-10-20 ENCOUNTER — ANTI-COAG VISIT (OUTPATIENT)
Dept: CARDIOLOGY CLINIC | Age: 68
End: 2023-10-20
Payer: MEDICARE

## 2023-10-20 DIAGNOSIS — I48.20 CHRONIC ATRIAL FIBRILLATION (HCC): Primary | ICD-10-CM

## 2023-10-20 PROCEDURE — 93793 ANTICOAG MGMT PT WARFARIN: CPT | Performed by: CLINICAL NURSE SPECIALIST

## 2023-10-25 ENCOUNTER — ANTI-COAG VISIT (OUTPATIENT)
Dept: CARDIOLOGY CLINIC | Age: 68
End: 2023-10-25

## 2023-10-25 LAB — INR BLD: 1.7

## 2023-11-01 ENCOUNTER — ANTI-COAG VISIT (OUTPATIENT)
Dept: CARDIOLOGY CLINIC | Age: 68
End: 2023-11-01
Payer: MEDICARE

## 2023-11-01 DIAGNOSIS — I48.20 CHRONIC ATRIAL FIBRILLATION (HCC): Primary | ICD-10-CM

## 2023-11-01 LAB
INTERNATIONAL NORMALIZATION RATIO, POC: 2.2
PROTHROMBIN TIME, POC: NORMAL

## 2023-11-01 PROCEDURE — 93793 ANTICOAG MGMT PT WARFARIN: CPT | Performed by: CLINICAL NURSE SPECIALIST

## 2023-11-09 ENCOUNTER — ANTI-COAG VISIT (OUTPATIENT)
Dept: CARDIOLOGY CLINIC | Age: 68
End: 2023-11-09

## 2023-11-09 LAB — INR BLD: 3.4

## 2023-11-17 ENCOUNTER — ANTI-COAG VISIT (OUTPATIENT)
Dept: CARDIOLOGY CLINIC | Age: 68
End: 2023-11-17

## 2023-11-17 DIAGNOSIS — I48.20 CHRONIC ATRIAL FIBRILLATION (HCC): ICD-10-CM

## 2023-11-17 DIAGNOSIS — I10 ESSENTIAL HYPERTENSION: Primary | ICD-10-CM

## 2023-11-17 LAB
INTERNATIONAL NORMALIZATION RATIO, POC: 2.8
PROTHROMBIN TIME, POC: NORMAL

## 2023-11-27 ENCOUNTER — ANTI-COAG VISIT (OUTPATIENT)
Dept: CARDIOLOGY CLINIC | Age: 68
End: 2023-11-27

## 2023-11-27 LAB — INR BLD: 2.9

## 2023-11-27 NOTE — TELEPHONE ENCOUNTER
Dillon Marvin called requesting a refill of the below medication which has been pended for you:     Requested Prescriptions     Pending Prescriptions Disp Refills    metFORMIN (GLUCOPHAGE) 500 MG tablet [Pharmacy Med Name: METFORMIN  MG  DD] 120 tablet 0     Sig: TAKE 2 TABLETS TWICE DAILY WITH MEALS.  atorvastatin (LIPITOR) 80 MG tablet [Pharmacy Med Name: ATORVASTATIN 80MG TAB SD] 30 tablet 0     Sig: TAKE 1 TABLET BY MOUTH ONCE DAILY.        Last Appointment Date: 6/25/2019  Next Appointment Date: 8/21/2019    Allergies   Allergen Reactions    Aspartame And Phenylalanine     Penicillins     Shellfish-Derived Products     Zetia [Ezetimibe]
Osteoarthritis R hip

## 2023-12-05 ENCOUNTER — ANTI-COAG VISIT (OUTPATIENT)
Dept: CARDIOLOGY CLINIC | Age: 68
End: 2023-12-05

## 2023-12-05 LAB — INR BLD: 2.7

## 2023-12-12 ENCOUNTER — OFFICE VISIT (OUTPATIENT)
Dept: CARDIOLOGY CLINIC | Age: 68
End: 2023-12-12
Payer: MEDICARE

## 2023-12-12 ENCOUNTER — TELEPHONE (OUTPATIENT)
Dept: CARDIOLOGY CLINIC | Age: 68
End: 2023-12-12

## 2023-12-12 VITALS
SYSTOLIC BLOOD PRESSURE: 116 MMHG | HEART RATE: 56 BPM | HEIGHT: 66 IN | DIASTOLIC BLOOD PRESSURE: 56 MMHG | BODY MASS INDEX: 30.05 KG/M2 | WEIGHT: 187 LBS

## 2023-12-12 DIAGNOSIS — I50.22 CHRONIC SYSTOLIC CHF (CONGESTIVE HEART FAILURE) (HCC): Primary | ICD-10-CM

## 2023-12-12 DIAGNOSIS — R53.81 PHYSICAL DECONDITIONING: ICD-10-CM

## 2023-12-12 DIAGNOSIS — I10 ESSENTIAL HYPERTENSION: ICD-10-CM

## 2023-12-12 DIAGNOSIS — E87.1 HYPONATREMIA: ICD-10-CM

## 2023-12-12 DIAGNOSIS — R06.02 SHORTNESS OF BREATH: ICD-10-CM

## 2023-12-12 DIAGNOSIS — I48.20 CHRONIC ATRIAL FIBRILLATION (HCC): ICD-10-CM

## 2023-12-12 DIAGNOSIS — E87.1 HYPONATREMIA: Primary | ICD-10-CM

## 2023-12-12 DIAGNOSIS — B88.2 TICK-BORNE DISEASE: ICD-10-CM

## 2023-12-12 DIAGNOSIS — I25.10 CORONARY ARTERY DISEASE INVOLVING NATIVE CORONARY ARTERY OF NATIVE HEART WITHOUT ANGINA PECTORIS: ICD-10-CM

## 2023-12-12 PROCEDURE — 3074F SYST BP LT 130 MM HG: CPT | Performed by: CLINICAL NURSE SPECIALIST

## 2023-12-12 PROCEDURE — 3017F COLORECTAL CA SCREEN DOC REV: CPT | Performed by: CLINICAL NURSE SPECIALIST

## 2023-12-12 PROCEDURE — 1036F TOBACCO NON-USER: CPT | Performed by: CLINICAL NURSE SPECIALIST

## 2023-12-12 PROCEDURE — 99214 OFFICE O/P EST MOD 30 MIN: CPT | Performed by: CLINICAL NURSE SPECIALIST

## 2023-12-12 PROCEDURE — 1123F ACP DISCUSS/DSCN MKR DOCD: CPT | Performed by: CLINICAL NURSE SPECIALIST

## 2023-12-12 PROCEDURE — G8484 FLU IMMUNIZE NO ADMIN: HCPCS | Performed by: CLINICAL NURSE SPECIALIST

## 2023-12-12 PROCEDURE — 1090F PRES/ABSN URINE INCON ASSESS: CPT | Performed by: CLINICAL NURSE SPECIALIST

## 2023-12-12 PROCEDURE — 3078F DIAST BP <80 MM HG: CPT | Performed by: CLINICAL NURSE SPECIALIST

## 2023-12-12 PROCEDURE — G8400 PT W/DXA NO RESULTS DOC: HCPCS | Performed by: CLINICAL NURSE SPECIALIST

## 2023-12-12 PROCEDURE — G8427 DOCREV CUR MEDS BY ELIG CLIN: HCPCS | Performed by: CLINICAL NURSE SPECIALIST

## 2023-12-12 PROCEDURE — G8417 CALC BMI ABV UP PARAM F/U: HCPCS | Performed by: CLINICAL NURSE SPECIALIST

## 2023-12-12 ASSESSMENT — ENCOUNTER SYMPTOMS
CHEST TIGHTNESS: 0
EYE REDNESS: 0
ABDOMINAL PAIN: 0
NAUSEA: 0
SHORTNESS OF BREATH: 1
BACK PAIN: 1
FACIAL SWELLING: 0
VOMITING: 0
COUGH: 0
WHEEZING: 0

## 2023-12-12 NOTE — PATIENT INSTRUCTIONS
Return in about 3 months (around 3/12/2024) for APRN. Check CMP, BNP today- will fax to Dr Rhoda Guzmán' office    Continue home monitoring INR monitoring  Consider defibrillator- call the office if you change your mind. Will help fatigue    - Weigh daily and report weight gain of 3lbs or more in 24hrs or 5lbs in one week. - Call for increasing shortness of breath or increasing swelling in feet and legs.     (This could mean you are retaining too much fluid)  - 2000mg low sodium diet  - Fluid restriction of 1500ml per day (about 6 cups of fluid per day)

## 2023-12-12 NOTE — PROGRESS NOTES
[facility] [address] [place], [facility]  [address]  Phone: [phone]  Fax: [phone]    OFFICE VISIT:  12/12/2023    [address]: 1955    Reason For Visit:  Sandhya Solis is a 70 y.o. female who is here for chronic atrial fibrillation, systolic heart failure   Diagnosis Orders   1. Chronic systolic CHF (congestive heart failure) ([address])        2. Hyponatremia  Brain Natriuretic Peptide    Comprehensive Metabolic Panel      3. Tick-borne disease        4. Chronic atrial fibrillation (HCC)        5. Essential hypertension        6. Coronary artery disease involving native coronary artery of native heart without angina pectoris        7. Physical deconditioning        8. Shortness of breath  Brain Natriuretic Peptide    Comprehensive Metabolic Panel             HPI  Patient is here for follow-up for chronic atrial fibrillation, hypertension, systolic heart failure, non-occlusive CAD, morbid obesity. She is rate controlled with digoxin and metoprolol. She is anticoagulated with Coumadin and does home INR monitoring. During her hospitalization in February 2020, she was found to have a further depressed ejection fraction, down to 25-30%, previously 40%. She also had a nuclear stress test that showed no evidence of ischemia. She was to follow-up in the office to discuss the potential of a defibrillator, but never kept appts due to back surgery that occurred in May of 2020. She did not return to our office until 1/27/21    In February 2021, she was started on Entresto with echo continuing to show an ejection fraction of 25 to 30%. She has continued to decline defibrillator. She has significant mobility issues and spends most of her day in the wheelchair. She only gets up to walk to the bathroom, but this requires the help of 2 people.   She needs to use her arms for transferring and feels the recovery following a defibrillator with limited use of the left arm would

## 2023-12-12 NOTE — TELEPHONE ENCOUNTER
Spoke with Dr Eyad Eller, PCP by phone. He is in agreement with stopping the sodium, holding the Lasix for several days and then decreasing the Lasix dose to 20 mg daily. Repeat labs next week. If sodium continues to decline, consider hospitalization.   Dr. Davis Doctor to contact patient

## 2023-12-13 ENCOUNTER — ANTI-COAG VISIT (OUTPATIENT)
Dept: CARDIOLOGY CLINIC | Age: 68
End: 2023-12-13

## 2023-12-13 LAB — INR BLD: 1.9

## 2023-12-14 ENCOUNTER — HOSPITAL ENCOUNTER (INPATIENT)
Facility: HOSPITAL | Age: 68
LOS: 6 days | Discharge: HOSPICE | End: 2023-12-20
Attending: EMERGENCY MEDICINE | Admitting: INTERNAL MEDICINE
Payer: MEDICARE

## 2023-12-14 ENCOUNTER — APPOINTMENT (OUTPATIENT)
Dept: GENERAL RADIOLOGY | Facility: HOSPITAL | Age: 68
End: 2023-12-14
Payer: MEDICARE

## 2023-12-14 DIAGNOSIS — R93.89 ABNORMAL CHEST X-RAY: ICD-10-CM

## 2023-12-14 DIAGNOSIS — E83.42 HYPOMAGNESEMIA: ICD-10-CM

## 2023-12-14 DIAGNOSIS — N39.0 ACUTE UTI (URINARY TRACT INFECTION): ICD-10-CM

## 2023-12-14 DIAGNOSIS — K86.89 PANCREATIC MASS: ICD-10-CM

## 2023-12-14 DIAGNOSIS — R53.1 GENERALIZED WEAKNESS: Primary | ICD-10-CM

## 2023-12-14 DIAGNOSIS — G89.3 CANCER ASSOCIATED PAIN: ICD-10-CM

## 2023-12-14 DIAGNOSIS — Z74.09 IMPAIRED MOBILITY: ICD-10-CM

## 2023-12-14 DIAGNOSIS — E87.1 HYPONATREMIA: ICD-10-CM

## 2023-12-14 LAB
ALBUMIN SERPL-MCNC: 3.1 G/DL (ref 3.5–5.2)
ALBUMIN/GLOB SERPL: 0.9 G/DL
ALP SERPL-CCNC: 59 U/L (ref 39–117)
ALT SERPL W P-5'-P-CCNC: 22 U/L (ref 1–33)
ANION GAP SERPL CALCULATED.3IONS-SCNC: 12 MMOL/L (ref 5–15)
AST SERPL-CCNC: 25 U/L (ref 1–32)
BACTERIA UR QL AUTO: ABNORMAL /HPF
BASOPHILS # BLD AUTO: 0.06 10*3/MM3 (ref 0–0.2)
BASOPHILS NFR BLD AUTO: 0.8 % (ref 0–1.5)
BILIRUB SERPL-MCNC: 0.8 MG/DL (ref 0–1.2)
BILIRUB UR QL STRIP: NEGATIVE
BUN SERPL-MCNC: 11 MG/DL (ref 8–23)
BUN/CREAT SERPL: 30.6 (ref 7–25)
CALCIUM SPEC-SCNC: 9.2 MG/DL (ref 8.6–10.5)
CHLORIDE SERPL-SCNC: 84 MMOL/L (ref 98–107)
CLARITY UR: ABNORMAL
CO2 SERPL-SCNC: 24 MMOL/L (ref 22–29)
COLOR UR: YELLOW
CREAT SERPL-MCNC: 0.36 MG/DL (ref 0.57–1)
CREAT UR-MCNC: 23.8 MG/DL
DEPRECATED RDW RBC AUTO: 45.6 FL (ref 37–54)
DIGOXIN SERPL-MCNC: 0.7 NG/ML (ref 0.6–1.2)
EGFRCR SERPLBLD CKD-EPI 2021: 110.1 ML/MIN/1.73
EOSINOPHIL # BLD AUTO: 0.09 10*3/MM3 (ref 0–0.4)
EOSINOPHIL NFR BLD AUTO: 1.1 % (ref 0.3–6.2)
ERYTHROCYTE [DISTWIDTH] IN BLOOD BY AUTOMATED COUNT: 14.6 % (ref 12.3–15.4)
FLUAV RNA RESP QL NAA+PROBE: NOT DETECTED
FLUBV RNA RESP QL NAA+PROBE: NOT DETECTED
GLOBULIN UR ELPH-MCNC: 3.3 GM/DL
GLUCOSE SERPL-MCNC: 227 MG/DL (ref 65–99)
GLUCOSE UR STRIP-MCNC: ABNORMAL MG/DL
HCT VFR BLD AUTO: 39.1 % (ref 34–46.6)
HGB BLD-MCNC: 13.1 G/DL (ref 12–15.9)
HGB UR QL STRIP.AUTO: ABNORMAL
HYALINE CASTS UR QL AUTO: ABNORMAL /LPF
IMM GRANULOCYTES # BLD AUTO: 0.02 10*3/MM3 (ref 0–0.05)
IMM GRANULOCYTES NFR BLD AUTO: 0.3 % (ref 0–0.5)
INR PPP: 1.86 (ref 0.91–1.09)
KETONES UR QL STRIP: NEGATIVE
LEUKOCYTE ESTERASE UR QL STRIP.AUTO: ABNORMAL
LYMPHOCYTES # BLD AUTO: 1.11 10*3/MM3 (ref 0.7–3.1)
LYMPHOCYTES NFR BLD AUTO: 14 % (ref 19.6–45.3)
MAGNESIUM SERPL-MCNC: 1.5 MG/DL (ref 1.6–2.4)
MCH RBC QN AUTO: 28.6 PG (ref 26.6–33)
MCHC RBC AUTO-ENTMCNC: 33.5 G/DL (ref 31.5–35.7)
MCV RBC AUTO: 85.4 FL (ref 79–97)
MONOCYTES # BLD AUTO: 0.91 10*3/MM3 (ref 0.1–0.9)
MONOCYTES NFR BLD AUTO: 11.5 % (ref 5–12)
NEUTROPHILS NFR BLD AUTO: 5.72 10*3/MM3 (ref 1.7–7)
NEUTROPHILS NFR BLD AUTO: 72.3 % (ref 42.7–76)
NITRITE UR QL STRIP: NEGATIVE
NRBC BLD AUTO-RTO: 0 /100 WBC (ref 0–0.2)
NT-PROBNP SERPL-MCNC: 497.1 PG/ML (ref 0–900)
OSMOLALITY SERPL: 266 MOSM/KG (ref 280–301)
OSMOLALITY UR: 141 MOSM/KG (ref 50–1400)
PH UR STRIP.AUTO: 5.5 [PH] (ref 5–8)
PLATELET # BLD AUTO: 216 10*3/MM3 (ref 140–450)
PMV BLD AUTO: 9.5 FL (ref 6–12)
POTASSIUM SERPL-SCNC: 3.9 MMOL/L (ref 3.5–5.2)
POTASSIUM SERPL-SCNC: 4.3 MMOL/L (ref 3.5–5.2)
POTASSIUM SERPL-SCNC: 4.6 MMOL/L (ref 3.5–5.2)
PROT SERPL-MCNC: 6.4 G/DL (ref 6–8.5)
PROT UR QL STRIP: NEGATIVE
PROTHROMBIN TIME: 21.7 SECONDS (ref 11.8–14.8)
RBC # BLD AUTO: 4.58 10*6/MM3 (ref 3.77–5.28)
RBC # UR STRIP: ABNORMAL /HPF
REF LAB TEST METHOD: ABNORMAL
SARS-COV-2 RNA RESP QL NAA+PROBE: NOT DETECTED
SODIUM SERPL-SCNC: 120 MMOL/L (ref 136–145)
SODIUM SERPL-SCNC: 124 MMOL/L (ref 136–145)
SODIUM SERPL-SCNC: 125 MMOL/L (ref 136–145)
SODIUM SERPL-SCNC: 127 MMOL/L (ref 136–145)
SODIUM UR-SCNC: <20 MMOL/L
SP GR UR STRIP: 1.01 (ref 1–1.03)
SQUAMOUS #/AREA URNS HPF: ABNORMAL /HPF
T4 FREE SERPL-MCNC: 1.65 NG/DL (ref 0.93–1.7)
TSH SERPL DL<=0.05 MIU/L-ACNC: 5.9 UIU/ML (ref 0.27–4.2)
URATE SERPL-MCNC: 3.1 MG/DL (ref 2.4–5.7)
UROBILINOGEN UR QL STRIP: ABNORMAL
UUN 24H UR-MCNC: 190 MG/DL
WBC # UR STRIP: ABNORMAL /HPF
WBC CLUMPS # UR AUTO: ABNORMAL /HPF
WBC NRBC COR # BLD AUTO: 7.91 10*3/MM3 (ref 3.4–10.8)

## 2023-12-14 PROCEDURE — 83735 ASSAY OF MAGNESIUM: CPT | Performed by: EMERGENCY MEDICINE

## 2023-12-14 PROCEDURE — 84443 ASSAY THYROID STIM HORMONE: CPT | Performed by: EMERGENCY MEDICINE

## 2023-12-14 PROCEDURE — 85610 PROTHROMBIN TIME: CPT | Performed by: EMERGENCY MEDICINE

## 2023-12-14 PROCEDURE — 84439 ASSAY OF FREE THYROXINE: CPT | Performed by: EMERGENCY MEDICINE

## 2023-12-14 PROCEDURE — 84295 ASSAY OF SERUM SODIUM: CPT | Performed by: INTERNAL MEDICINE

## 2023-12-14 PROCEDURE — 84132 ASSAY OF SERUM POTASSIUM: CPT | Performed by: EMERGENCY MEDICINE

## 2023-12-14 PROCEDURE — P9612 CATHETERIZE FOR URINE SPEC: HCPCS

## 2023-12-14 PROCEDURE — 84295 ASSAY OF SERUM SODIUM: CPT | Performed by: EMERGENCY MEDICINE

## 2023-12-14 PROCEDURE — 80162 ASSAY OF DIGOXIN TOTAL: CPT | Performed by: EMERGENCY MEDICINE

## 2023-12-14 PROCEDURE — 99285 EMERGENCY DEPT VISIT HI MDM: CPT

## 2023-12-14 PROCEDURE — 82570 ASSAY OF URINE CREATININE: CPT | Performed by: INTERNAL MEDICINE

## 2023-12-14 PROCEDURE — 84550 ASSAY OF BLOOD/URIC ACID: CPT | Performed by: INTERNAL MEDICINE

## 2023-12-14 PROCEDURE — 83880 ASSAY OF NATRIURETIC PEPTIDE: CPT | Performed by: EMERGENCY MEDICINE

## 2023-12-14 PROCEDURE — 84540 ASSAY OF URINE/UREA-N: CPT | Performed by: INTERNAL MEDICINE

## 2023-12-14 PROCEDURE — 80053 COMPREHEN METABOLIC PANEL: CPT | Performed by: EMERGENCY MEDICINE

## 2023-12-14 PROCEDURE — 85025 COMPLETE CBC W/AUTO DIFF WBC: CPT | Performed by: EMERGENCY MEDICINE

## 2023-12-14 PROCEDURE — 81001 URINALYSIS AUTO W/SCOPE: CPT | Performed by: EMERGENCY MEDICINE

## 2023-12-14 PROCEDURE — 25010000002 HEPARIN (PORCINE) PER 1000 UNITS: Performed by: INTERNAL MEDICINE

## 2023-12-14 PROCEDURE — 36415 COLL VENOUS BLD VENIPUNCTURE: CPT | Performed by: EMERGENCY MEDICINE

## 2023-12-14 PROCEDURE — 71045 X-RAY EXAM CHEST 1 VIEW: CPT

## 2023-12-14 PROCEDURE — 97166 OT EVAL MOD COMPLEX 45 MIN: CPT | Performed by: OCCUPATIONAL THERAPIST

## 2023-12-14 PROCEDURE — 25010000002 FUROSEMIDE PER 20 MG: Performed by: INTERNAL MEDICINE

## 2023-12-14 PROCEDURE — 87636 SARSCOV2 & INF A&B AMP PRB: CPT | Performed by: EMERGENCY MEDICINE

## 2023-12-14 PROCEDURE — 83930 ASSAY OF BLOOD OSMOLALITY: CPT | Performed by: INTERNAL MEDICINE

## 2023-12-14 PROCEDURE — 25010000002 CEFTRIAXONE PER 250 MG: Performed by: EMERGENCY MEDICINE

## 2023-12-14 PROCEDURE — 83935 ASSAY OF URINE OSMOLALITY: CPT | Performed by: INTERNAL MEDICINE

## 2023-12-14 PROCEDURE — 84300 ASSAY OF URINE SODIUM: CPT | Performed by: INTERNAL MEDICINE

## 2023-12-14 PROCEDURE — 25010000002 MAGNESIUM SULFATE 2 GM/50ML SOLUTION: Performed by: EMERGENCY MEDICINE

## 2023-12-14 PROCEDURE — 25810000003 SODIUM CHLORIDE 3 % SOLUTION: Performed by: EMERGENCY MEDICINE

## 2023-12-14 RX ORDER — BISACODYL 10 MG
10 SUPPOSITORY, RECTAL RECTAL DAILY PRN
Status: DISCONTINUED | OUTPATIENT
Start: 2023-12-14 | End: 2023-12-20 | Stop reason: HOSPADM

## 2023-12-14 RX ORDER — 3% SODIUM CHLORIDE 3 G/100ML
50 INJECTION, SOLUTION INTRAVENOUS CONTINUOUS
Status: DISCONTINUED | OUTPATIENT
Start: 2023-12-14 | End: 2023-12-14

## 2023-12-14 RX ORDER — FUROSEMIDE 10 MG/ML
60 INJECTION INTRAMUSCULAR; INTRAVENOUS ONCE
Status: DISCONTINUED | OUTPATIENT
Start: 2023-12-14 | End: 2023-12-14

## 2023-12-14 RX ORDER — DEXTROSE MONOHYDRATE 50 MG/ML
6 INJECTION, SOLUTION INTRAVENOUS CONTINUOUS PRN
Status: DISCONTINUED | OUTPATIENT
Start: 2023-12-14 | End: 2023-12-20 | Stop reason: HOSPADM

## 2023-12-14 RX ORDER — POLYETHYLENE GLYCOL 3350 17 G/17G
17 POWDER, FOR SOLUTION ORAL DAILY PRN
Status: DISCONTINUED | OUTPATIENT
Start: 2023-12-14 | End: 2023-12-15 | Stop reason: SDUPTHER

## 2023-12-14 RX ORDER — BISACODYL 5 MG/1
5 TABLET, DELAYED RELEASE ORAL DAILY PRN
Status: DISCONTINUED | OUTPATIENT
Start: 2023-12-14 | End: 2023-12-20 | Stop reason: HOSPADM

## 2023-12-14 RX ORDER — MAGNESIUM SULFATE HEPTAHYDRATE 40 MG/ML
2 INJECTION, SOLUTION INTRAVENOUS ONCE
Status: COMPLETED | OUTPATIENT
Start: 2023-12-14 | End: 2023-12-14

## 2023-12-14 RX ORDER — LORAZEPAM 0.5 MG/1
0.5 TABLET ORAL EVERY 8 HOURS PRN
Status: DISCONTINUED | OUTPATIENT
Start: 2023-12-14 | End: 2023-12-20 | Stop reason: HOSPADM

## 2023-12-14 RX ORDER — WARFARIN SODIUM 6 MG/1
6 TABLET ORAL
COMMUNITY

## 2023-12-14 RX ORDER — WARFARIN SODIUM 6 MG/1
3 TABLET ORAL 3 TIMES WEEKLY
COMMUNITY

## 2023-12-14 RX ORDER — CALCIUM CARBONATE 500 MG/1
2 TABLET, CHEWABLE ORAL 2 TIMES DAILY PRN
Status: DISCONTINUED | OUTPATIENT
Start: 2023-12-14 | End: 2023-12-20 | Stop reason: HOSPADM

## 2023-12-14 RX ORDER — NITROGLYCERIN 0.4 MG/1
0.4 TABLET SUBLINGUAL
Status: DISCONTINUED | OUTPATIENT
Start: 2023-12-14 | End: 2023-12-20 | Stop reason: HOSPADM

## 2023-12-14 RX ORDER — FUROSEMIDE 10 MG/ML
60 INJECTION INTRAMUSCULAR; INTRAVENOUS ONCE
Status: COMPLETED | OUTPATIENT
Start: 2023-12-14 | End: 2023-12-14

## 2023-12-14 RX ORDER — SODIUM CHLORIDE 9 MG/ML
40 INJECTION, SOLUTION INTRAVENOUS AS NEEDED
Status: DISCONTINUED | OUTPATIENT
Start: 2023-12-14 | End: 2023-12-20 | Stop reason: HOSPADM

## 2023-12-14 RX ORDER — ACETAMINOPHEN 325 MG/1
650 TABLET ORAL EVERY 4 HOURS PRN
Status: DISCONTINUED | OUTPATIENT
Start: 2023-12-14 | End: 2023-12-20 | Stop reason: HOSPADM

## 2023-12-14 RX ORDER — SODIUM CHLORIDE 0.9 % (FLUSH) 0.9 %
10 SYRINGE (ML) INJECTION AS NEEDED
Status: DISCONTINUED | OUTPATIENT
Start: 2023-12-14 | End: 2023-12-20 | Stop reason: HOSPADM

## 2023-12-14 RX ORDER — LORATADINE 10 MG/1
1 TABLET ORAL DAILY
COMMUNITY

## 2023-12-14 RX ORDER — HEPARIN SODIUM 5000 [USP'U]/ML
5000 INJECTION, SOLUTION INTRAVENOUS; SUBCUTANEOUS EVERY 8 HOURS SCHEDULED
Status: DISCONTINUED | OUTPATIENT
Start: 2023-12-14 | End: 2023-12-18

## 2023-12-14 RX ORDER — OXYMETAZOLINE HYDROCHLORIDE 0.05 G/100ML
2 SPRAY NASAL DAILY
COMMUNITY

## 2023-12-14 RX ORDER — SODIUM CHLORIDE 0.9 % (FLUSH) 0.9 %
10 SYRINGE (ML) INJECTION EVERY 12 HOURS SCHEDULED
Status: DISCONTINUED | OUTPATIENT
Start: 2023-12-14 | End: 2023-12-20 | Stop reason: HOSPADM

## 2023-12-14 RX ORDER — CHOLECALCIFEROL (VITAMIN D3) 125 MCG
5 CAPSULE ORAL NIGHTLY PRN
Status: DISCONTINUED | OUTPATIENT
Start: 2023-12-14 | End: 2023-12-20 | Stop reason: HOSPADM

## 2023-12-14 RX ORDER — ONDANSETRON 4 MG/1
4 TABLET, ORALLY DISINTEGRATING ORAL EVERY 8 HOURS PRN
Status: ON HOLD | COMMUNITY
End: 2023-12-15

## 2023-12-14 RX ORDER — AMOXICILLIN 250 MG
2 CAPSULE ORAL 2 TIMES DAILY
Status: DISCONTINUED | OUTPATIENT
Start: 2023-12-14 | End: 2023-12-20 | Stop reason: HOSPADM

## 2023-12-14 RX ORDER — ONDANSETRON 2 MG/ML
4 INJECTION INTRAMUSCULAR; INTRAVENOUS EVERY 6 HOURS PRN
Status: DISCONTINUED | OUTPATIENT
Start: 2023-12-14 | End: 2023-12-20 | Stop reason: HOSPADM

## 2023-12-14 RX ADMIN — LORAZEPAM 0.5 MG: 0.5 TABLET ORAL at 21:29

## 2023-12-14 RX ADMIN — DOCUSATE SODIUM 50 MG AND SENNOSIDES 8.6 MG 2 TABLET: 8.6; 5 TABLET, FILM COATED ORAL at 21:29

## 2023-12-14 RX ADMIN — ACETAMINOPHEN 650 MG: 325 TABLET, FILM COATED ORAL at 21:29

## 2023-12-14 RX ADMIN — Medication 10 ML: at 21:30

## 2023-12-14 RX ADMIN — CEFTRIAXONE 1000 MG: 1 INJECTION, POWDER, FOR SOLUTION INTRAMUSCULAR; INTRAVENOUS at 07:35

## 2023-12-14 RX ADMIN — SODIUM CHLORIDE 50 ML/HR: 3 INJECTION, SOLUTION INTRAVENOUS at 07:12

## 2023-12-14 RX ADMIN — MAGNESIUM SULFATE HEPTAHYDRATE 2 G: 2 INJECTION, SOLUTION INTRAVENOUS at 07:03

## 2023-12-14 RX ADMIN — FUROSEMIDE 60 MG: 10 INJECTION, SOLUTION INTRAMUSCULAR; INTRAVENOUS at 14:12

## 2023-12-14 RX ADMIN — HEPARIN SODIUM 5000 UNITS: 5000 INJECTION INTRAVENOUS; SUBCUTANEOUS at 21:29

## 2023-12-14 NOTE — ED PROVIDER NOTES
Subjective   History of Present Illness    Review of Systems    Past Medical History:   Diagnosis Date    Asthma     CAD (coronary artery disease)     CHF (congestive heart failure)     Chronic atrial fibrillation     Chronic back pain     Chronic fatigue     Essential hypertension 05/14/2020    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Hyperlipidemia     Hypertension     Left sided lacunar infarction     Mixed hyperlipidemia 09/26/2017    Obesity, Class II, BMI 35-39.9     S/P discectomy 06/04/2020    Systolic heart failure     Type 2 diabetes mellitus     Type 2 diabetes mellitus with microalbuminuria, without long-term current use of insulin 09/26/2017    Vitamin B 12 deficiency 05/17/2020       Allergies   Allergen Reactions    Aspartame And Phenylalanine Anaphylaxis, Shortness Of Breath and Swelling    Mushroom Anaphylaxis    Mushroom Extract Complex Anaphylaxis, Shortness Of Breath and Swelling    Penicillins Rash, Anaphylaxis, Hives, Itching, Shortness Of Breath and Swelling     TOLERATES ROCEPHIN    Shellfish-Derived Products Anaphylaxis, Shortness Of Breath and Swelling    Beef-Derived Products Other (See Comments)     Abdominal pain    Milk-Related Compounds Other (See Comments)     Abdominal pain    Ezetimibe Nausea Only       Past Surgical History:   Procedure Laterality Date    CARDIAC CATHETERIZATION  10/21/2009    CARDIOVERSION  10/09/2013    CATARACT EXTRACTION, BILATERAL      CHOLECYSTECTOMY      LAPAROSCOPIC TUBAL LIGATION      LUMBAR DISCECTOMY N/A 5/19/2020    Procedure: Thoracic  DISCECTOMY, T10/11.  Costotransversectomy. Neuromonitoring;  Surgeon: Willy Diaz MD;  [facility]: [facility];  Service: Neurosurgery;  Laterality: N/A;    RETINAL LASER PROCEDURE         Family History   Problem Relation Age of Onset    Heart failure Mother     Hypertension Mother     Diabetes Mother     Liver cancer Father        Social History     Socioeconomic History    Marital status:    Tobacco Use    Smoking status:  Never   Substance and Sexual Activity    Alcohol use: Never    Drug use: Never           Objective   Physical Exam    Procedures           ED Course  ED Course as of 12/14/23 0728   u Dec 14, 2023   0653 To correct her sodium 0.5 mEq/L/h utilizing hypertonic saline 3% she will have to get 50 ml an hour [TS]   0654 Her total sodium deficit is 501.2 mEq [TS]   0657 Even with her hyperglycemia sodium correction comes down to 122 [TS]   0723 This patient was seen by Dr. Whitmore please refer to his records noted details the patient came in generalized malaise and weakness and lower extremity swelling.  There is no clinical evidence of DVT patient's sodium is low she is hypomagnesemic and also has got a UTI all this could explain individually and collectively generalized weakness and fatigue.  The patient will be admitted to the medicine service. [TS]      ED Course User Index  [TS] Jose Chavira MD                                             Medical Decision Making  Problems Addressed:  Abnormal chest x-ray:     Details: Patient has got indistinct left hemidiaphragm which could be pleural fluid versus infiltrate versus something else.  This will be discussed with the hospitalist and probably his CT needs to be ordered.  Acute UTI (urinary tract infection): acute illness or injury     Details: Patient with acute UTI no evidence of sepsis.  Is going to be admitted to the medicine service for further evaluation treatment was given Rocephin which she tolerates.  Generalized weakness: complicated acute illness or injury     Details: As fatigue multifactorial hyponatremia UTI and hypomagnesemia.  Hypomagnesemia: acute illness or injury     Details: Was replaced intravenously and will repeat the level CT will be final.  Hyponatremia: complicated acute illness or injury     Details: Patient with hyponatremia the sodium deficit was calculated and will be repleted with 3% saline at 50 mill an hour.    Amount and/or  Complexity of Data Reviewed  Labs: ordered.     Details: Labs reviewed the patient is hyponatremic has a UTI and is hypomagnesemic  Radiology: ordered.  Discussion of management or test interpretation with external provider(s): Discussed with the hospitalist.    Risk  Prescription drug management.  Decision regarding hospitalization.  Risk Details: Will be admitted to medicine service        Final diagnoses:   Generalized weakness   Hyponatremia   Acute UTI (urinary tract infection)   Hypomagnesemia   Abnormal chest x-ray       ED Disposition  ED Disposition       ED Disposition   Decision to Admit    Condition   --    Comment   --               No follow-up provider specified.       Medication List      No changes were made to your prescriptions during this visit.            Jose Chavira MD  12/14/23 0720

## 2023-12-14 NOTE — ED NOTES
Nursing report ED to floor  Anne-Marie Foreman  68 y.o.  female    HPI:   Chief Complaint   Patient presents with    Leg Swelling    Illness       Admitting doctor:   Hiram Perea MD    Consulting provider(s):  Consults       No orders found from 11/15/2023 to 12/15/2023.             Admitting diagnosis:   The primary encounter diagnosis was Generalized weakness. Diagnoses of Hyponatremia, Acute UTI (urinary tract infection), Hypomagnesemia, and Abnormal chest x-ray were also pertinent to this visit.    Code status:   Current Code Status       Date Active Code Status Order ID Comments User Context       Prior            Allergies:   Aspartame and phenylalanine, Mushroom, Mushroom extract complex, Penicillins, Shellfish-derived products, Beef-derived products, Milk-related compounds, and Ezetimibe    Intake and Output    Intake/Output Summary (Last 24 hours) at 12/14/2023 1331  Last data filed at 12/14/2023 0858  Gross per 24 hour   Intake 65 ml   Output 700 ml   Net -635 ml       Weight:       12/14/23  0525   Weight: 84.8 kg (187 lb)       Most recent vitals:   Vitals:    12/14/23 0901 12/14/23 1001 12/14/23 1103 12/14/23 1303   BP: 110/78 114/49 118/68 104/66   BP [facility]:       Patient Position:       Pulse: 110 82 88 91   Resp:       Temp:       TempSrc:       SpO2: 100% 100% 98% 100%   Weight:       Height:         Oxygen Therapy: .    Active LDAs/IV Access:   Lines, Drains & Airways       Active LDAs       Name Placement date Placement time Site Days    Peripheral IV 12/14/23 0602 Left Antecubital 12/14/23  0602  Antecubital  less than 1    Peripheral IV 12/14/23 0709 Anterior;Left Hand 12/14/23  0709  Hand  less than 1    Urethral Catheter 16 Fr. 12/14/23  0852  -- less than 1                    Labs (abnormal labs have a star):   Labs Reviewed   COMPREHENSIVE METABOLIC PANEL - Abnormal; Notable for the following components:       Result Value    Glucose 227 (*)     Creatinine 0.36 (*)     Sodium 120 (*)      Chloride 84 (*)     Albumin 3.1 (*)     BUN/Creatinine Ratio 30.6 (*)     All other components within normal limits    Narrative:     GFR Normal >60  Chronic Kidney Disease <60  Kidney Failure <15     URINALYSIS W/ MICROSCOPIC IF INDICATED (NO CULTURE) - Abnormal; Notable for the following components:    Appearance, UA Turbid (*)     Glucose,  mg/dL (2+) (*)     Blood, UA Moderate (2+) (*)     Leuk Esterase, UA Large (3+) (*)     All other components within normal limits   MAGNESIUM - Abnormal; Notable for the following components:    Magnesium 1.5 (*)     All other components within normal limits   TSH - Abnormal; Notable for the following components:    TSH 5.900 (*)     All other components within normal limits   PROTIME-INR - Abnormal; Notable for the following components:    Protime 21.7 (*)     INR 1.86 (*)     All other components within normal limits   CBC WITH AUTO DIFFERENTIAL - Abnormal; Notable for the following components:    Lymphocyte % 14.0 (*)     Monocytes, Absolute 0.91 (*)     All other components within normal limits   URINALYSIS, MICROSCOPIC ONLY - Abnormal; Notable for the following components:    RBC, UA 3-5 (*)     WBC, UA Too Numerous to Count (*)     Bacteria, UA 4+ (*)     All other components within normal limits   SODIUM - Abnormal; Notable for the following components:    Sodium 124 (*)     All other components within normal limits   OSMOLALITY, SERUM - Abnormal; Notable for the following components:    Osmolality 266 (*)     All other components within normal limits   COVID-19 AND FLU A/B, NP SWAB IN TRANSPORT MEDIA 1 HR TAT - Normal    Narrative:     Fact sheet for providers: https://www.fda.gov/media/233083/download    Fact sheet for patients: https://www.fda.gov/media/286865/download    Test performed by PCR.   BNP (IN-HOUSE) - Normal    Narrative:     This assay is used as an aid in the diagnosis of individuals suspected of having heart failure. It can be used as an aid in  the diagnosis of acute decompensated heart failure (ADHF) in patients presenting with signs and symptoms of ADHF to the emergency department (ED). In addition, NT-proBNP of <300 pg/mL indicates ADHF is not likely.    Age Range Result Interpretation  NT-proBNP Concentration (pg/mL:      <50             Positive            >450                   Gray                 300-450                    Negative             <300    50-75           Positive            >900                  Gray                300-900                  Negative            <300      >75             Positive            >1800                  Gray                300-1800                  Negative            <300   T4, FREE - Normal    Narrative:     Results may be falsely increased if patient taking Biotin.     DIGOXIN LEVEL - Normal   POTASSIUM - Normal   OSMOLALITY, URINE - Normal   URIC ACID - Normal   COVID PRE-OP / PRE-PROCEDURE SCREENING ORDER (NO ISOLATION)    Narrative:     The following orders were created for panel order COVID PRE-OP / PRE-PROCEDURE SCREENING ORDER (NO ISOLATION) - Swab, Nasal Cavity.  Procedure                               Abnormality         Status                     ---------                               -----------         ------                     COVID-19 and FLU A/B PCR...[[id number]]  Normal              Final result                 Please view results for these tests on the individual orders.   SODIUM, URINE, RANDOM    Narrative:     Reference intervals for random urine have not been established.  Clinical usage is dependent upon physician's interpretation in combination with other laboratory tests.      CREATININE URINE RANDOM (KIDNEY FUNCTION) GFR COMPONENT   UREA NITROGEN, URINE   SODIUM   POTASSIUM   CBC AND DIFFERENTIAL    Narrative:     The following orders were created for panel order CBC & Differential.  Procedure                               Abnormality         Status                     ---------                                -----------         ------                     CBC Auto Differential[[id number]]        Abnormal            Final result                 Please view results for these tests on the individual orders.       Meds given in ED:   Medications   sodium chloride 0.9 % flush 10 mL (has no administration in time range)   dextrose (D5W) 5 % infusion 342 mL (has no administration in time range)   furosemide (LASIX) injection 60 mg (has no administration in time range)   furosemide (LASIX) injection 60 mg (has no administration in time range)   magnesium sulfate 2g/50 mL (PREMIX) infusion (0 g Intravenous Stopped 12/14/23 0903)   cefTRIAXone (ROCEPHIN) 1,000 mg in sodium chloride 0.9 % 100 mL IVPB (0 mg Intravenous Stopped 12/14/23 0830)     dextrose, 6 mL/kg (Ideal)         NIH Stroke Scale:       Isolation/Infection(s):  No active isolations   No active infections     COVID Testing  Collected .  Resulted .    Nursing report ED to floor:  Mental status: A & O x3  Ambulatory status: Assist x1  Precautions: .    ED nurse phone extentsion- ..     yes

## 2023-12-14 NOTE — Clinical Note
Level of Care: Critical Care [6]   Diagnosis: Hyponatremia [198519]   Admitting Physician: MONICA GODINEZ [[id number]]   Attending Physician: MONICA GODINEZ [[id number]]   Certification: I Certify That Inpatient Hospital Services Are Medically Necessary For Greater Than 2 Midnights

## 2023-12-14 NOTE — THERAPY EVALUATION
Patient Name: Anne-Marie Foreman  : 1955    MRN: [id number]                              Today's Date: 2023       Admit Date: 2023    Visit Dx:     ICD-10-CM ICD-9-CM   1. Generalized weakness  R53.1 780.79   2. Hyponatremia  E87.1 276.1   3. Acute UTI (urinary tract infection)  N39.0 599.0   4. Hypomagnesemia  E83.42 275.2   5. Abnormal chest x-ray  R93.89 793.2     Patient Active Problem List   Diagnosis    Weakness of both lower extremities    Chronic atrial fibrillation    Essential hypertension    Long term current use of anticoagulant therapy    Mild CAD    Mixed hyperlipidemia    Morbid obesity due to excess calories    Systolic congestive heart failure    Type 2 diabetes mellitus with microalbuminuria, without long-term current use of insulin    Acquired hypothyroidism    Vitamin B 12 deficiency    Intervertebral thoracic disc disorder with myelopathy, thoracic region    S/P discectomy    Current non-smoker    Hyponatremia     Past Medical History:   Diagnosis Date    Asthma     CAD (coronary artery disease)     CHF (congestive heart failure)     Chronic atrial fibrillation     Chronic back pain     Chronic fatigue     Essential hypertension 2020    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Hyperlipidemia     Hypertension     Left sided lacunar infarction     Mixed hyperlipidemia 2017    Obesity, Class II, BMI 35-39.9     S/P discectomy 2020    Systolic heart failure     Type 2 diabetes mellitus     Type 2 diabetes mellitus with microalbuminuria, without long-term current use of insulin 2017    Vitamin B 12 deficiency 2020     Past Surgical History:   Procedure Laterality Date    CARDIAC CATHETERIZATION  10/21/2009    CARDIOVERSION  10/09/2013    CATARACT EXTRACTION, BILATERAL      CHOLECYSTECTOMY      LAPAROSCOPIC TUBAL LIGATION      LUMBAR DISCECTOMY N/A 2020    Procedure: Thoracic  DISCECTOMY, T10/11.  Costotransversectomy. Neuromonitoring;  Surgeon: Willy Diaz MD;   [facility]: [facility] OR;  Service: Neurosurgery;  Laterality: N/A;    RETINAL LASER PROCEDURE        General Information       Row Name 12/14/23 1454          OT Time and Intention    Document Type evaluation  ED with fatigue, B LE swelling and weakness. Dx: Hyponatremia.  -J     Mode of Treatment occupational therapy  -J       Row Name 12/14/23 1451          General Information    Patient Profile Reviewed yes  -JJ     Prior Level of Function all household mobility;transfer;bed mobility;ADL's;independent:  -JJ     Existing Precautions/Restrictions fall  -J     Barriers to Rehab medically complex;previous functional deficit  -JJ       Row Name 12/14/23 1459          Living Environment    People in Home child(jered), adult  -J       Row Name 12/14/23 1454          Home Main Entrance    Number of Stairs, Main Entrance three  -       Row Name 12/14/23 1454          Stairs Within Home, Primary    Number of Stairs, Within Home, Primary none  -J     Stair Railings, Within Home, Primary none  -J       Row Name 12/14/23 1450          Cognition    Orientation Status (Cognition) oriented x 4  -       Row Name 12/14/23 1457          Safety Issues, Functional Mobility    Impairments Affecting Function (Mobility) balance;endurance/activity tolerance;strength;range of motion (ROM);pain  -               User Key  (r) = Recorded By, (t) = Taken By, (c) = Cosigned By      Initials Name Provider Type    Shereen Rivera, OTR/L, CSRS Occupational Therapist                     Mobility/ADL's       Row Name 12/14/23 1458          Bed Mobility    Bed Mobility supine-sit;sit-supine  -JJ     Supine-Sit [place] (Bed Mobility) moderate assist (50% patient effort)  -     Sit-Supine [place] (Bed Mobility) moderate assist (50% patient effort)  -     Bed Mobility, Safety Issues decreased use of arms for pushing/pulling;decreased use of legs for bridging/pushing  -     Assistive Device (Bed Mobility) bed rails  -        Row Name 12/14/23 1454          Transfers    Transfers sit-stand transfer;stand-sit transfer  -     Comment, (Transfers) completed x2 reps and able to take small steps towards HOB with use of rwx. She has a decreased foot clearance.  -JJ       Row Name 12/14/23 1454          Sit-Stand Transfer    Sit-Stand [place] (Transfers) maximum assist (25% patient effort)  -JJ       Row Name 12/14/23 1454          Stand-Sit Transfer    Stand-Sit [place] (Transfers) maximum assist (25% patient effort)  -JJ       Row Name 12/14/23 1454          Activities of Daily Living    BADL Assessment/Intervention lower body dressing  -JJ       Row Name 12/14/23 1454          Lower Body Dressing Assessment/Training    [place] Level (Lower Body Dressing) don;maximum assist (25% patient effort)  -     Position (Lower Body Dressing) edge of bed sitting  -               User Key  (r) = Recorded By, (t) = Taken By, (c) = Cosigned By      Initials Name Provider Type    Shereen Rivera, ANYAR/L, CSRS Occupational Therapist                   Obj/Interventions       [facility] Name 12/14/23 1454          Sensory Assessment (Somatosensory)    Sensory Assessment (Somatosensory) UE sensation intact  -JJ       Row Name 12/14/23 1454          Vision Assessment/Intervention    Visual Impairment/Limitations L  -JJ       Row Name 12/14/23 1454          Range of Motion Comprehensive    General Range of Motion bilateral upper extremity ROM L  -JJ       Row Name 12/14/23 1454          Strength Comprehensive (MMT)    Comment, General Manual Muscle Testing (MMT) Assessment B UE strength grossly 4-/5  -JJ       Row Name 12/14/23 1454          Balance    Balance Assessment sitting static balance;sitting dynamic balance;standing static balance;standing dynamic balance  -     Static Sitting Balance standby assist  -     Dynamic Sitting Balance contact guard  -     Position, Sitting Balance supported;sitting edge of bed  -     Static  Standing Balance minimal assist  -JJ     Dynamic Standing Balance moderate assist  -JJ     Position/Device Used, Standing Balance supported;walker, front-wheeled  -JJ               User Key  (r) = Recorded By, (t) = Taken By, (c) = Cosigned By      Initials Name Provider Type    Shereen Rivera, OTR/L, CSRS Occupational Therapist                   Goals/Plan       Row Name 12/14/23 1454          Transfer Goal 1 (OT)    Activity/Assistive Device (Transfer Goal 1, OT) shower chair;commode, bedside without drop arms  -JJ     [place] Level/Cues Needed (Transfer Goal 1, OT) contact guard required  -JJ     Time Frame (Transfer Goal 1, OT) long term goal (LTG);by discharge  -JJ     Progress/Outcome (Transfer Goal 1, OT) new goal  -JJ       Row Name 12/14/23 1452          Dressing Goal 1 (OT)    Activity/Device (Dressing Goal 1, OT) lower body dressing  -JJ     [place]/Cues Needed (Dressing Goal 1, OT) moderate assist (50-74% patient effort)  -JJ     Time Frame (Dressing Goal 1, OT) long term goal (LTG);by discharge  -JJ     Progress/Outcome (Dressing Goal 1, OT) new goal  -J       Row Name 12/14/23 1458          Toileting Goal 1 (OT)    Activity/Device (Toileting Goal 1, OT) toileting skills, all  -JJ     [place] Level/Cues Needed (Toileting Goal 1, OT) minimum assist (75% or more patient effort)  -JJ     Time Frame (Toileting Goal 1, OT) long term goal (LTG);by discharge  -JJ     Progress/Outcome (Toileting Goal 1, OT) new goal  -J       Row Name 12/14/23 1450          Therapy Assessment/Plan (OT)    Planned Therapy Interventions (OT) activity tolerance training;adaptive equipment training;BADL retraining;functional balance retraining;transfer/mobility retraining;strengthening exercise;occupation/activity based interventions;cognitive/visual perception retraining;patient/caregiver education/training  -JJ               User Key  (r) = Recorded By, (t) = Taken By, (c) = Cosigned By      Initials Name  Provider Type    Shereen Rivera OTR/L, PEACES Occupational Therapist                   Clinical Impression       Row Name 12/14/23 1454          Pain Assessment    Pretreatment Pain Rating 0/10 - no pain  -     Posttreatment Pain Rating 0/10 - no pain  -       Row Name 12/14/23 1454          Plan of Care Review    Plan of Care Reviewed With patient  -     Outcome Evaluation OT eval completed. Pt presents alert and oriented x4, sitting up in bed. She reports at baseline requiring assistance with all adls and mobility. Today she demonstrates generalized weakness, decreased balance, activity tolerance and significant B LE pitting edema. She was able to come to sitting at EOB with Mod A. Max A to don socks while seated at EOB. She completed sit <> stand t/f x2 reps with Mod Ax2. She was returned back to [facility] in bed with Mod A. She would benefit from skilled OT services to address these deficits. Recommend d/c to SNF for continued skilled OT services.  -       Row Name 12/14/23 1458          Therapy Assessment/Plan (OT)    Rehab Potential (OT) good, to achieve stated therapy goals  -     Criteria for Skilled Therapeutic Interventions Met (OT) yes;skilled treatment is necessary  -     Therapy Frequency (OT) 5 times/wk  -     Predicted Duration of Therapy Intervention (OT) 10 days  -       Row Name 12/14/23 1454          Therapy Plan Review/Discharge Plan (OT)    Anticipated Discharge Disposition (OT) skilled nursing facility  -       Row Name 12/14/23 1457          Positioning and Restraints    Pre-Treatment Position in bed  -     Post Treatment Position bed  -     In Bed notified nsg;fowlers;call light within reach;encouraged to call for assist;patient within staff view  -               User Key  (r) = Recorded By, (t) = Taken By, (c) = Cosigned By      Initials Name Provider Type    Shereen Rivera OTR/L, CSRS Occupational Therapist                   Outcome Measures       Row  Name 12/14/23 1454          How much help from another is currently needed...    Putting on and taking off regular lower body clothing? 2  -JJ     Bathing (including washing, rinsing, and drying) 2  -JJ     Toileting (which includes using toilet bed pan or urinal) 2  -JJ     Putting on and taking off regular upper body clothing 3  -JJ     Taking care of personal grooming (such as brushing teeth) 4  -JJ     Eating meals 4  -JJ     AM-PAC 6 Clicks Score (OT) 17  -JJ       Row Name 12/14/23 1404          How much help from another person do you currently need...    Turning from your back to your side while in flat bed without using bedrails? 4  -AT     Moving from lying on back to sitting on the side of a flat bed without bedrails? 2  -AT     Moving to and from a bed to a chair (including a wheelchair)? 2  -AT     Standing up from a chair using your arms (e.g., wheelchair, bedside chair)? 2  -AT     Climbing 3-5 steps with a railing? 2  -AT     To walk in hospital room? 2  -AT     AM-PAC 6 Clicks Score (PT) 14  -AT     Highest Level of Mobility Goal 4 --> Transfer to chair/commode  -AT       Row Name 12/14/23 1458          Functional Assessment    Outcome Measure Options AM-PAC 6 Clicks Daily Activity (OT)  -               User Key  (r) = Recorded By, (t) = Taken By, (c) = Cosigned By      Initials Name Provider Type    AT , Cheryl, RN Registered Nurse    Shereen Rivera, OTR/L, CSRS Occupational Therapist                    Occupational Therapy Education       Title: PT OT SLP Therapies (In Progress)       Topic: Occupational Therapy (In Progress)       Point: ADL training (Done)       Description:   Instruct learner(s) on proper safety adaptation and remediation techniques during self care or transfers.   Instruct in proper use of assistive devices.                  Learning Progress Summary             Patient Acceptance, E, VU by GERONIMO at 12/14/2023 3865                         Point: Home exercise  program (Not Started)       Description:   Instruct learner(s) on appropriate technique for monitoring, assisting and/or progressing therapeutic exercises/activities.                  Learner Progress:  Not documented in this visit.              Point: Precautions (Done)       Description:   Instruct learner(s) on prescribed precautions during self-care and functional transfers.                  Learning Progress Summary             Patient Acceptance, PADMINI, VU by GERONIMO at 12/14/2023 9077                         Point: Body mechanics (Not Started)       Description:   Instruct learner(s) on proper positioning and spine alignment during self-care, functional mobility activities and/or exercises.                  Learner Progress:  Not documented in this visit.                              User Key       Initials Effective Dates Name Provider Type Discipline    GERONIMO 07/11/23 -  Shereen Thapa, OTR/L, CSRS Occupational Therapist OT                  OT Recommendation and Plan  Planned Therapy Interventions (OT): activity tolerance training, adaptive equipment training, BADL retraining, functional balance retraining, transfer/mobility retraining, strengthening exercise, occupation/activity based interventions, cognitive/visual perception retraining, patient/caregiver education/training  Therapy Frequency (OT): 5 times/wk  Plan of Care Review  Plan of Care Reviewed With: patient  Outcome Evaluation: OT eval completed. Pt presents alert and oriented x4, sitting up in bed. She reports at baseline requiring assistance with all adls and mobility. Today she demonstrates generalized weakness, decreased balance, activity tolerance and significant B LE pitting edema. She was able to come to sitting at EOB with Mod A. Max A to don socks while seated at EOB. She completed sit <> stand t/f x2 reps with Mod Ax2. She was returned back to [facility] in bed with Mod A. She would benefit from skilled OT services to address these deficits.  Recommend d/c to SNF for continued skilled OT services.     Time Calculation:         Time Calculation- OT       Row Name 12/14/23 1454             Time Calculation- OT    OT Start Time 1454  -      OT Stop Time 1540  -      OT Time Calculation (min) 46 min  -RENE      OT Received On 12/14/23  -      OT Goal Re-Cert Due Date 12/24/23  -                User Key  (r) = Recorded By, (t) = Taken By, (c) = Cosigned By      Initials Name Provider Type    Shereen Rivera OTR/L, CSRS Occupational Therapist                  Therapy Charges for Today       Code Description Service Date Service Provider Modifiers Qty    [id number] HC OT EVAL MOD COMPLEXITY 3 12/14/2023 Shereen Thapa OTR/L, PEACES GO 1                 Shereen Thapa, OTR/L, PEACES  12/14/2023

## 2023-12-14 NOTE — PROGRESS NOTES
ED nurse refusing to give lasix as ordered for pt that is significantly fluid overloaded. Says that she does not want to lower the patients sodium further by giving lasix. I responded 3 minutes after she reached out to me. Nurse displays poor understanding of the pathophysiology of hypervolemic hyponatremia. I did not have the time to walk the patient through the medical physiology and disease process just so the patient can receive the medication she desperately requires. If I had to explain every order I placed to every nurse I would not be able to accomplish half of the work I do per day. The ED nurse made no mention about having a concern for decreasing the patients blood pressure. I then placed a stat order for lasix as it had been 8 hours and pt has received no medication wasting esteban time.

## 2023-12-14 NOTE — ED PROVIDER NOTES
Subjective   History of Present Illness  Pt presents to the  with report that she has not felt well for the past 6 months.  Reports that she has ongoing fatigue.  Pt also reports increased swelling to legs bilaterally.  Reports that she was taking lasix but this was stopped d/t low sodium.  Pt does have hx of CHF.  Denies any f/c/n/v.  Pt reportedly has had poor appetite for the past 6mo as well.  States that she was bitten by a tick around that time and she has had symptoms since.  No CP.  No SOB.  Pt/family reports decreased mobility d/t swelling/general weakness.          Review of Systems   Constitutional:  Positive for appetite change and fatigue. Negative for chills and fever.   HENT:  Negative for congestion.    Respiratory:  Negative for cough.    Cardiovascular:  Positive for leg swelling. Negative for chest pain.   Gastrointestinal:  Positive for abdominal distention. Negative for abdominal pain, diarrhea and vomiting.   Genitourinary:  Negative for dysuria and hematuria.   Musculoskeletal:  Negative for back pain.   Skin:  Negative for color change and rash.   Neurological:  Negative for headaches.   Psychiatric/Behavioral:  Negative for confusion.    All other systems reviewed and are negative.      Past Medical History:   Diagnosis Date    Asthma     CAD (coronary artery disease)     CHF (congestive heart failure)     Chronic atrial fibrillation     Chronic back pain     Chronic fatigue     Essential hypertension 05/14/2020    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Hyperlipidemia     Hypertension     Left sided lacunar infarction     Mixed hyperlipidemia 09/26/2017    Obesity, Class II, BMI 35-39.9     S/P discectomy 06/04/2020    Systolic heart failure     Type 2 diabetes mellitus     Type 2 diabetes mellitus with microalbuminuria, without long-term current use of insulin 09/26/2017    Vitamin B 12 deficiency 05/17/2020       Allergies   Allergen Reactions    Aspartame And Phenylalanine Anaphylaxis, Shortness Of Breath  and Swelling    Mushroom Anaphylaxis    Mushroom Extract Complex Anaphylaxis, Shortness Of Breath and Swelling    Penicillins Rash, Anaphylaxis, Hives, Itching, Shortness Of Breath and Swelling     TOLERATES ROCEPHIN    Shellfish-Derived Products Anaphylaxis, Shortness Of Breath and Swelling    Beef-Derived Products Other (See Comments)     Abdominal pain    Milk-Related Compounds Other (See Comments)     Abdominal pain    Ezetimibe Nausea Only       Past Surgical History:   Procedure Laterality Date    CARDIAC CATHETERIZATION  10/21/2009    CARDIOVERSION  10/09/2013    CATARACT EXTRACTION, BILATERAL      CHOLECYSTECTOMY      LAPAROSCOPIC TUBAL LIGATION      LUMBAR DISCECTOMY N/A 5/19/2020    Procedure: Thoracic  DISCECTOMY, T10/11.  Costotransversectomy. Neuromonitoring;  Surgeon: Willy Diaz MD;  [facility]: [facility] OR;  Service: Neurosurgery;  Laterality: N/A;    RETINAL LASER PROCEDURE         Family History   Problem Relation Age of Onset    Heart failure Mother     Hypertension Mother     Diabetes Mother     Liver cancer Father        Social History     Socioeconomic History    Marital status:    Tobacco Use    Smoking status: Never   Substance and Sexual Activity    Alcohol use: Never    Drug use: Never           Objective   Physical Exam  Vitals and nursing note reviewed.   Constitutional:       General: She is not in acute distress.     Appearance: Normal appearance.   HENT:      Head: Normocephalic.      Nose: Nose normal.      Mouth/Throat:      Mouth: Mucous membranes are moist.   Eyes:      Extraocular Movements: Extraocular movements intact.      Pupils: Pupils are equal, round, and reactive to light.   Cardiovascular:      Rate and Rhythm: Normal rate and regular rhythm.      Pulses: Normal pulses.      Heart sounds: Normal heart sounds.   Pulmonary:      Effort: Pulmonary effort is normal.      Breath sounds: Normal breath sounds.   Abdominal:      General: Abdomen is flat.       Palpations: Abdomen is soft.      Comments: + small hernia.  + abdominal wall edema.    Musculoskeletal:      Right lower leg: Edema present.      Left lower leg: Edema present.   Skin:     General: Skin is warm and dry.      Capillary Refill: Capillary refill takes less than 2 seconds.   Neurological:      General: No focal deficit present.      Mental Status: She is alert.   Psychiatric:      Comments: Depressed mood         Procedures           ED Course  ED Course as of 12/14/23 1834   Thu Dec 14, 2023   0653 To correct her sodium 0.5 mEq/L/h utilizing hypertonic saline 3% she will have to get 50 ml an hour [TS]   0654 Her total sodium deficit is 501.2 mEq [TS]   0657 Even with her hyperglycemia sodium correction comes down to 122 [TS]   0723 This patient was seen by Dr. Whitmore please refer to his records noted details the patient came in generalized malaise and weakness and lower extremity swelling.  There is no clinical evidence of DVT patient's sodium is low she is hypomagnesemic and also has got a UTI all this could explain individually and collectively generalized weakness and fatigue.  The patient will be admitted to the medicine service. [TS]      ED Course User Index  [TS] Jose Chavira MD                                             Medical Decision Making  Problems Addressed:  Abnormal chest x-ray: complicated acute illness or injury  Acute UTI (urinary tract infection): complicated acute illness or injury  Generalized weakness: complicated acute illness or injury  Hypomagnesemia: complicated acute illness or injury  Hyponatremia: complicated acute illness or injury    Amount and/or Complexity of Data Reviewed  Labs: ordered.  Radiology: ordered.    Risk  Prescription drug management.  Decision regarding hospitalization.        Final diagnoses:   None       ED Disposition  ED Disposition       None            No follow-up provider specified.       Medication List      No changes were made to your  prescriptions during this visit.            Rickey Santacruz,   12/14/23 0610       Rickey Santacruz,   12/14/23 6617

## 2023-12-14 NOTE — ED NOTES
Attempted to discuss concerns with  about lasix order, as I do not feel comfortable giving the IV lasix at this time, no response. Spoke with , ER provider/Hospitalist about concerns, due to pt's hypotension, and ED management of care, Lasix not given at this time. Also discussed with charge nurse LANETTE Alvarado.

## 2023-12-14 NOTE — H&P
[facility] Medicine Services  HISTORY AND PHYSICAL    Date of Admission: 12/14/2023  Primary Care Physician: Anabell Sanchez APRN    Subjective   Primary Historian: Pt and/or family    Chief Complaint: see H&P    History and Physical:  Patient reports that she has not been feeling well for the past 6 months.  She was recently found to have hyponatremia and was started on salt tablets which were ineffective.  Lasix was held and she developed lower extremity edema.  She is accompanied at bedside with her 2 sons.        ROS:   Otherwise complete ROS reviewed and negative except as mentioned in the HPI.    Past Medical History:   Past Medical History:   Diagnosis Date    Asthma     CAD (coronary artery disease)     CHF (congestive heart failure)     Chronic atrial fibrillation     Chronic back pain     Chronic fatigue     Essential hypertension 05/14/2020    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Hyperlipidemia     Hypertension     Left sided lacunar infarction     Mixed hyperlipidemia 09/26/2017    Obesity, Class II, BMI 35-39.9     S/P discectomy 06/04/2020    Systolic heart failure     Type 2 diabetes mellitus     Type 2 diabetes mellitus with microalbuminuria, without long-term current use of insulin 09/26/2017    Vitamin B 12 deficiency 05/17/2020     Past Surgical History:  Past Surgical History:   Procedure Laterality Date    CARDIAC CATHETERIZATION  10/21/2009    CARDIOVERSION  10/09/2013    CATARACT EXTRACTION, BILATERAL      CHOLECYSTECTOMY      LAPAROSCOPIC TUBAL LIGATION      LUMBAR DISCECTOMY N/A 5/19/2020    Procedure: Thoracic  DISCECTOMY, T10/11.  Costotransversectomy. Neuromonitoring;  Surgeon: Willy Diaz MD;  [facility]: [facility];  Service: Neurosurgery;  Laterality: N/A;    RETINAL LASER PROCEDURE       Social History:  reports that she has never smoked. She does not have any smokeless tobacco history on file. She reports that she does not drink alcohol and does not use  drugs.    Family History: family history includes Diabetes in her mother; Heart failure in her mother; Hypertension in her mother; Liver cancer in her father.       Allergies:  Allergies   Allergen Reactions    Aspartame And Phenylalanine Anaphylaxis, Shortness Of Breath and Swelling    Mushroom Anaphylaxis    Mushroom Extract Complex Anaphylaxis, Shortness Of Breath and Swelling    Penicillins Rash, Anaphylaxis, Hives, Itching, Shortness Of Breath and Swelling     TOLERATES ROCEPHIN    Shellfish-Derived Products Anaphylaxis, Shortness Of Breath and Swelling    Beef-Derived Products Other (See Comments)     Abdominal pain    Milk-Related Compounds Other (See Comments)     Abdominal pain    Ezetimibe Nausea Only       Medications:  Prior to Admission medications    Medication Sig Start Date End Date Taking? Authorizing Provider   aspirin 81 MG chewable tablet Chew 1 tablet Daily.   Yes Tina Delaney MD   atorvastatin (LIPITOR) 80 MG tablet Take 1 tablet by mouth Every Night.   Yes Tina Delaney MD   calcium carbonate (TUMS) 500 MG chewable tablet Chew 1,000 mg 4 (Four) Times a Day As Needed for Indigestion or Heartburn. 5/22/20  Yes Marty Kc DO   carvedilol (COREG) 12.5 MG tablet Take 1 tablet by mouth 2 (Two) Times a Day With Meals.   Yes Tina Delaney MD   cholecalciferol (VITAMIN D3) 25 MCG (1000 UT) tablet Take 1 tablet by mouth 2 (Two) Times a Day.   Yes Tina Delaney MD   digoxin (LANOXIN) 125 MCG tablet Take 2 tablets by mouth Daily.   Yes Tina Delaney MD   fluticasone (FLONASE) 50 MCG/ACT nasal spray 2 sprays into the nostril(s) as directed by provider Daily As Needed for Rhinitis.   Yes Tina Delaney MD   furosemide (LASIX) 20 MG tablet Take 2 tablets by mouth Daily.   Yes Tina Delaney MD   glipizide (GLUCOTROL) 10 MG tablet Take 1 tablet by mouth Every Morning.   Yes Tina Delaney MD   levothyroxine (SYNTHROID, LEVOTHROID) 125 MCG  tablet Take 1 tablet by mouth Daily.   Yes Tina Delaney MD   linagliptin (TRADJENTA) 5 MG tablet tablet Take 1 tablet by mouth Daily.   Yes Tina Delaney MD   Loratadine 10 MG capsule Take  by mouth Daily.   Yes Tina Delnaey MD   metFORMIN (GLUCOPHAGE) 500 MG tablet Take 2 tablets by mouth 2 (Two) Times a Day With Meals.   Yes Tina Delaney MD   Multiple Vitamins-Minerals (MULTIVITAMIN WITH MINERALS) tablet tablet Take 1 tablet by mouth Daily.   Yes Tina Delaney MD   oxymetazoline (AFRIN) 0.05 % nasal spray 2 sprays into the nostril(s) as directed by provider Daily.   Yes Tina Delaney MD   sacubitril-valsartan (ENTRESTO) 24-26 MG tablet Take 1 tablet by mouth 2 (Two) Times a Day.   Yes Tina Delaney MD   spironolactone (ALDACTONE) 25 MG tablet Take 1 tablet by mouth Daily.   Yes Tina Delaney MD   warfarin (COUMADIN) 3 MG tablet Take 1 tablet by mouth Daily. TUES THURS SAT   Yes Tina Delaney MD   warfarin (COUMADIN) 6 MG tablet Take 1 tablet by mouth Daily. MONDAY WEDNESDAY FRIDAY SUNDAY   Yes Tina Delaney MD   albuterol sulfate  (90 Base) MCG/ACT inhaler Inhale 2 puffs Every 6 (Six) Hours As Needed for Wheezing.    Tina Delaney MD   carvedilol (COREG) 12.5 MG tablet  4/23/20   Tina Delaney MD   docusate sodium 100 MG capsule Take 100 mg by mouth 2 (Two) Times a Day. 5/22/20   Marty Kc DO   enoxaparin (LOVENOX) 40 MG/0.4ML solution syringe Inject 0.4 mL under the skin into the appropriate area as directed Daily. 5/23/20   Marty Kc DO   hydroCHLOROthiazide (HYDRODIURIL) 25 MG tablet Take 12.5 mg by mouth. 4/23/20   Tina Delaney MD   insulin glargine (Lantus) 100 UNIT/ML injection Inject 15 Units under the skin into the appropriate area as directed Every Night. 5/22/20   Marty Kc DO   irbesartan (AVAPRO) 75 MG tablet Take 1 tablet by mouth. 3/20/20   Tina Delaney  "MD   losartan (COZAAR) 25 MG tablet Take 1 tablet by mouth Daily. 5/23/20   Marty Kc DO   metoprolol succinate XL (TOPROL-XL) 50 MG 24 hr tablet Take 1 tablet by mouth Daily.    ProviderTina MD   mupirocin (BACTROBAN) 2 % ointment Apply  topically to the appropriate area as directed Every 12 (Twelve) Hours. 5/22/20   Marty Kc DO   ondansetron ODT (ZOFRAN-ODT) 4 MG disintegrating tablet Place 1 tablet on the tongue Every 8 (Eight) Hours As Needed for Nausea or Vomiting.    ProviderTina MD   oxyCODONE-acetaminophen (PERCOCET)  MG per tablet Take 1 tablet by mouth Every 6 (Six) Hours As Needed for Moderate Pain.    ProviderTina MD   polyethylene glycol (MIRALAX) 17 g packet Take 17 g by mouth Daily As Needed (constipation). 5/22/20   Marty Kc DO   sennosides-docusate (PERICOLACE) 8.6-50 MG per tablet Take 2 tablets by mouth Every Night. 5/22/20   Marty Kc DO   tiZANidine (ZANAFLEX) 4 MG tablet Take 1 tablet by mouth Every 8 (Eight) Hours As Needed for Muscle Spasms for up to 15 days. Indications: Muscle Spasticity 7/21/20 8/5/20  Willy Diaz MD   warfarin (COUMADIN) 7.5 MG tablet Take 0.5 tablets by mouth Daily.  Patient taking differently: Take 6 mg by mouth Daily. 5/26/20   Marty Kc DO     I have utilized all available immediate resources to obtain, update, or review the patient's current medications (including all prescriptions, over-the-counter products, herbals, cannabis/cannabidiol products, and vitamin/mineral/dietary (nutritional) supplements).    Objective     Vital Signs: /76   Pulse 96   Temp 97 °F (36.1 °C) (Temporal)   Resp 18   Ht 165.1 cm (65\")   Wt 84.8 kg (187 lb)   SpO2 100%   BMI 31.12 kg/m²   Physical exam:  General: No acute distress  HEENT: normocephalic, atraumatic  Neck: Supple  CV: RRR  Lung: Clear to auscultation bilaterally.  No wheeze, rales, or rhonchi noted.  Abdominal exam: Positive " bowel sounds, nontender, non distended  Extremity: BLE to the knees  Neurological exam: AAO x3. Cranial nerve II to XII grossly intact  Skin exam: Dry and warm  Psychiatric exam: Calm, cooperative, and normal affect         Results Reviewed:  Lab Results (last 24 hours)       Procedure Component Value Units Date/Time    Uric Acid [[id number]]  (Normal) Collected: 12/14/23 0606    Specimen: Blood Updated: 12/14/23 0826     Uric Acid 3.1 mg/dL     Digoxin Level [[id number]]  (Normal) Collected: 12/14/23 0606    Specimen: Blood Updated: 12/14/23 0719     Digoxin 0.70 ng/mL     TSH [[id number]]  (Abnormal) Collected: 12/14/23 0606    Specimen: Blood Updated: 12/14/23 0650     TSH 5.900 uIU/mL     T4, Free [[id number]]  (Normal) Collected: 12/14/23 0606    Specimen: Blood Updated: 12/14/23 0650     Free T4 1.65 ng/dL     Narrative:      Results may be falsely increased if patient taking Biotin.      Comprehensive Metabolic Panel [[id number]]  (Abnormal) Collected: 12/14/23 0606    Specimen: Blood Updated: 12/14/23 0646     Glucose 227 mg/dL      BUN 11 mg/dL      Creatinine 0.36 mg/dL      Sodium 120 mmol/L      Potassium 4.6 mmol/L      Comment: Slight hemolysis detected by analyzer. Result may be falsely elevated.        Chloride 84 mmol/L      CO2 24.0 mmol/L      Calcium 9.2 mg/dL      Total Protein 6.4 g/dL      Albumin 3.1 g/dL      ALT (SGPT) 22 U/L      AST (SGOT) 25 U/L      Comment: Slight hemolysis detected by analyzer. Result may be falsely elevated.        Alkaline Phosphatase 59 U/L      Total Bilirubin 0.8 mg/dL      Globulin 3.3 gm/dL      A/G Ratio 0.9 g/dL      BUN/Creatinine Ratio 30.6     Anion Gap 12.0 mmol/L      eGFR 110.1 mL/min/1.73     Narrative:      GFR Normal >60  Chronic Kidney Disease <60  Kidney Failure <15      Magnesium [[id number]]  (Abnormal) Collected: 12/14/23 0606    Specimen: Blood Updated: 12/14/23 0646     Magnesium 1.5 mg/dL     BNP [[id number]]  (Normal) Collected: 12/14/23 0606     Specimen: Blood Updated: 12/14/23 0646     proBNP 497.1 pg/mL     Narrative:      This assay is used as an aid in the diagnosis of individuals suspected of having heart failure. It can be used as an aid in the diagnosis of acute decompensated heart failure (ADHF) in patients presenting with signs and symptoms of ADHF to the emergency department (ED). In addition, NT-proBNP of <300 pg/mL indicates ADHF is not likely.    Age Range Result Interpretation  NT-proBNP Concentration (pg/mL:      <50             Positive            >450                   Gray                 300-450                    Negative             <300    50-75           Positive            >900                  Gray                300-900                  Negative            <300      >75             Positive            >1800                  Gray                300-1800                  Negative            <300    COVID PRE-OP / PRE-PROCEDURE SCREENING ORDER (NO ISOLATION) - Swab, Nasal Cavity [[id number]]  (Normal) Collected: 12/14/23 0619    Specimen: Swab from Nasal Cavity Updated: 12/14/23 0644    Narrative:      The following orders were created for panel order COVID PRE-OP / PRE-PROCEDURE SCREENING ORDER (NO ISOLATION) - Swab, Nasal Cavity.  Procedure                               Abnormality         Status                     ---------                               -----------         ------                     COVID-19 and FLU A/B PCR...[[id number]]  Normal              Final result                 Please view results for these tests on the individual orders.    COVID-19 and FLU A/B PCR, 1 HR TAT - Swab, Nasopharynx [[id number]]  (Normal) Collected: 12/14/23 0619    Specimen: Swab from Nasopharynx Updated: 12/14/23 0644     COVID19 Not Detected     Influenza A PCR Not Detected     Influenza B PCR Not Detected    Narrative:      Fact sheet for providers: https://www.fda.gov/media/492193/download    Fact sheet for patients:  https://www.fda.gov/media/732107/download    Test performed by PCR.    Urinalysis With Microscopic If Indicated (No Culture) - Straight Cath [[id number]]  (Abnormal) Collected: 12/14/23 0615    Specimen: Urine from Straight Cath Updated: 12/14/23 0641     Color, UA Yellow     Appearance, UA Turbid     pH, UA 5.5     Specific Gravity, UA 1.007     Glucose,  mg/dL (2+)     Ketones, UA Negative     Bilirubin, UA Negative     Blood, UA Moderate (2+)     Protein, UA Negative     Leuk Esterase, UA Large (3+)     Nitrite, UA Negative     Urobilinogen, UA 0.2 E.U./dL    Urinalysis, Microscopic Only - Straight Cath [[id number]]  (Abnormal) Collected: 12/14/23 0615    Specimen: Urine from Straight Cath Updated: 12/14/23 0641     RBC, UA 3-5 /HPF      WBC, UA Too Numerous to Count /HPF      Bacteria, UA 4+ /HPF      Squamous Epithelial Cells, UA 0-2 /HPF      Hyaline Casts, UA       Unable to determine due to loaded field     /LPF     WBC Clumps, UA Large/3+ /HPF      Methodology Manual Light Microscopy    Protime-INR [[id number]]  (Abnormal) Collected: 12/14/23 0606    Specimen: Blood Updated: 12/14/23 0628     Protime 21.7 Seconds      INR 1.86    CBC & Differential [[id number]]  (Abnormal) Collected: 12/14/23 0606    Specimen: Blood Updated: 12/14/23 0620    Narrative:      The following orders were created for panel order CBC & Differential.  Procedure                               Abnormality         Status                     ---------                               -----------         ------                     CBC Auto Differential[[id number]]        Abnormal            Final result                 Please view results for these tests on the individual orders.    CBC Auto Differential [[id number]]  (Abnormal) Collected: 12/14/23 0606    Specimen: Blood Updated: 12/14/23 0620     WBC 7.91 10*3/mm3      RBC 4.58 10*6/mm3      Hemoglobin 13.1 g/dL      Hematocrit 39.1 %      MCV 85.4 fL      MCH 28.6 pg      MCHC 33.5 g/dL       RDW 14.6 %      RDW-SD 45.6 fl      MPV 9.5 fL      Platelets 216 10*3/mm3      Neutrophil % 72.3 %      Lymphocyte % 14.0 %      Monocyte % 11.5 %      Eosinophil % 1.1 %      Basophil % 0.8 %      Immature Grans % 0.3 %      Neutrophils, Absolute 5.72 10*3/mm3      Lymphocytes, Absolute 1.11 10*3/mm3      Monocytes, Absolute 0.91 10*3/mm3      Eosinophils, Absolute 0.09 10*3/mm3      Basophils, Absolute 0.06 10*3/mm3      Immature Grans, Absolute 0.02 10*3/mm3      nRBC 0.0 /100 WBC           Imaging Results (Last 24 Hours)       Procedure Component Value Units Date/Time    XR Chest 1 View [[id number]] Collected: 12/14/23 0626     Updated: 12/14/23 0631    Narrative:      EXAMINATION: XR CHEST 1 VW-     12/14/2023 5:12 AM     HISTORY: generalized weakness/fatigue     A frontal projection of the chest is compared with the previous study  dated 9/1/2016.     The lungs are poorly expanded, worse on the right side.     There is moderate elevation of right diaphragm which is more pronounced  since the previous study is probably due to poor lung expansion.     The left lung base and left lateral diaphragm is not optimally  visualized. Possibility of left lower lobar consolidation and pleural  effusion may not be excluded.     There is no pulmonary congestion or pneumothorax.     There is persistent moderate cardiomegaly.     There is no acute bony abnormality.       Impression:      1. Complete obliteration of the lateral left diaphragm and left lung  base which may be artifactual or due to left lower lobar consolidation  or pleural effusion. This may be further evaluated by a follow-up chest  radiograph in PA projection. The remaining lungs are unremarkable.     This report was signed and finalized on 12/14/2023 6:28 AM by Dr. Omar Ayala MD.             I have personally reviewed and interpreted the radiology studies and ECG obtained at time of admission.     Assessment / Plan   Assessment:   Active Hospital  Problems    Diagnosis     **Hyponatremia        Hypervolemic Hyponatremia 2/2 fluid overload: Plasma Osm > Urine Osm c/w diagnosis  -lasix    sCHF 2/2 ischemic vs nonischemic: EF 25-30% on last TTE, been refusing AICD for primary prevention of SCD for years  -hold home meds while diuresing  -strict I/Os q2h  -vitals q2h  -daily weights  -follow up with cardiology outpatient as directed         Dispo: ED nurse refusing to give Lasix as ordered, floor nurses not documenting intake and output nor vital signs appropriately, I am unable to properly diurese this patient without orders being carried out as ordered, will attempt diuresis tomorrow when she has a new nurse who will hopefully follow orders so we can treat this patient    Hiram Perea MD, 12/14/23, 08:35 CST      Treatment Plan  The patient will be admitted to my service here at [facility].     Medical Decision Making  Number and Complexity of problems: see assessment and plan  Differential Diagnosis: see assessment and plan    Conditions and Status             MDM Data  External documents reviewed: yes  Cardiac tracing (EKG, telemetry) interpretation: see cardiology read and/or assessment and plan  Radiology interpretation: see radiologist read  Labs reviewed: yes  Any tests that were considered but not ordered: n/a     Decision rules/scores evaluated (example [id number], Wells, etc): see assessment and plan     Discussed with: see assessment and plan     Care Planning  Shared decision making: see assessment and plan  Code status and discussions: see assessment and plan    Disposition  Social Determinants of Health that impact treatment or disposition: see dispo above.  Estimated length of stay is see dispo above.    I confirmed that the patient's advanced care plan is present, code status is documented, and a surrogate decision maker is listed in the patient's medical record.     The patient's surrogate decision maker is see chart    The  patient was seen and examined by me is see admission order time.    Electronically signed by Hiram Peera MD, 12/14/23, 08:35 CST.

## 2023-12-14 NOTE — PLAN OF CARE
Goal Outcome Evaluation:  Plan of Care Reviewed With: patient           Outcome Evaluation: OT eval completed. Pt presents alert and oriented x4, sitting up in bed. She reports at baseline requiring assistance with all adls and mobility. Today she demonstrates generalized weakness, decreased balance, activity tolerance and significant B LE pitting edema. She was able to come to sitting at EOB with Mod A. Max A to don socks while seated at EOB. She completed sit <> stand t/f x2 reps with Mod Ax2. She was returned back to [facility] in bed with Mod A. She would benefit from skilled OT services to address these deficits. Recommend d/c to SNF for continued skilled OT services.      Anticipated Discharge Disposition (OT): skilled nursing facility

## 2023-12-15 LAB
ANION GAP SERPL CALCULATED.3IONS-SCNC: 11 MMOL/L (ref 5–15)
ANION GAP SERPL CALCULATED.3IONS-SCNC: 7 MMOL/L (ref 5–15)
BASOPHILS # BLD AUTO: 0.06 10*3/MM3 (ref 0–0.2)
BASOPHILS NFR BLD AUTO: 0.9 % (ref 0–1.5)
BUN SERPL-MCNC: 10 MG/DL (ref 8–23)
BUN SERPL-MCNC: 11 MG/DL (ref 8–23)
BUN/CREAT SERPL: 28.2 (ref 7–25)
BUN/CREAT SERPL: 38.5 (ref 7–25)
CALCIUM SPEC-SCNC: 8.6 MG/DL (ref 8.6–10.5)
CALCIUM SPEC-SCNC: 8.6 MG/DL (ref 8.6–10.5)
CHLORIDE SERPL-SCNC: 89 MMOL/L (ref 98–107)
CHLORIDE SERPL-SCNC: 92 MMOL/L (ref 98–107)
CO2 SERPL-SCNC: 25 MMOL/L (ref 22–29)
CO2 SERPL-SCNC: 27 MMOL/L (ref 22–29)
CREAT SERPL-MCNC: 0.26 MG/DL (ref 0.57–1)
CREAT SERPL-MCNC: 0.39 MG/DL (ref 0.57–1)
DEPRECATED RDW RBC AUTO: 45.5 FL (ref 37–54)
DIGOXIN SERPL-MCNC: 0.3 NG/ML (ref 0.6–1.2)
EGFRCR SERPLBLD CKD-EPI 2021: 107.9 ML/MIN/1.73
EGFRCR SERPLBLD CKD-EPI 2021: 119 ML/MIN/1.73
EOSINOPHIL # BLD AUTO: 0.09 10*3/MM3 (ref 0–0.4)
EOSINOPHIL NFR BLD AUTO: 1.4 % (ref 0.3–6.2)
ERYTHROCYTE [DISTWIDTH] IN BLOOD BY AUTOMATED COUNT: 14.6 % (ref 12.3–15.4)
GLUCOSE SERPL-MCNC: 194 MG/DL (ref 65–99)
GLUCOSE SERPL-MCNC: 288 MG/DL (ref 65–99)
HCT VFR BLD AUTO: 35.6 % (ref 34–46.6)
HGB BLD-MCNC: 11.9 G/DL (ref 12–15.9)
IMM GRANULOCYTES # BLD AUTO: 0.02 10*3/MM3 (ref 0–0.05)
IMM GRANULOCYTES NFR BLD AUTO: 0.3 % (ref 0–0.5)
INR PPP: 1.83 (ref 0.91–1.09)
LYMPHOCYTES # BLD AUTO: 1.21 10*3/MM3 (ref 0.7–3.1)
LYMPHOCYTES NFR BLD AUTO: 19.1 % (ref 19.6–45.3)
MAGNESIUM SERPL-MCNC: 1.8 MG/DL (ref 1.6–2.4)
MCH RBC QN AUTO: 28.4 PG (ref 26.6–33)
MCHC RBC AUTO-ENTMCNC: 33.4 G/DL (ref 31.5–35.7)
MCV RBC AUTO: 85 FL (ref 79–97)
MONOCYTES # BLD AUTO: 0.73 10*3/MM3 (ref 0.1–0.9)
MONOCYTES NFR BLD AUTO: 11.5 % (ref 5–12)
NEUTROPHILS NFR BLD AUTO: 4.24 10*3/MM3 (ref 1.7–7)
NEUTROPHILS NFR BLD AUTO: 66.8 % (ref 42.7–76)
NRBC BLD AUTO-RTO: 0 /100 WBC (ref 0–0.2)
PHOSPHATE SERPL-MCNC: 2.2 MG/DL (ref 2.5–4.5)
PLATELET # BLD AUTO: 184 10*3/MM3 (ref 140–450)
PMV BLD AUTO: 9.3 FL (ref 6–12)
POTASSIUM SERPL-SCNC: 3.6 MMOL/L (ref 3.5–5.2)
POTASSIUM SERPL-SCNC: 3.8 MMOL/L (ref 3.5–5.2)
POTASSIUM SERPL-SCNC: 4 MMOL/L (ref 3.5–5.2)
POTASSIUM SERPL-SCNC: 4.1 MMOL/L (ref 3.5–5.2)
POTASSIUM SERPL-SCNC: 4.2 MMOL/L (ref 3.5–5.2)
PROTHROMBIN TIME: 21.4 SECONDS (ref 11.8–14.8)
RBC # BLD AUTO: 4.19 10*6/MM3 (ref 3.77–5.28)
SODIUM SERPL-SCNC: 123 MMOL/L (ref 136–145)
SODIUM SERPL-SCNC: 124 MMOL/L (ref 136–145)
SODIUM SERPL-SCNC: 125 MMOL/L (ref 136–145)
SODIUM SERPL-SCNC: 127 MMOL/L (ref 136–145)
SODIUM SERPL-SCNC: 127 MMOL/L (ref 136–145)
WBC NRBC COR # BLD AUTO: 6.35 10*3/MM3 (ref 3.4–10.8)

## 2023-12-15 PROCEDURE — 0 DEXTROSE 5 % SOLUTION: Performed by: EMERGENCY MEDICINE

## 2023-12-15 PROCEDURE — 85025 COMPLETE CBC W/AUTO DIFF WBC: CPT | Performed by: INTERNAL MEDICINE

## 2023-12-15 PROCEDURE — 25010000002 HEPARIN (PORCINE) PER 1000 UNITS: Performed by: INTERNAL MEDICINE

## 2023-12-15 PROCEDURE — 80048 BASIC METABOLIC PNL TOTAL CA: CPT | Performed by: INTERNAL MEDICINE

## 2023-12-15 PROCEDURE — 25010000002 FUROSEMIDE PER 20 MG: Performed by: INTERNAL MEDICINE

## 2023-12-15 PROCEDURE — 83735 ASSAY OF MAGNESIUM: CPT | Performed by: INTERNAL MEDICINE

## 2023-12-15 PROCEDURE — 84132 ASSAY OF SERUM POTASSIUM: CPT | Performed by: INTERNAL MEDICINE

## 2023-12-15 PROCEDURE — 25010000002 DIGOXIN PER 500 MCG: Performed by: INTERNAL MEDICINE

## 2023-12-15 PROCEDURE — 84100 ASSAY OF PHOSPHORUS: CPT | Performed by: INTERNAL MEDICINE

## 2023-12-15 PROCEDURE — 85610 PROTHROMBIN TIME: CPT | Performed by: INTERNAL MEDICINE

## 2023-12-15 PROCEDURE — 80162 ASSAY OF DIGOXIN TOTAL: CPT | Performed by: INTERNAL MEDICINE

## 2023-12-15 PROCEDURE — 97161 PT EVAL LOW COMPLEX 20 MIN: CPT

## 2023-12-15 PROCEDURE — 84295 ASSAY OF SERUM SODIUM: CPT | Performed by: INTERNAL MEDICINE

## 2023-12-15 PROCEDURE — 97535 SELF CARE MNGMENT TRAINING: CPT | Performed by: OCCUPATIONAL THERAPIST

## 2023-12-15 RX ORDER — DIGOXIN 250 MCG
250 TABLET ORAL
Status: DISCONTINUED | OUTPATIENT
Start: 2023-12-15 | End: 2023-12-20 | Stop reason: HOSPADM

## 2023-12-15 RX ORDER — LEVOTHYROXINE SODIUM 0.12 MG/1
125 TABLET ORAL DAILY
Status: DISCONTINUED | OUTPATIENT
Start: 2023-12-15 | End: 2023-12-20 | Stop reason: HOSPADM

## 2023-12-15 RX ORDER — WARFARIN SODIUM 6 MG/1
6 TABLET ORAL
Status: DISCONTINUED | OUTPATIENT
Start: 2023-12-15 | End: 2023-12-20

## 2023-12-15 RX ORDER — DIGOXIN 0.25 MG/ML
500 INJECTION INTRAMUSCULAR; INTRAVENOUS ONCE
Status: COMPLETED | OUTPATIENT
Start: 2023-12-15 | End: 2023-12-15

## 2023-12-15 RX ORDER — POTASSIUM CHLORIDE 750 MG/1
40 CAPSULE, EXTENDED RELEASE ORAL
Status: DISCONTINUED | OUTPATIENT
Start: 2023-12-15 | End: 2023-12-16

## 2023-12-15 RX ORDER — FUROSEMIDE 10 MG/ML
60 INJECTION INTRAMUSCULAR; INTRAVENOUS EVERY 6 HOURS
Qty: 60 ML | Refills: 0 | Status: DISCONTINUED | OUTPATIENT
Start: 2023-12-15 | End: 2023-12-16

## 2023-12-15 RX ORDER — CHOLECALCIFEROL (VITAMIN D3) 125 MCG
500 CAPSULE ORAL DAILY
COMMUNITY

## 2023-12-15 RX ORDER — POLYETHYLENE GLYCOL 3350 17 G/17G
17 POWDER, FOR SOLUTION ORAL DAILY
Status: DISCONTINUED | OUTPATIENT
Start: 2023-12-15 | End: 2023-12-20 | Stop reason: HOSPADM

## 2023-12-15 RX ORDER — FUROSEMIDE 10 MG/ML
40 INJECTION INTRAMUSCULAR; INTRAVENOUS ONCE
Status: COMPLETED | OUTPATIENT
Start: 2023-12-15 | End: 2023-12-15

## 2023-12-15 RX ORDER — BISACODYL 10 MG
10 SUPPOSITORY, RECTAL RECTAL DAILY PRN
COMMUNITY

## 2023-12-15 RX ORDER — FUROSEMIDE 10 MG/ML
40 INJECTION INTRAMUSCULAR; INTRAVENOUS EVERY 6 HOURS
Status: DISCONTINUED | OUTPATIENT
Start: 2023-12-15 | End: 2023-12-15

## 2023-12-15 RX ORDER — ATORVASTATIN CALCIUM 40 MG/1
80 TABLET, FILM COATED ORAL NIGHTLY
Status: DISCONTINUED | OUTPATIENT
Start: 2023-12-15 | End: 2023-12-20 | Stop reason: HOSPADM

## 2023-12-15 RX ORDER — GLIPIZIDE 10 MG/1
15 TABLET ORAL EVERY EVENING
COMMUNITY
End: 2023-12-20 | Stop reason: HOSPADM

## 2023-12-15 RX ORDER — GLIPIZIDE 10 MG/1
10 TABLET ORAL EVERY MORNING
Status: DISCONTINUED | OUTPATIENT
Start: 2023-12-15 | End: 2023-12-20 | Stop reason: HOSPADM

## 2023-12-15 RX ORDER — WARFARIN SODIUM 3 MG/1
3 TABLET ORAL
Status: DISCONTINUED | OUTPATIENT
Start: 2023-12-16 | End: 2023-12-16

## 2023-12-15 RX ORDER — ACETAMINOPHEN 500 MG
500 TABLET ORAL EVERY 6 HOURS PRN
COMMUNITY

## 2023-12-15 RX ADMIN — METFORMIN HCL 1000 MG: 500 TABLET ORAL at 17:49

## 2023-12-15 RX ADMIN — FUROSEMIDE 40 MG: 10 INJECTION, SOLUTION INTRAVENOUS at 14:01

## 2023-12-15 RX ADMIN — Medication 10 ML: at 09:22

## 2023-12-15 RX ADMIN — WARFARIN 6 MG: 6 TABLET ORAL at 17:49

## 2023-12-15 RX ADMIN — LINAGLIPTIN 5 MG: 5 TABLET, FILM COATED ORAL at 14:01

## 2023-12-15 RX ADMIN — POTASSIUM CHLORIDE 40 MEQ: 750 CAPSULE, EXTENDED RELEASE ORAL at 14:01

## 2023-12-15 RX ADMIN — HEPARIN SODIUM 5000 UNITS: 5000 INJECTION INTRAVENOUS; SUBCUTANEOUS at 05:35

## 2023-12-15 RX ADMIN — Medication 10 ML: at 21:03

## 2023-12-15 RX ADMIN — COLLAGENASE SANTYL 1 APPLICATION: 250 OINTMENT TOPICAL at 21:04

## 2023-12-15 RX ADMIN — LEVOTHYROXINE SODIUM 125 MCG: 125 TABLET ORAL at 14:01

## 2023-12-15 RX ADMIN — COLLAGENASE SANTYL 1 APPLICATION: 250 OINTMENT TOPICAL at 14:01

## 2023-12-15 RX ADMIN — DIGOXIN 250 MCG: 250 TABLET ORAL at 14:01

## 2023-12-15 RX ADMIN — POLYETHYLENE GLYCOL 3350 17 G: 17 POWDER, FOR SOLUTION ORAL at 14:02

## 2023-12-15 RX ADMIN — DOCUSATE SODIUM 50 MG AND SENNOSIDES 8.6 MG 2 TABLET: 8.6; 5 TABLET, FILM COATED ORAL at 09:17

## 2023-12-15 RX ADMIN — DEXTROSE MONOHYDRATE 342 ML: 50 INJECTION, SOLUTION INTRAVENOUS at 21:53

## 2023-12-15 RX ADMIN — POTASSIUM & SODIUM PHOSPHATES POWDER PACK 280-160-250 MG 2 PACKET: 280-160-250 PACK at 17:49

## 2023-12-15 RX ADMIN — HEPARIN SODIUM 5000 UNITS: 5000 INJECTION INTRAVENOUS; SUBCUTANEOUS at 21:03

## 2023-12-15 RX ADMIN — DOCUSATE SODIUM 50 MG AND SENNOSIDES 8.6 MG 2 TABLET: 8.6; 5 TABLET, FILM COATED ORAL at 21:03

## 2023-12-15 RX ADMIN — BISACODYL 5 MG: 5 TABLET ORAL at 18:36

## 2023-12-15 RX ADMIN — HEPARIN SODIUM 5000 UNITS: 5000 INJECTION INTRAVENOUS; SUBCUTANEOUS at 14:01

## 2023-12-15 RX ADMIN — FUROSEMIDE 40 MG: 10 INJECTION, SOLUTION INTRAVENOUS at 15:41

## 2023-12-15 RX ADMIN — POTASSIUM CHLORIDE 40 MEQ: 750 CAPSULE, EXTENDED RELEASE ORAL at 17:49

## 2023-12-15 RX ADMIN — FUROSEMIDE 60 MG: 10 INJECTION, SOLUTION INTRAMUSCULAR; INTRAVENOUS at 21:02

## 2023-12-15 RX ADMIN — DIGOXIN 500 MCG: 0.25 INJECTION INTRAMUSCULAR; INTRAVENOUS at 15:41

## 2023-12-15 RX ADMIN — ATORVASTATIN CALCIUM 80 MG: 40 TABLET ORAL at 21:02

## 2023-12-15 RX ADMIN — GLIPIZIDE 10 MG: 10 TABLET ORAL at 14:00

## 2023-12-15 RX ADMIN — FUROSEMIDE 40 MG: 10 INJECTION, SOLUTION INTRAVENOUS at 09:18

## 2023-12-15 NOTE — PLAN OF CARE
Problem: Adult Inpatient Plan of Care  Goal: Plan of Care Review  Recent Flowsheet Documentation  Taken 12/15/2023 1125 by Dain Cuellar, PT  Plan of Care Reviewed With: patient  Outcome Evaluation: PT IE complete.  A&Ox4.  Today she was min A bed mobility.  Mod A x2 sit<>stand.  Min A x2 bed to chair with RW.  Pt with decreased strength, endurance, and balance.  PT to see for gait safety and strengthening.  [facility]  Thank you for referral.   Goal Outcome Evaluation:  Plan of Care Reviewed With: patient           Outcome Evaluation: PT IE complete.  A&Ox4.  Today she was min A bed mobility.  Mod A x2 sit<>stand.  Min A x2 bed to chair with RW.  Pt with decreased strength, endurance, and balance.  PT to see for gait safety and strengthening.  Recommend SNF [facility].  Thank you for referral.      Anticipated Discharge Disposition (PT): skilled nursing facility

## 2023-12-15 NOTE — PLAN OF CARE
Goal Outcome Evaluation:  Plan of Care Reviewed With: patient           Outcome Evaluation: OT tx completed. Pt presents alert and oriented x4, with son's at bedside. She was able to come to sitting at EOB with Min A. Max A to don socks while seated at EOB. She completed sit <> stand t/f from EOB x2 reps in total. Mod Ax2 for each attempt. First attempt wound care RN present in wound to assess sacral wound. She tolerated approx 2-3 minutes of standing on first attempt and with second attempt she was able to take steps to chair. Requires vcs to  L foot when taking steps. Waffle cushion placed in chair to assist with wound and education provided on weightshifting for pressure relief. Continue OT POC.      Anticipated Discharge Disposition (OT): skilled nursing facility

## 2023-12-15 NOTE — PLAN OF CARE
Problem: Social, Occupational or Functional Impairment (Anxiety Signs/Symptoms)  Goal: Enhanced Social, Occupational or Functional Skills (Anxiety Signs/Symptoms)  Intervention: Promote Social, Occupational and Functional Ability  Recent Flowsheet Documentation  Taken 12/14/2023 2050 by Sunshine Betancourt RN  Trust Relationship/Rapport:   care explained   choices provided   Goal Outcome Evaluation:         Pt reports that she had a panic attack last night when lab came in to draw the labs.  Able to calm her and family at bedside. Turned 2 hrs for sore on back.  Pt stated that was the best that she has slept in a long time.

## 2023-12-15 NOTE — PROGRESS NOTES
"    [facility] Medicine Services  INPATIENT PROGRESS NOTE    Patient Name: Anne-Marie Foreman  Date of Admission: 12/14/2023  Today's Date: 12/15/23  Length of Stay: 1  Primary Care Physician: Anabell Sanchez, TYSON    Subjective     No acute events overnight.         Objective    Temp:  [97.2 °F (36.2 °C)-98.4 °F (36.9 °C)] 97.7 °F (36.5 °C)  Heart Rate:  [] 106  Resp:  [16-18] 16  BP: ()/(41-80) 122/80    General: No acute distress  HEENT: normocephalic, atraumatic  Neck: Supple  CV: RRR  Lung: Clear to auscultation bilaterally.  No wheeze, rales, or rhonchi noted.  Abdominal exam: Positive bowel sounds, nontender, non distended  Extremity: No edema noted  Neurological exam: AAO x3. Cranial nerve II to XII grossly intact  Skin exam: Dry and warm  Psychiatric exam: Calm, cooperative, and normal affect       Results Review:  I have reviewed the labs, radiology results, and diagnostic studies.    Laboratory Data:   Results from last 7 days   Lab Units 12/15/23  0456 12/14/23  0606   WBC 10*3/mm3 6.35 7.91   HEMOGLOBIN g/dL 11.9* 13.1   HEMATOCRIT % 35.6 39.1   PLATELETS 10*3/mm3 184 216        Results from last 7 days   Lab Units 12/15/23  0838 12/15/23  0456 12/15/23  0020 12/14/23  1120 12/14/23  0606   SODIUM mmol/L 125* 124*  124* 124*   < > 120*   POTASSIUM mmol/L 3.6 4.0  4.0 4.2   < > 4.6   CHLORIDE mmol/L  --  92*  --   --  84*   CO2 mmol/L  --  25.0  --   --  24.0   BUN mg/dL  --  10  --   --  11   CREATININE mg/dL  --  0.26*  --   --  0.36*   CALCIUM mg/dL  --  8.6  --   --  9.2   BILIRUBIN mg/dL  --   --   --   --  0.8   ALK PHOS U/L  --   --   --   --  59   ALT (SGPT) U/L  --   --   --   --  22   AST (SGOT) U/L  --   --   --   --  25   GLUCOSE mg/dL  --  194*  --   --  227*    < > = values in this interval not displayed.       Culture Data:   No results found for: \"BLOODCX\", \"URINECX\", \"WOUNDCX\", \"MRSACX\", \"RESPCX\", \"STOOLCX\"    Radiology Data:   Imaging Results " (Last 24 Hours)       ** No results found for the last 24 hours. **            I have reviewed the patient's current medications.     Assessment/Plan   Assessment  Active Hospital Problems    Diagnosis     **Hyponatremia        Assessment and Plan:  Patient reports that she has not been feeling well for the past 6 months.  She was recently found to have hyponatremia and was started on salt tablets which were ineffective.  Lasix was held and she developed lower extremity edema.     Hypervolemic Hyponatremia 2/2 fluid overload: Plasma Osm > Urine Osm c/w diagnosis  -lasix 40 IV q6h     sCHF 2/2 ischemic vs nonischemic: EF 25-30% on last TTE, been refusing AICD for primary prevention of SCD for years  -hold home meds while diuresing  -strict I/Os q2h  -vitals q2h  -daily weights  -lasix as above    pAF, rate controlled:  -goal HR < 110  -hold coreg for now while diuresing, may switch to metoprolol for rate control if needed  -cont home digoxin, digoxin level pending  -resume home coumadin, coumadin level pending  -Keep K > 4, Mg > 2  -telemetry monitoring     DM2:   -goal glucose < 180 while inpatient  -cont home glipizide, linagliptin, metformin  -A1c in am    Pressure wound:  -wound care following            Dispo: pt is diuresing appropriately. Intake and output, vitals and other orders taken and entered as requested. Spoke with RN prior to bedside exam, reports pt is improving, spoke with pt and 2 sons at bedside. Pt reports she feels improved and relayed the plan. Everyone is in agreement. Current plan is working really well for her.    Hiram Perea MD 12/15/23, 12:17 CST.                  Treatment Plan      Medical Decision Making  Number and Complexity of problems:   Differential Diagnosis:     Conditions and Status             MDM Data  External documents reviewed:   Cardiac tracing (EKG, telemetry) interpretation:   Radiology interpretation:   Labs reviewed:   Any tests that were considered but not  ordered:      Decision rules/scores evaluated (example [id number], [place], etc):      Discussed with:      Care Planning  Shared decision making:   Code status and discussions:     Disposition  Social Determinants of Health that impact treatment or disposition:   I expect the patient to be discharged to  in  days.     Electronically signed by Hiram Perea MD, 12/15/23, 12:17 CST.

## 2023-12-15 NOTE — THERAPY EVALUATION
Patient Name: Anne-Marie Foreman  : 1955    MRN: [id number]                              Today's Date: 12/15/2023       Admit Date: 2023    Visit Dx:     ICD-10-CM ICD-9-CM   1. Generalized weakness  R53.1 780.79   2. Hyponatremia  E87.1 276.1   3. Acute UTI (urinary tract infection)  N39.0 599.0   4. Hypomagnesemia  E83.42 275.2   5. Abnormal chest x-ray  R93.89 793.2   6. Impaired mobility [Z74.09]  Z74.09 799.89     Patient Active Problem List   Diagnosis    Weakness of both lower extremities    Chronic atrial fibrillation    Essential hypertension    Long term current use of anticoagulant therapy    Mild CAD    Mixed hyperlipidemia    Morbid obesity due to excess calories    Systolic congestive heart failure    Type 2 diabetes mellitus with microalbuminuria, without long-term current use of insulin    Acquired hypothyroidism    Vitamin B 12 deficiency    Intervertebral thoracic disc disorder with myelopathy, thoracic region    S/P discectomy    Current non-smoker    Hyponatremia     Past Medical History:   Diagnosis Date    Asthma     CAD (coronary artery disease)     CHF (congestive heart failure)     Chronic atrial fibrillation     Chronic back pain     Chronic fatigue     Essential hypertension 2020    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Hyperlipidemia     Hypertension     Left sided lacunar infarction     Mixed hyperlipidemia 2017    Obesity, Class II, BMI 35-39.9     S/P discectomy 2020    Systolic heart failure     Type 2 diabetes mellitus     Type 2 diabetes mellitus with microalbuminuria, without long-term current use of insulin 2017    Vitamin B 12 deficiency 2020     Past Surgical History:   Procedure Laterality Date    CARDIAC CATHETERIZATION  10/21/2009    CARDIOVERSION  10/09/2013    CATARACT EXTRACTION, BILATERAL      CHOLECYSTECTOMY      LAPAROSCOPIC TUBAL LIGATION      LUMBAR DISCECTOMY N/A 2020    Procedure: Thoracic  DISCECTOMY, T10/11.  Costotransversectomy.  Neuromonitoring;  Surgeon: Willy Diaz MD;  [facility]: [facility] OR;  Service: Neurosurgery;  Laterality: N/A;    RETINAL LASER PROCEDURE        General Information       Row Name 12/15/23 1125          Physical Therapy Time and Intention    Document Type evaluation  -     Mode of Treatment co-treatment;physical therapy  -       Row Name 12/15/23 1125          General Information    Patient Profile Reviewed yes  -     Prior Level of Function independent:;all household mobility;ADL's  -     Existing Precautions/Restrictions fall  -     Barriers to Rehab medically complex  -       Row Name 12/15/23 1125          Living Environment    People in Home child(jered), adult  -       Row Name 12/15/23 1125          Home Main Entrance    Number of Stairs, Main Entrance three  -       Row Name 12/15/23 1125          Stairs Within Home, Primary    Number of Stairs, Within Home, Primary none  -       Row Name 12/15/23 1125          Cognition    Orientation Status (Cognition) oriented x 4  -       Row Name 12/15/23 1125          Safety Issues, Functional Mobility    Safety Issues Affecting Function (Mobility) ability to follow commands  -     Impairments Affecting Function (Mobility) balance;endurance/activity tolerance;strength  -               User Key  (r) = Recorded By, (t) = Taken By, (c) = Cosigned By      Initials Name Provider Type     Dain Cuellar, PT Physical Therapist                   Mobility       Row Name 12/15/23 1125          Bed Mobility    Bed Mobility supine-sit  -     Supine-Sit [place] (Bed Mobility) minimum assist (75% patient effort)  -     Assistive Device (Bed Mobility) bed rails;head of bed elevated  -       Row Name 12/15/23 1125          Bed-Chair Transfer    Bed-Chair [place] (Transfers) 2 person assist;minimum assist (75% patient effort)  -     Assistive Device (Bed-Chair Transfers) walker, front-wheeled  -       Row Name 12/15/23 1125           Sit-Stand Transfer    Sit-Stand [place] (Transfers) moderate assist (50% patient effort);2 person assist  -     Assistive Device (Sit-Stand Transfers) walker, front-wheeled  -               User Key  (r) = Recorded By, (t) = Taken By, (c) = Cosigned By      Initials Name Provider Type     Dain Cuellar, PT Physical Therapist                   Obj/Interventions       Row Name 12/15/23 1125          Range of Motion Comprehensive    General Range of Motion bilateral upper extremity ROM WFL;bilateral lower extremity ROM WFL  -       Row Name 12/15/23 1125          Strength Comprehensive (MMT)    General Manual Muscle Testing (MMT) Assessment upper extremity strength deficits identified;lower extremity strength deficits identified  -               User Key  (r) = Recorded By, (t) = Taken By, (c) = Cosigned By      Initials Name Provider Type     Dain Cuellar, PT Physical Therapist                   Goals/Plan       [facility] Name 12/15/23 1125          Bed Mobility Goal 1 (PT)    Activity/Assistive Device (Bed Mobility Goal 1, PT) bed mobility activities, all  -     [place] Level/Cues Needed (Bed Mobility Goal 1, PT) standby assist  -     Time Frame (Bed Mobility Goal 1, PT) 10 days  -     Progress/Outcomes (Bed Mobility Goal 1, PT) new goal  -[facility] Name 12/15/23 1125          Transfer Goal 1 (PT)    Activity/Assistive Device (Transfer Goal 1, PT) sit-to-stand/stand-to-sit  -     [place] Level/Cues Needed (Transfer Goal 1, PT) standby assist  -     Time Frame (Transfer Goal 1, PT) 10 days  -     Progress/Outcome (Transfer Goal 1, PT) new goal  -[facility] Name 12/15/23 1125          Gait Training Goal 1 (PT)    Activity/Assistive Device (Gait Training Goal 1, PT) gait (walking locomotion);assistive device use  -     [place] Level (Gait Training Goal 1, PT) contact guard required  -     Distance (Gait Training Goal 1, PT) 10ft  -     Time Frame (Gait Training Goal 1,  PT) 10 days  -     Progress/Outcome (Gait Training Goal 1, PT) new goal  -[facility] Name 12/15/23 1125          Therapy Assessment/Plan (PT)    Planned Therapy Interventions (PT) balance training;bed mobility training;gait training;home exercise program;stretching;strengthening;ROM (range of motion);patient/family education;transfer training  -               User Key  (r) = Recorded By, (t) = Taken By, (c) = Cosigned By      Initials Name Provider Type     Dain Cuellar, PT Physical Therapist                   Clinical Impression       [facility] Name 12/15/23 1125          Pain    Pretreatment Pain Rating 0/10 - no pain  -     Posttreatment Pain Rating 0/10 - no pain  -     Pain Intervention(s) Medication (See MAR)  -[facility] Name 12/15/23 1125          Plan of Care Review    Plan of Care Reviewed With patient  -     Outcome Evaluation PT IE complete.  A&Ox4.  Today she was min A bed mobility.  Mod A x2 sit<>stand.  Min A x2 bed to chair with RW.  Pt with decreased strength, endurance, and balance.  PT to see for gait safety and strengthening.  Recommend SNF at [place].  Thank you for referral.  -[facility] Name 12/15/23 1125          Therapy Assessment/Plan (PT)    Patient/Family Therapy Goals Statement (PT) return home  -     Rehab Potential (PT) good, to achieve stated therapy goals  -     Criteria for Skilled Interventions Met (PT) yes;skilled treatment is necessary  [facility]     Therapy Frequency (PT) 2 times/day  -     Predicted Duration of Therapy Intervention (PT) until dc  -[facility] Name 12/15/23 1125          Positioning and Restraints    Pre-Treatment Position in bed  -     Post Treatment Position chair  -     In Chair notified nsg;reclined;call light within reach;encouraged to call for assist;with family/caregiver;legs elevated  -               User Key  (r) = Recorded By, (t) = Taken By, (c) = Cosigned By      Initials Name Provider Type     Dain Cuellar, PT Physical Therapist                    Outcome Measures       Row Name 12/15/23 1125 12/15/23 0800       How much help from another person do you currently need...    Turning from your back to your side while in flat bed without using bedrails? 3  - 3  -AG    Moving from lying on back to sitting on the side of a flat bed without bedrails? 2  - 3  -AG    Moving to and from a bed to a chair (including a wheelchair)? 2  - 2  -AG    Standing up from a chair using your arms (e.g., wheelchair, bedside chair)? 2  - 2  -AG    Climbing 3-5 steps with a railing? 1  - 2  -AG    To walk in hospital room? 1  - 2  -AG    AM-PAC 6 Clicks Score (PT) 11  - 14  -AG    Highest Level of Mobility Goal 4 --> Transfer to chair/commode  - 4 --> Transfer to chair/commode  -AG      Row Name 12/15/23 1126 12/15/23 1125       Functional Assessment    Outcome Measure Options AM-PAC 6 Clicks Daily Activity (OT)  -[facility]-PAC 6 Clicks Basic Mobility (PT)  -              User Key  (r) = Recorded By, (t) = Taken By, (c) = Cosigned By      Initials Name Provider Type     Dain Cuellar, PT Physical Therapist    Shereen Rivera OTR/L, CSRS Occupational Therapist    AG Goldberg, Amanda, RN Registered Nurse                                 Physical Therapy Education       Title: PT OT SLP Therapies (In Progress)       Topic: Physical Therapy (Done)       Point: Mobility training (Done)       Learning Progress Summary             Patient Acceptance, E,D, DU,VU by  at 12/15/2023 1408    Comment: benefits of PT and POC, call for A OOB                         Point: Precautions (Done)       Learning Progress Summary             Patient Acceptance, E,D, DU,VU by  at 12/15/2023 1408    Comment: benefits of PT and POC, call for A OOB                                         User Key       Initials Effective Dates Name Provider Type [facility] 02/03/23 -  Dain Cuellar, PT Physical Therapist PT                  PT Recommendation and Plan  Planned  Therapy Interventions (PT): balance training, bed mobility training, gait training, home exercise program, stretching, strengthening, ROM (range of motion), patient/family education, transfer training  Plan of Care Reviewed With: patient  Outcome Evaluation: PT IE complete.  A&Ox4.  Today she was min A bed mobility.  Mod A x2 sit<>stand.  Min A x2 bed to chair with RW.  Pt with decreased strength, endurance, and balance.  PT to see for gait safety and strengthening.  Recommend SNF at [place].  Thank you for referral.     Time Calculation:         PT Charges       Row Name 12/15/23 1409             Time Calculation    Start Time 1125  -      Stop Time 1203  -      Time Calculation (min) 38 min  -LH      PT Received On 12/15/23  -      PT Goal Re-Cert Due Date 12/25/23  -         Untimed Charges    PT Eval/Re-eval Minutes 38  -         Total Minutes    Untimed Charges Total Minutes 38  -       Total Minutes 38  -                User Key  (r) = Recorded By, (t) = Taken By, (c) = Cosigned By      Initials Name Provider Type     Dain Cuellar, PT Physical Therapist                  Therapy Charges for Today       Code Description Service Date Service Provider Modifiers Qty    [id number] HC PT EVAL LOW COMPLEXITY 3 12/15/2023 Dain Cuellar, PT GP 1            PT G-Codes  Outcome Measure Options: AM-PAC 6 Clicks Daily Activity (OT)  AM-PAC 6 Clicks Score (PT): 11  AM-PAC 6 Clicks Score (OT): 17  PT Discharge Summary  Anticipated Discharge Disposition (PT): skilled nursing facility    Dain Cuellar PT  12/15/2023

## 2023-12-15 NOTE — CONSULTS
[facility]  INPATIENT WOUND & OSTOMY CONSULTATION    Today's Date: 12/15/23    Patient Name: Anne-Marie Foreman  MRN: [id number]  CSN: [id number]  PCP: Anabell Sanchez APRN  Referring Provider:   Consulting Provider (From admission, onward)      Start Ordered     Status Ordering Provider    12/14/23 1824 12/14/23 1824  Inpatient Wound Care MD Consult  Once        Specialty:  Wound Care  Provider:  Jerilyn Sebastian APRN    Acknowledged HIRAM PEREA           Attending Provider: Hiram Perea MD  Length of Stay: 1    SUBJECTIVE   Chief Complaint: ***    HPI: Anne-Marie Foreman, a 68 y.o.female, presents with a past medical history of ***.  A full past medical history as listed below.  ***    Inpatient wound care consulted due to ***      Visit Dx:    ICD-10-CM ICD-9-CM   1. Generalized weakness  R53.1 780.79   2. Hyponatremia  E87.1 276.1   3. Acute UTI (urinary tract infection)  N39.0 599.0   4. Hypomagnesemia  E83.42 275.2   5. Abnormal chest x-ray  R93.89 793.2       Hospital Problem List:     Hyponatremia      History:   Past Medical History:   Diagnosis Date    Asthma     CAD (coronary artery disease)     CHF (congestive heart failure)     Chronic atrial fibrillation     Chronic back pain     Chronic fatigue     Essential hypertension 05/14/2020    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Hyperlipidemia     Hypertension     Left sided lacunar infarction     Mixed hyperlipidemia 09/26/2017    Obesity, Class II, BMI 35-39.9     S/P discectomy 06/04/2020    Systolic heart failure     Type 2 diabetes mellitus     Type 2 diabetes mellitus with microalbuminuria, without long-term current use of insulin 09/26/2017    Vitamin B 12 deficiency 05/17/2020     Past Surgical History:   Procedure Laterality Date    CARDIAC CATHETERIZATION  10/21/2009    CARDIOVERSION  10/09/2013    CATARACT EXTRACTION, BILATERAL      CHOLECYSTECTOMY      LAPAROSCOPIC TUBAL LIGATION      LUMBAR DISCECTOMY N/A 5/19/2020    Procedure: Thoracic   DISCECTOMY, T10/11.  Costotransversectomy. Neuromonitoring;  Surgeon: Willy Diaz MD;  [facility]: [facility] OR;  Service: Neurosurgery;  Laterality: N/A;    RETINAL LASER PROCEDURE       Social History     Socioeconomic History    Marital status:    Tobacco Use    Smoking status: Never   Vaping Use    Vaping Use: Never used   Substance and Sexual Activity    Alcohol use: Never    Drug use: Never     Family History   Problem Relation Age of Onset    Heart failure Mother     Hypertension Mother     Diabetes Mother     Liver cancer Father        Allergies:  Allergies   Allergen Reactions    Aspartame And Phenylalanine Anaphylaxis, Shortness Of Breath and Swelling    Mushroom Anaphylaxis    Mushroom Extract Complex Anaphylaxis, Shortness Of Breath and Swelling    Penicillins Rash, Anaphylaxis, Hives, Itching, Shortness Of Breath and Swelling     TOLERATES ROCEPHIN    Shellfish-Derived Products Anaphylaxis, Shortness Of Breath and Swelling    Beef-Derived Products Other (See Comments)     Abdominal pain    Milk-Related Compounds Other (See Comments)     Abdominal pain    Ezetimibe Nausea Only       Medications:    Current Facility-Administered Medications:     acetaminophen (TYLENOL) tablet 650 mg, 650 mg, Oral, Q4H PRN, Hiram Perea MD, 650 mg at 12/14/23 2129    atorvastatin (LIPITOR) tablet 80 mg, 80 mg, Oral, Nightly, Hiram Perea MD    sennosides-docusate (PERICOLACE) 8.6-50 MG per tablet 2 tablet, 2 tablet, Oral, BID, 2 tablet at 12/15/23 0917 **AND** [DISCONTINUED] polyethylene glycol (MIRALAX) packet 17 g, 17 g, Oral, Daily PRN **AND** bisacodyl (DULCOLAX) EC tablet 5 mg, 5 mg, Oral, Daily PRN **AND** bisacodyl (DULCOLAX) suppository 10 mg, 10 mg, Rectal, Daily PRN, Hiram Perea MD    calcium carbonate (TUMS) chewable tablet 500 mg (200 mg elemental), 2 tablet, Oral, BID PRN, Hiram Perea MD    collagenase ointment 1 application , 1 application , Topical, Q12H, Jerilyn Sebastian  R, APRN    dextrose (D5W) 5 % infusion 342 mL, 6 mL/kg (Ideal), Intravenous, Continuous PRN, Jose Chavira MD    digoxin (LANOXIN) tablet 250 mcg, 250 mcg, Oral, Daily, Hiram Perea MD    furosemide (LASIX) injection 40 mg, 40 mg, Intravenous, Q6H, Hiram Perea MD, 40 mg at 12/15/23 0918    glipizide (GLUCOTROL) tablet 10 mg, 10 mg, Oral, QAM, Hiram Perea MD    heparin (porcine) 5000 UNIT/ML injection 5,000 Units, 5,000 Units, Subcutaneous, Q8H, Hiram Perea MD, 5,000 Units at 12/15/23 0535    levothyroxine (SYNTHROID, LEVOTHROID) tablet 125 mcg, 125 mcg, Oral, Daily, Hiram Perea MD    linagliptin (TRADJENTA) tablet 5 mg, 5 mg, Oral, Daily, Hiram Perea MD    LORazepam (ATIVAN) tablet 0.5 mg, 0.5 mg, Oral, Q8H PRN, Hiram Perea MD, 0.5 mg at 12/14/23 2129    melatonin tablet 5 mg, 5 mg, Oral, Nightly PRN, Hiram Perea MD    metFORMIN (GLUCOPHAGE) tablet 1,000 mg, 1,000 mg, Oral, BID With Meals, Hiram Perea MD    nitroglycerin (NITROSTAT) SL tablet 0.4 mg, 0.4 mg, Sublingual, Q5 Min PRN, Hiram Perea MD    ondansetron (ZOFRAN) injection 4 mg, 4 mg, Intravenous, Q6H PRN, Hiram Perea MD    Pharmacy Consult - Pharmacy to dose, , Does not apply, Continuous PRN, Hiram Perea MD    polyethylene glycol (MIRALAX) packet 17 g, 17 g, Oral, Daily, Hiram Perea MD    potassium & sodium phosphates (PHOS-NAK) 280-160-250 MG packet 2 packet, 2 packet, Oral, BID AC, Hiram Perea MD    potassium chloride (MICRO-K/KLOR-CON) CR capsule, 40 mEq, Oral, TID With Meals, Hiram Perea MD    [COMPLETED] Insert Peripheral IV, , , Once **AND** sodium chloride 0.9 % flush 10 mL, 10 mL, Intravenous, PRN, HelphenstRickey mensah DO    sodium chloride 0.9 % flush 10 mL, 10 mL, Intravenous, Q12H, Hiram Perea MD, 10 mL at 12/15/23 0922    sodium chloride 0.9 % flush 10 mL, 10 mL, Intravenous, PRN, Hiram Perea MD    sodium chloride 0.9 % infusion 40 mL, 40 mL,  Intravenous, PRN, Hiram Perea MD    warfarin (COUMADIN) tablet 3 mg, 3 mg, Oral, Daily, Hiram Perea MD    warfarin (COUMADIN) tablet 6 mg, 6 mg, Oral, Daily, Hiram Perea MD    Review of Systems: ***  Review of Systems      OBJECTIVE     Vitals:    12/15/23 1320   BP: 100/52   Pulse: 107   Resp: 16   Temp: 97.3 °F (36.3 °C)   SpO2: 98%       PHYSICAL EXAM: ***  Physical Exam       Results Review:  Lab Results (last 48 hours)       Procedure Component Value Units Date/Time    Digoxin Level [[id number]]  (Abnormal) Collected: 12/15/23 1214    Specimen: Blood Updated: 12/15/23 1328     Digoxin 0.30 ng/mL     Protime-INR [[id number]]  (Abnormal) Collected: 12/15/23 1214    Specimen: Blood Updated: 12/15/23 1257     Protime 21.4 Seconds      INR 1.83    Sodium [[id number]]  (Abnormal) Collected: 12/15/23 1214    Specimen: Blood Updated: 12/15/23 1247     Sodium 123 mmol/L     Potassium [[id number]]  (Normal) Collected: 12/15/23 1214    Specimen: Blood Updated: 12/15/23 1247     Potassium 3.8 mmol/L      Comment: Slight hemolysis detected by analyzer. Result may be falsely elevated.       Sodium [[id number]]  (Abnormal) Collected: 12/15/23 0838    Specimen: Blood Updated: 12/15/23 0859     Sodium 125 mmol/L     Potassium [[id number]]  (Normal) Collected: 12/15/23 0838    Specimen: Blood Updated: 12/15/23 0859     Potassium 3.6 mmol/L     Sodium [[id number]]  (Abnormal) Collected: 12/15/23 0456    Specimen: Blood Updated: 12/15/23 0548     Sodium 124 mmol/L     Potassium [[id number]]  (Normal) Collected: 12/15/23 0456    Specimen: Blood Updated: 12/15/23 0548     Potassium 4.0 mmol/L      Comment: Slight hemolysis detected by analyzer. Result may be falsely elevated.       Basic Metabolic Panel [[id number]]  (Abnormal) Collected: 12/15/23 0456    Specimen: Blood Updated: 12/15/23 0548     Glucose 194 mg/dL      BUN 10 mg/dL      Creatinine 0.26 mg/dL      Sodium 124 mmol/L      Potassium 4.0 mmol/L      Comment:  Slight hemolysis detected by analyzer. Result may be falsely elevated.        Chloride 92 mmol/L      CO2 25.0 mmol/L      Calcium 8.6 mg/dL      BUN/Creatinine Ratio 38.5     Anion Gap 7.0 mmol/L      eGFR 119.0 mL/min/1.73     Narrative:      GFR Normal >60  Chronic Kidney Disease <60  Kidney Failure <15      Magnesium [[id number]]  (Normal) Collected: 12/15/23 0456    Specimen: Blood Updated: 12/15/23 0548     Magnesium 1.8 mg/dL     Phosphorus [[id number]]  (Abnormal) Collected: 12/15/23 0456    Specimen: Blood Updated: 12/15/23 0548     Phosphorus 2.2 mg/dL     CBC & Differential [[id number]]  (Abnormal) Collected: 12/15/23 0456    Specimen: Blood Updated: 12/15/23 0521    Narrative:      The following orders were created for panel order CBC & Differential.  Procedure                               Abnormality         Status                     ---------                               -----------         ------                     CBC Auto Differential[[id number]]        Abnormal            Final result                 Please view results for these tests on the individual orders.    CBC Auto Differential [[id number]]  (Abnormal) Collected: 12/15/23 0456    Specimen: Blood Updated: 12/15/23 0521     WBC 6.35 10*3/mm3      RBC 4.19 10*6/mm3      Hemoglobin 11.9 g/dL      Hematocrit 35.6 %      MCV 85.0 fL      MCH 28.4 pg      MCHC 33.4 g/dL      RDW 14.6 %      RDW-SD 45.5 fl      MPV 9.3 fL      Platelets 184 10*3/mm3      Neutrophil % 66.8 %      Lymphocyte % 19.1 %      Monocyte % 11.5 %      Eosinophil % 1.4 %      Basophil % 0.9 %      Immature Grans % 0.3 %      Neutrophils, Absolute 4.24 10*3/mm3      Lymphocytes, Absolute 1.21 10*3/mm3      Monocytes, Absolute 0.73 10*3/mm3      Eosinophils, Absolute 0.09 10*3/mm3      Basophils, Absolute 0.06 10*3/mm3      Immature Grans, Absolute 0.02 10*3/mm3      nRBC 0.0 /100 WBC     Sodium [[id number]]  (Abnormal) Collected: 12/15/23 0020    Specimen: Blood Updated:  12/15/23 0039     Sodium 124 mmol/L     Potassium [[id number]]  (Normal) Collected: 12/15/23 0020    Specimen: Blood Updated: 12/15/23 0039     Potassium 4.2 mmol/L     Sodium [[id number]]  (Abnormal) Collected: 12/14/23 2223    Specimen: Blood Updated: 12/14/23 2242     Sodium 125 mmol/L     Creatinine Urine Random (kidney function) GFR component - Indwelling Urethral Catheter [[id number]] Collected: 12/14/23 0853    Specimen: Urine from Indwelling Urethral Catheter Updated: 12/14/23 1911     Creatinine, Urine 23.8 mg/dL     Narrative:      Reference intervals for random urine have not been established.  Clinical usage is dependent upon physician's interpretation in combination with other laboratory tests.       Urea Nitrogen, Urine - Indwelling Urethral Catheter [[id number]] Collected: 12/14/23 0853    Specimen: Urine from Indwelling Urethral Catheter Updated: 12/14/23 1911     Urea Nitrogen, Urine 190 mg/dL     Narrative:      Reference intervals for random urine have not been established.  Clinical usage is dependent upon physician's interpretation in combination with other laboratory tests.       Sodium [[id number]]  (Abnormal) Collected: 12/14/23 1722    Specimen: Blood Updated: 12/14/23 1745     Sodium 127 mmol/L     Potassium [[id number]]  (Normal) Collected: 12/14/23 1722    Specimen: Blood Updated: 12/14/23 1745     Potassium 3.9 mmol/L     Sodium [[id number]]  (Abnormal) Collected: 12/14/23 1120    Specimen: Blood Updated: 12/14/23 1205     Sodium 124 mmol/L     Potassium [[id number]]  (Normal) Collected: 12/14/23 1120    Specimen: Blood Updated: 12/14/23 1205     Potassium 4.3 mmol/L     Osmolality, Serum [[id number]]  (Abnormal) Collected: 12/14/23 0949    Specimen: Blood Updated: 12/14/23 1025     Osmolality 266 mOsm/kg     Sodium, Urine, Random - Indwelling Urethral Catheter [[id number]] Collected: 12/14/23 0853    Specimen: Urine from Indwelling Urethral Catheter Updated: 12/14/23 0924     Sodium, Urine <20  mmol/L     Narrative:      Reference intervals for random urine have not been established.  Clinical usage is dependent upon physician's interpretation in combination with other laboratory tests.       Osmolality, Urine - Indwelling Urethral Catheter [[id number]]  (Normal) Collected: 12/14/23 0853    Specimen: Urine from Indwelling Urethral Catheter Updated: 12/14/23 0920     Osmolality, Urine 141 mOsm/kg     Uric Acid [[id number]]  (Normal) Collected: 12/14/23 0606    Specimen: Blood Updated: 12/14/23 0826     Uric Acid 3.1 mg/dL     Digoxin Level [[id number]]  (Normal) Collected: 12/14/23 0606    Specimen: Blood Updated: 12/14/23 0719     Digoxin 0.70 ng/mL     TSH [[id number]]  (Abnormal) Collected: 12/14/23 0606    Specimen: Blood Updated: 12/14/23 0650     TSH 5.900 uIU/mL     T4, Free [[id number]]  (Normal) Collected: 12/14/23 0606    Specimen: Blood Updated: 12/14/23 0650     Free T4 1.65 ng/dL     Narrative:      Results may be falsely increased if patient taking Biotin.      Comprehensive Metabolic Panel [[id number]]  (Abnormal) Collected: 12/14/23 0606    Specimen: Blood Updated: 12/14/23 0646     Glucose 227 mg/dL      BUN 11 mg/dL      Creatinine 0.36 mg/dL      Sodium 120 mmol/L      Potassium 4.6 mmol/L      Comment: Slight hemolysis detected by analyzer. Result may be falsely elevated.        Chloride 84 mmol/L      CO2 24.0 mmol/L      Calcium 9.2 mg/dL      Total Protein 6.4 g/dL      Albumin 3.1 g/dL      ALT (SGPT) 22 U/L      AST (SGOT) 25 U/L      Comment: Slight hemolysis detected by analyzer. Result may be falsely elevated.        Alkaline Phosphatase 59 U/L      Total Bilirubin 0.8 mg/dL      Globulin 3.3 gm/dL      A/G Ratio 0.9 g/dL      BUN/Creatinine Ratio 30.6     Anion Gap 12.0 mmol/L      eGFR 110.1 mL/min/1.73     Narrative:      GFR Normal >60  Chronic Kidney Disease <60  Kidney Failure <15      Magnesium [[id number]]  (Abnormal) Collected: 12/14/23 0606    Specimen: Blood Updated: 12/14/23  0646     Magnesium 1.5 mg/dL     BNP [[id number]]  (Normal) Collected: 12/14/23 0606    Specimen: Blood Updated: 12/14/23 0646     proBNP 497.1 pg/mL     Narrative:      This assay is used as an aid in the diagnosis of individuals suspected of having heart failure. It can be used as an aid in the diagnosis of acute decompensated heart failure (ADHF) in patients presenting with signs and symptoms of ADHF to the emergency department (ED). In addition, NT-proBNP of <300 pg/mL indicates ADHF is not likely.    Age Range Result Interpretation  NT-proBNP Concentration (pg/mL:      <50             Positive            >450                   Gray                 300-450                    Negative             <300    50-75           Positive            >900                  Gray                300-900                  Negative            <300      >75             Positive            >1800                  Gray                300-1800                  Negative            <300    COVID PRE-OP / PRE-PROCEDURE SCREENING ORDER (NO ISOLATION) - Swab, Nasal Cavity [[id number]]  (Normal) Collected: 12/14/23 0619    Specimen: Swab from Nasal Cavity Updated: 12/14/23 0644    Narrative:      The following orders were created for panel order COVID PRE-OP / PRE-PROCEDURE SCREENING ORDER (NO ISOLATION) - Swab, Nasal Cavity.  Procedure                               Abnormality         Status                     ---------                               -----------         ------                     COVID-19 and FLU A/B PCR...[[id number]]  Normal              Final result                 Please view results for these tests on the individual orders.    COVID-19 and FLU A/B PCR, 1 HR TAT - Swab, Nasopharynx [[id number]]  (Normal) Collected: 12/14/23 0619    Specimen: Swab from Nasopharynx Updated: 12/14/23 0644     COVID19 Not Detected     Influenza A PCR Not Detected     Influenza B PCR Not Detected    Narrative:      Fact sheet for providers:  https://www.fda.gov/media/060140/download    Fact sheet for patients: https://www.fda.gov/media/982073/download    Test performed by PCR.    Urinalysis With Microscopic If Indicated (No Culture) - Straight Cath [[id number]]  (Abnormal) Collected: 12/14/23 0615    Specimen: Urine from Straight Cath Updated: 12/14/23 0641     Color, UA Yellow     Appearance, UA Turbid     pH, UA 5.5     Specific Gravity, UA 1.007     Glucose,  mg/dL (2+)     Ketones, UA Negative     Bilirubin, UA Negative     Blood, UA Moderate (2+)     Protein, UA Negative     Leuk Esterase, UA Large (3+)     Nitrite, UA Negative     Urobilinogen, UA 0.2 E.U./dL    Urinalysis, Microscopic Only - Straight Cath [[id number]]  (Abnormal) Collected: 12/14/23 0615    Specimen: Urine from Straight Cath Updated: 12/14/23 0641     RBC, UA 3-5 /HPF      WBC, UA Too Numerous to Count /HPF      Bacteria, UA 4+ /HPF      Squamous Epithelial Cells, UA 0-2 /HPF      Hyaline Casts, UA       Unable to determine due to loaded field     /LPF     WBC Clumps, UA Large/3+ /HPF      Methodology Manual Light Microscopy    Protime-INR [[id number]]  (Abnormal) Collected: 12/14/23 0606    Specimen: Blood Updated: 12/14/23 0628     Protime 21.7 Seconds      INR 1.86    CBC & Differential [[id number]]  (Abnormal) Collected: 12/14/23 0606    Specimen: Blood Updated: 12/14/23 0620    Narrative:      The following orders were created for panel order CBC & Differential.  Procedure                               Abnormality         Status                     ---------                               -----------         ------                     CBC Auto Differential[[id number]]        Abnormal            Final result                 Please view results for these tests on the individual orders.    CBC Auto Differential [[id number]]  (Abnormal) Collected: 12/14/23 0606    Specimen: Blood Updated: 12/14/23 0620     WBC 7.91 10*3/mm3      RBC 4.58 10*6/mm3      Hemoglobin 13.1 g/dL       Hematocrit 39.1 %      MCV 85.4 fL      MCH 28.6 pg      MCHC 33.5 g/dL      RDW 14.6 %      RDW-SD 45.6 fl      MPV 9.5 fL      Platelets 216 10*3/mm3      Neutrophil % 72.3 %      Lymphocyte % 14.0 %      Monocyte % 11.5 %      Eosinophil % 1.1 %      Basophil % 0.8 %      Immature Grans % 0.3 %      Neutrophils, Absolute 5.72 10*3/mm3      Lymphocytes, Absolute 1.11 10*3/mm3      Monocytes, Absolute 0.91 10*3/mm3      Eosinophils, Absolute 0.09 10*3/mm3      Basophils, Absolute 0.06 10*3/mm3      Immature Grans, Absolute 0.02 10*3/mm3      nRBC 0.0 /100 WBC           Imaging Results (Last 72 Hours)       Procedure Component Value Units Date/Time    XR Chest 1 View [[id number]] Collected: 12/14/23 0626     Updated: 12/14/23 0631    Narrative:      EXAMINATION: XR CHEST 1 VW-     12/14/2023 5:12 AM     HISTORY: generalized weakness/fatigue     A frontal projection of the chest is compared with the previous study  dated 9/1/2016.     The lungs are poorly expanded, worse on the right side.     There is moderate elevation of right diaphragm which is more pronounced  since the previous study is probably due to poor lung expansion.     The left lung base and left lateral diaphragm is not optimally  visualized. Possibility of left lower lobar consolidation and pleural  effusion may not be excluded.     There is no pulmonary congestion or pneumothorax.     There is persistent moderate cardiomegaly.     There is no acute bony abnormality.       Impression:      1. Complete obliteration of the lateral left diaphragm and left lung  base which may be artifactual or due to left lower lobar consolidation  or pleural effusion. This may be further evaluated by a follow-up chest  radiograph in PA projection. The remaining lungs are unremarkable.     This report was signed and finalized on 12/14/2023 6:28 AM by Dr. Omar Ayala MD.                  ASSESSMENT/PLAN       Examination and evaluation of wound(s) was  performed.    DIAGNOSIS:   Pressure injury of coccygeal region, unstageable  Impaired mobility and ADLs  ***    PLAN:   ***        Start     Ordered    12/15/23 1231  Specialty Bed SkinGuard Float ELISA/AP Mattress  Once        Comments: Call [employer] to order a Braxton bed frame.  If bariatric/bariatric, [employer] provides frame, mattress, pump, and trapeze (if needed).  Nurse or HUC is to call [employer], the rental company, to order the equipment, provide patient's name, room number, and if in isolation. [phone].   Question:  Specialty Bed needed  Answer:  SkinGuard Float ELISA/AP Mattress    12/15/23 1231    12/15/23 1229  Wound Care  Every 12 Hours        Question Answer Comment   Wound [facility]s Coccyx    Wound Care Instructions Clean with NS.  Apply a nickel-thick layer of Santyl to wound base.  Cover with Vaseline gauze and secure with silicone border foam dressing.  Change q12h.    Cleanse Normal Saline    Intervention Santyl - Thick Layer    Securement Silicone Border Dressing        12/15/23 1228    12/15/23 1226  Turn Patient  Now Then Every 2 Hours         12/15/23 1226    12/15/23 1226  Elevate Heels Off of Bed  Until Discontinued         12/15/23 1226    12/15/23 1226  Use Seat Cushion When Up In Chair  Continuous         12/15/23 1226    12/15/23 1226  Use Repositioning Wedge to Position Patient  Continuous        Comments: Use Comfort Glide repositioning sheet and wedges to position patient.    12/15/23 1226             Discussed findings and treatment plan including risks, benefits, and treatment options with *** in detail. Patient agreed with treatment plan.      This document has been electronically signed by TYSON Valenzuela on 12/15/2023 14:01 CST

## 2023-12-15 NOTE — PLAN OF CARE
Problem: Adult Inpatient Plan of Care  Goal: Plan of Care Review  Recent Flowsheet Documentation  Taken 12/15/2023 1125 by Dain Cuellar, PT  Plan of Care Reviewed With: patient  Outcome Evaluation: PT IE complete.  A&Ox4.  Today she was min A bed mobility.  Mod A x2 sit<>stand.  Min A x2 bed to chair with RW.  Pt with decreased strength, endurance, and balance.  PT to see for gait safety and strengthening.  Recommend SNF [place].  Thank you for referral.   Goal Outcome Evaluation:  Plan of Care Reviewed With: patient           Outcome Evaluation: PT IE complete.  A&Ox4.  Today she was min A bed mobility.  Mod A x2 sit<>stand.  Min A x2 bed to chair with RW.  Pt with decreased strength, endurance, and balance.  PT to see for gait safety and strengthening.  Recommend SNF at [place].  Thank you for referral.      Anticipated Discharge Disposition (PT): skilled nursing facility

## 2023-12-15 NOTE — PLAN OF CARE
Goal Outcome Evaluation:           Problem: Adult Inpatient Plan of Care  Goal: Absence of Hospital-Acquired Illness or Injury  Intervention: Prevent and Manage VTE (Venous Thromboembolism) Risk  Recent Flowsheet Documentation  Taken 12/14/2023 2050 by Sunshine Betancourt RN  Range of Motion: active ROM (range of motion) encouraged         Pt reported that she has good family support for her care. She has her youinger son as her caretaker and her older son as her .  She was assisted with turning q 2 hrs. To relieve pressure off her buttocks.

## 2023-12-15 NOTE — PROGRESS NOTES
"Pharmacy Dosing Service  Anticoagulant  Warfarin Evaluation    Assessment/Action/Plan:  Patient on chronic anticoagulation with warfarin for AFib (home regimen appears to be 6mg on Monday, Wednesday, Friday, Sunday and 3mg on Tuesday, Thursday & Saturday.     Date         INR         Dose  12/14       1.86         None  12/15       1.83          6mg    No warfarin given last PM. INR slightly sub-therapeutic today (1.83). Will resume home warfarin regimen for now. Pharmacy will continue to follow INR daily & adjust dose accordingly. Note: Patient remains on SQ heparin (5,000 units Q8h) at this time        Subjective:  Anne-Marie Foreman is a 68 y.o.female  [Ht: 165.1 cm (65\"); Wt: 89.8 kg (197 lb 15.6 oz)] with a Pharmacy to Dose consult for warfarin for the indication of atrial fibrillation.    INR Goal: 2 - 3  Continuation of a Home Dose?: YES (6mg M,W,F,Sun and 3mg T,Th,Sat)  Bridge Therapy Present?:  Heparin 5,000 units SQ Q8h  Interacting Medications Evaluation (New/Present/Dicontinued): Acetaminophen, glipizide, heparin, levothyroxine, metformin    Objective:  [Ht: 165.1 cm (65\"); Wt: 89.8 kg (197 lb 15.6 oz); BMI: Body mass index is 32.94 kg/m².]    Lab Results   Component Value Date    ALBUMIN 3.1 (L) 12/14/2023     Lab Results   Component Value Date    INR 1.83 (H) 12/15/2023    INR 1.86 (H) 12/14/2023    INR 1.11 (H) 05/21/2020    PROTIME 21.4 (H) 12/15/2023    PROTIME 21.7 (H) 12/14/2023    PROTIME 13.9 05/21/2020     Lab Results   Component Value Date    HGB 11.9 (L) 12/15/2023    HGB 13.1 12/14/2023    HGB 10.3 (L) 05/21/2020     Lab Results   Component Value Date    HCT 35.6 12/15/2023    HCT 39.1 12/14/2023    HCT 31.0 (L) 05/21/2020       Aris Thornton, PharmD  12/15/23 15:38 CST     "

## 2023-12-15 NOTE — THERAPY TREATMENT NOTE
Patient Name: Anne-Marie Foreman  : 1955    MRN: [id number]                              Today's Date: 12/15/2023       Admit Date: 2023    Visit Dx:     ICD-10-CM ICD-9-CM   1. Generalized weakness  R53.1 780.79   2. Hyponatremia  E87.1 276.1   3. Acute UTI (urinary tract infection)  N39.0 599.0   4. Hypomagnesemia  E83.42 275.2   5. Abnormal chest x-ray  R93.89 793.2     Patient Active Problem List   Diagnosis    Weakness of both lower extremities    Chronic atrial fibrillation    Essential hypertension    Long term current use of anticoagulant therapy    Mild CAD    Mixed hyperlipidemia    Morbid obesity due to excess calories    Systolic congestive heart failure    Type 2 diabetes mellitus with microalbuminuria, without long-term current use of insulin    Acquired hypothyroidism    Vitamin B 12 deficiency    Intervertebral thoracic disc disorder with myelopathy, thoracic region    S/P discectomy    Current non-smoker    Hyponatremia     Past Medical History:   Diagnosis Date    Asthma     CAD (coronary artery disease)     CHF (congestive heart failure)     Chronic atrial fibrillation     Chronic back pain     Chronic fatigue     Essential hypertension 2020    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Hyperlipidemia     Hypertension     Left sided lacunar infarction     Mixed hyperlipidemia 2017    Obesity, Class II, BMI 35-39.9     S/P discectomy 2020    Systolic heart failure     Type 2 diabetes mellitus     Type 2 diabetes mellitus with microalbuminuria, without long-term current use of insulin 2017    Vitamin B 12 deficiency 2020     Past Surgical History:   Procedure Laterality Date    CARDIAC CATHETERIZATION  10/21/2009    CARDIOVERSION  10/09/2013    CATARACT EXTRACTION, BILATERAL      CHOLECYSTECTOMY      LAPAROSCOPIC TUBAL LIGATION      LUMBAR DISCECTOMY N/A 2020    Procedure: Thoracic  DISCECTOMY, T10/11.  Costotransversectomy. Neuromonitoring;  Surgeon: Willy Diaz MD;   [facility]: [facility];  Service: Neurosurgery;  Laterality: N/A;    RETINAL LASER PROCEDURE        General Information       Row Name 12/15/23 1126          OT Time and Intention    Document Type evaluation  -     Mode of Treatment occupational therapy  -       Row Name 12/15/23 1126          General Information    Patient Profile Reviewed yes  -JJ     Existing Precautions/Restrictions fall  -J     Barriers to Rehab medically complex;previous functional deficit  -JJ       Row Name 12/15/23 1126          Cognition    Orientation Status (Cognition) oriented x 4  -       Row Name 12/15/23 1126          Safety Issues, Functional Mobility    Impairments Affecting Function (Mobility) balance;endurance/activity tolerance;strength;range of motion (ROM);pain  -               User Key  (r) = Recorded By, (t) = Taken By, (c) = Cosigned By      Initials Name Provider Type    Shereen Rivera, OTR/L, CSRS Occupational Therapist                     Mobility/ADL's       Row Name 12/15/23 1126          Bed Mobility    Bed Mobility supine-sit  -     Supine-Sit [place] (Bed Mobility) minimum assist (75% patient effort)  -     Bed Mobility, Safety Issues decreased use of arms for pushing/pulling;decreased use of legs for bridging/pushing  -     Assistive Device (Bed Mobility) bed rails;head of bed elevated  -       Row Name 12/15/23 1126          Transfers    Transfers sit-stand transfer;stand-sit transfer;bed-chair transfer  -       Row Name 12/15/23 1126          Bed-Chair Transfer    Bed-Chair [place] (Transfers) minimum assist (75% patient effort);moderate assist (50% patient effort);2 person assist  -     Assistive Device (Bed-Chair Transfers) walker, front-wheeled  -J       Row Name 12/15/23 1126          Sit-Stand Transfer    Sit-Stand [place] (Transfers) moderate assist (50% patient effort);2 person assist  -     Assistive Device (Sit-Stand Transfers) walker, front-wheeled  -J        Row Name 12/15/23 1126          Stand-Sit Transfer    Stand-Sit [place] (Transfers) moderate assist (50% patient effort);2 person assist  -     Assistive Device (Stand-Sit Transfers) walker, front-wheeled  -       Row Name 12/15/23 1126          Activities of Daily Living    BADL Assessment/Intervention lower body dressing  -       Row Name 12/15/23 1126          Lower Body Dressing Assessment/Training    [place] Level (Lower Body Dressing) don;maximum assist (25% patient effort)  -J     Position (Lower Body Dressing) edge of bed sitting  -J               User Key  (r) = Recorded By, (t) = Taken By, (c) = Cosigned By      Initials Name Provider Type    Shereen Rivera, OTR/L, CSRS Occupational Therapist                   Obj/Interventions    No documentation.                  Goals/Plan    No documentation.                  Clinical Impression       Row Name 12/15/23 1126          Pain Assessment    Pretreatment Pain Rating 0/10 - no pain  -J     Posttreatment Pain Rating 0/10 - no pain  -       Row Name 12/15/23 1126          Plan of Care Review    Plan of Care Reviewed With patient  -     Outcome Evaluation OT tx completed. Pt presents alert and oriented x4, with son's at bedside. She was able to come to sitting at EOB with Min A. Max A to don socks while seated at EOB. She completed sit <> stand t/f from EOB x2 reps in total. Mod Ax2 for each attempt. First attempt wound care RN present in wound to assess sacral wound. She tolerated approx 2-3 minutes of standing on first attempt and with second attempt she was able to take steps to chair. Requires vcs to  L foot when taking steps. Waffle cushion placed in chair to assist with wound and education provided on weightshifting for pressure relief. Continue OT POC.  -       Row Name 12/15/23 1126          Therapy Plan Review/Discharge Plan (OT)    Anticipated Discharge Disposition (OT) skilled nursing facility  -       Row Name  12/15/23 1126          Positioning and Restraints    Pre-Treatment Position in bed  -JJ     Post Treatment Position chair  -JJ     In Chair notified nsg;reclined;call light within reach;encouraged to call for assist;with family/caregiver;waffle cushion;legs elevated  -JJ               User Key  (r) = Recorded By, (t) = Taken By, (c) = Cosigned By      Initials Name Provider Type    Shereen Rivera OTR/L, PEACES Occupational Therapist                   Outcome Measures       Row Name 12/15/23 1126          How much help from another is currently needed...    Putting on and taking off regular lower body clothing? 2  -JJ     Bathing (including washing, rinsing, and drying) 2  -JJ     Toileting (which includes using toilet bed pan or urinal) 2  -JJ     Putting on and taking off regular upper body clothing 3  -JJ     Taking care of personal grooming (such as brushing teeth) 4  -JJ     Eating meals 4  -JJ     AM-PAC 6 Clicks Score (OT) 17  -JJ       Row Name 12/15/23 0800          How much help from another person do you currently need...    Turning from your back to your side while in flat bed without using bedrails? 3  -AG     Moving from lying on back to sitting on the side of a flat bed without bedrails? 3  -AG     Moving to and from a bed to a chair (including a wheelchair)? 2  -AG     Standing up from a chair using your arms (e.g., wheelchair, bedside chair)? 2  -AG     Climbing 3-5 steps with a railing? 2  -AG     To walk in hospital room? 2  -AG     AM-PAC 6 Clicks Score (PT) 14  -AG     Highest Level of Mobility Goal 4 --> Transfer to chair/commode  -AG       Row Name 12/15/23 1126          Functional Assessment    Outcome Measure Options AM-PAC 6 Clicks Daily Activity (OT)  -JJ               User Key  (r) = Recorded By, (t) = Taken By, (c) = Cosigned By      Initials Name Provider Type    Shereen Rivera OTR/L, PEACES Occupational Therapist    AG Goldberg, Amanda, RN Registered Nurse                     Occupational Therapy Education       Title: PT OT SLP Therapies (In Progress)       Topic: Occupational Therapy (In Progress)       Point: ADL training (Done)       Description:   Instruct learner(s) on proper safety adaptation and remediation techniques during self care or transfers.   Instruct in proper use of assistive devices.                  Learning Progress Summary             Patient Acceptance, E, VU by GERONIMO at 12/15/2023 1245    Acceptance, E, VU by GERONIMO at 12/14/2023 1559                         Point: Home exercise program (Not Started)       Description:   Instruct learner(s) on appropriate technique for monitoring, assisting and/or progressing therapeutic exercises/activities.                  Learner Progress:  Not documented in this visit.              Point: Precautions (Done)       Description:   Instruct learner(s) on prescribed precautions during self-care and functional transfers.                  Learning Progress Summary             Patient Acceptance, E, VU by GERONIMO at 12/15/2023 1245    Acceptance, E, VU by GERONIMO at 12/14/2023 1559                         Point: Body mechanics (Not Started)       Description:   Instruct learner(s) on proper positioning and spine alignment during self-care, functional mobility activities and/or exercises.                  Learner Progress:  Not documented in this visit.                              User Key       Initials Effective Dates Name Provider Type Discipline     07/11/23 -  Shereen Thapa, OTR/L, CSRS Occupational Therapist OT                  OT Recommendation and Plan  Planned Therapy Interventions (OT): activity tolerance training, adaptive equipment training, BADL retraining, functional balance retraining, transfer/mobility retraining, strengthening exercise, occupation/activity based interventions, cognitive/visual perception retraining, patient/caregiver education/training  Therapy Frequency (OT): 5 times/wk  Plan of Care Review  Plan of  Care Reviewed With: patient  Outcome Evaluation: OT tx completed. Pt presents alert and oriented x4, with son's at bedside. She was able to come to sitting at EOB with Min A. Max A to don socks while seated at EOB. She completed sit <> stand t/f from EOB x2 reps in total. Mod Ax2 for each attempt. First attempt wound care RN present in wound to assess sacral wound. She tolerated approx 2-3 minutes of standing on first attempt and with second attempt she was able to take steps to chair. Requires vcs to  L foot when taking steps. Waffle cushion placed in chair to assist with wound and education provided on weightshifting for pressure relief. Continue OT POC.     Time Calculation:         Time Calculation- OT       Row Name 12/15/23 1125             Time Calculation- OT    OT Start Time 1122  -JJ      OT Stop Time 1200  -JJ      OT Time Calculation (min) 38 min  -JJ      OT Received On 12/15/23  -JJ         Timed Charges    10288 - OT Self Care/Mgmt Minutes 38  -JJ         Total Minutes    Timed Charges Total Minutes 38  -JJ       Total Minutes 38  -JJ                User Key  (r) = Recorded By, (t) = Taken By, (c) = Cosigned By      Initials Name Provider Type    Shereen Rivera OTR/L, CSRS Occupational Therapist                  Therapy Charges for Today       Code Description Service Date Service Provider Modifiers Qty    55752947995 HC OT EVAL MOD COMPLEXITY 3 12/14/2023 Shereen Thapa OTR/L, CSRS GO 1    19056983333 HC OT SELF CARE/MGMT/TRAIN EA 15 MIN 12/15/2023 Shereen Thapa OTR/L, CSRS GO 3                 MARK Barrett/L, CSRS  12/15/2023

## 2023-12-15 NOTE — CONSULTS
Inpatient Nutrition Consult Per Nurse Admission Screen  Malnutrition Severity Assessment  Patient Name:  Anne-Marie Foreman  YOB: 1955  MRN: [id number]  Admit Date:  12/14/2023  Assessment Date:  12/15/2023     Reason for Assessment       Row Name 12/15/23 1006          Reason for Assessment    Reason For Assessment identified at risk by screening criteria;physician consult;other (see comments)  Nurse Admission Screen     Diagnosis infection/sepsis;diabetes diagnosis/complications;cardiac disease;renal disease     Identified At Risk by Screening Criteria large or nonhealing wound, burn or pressure injury;MST SCORE 2+;unintentional loss of 10 lbs or more in the past 2 mos                    Nutrition/Diet History       Row Name 12/15/23 1006          Nutrition/Diet History    Typical Intake (Food/Fluid/EN/PN) *Telephone Encounter/Remote Access Entry* Pt and RD reviewed multiple food allergies; all high severity. Pt's CBORD ( software) updated with notes. Pt aware of diet/MNT and food allergies limiting most options. Due to restrictions and symptoms of alpha-gal, pt has lost 60lb x 6 months, experienced significant weakness (needing someone to stand by her when going from sit to stand), muscle and fat loss (pt reports clothes can fall off when standing if not properly secured, unable to verify due to remote access at this time), and admits with a sacral spine pressure injury. RD and pt reviewed malnutrition definition, signs/symptoms, concerns, and interventions. Pt stated the [employer] ambassador number was on pt's whiteboard; encouraged pt to call with any questions or concerns. Pt has family to bring in food items that she trusts and feels is appropriate for her diet. Pt snacks on one protein bar between meals every day. Reports last bowel movement 2 days ago. Denies chewing/swallowing difficulty. Reported A1c checked this week 8%; this is down from 11%. Reviewed NTN POC priorities of food safety  (allergens cause anaphylactic shock with ingestion), malnutrition, and chronic health conditions. Pt verbalized understanding.     Food Preferences Small, frequent meals. Snacks/eats every 2 hours. Consumes pork products safely. Eats PB, vegetables, fruits.     Food Intolerance(s) Shellfish (fish ok), mushrooms, beef or beef products, milk and milk products (including mashed potatoes, yogurt, butter, pudding, etc.), and aspartame.     Meal/Snack Patterns PO every 2hr.     Supplemental Drinks/Foods/Additives Protein bar (pt has some while inpatient)     Vitamin/Mineral Supplements MVI, B12, vitamin D     Herbal/Other Supplements None     Factors Affecting Nutritional Intake appetite;restricted diet               Malnutrition Severity Assessment       Row Name 12/15/23 1021          Malnutrition Severity Assessment    Malnutrition Type Chronic Disease - Related Malnutrition        Insufficient Energy Intake     Insufficient Energy Intake Findings Severe     Insufficient Energy Intake  <75% of est. energy requirement for > or equal to 3 months  x 6 m.        Unintentional Weight Loss     Unintentional Weight Loss Findings Severe     Unintentional Weight Loss  Weight loss greater than 10% in six months        Fluid Accumulation (Edema)    Fluid Acumulation Findings --  Non        Declining Functional Status    Declining Functional Status Findings Measurably Reduced  Per pt, unable to transition from sit to stand without making sure someone is behind her. Admits with sacral spine pressure injury/wound. Reports clothes no longer fit. These comments and pt's NTN reported hx provide evidence for malnutrition.        Criteria Met (Must meet criteria for severity in at least 2 of these categories: M Wasting, Fat Loss, Fluid, Secondary Signs, Wt. Status, Intake)    Patient meets criteria for  Severe Malnutrition                    Labs/Tests/Procedures/Meds       Row Name 12/15/23 1019          Labs/Procedures/Meds    Lab  Results Reviewed reviewed     Lab Results Comments Glu 194, 227, Na, Cl, A1c per pt was 8% this week        Diagnostic Tests/Procedures    Diagnostic Test/Procedure Reviewed reviewed        Medications    Pertinent Medications Reviewed reviewed     Pertinent Medications Comments See MAR                    Physical Findings       Row Name 12/15/23 1019          Physical Findings    Overall Physical Appearance Room air, BM 12/13 per pt, no edema, sacral spine pressure injury, and Domenic Score 14.                    Estimated/Assessed Needs - Anthropometrics       Row Name 12/15/23 1020 12/15/23 0532       Anthropometrics    Weight -- 89.8 kg (197 lb 15.6 oz)  patient unable to stand ; RN aware    Weight for Calculation 89.8 kg (197 lb 15.6 oz) --       Usual Body Weight (UBW)    Weight Change (Amount and Duration) Loss 60lb x 6 m. --       Estimated/Assessed Needs    Additional Documentation Fluid Requirements (Group);Protein Requirements (Group);Habersham-St. Jeor Equation (Group) --       Habersham-St. Jeor Equation    RMR (Habersham-St. Jeor Equation) 1423.875 --    Habersham-St. Jeor Activity Factors 1.4 - 1.5 --    Activity Factors (Habersham-St. Jeor) 1993.705 - 0320.8148 --       Protein Requirements    Weight Used For Protein Calculations 89.8 kg (197 lb 15.6 oz) --    Est Protein Requirement Amount (gms/kg) 1.2 gm protein --    Estimated Protein Requirements (gms/day) 107.76 --       Fluid Requirements    Fluid Requirements (mL/day) 1993 --    RDA Method (mL) 1993 --                   Nutrition Prescription Ordered       Row Name 12/15/23 1021          Nutrition Prescription PO    Current PO Diet Regular     Fluid Consistency Thin     Common Modifiers Cardiac;Consistent Carbohydrate;Low Sodium                    Evaluation of Received Nutrient/Fluid Intake       Row Name 12/15/23 1021          Nutrient/Fluid Evaluation    Number of Days Evaluated Other (comment)  admission < 24 hours        Fluid Intake Evaluation     Oral Fluid (mL) 600  admit to present total     Other Fluid (mL) 65  admit to present total        PO Evaluation    Number of Days PO Intake Evaluated Insufficient Data     % PO Intake admission < 24 hr                            Problem/Interventions:   Problem 1       Row Name 12/15/23 1023          Nutrition Diagnoses Problem 1    Problem 1 Malnutrition     Inadequate Intake Type Oral     Macronutrient Kcal     Micronutrient Vitamin;Mineral     Etiology (related to) Factors Affecting Nutrition;Medical Diagnosis     Infectious Disease Other (comment)  Alpha-Gal     Reported/Observed By Patient     Food Habit/Preferences Uses Supplements     Reported Allergies/Food Intolerance Milk/Dairy;Shellfish;Other  Beef, Aspartame     Signs/Symptoms (evidenced by) Report of Mnimal PO Intake;Unintended Weight Change;Report/Observation     Unintended Weight Change Loss     Number of Pounds Lost 60lb     Weight loss time period 6 m.     Reported/Observed By Patient;RN     Other Comment significant weakness, wound to sacral spine, ill-fitting clothes                          Intervention Goal       Row Name 12/15/23 1025          Intervention Goal    General Meet nutritional needs for age/condition;Disease management/therapy;Reduce/improve symptoms;Improved nutrition related lab(s)     PO Tolerate PO;Increase intake;Meet estimated needs     Weight Appropriate weight gain                    Nutrition Intervention       Row Name 12/15/23 1025          Nutrition Intervention    RD/Tech Action Follow Tx progress;Care plan reviewd;Interview for preference;Encourage intake;Recommend/ordered;Advise alternate selection;Advise available snack;Menu provided     Recommended/Ordered Diet                    Nutrition Prescription       Row Name 12/15/23 1025          Nutrition Prescription PO    PO Prescription Begin/change diet     Begin/Change Diet to Regular     Fluid Consistency Thin     New PO Prescription Ordered? Yes                     Education/Evaluation       Row Name 12/15/23 1025          Education    Education Provided education regarding     Provided education regarding Nutrition related factor;Other (comment)  Malnutrition        Monitor/Evaluation    Monitor Per protocol     Education Follow-up Reinforce PRN                     Electronically signed by:  Gia Cota RDN, REYES  12/15/23 10:26 CST

## 2023-12-15 NOTE — NURSING NOTE
Accompanied TYSON Smith for initial wound care consult. See consult note for details.. Skin-guard bed and prevention measures ordered. Please reach out to Jerilyn Sebastian or myself with any issues or concerns.  Elaine Trammell RN  Wound Care Nurse Educator    12/15/2023  12:32 CST

## 2023-12-16 LAB
ANION GAP SERPL CALCULATED.3IONS-SCNC: 11 MMOL/L (ref 5–15)
ANION GAP SERPL CALCULATED.3IONS-SCNC: 11 MMOL/L (ref 5–15)
BASOPHILS # BLD AUTO: 0.05 10*3/MM3 (ref 0–0.2)
BASOPHILS NFR BLD AUTO: 0.7 % (ref 0–1.5)
BUN SERPL-MCNC: 10 MG/DL (ref 8–23)
BUN SERPL-MCNC: 9 MG/DL (ref 8–23)
BUN/CREAT SERPL: 27.3 (ref 7–25)
BUN/CREAT SERPL: 32.3 (ref 7–25)
CALCIUM SPEC-SCNC: 8.4 MG/DL (ref 8.6–10.5)
CALCIUM SPEC-SCNC: 8.5 MG/DL (ref 8.6–10.5)
CHLORIDE SERPL-SCNC: 90 MMOL/L (ref 98–107)
CHLORIDE SERPL-SCNC: 90 MMOL/L (ref 98–107)
CO2 SERPL-SCNC: 23 MMOL/L (ref 22–29)
CO2 SERPL-SCNC: 24 MMOL/L (ref 22–29)
CREAT SERPL-MCNC: 0.31 MG/DL (ref 0.57–1)
CREAT SERPL-MCNC: 0.33 MG/DL (ref 0.57–1)
DEPRECATED RDW RBC AUTO: 45.4 FL (ref 37–54)
DIGOXIN SERPL-MCNC: 1.2 NG/ML (ref 0.6–1.2)
EGFRCR SERPLBLD CKD-EPI 2021: 112.4 ML/MIN/1.73
EGFRCR SERPLBLD CKD-EPI 2021: 114.1 ML/MIN/1.73
EOSINOPHIL # BLD AUTO: 0.05 10*3/MM3 (ref 0–0.4)
EOSINOPHIL NFR BLD AUTO: 0.7 % (ref 0.3–6.2)
ERYTHROCYTE [DISTWIDTH] IN BLOOD BY AUTOMATED COUNT: 14.8 % (ref 12.3–15.4)
GLUCOSE BLDC GLUCOMTR-MCNC: 323 MG/DL (ref 70–130)
GLUCOSE SERPL-MCNC: 276 MG/DL (ref 65–99)
GLUCOSE SERPL-MCNC: 324 MG/DL (ref 65–99)
HBA1C MFR BLD: 7.8 % (ref 4.8–5.6)
HCT VFR BLD AUTO: 36.7 % (ref 34–46.6)
HGB BLD-MCNC: 12.2 G/DL (ref 12–15.9)
IMM GRANULOCYTES # BLD AUTO: 0.02 10*3/MM3 (ref 0–0.05)
IMM GRANULOCYTES NFR BLD AUTO: 0.3 % (ref 0–0.5)
INR PPP: 1.73 (ref 0.91–1.09)
LYMPHOCYTES # BLD AUTO: 0.94 10*3/MM3 (ref 0.7–3.1)
LYMPHOCYTES NFR BLD AUTO: 13.8 % (ref 19.6–45.3)
MCH RBC QN AUTO: 28.4 PG (ref 26.6–33)
MCHC RBC AUTO-ENTMCNC: 33.2 G/DL (ref 31.5–35.7)
MCV RBC AUTO: 85.3 FL (ref 79–97)
MONOCYTES # BLD AUTO: 0.77 10*3/MM3 (ref 0.1–0.9)
MONOCYTES NFR BLD AUTO: 11.3 % (ref 5–12)
NEUTROPHILS NFR BLD AUTO: 4.97 10*3/MM3 (ref 1.7–7)
NEUTROPHILS NFR BLD AUTO: 73.2 % (ref 42.7–76)
NRBC BLD AUTO-RTO: 0 /100 WBC (ref 0–0.2)
PLATELET # BLD AUTO: 192 10*3/MM3 (ref 140–450)
PMV BLD AUTO: 9.5 FL (ref 6–12)
POTASSIUM SERPL-SCNC: 3.9 MMOL/L (ref 3.5–5.2)
POTASSIUM SERPL-SCNC: 4 MMOL/L (ref 3.5–5.2)
POTASSIUM SERPL-SCNC: 4 MMOL/L (ref 3.5–5.2)
POTASSIUM SERPL-SCNC: 4.1 MMOL/L (ref 3.5–5.2)
POTASSIUM SERPL-SCNC: 4.5 MMOL/L (ref 3.5–5.2)
POTASSIUM SERPL-SCNC: 5 MMOL/L (ref 3.5–5.2)
PROTHROMBIN TIME: 20.5 SECONDS (ref 11.8–14.8)
RBC # BLD AUTO: 4.3 10*6/MM3 (ref 3.77–5.28)
SODIUM SERPL-SCNC: 124 MMOL/L (ref 136–145)
SODIUM SERPL-SCNC: 125 MMOL/L (ref 136–145)
SODIUM SERPL-SCNC: 126 MMOL/L (ref 136–145)
SODIUM SERPL-SCNC: 126 MMOL/L (ref 136–145)
WBC NRBC COR # BLD AUTO: 6.8 10*3/MM3 (ref 3.4–10.8)

## 2023-12-16 PROCEDURE — 80048 BASIC METABOLIC PNL TOTAL CA: CPT | Performed by: INTERNAL MEDICINE

## 2023-12-16 PROCEDURE — 63710000001 INSULIN DETEMIR PER 5 UNITS: Performed by: INTERNAL MEDICINE

## 2023-12-16 PROCEDURE — 25010000002 ONDANSETRON PER 1 MG: Performed by: INTERNAL MEDICINE

## 2023-12-16 PROCEDURE — 83036 HEMOGLOBIN GLYCOSYLATED A1C: CPT | Performed by: INTERNAL MEDICINE

## 2023-12-16 PROCEDURE — 85025 COMPLETE CBC W/AUTO DIFF WBC: CPT | Performed by: INTERNAL MEDICINE

## 2023-12-16 PROCEDURE — 84295 ASSAY OF SERUM SODIUM: CPT | Performed by: INTERNAL MEDICINE

## 2023-12-16 PROCEDURE — 25010000002 FUROSEMIDE PER 20 MG: Performed by: INTERNAL MEDICINE

## 2023-12-16 PROCEDURE — 82948 REAGENT STRIP/BLOOD GLUCOSE: CPT

## 2023-12-16 PROCEDURE — 84132 ASSAY OF SERUM POTASSIUM: CPT | Performed by: INTERNAL MEDICINE

## 2023-12-16 PROCEDURE — 25010000002 HEPARIN (PORCINE) PER 1000 UNITS: Performed by: INTERNAL MEDICINE

## 2023-12-16 PROCEDURE — 85610 PROTHROMBIN TIME: CPT | Performed by: INTERNAL MEDICINE

## 2023-12-16 PROCEDURE — 63710000001 INSULIN LISPRO (HUMAN) PER 5 UNITS: Performed by: INTERNAL MEDICINE

## 2023-12-16 PROCEDURE — 80162 ASSAY OF DIGOXIN TOTAL: CPT | Performed by: INTERNAL MEDICINE

## 2023-12-16 PROCEDURE — 97530 THERAPEUTIC ACTIVITIES: CPT

## 2023-12-16 RX ORDER — SODIUM CHLORIDE 1 G/1
4 TABLET ORAL
Status: DISCONTINUED | OUTPATIENT
Start: 2023-12-17 | End: 2023-12-16

## 2023-12-16 RX ORDER — DEXTROSE MONOHYDRATE 25 G/50ML
25 INJECTION, SOLUTION INTRAVENOUS
Status: DISCONTINUED | OUTPATIENT
Start: 2023-12-16 | End: 2023-12-20 | Stop reason: HOSPADM

## 2023-12-16 RX ORDER — SODIUM CHLORIDE 1 G/1
2 TABLET ORAL ONCE
Status: COMPLETED | OUTPATIENT
Start: 2023-12-16 | End: 2023-12-16

## 2023-12-16 RX ORDER — INSULIN LISPRO 100 [IU]/ML
2-7 INJECTION, SOLUTION INTRAVENOUS; SUBCUTANEOUS
Status: DISCONTINUED | OUTPATIENT
Start: 2023-12-16 | End: 2023-12-20 | Stop reason: HOSPADM

## 2023-12-16 RX ORDER — FUROSEMIDE 40 MG/1
40 TABLET ORAL DAILY
Status: DISCONTINUED | OUTPATIENT
Start: 2023-12-16 | End: 2023-12-20 | Stop reason: HOSPADM

## 2023-12-16 RX ORDER — WARFARIN SODIUM 3 MG/1
3 TABLET ORAL
Status: DISCONTINUED | OUTPATIENT
Start: 2023-12-19 | End: 2023-12-20

## 2023-12-16 RX ORDER — SODIUM CHLORIDE 1 G/1
2 TABLET ORAL
Status: DISCONTINUED | OUTPATIENT
Start: 2023-12-16 | End: 2023-12-16

## 2023-12-16 RX ORDER — SODIUM CHLORIDE 1 G/1
4 TABLET ORAL EVERY 6 HOURS
Qty: 48 TABLET | Refills: 0 | Status: DISPENSED | OUTPATIENT
Start: 2023-12-17 | End: 2023-12-19

## 2023-12-16 RX ORDER — WARFARIN SODIUM 6 MG/1
6 TABLET ORAL
Status: COMPLETED | OUTPATIENT
Start: 2023-12-16 | End: 2023-12-16

## 2023-12-16 RX ORDER — NICOTINE POLACRILEX 4 MG
15 LOZENGE BUCCAL
Status: DISCONTINUED | OUTPATIENT
Start: 2023-12-16 | End: 2023-12-20 | Stop reason: HOSPADM

## 2023-12-16 RX ADMIN — FUROSEMIDE 60 MG: 10 INJECTION, SOLUTION INTRAMUSCULAR; INTRAVENOUS at 15:05

## 2023-12-16 RX ADMIN — FUROSEMIDE 60 MG: 10 INJECTION, SOLUTION INTRAMUSCULAR; INTRAVENOUS at 09:35

## 2023-12-16 RX ADMIN — METFORMIN HCL 1000 MG: 500 TABLET ORAL at 17:46

## 2023-12-16 RX ADMIN — ACETAMINOPHEN 650 MG: 325 TABLET, FILM COATED ORAL at 22:02

## 2023-12-16 RX ADMIN — METFORMIN HCL 1000 MG: 500 TABLET ORAL at 08:05

## 2023-12-16 RX ADMIN — SODIUM CHLORIDE TAB 1 GM 2 G: 1 TAB at 11:36

## 2023-12-16 RX ADMIN — Medication 10 ML: at 08:06

## 2023-12-16 RX ADMIN — HEPARIN SODIUM 5000 UNITS: 5000 INJECTION INTRAVENOUS; SUBCUTANEOUS at 14:00

## 2023-12-16 RX ADMIN — DIGOXIN 250 MCG: 250 TABLET ORAL at 11:36

## 2023-12-16 RX ADMIN — POTASSIUM CHLORIDE 40 MEQ: 750 CAPSULE, EXTENDED RELEASE ORAL at 08:05

## 2023-12-16 RX ADMIN — INSULIN LISPRO 5 UNITS: 100 INJECTION, SOLUTION INTRAVENOUS; SUBCUTANEOUS at 21:19

## 2023-12-16 RX ADMIN — SODIUM CHLORIDE TAB 1 GM 2 G: 1 TAB at 12:19

## 2023-12-16 RX ADMIN — HEPARIN SODIUM 5000 UNITS: 5000 INJECTION INTRAVENOUS; SUBCUTANEOUS at 05:18

## 2023-12-16 RX ADMIN — ONDANSETRON 4 MG: 2 INJECTION INTRAMUSCULAR; INTRAVENOUS at 14:12

## 2023-12-16 RX ADMIN — LEVOTHYROXINE SODIUM 125 MCG: 125 TABLET ORAL at 05:18

## 2023-12-16 RX ADMIN — POTASSIUM CHLORIDE 40 MEQ: 750 CAPSULE, EXTENDED RELEASE ORAL at 11:36

## 2023-12-16 RX ADMIN — SACUBITRIL AND VALSARTAN 1 TABLET: 24; 26 TABLET, FILM COATED ORAL at 17:45

## 2023-12-16 RX ADMIN — SODIUM CHLORIDE TAB 1 GM 2 G: 1 TAB at 17:46

## 2023-12-16 RX ADMIN — POTASSIUM & SODIUM PHOSPHATES POWDER PACK 280-160-250 MG 2 PACKET: 280-160-250 PACK at 08:05

## 2023-12-16 RX ADMIN — ONDANSETRON 4 MG: 2 INJECTION INTRAMUSCULAR; INTRAVENOUS at 23:37

## 2023-12-16 RX ADMIN — HEPARIN SODIUM 5000 UNITS: 5000 INJECTION INTRAVENOUS; SUBCUTANEOUS at 21:18

## 2023-12-16 RX ADMIN — GLIPIZIDE 10 MG: 10 TABLET ORAL at 08:05

## 2023-12-16 RX ADMIN — COLLAGENASE SANTYL 1 APPLICATION: 250 OINTMENT TOPICAL at 21:19

## 2023-12-16 RX ADMIN — LINAGLIPTIN 5 MG: 5 TABLET, FILM COATED ORAL at 09:36

## 2023-12-16 RX ADMIN — Medication 10 ML: at 21:19

## 2023-12-16 RX ADMIN — INSULIN DETEMIR 10 UNITS: 100 INJECTION, SOLUTION SUBCUTANEOUS at 17:50

## 2023-12-16 RX ADMIN — COLLAGENASE SANTYL 1 APPLICATION: 250 OINTMENT TOPICAL at 09:36

## 2023-12-16 RX ADMIN — ATORVASTATIN CALCIUM 80 MG: 40 TABLET ORAL at 21:19

## 2023-12-16 RX ADMIN — WARFARIN 6 MG: 6 TABLET ORAL at 17:45

## 2023-12-16 RX ADMIN — FUROSEMIDE 60 MG: 10 INJECTION, SOLUTION INTRAMUSCULAR; INTRAVENOUS at 03:23

## 2023-12-16 RX ADMIN — ACETAMINOPHEN 650 MG: 325 TABLET, FILM COATED ORAL at 14:12

## 2023-12-16 NOTE — PROGRESS NOTES
[facility]  INPATIENT PROGRESS NOTE    Patient Name: Anne-Marie Foreman  Date of Admission: 12/14/2023  Today's Date: 12/16/23  Length of Stay: 2  Primary Care Physician: Anabell Sanchez APRN    Subjective     No acute events overnight.         Objective    Temp:  [97.9 °F (36.6 °C)-98.2 °F (36.8 °C)] 97.9 °F (36.6 °C)  Heart Rate:  [] 114  Resp:  [18] 18  BP: ()/(53-96) 115/74    General: No acute distress  HEENT: normocephalic, atraumatic  Neck: Supple  CV: RRR  Lung: Clear to auscultation bilaterally.  No wheeze, rales, or rhonchi noted.  Abdominal exam: Positive bowel sounds, nontender, non distended  Extremity: No edema noted  Neurological exam: AAO x3. Cranial nerve II to XII grossly intact  Skin exam: Dry and warm  Psychiatric exam: Calm, cooperative, and normal affect       Results Review:  I have reviewed the labs, radiology results, and diagnostic studies.    Laboratory Data:   Results from last 7 days   Lab Units 12/16/23  0525 12/15/23  0456 12/14/23  0606   WBC 10*3/mm3 6.80 6.35 7.91   HEMOGLOBIN g/dL 12.2 11.9* 13.1   HEMATOCRIT % 36.7 35.6 39.1   PLATELETS 10*3/mm3 192 184 216        Results from last 7 days   Lab Units 12/16/23  0754 12/16/23  0525 12/16/23  0022 12/15/23  1908 12/15/23  0838 12/15/23  0456 12/14/23  1120 12/14/23  0606   SODIUM mmol/L 125* 125*  125* 126* 127*   < > 124*  124*   < > 120*   POTASSIUM mmol/L 3.9 4.0  4.0 4.1 4.1   < > 4.0  4.0   < > 4.6   CHLORIDE mmol/L  --  90*  --  89*  --  92*  --  84*   CO2 mmol/L  --  24.0  --  27.0  --  25.0  --  24.0   BUN mg/dL  --  10  --  11  --  10  --  11   CREATININE mg/dL  --  0.31*  --  0.39*  --  0.26*  --  0.36*   CALCIUM mg/dL  --  8.4*  --  8.6  --  8.6  --  9.2   BILIRUBIN mg/dL  --   --   --   --   --   --   --  0.8   ALK PHOS U/L  --   --   --   --   --   --   --  59   ALT (SGPT) U/L  --   --   --   --   --   --   --  22   AST (SGOT) U/L  --   --   --   --   --    "--   --  25   GLUCOSE mg/dL  --  276*  --  288*  --  194*  --  227*    < > = values in this interval not displayed.       Culture Data:   No results found for: \"BLOODCX\", \"URINECX\", \"WOUNDCX\", \"MRSACX\", \"RESPCX\", \"STOOLCX\"    Radiology Data:   Imaging Results (Last 24 Hours)       ** No results found for the last 24 hours. **            I have reviewed the patient's current medications.     Assessment/Plan   Assessment  Active Hospital Problems    Diagnosis     **Hyponatremia        Assessment and Plan:  Patient reports that she has not been feeling well for the past 6 months.  She was recently found to have hyponatremia and was started on salt tablets which were ineffective.  Lasix was held and she developed lower extremity edema.     Hypervolemic Hyponatremia 2/2 fluid overload: Plasma Osm > Urine Osm c/w diagnosis  -lasix 40 IV q6h     sCHF 2/2 ischemic vs nonischemic: EF 25-30% on last TTE, been refusing AICD for primary prevention of SCD for years  -hold home meds while diuresing  -strict I/Os q2h  -vitals q2h  -daily weights  -lasix as above    pAF, rate controlled:  -goal HR < 110  -hold coreg for now while diuresing, may switch to metoprolol for rate control if needed  -cont home digoxin, digoxin level pending  -resume home coumadin, coumadin level pending  -Keep K > 4, Mg > 2  -telemetry monitoring     DM2:   -goal glucose < 180 while inpatient  -cont home glipizide, linagliptin, metformin  -A1c in am    Pressure wound:  -wound care following            Dispo: plan going as planned    Hiram Perea MD 12/16/23, 13:39 CST.                  Treatment Plan      Medical Decision Making  Number and Complexity of problems:   Differential Diagnosis:     Conditions and Status             MDM Data  External documents reviewed:   Cardiac tracing (EKG, telemetry) interpretation:   Radiology interpretation:   Labs reviewed:   Any tests that were considered but not ordered:      Decision rules/scores evaluated " (example [id number], Wells, etc):      Discussed with:      Care Planning  Shared decision making:   Code status and discussions:     Disposition  Social Determinants of Health that impact treatment or disposition:   I expect the patient to be discharged to  in  days.     Electronically signed by Hiram Perea MD, 12/16/23, 13:39 CST.

## 2023-12-16 NOTE — PROGRESS NOTES
"Pharmacy Dosing Service  Anticoagulant  Warfarin Evaluation     Assessment/Action/Plan:  Patient on chronic anticoagulation with warfarin for AFib (home regimen appears to be 6mg on Monday, Wednesday, Friday, Sunday and 3mg on Tuesday, Thursday & Saturday.     INR = 1.73 Warfarin adjusted to 6 mg tonight then continue home regimen.     date INR dose   12/14 1.86 none   12/15 1.83 6mg   12/16 1.73 6 mg                  Pharmacy will continue to follow INR daily & adjust dose accordingly. Note: Patient remains on SQ heparin (5,000 units Q8h) at this time          Subjective:  Anne-Marie Foreman is a 68 y.o.female  [Ht: 165.1 cm (65\"); Wt: 89.8 kg (197 lb 15.6 oz)] with a Pharmacy to Dose consult for warfarin for the indication of atrial fibrillation.     INR Goal: 2 - 3  Continuation of a Home Dose?: YES (6mg M,W,F,Sun and 3mg T,Th,Sat)  Bridge Therapy Present?:  Heparin 5,000 units SQ Q8h  Interacting Medications Evaluation (New/Present/Discontinued): Acetaminophen, glipizide, heparin, levothyroxine, metformin       Objective:  [Ht: 165.1 cm (65\"); Wt: 89.8 kg (197 lb 15.6 oz); BMI: Body mass index is 32.94 kg/m².]  Lab Results   Component Value Date    ALBUMIN 3.1 (L) 12/14/2023     Lab Results   Component Value Date    INR 1.83 (H) 12/15/2023    INR 1.86 (H) 12/14/2023    INR 1.11 (H) 05/21/2020    PROTIME 21.4 (H) 12/15/2023    PROTIME 21.7 (H) 12/14/2023    PROTIME 13.9 05/21/2020     Lab Results   Component Value Date    HGB 12.2 12/16/2023    HGB 11.9 (L) 12/15/2023    HGB 13.1 12/14/2023     Lab Results   Component Value Date    HCT 36.7 12/16/2023    HCT 35.6 12/15/2023    HCT 39.1 12/14/2023       Buddy Moya, PharmONEYDA  12/16/23 11:01 CST     "

## 2023-12-16 NOTE — THERAPY TREATMENT NOTE
Acute Care - Physical Therapy Treatment Note  [facility]     Patient Name: Anne-Marie Foreman  : 1955  MRN: [id number]  Today's Date: 2023      Visit Dx:     ICD-10-CM ICD-9-CM   1. Generalized weakness  R53.1 780.79   2. Hyponatremia  E87.1 276.1   3. Acute UTI (urinary tract infection)  N39.0 599.0   4. Hypomagnesemia  E83.42 275.2   5. Abnormal chest x-ray  R93.89 793.2   6. Impaired mobility [Z74.09]  Z74.09 799.89     Patient Active Problem List   Diagnosis    Weakness of both lower extremities    Chronic atrial fibrillation    Essential hypertension    Long term current use of anticoagulant therapy    Mild CAD    Mixed hyperlipidemia    Morbid obesity due to excess calories    Systolic congestive heart failure    Type 2 diabetes mellitus with microalbuminuria, without long-term current use of insulin    Acquired hypothyroidism    Vitamin B 12 deficiency    Intervertebral thoracic disc disorder with myelopathy, thoracic region    S/P discectomy    Current non-smoker    Hyponatremia     Past Medical History:   Diagnosis Date    Asthma     CAD (coronary artery disease)     CHF (congestive heart failure)     Chronic atrial fibrillation     Chronic back pain     Chronic fatigue     Essential hypertension 2020    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Hyperlipidemia     Hypertension     Left sided lacunar infarction     Mixed hyperlipidemia 2017    Obesity, Class II, BMI 35-39.9     S/P discectomy 2020    Systolic heart failure     Type 2 diabetes mellitus     Type 2 diabetes mellitus with microalbuminuria, without long-term current use of insulin 2017    Vitamin B 12 deficiency 2020     Past Surgical History:   Procedure Laterality Date    CARDIAC CATHETERIZATION  10/21/2009    CARDIOVERSION  10/09/2013    CATARACT EXTRACTION, BILATERAL      CHOLECYSTECTOMY      LAPAROSCOPIC TUBAL LIGATION      LUMBAR DISCECTOMY N/A 2020    Procedure: Thoracic  DISCECTOMY, T10/11.  Costotransversectomy.  Neuromonitoring;  Surgeon: Willy Diaz MD;  [facility]: [facility];  Service: Neurosurgery;  Laterality: N/A;    RETINAL LASER PROCEDURE       PT Assessment (last 12 hours)       PT Evaluation and Treatment       Row Name 12/16/23 0859          Physical Therapy Time and Intention    Subjective Information complains of;weakness;fatigue  -     Document Type therapy note (daily note)  -     Mode of Treatment physical therapy  -       Row Name 12/16/23 0859          General Information    Existing Precautions/Restrictions fall  -       Row Name 12/16/23 0859          Pain    Pretreatment Pain Rating 0/10 - no pain  -     Posttreatment Pain Rating 0/10 - no pain  -       Row Name 12/16/23 0859          Bed Mobility    Supine-Sit [place] (Bed Mobility) minimum assist (75% patient effort);moderate assist (50% patient effort);verbal cues  -     Sit-Supine [place] (Bed Mobility) --  chair  -       Row Name 12/16/23 0859          Transfers    Transfers toilet transfer  -       Row Name 12/16/23 0859          Bed-Chair Transfer    Bed-Chair [place] (Transfers) moderate assist (50% patient effort);maximum assist (25% patient effort);2 person assist;verbal cues  -       Row Name 12/16/23 0859          Sit-Stand Transfer    Sit-Stand [place] (Transfers) moderate assist (50% patient effort);2 person assist;verbal cues  -[facility] Name 12/16/23 0859          Stand-Sit Transfer    Stand-Sit [place] (Transfers) minimum assist (75% patient effort);2 person assist;verbal cues  -       Row Name 12/16/23 0859          Toilet Transfer    [place] Level (Toilet Transfer) moderate assist (50% patient effort);maximum assist (25% patient effort);2 person assist;verbal cues  [facility]     Assistive Device (Toilet Transfer) commode, 3-in-1;walker, front-wheeled  -     Comment, (Toilet Transfer) trans bed-BSC, stood from BSC, moved BSC and pulled chair to pt  -       Row Name              Wound 12/14/23 1649 Other (See comments) sacral spine    Wound - Properties Group Placement Date: 12/14/23 -AG Placement Time: 1649  -AG Present on Original Admission: Y  -AG Side: Other (See comments)  -AG, mid  [facility]: sacral spine  -AG    Retired Wound - Properties Group Placement Date: 12/14/23  -AG Placement Time: 1649  -AG Present on Original Admission: Y  -AG Side: Other (See comments)  -AG, mid  [facility]: sacral spine  -AG    Retired Wound - Properties Group Date first assessed: 12/14/23 -AG Time first assessed: 1649  -AG Present on Original Admission: Y  -AG Side: Other (See comments)  -AG, mid  [facility]: sacral spine  -AG      Row Name 12/16/23 0859          Plan of Care Review    Plan of Care Reviewed With patient  -AH     Progress no change  -     Outcome Evaluation pt trans to EOB mod assist, sit-stand mod 2, trans bed-BSC with rwx mod-max 2, stood from BSC min-mod 2, moved BSC and pulled chair to pt, pt would benefit from SNF  -AH       Row Name 12/16/23 0859          Positioning and Restraints    Pre-Treatment Position in bed  -AH     Post Treatment Position chair  -AH     In Chair notified nsg;reclined;call light within reach;encouraged to call for assist  -AH               User Key  (r) = Recorded By, (t) = Taken By, (c) = Cosigned By      Initials Name Provider Type     Argenis Ellis PTA Physical Therapist Assistant    AG Goldberg, Amanda, RN Registered Nurse                    Physical Therapy Education       Title: PT OT SLP Therapies (In Progress)       Topic: Physical Therapy (Done)       Point: Mobility training (Done)       Learning Progress Summary             Patient Acceptance, E,D, DU,VU by  at 12/15/2023 1408    Comment: benefits of PT and POC, call for A OOB                         Point: Precautions (Done)       Learning Progress Summary             Patient Acceptance, E,D, DU,VU by  at 12/15/2023 1408    Comment: benefits of PT and POC, call for A OOB                                          User Key       Initials Effective Dates Name Provider Type [facility] 02/03/23 -  Dain Cuellar, PT Physical Therapist PT                  PT Recommendation and Plan     Plan of Care Reviewed With: patient  Progress: no change  Outcome Evaluation: pt trans to EOB mod assist, sit-stand mod 2, trans bed-BSC with rwx mod-max 2, stood from BSC min-mod 2, moved BSC and pulled chair to pt, pt would benefit from SNF   Outcome Measures       Row Name 12/16/23 0900             How much help from another person do you currently need...    Turning from your back to your side while in flat bed without using bedrails? 3  -AH      Moving from lying on back to sitting on the side of a flat bed without bedrails? 2  -AH      Moving to and from a bed to a chair (including a wheelchair)? 2  -AH      Standing up from a chair using your arms (e.g., wheelchair, bedside chair)? 2  -AH      Climbing 3-5 steps with a railing? 1  -AH      To walk in hospital room? 1  -AH      AM-PAC 6 Clicks Score (PT) 11  -AH      Highest Level of Mobility Goal 4 --> Transfer to chair/commode  -         Functional Assessment    Outcome Measure Options AM-PAC 6 Clicks Basic Mobility (PT)  -AH                User Key  (r) = Recorded By, (t) = Taken By, (c) = Cosigned By      Initials Name Provider Type     Argenis Ellis, PTA Physical Therapist Assistant                     Time Calculation:    PT Charges       Row Name 12/16/23 0951             Time Calculation    Start Time 0859  -      Stop Time 0925  -      Time Calculation (min) 26 min  -      PT Received On 12/16/23  -         Time Calculation- PT    Total Timed Code Minutes- PT 26 minute(s)  -AH         Timed Charges    00930 - PT Therapeutic Activity Minutes 26  -AH         Total Minutes    Timed Charges Total Minutes 26  -AH       Total Minutes 26  -AH                User Key  (r) = Recorded By, (t) = Taken By, (c) = Cosigned By      Initials Name Provider  Type     Argenis Ellis PTA Physical Therapist Assistant                  Therapy Charges for Today       Code Description Service Date Service Provider Modifiers Qty    47057882984 HC PT THERAPEUTIC ACT EA 15 MIN 12/16/2023 Argenis Ellis PTA GP 2            PT G-Codes  Outcome Measure Options: AM-PAC 6 Clicks Basic Mobility (PT)  AM-PAC 6 Clicks Score (PT): 11  AM-PAC 6 Clicks Score (OT): 17    Argenis Ellis PTA  12/16/2023

## 2023-12-16 NOTE — PLAN OF CARE
Goal Outcome Evaluation:  Plan of Care Reviewed With: patient        Progress: no change  Outcome Evaluation: pt trans to EOB mod assist, sit-stand mod 2, trans bed-BSC with rwx mod-max 2, stood from BSC min-mod 2, moved BSC and pulled chair to pt, pt would benefit from SNF

## 2023-12-16 NOTE — PLAN OF CARE
Goal Outcome Evaluation:  Plan of Care Reviewed With: patient        Progress: no change  Outcome Evaluation: She co pain and nausea, medication given. monitor sodium level. Working with PT. Randolph in place. Lasix given. cont to monitor.

## 2023-12-17 ENCOUNTER — APPOINTMENT (OUTPATIENT)
Dept: CT IMAGING | Facility: HOSPITAL | Age: 68
End: 2023-12-17
Payer: MEDICARE

## 2023-12-17 PROBLEM — E43 SEVERE MALNUTRITION: Status: ACTIVE | Noted: 2023-12-17

## 2023-12-17 LAB
ANION GAP SERPL CALCULATED.3IONS-SCNC: 10 MMOL/L (ref 5–15)
BASOPHILS # BLD AUTO: 0.05 10*3/MM3 (ref 0–0.2)
BASOPHILS NFR BLD AUTO: 0.7 % (ref 0–1.5)
BUN SERPL-MCNC: 9 MG/DL (ref 8–23)
BUN/CREAT SERPL: 20.9 (ref 7–25)
CALCIUM SPEC-SCNC: 8.6 MG/DL (ref 8.6–10.5)
CHLORIDE SERPL-SCNC: 92 MMOL/L (ref 98–107)
CO2 SERPL-SCNC: 26 MMOL/L (ref 22–29)
CREAT SERPL-MCNC: 0.43 MG/DL (ref 0.57–1)
CREAT UR-MCNC: 45.8 MG/DL
DEPRECATED RDW RBC AUTO: 48.9 FL (ref 37–54)
EGFRCR SERPLBLD CKD-EPI 2021: 105.4 ML/MIN/1.73
EOSINOPHIL # BLD AUTO: 0.06 10*3/MM3 (ref 0–0.4)
EOSINOPHIL NFR BLD AUTO: 0.8 % (ref 0.3–6.2)
ERYTHROCYTE [DISTWIDTH] IN BLOOD BY AUTOMATED COUNT: 15.2 % (ref 12.3–15.4)
GLUCOSE BLDC GLUCOMTR-MCNC: 101 MG/DL (ref 70–130)
GLUCOSE BLDC GLUCOMTR-MCNC: 110 MG/DL (ref 70–130)
GLUCOSE BLDC GLUCOMTR-MCNC: 126 MG/DL (ref 70–130)
GLUCOSE BLDC GLUCOMTR-MCNC: 150 MG/DL (ref 70–130)
GLUCOSE SERPL-MCNC: 187 MG/DL (ref 65–99)
HCT VFR BLD AUTO: 36.4 % (ref 34–46.6)
HGB BLD-MCNC: 11.9 G/DL (ref 12–15.9)
IMM GRANULOCYTES # BLD AUTO: 0.01 10*3/MM3 (ref 0–0.05)
IMM GRANULOCYTES NFR BLD AUTO: 0.1 % (ref 0–0.5)
INR PPP: 1.89 (ref 0.91–1.09)
LYMPHOCYTES # BLD AUTO: 1.2 10*3/MM3 (ref 0.7–3.1)
LYMPHOCYTES NFR BLD AUTO: 15.8 % (ref 19.6–45.3)
MCH RBC QN AUTO: 28.7 PG (ref 26.6–33)
MCHC RBC AUTO-ENTMCNC: 32.7 G/DL (ref 31.5–35.7)
MCV RBC AUTO: 87.7 FL (ref 79–97)
MONOCYTES # BLD AUTO: 0.85 10*3/MM3 (ref 0.1–0.9)
MONOCYTES NFR BLD AUTO: 11.2 % (ref 5–12)
NEUTROPHILS NFR BLD AUTO: 5.44 10*3/MM3 (ref 1.7–7)
NEUTROPHILS NFR BLD AUTO: 71.4 % (ref 42.7–76)
NRBC BLD AUTO-RTO: 0 /100 WBC (ref 0–0.2)
OSMOLALITY SERPL: 266 MOSM/KG (ref 280–301)
OSMOLALITY UR: 400 MOSM/KG
OSMOLALITY UR: 405 MOSM/KG (ref 50–1400)
PLATELET # BLD AUTO: 221 10*3/MM3 (ref 140–450)
PMV BLD AUTO: 9.7 FL (ref 6–12)
POTASSIUM SERPL-SCNC: 4.6 MMOL/L (ref 3.5–5.2)
POTASSIUM SERPL-SCNC: 4.6 MMOL/L (ref 3.5–5.2)
POTASSIUM SERPL-SCNC: 4.7 MMOL/L (ref 3.5–5.2)
POTASSIUM SERPL-SCNC: 4.7 MMOL/L (ref 3.5–5.2)
POTASSIUM SERPL-SCNC: 4.8 MMOL/L (ref 3.5–5.2)
POTASSIUM SERPL-SCNC: 4.9 MMOL/L (ref 3.5–5.2)
PROTHROMBIN TIME: 22 SECONDS (ref 11.8–14.8)
RBC # BLD AUTO: 4.15 10*6/MM3 (ref 3.77–5.28)
SODIUM SERPL-SCNC: 125 MMOL/L (ref 136–145)
SODIUM SERPL-SCNC: 126 MMOL/L (ref 136–145)
SODIUM SERPL-SCNC: 126 MMOL/L (ref 136–145)
SODIUM SERPL-SCNC: 127 MMOL/L (ref 136–145)
SODIUM SERPL-SCNC: 127 MMOL/L (ref 136–145)
SODIUM SERPL-SCNC: 128 MMOL/L (ref 136–145)
SODIUM UR-SCNC: <20 MMOL/L
SODIUM UR-SCNC: <20 MMOL/L
WBC NRBC COR # BLD AUTO: 7.61 10*3/MM3 (ref 3.4–10.8)

## 2023-12-17 PROCEDURE — 84300 ASSAY OF URINE SODIUM: CPT | Performed by: INTERNAL MEDICINE

## 2023-12-17 PROCEDURE — 25010000002 ONDANSETRON PER 1 MG: Performed by: INTERNAL MEDICINE

## 2023-12-17 PROCEDURE — 82948 REAGENT STRIP/BLOOD GLUCOSE: CPT

## 2023-12-17 PROCEDURE — 25510000001 IOPAMIDOL 61 % SOLUTION: Performed by: INTERNAL MEDICINE

## 2023-12-17 PROCEDURE — 63710000001 INSULIN DETEMIR PER 5 UNITS: Performed by: INTERNAL MEDICINE

## 2023-12-17 PROCEDURE — 83930 ASSAY OF BLOOD OSMOLALITY: CPT | Performed by: INTERNAL MEDICINE

## 2023-12-17 PROCEDURE — 63710000001 INSULIN LISPRO (HUMAN) PER 5 UNITS: Performed by: INTERNAL MEDICINE

## 2023-12-17 PROCEDURE — 25010000002 HEPARIN (PORCINE) PER 1000 UNITS: Performed by: INTERNAL MEDICINE

## 2023-12-17 PROCEDURE — 80048 BASIC METABOLIC PNL TOTAL CA: CPT | Performed by: INTERNAL MEDICINE

## 2023-12-17 PROCEDURE — 25010000002 PROMETHAZINE PER 50 MG: Performed by: FAMILY MEDICINE

## 2023-12-17 PROCEDURE — 85025 COMPLETE CBC W/AUTO DIFF WBC: CPT | Performed by: INTERNAL MEDICINE

## 2023-12-17 PROCEDURE — 83935 ASSAY OF URINE OSMOLALITY: CPT | Performed by: INTERNAL MEDICINE

## 2023-12-17 PROCEDURE — 82570 ASSAY OF URINE CREATININE: CPT | Performed by: INTERNAL MEDICINE

## 2023-12-17 PROCEDURE — 25810000003 SODIUM CHLORIDE 0.9 % SOLUTION: Performed by: INTERNAL MEDICINE

## 2023-12-17 PROCEDURE — 84132 ASSAY OF SERUM POTASSIUM: CPT | Performed by: INTERNAL MEDICINE

## 2023-12-17 PROCEDURE — 85610 PROTHROMBIN TIME: CPT | Performed by: INTERNAL MEDICINE

## 2023-12-17 PROCEDURE — 84295 ASSAY OF SERUM SODIUM: CPT | Performed by: INTERNAL MEDICINE

## 2023-12-17 PROCEDURE — 74177 CT ABD & PELVIS W/CONTRAST: CPT

## 2023-12-17 RX ORDER — TAMSULOSIN HYDROCHLORIDE 0.4 MG/1
0.4 CAPSULE ORAL DAILY
Status: DISCONTINUED | OUTPATIENT
Start: 2023-12-17 | End: 2023-12-20 | Stop reason: HOSPADM

## 2023-12-17 RX ADMIN — PROMETHAZINE HYDROCHLORIDE 12.5 MG: 25 INJECTION INTRAMUSCULAR; INTRAVENOUS at 01:23

## 2023-12-17 RX ADMIN — COLLAGENASE SANTYL 1 APPLICATION: 250 OINTMENT TOPICAL at 09:02

## 2023-12-17 RX ADMIN — FUROSEMIDE 40 MG: 40 TABLET ORAL at 08:53

## 2023-12-17 RX ADMIN — METFORMIN HCL 1000 MG: 500 TABLET ORAL at 08:53

## 2023-12-17 RX ADMIN — SODIUM CHLORIDE TAB 1 GM 4 G: 1 TAB at 22:45

## 2023-12-17 RX ADMIN — DOCUSATE SODIUM 50 MG AND SENNOSIDES 8.6 MG 2 TABLET: 8.6; 5 TABLET, FILM COATED ORAL at 22:44

## 2023-12-17 RX ADMIN — IOPAMIDOL 100 ML: 612 INJECTION, SOLUTION INTRAVENOUS at 19:03

## 2023-12-17 RX ADMIN — Medication 10 ML: at 22:45

## 2023-12-17 RX ADMIN — SACUBITRIL AND VALSARTAN 1 TABLET: 24; 26 TABLET, FILM COATED ORAL at 22:44

## 2023-12-17 RX ADMIN — INSULIN LISPRO 2 UNITS: 100 INJECTION, SOLUTION INTRAVENOUS; SUBCUTANEOUS at 08:54

## 2023-12-17 RX ADMIN — ACETAMINOPHEN 650 MG: 325 TABLET, FILM COATED ORAL at 22:55

## 2023-12-17 RX ADMIN — SACUBITRIL AND VALSARTAN 1 TABLET: 24; 26 TABLET, FILM COATED ORAL at 08:53

## 2023-12-17 RX ADMIN — TAMSULOSIN HYDROCHLORIDE 0.4 MG: 0.4 CAPSULE ORAL at 17:40

## 2023-12-17 RX ADMIN — HEPARIN SODIUM 5000 UNITS: 5000 INJECTION INTRAVENOUS; SUBCUTANEOUS at 13:53

## 2023-12-17 RX ADMIN — DIGOXIN 250 MCG: 250 TABLET ORAL at 12:11

## 2023-12-17 RX ADMIN — METFORMIN HCL 1000 MG: 500 TABLET ORAL at 17:41

## 2023-12-17 RX ADMIN — SODIUM CHLORIDE TAB 1 GM 4 G: 1 TAB at 17:40

## 2023-12-17 RX ADMIN — INSULIN DETEMIR 10 UNITS: 100 INJECTION, SOLUTION SUBCUTANEOUS at 08:54

## 2023-12-17 RX ADMIN — COLLAGENASE SANTYL 1 APPLICATION: 250 OINTMENT TOPICAL at 22:44

## 2023-12-17 RX ADMIN — GLIPIZIDE 10 MG: 10 TABLET ORAL at 08:53

## 2023-12-17 RX ADMIN — SODIUM CHLORIDE TAB 1 GM 4 G: 1 TAB at 12:11

## 2023-12-17 RX ADMIN — Medication 5 MG: at 22:45

## 2023-12-17 RX ADMIN — ATORVASTATIN CALCIUM 80 MG: 40 TABLET ORAL at 22:44

## 2023-12-17 RX ADMIN — ONDANSETRON 4 MG: 2 INJECTION INTRAMUSCULAR; INTRAVENOUS at 13:53

## 2023-12-17 RX ADMIN — POLYETHYLENE GLYCOL 3350 17 G: 17 POWDER, FOR SOLUTION ORAL at 08:54

## 2023-12-17 RX ADMIN — SODIUM CHLORIDE 1000 ML: 9 INJECTION, SOLUTION INTRAVENOUS at 22:40

## 2023-12-17 RX ADMIN — ONDANSETRON 4 MG: 2 INJECTION INTRAMUSCULAR; INTRAVENOUS at 22:41

## 2023-12-17 RX ADMIN — HEPARIN SODIUM 5000 UNITS: 5000 INJECTION INTRAVENOUS; SUBCUTANEOUS at 06:28

## 2023-12-17 RX ADMIN — SODIUM CHLORIDE TAB 1 GM 4 G: 1 TAB at 00:07

## 2023-12-17 RX ADMIN — SODIUM CHLORIDE TAB 1 GM 4 G: 1 TAB at 06:28

## 2023-12-17 RX ADMIN — DOCUSATE SODIUM 50 MG AND SENNOSIDES 8.6 MG 2 TABLET: 8.6; 5 TABLET, FILM COATED ORAL at 09:01

## 2023-12-17 RX ADMIN — LINAGLIPTIN 5 MG: 5 TABLET, FILM COATED ORAL at 08:53

## 2023-12-17 RX ADMIN — HEPARIN SODIUM 5000 UNITS: 5000 INJECTION INTRAVENOUS; SUBCUTANEOUS at 22:44

## 2023-12-17 RX ADMIN — LEVOTHYROXINE SODIUM 125 MCG: 125 TABLET ORAL at 06:28

## 2023-12-17 RX ADMIN — Medication 10 ML: at 09:04

## 2023-12-17 RX ADMIN — ONDANSETRON 4 MG: 2 INJECTION INTRAMUSCULAR; INTRAVENOUS at 06:37

## 2023-12-17 RX ADMIN — WARFARIN 6 MG: 6 TABLET ORAL at 17:40

## 2023-12-17 NOTE — CASE MANAGEMENT/SOCIAL WORK
Discharge Planning Assessment   [facility]     Patient Name: Anne-Marie Foreman  MRN: [id number]  Today's Date: 12/17/2023    Admit Date: 12/14/2023    [facility]   Discharge Needs Assessment       Row Name 12/17/23 0900       Living Environment    People in Home child(jered), adult    Name(s) of People in Home Lives with 2 adult son's (Chauncey and Drake)    Current Living Arrangements home    Potentially Unsafe Housing Conditions none    In the past 12 months has the electric, gas, oil, or water company threatened to shut off services in your home? No    Primary Care Provided by self;child(jered)    Provides Primary Care For no one, unable/limited ability to care for self    Family Caregiver if Needed child(jered), adult    Quality of Family Relationships supportive;involved;helpful    Able to Return to Prior Arrangements yes       Resource/Environmental Concerns    Resource/Environmental Concerns none       Food Insecurity    Within the past 12 months, you worried that your food would run out before you got the money to buy more. Never true    Within the past 12 months, the food you bought just didn't last and you didn't have money to get more. Never true       Transition Planning    Patient/Family Anticipates Transition to home with family;home with help/services    Patient/Family Anticipated Services at Transition home health care    Transportation Anticipated family or friend will provide       Discharge Needs Assessment    Readmission Within the Last 30 Days no previous admission in last 30 days    Current Outpatient/Agency/Support Group homecare agency    Equipment Currently Used at Home commode;walker, rolling;wheelchair;shower chair;hospital bed    Concerns to be Addressed basic needs;discharge planning    Anticipated Changes Related to Illness none    Equipment Needed After Discharge none    Outpatient/Agency/Support Group Needs homecare agency    Discharge Facility/Level of Care Needs home with home health     Current Discharge Risk chronically ill                   Discharge Plan       Row Name 12/17/23 0902       Plan    Plan [facility]    Patient/Family in Agreement with Plan yes    Plan Comments Spoke with CHAUNCEY BOONE Son   [phone] and discussed MD order for SNF.  Son saying she will not be going to a facility and will be returning home.  Pt lives with both sons at home, Chauncey works but Drake with her all the time.  Pt has PCP/RX covergae. Son saying pt followed my [facility], will need confirmed on Monday.  Pt has needed DME. He denies further needs at this time.  Will follow.                  Continued Care and Services - Admitted Since 12/14/2023    Coordination has not been started for this encounter.       Expected Discharge Date and Time       Expected Discharge Date Expected Discharge Time    Dec 16, 2023            Demographic Summary    No documentation.                  Functional Status    No documentation.                  Psychosocial    No documentation.                  Abuse/Neglect    No documentation.                  Legal    No documentation.                  Substance Abuse    No documentation.                  Patient Forms    No documentation.                     Susanne Meadows, CRAIGW

## 2023-12-17 NOTE — PROGRESS NOTES
"Pharmacy Dosing Service  Anticoagulant  Warfarin Evaluation     Assessment/Action/Plan:  Patient on chronic anticoagulation with warfarin for AFib (home regimen appears to be 6mg on Monday, Wednesday, Friday, Sunday and 3mg on Tuesday, Thursday & Saturday.      INR = 1.89 Warfarin 6 mg po tonight; continue home regimen.     date INR dose   12/14 1.86 none   12/15 1.83 6mg   12/16 1.73 6 mg   12/17  1.89 6 mg                   Pharmacy will continue to follow INR daily & adjust dose accordingly. Note: Patient remains on SQ heparin (5,000 units Q8h) at this time          Subjective:  Anne-Marie Foreman is a 68 y.o.female  [Ht: 165.1 cm (65\"); Wt: 89.8 kg (197 lb 15.6 oz)] with a Pharmacy to Dose consult for warfarin for the indication of atrial fibrillation.     INR Goal: 2 - 3  Continuation of a Home Dose?: YES (6mg M,W,F,Sun and 3mg T,Th,Sat)  Bridge Therapy Present?:  Heparin 5,000 units SQ Q8h  Interacting Medications Evaluation (New/Present/Discontinued): Acetaminophen, glipizide, heparin, levothyroxine, metformin    Objective:  [Ht: 165.1 cm (65\"); Wt: 86.5 kg (190 lb 9.6 oz); BMI: Body mass index is 31.72 kg/m².]  Lab Results   Component Value Date    ALBUMIN 3.1 (L) 12/14/2023     Lab Results   Component Value Date    INR 1.89 (H) 12/17/2023    INR 1.73 (H) 12/16/2023    INR 1.83 (H) 12/15/2023    PROTIME 22.0 (H) 12/17/2023    PROTIME 20.5 (H) 12/16/2023    PROTIME 21.4 (H) 12/15/2023     Lab Results   Component Value Date    HGB 11.9 (L) 12/17/2023    HGB 12.2 12/16/2023    HGB 11.9 (L) 12/15/2023     Lab Results   Component Value Date    HCT 36.4 12/17/2023    HCT 36.7 12/16/2023    HCT 35.6 12/15/2023       Buddy Moya, PharmD  12/17/23 16:34 CST     "

## 2023-12-17 NOTE — PROGRESS NOTES
[facility] Medicine Services  INPATIENT PROGRESS NOTE    Patient Name: Anne-Marie Foreman  Date of Admission: 12/14/2023  Today's Date: 12/17/23  Length of Stay: 3  Primary Care Physician: Anabell Sanchez APRN    Subjective     No acute events overnight.         Objective    Temp:  [97.4 °F (36.3 °C)-98.1 °F (36.7 °C)] 98 °F (36.7 °C)  Heart Rate:  [] 99  Resp:  [16-18] 16  BP: ()/(48-75) 117/61    General: No acute distress  HEENT: normocephalic, atraumatic  Neck: Supple  CV: RRR  Lung: Clear to auscultation bilaterally.  No wheeze, rales, or rhonchi noted.  Abdominal exam: Positive bowel sounds, nontender, non distended  Extremity: No edema noted  Neurological exam: AAO x3. Cranial nerve II to XII grossly intact  Skin exam: Dry and warm  Psychiatric exam: Calm, cooperative, and normal affect       Results Review:  I have reviewed the labs, radiology results, and diagnostic studies.    Laboratory Data:   Results from last 7 days   Lab Units 12/17/23  0342 12/16/23  0525 12/15/23  0456   WBC 10*3/mm3 7.61 6.80 6.35   HEMOGLOBIN g/dL 11.9* 12.2 11.9*   HEMATOCRIT % 36.4 36.7 35.6   PLATELETS 10*3/mm3 221 192 184        Results from last 7 days   Lab Units 12/17/23  0953 12/17/23  0342 12/16/23  2359 12/16/23  1926 12/16/23  1555 12/16/23  0754 12/16/23  0525 12/14/23  1120 12/14/23  0606   SODIUM mmol/L 126* 128* 125*   < > 124*   < > 125*  125*   < > 120*   POTASSIUM mmol/L 4.6 4.8 4.9   < > 4.5   < > 4.0  4.0   < > 4.6   CHLORIDE mmol/L  --  92*  --   --  90*  --  90*   < > 84*   CO2 mmol/L  --  26.0  --   --  23.0  --  24.0   < > 24.0   BUN mg/dL  --  9  --   --  9  --  10   < > 11   CREATININE mg/dL  --  0.43*  --   --  0.33*  --  0.31*   < > 0.36*   CALCIUM mg/dL  --  8.6  --   --  8.5*  --  8.4*   < > 9.2   BILIRUBIN mg/dL  --   --   --   --   --   --   --   --  0.8   ALK PHOS U/L  --   --   --   --   --   --   --   --  59   ALT (SGPT) U/L  --   --   --   --   --    "--   --   --  22   AST (SGOT) U/L  --   --   --   --   --   --   --   --  25   GLUCOSE mg/dL  --  187*  --   --  324*  --  276*   < > 227*    < > = values in this interval not displayed.       Culture Data:   No results found for: \"BLOODCX\", \"URINECX\", \"WOUNDCX\", \"MRSACX\", \"RESPCX\", \"STOOLCX\"    Radiology Data:   Imaging Results (Last 24 Hours)       ** No results found for the last 24 hours. **            I have reviewed the patient's current medications.     Assessment/Plan   Assessment  Active Hospital Problems    Diagnosis     **Hyponatremia        Assessment and Plan:  Patient reports that she has not been feeling well for the past 6 months.  She was recently found to have hyponatremia and was started on salt tablets which were ineffective.  Lasix was held and she developed lower extremity edema.     Hypervolemic Hyponatremia 2/2 fluid overload and other etiology: Plasma Osm > Urine Osm c/w diagnosis  -home lasix 40 po qd  -repeat plasma osmolarity, urine osmolarity, urine sodium, urine creatinine at 1600 once lasix is out of her system to assess further     sCHF 2/2 ischemic vs nonischemic: EF 25-30% on last TTE, been refusing AICD for primary prevention of SCD for years  -hold home meds while diuresing  -strict I/Os q2h  -vitals q2h  -daily weights  -lasix as above    pAF, rate controlled:  -goal HR < 110  -hold coreg for now while diuresing, may switch to metoprolol for rate control if needed  -cont home digoxin, digoxin level pending  -resume home coumadin, coumadin level pending  -Keep K > 4, Mg > 2  -telemetry monitoring     DM2: A1c  7.8  -goal glucose < 180 while inpatient  -cont home glipizide, linagliptin, metformin    Pressure wound:  -wound care following            Dispo: plan going as planned    Hiram Perea MD 12/17/23, 11:39 CST.                  Treatment Plan      Medical Decision Making  Number and Complexity of problems:   Differential Diagnosis:     Conditions and Status             MDM " Data  External documents reviewed:   Cardiac tracing (EKG, telemetry) interpretation:   Radiology interpretation:   Labs reviewed:   Any tests that were considered but not ordered:      Decision rules/scores evaluated (example [id number], Wells, etc):      Discussed with:      Care Planning  Shared decision making:   Code status and discussions:     Disposition  Social Determinants of Health that impact treatment or disposition:   I expect the patient to be discharged to  in  days.     Electronically signed by Hiram Perea MD, 12/17/23, 11:39 CST.

## 2023-12-17 NOTE — PLAN OF CARE
Goal Outcome Evaluation:  Plan of Care Reviewed With: patient        Progress: no change  Outcome Evaluation: Patient had c/o nausea this shift, given zofran with no relief. MD contacted, one time dose of phenergan given. Patient ovalles catheter pulled this shift. Patient bladder scanned this morning with 900ml showing.  In and out cath attempted with only 100ml out. Patient bladder scanned again with different bladder scanner in case of equipment error with dayshift nurse at bedside, shows 300 mls retained. Af  with bbb per tele. Call light in reach. Son at bedside.

## 2023-12-17 NOTE — SIGNIFICANT NOTE
Received a call from RN who reported macroscopic blood clots in ovalles and ovalles is clogged.   Placed order for removal.    Later RN calls, pt attempted to void but only produced 30ml urine, bladder scan  = 530ml.   Placed order for ovalles insertion, flomax qhs and CT abd/pelvis with contrast to check for obstruction, etiology of clots. There is some concern for malignancy. Depending on the results of the CT scan, may consult Urology in the morning vs attempt voiding trial.    Updated RN on the plan regarding hyponatremia now Na 127, it would be safe to increase rapidly back to 135 which is normal since the goal is 6-8 increase per 24 hours.    Hiram Perea MD 12/17/23 17:02 CST

## 2023-12-18 LAB
ANION GAP SERPL CALCULATED.3IONS-SCNC: 10 MMOL/L (ref 5–15)
BASOPHILS # BLD AUTO: 0.06 10*3/MM3 (ref 0–0.2)
BASOPHILS NFR BLD AUTO: 0.9 % (ref 0–1.5)
BUN SERPL-MCNC: 9 MG/DL (ref 8–23)
BUN/CREAT SERPL: 25.7 (ref 7–25)
CALCIUM SPEC-SCNC: 8.3 MG/DL (ref 8.6–10.5)
CHLORIDE SERPL-SCNC: 93 MMOL/L (ref 98–107)
CO2 SERPL-SCNC: 23 MMOL/L (ref 22–29)
CORTIS SERPL-MCNC: 14.4 MCG/DL
CREAT SERPL-MCNC: 0.35 MG/DL (ref 0.57–1)
DEPRECATED RDW RBC AUTO: 49.2 FL (ref 37–54)
EGFRCR SERPLBLD CKD-EPI 2021: 110.8 ML/MIN/1.73
EOSINOPHIL # BLD AUTO: 0.07 10*3/MM3 (ref 0–0.4)
EOSINOPHIL NFR BLD AUTO: 1.1 % (ref 0.3–6.2)
ERYTHROCYTE [DISTWIDTH] IN BLOOD BY AUTOMATED COUNT: 15.2 % (ref 12.3–15.4)
GLUCOSE BLDC GLUCOMTR-MCNC: 102 MG/DL (ref 70–130)
GLUCOSE BLDC GLUCOMTR-MCNC: 107 MG/DL (ref 70–130)
GLUCOSE BLDC GLUCOMTR-MCNC: 117 MG/DL (ref 70–130)
GLUCOSE BLDC GLUCOMTR-MCNC: 96 MG/DL (ref 70–130)
GLUCOSE SERPL-MCNC: 93 MG/DL (ref 65–99)
HCT VFR BLD AUTO: 38 % (ref 34–46.6)
HGB BLD-MCNC: 12 G/DL (ref 12–15.9)
IMM GRANULOCYTES # BLD AUTO: 0.02 10*3/MM3 (ref 0–0.05)
IMM GRANULOCYTES NFR BLD AUTO: 0.3 % (ref 0–0.5)
INR PPP: 2.16 (ref 0.91–1.09)
LYMPHOCYTES # BLD AUTO: 1.25 10*3/MM3 (ref 0.7–3.1)
LYMPHOCYTES NFR BLD AUTO: 18.8 % (ref 19.6–45.3)
MCH RBC QN AUTO: 28 PG (ref 26.6–33)
MCHC RBC AUTO-ENTMCNC: 31.6 G/DL (ref 31.5–35.7)
MCV RBC AUTO: 88.6 FL (ref 79–97)
MONOCYTES # BLD AUTO: 0.61 10*3/MM3 (ref 0.1–0.9)
MONOCYTES NFR BLD AUTO: 9.2 % (ref 5–12)
NEUTROPHILS NFR BLD AUTO: 4.65 10*3/MM3 (ref 1.7–7)
NEUTROPHILS NFR BLD AUTO: 69.7 % (ref 42.7–76)
NRBC BLD AUTO-RTO: 0 /100 WBC (ref 0–0.2)
OSMOLALITY UR: 556 MOSM/KG (ref 50–1400)
PLATELET # BLD AUTO: 213 10*3/MM3 (ref 140–450)
PMV BLD AUTO: 9.9 FL (ref 6–12)
POTASSIUM SERPL-SCNC: 4.6 MMOL/L (ref 3.5–5.2)
POTASSIUM SERPL-SCNC: 4.6 MMOL/L (ref 3.5–5.2)
POTASSIUM SERPL-SCNC: 4.7 MMOL/L (ref 3.5–5.2)
POTASSIUM SERPL-SCNC: 4.8 MMOL/L (ref 3.5–5.2)
PROTHROMBIN TIME: 24.4 SECONDS (ref 11.8–14.8)
RBC # BLD AUTO: 4.29 10*6/MM3 (ref 3.77–5.28)
SODIUM SERPL-SCNC: 124 MMOL/L (ref 136–145)
SODIUM SERPL-SCNC: 126 MMOL/L (ref 136–145)
SODIUM SERPL-SCNC: 127 MMOL/L (ref 136–145)
SODIUM SERPL-SCNC: 128 MMOL/L (ref 136–145)
SODIUM UR-SCNC: <20 MMOL/L
WBC NRBC COR # BLD AUTO: 6.66 10*3/MM3 (ref 3.4–10.8)

## 2023-12-18 PROCEDURE — 85025 COMPLETE CBC W/AUTO DIFF WBC: CPT | Performed by: INTERNAL MEDICINE

## 2023-12-18 PROCEDURE — 82533 TOTAL CORTISOL: CPT | Performed by: INTERNAL MEDICINE

## 2023-12-18 PROCEDURE — 84295 ASSAY OF SERUM SODIUM: CPT | Performed by: INTERNAL MEDICINE

## 2023-12-18 PROCEDURE — 82948 REAGENT STRIP/BLOOD GLUCOSE: CPT

## 2023-12-18 PROCEDURE — 97110 THERAPEUTIC EXERCISES: CPT

## 2023-12-18 PROCEDURE — 25010000002 HEPARIN (PORCINE) PER 1000 UNITS: Performed by: INTERNAL MEDICINE

## 2023-12-18 PROCEDURE — 85610 PROTHROMBIN TIME: CPT | Performed by: INTERNAL MEDICINE

## 2023-12-18 PROCEDURE — 84300 ASSAY OF URINE SODIUM: CPT | Performed by: INTERNAL MEDICINE

## 2023-12-18 PROCEDURE — 83935 ASSAY OF URINE OSMOLALITY: CPT | Performed by: INTERNAL MEDICINE

## 2023-12-18 PROCEDURE — 84132 ASSAY OF SERUM POTASSIUM: CPT | Performed by: INTERNAL MEDICINE

## 2023-12-18 PROCEDURE — 25010000002 ONDANSETRON PER 1 MG: Performed by: INTERNAL MEDICINE

## 2023-12-18 PROCEDURE — 80048 BASIC METABOLIC PNL TOTAL CA: CPT | Performed by: INTERNAL MEDICINE

## 2023-12-18 PROCEDURE — 99223 1ST HOSP IP/OBS HIGH 75: CPT | Performed by: INTERNAL MEDICINE

## 2023-12-18 PROCEDURE — 63710000001 INSULIN DETEMIR PER 5 UNITS: Performed by: INTERNAL MEDICINE

## 2023-12-18 RX ORDER — OXYCODONE HCL 10 MG/1
10 TABLET, FILM COATED, EXTENDED RELEASE ORAL EVERY 12 HOURS SCHEDULED
Status: DISCONTINUED | OUTPATIENT
Start: 2023-12-18 | End: 2023-12-20 | Stop reason: HOSPADM

## 2023-12-18 RX ADMIN — SODIUM CHLORIDE TAB 1 GM 4 G: 1 TAB at 06:39

## 2023-12-18 RX ADMIN — HEPARIN SODIUM 5000 UNITS: 5000 INJECTION INTRAVENOUS; SUBCUTANEOUS at 06:39

## 2023-12-18 RX ADMIN — POLYETHYLENE GLYCOL 3350 17 G: 17 POWDER, FOR SOLUTION ORAL at 08:39

## 2023-12-18 RX ADMIN — HEPARIN SODIUM 5000 UNITS: 5000 INJECTION INTRAVENOUS; SUBCUTANEOUS at 15:43

## 2023-12-18 RX ADMIN — INSULIN DETEMIR 10 UNITS: 100 INJECTION, SOLUTION SUBCUTANEOUS at 08:41

## 2023-12-18 RX ADMIN — ATORVASTATIN CALCIUM 80 MG: 40 TABLET ORAL at 20:40

## 2023-12-18 RX ADMIN — DOCUSATE SODIUM 50 MG AND SENNOSIDES 8.6 MG 2 TABLET: 8.6; 5 TABLET, FILM COATED ORAL at 08:40

## 2023-12-18 RX ADMIN — LEVOTHYROXINE SODIUM 125 MCG: 125 TABLET ORAL at 06:39

## 2023-12-18 RX ADMIN — SODIUM CHLORIDE TAB 1 GM 4 G: 1 TAB at 17:15

## 2023-12-18 RX ADMIN — LINAGLIPTIN 5 MG: 5 TABLET, FILM COATED ORAL at 08:40

## 2023-12-18 RX ADMIN — OXYCODONE HYDROCHLORIDE 10 MG: 10 TABLET, FILM COATED, EXTENDED RELEASE ORAL at 20:40

## 2023-12-18 RX ADMIN — DIGOXIN 250 MCG: 250 TABLET ORAL at 12:33

## 2023-12-18 RX ADMIN — COLLAGENASE SANTYL 1 APPLICATION: 250 OINTMENT TOPICAL at 08:41

## 2023-12-18 RX ADMIN — TAMSULOSIN HYDROCHLORIDE 0.4 MG: 0.4 CAPSULE ORAL at 08:40

## 2023-12-18 RX ADMIN — SACUBITRIL AND VALSARTAN 1 TABLET: 24; 26 TABLET, FILM COATED ORAL at 08:40

## 2023-12-18 RX ADMIN — GLIPIZIDE 10 MG: 10 TABLET ORAL at 08:41

## 2023-12-18 RX ADMIN — Medication 10 ML: at 20:42

## 2023-12-18 RX ADMIN — ONDANSETRON 4 MG: 2 INJECTION INTRAMUSCULAR; INTRAVENOUS at 08:57

## 2023-12-18 RX ADMIN — SODIUM CHLORIDE TAB 1 GM 4 G: 1 TAB at 12:33

## 2023-12-18 RX ADMIN — METFORMIN HCL 1000 MG: 500 TABLET ORAL at 08:40

## 2023-12-18 RX ADMIN — Medication 10 ML: at 08:41

## 2023-12-18 RX ADMIN — WARFARIN 6 MG: 6 TABLET ORAL at 17:15

## 2023-12-18 RX ADMIN — SACUBITRIL AND VALSARTAN 1 TABLET: 24; 26 TABLET, FILM COATED ORAL at 20:40

## 2023-12-18 RX ADMIN — METFORMIN HCL 1000 MG: 500 TABLET ORAL at 17:16

## 2023-12-18 RX ADMIN — INSULIN DETEMIR 10 UNITS: 100 INJECTION, SOLUTION SUBCUTANEOUS at 20:41

## 2023-12-18 RX ADMIN — DOCUSATE SODIUM 50 MG AND SENNOSIDES 8.6 MG 2 TABLET: 8.6; 5 TABLET, FILM COATED ORAL at 20:39

## 2023-12-18 RX ADMIN — FUROSEMIDE 40 MG: 40 TABLET ORAL at 08:40

## 2023-12-18 NOTE — THERAPY TREATMENT NOTE
Acute Care - Physical Therapy Treatment Note  [facility]     Patient Name: Anne-Marie Foreman  : 1955  MRN: [id number]  Today's Date: 2023      Visit Dx:     ICD-10-CM ICD-9-CM   1. Generalized weakness  R53.1 780.79   2. Hyponatremia  E87.1 276.1   3. Acute UTI (urinary tract infection)  N39.0 599.0   4. Hypomagnesemia  E83.42 275.2   5. Abnormal chest x-ray  R93.89 793.2   6. Impaired mobility [Z74.09]  Z74.09 799.89     Patient Active Problem List   Diagnosis    Weakness of both lower extremities    Chronic atrial fibrillation    Essential hypertension    Long term current use of anticoagulant therapy    Mild CAD    Mixed hyperlipidemia    Morbid obesity due to excess calories    Systolic congestive heart failure    Type 2 diabetes mellitus with microalbuminuria, without long-term current use of insulin    Acquired hypothyroidism    Vitamin B 12 deficiency    Intervertebral thoracic disc disorder with myelopathy, thoracic region    S/P discectomy    Current non-smoker    Hyponatremia    Severe malnutrition     Past Medical History:   Diagnosis Date    Asthma     CAD (coronary artery disease)     CHF (congestive heart failure)     Chronic atrial fibrillation     Chronic back pain     Chronic fatigue     Essential hypertension 2020    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Hyperlipidemia     Hypertension     Left sided lacunar infarction     Mixed hyperlipidemia 2017    Obesity, Class II, BMI 35-39.9     S/P discectomy 2020    Systolic heart failure     Type 2 diabetes mellitus     Type 2 diabetes mellitus with microalbuminuria, without long-term current use of insulin 2017    Vitamin B 12 deficiency 2020     Past Surgical History:   Procedure Laterality Date    CARDIAC CATHETERIZATION  10/21/2009    CARDIOVERSION  10/09/2013    CATARACT EXTRACTION, BILATERAL      CHOLECYSTECTOMY      LAPAROSCOPIC TUBAL LIGATION      LUMBAR DISCECTOMY N/A 2020    Procedure: Thoracic  DISCECTOMY, T10/11.   Costotransversectomy. Neuromonitoring;  Surgeon: iWlly Diaz MD;  [facility]: [facility];  Service: Neurosurgery;  Laterality: N/A;    RETINAL LASER PROCEDURE       PT Assessment (last 12 hours)       PT Evaluation and Treatment       Row Name 12/18/23 1144          Physical Therapy Time and Intention    Subjective Information complains of;weakness;fatigue  -     Document Type therapy note (daily note)  -     Mode of Treatment physical therapy  -       Row Name 12/18/23 1144          General Information    Patient/Family/Caregiver Comments/Observations 2 sons, they are her caretakers  -     Existing Precautions/Restrictions fall  -       Row Name 12/18/23 1144          Pain    Pretreatment Pain Rating 0/10 - no pain  -     Posttreatment Pain Rating 0/10 - no pain  -       Row Name 12/18/23 1144          Bed Mobility    Scooting/Bridging [place] (Bed Mobility) verbal cues;maximum assist (25% patient effort);2 person assist  son assisted  -     Supine-Sit [place] (Bed Mobility) verbal cues;moderate assist (50% patient effort)  -     Sit-Supine [place] (Bed Mobility) verbal cues;moderate assist (50% patient effort)  -     Assistive Device (Bed Mobility) head of bed elevated;draw sheet  -       Row Name 12/18/23 1144          Transfers    Comment, (Transfers) refused to stand or transfer to chair  -       Row Name 12/18/23 1144          Balance    Static Sitting Balance supervision  -     Dynamic Sitting Balance contact guard  -     Position, Sitting Balance sitting edge of bed  -     Balance Interventions sitting;core stability exercise;dynamic reaching  trunk stabs  -       Row Name 12/18/23 1144          Motor Skills    Therapeutic Exercise hip;knee;ankle  -       Row Name 12/18/23 1144          Hip (Therapeutic Exercise)    Hip (Therapeutic Exercise) AROM (active range of motion)  -     Hip AROM (Therapeutic Exercise)  bilateral;flexion;aBduction;aDduction;sitting;20 repititions  -       Row Name 12/18/23 1144          Knee (Therapeutic Exercise)    Knee (Therapeutic Exercise) AROM (active range of motion)  -     Knee AROM (Therapeutic Exercise) bilateral;LAQ (long arc quad)  -       Row Name 12/18/23 1144          Ankle (Therapeutic Exercise)    Ankle (Therapeutic Exercise) AROM (active range of motion)  -     Ankle AROM (Therapeutic Exercise) bilateral;dorsiflexion;plantarflexion  -       Row Name             Wound 12/14/23 1649 Other (See comments) sacral spine    Wound - Properties Group Placement Date: 12/14/23  -AG Placement Time: 1649  -AG Present on Original Admission: Y  -AG Side: Other (See comments)  -AG, mid  [facility]: sacral spine  -AG    Retired Wound - Properties Group Placement Date: 12/14/23  -AG Placement Time: 1649  -AG Present on Original Admission: Y  -AG Side: Other (See comments)  -AG, mid  [facility]: sacral spine  -AG    Retired Wound - Properties Group Date first assessed: 12/14/23  -AG Time first assessed: 1649  -AG Present on Original Admission: Y  -AG Side: Other (See comments)  -AG, mid  [facility]: sacral spine  -AG      Row Name 12/18/23 1144          Positioning and Restraints    Pre-Treatment Position in bed  -     Post Treatment Position bed  -     In Bed fowlers;call light within reach;encouraged to call for assist  -               User Key  (r) = Recorded By, (t) = Taken By, (c) = Cosigned By      Initials Name Provider Type    Wendy Chairez PTA Physical Therapist Assistant    AG Goldberg, Amanda, RN Registered Nurse                    Physical Therapy Education       Title: PT OT SLP Therapies (In Progress)       Topic: Physical Therapy (In Progress)       Point: Mobility training (In Progress)       Learning Progress Summary             Patient Acceptance, E, NR by DEENA at 12/18/2023 1208    Comment: bed mobility    Acceptance, E,D, DU,VU by  at 12/15/2023 1408    Comment:  benefits of PT and POC, call for A OOB                         Point: Precautions (Done)       Learning Progress Summary             Patient Acceptance, E,D, DU,VU by  at 12/15/2023 1408    Comment: benefits of PT and POC, call for A OOB                                         User Key       Initials Effective Dates Name Provider Type Discipline     02/03/23 -  Dain Cuellar, PT Physical Therapist PT     02/03/23 -  Wendy Landers, RORO Physical Therapist Assistant PT                  PT Recommendation and Plan         Outcome Measures       Row Name 12/18/23 1200 12/16/23 0900          How much help from another person do you currently need...    Turning from your back to your side while in flat bed without using bedrails? 3  -DEENA 3  -AH     Moving from lying on back to sitting on the side of a flat bed without bedrails? 2  -DEENA 2  -AH     Moving to and from a bed to a chair (including a wheelchair)? 2  -DEENA 2  -AH     Standing up from a chair using your arms (e.g., wheelchair, bedside chair)? 2  -DEENA 2  -AH     Climbing 3-5 steps with a railing? 1  -DEENA 1  -AH     To walk in hospital room? 1  -DEENA 1  -AH     AM-PAC 6 Clicks Score (PT) 11  -DEENA 11  -AH     Highest Level of Mobility Goal 4 --> Transfer to chair/commode  -DEENA 4 --> Transfer to chair/commode  -AH        Functional Assessment    Outcome Measure Options -- AM-PAC 6 Clicks Basic Mobility (PT)  -AH               User Key  (r) = Recorded By, (t) = Taken By, (c) = Cosigned By      Initials Name Provider Type     Argenis Ellis PTA Physical Therapist Assistant    DEENA Wendy Landers PTA Physical Therapist Assistant                     Time Calculation:    PT Charges       Row Name 12/18/23 1323 12/18/23 1212          Time Calculation    Start Time 1144  -DEENA 1144  -DEENA     Stop Time 1202  -DEENA 1200  -DEENA     Time Calculation (min) 18 min  -DEENA 16 min  -DEENA        Timed Charges    29383 - PT Therapeutic Exercise Minutes 18  -DEENA 16  -DEENA        Total Minutes     Timed Charges Total Minutes 18  -DEENA 16  -DEENA      Total Minutes 18  -DEENA 16  -DEENA               User Key  (r) = Recorded By, (t) = Taken By, (c) = Cosigned By      Initials Name Provider Type    Wendy Chairez PTA Physical Therapist Assistant                  Therapy Charges for Today       Code Description Service Date Service Provider Modifiers Qty    [id number] HC PT THER PROC EA 15 MIN 12/18/2023 Wendy Landers PTA GP 1            PT G-Codes  Outcome Measure Options: AM-PAC 6 Clicks Basic Mobility (PT)  AM-PAC 6 Clicks Score (PT): 11  AM-PAC 6 Clicks Score (OT): 17    Wendy Landers PTA  12/18/2023

## 2023-12-18 NOTE — PLAN OF CARE
Goal Outcome Evaluation:  Plan of Care Reviewed With: patient        Progress: improving  Outcome Evaluation: Patient had CT of abdomen and pelvis at the beginning of shift, see chart. Patient continues to report nausea and back pain this shift, treated with prn medication. Patient ovalles in place with 330ml out this shift. Patient turned as tolerated. AF  with frequent pvcs and couplets per tele. Call light in reach.

## 2023-12-18 NOTE — CONSULTS
[facility] Oncology/Hematology Services  CONSULT NOTE    PATIENT NAME:  Anne-Marie Foreman  YOB: 1955  PATIENT MRN:  [id number]    Date of Admission:  12/14/2023  Consultation Date:  12/18/2023  Referring Provider: No ref. provider found    Subjective     Reason for Consultation: Pancreatic mass.    History of present illness: Anne-Marie Foreman is an unfortunate 68 y.o.  female who is seen on consult for pancreatic mass.  She is seen with bobby Fisher and return at the bedside.  History from the chart.      She presented with not feeling well for the past 6 months.  Patient recently diagnosed with hyponatremia and no response to dose salt tablets.    CBC showed no cytopenias.  BMP remarkable for sodium 120 and albumin 3.1 on 12/14/2023.  Normal BNP.    Chest x-ray 12/14/2023. Complete obliteration of the lateral left diaphragm and left lung  base which may be artifactual or due to left lower lobar consolidation or pleural effusion. This may be further evaluated by a follow-up chest radiograph in PA projection. The remaining lungs are unremarkable.    CT abdomen pelvis 12/17/2023. 6.3 x 5.4 x 4.9 cm infiltrative pancreatic mass involving the  pancreatic neck, body, and tail. Findings are in keeping with primary pancreatic adenocarcinoma. There is extensive vascular encasement.   Large volume ascites is present. There is also some induration of the left anterior omentum/peritoneum. Differential diagnosis includes malignant ascites and peritoneal carcinomatosis. Recommend correlation with diagnostic paracentesis.  15 mm hypodense liver lesion, concerning for metastatic disease, although incompletely characterized on the single phase CT. If clinically indicated, MRI abdomen could be performed for further evaluation (after paracentesis as large volume ascites would degrade image quality). Four-chamber cardiomegaly and diffuse edematous state. While large volume ascites and  inflammatory change in the peritoneum/mesentery is concerning for metastatic disease, these findings could also simply be related to global edematous state in the setting of CHF. As mentioned above, diagnostic paracentesis may be helpful to differentiate.    Urine osmolality 400.  Urine sodium <20 on 12/17/2023.    CBC 12/18/2023 showed no cytopenias.  Sodium 126.  Urine osmolality 556.    She is on fluid restriction.    Patient is mainly bedbound.    With above background, she is seen.          Past Medical History:  Past Medical History:   Diagnosis Date    Asthma     CAD (coronary artery disease)     CHF (congestive heart failure)     Chronic atrial fibrillation     Chronic back pain     Chronic fatigue     Essential hypertension 05/14/2020    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Hyperlipidemia     Hypertension     Left sided lacunar infarction     Mixed hyperlipidemia 09/26/2017    Obesity, Class II, BMI 35-39.9     S/P discectomy 06/04/2020    Systolic heart failure     Type 2 diabetes mellitus     Type 2 diabetes mellitus with microalbuminuria, without long-term current use of insulin 09/26/2017    Vitamin B 12 deficiency 05/17/2020     Prior Surgeries:  Past Surgical History:   Procedure Laterality Date    CARDIAC CATHETERIZATION  10/21/2009    CARDIOVERSION  10/09/2013    CATARACT EXTRACTION, BILATERAL      CHOLECYSTECTOMY      LAPAROSCOPIC TUBAL LIGATION      LUMBAR DISCECTOMY N/A 5/19/2020    Procedure: Thoracic  DISCECTOMY, T10/11.  Costotransversectomy. Neuromonitoring;  Surgeon: Willy Diaz MD;  [facility]: [facility];  Service: Neurosurgery;  Laterality: N/A;    RETINAL LASER PROCEDURE          Allergies:Aspartame and phenylalanine, Mushroom, Mushroom extract complex, Penicillins, Shellfish-derived products, Beef-derived products, Milk-related compounds, and Ezetimibe  PTA Meds:  Medications Prior to Admission   Medication Sig Dispense Refill Last Dose    acetaminophen (TYLENOL) 500 MG tablet Take 1 tablet by  mouth Every 6 (Six) Hours As Needed for Mild Pain.   Past Week    aspirin 81 MG chewable tablet Chew 1 tablet Daily.   12/13/2023    atorvastatin (LIPITOR) 80 MG tablet Take 1 tablet by mouth Every Night.   12/13/2023    bisacodyl (Dulcolax) 10 MG suppository Insert 1 suppository into the rectum Daily As Needed for Constipation.   Past Month    calcium carbonate (TUMS) 500 MG chewable tablet Chew 1,000 mg 4 (Four) Times a Day As Needed for Indigestion or Heartburn.   12/13/2023    carvedilol (COREG) 12.5 MG tablet Take 1 tablet by mouth 2 (Two) Times a Day With Meals.   12/13/2023    cholecalciferol (VITAMIN D3) 25 MCG (1000 UT) tablet Take 1 tablet by mouth 2 (Two) Times a Day.   12/13/2023    collagenase 250 UNIT/GM ointment Apply 1 application  topically to the appropriate area as directed Daily. USE AS DIRECTED ONCE DAILY FOR 14 DAYS   12/13/2023    digoxin (LANOXIN) 125 MCG tablet Take 1 tablet by mouth Daily.   12/13/2023    fluticasone (FLONASE) 50 MCG/ACT nasal spray 2 sprays into the nostril(s) as directed by provider Daily As Needed for Rhinitis.   12/13/2023    furosemide (LASIX) 40 MG tablet Take 1 tablet by mouth Daily.   Patient Taking Differently    glipizide (GLUCOTROL) 10 MG tablet Take 1 tablet by mouth Daily.   12/13/2023    glipizide (GLUCOTROL) 10 MG tablet Take 1.5 tablets by mouth Every Evening.   12/13/2023    Glycerin-Hypromellose- (Dry Eye Relief Drops) 0.2-0.2-1 % solution ophthalmic solution Administer 1 drop to both eyes Every 1 (One) Hour As Needed for Dry Eyes.   Past Week    levothyroxine (SYNTHROID, LEVOTHROID) 125 MCG tablet Take 1 tablet by mouth Daily.   12/13/2023    linagliptin (TRADJENTA) 5 MG tablet tablet Take 1 tablet by mouth Daily.   12/13/2023    loratadine (CLARITIN) 10 MG tablet Take 1 tablet by mouth Daily.   12/13/2023    metFORMIN (GLUCOPHAGE) 1000 MG tablet Take 1 tablet by mouth 2 (Two) Times a Day With Meals.   12/13/2023    Multiple Vitamins-Minerals  (MULTIVITAMIN WITH MINERALS) tablet tablet Take 1 tablet by mouth Daily.   12/13/2023    oxymetazoline (AFRIN) 0.05 % nasal spray 2 sprays into the nostril(s) as directed by provider Daily.   12/13/2023    sacubitril-valsartan (ENTRESTO) 24-26 MG tablet Take 1 tablet by mouth 2 (Two) Times a Day.   12/13/2023    spironolactone (ALDACTONE) 25 MG tablet Take 1 tablet by mouth Daily.   12/13/2023    vitamin B-12 (CYANOCOBALAMIN) 500 MCG tablet Take 1 tablet by mouth Daily.   12/13/2023    warfarin (COUMADIN) 6 MG tablet Take 1 tablet by mouth 4 (Four) Times a Week. MONDAY WEDNESDAY FRIDAY SUNDAY 12/13/2023    warfarin (COUMADIN) 6 MG tablet Take 0.5 tablets by mouth 3 (Three) Times a Week. TUES THURS SAT   12/12/2023      Current Meds:   Current Facility-Administered Medications   Medication Dose Route Frequency Provider Last Rate Last Admin    acetaminophen (TYLENOL) tablet 650 mg  650 mg Oral Q4H PRN Hiram Perea MD   650 mg at 12/17/23 2255    atorvastatin (LIPITOR) tablet 80 mg  80 mg Oral Nightly Hiram Perea MD   80 mg at 12/17/23 2244    sennosides-docusate (PERICOLACE) 8.6-50 MG per tablet 2 tablet  2 tablet Oral BID Hiram Perea MD   2 tablet at 12/18/23 0840    And    bisacodyl (DULCOLAX) EC tablet 5 mg  5 mg Oral Daily PRN Hiram Perea MD   5 mg at 12/15/23 1836    And    bisacodyl (DULCOLAX) suppository 10 mg  10 mg Rectal Daily PRN Hiram Perea MD        calcium carbonate (TUMS) chewable tablet 500 mg (200 mg elemental)  2 tablet Oral BID PRN Hiram Perea MD        collagenase ointment 1 application   1 application  Topical Q12H Jerilyn Sebastian APRN   1 application  at 12/18/23 0841    dextrose (D50W) (25 g/50 mL) IV injection 25 g  25 g Intravenous Q15 Min PRN Hiram Perea MD        dextrose (D5W) 5 % infusion 342 mL  6 mL/kg (Ideal) Intravenous Continuous PRN Jose Chavira MD   342 mL at 12/15/23 215    dextrose (GLUTOSE) oral gel 15 g  15 g Oral Q15 Min PRN  Hiram Perea MD        digoxin (LANOXIN) tablet 250 mcg  250 mcg Oral Daily Hiram Perea MD   250 mcg at 12/18/23 1233    furosemide (LASIX) tablet 40 mg  40 mg Oral Daily Hiram Perea MD   40 mg at 12/18/23 0840    glipizide (GLUCOTROL) tablet 10 mg  10 mg Oral QAM Hiram Perea MD   10 mg at 12/18/23 0841    glucagon (GLUCAGEN) injection 1 mg  1 mg Intramuscular Q15 Min PRN Hiram Perea MD        heparin (porcine) 5000 UNIT/ML injection 5,000 Units  5,000 Units Subcutaneous Q8H Hirma Perea MD   5,000 Units at 12/18/23 0639    insulin detemir (LEVEMIR) injection 10 Units  10 Units Subcutaneous Q12H Hiram Perea MD   10 Units at 12/18/23 0841    Insulin Lispro (humaLOG) injection 2-7 Units  2-7 Units Subcutaneous 4x Daily AC & at Bedtime Hiram Perea MD   2 Units at 12/17/23 0854    levothyroxine (SYNTHROID, LEVOTHROID) tablet 125 mcg  125 mcg Oral Daily Hiram Perea MD   125 mcg at 12/18/23 0639    linagliptin (TRADJENTA) tablet 5 mg  5 mg Oral Daily Hiram Perea MD   5 mg at 12/18/23 0840    LORazepam (ATIVAN) tablet 0.5 mg  0.5 mg Oral Q8H PRN Hiram Perea MD   0.5 mg at 12/14/23 2129    melatonin tablet 5 mg  5 mg Oral Nightly PRN Hiram Perea MD   5 mg at 12/17/23 2245    metFORMIN (GLUCOPHAGE) tablet 1,000 mg  1,000 mg Oral BID With Meals Hiram Perea MD   1,000 mg at 12/18/23 0840    nitroglycerin (NITROSTAT) SL tablet 0.4 mg  0.4 mg Sublingual Q5 Min PRN Hiram Perea MD        ondansetron (ZOFRAN) injection 4 mg  4 mg Intravenous Q6H PRN Hiram Perea MD   4 mg at 12/18/23 0857    Pharmacy Consult - Pharmacy to dose   Does not apply Continuous PRN Hiram Perea MD        polyethylene glycol (MIRALAX) packet 17 g  17 g Oral Daily Hiram Perea MD   17 g at 12/18/23 0839    sacubitril-valsartan (ENTRESTO) 24-26 MG tablet 1 tablet  1 tablet Oral BID Hiram Perea MD   1 tablet at 12/18/23 0840    sodium chloride 0.9 % flush 10  mL  10 mL Intravenous PRN Rickey Santacruz DO        sodium chloride 0.9 % flush 10 mL  10 mL Intravenous Q12H Hiram Perea MD   10 mL at 12/18/23 0841    sodium chloride 0.9 % flush 10 mL  10 mL Intravenous PRN Hiram Perea MD        sodium chloride 0.9 % infusion 40 mL  40 mL Intravenous PRN Hiram Perea MD        sodium chloride tablet 4 g  4 g Oral Q6H Hiram Perea MD   4 g at 12/18/23 1233    tamsulosin (FLOMAX) 24 hr capsule 0.4 mg  0.4 mg Oral Daily Hiram Perea MD   0.4 mg at 12/18/23 0840    [START ON 12/19/2023] warfarin (COUMADIN) tablet 3 mg  3 mg Oral Once per day on Tue Thu Sat Hiram Perea MD        warfarin (COUMADIN) tablet 6 mg  6 mg Oral Once per day on Sun Mon Wed Fri Hiram Perea MD   6 mg at 12/17/23 1740       Family History:family history includes Diabetes in her mother; Heart failure in her mother; Hypertension in her mother; Liver cancer in her father.   Social History: reports that she has never smoked. She does not have any smokeless tobacco history on file. She reports that she does not drink alcohol and does not use drugs.    Review of Systems  Review of Systems   Constitutional:  Positive for fatigue. Negative for fever.   HENT:  Negative for congestion and facial swelling.    Eyes:  Negative for discharge and redness.   Respiratory:  Negative for shortness of breath.    Cardiovascular:  Negative for chest pain and palpitations.   Gastrointestinal:  Positive for abdominal pain. Negative for nausea and vomiting.   Endocrine: Negative for polydipsia and polyphagia.   Genitourinary:  Negative for flank pain and hematuria.   Musculoskeletal:  Positive for gait problem.   Skin:  Positive for pallor.   Neurological:  Negative for facial asymmetry and speech difficulty.   Hematological:  Negative for adenopathy.   Psychiatric/Behavioral:  Negative for agitation, confusion and hallucinations.          Objective      Vital Signs   Temp:  [97.5 °F (36.4  °C)-98.4 °F (36.9 °C)] 97.5 °F (36.4 °C)  Heart Rate:  [] 103  Resp:  [18-20] 18  BP: ()/(59-92) 97/59    Physical Exam  Vitals and nursing note reviewed.   Constitutional:       Appearance: She is ill-appearing.   HENT:      Head: Normocephalic and atraumatic.   Eyes:      General: No scleral icterus.  Cardiovascular:      Rate and Rhythm: Tachycardia present.   Pulmonary:      Effort: No respiratory distress.      Breath sounds: No wheezing.   Abdominal:      General: Bowel sounds are normal. There is no distension.   Musculoskeletal:         General: Swelling present.      Cervical back: No rigidity.   Skin:     Coloration: Skin is pale.   Neurological:      Mental Status: She is oriented to person, place, and time.   Psychiatric:         Mood and Affect: Mood normal.         Behavior: Behavior normal.         Thought Content: Thought content normal.         Judgment: Judgment normal.          Results from last 7 days   Lab Units 12/18/23  0523 12/17/23  0342 12/16/23  0525   WBC 10*3/mm3 6.66 7.61 6.80   HEMOGLOBIN g/dL 12.0 11.9* 12.2   HEMATOCRIT % 38.0 36.4 36.7   PLATELETS 10*3/mm3 213 221 192        Results from last 7 days   Lab Units 12/18/23  0523 12/17/23  2351 12/17/23  1948 12/17/23  0953 12/17/23  0342 12/16/23  1926 12/16/23  1555 12/14/23  1120 12/14/23  0606   SODIUM mmol/L 126* 127* 127*   < > 128*   < > 124*   < > 120*   POTASSIUM mmol/L 4.8 4.6 4.7   < > 4.8   < > 4.5   < > 4.6   CHLORIDE mmol/L 93*  --   --   --  92*  --  90*   < > 84*   CO2 mmol/L 23.0  --   --   --  26.0  --  23.0   < > 24.0   BUN mg/dL 9  --   --   --  9  --  9   < > 11   CREATININE mg/dL 0.35*  --   --   --  0.43*  --  0.33*   < > 0.36*   CALCIUM mg/dL 8.3*  --   --   --  8.6  --  8.5*   < > 9.2   BILIRUBIN mg/dL  --   --   --   --   --   --   --   --  0.8   ALK PHOS U/L  --   --   --   --   --   --   --   --  59   ALT (SGPT) U/L  --   --   --   --   --   --   --   --  22   AST (SGOT) U/L  --   --   --   --    "--   --   --   --  25   GLUCOSE mg/dL 93  --   --   --  187*  --  324*   < > 227*    < > = values in this interval not displayed.       ABG:  No results found for: \"PHART\", \"PO2ART\", \"BWM5HUP\"    Culture Data:   No results found for: \"BLOODCX\", \"URINECX\", \"WOUNDCX\", \"MRSACX\", \"RESPCX\", \"STOOLCX\"    Radiology:   Imaging Results (Last 7 Days)       Procedure Component Value Units Date/Time    CT Abdomen Pelvis With Contrast [[id number]] Collected: 12/17/23 1905     Updated: 12/17/23 1922    Narrative:      EXAM/TECHNIQUE: CT abdomen pelvis with IV contrast     INDICATION: Hematuria, gross/macroscopic     COMPARISON: None     DLP: 668 mGy cm. Automated exposure control was also utilized to  decrease patient radiation dose.     FINDINGS:     Lung bases are clear. Four-chamber cardiomegaly. Mitral annular  calcifications are noted.     6.3 x 5.4 x 4.9 cm hypodense infiltrative large mass in the pancreatic  neck, body, and proximal tail. There is encasement of the celiac artery  (as well as common hepatic artery and splenic artery), SMA, and portal  vein. There is severe stenosis of the portal vein and potentially tumor  thrombus within the portal vein, with evaluation limited given exam is  predominantly arterial phase.     15 mm hypodense liver lesion image 28 is incompletely characterized but  suspicious for metastatic disease. Prior cholecystectomy. No biliary  ductal dilatation. A with 19 mm low low-density lesion in the spleen is  incompletely characterized but likely a cyst. A myelolipoma is noted in  the right adrenal gland. There is also a small right adrenal gland  adenoma.     No solid renal mass. No urolithiasis or hydronephrosis. Urinary bladder  is decompressed by Dickson catheter.     No colonic wall thickening or pericolonic fat stranding. No small bowel  distention or evidence of active small bowel inflammation.     Large volume ascites. There is some induration and mild nodularity along  the left anterior " omentum and peritoneum. No pelvic mass or organized  collection. Uterus and adnexa appear unremarkable.     Nonaneurysmal abdominal aorta with atherosclerotic calcification. Celiac  artery, SMA, and GAGE are widely patent. No enlarged retroperitoneal,  mesenteric, pelvic, or inguinal lymph nodes.     Diffuse body wall soft tissue edema is noted. Periumbilical fat and  fluid containing hernia. No aggressive osseous lesion.       Impression:         1.  6.3 x 5.4 x 4.9 cm infiltrative pancreatic mass involving the  pancreatic neck, body, and tail. Findings are in keeping with primary  pancreatic adenocarcinoma. There is extensive vascular encasement.     2.  Large volume ascites is present. There is also some induration of  the left anterior omentum/peritoneum. Differential diagnosis includes  malignant ascites and peritoneal carcinomatosis. Recommend correlation  with diagnostic paracentesis.     3.  15 mm hypodense liver lesion, concerning for metastatic disease,  although incompletely characterized on the single phase CT. If  clinically indicated, MRI abdomen could be performed for further  evaluation (after paracentesis as large volume ascites would degrade  image quality).     4.  Four-chamber cardiomegaly and diffuse edematous state. While large  volume ascites and inflammatory change in the peritoneum/mesentery is  concerning for metastatic disease, these findings could also simply be  related to global edematous state in the setting of CHF. As mentioned  above, diagnostic paracentesis may be helpful to differentiate.           This report was signed and finalized on 12/17/2023 7:19 PM by Dr. Buddy Carmona MD.       XR Chest 1 View [[id number]] Collected: 12/14/23 0626     Updated: 12/14/23 0631    Narrative:      EXAMINATION: XR CHEST 1 VW-     12/14/2023 5:12 AM     HISTORY: generalized weakness/fatigue     A frontal projection of the chest is compared with the previous study  dated 9/1/2016.     The lungs  are poorly expanded, worse on the right side.     There is moderate elevation of right diaphragm which is more pronounced  since the previous study is probably due to poor lung expansion.     The left lung base and left lateral diaphragm is not optimally  visualized. Possibility of left lower lobar consolidation and pleural  effusion may not be excluded.     There is no pulmonary congestion or pneumothorax.     There is persistent moderate cardiomegaly.     There is no acute bony abnormality.       Impression:      1. Complete obliteration of the lateral left diaphragm and left lung  base which may be artifactual or due to left lower lobar consolidation  or pleural effusion. This may be further evaluated by a follow-up chest  radiograph in PA projection. The remaining lungs are unremarkable.     This report was signed and finalized on 12/14/2023 6:28 AM by Dr. Omar Ayala MD.                          Assessment/Plan        ASSESSMENT:   1.Pancreatic mass likely from pancreatic cancer with vascular encasement, liver involvement and omental involvement.  AJCC stage:IV  (cT4, cN0, cM1)  Prognosis: Poor.  Treatment status: Too ill for palliative systemic therapy due to poor performance status and ejection fraction 20%.  2.  Performance status of 4.  3.  A 15 mm liver lesion suspicious for metastatic disease.  4.  Stenosis of the portal vein and possibly tumor thrombus within the portal vein.  5.  Large volume ascites with induration and nodularity along the left anterior omentum and peritoneum likely from peritoneal carcinomatosis.  6.  Multiple comorbidities like coronary artery disease, congestive heart failure with EF of 20%, type 2 diabetes, and chronic atrial fibrillation.        PLAN:  1.   regarding the reason for the consult.  2.   regarding the CT scan of the abdomen pelvis findings.  Patient had pancreatic malignancy with extensive vascular involvement, liver involvement and omental  "involvement.  Her prognosis very poor.  3.   regarding supportive care/hospice.  Patient is not a candidate for systemic therapy due to her poor performance status and poor overall prognosis.  4.  Hold diagnostic and therapeutic paracentesis due to her poor overall prognosis.    5.  Patient and family amenable to hospice. \"I want to go home.\"  6.  Suggest CODE STATUS changed to no CPR/full measures.  7.  I will sign off.  No clinic appointment.          I discussed the patients findings and my recommendations with patient, sons and nurse Cazares.  They verbalized understanding.    Shay Castillo MD  12/18/23  13:06 CST        Thank you for allowing me to participate in the care of Ms. Anne-Marie Foreman    "

## 2023-12-18 NOTE — PLAN OF CARE
Goal Outcome Evaluation:  Plan of Care Reviewed With: patient        Progress: no change  Outcome Evaluation: No co pain, she co nausea, medication given . Ovalles replace, with little ouput, ovalles was flushed and unable to pull back. Dr. Duhon call and ordered ovalles to be dc. She pee 30ml and bladder scan >500. Dr Duhon call and ovalles to be replaced and CT ordered. cont to monitor.

## 2023-12-18 NOTE — PLAN OF CARE
Goal Outcome Evaluation: NTN follow-up. *Telephone Encounter/Remote Access Entry* Pt reported appetite is poor and nothing tastes good. Pt very sad on the phone and reported she has not been well enough to even snack on a protein bar. Pt and RD reviewed possible options for improving PO intake while inpatient, and pt agreeable to PB & crackers all meals. Pt likes vegetable soup; but, pt is on a 1L/day fluid restriction. Encouraged pt to verbalize preferences as able. Pt was eating well x 2 days and appears to have a turn in appetite since yesterday. Average PO 71% of seven meals, 473 mL oral fluid/day. Encouraged to snack as tolerated. Large BM today. Wt 190.5lb using bed scale; pt too weak to stand on scale. NTN following per protocol.

## 2023-12-18 NOTE — PLAN OF CARE
Goal Outcome Evaluation:   Patient agrees to sit EOB and exercise. Two sons are present. Patient requires Mod assist for supine to sit with HOB elevated. Patient actively works thru LE ex's. Patient is able to reach all directions but ,limited in movement. She was able to work thru trunk stabilization activities. She denies opportunity to stand or transfer to chair. Patient will continue to benefit from bed mobility and strengthening activities.

## 2023-12-19 LAB
ANION GAP SERPL CALCULATED.3IONS-SCNC: 12 MMOL/L (ref 5–15)
BASOPHILS # BLD AUTO: 0.05 10*3/MM3 (ref 0–0.2)
BASOPHILS NFR BLD AUTO: 0.7 % (ref 0–1.5)
BUN SERPL-MCNC: 12 MG/DL (ref 8–23)
BUN/CREAT SERPL: 32.4 (ref 7–25)
CALCIUM SPEC-SCNC: 8.3 MG/DL (ref 8.6–10.5)
CHLORIDE SERPL-SCNC: 93 MMOL/L (ref 98–107)
CO2 SERPL-SCNC: 21 MMOL/L (ref 22–29)
CREAT SERPL-MCNC: 0.37 MG/DL (ref 0.57–1)
DEPRECATED RDW RBC AUTO: 48.7 FL (ref 37–54)
EGFRCR SERPLBLD CKD-EPI 2021: 109.3 ML/MIN/1.73
EOSINOPHIL # BLD AUTO: 0.09 10*3/MM3 (ref 0–0.4)
EOSINOPHIL NFR BLD AUTO: 1.2 % (ref 0.3–6.2)
ERYTHROCYTE [DISTWIDTH] IN BLOOD BY AUTOMATED COUNT: 15.2 % (ref 12.3–15.4)
GLUCOSE BLDC GLUCOMTR-MCNC: 101 MG/DL (ref 70–130)
GLUCOSE BLDC GLUCOMTR-MCNC: 141 MG/DL (ref 70–130)
GLUCOSE BLDC GLUCOMTR-MCNC: 154 MG/DL (ref 70–130)
GLUCOSE BLDC GLUCOMTR-MCNC: 80 MG/DL (ref 70–130)
GLUCOSE BLDC GLUCOMTR-MCNC: 84 MG/DL (ref 70–130)
GLUCOSE BLDC GLUCOMTR-MCNC: 88 MG/DL (ref 70–130)
GLUCOSE SERPL-MCNC: 109 MG/DL (ref 65–99)
HCT VFR BLD AUTO: 38 % (ref 34–46.6)
HGB BLD-MCNC: 12.3 G/DL (ref 12–15.9)
IMM GRANULOCYTES # BLD AUTO: 0.01 10*3/MM3 (ref 0–0.05)
IMM GRANULOCYTES NFR BLD AUTO: 0.1 % (ref 0–0.5)
INR PPP: 2.7 (ref 0.91–1.09)
LYMPHOCYTES # BLD AUTO: 0.98 10*3/MM3 (ref 0.7–3.1)
LYMPHOCYTES NFR BLD AUTO: 13.1 % (ref 19.6–45.3)
MCH RBC QN AUTO: 28.4 PG (ref 26.6–33)
MCHC RBC AUTO-ENTMCNC: 32.4 G/DL (ref 31.5–35.7)
MCV RBC AUTO: 87.8 FL (ref 79–97)
MONOCYTES # BLD AUTO: 0.61 10*3/MM3 (ref 0.1–0.9)
MONOCYTES NFR BLD AUTO: 8.2 % (ref 5–12)
NEUTROPHILS NFR BLD AUTO: 5.72 10*3/MM3 (ref 1.7–7)
NEUTROPHILS NFR BLD AUTO: 76.7 % (ref 42.7–76)
NRBC BLD AUTO-RTO: 0 /100 WBC (ref 0–0.2)
PLATELET # BLD AUTO: 216 10*3/MM3 (ref 140–450)
PMV BLD AUTO: 9.4 FL (ref 6–12)
POTASSIUM SERPL-SCNC: 4.9 MMOL/L (ref 3.5–5.2)
PROTHROMBIN TIME: 29 SECONDS (ref 11.8–14.8)
RBC # BLD AUTO: 4.33 10*6/MM3 (ref 3.77–5.28)
SODIUM SERPL-SCNC: 126 MMOL/L (ref 136–145)
WBC NRBC COR # BLD AUTO: 7.46 10*3/MM3 (ref 3.4–10.8)

## 2023-12-19 PROCEDURE — 63710000001 INSULIN LISPRO (HUMAN) PER 5 UNITS: Performed by: INTERNAL MEDICINE

## 2023-12-19 PROCEDURE — 25010000002 ONDANSETRON PER 1 MG: Performed by: INTERNAL MEDICINE

## 2023-12-19 PROCEDURE — 85025 COMPLETE CBC W/AUTO DIFF WBC: CPT | Performed by: INTERNAL MEDICINE

## 2023-12-19 PROCEDURE — 97535 SELF CARE MNGMENT TRAINING: CPT

## 2023-12-19 PROCEDURE — 63710000001 INSULIN DETEMIR PER 5 UNITS: Performed by: INTERNAL MEDICINE

## 2023-12-19 PROCEDURE — 82948 REAGENT STRIP/BLOOD GLUCOSE: CPT

## 2023-12-19 PROCEDURE — 85610 PROTHROMBIN TIME: CPT | Performed by: INTERNAL MEDICINE

## 2023-12-19 PROCEDURE — 80048 BASIC METABOLIC PNL TOTAL CA: CPT | Performed by: INTERNAL MEDICINE

## 2023-12-19 RX ADMIN — ONDANSETRON 4 MG: 2 INJECTION INTRAMUSCULAR; INTRAVENOUS at 21:48

## 2023-12-19 RX ADMIN — SACUBITRIL AND VALSARTAN 1 TABLET: 24; 26 TABLET, FILM COATED ORAL at 21:51

## 2023-12-19 RX ADMIN — OXYCODONE HYDROCHLORIDE 10 MG: 10 TABLET, FILM COATED, EXTENDED RELEASE ORAL at 09:15

## 2023-12-19 RX ADMIN — Medication 10 ML: at 09:15

## 2023-12-19 RX ADMIN — INSULIN LISPRO 2 UNITS: 100 INJECTION, SOLUTION INTRAVENOUS; SUBCUTANEOUS at 17:34

## 2023-12-19 RX ADMIN — METFORMIN HCL 1000 MG: 500 TABLET ORAL at 17:34

## 2023-12-19 RX ADMIN — TAMSULOSIN HYDROCHLORIDE 0.4 MG: 0.4 CAPSULE ORAL at 09:15

## 2023-12-19 RX ADMIN — METFORMIN HCL 1000 MG: 500 TABLET ORAL at 09:15

## 2023-12-19 RX ADMIN — DIGOXIN 250 MCG: 250 TABLET ORAL at 12:16

## 2023-12-19 RX ADMIN — INSULIN DETEMIR 10 UNITS: 100 INJECTION, SOLUTION SUBCUTANEOUS at 09:15

## 2023-12-19 RX ADMIN — ATORVASTATIN CALCIUM 80 MG: 40 TABLET ORAL at 21:51

## 2023-12-19 RX ADMIN — FUROSEMIDE 40 MG: 40 TABLET ORAL at 09:15

## 2023-12-19 RX ADMIN — ONDANSETRON 4 MG: 2 INJECTION INTRAMUSCULAR; INTRAVENOUS at 14:54

## 2023-12-19 RX ADMIN — SACUBITRIL AND VALSARTAN 1 TABLET: 24; 26 TABLET, FILM COATED ORAL at 09:15

## 2023-12-19 RX ADMIN — GLIPIZIDE 10 MG: 10 TABLET ORAL at 09:15

## 2023-12-19 RX ADMIN — WARFARIN SODIUM 3 MG: 3 TABLET ORAL at 17:34

## 2023-12-19 RX ADMIN — LINAGLIPTIN 5 MG: 5 TABLET, FILM COATED ORAL at 09:15

## 2023-12-19 RX ADMIN — OXYCODONE HYDROCHLORIDE 10 MG: 10 TABLET, FILM COATED, EXTENDED RELEASE ORAL at 21:52

## 2023-12-19 RX ADMIN — LEVOTHYROXINE SODIUM 125 MCG: 125 TABLET ORAL at 05:17

## 2023-12-19 RX ADMIN — DOCUSATE SODIUM 50 MG AND SENNOSIDES 8.6 MG 2 TABLET: 8.6; 5 TABLET, FILM COATED ORAL at 21:52

## 2023-12-19 RX ADMIN — COLLAGENASE SANTYL 1 APPLICATION: 250 OINTMENT TOPICAL at 09:16

## 2023-12-19 RX ADMIN — Medication 10 ML: at 21:52

## 2023-12-19 NOTE — PROGRESS NOTES
[facility] Medicine Services  INPATIENT PROGRESS NOTE    Patient Name: Anne-Marie Foreman  Date of Admission: 12/14/2023  Today's Date: 12/19/23  Length of Stay: 5  Primary Care Physician: Anabell Sanchez APRN    Subjective     Patient feels improved today.  Her pain is better controlled.    Objective    Temp:  [97.4 °F (36.3 °C)-98.2 °F (36.8 °C)] 97.4 °F (36.3 °C)  Heart Rate:  [] 119  Resp:  [18-20] 20  BP: ()/(53-75) 119/66  Physical exam  General: No acute distress  HEENT: normocephalic, atraumatic  Neck: Supple  CV: RRR  Lung: Clear to auscultation bilaterally.  No wheeze, rales, or rhonchi noted.  Abdominal exam: Positive bowel sounds, tenderness in periumbilical region.  Old ventral hernia present.  Extremity: No edema noted  Neurological exam: AAO x3. Cranial nerve II to XII grossly intact  Skin exam: Dry and warm  Psychiatric exam: Calm, cooperative, and normal affect     Results Review:  I have reviewed the labs, radiology results, and diagnostic studies.    Laboratory Data:   Results from last 7 days   Lab Units 12/19/23  0341 12/18/23  0523 12/17/23  0342   WBC 10*3/mm3 7.46 6.66 7.61   HEMOGLOBIN g/dL 12.3 12.0 11.9*   HEMATOCRIT % 38.0 38.0 36.4   PLATELETS 10*3/mm3 216 213 221        Results from last 7 days   Lab Units 12/19/23  0341 12/18/23  1710 12/18/23  1249 12/18/23  0523 12/17/23  0953 12/17/23  0342 12/14/23  1120 12/14/23  0606   SODIUM mmol/L 126* 124* 128* 126*   < > 128*   < > 120*   POTASSIUM mmol/L 4.9 4.7 4.6 4.8   < > 4.8   < > 4.6   CHLORIDE mmol/L 93*  --   --  93*  --  92*   < > 84*   CO2 mmol/L 21.0*  --   --  23.0  --  26.0   < > 24.0   BUN mg/dL 12  --   --  9  --  9   < > 11   CREATININE mg/dL 0.37*  --   --  0.35*  --  0.43*   < > 0.36*   CALCIUM mg/dL 8.3*  --   --  8.3*  --  8.6   < > 9.2   BILIRUBIN mg/dL  --   --   --   --   --   --   --  0.8   ALK PHOS U/L  --   --   --   --   --   --   --  59   ALT (SGPT) U/L  --   --   --   --  "  --   --   --  22   AST (SGOT) U/L  --   --   --   --   --   --   --  25   GLUCOSE mg/dL 109*  --   --  93  --  187*   < > 227*    < > = values in this interval not displayed.       Culture Data:   No results found for: \"BLOODCX\", \"URINECX\", \"WOUNDCX\", \"MRSACX\", \"RESPCX\", \"STOOLCX\"    Radiology Data:   Imaging Results (Last 24 Hours)       ** No results found for the last 24 hours. **            I have reviewed the patient's current medications.     Assessment/Plan   Assessment  Active Hospital Problems    Diagnosis     **Hyponatremia     Severe malnutrition        Assessment and Plan:  Patient reports that she has not been feeling well for the past 6 months.  She was recently found to have hyponatremia and was started on salt tablets which were ineffective.  Lasix was held and she developed lower extremity edema.      Pancreatic mass and suspected cancer:  CT scan of the abdomen and pelvis showed a pancreatic mass highly concerning for cancer with metastatic lesion in the liver.  Oncology and nephrology were consulted. Patient was recommended for hospice by oncology due to poor performance status and metastatic cancer.     Patient and her family are preparing for hospice and will meet with hospice nurse to prepare for home hospice.  Will likely discharge in the next 24 hours once equipment is delivered to patient's house.    Hypervolemic Hyponatremia 2/2 fluid overload and other etiology:   Continue salt tabs for now if patient tolerates.  Continue Lasix     sCHF 2/2 ischemic vs nonischemic: EF 25-30% on last TTE, been refusing AICD for primary prevention of SCD for years  -hold home meds while diuresing  -strict I/Os q2h  -vitals q2h  -daily weights  -lasix as above    pAF, rate controlled:  -goal HR < 110  -hold coreg for now while diuresing, may switch to metoprolol for rate control if needed  -cont home digoxin, digoxin level pending  -resume home coumadin, coumadin level pending  -Keep K > 4, Mg > " 2  -telemetry monitoring     DM2: A1c  7.8  -goal glucose < 180 while inpatient  -cont home glipizide, linagliptin, metformin    Pressure wound:  -wound care following      Dispo: Plan to discharge home with hospice in the next 24 hours    Meenu Tubbs MD 12/19/23, 17:39 CST.    Treatment Plan      Medical Decision Making  Number and Complexity of problems:   Differential Diagnosis:     Conditions and Status             MDM Data  External documents reviewed:   Cardiac tracing (EKG, telemetry) interpretation:   Radiology interpretation:   Labs reviewed:   Any tests that were considered but not ordered:      Decision rules/scores evaluated (example [id number], [place], etc):      Discussed with:      Care Planning  Shared decision making:   Code status and discussions:     Disposition  Social Determinants of Health that impact treatment or disposition:   I expect the patient to be discharged to  in  days.     Electronically signed by Meenu Tubbs MD, 12/19/23, 17:39 CST.

## 2023-12-19 NOTE — THERAPY TREATMENT NOTE
GUANFACINE 1MG TAB      Last Written Prescription Date: 0915/17  Last Fill Quantity: 30,  # refills: 5   Last Office Visit with [facility], [facility] or [facility] Elzbieta Enrique NP   [facility] ([facility] )    [address]  [facility] [address]   [phone]                     Patient Name: Anne-Marie Foreman  : 1955    MRN: [id number]                              Today's Date: 2023       Admit Date: 2023    Visit Dx:     ICD-10-CM ICD-9-CM   1. Generalized weakness  R53.1 780.79   2. Hyponatremia  E87.1 276.1   3. Acute UTI (urinary tract infection)  N39.0 599.0   4. Hypomagnesemia  E83.42 275.2   5. Abnormal chest x-ray  R93.89 793.2   6. Impaired mobility [Z74.09]  Z74.09 799.89     Patient Active Problem List   Diagnosis    Weakness of both lower extremities    Chronic atrial fibrillation    Essential hypertension    Long term current use of anticoagulant therapy    Mild CAD    Mixed hyperlipidemia    Morbid obesity due to excess calories    Systolic congestive heart failure    Type 2 diabetes mellitus with microalbuminuria, without long-term current use of insulin    Acquired hypothyroidism    Vitamin B 12 deficiency    Intervertebral thoracic disc disorder with myelopathy, thoracic region    S/P discectomy    Current non-smoker    Hyponatremia    Severe malnutrition     Past Medical History:   Diagnosis Date    Asthma     CAD (coronary artery disease)     CHF (congestive heart failure)     Chronic atrial fibrillation     Chronic back pain     Chronic fatigue     Essential hypertension 2020    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Hyperlipidemia     Hypertension     Left sided lacunar infarction     Mixed hyperlipidemia 2017    Obesity, Class II, BMI 35-39.9     S/P discectomy 2020    Systolic heart failure     Type 2 diabetes mellitus     Type 2 diabetes mellitus with microalbuminuria, without long-term current use of insulin 2017    Vitamin B 12 deficiency 2020     Past Surgical History:   Procedure Laterality Date    CARDIAC CATHETERIZATION  10/21/2009    CARDIOVERSION  10/09/2013    CATARACT EXTRACTION, BILATERAL      CHOLECYSTECTOMY      LAPAROSCOPIC TUBAL LIGATION      LUMBAR DISCECTOMY N/A 2020    Procedure: Thoracic  DISCECTOMY, T10/11.   Costotransversectomy. Neuromonitoring;  Surgeon: Willy Diaz MD;  [facility]: [facility];  Service: Neurosurgery;  Laterality: N/A;    RETINAL LASER PROCEDURE        General Information       Row Name 12/19/23 1413          OT Time and Intention    Document Type therapy note (daily note)  -     Mode of Treatment occupational therapy  -       Row Name 12/19/23 1413          General Information    Existing Precautions/Restrictions fall  -       Row Name 12/19/23 1413          Safety Issues, Functional Mobility    Impairments Affecting Function (Mobility) balance;endurance/activity tolerance;strength;range of motion (ROM);pain  -               User Key  (r) = Recorded By, (t) = Taken By, (c) = Cosigned By      Initials Name Provider Type    LS Nancy Beltran OTR/L Occupational Therapist                     Mobility/ADL's       Row Name 12/19/23 1413          Bed Mobility    Supine-Sit [place] (Bed Mobility) verbal cues;maximum assist (25% patient effort)  -     Sit-Supine [place] (Bed Mobility) verbal cues;maximum assist (25% patient effort)  -     Assistive Device (Bed Mobility) bed rails;head of bed elevated  -       Row Name 12/19/23 1413          Transfers    Transfers sit-stand transfer  -       Row Name 12/19/23 1413          Bed-Chair Transfer    Bed-Chair [place] (Transfers) moderate assist (50% patient effort);maximum assist (25% patient effort);2 person assist  -     Assistive Device (Bed-Chair Transfers) walker, front-wheeled  -       Row Name 12/19/23 1413          Functional Mobility    Patient was able to Ambulate no, other medical factors prevent ambulation  -LS               User Key  (r) = Recorded By, (t) = Taken By, (c) = Cosigned By      Initials Name Provider Type    LS Nancy Beltran OTR/L Occupational Therapist                   Obj/Interventions    No documentation.                  Goals/Plan    No documentation.                  Clinical Impression        Row Name 12/19/23 1413          Pain Assessment    Pretreatment Pain Rating 0/10 - no pain  -LS     Posttreatment Pain Rating 0/10 - no pain  -LS       Row Name 12/19/23 1413          Plan of Care Review    Plan of Care Reviewed With patient;son  -LS     Progress no change  -LS     Outcome Evaluation OT tx completed. Pt in fowlers upon therapist arrival; no c/o pain; Pt's 2 sons also present. Pt performed supine<>sit utilizing bedrails with HOB elevated with Max A and verbal cues for body mechanics and sequencing. Pt performed sit<>stand on 2 attempts requiring Max A x2 on first attempt and Mod A x2 on second attempt; Attempted to educate Pt on pushing from bed to stand, however, Pt continued to pull on rwx during sit<>stands. Pt attempted to take side steps toward HOB however, Pt was unable to scoot or  either foot. Pt declined participation in further activity due to nausea. Pt continues to require skilled OT intervention in order to address remaining deficits in fxl mobility, fxl activity tolerance, balance, strength, and use of adaptive techniques/equipment during performance of BADLs. Recommend SNF at discharge.  -LS       Row Name 12/19/23 1413          Therapy Plan Review/Discharge Plan (OT)    Anticipated Discharge Disposition (OT) skilled nursing facility  -       Row Name 12/19/23 1413          Positioning and Restraints    Pre-Treatment Position in bed  -LS     Post Treatment Position bed  -LS     In Bed fowlers;side rails up x2;call light within reach;encouraged to call for assist;with family/caregiver  -               User Key  (r) = Recorded By, (t) = Taken By, (c) = Cosigned By      Initials Name Provider Type    LS Nancy Beltran OTR/L Occupational Therapist                   Outcome Measures       Row Name 12/19/23 1413          How much help from another is currently needed...    Putting on and taking off regular lower body clothing? 1  -LS     Bathing (including washing, rinsing,  and drying) 2  -LS     Toileting (which includes using toilet bed pan or urinal) 2  -LS     Putting on and taking off regular upper body clothing 3  -LS     Taking care of personal grooming (such as brushing teeth) 4  -LS     Eating meals 4  -LS     AM-PAC 6 Clicks Score (OT) 16  -LS       Row Name 12/19/23 1413          Functional Assessment    Outcome Measure Options AM-PAC 6 Clicks Daily Activity (OT)  -               User Key  (r) = Recorded By, (t) = Taken By, (c) = Cosigned By      Initials Name Provider Type    Nancy Persaud OTR/L Occupational Therapist                    Occupational Therapy Education       Title: PT OT SLP Therapies (In Progress)       Topic: Occupational Therapy (In Progress)       Point: ADL training (Done)       Description:   Instruct learner(s) on proper safety adaptation and remediation techniques during self care or transfers.   Instruct in proper use of assistive devices.                  Learning Progress Summary             Patient Acceptance, E, VU,NR by MELO at 12/19/2023 1454    Acceptance, E, VU by GERONIMO at 12/15/2023 1245    Acceptance, E, VU by GERONIMO at 12/14/2023 1559                         Point: Home exercise program (Not Started)       Description:   Instruct learner(s) on appropriate technique for monitoring, assisting and/or progressing therapeutic exercises/activities.                  Learner Progress:  Not documented in this visit.              Point: Precautions (Done)       Description:   Instruct learner(s) on prescribed precautions during self-care and functional transfers.                  Learning Progress Summary             Patient Acceptance, E, VU,NR by MELO at 12/19/2023 1454    Acceptance, E, VU by GERONIMO at 12/15/2023 1245    Acceptance, E, VU by GERONIMO at 12/14/2023 1559                         Point: Body mechanics (Done)       Description:   Instruct learner(s) on proper positioning and spine alignment during self-care, functional mobility activities and/or  exercises.                  Learning Progress Summary             Patient Acceptance, E, VU,NR by  at 12/19/2023 1454                                         User Key       Initials Effective Dates Name Provider Type Discipline     07/11/23 -  Shereen Thapa OTR/L, CSRS Occupational Therapist OT     06/20/22 -  Nancy Beltran OTR/L Occupational Therapist OT                  OT Recommendation and Plan     Plan of Care Review  Plan of Care Reviewed With: patient, son  Progress: no change  Outcome Evaluation: OT tx completed. Pt in fowlers upon therapist arrival; no c/o pain; Pt's 2 sons also present. Pt performed supine<>sit utilizing bedrails with HOB elevated with Max A and verbal cues for body mechanics and sequencing. Pt performed sit<>stand on 2 attempts requiring Max A x2 on first attempt and Mod A x2 on second attempt; Attempted to educate Pt on pushing from bed to stand, however, Pt continued to pull on rwx during sit<>stands. Pt attempted to take side steps toward HOB however, Pt was unable to scoot or  either foot. Pt declined participation in further activity due to nausea. Pt continues to require skilled OT intervention in order to address remaining deficits in fxl mobility, fxl activity tolerance, balance, strength, and use of adaptive techniques/equipment during performance of BADLs. Recommend SNF at discharge.     Time Calculation:         Time Calculation- OT       Row Name 12/19/23 1413             Time Calculation- OT    OT Start Time 1413  -      OT Stop Time 1439  -      OT Time Calculation (min) 26 min  -      Total Timed Code Minutes- OT 26 minute(s)  -      OT Received On 12/19/23  -                User Key  (r) = Recorded By, (t) = Taken By, (c) = Cosigned By      Initials Name Provider Type     Nancy Beltran OTR/L Occupational Therapist                  Therapy Charges for Today       Code Description Service Date Service Provider Modifiers Qty    [id number]   OT SELF CARE/MGMT/TRAIN EA 15 MIN 12/19/2023 Nancy Beltran, OTR/L GO 2                 Nancy Beltran OTR/L  12/19/2023

## 2023-12-19 NOTE — CASE MANAGEMENT/SOCIAL WORK
Continued Stay Note   [place]     Patient Name: Anne-Marie Foreman  MRN: [id number]  Today's Date: 12/19/2023    Admit Date: 12/14/2023    Plan: [facility]   Discharge Plan       Row Name 12/19/23 1358       Plan    Plan [facility]    Plan Comments Spoke to Dionne Vidal RN with [facility] re update on hospice arrangements. DME will be delivered to patient's home tomorrow, 12/20 and hospice staff will admit patient at her home on Thursday, 12/21. MD will need to prescribe oxycodone and zofran as patient is taking these medications scheduled. MSW notified Dr. Tubbs of update on plan and need for Rx. Patient's family wants to use [facility]. Will follow for DME to be delivered so that patient can discharge, tentatively tomorrow.    Final Discharge Disposition Code 50 - home with hospice               JOSSY Marc

## 2023-12-19 NOTE — PLAN OF CARE
Goal Outcome Evaluation:  Plan of Care Reviewed With: patient        Progress: no change  Outcome Evaluation: patient plans to discharge home with hospice tomorrow

## 2023-12-19 NOTE — CONSULTS
Nephrology ([facility]) Consult Note      Patient:  Anne-Marie Foreman  YOB: 1955  Date of Service: 12/18/2023  MRN: [id number]   Acct: [id number]   Primary Care Physician: Anabell Sanchez APRN  Advance Directive:   Code Status and Medical Interventions:   Ordered at: 12/14/23 7323     Level Of Support Discussed With:    Patient     Code Status (Patient has no pulse and is not breathing):    CPR (Attempt to Resuscitate)     Medical Interventions (Patient has pulse or is breathing):    Full Support     Admit Date: 12/14/2023       Hospital Day: 4  Referring Provider: No ref. provider found      Patient personally seen and examined.  Complete chart including Consults, Notes, Operative Reports, Labs, Cardiology, and Radiology studies reviewed as able.        Subjective:  Anne-Marie Foreman is a 68 y.o. female for whom we were consulted for evaluation and treatment of hyponatremia.  We were consulted on hospital day 4 for evaluation of hyponatremia.  This has improved from 120-127 at time of consult.  Patient was recently diagnosed with infiltrative pancreatic mass along with concern for peritoneal carcinomatosis.  This was evaluated by Dr. Castillo which was felt to be not a candidate for palliative therapy and recommended hospice.  Patient and the family are planning to go home with hospice after evaluation in the morning.  Other issues included a liver lesion and congestive heart failure and diabetes.  She was having trouble taking her salt tablets and was frustrated with the frequent lab draws.  Was on Aldactone for congestive heart failure therapy.    Allergies:  Aspartame and phenylalanine, Mushroom, Mushroom extract complex, Penicillins, Shellfish-derived products, Beef-derived products, Milk-related compounds, and Ezetimibe    Home Meds:  Medications Prior to Admission   Medication Sig Dispense Refill Last Dose    acetaminophen (TYLENOL) 500 MG tablet Take 1 tablet by mouth Every 6 (Six)  Hours As Needed for Mild Pain.   Past Week    aspirin 81 MG chewable tablet Chew 1 tablet Daily.   12/13/2023    atorvastatin (LIPITOR) 80 MG tablet Take 1 tablet by mouth Every Night.   12/13/2023    bisacodyl (Dulcolax) 10 MG suppository Insert 1 suppository into the rectum Daily As Needed for Constipation.   Past Month    calcium carbonate (TUMS) 500 MG chewable tablet Chew 1,000 mg 4 (Four) Times a Day As Needed for Indigestion or Heartburn.   12/13/2023    carvedilol (COREG) 12.5 MG tablet Take 1 tablet by mouth 2 (Two) Times a Day With Meals.   12/13/2023    cholecalciferol (VITAMIN D3) 25 MCG (1000 UT) tablet Take 1 tablet by mouth 2 (Two) Times a Day.   12/13/2023    collagenase 250 UNIT/GM ointment Apply 1 application  topically to the appropriate area as directed Daily. USE AS DIRECTED ONCE DAILY FOR 14 DAYS   12/13/2023    digoxin (LANOXIN) 125 MCG tablet Take 1 tablet by mouth Daily.   12/13/2023    fluticasone (FLONASE) 50 MCG/ACT nasal spray 2 sprays into the nostril(s) as directed by provider Daily As Needed for Rhinitis.   12/13/2023    furosemide (LASIX) 40 MG tablet Take 1 tablet by mouth Daily.   Patient Taking Differently    glipizide (GLUCOTROL) 10 MG tablet Take 1 tablet by mouth Daily.   12/13/2023    glipizide (GLUCOTROL) 10 MG tablet Take 1.5 tablets by mouth Every Evening.   12/13/2023    Glycerin-Hypromellose- (Dry Eye Relief Drops) 0.2-0.2-1 % solution ophthalmic solution Administer 1 drop to both eyes Every 1 (One) Hour As Needed for Dry Eyes.   Past Week    levothyroxine (SYNTHROID, LEVOTHROID) 125 MCG tablet Take 1 tablet by mouth Daily.   12/13/2023    linagliptin (TRADJENTA) 5 MG tablet tablet Take 1 tablet by mouth Daily.   12/13/2023    loratadine (CLARITIN) 10 MG tablet Take 1 tablet by mouth Daily.   12/13/2023    metFORMIN (GLUCOPHAGE) 1000 MG tablet Take 1 tablet by mouth 2 (Two) Times a Day With Meals.   12/13/2023    Multiple Vitamins-Minerals (MULTIVITAMIN WITH  MINERALS) tablet tablet Take 1 tablet by mouth Daily.   12/13/2023    oxymetazoline (AFRIN) 0.05 % nasal spray 2 sprays into the nostril(s) as directed by provider Daily.   12/13/2023    sacubitril-valsartan (ENTRESTO) 24-26 MG tablet Take 1 tablet by mouth 2 (Two) Times a Day.   12/13/2023    spironolactone (ALDACTONE) 25 MG tablet Take 1 tablet by mouth Daily.   12/13/2023    vitamin B-12 (CYANOCOBALAMIN) 500 MCG tablet Take 1 tablet by mouth Daily.   12/13/2023    warfarin (COUMADIN) 6 MG tablet Take 1 tablet by mouth 4 (Four) Times a Week. MONDAY WEDNESDAY FRIDAY SUNDAY 12/13/2023    warfarin (COUMADIN) 6 MG tablet Take 0.5 tablets by mouth 3 (Three) Times a Week. TUES THURS SAT   12/12/2023       Medicines:  Current Facility-Administered Medications   Medication Dose Route Frequency Provider Last Rate Last Admin    acetaminophen (TYLENOL) tablet 650 mg  650 mg Oral Q4H PRN Hiram Perea MD   650 mg at 12/17/23 2255    atorvastatin (LIPITOR) tablet 80 mg  80 mg Oral Nightly Hiram Perea MD   80 mg at 12/18/23 2040    sennosides-docusate (PERICOLACE) 8.6-50 MG per tablet 2 tablet  2 tablet Oral BID Hiram Perea MD   2 tablet at 12/18/23 2039    And    bisacodyl (DULCOLAX) EC tablet 5 mg  5 mg Oral Daily PRN Hiram Perea MD   5 mg at 12/15/23 1836    And    bisacodyl (DULCOLAX) suppository 10 mg  10 mg Rectal Daily PRN Hiram Perea MD        calcium carbonate (TUMS) chewable tablet 500 mg (200 mg elemental)  2 tablet Oral BID PRN Hiram Perea MD        collagenase ointment 1 application   1 application  Topical Q12H Jerilyn Sebastian APRN   1 application  at 12/18/23 0841    dextrose (D50W) (25 g/50 mL) IV injection 25 g  25 g Intravenous Q15 Min PRN Hiram Perea MD        dextrose (D5W) 5 % infusion 342 mL  6 mL/kg (Ideal) Intravenous Continuous PRN Jose Chavira MD   342 mL at 12/15/23 2153    dextrose (GLUTOSE) oral gel 15 g  15 g Oral Q15 Min PRN Hiram Perea MD         digoxin (LANOXIN) tablet 250 mcg  250 mcg Oral Daily Hiram Perea MD   250 mcg at 12/18/23 1233    furosemide (LASIX) tablet 40 mg  40 mg Oral Daily Hiram Perea MD   40 mg at 12/18/23 0840    glipizide (GLUCOTROL) tablet 10 mg  10 mg Oral QAM Hiram Perea MD   10 mg at 12/18/23 0841    glucagon (GLUCAGEN) injection 1 mg  1 mg Intramuscular Q15 Min PRN Hiram Perea MD        insulin detemir (LEVEMIR) injection 10 Units  10 Units Subcutaneous Q12H Hiram Perea MD   10 Units at 12/18/23 2041    Insulin Lispro (humaLOG) injection 2-7 Units  2-7 Units Subcutaneous 4x Daily AC & at Bedtime Hiram Perea MD   2 Units at 12/17/23 0854    levothyroxine (SYNTHROID, LEVOTHROID) tablet 125 mcg  125 mcg Oral Daily Hiram Perea MD   125 mcg at 12/18/23 0639    linagliptin (TRADJENTA) tablet 5 mg  5 mg Oral Daily Hiram Perea MD   5 mg at 12/18/23 0840    LORazepam (ATIVAN) tablet 0.5 mg  0.5 mg Oral Q8H PRN Hiram Perea MD   0.5 mg at 12/14/23 2129    melatonin tablet 5 mg  5 mg Oral Nightly PRN Hiram Perea MD   5 mg at 12/17/23 2245    metFORMIN (GLUCOPHAGE) tablet 1,000 mg  1,000 mg Oral BID With Meals Hiram Perea MD   1,000 mg at 12/18/23 1716    morphine injection 4 mg  4 mg Intravenous Q3H PRN Meenu Tubbs MD        nitroglycerin (NITROSTAT) SL tablet 0.4 mg  0.4 mg Sublingual Q5 Min PRN Hiram Perea MD        ondansetron (ZOFRAN) injection 4 mg  4 mg Intravenous Q6H PRN Hiram Perea MD   4 mg at 12/18/23 0857    oxyCODONE (oxyCONTIN) 12 hr tablet 10 mg  10 mg Oral Q12H Meenu Tubbs MD   10 mg at 12/18/23 2040    Pharmacy Consult - Pharmacy to dose   Does not apply Continuous PRN Hiram Perea MD        polyethylene glycol (MIRALAX) packet 17 g  17 g Oral Daily Hiram Perea MD   17 g at 12/18/23 0839    sacubitril-valsartan (ENTRESTO) 24-26 MG tablet 1 tablet  1 tablet Oral BID Hiram Perea MD   1 tablet at 12/18/23 2040     sodium chloride 0.9 % flush 10 mL  10 mL Intravenous PRN Helphenstine, Rickey S, DO        sodium chloride 0.9 % flush 10 mL  10 mL Intravenous Q12H Hiram Perea MD   10 mL at 12/18/23 2042    sodium chloride 0.9 % flush 10 mL  10 mL Intravenous PRN Hiram Perea MD        sodium chloride 0.9 % infusion 40 mL  40 mL Intravenous PRN Hiram Perea MD        sodium chloride tablet 4 g  4 g Oral Q6H Hiram Perea MD   4 g at 12/18/23 1715    tamsulosin (FLOMAX) 24 hr capsule 0.4 mg  0.4 mg Oral Daily Hiram Preea MD   0.4 mg at 12/18/23 0840    [START ON 12/19/2023] warfarin (COUMADIN) tablet 3 mg  3 mg Oral Once per day on Tue Thu Sat Hiram Perea MD        warfarin (COUMADIN) tablet 6 mg  6 mg Oral Once per day on Sun Mon Wed Fri Hiram Perea MD   6 mg at 12/18/23 1715       Past Medical History:  Past Medical History:   Diagnosis Date    Asthma     CAD (coronary artery disease)     CHF (congestive heart failure)     Chronic atrial fibrillation     Chronic back pain     Chronic fatigue     Essential hypertension 05/14/2020    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Hyperlipidemia     Hypertension     Left sided lacunar infarction     Mixed hyperlipidemia 09/26/2017    Obesity, Class II, BMI 35-39.9     S/P discectomy 06/04/2020    Systolic heart failure     Type 2 diabetes mellitus     Type 2 diabetes mellitus with microalbuminuria, without long-term current use of insulin 09/26/2017    Vitamin B 12 deficiency 05/17/2020       Past Surgical History:  Past Surgical History:   Procedure Laterality Date    CARDIAC CATHETERIZATION  10/21/2009    CARDIOVERSION  10/09/2013    CATARACT EXTRACTION, BILATERAL      CHOLECYSTECTOMY      LAPAROSCOPIC TUBAL LIGATION      LUMBAR DISCECTOMY N/A 5/19/2020    Procedure: Thoracic  DISCECTOMY, T10/11.  Costotransversectomy. Neuromonitoring;  Surgeon: Willy Diaz MD;  [facility]: [facility];  Service: Neurosurgery;  Laterality: N/A;    RETINAL LASER PROCEDURE          Family History  Family History   Problem Relation Age of Onset    Heart failure Mother     Hypertension Mother     Diabetes Mother     Liver cancer Father        Social History  Social History     Socioeconomic History    Marital status:    Tobacco Use    Smoking status: Never   Vaping Use    Vaping Use: Never used   Substance and Sexual Activity    Alcohol use: Never    Drug use: Never         Review of Systems:  History obtained from chart review and the patient  General ROS: No fever or chills  Respiratory ROS: No cough, shortness of breath, wheezing  Cardiovascular ROS: No chest pain or palpitations  Gastrointestinal ROS: No abdominal pain or melena  Genito-Urinary ROS: No dysuria or hematuria  Psych ROS: No anxiety and depression  14 point ROS reviewed with the patient and negative except as noted above and in the HPI unless unable to obtain.      Objective:  Patient Vitals for the past 24 hrs:   BP Temp Temp src Pulse Resp SpO2   12/18/23 2019 109/75 98 °F (36.7 °C) Oral 81 18 96 %   12/18/23 1500 102/67 97.5 °F (36.4 °C) Oral -- 16 95 %   12/18/23 1047 97/59 97.5 °F (36.4 °C) Oral 103 18 92 %   12/18/23 0700 103/61 97.6 °F (36.4 °C) Oral 98 18 95 %   12/18/23 0333 97/61 98.4 °F (36.9 °C) Oral 89 20 95 %   12/17/23 2318 108/76 98.2 °F (36.8 °C) Oral 71 18 97 %       Intake/Output Summary (Last 24 hours) at 12/18/2023 2156  Last data filed at 12/18/2023 1300  Gross per 24 hour   Intake 240 ml   Output 330 ml   Net -90 ml     General: awake/alert   Chest:  clear to auscultation bilaterally without respiratory distress  CVS: regular rate and rhythm  Abdominal: soft, nontender, positive bowel sounds  Extremities: ble edema  Skin: warm and dry without rash      Labs:  Results from last 7 days   Lab Units 12/18/23  0523 12/17/23  0342 12/16/23  0525   WBC 10*3/mm3 6.66 7.61 6.80   HEMOGLOBIN g/dL 12.0 11.9* 12.2   HEMATOCRIT % 38.0 36.4 36.7   PLATELETS 10*3/mm3 213 221 192         Results from last 7  days   Lab Units 12/18/23  1710 12/18/23  1249 12/18/23  0523 12/17/23  0953 12/17/23  0342 12/16/23 1926 12/16/23  1555 12/14/23  1120 12/14/23  0606   SODIUM mmol/L 124* 128* 126*   < > 128*   < > 124*   < > 120*   POTASSIUM mmol/L 4.7 4.6 4.8   < > 4.8   < > 4.5   < > 4.6   CHLORIDE mmol/L  --   --  93*  --  92*  --  90*   < > 84*   CO2 mmol/L  --   --  23.0  --  26.0  --  23.0   < > 24.0   BUN mg/dL  --   --  9  --  9  --  9   < > 11   CREATININE mg/dL  --   --  0.35*  --  0.43*  --  0.33*   < > 0.36*   CALCIUM mg/dL  --   --  8.3*  --  8.6  --  8.5*   < > 9.2   EGFR mL/min/1.73  --   --  110.8  --  105.4  --  112.4   < > 110.1   BILIRUBIN mg/dL  --   --   --   --   --   --   --   --  0.8   ALK PHOS U/L  --   --   --   --   --   --   --   --  59   ALT (SGPT) U/L  --   --   --   --   --   --   --   --  22   AST (SGOT) U/L  --   --   --   --   --   --   --   --  25   GLUCOSE mg/dL  --   --  93  --  187*  --  324*   < > 227*    < > = values in this interval not displayed.       Radiology:   Imaging Results (Last 72 Hours)       Procedure Component Value Units Date/Time    CT Abdomen Pelvis With Contrast [[id number]] Collected: 12/17/23 1905     Updated: 12/17/23 1922    Narrative:      EXAM/TECHNIQUE: CT abdomen pelvis with IV contrast     INDICATION: Hematuria, gross/macroscopic     COMPARISON: None     DLP: 668 mGy cm. Automated exposure control was also utilized to  decrease patient radiation dose.     FINDINGS:     Lung bases are clear. Four-chamber cardiomegaly. Mitral annular  calcifications are noted.     6.3 x 5.4 x 4.9 cm hypodense infiltrative large mass in the pancreatic  neck, body, and proximal tail. There is encasement of the celiac artery  (as well as common hepatic artery and splenic artery), SMA, and portal  vein. There is severe stenosis of the portal vein and potentially tumor  thrombus within the portal vein, with evaluation limited given exam is  predominantly arterial phase.     15 mm  hypodense liver lesion image 28 is incompletely characterized but  suspicious for metastatic disease. Prior cholecystectomy. No biliary  ductal dilatation. A with 19 mm low low-density lesion in the spleen is  incompletely characterized but likely a cyst. A myelolipoma is noted in  the right adrenal gland. There is also a small right adrenal gland  adenoma.     No solid renal mass. No urolithiasis or hydronephrosis. Urinary bladder  is decompressed by Dickson catheter.     No colonic wall thickening or pericolonic fat stranding. No small bowel  distention or evidence of active small bowel inflammation.     Large volume ascites. There is some induration and mild nodularity along  the left anterior omentum and peritoneum. No pelvic mass or organized  collection. Uterus and adnexa appear unremarkable.     Nonaneurysmal abdominal aorta with atherosclerotic calcification. Celiac  artery, SMA, and GAGE are widely patent. No enlarged retroperitoneal,  mesenteric, pelvic, or inguinal lymph nodes.     Diffuse body wall soft tissue edema is noted. Periumbilical fat and  fluid containing hernia. No aggressive osseous lesion.       Impression:         1.  6.3 x 5.4 x 4.9 cm infiltrative pancreatic mass involving the  pancreatic neck, body, and tail. Findings are in keeping with primary  pancreatic adenocarcinoma. There is extensive vascular encasement.     2.  Large volume ascites is present. There is also some induration of  the left anterior omentum/peritoneum. Differential diagnosis includes  malignant ascites and peritoneal carcinomatosis. Recommend correlation  with diagnostic paracentesis.     3.  15 mm hypodense liver lesion, concerning for metastatic disease,  although incompletely characterized on the single phase CT. If  clinically indicated, MRI abdomen could be performed for further  evaluation (after paracentesis as large volume ascites would degrade  image quality).     4.  Four-chamber cardiomegaly and diffuse  "edematous state. While large  volume ascites and inflammatory change in the peritoneum/mesentery is  concerning for metastatic disease, these findings could also simply be  related to global edematous state in the setting of CHF. As mentioned  above, diagnostic paracentesis may be helpful to differentiate.           This report was signed and finalized on 12/17/2023 7:19 PM by Dr. Buddy Carmona MD.               Culture:  No results found for: \"BLOODCX\", \"URINECX\", \"WOUNDCX\", \"MRSACX\", \"RESPCX\", \"STOOLCX\"      Assessment   Hyponatremia  Pancreatic mass/likely adenocarcinoma  Chronic systolic congestive heart failure  Paroxysmal atrial fibrillation  Diabetes type 2    Plan:  Discussed with patient, family, nursing  Workup reviewed today  Given plans for disposition for hospice in the next 12 hours, further workup of hyponatremia appears futile.  As patient's goals of care we are transitioning to comfort in the morning would suggest discontinuation of further labs given that sodium levels have already improved and no further improvement may be necessary prior to hospice disposition.    Offered sympathy to family and patient and happy to assist with any matters going forward however unless her goals of care change there is no role for further therapy.  If they wish to continue salt tablets at discharge that would be reasonable however given the patient's intolerance to it I would not be upset if she did not wish to continue those.  Would recommend discontinuation for further labs and we will sign off at this point thank you.  These communications communicated directly to her bedside nurse      Thank you for the consult, we appreciate the opportunity to provide care to your patients.  Feel free to contact me if I can be of any further assistance.      Kenrick Bond MD  12/18/2023  21:56 CST    "

## 2023-12-19 NOTE — THERAPY DISCHARGE NOTE
Acute Care - Physical Therapy Discharge Summary  Psychiatric       Patient Name: Anne-Marie Foreman  : 1955  MRN: [id number]    Today's Date: 2023                 Admit Date: 2023      PT Recommendation and Plan    Visit Dx:    ICD-10-CM ICD-9-CM   1. Generalized weakness  R53.1 780.79   2. Hyponatremia  E87.1 276.1   3. Acute UTI (urinary tract infection)  N39.0 599.0   4. Hypomagnesemia  E83.42 275.2   5. Abnormal chest x-ray  R93.89 793.2   6. Impaired mobility [Z74.09]  Z74.09 799.89        Outcome Measures       Row Name 23 1200             How much help from another person do you currently need...    Turning from your back to your side while in flat bed without using bedrails? 3  -DEENA      Moving from lying on back to sitting on the side of a flat bed without bedrails? 2  -DEENA      Moving to and from a bed to a chair (including a wheelchair)? 2  -DEENA      Standing up from a chair using your arms (e.g., wheelchair, bedside chair)? 2  -DEENA      Climbing 3-5 steps with a railing? 1  -DEENA      To walk in hospital room? 1  -DEENA      AM-PAC 6 Clicks Score (PT) 11  -DEENA      Highest Level of Mobility Goal 4 --> Transfer to chair/commode  -DEENA                User Key  (r) = Recorded By, (t) = Taken By, (c) = Cosigned By      Initials Name Provider Type    Wendy Chairez, PTA Physical Therapist Assistant                         PT Rehab Goals       Row Name 23 1435             Bed Mobility Goal 1 (PT)    Activity/Assistive Device (Bed Mobility Goal 1, PT) bed mobility activities, all  -MF      [place] Level/Cues Needed (Bed Mobility Goal 1, PT) standby assist  -MF      Time Frame (Bed Mobility Goal 1, PT) 10 days  -MF      Progress/Outcomes (Bed Mobility Goal 1, PT) goal not met  -MF         Transfer Goal 1 (PT)    Activity/Assistive Device (Transfer Goal 1, PT) sit-to-stand/stand-to-sit  -MF      [place] Level/Cues Needed (Transfer Goal 1, PT) standby assist  -MF      Time Frame  (Transfer Goal 1, PT) 10 days  -MF      Progress/Outcome (Transfer Goal 1, PT) goal not met  -MF         Gait Training Goal 1 (PT)    Activity/Assistive Device (Gait Training Goal 1, PT) gait (walking locomotion);assistive device use  -MF      [place] Level (Gait Training Goal 1, PT) contact guard required  -MF      Distance (Gait Training Goal 1, PT) 10ft  -MF      Time Frame (Gait Training Goal 1, PT) 10 days  -MF      Progress/Outcome (Gait Training Goal 1, PT) goal not met  -MF                User Key  (r) = Recorded By, (t) = Taken By, (c) = Cosigned By      Initials Name Provider Type Discipline    Giselle Lucia, PTA Physical Therapist Assistant PT                        PT Discharge Summary  Anticipated Discharge Disposition (PT): other (see comments) (home hospice)  Reason for Discharge: Change in medical status (pt being discharged home tomorrow with hospice)  Outcomes Achieved: Unable to make functional progress toward goals at this time  Discharge Destination: other (comment) (home with hospice)      Giselle Coleman PTA   12/19/2023

## 2023-12-19 NOTE — PLAN OF CARE
Problem: Adult Inpatient Plan of Care  Goal: Plan of Care Review  Outcome: Ongoing, Progressing  Flowsheets (Taken 12/19/2023 0435)  Progress: declining  Plan of Care Reviewed With: patient  Outcome Evaluation: A/O, VSS, pt c/o pain, scheduled pain med given with relief. Hospice to see pt today. Pt turned as gypsy q2h. Pt appeared to rest well overnight. Safety maintained.

## 2023-12-19 NOTE — PLAN OF CARE
Goal Outcome Evaluation:  Plan of Care Reviewed With: patient, son        Progress: no change  Outcome Evaluation: OT tx completed. Pt in fowlers upon therapist arrival; no c/o pain; Pt's 2 sons also present. Pt performed supine<>sit utilizing bedrails with HOB elevated with Max A and verbal cues for body mechanics and sequencing. Pt performed sit<>stand on 2 attempts requiring Max A x2 on first attempt and Mod A x2 on second attempt; Attempted to educate Pt on pushing from bed to stand, however, Pt continued to pull on rwx during sit<>stands. Pt attempted to take side steps toward HOB however, Pt was unable to scoot or  either foot. Pt declined participation in further activity due to nausea. Pt continues to require skilled OT intervention in order to address remaining deficits in fxl mobility, fxl activity tolerance, balance, strength, and use of adaptive techniques/equipment during performance of BADLs. Recommend SNF at discharge.      Anticipated Discharge Disposition (OT): skilled nursing facility

## 2023-12-19 NOTE — PLAN OF CARE
Goal Outcome Evaluation:  Plan of Care Reviewed With: patient        Progress: declining  Outcome Evaluation: Results discussed with patient. Hopsice to see patient tomorrow.  Wound care done. Turned Q2H

## 2023-12-19 NOTE — PROGRESS NOTES
[facility]  INPATIENT PROGRESS NOTE    Patient Name: Anne-Marie Foreman  Date of Admission: 12/14/2023  Today's Date: 12/18/23  Length of Stay: 4  Primary Care Physician: Anabell Sanchez APRN    Subjective     Patient continues to have nausea and abdominal pain.    Objective    Temp:  [97.5 °F (36.4 °C)-98.4 °F (36.9 °C)] 97.5 °F (36.4 °C)  Heart Rate:  [] 103  Resp:  [16-20] 16  BP: ()/(59-92) 102/67  Physical exam  General: No acute distress  HEENT: normocephalic, atraumatic  Neck: Supple  CV: RRR  Lung: Clear to auscultation bilaterally.  No wheeze, rales, or rhonchi noted.  Abdominal exam: Positive bowel sounds, tenderness in periumbilical region.  Old ventral hernia present.  Extremity: No edema noted  Neurological exam: AAO x3. Cranial nerve II to XII grossly intact  Skin exam: Dry and warm  Psychiatric exam: Calm, cooperative, and normal affect     Results Review:  I have reviewed the labs, radiology results, and diagnostic studies.    Laboratory Data:   Results from last 7 days   Lab Units 12/18/23  0523 12/17/23  0342 12/16/23  0525   WBC 10*3/mm3 6.66 7.61 6.80   HEMOGLOBIN g/dL 12.0 11.9* 12.2   HEMATOCRIT % 38.0 36.4 36.7   PLATELETS 10*3/mm3 213 221 192        Results from last 7 days   Lab Units 12/18/23  1710 12/18/23  1249 12/18/23  0523 12/17/23  0953 12/17/23  0342 12/16/23  1926 12/16/23  1555 12/14/23  1120 12/14/23  0606   SODIUM mmol/L 124* 128* 126*   < > 128*   < > 124*   < > 120*   POTASSIUM mmol/L 4.7 4.6 4.8   < > 4.8   < > 4.5   < > 4.6   CHLORIDE mmol/L  --   --  93*  --  92*  --  90*   < > 84*   CO2 mmol/L  --   --  23.0  --  26.0  --  23.0   < > 24.0   BUN mg/dL  --   --  9  --  9  --  9   < > 11   CREATININE mg/dL  --   --  0.35*  --  0.43*  --  0.33*   < > 0.36*   CALCIUM mg/dL  --   --  8.3*  --  8.6  --  8.5*   < > 9.2   BILIRUBIN mg/dL  --   --   --   --   --   --   --   --  0.8   ALK PHOS U/L  --   --   --   --   --    "--   --   --  59   ALT (SGPT) U/L  --   --   --   --   --   --   --   --  22   AST (SGOT) U/L  --   --   --   --   --   --   --   --  25   GLUCOSE mg/dL  --   --  93  --  187*  --  324*   < > 227*    < > = values in this interval not displayed.       Culture Data:   No results found for: \"BLOODCX\", \"URINECX\", \"WOUNDCX\", \"MRSACX\", \"RESPCX\", \"STOOLCX\"    Radiology Data:   Imaging Results (Last 24 Hours)       Procedure Component Value Units Date/Time    CT Abdomen Pelvis With Contrast [[id number]] Collected: 12/17/23 1905     Updated: 12/17/23 1922    Narrative:      EXAM/TECHNIQUE: CT abdomen pelvis with IV contrast     INDICATION: Hematuria, gross/macroscopic     COMPARISON: None     DLP: 668 mGy cm. Automated exposure control was also utilized to  decrease patient radiation dose.     FINDINGS:     Lung bases are clear. Four-chamber cardiomegaly. Mitral annular  calcifications are noted.     6.3 x 5.4 x 4.9 cm hypodense infiltrative large mass in the pancreatic  neck, body, and proximal tail. There is encasement of the celiac artery  (as well as common hepatic artery and splenic artery), SMA, and portal  vein. There is severe stenosis of the portal vein and potentially tumor  thrombus within the portal vein, with evaluation limited given exam is  predominantly arterial phase.     15 mm hypodense liver lesion image 28 is incompletely characterized but  suspicious for metastatic disease. Prior cholecystectomy. No biliary  ductal dilatation. A with 19 mm low low-density lesion in the spleen is  incompletely characterized but likely a cyst. A myelolipoma is noted in  the right adrenal gland. There is also a small right adrenal gland  adenoma.     No solid renal mass. No urolithiasis or hydronephrosis. Urinary bladder  is decompressed by Dickson catheter.     No colonic wall thickening or pericolonic fat stranding. No small bowel  distention or evidence of active small bowel inflammation.     Large volume ascites. There is " some induration and mild nodularity along  the left anterior omentum and peritoneum. No pelvic mass or organized  collection. Uterus and adnexa appear unremarkable.     Nonaneurysmal abdominal aorta with atherosclerotic calcification. Celiac  artery, SMA, and GAGE are widely patent. No enlarged retroperitoneal,  mesenteric, pelvic, or inguinal lymph nodes.     Diffuse body wall soft tissue edema is noted. Periumbilical fat and  fluid containing hernia. No aggressive osseous lesion.       Impression:         1.  6.3 x 5.4 x 4.9 cm infiltrative pancreatic mass involving the  pancreatic neck, body, and tail. Findings are in keeping with primary  pancreatic adenocarcinoma. There is extensive vascular encasement.     2.  Large volume ascites is present. There is also some induration of  the left anterior omentum/peritoneum. Differential diagnosis includes  malignant ascites and peritoneal carcinomatosis. Recommend correlation  with diagnostic paracentesis.     3.  15 mm hypodense liver lesion, concerning for metastatic disease,  although incompletely characterized on the single phase CT. If  clinically indicated, MRI abdomen could be performed for further  evaluation (after paracentesis as large volume ascites would degrade  image quality).     4.  Four-chamber cardiomegaly and diffuse edematous state. While large  volume ascites and inflammatory change in the peritoneum/mesentery is  concerning for metastatic disease, these findings could also simply be  related to global edematous state in the setting of CHF. As mentioned  above, diagnostic paracentesis may be helpful to differentiate.           This report was signed and finalized on 12/17/2023 7:19 PM by Dr. Buddy Carmona MD.               I have reviewed the patient's current medications.     Assessment/Plan   Assessment  Active Hospital Problems    Diagnosis     **Hyponatremia     Severe malnutrition        Assessment and Plan:  Patient reports that she has not  been feeling well for the past 6 months.  She was recently found to have hyponatremia and was started on salt tablets which were ineffective.  Lasix was held and she developed lower extremity edema.     Pancreatic mass and suspected cancer:  CT scan of the abdomen and pelvis showed a pancreatic mass highly concerning for cancer with metastatic lesion in the liver.  Oncology and nephrology were consulted.  Patient was recommended for hospice by oncology due to poor performance status and metastatic cancer.  I did have a goals of care discussion with the patient and she is agreeable to being discharged home with hospice in the morning as she wants to go home.  She however is not ready to change her CODE STATUS and will remember to remain a full code.  However she does not want any blood draws or sticks.    Hypervolemic Hyponatremia 2/2 fluid overload and other etiology:   Continue salt tabs for now.  Continue Lasix     sCHF 2/2 ischemic vs nonischemic: EF 25-30% on last TTE, been refusing AICD for primary prevention of SCD for years  -hold home meds while diuresing  -strict I/Os q2h  -vitals q2h  -daily weights  -lasix as above    pAF, rate controlled:  -goal HR < 110  -hold coreg for now while diuresing, may switch to metoprolol for rate control if needed  -cont home digoxin, digoxin level pending  -resume home coumadin, coumadin level pending  -Keep K > 4, Mg > 2  -telemetry monitoring     DM2: A1c  7.8  -goal glucose < 180 while inpatient  -cont home glipizide, linagliptin, metformin    Pressure wound:  -wound care following      Dispo: Plan to discharge home with hospice in the next 24 hours    Meenu Tubbs MD 12/18/23, 18:43 CST.        Treatment Plan      Medical Decision Making  Number and Complexity of problems:   Differential Diagnosis:     Conditions and Status             MDM Data  External documents reviewed:   Cardiac tracing (EKG, telemetry) interpretation:   Radiology interpretation:   Labs  reviewed:   Any tests that were considered but not ordered:      Decision rules/scores evaluated (example [id number], Wells, etc):      Discussed with:      Care Planning  Shared decision making:   Code status and discussions:     Disposition  Social Determinants of Health that impact treatment or disposition:   I expect the patient to be discharged to  in  days.     Electronically signed by Meenu Tubbs MD, 12/18/23, 18:43 CST.

## 2023-12-19 NOTE — CASE MANAGEMENT/SOCIAL WORK
Continued Stay Note   [place]     Patient Name: Anne-Marie Foreman  MRN: [id number]  Today's Date: 12/19/2023    Admit Date: 12/14/2023    Plan: Referral to [facility]   Discharge Plan       Row Name 12/19/23 0911       Plan    Plan Referral to [facility]    Plan Comments Received hospice consult. JOSSY notified Dionne Vidal RN with [facility] of new referral and provided referral packet to Dionne on site. Will follow for outcome of hospice meeting.             JOSSY Marc

## 2023-12-20 VITALS
OXYGEN SATURATION: 95 % | WEIGHT: 190 LBS | DIASTOLIC BLOOD PRESSURE: 64 MMHG | SYSTOLIC BLOOD PRESSURE: 118 MMHG | RESPIRATION RATE: 18 BRPM | HEIGHT: 65 IN | TEMPERATURE: 97.4 F | BODY MASS INDEX: 31.65 KG/M2 | HEART RATE: 80 BPM

## 2023-12-20 PROBLEM — K86.89 PANCREATIC MASS: Status: ACTIVE | Noted: 2023-12-20

## 2023-12-20 LAB
GLUCOSE BLDC GLUCOMTR-MCNC: 133 MG/DL (ref 70–130)
GLUCOSE BLDC GLUCOMTR-MCNC: 92 MG/DL (ref 70–130)
INR PPP: 3.76 (ref 0.91–1.09)
PROTHROMBIN TIME: 37.8 SECONDS (ref 11.8–14.8)

## 2023-12-20 PROCEDURE — 82948 REAGENT STRIP/BLOOD GLUCOSE: CPT

## 2023-12-20 PROCEDURE — 85610 PROTHROMBIN TIME: CPT | Performed by: INTERNAL MEDICINE

## 2023-12-20 PROCEDURE — 25010000002 ONDANSETRON PER 1 MG: Performed by: INTERNAL MEDICINE

## 2023-12-20 RX ORDER — TAMSULOSIN HYDROCHLORIDE 0.4 MG/1
0.4 CAPSULE ORAL DAILY
Qty: 30 CAPSULE | Refills: 0 | Status: SHIPPED | OUTPATIENT
Start: 2023-12-21

## 2023-12-20 RX ORDER — AMOXICILLIN 250 MG
2 CAPSULE ORAL NIGHTLY
Qty: 60 TABLET | Refills: 0 | Status: SHIPPED | OUTPATIENT
Start: 2023-12-20

## 2023-12-20 RX ORDER — ONDANSETRON 4 MG/1
4 TABLET, FILM COATED ORAL EVERY 8 HOURS PRN
Qty: 21 TABLET | Refills: 0 | Status: SHIPPED | OUTPATIENT
Start: 2023-12-20

## 2023-12-20 RX ORDER — OXYCODONE HCL 10 MG/1
10 TABLET, FILM COATED, EXTENDED RELEASE ORAL EVERY 12 HOURS SCHEDULED
Qty: 6 TABLET | Refills: 0 | Status: SHIPPED | OUTPATIENT
Start: 2023-12-20 | End: 2023-12-20 | Stop reason: SDUPTHER

## 2023-12-20 RX ORDER — NALOXONE HYDROCHLORIDE 4 MG/.1ML
SPRAY NASAL
Qty: 2 EACH | Refills: 0 | Status: SHIPPED | OUTPATIENT
Start: 2023-12-20

## 2023-12-20 RX ORDER — OXYCODONE HYDROCHLORIDE 5 MG/1
5 TABLET ORAL EVERY 6 HOURS PRN
Qty: 12 TABLET | Refills: 0 | Status: SHIPPED | OUTPATIENT
Start: 2023-12-20

## 2023-12-20 RX ORDER — LORAZEPAM 1 MG/1
1 TABLET ORAL EVERY 8 HOURS PRN
Qty: 9 TABLET | Refills: 0 | Status: SHIPPED | OUTPATIENT
Start: 2023-12-20

## 2023-12-20 RX ORDER — OXYCODONE HCL 10 MG/1
10 TABLET, FILM COATED, EXTENDED RELEASE ORAL EVERY 12 HOURS SCHEDULED
Qty: 6 TABLET | Refills: 0 | Status: SHIPPED | OUTPATIENT
Start: 2023-12-20 | End: 2023-12-23

## 2023-12-20 RX ADMIN — ONDANSETRON 4 MG: 2 INJECTION INTRAMUSCULAR; INTRAVENOUS at 10:30

## 2023-12-20 RX ADMIN — LINAGLIPTIN 5 MG: 5 TABLET, FILM COATED ORAL at 09:24

## 2023-12-20 RX ADMIN — TAMSULOSIN HYDROCHLORIDE 0.4 MG: 0.4 CAPSULE ORAL at 09:23

## 2023-12-20 RX ADMIN — METFORMIN HCL 1000 MG: 500 TABLET ORAL at 09:23

## 2023-12-20 RX ADMIN — COLLAGENASE SANTYL 1 APPLICATION: 250 OINTMENT TOPICAL at 09:30

## 2023-12-20 RX ADMIN — GLIPIZIDE 10 MG: 10 TABLET ORAL at 09:24

## 2023-12-20 RX ADMIN — LEVOTHYROXINE SODIUM 125 MCG: 125 TABLET ORAL at 05:29

## 2023-12-20 RX ADMIN — DIGOXIN 250 MCG: 250 TABLET ORAL at 11:58

## 2023-12-20 RX ADMIN — POLYETHYLENE GLYCOL 3350 17 G: 17 POWDER, FOR SOLUTION ORAL at 09:29

## 2023-12-20 RX ADMIN — FUROSEMIDE 40 MG: 40 TABLET ORAL at 09:30

## 2023-12-20 RX ADMIN — Medication 10 ML: at 09:29

## 2023-12-20 RX ADMIN — SACUBITRIL AND VALSARTAN 1 TABLET: 24; 26 TABLET, FILM COATED ORAL at 10:35

## 2023-12-20 RX ADMIN — DOCUSATE SODIUM 50 MG AND SENNOSIDES 8.6 MG 2 TABLET: 8.6; 5 TABLET, FILM COATED ORAL at 09:24

## 2023-12-20 NOTE — DISCHARGE SUMMARY
[facility]  DISCHARGE SUMMARY       Date of Admission: 12/14/2023  Date of Discharge:  12/20/2023  Primary Care Physician: Anabell Sanchez APRN    Presenting Problem/History of Present Illness: Nausea, abdominal pain    Final Discharge Diagnoses:  Active Hospital Problems    Diagnosis     **Hyponatremia     Pancreatic mass     Severe malnutrition        Consults: Oncology, nephrology    Procedures Performed: None    Pertinent Test Results:       Imaging Results (All)       Procedure Component Value Units Date/Time    CT Abdomen Pelvis With Contrast [[id number]] Collected: 12/17/23 1905     Updated: 12/17/23 1922    Narrative:      EXAM/TECHNIQUE: CT abdomen pelvis with IV contrast     INDICATION: Hematuria, gross/macroscopic     COMPARISON: None     DLP: 668 mGy cm. Automated exposure control was also utilized to  decrease patient radiation dose.     FINDINGS:     Lung bases are clear. Four-chamber cardiomegaly. Mitral annular  calcifications are noted.     6.3 x 5.4 x 4.9 cm hypodense infiltrative large mass in the pancreatic  neck, body, and proximal tail. There is encasement of the celiac artery  (as well as common hepatic artery and splenic artery), SMA, and portal  vein. There is severe stenosis of the portal vein and potentially tumor  thrombus within the portal vein, with evaluation limited given exam is  predominantly arterial phase.     15 mm hypodense liver lesion image 28 is incompletely characterized but  suspicious for metastatic disease. Prior cholecystectomy. No biliary  ductal dilatation. A with 19 mm low low-density lesion in the spleen is  incompletely characterized but likely a cyst. A myelolipoma is noted in  the right adrenal gland. There is also a small right adrenal gland  adenoma.     No solid renal mass. No urolithiasis or hydronephrosis. Urinary bladder  is decompressed by Dickson catheter.     No colonic wall thickening or pericolonic  fat stranding. No small bowel  distention or evidence of active small bowel inflammation.     Large volume ascites. There is some induration and mild nodularity along  the left anterior omentum and peritoneum. No pelvic mass or organized  collection. Uterus and adnexa appear unremarkable.     Nonaneurysmal abdominal aorta with atherosclerotic calcification. Celiac  artery, SMA, and GAGE are widely patent. No enlarged retroperitoneal,  mesenteric, pelvic, or inguinal lymph nodes.     Diffuse body wall soft tissue edema is noted. Periumbilical fat and  fluid containing hernia. No aggressive osseous lesion.       Impression:         1.  6.3 x 5.4 x 4.9 cm infiltrative pancreatic mass involving the  pancreatic neck, body, and tail. Findings are in keeping with primary  pancreatic adenocarcinoma. There is extensive vascular encasement.     2.  Large volume ascites is present. There is also some induration of  the left anterior omentum/peritoneum. Differential diagnosis includes  malignant ascites and peritoneal carcinomatosis. Recommend correlation  with diagnostic paracentesis.     3.  15 mm hypodense liver lesion, concerning for metastatic disease,  although incompletely characterized on the single phase CT. If  clinically indicated, MRI abdomen could be performed for further  evaluation (after paracentesis as large volume ascites would degrade  image quality).     4.  Four-chamber cardiomegaly and diffuse edematous state. While large  volume ascites and inflammatory change in the peritoneum/mesentery is  concerning for metastatic disease, these findings could also simply be  related to global edematous state in the setting of CHF. As mentioned  above, diagnostic paracentesis may be helpful to differentiate.           This report was signed and finalized on 12/17/2023 7:19 PM by Dr. Buddy Carmona MD.       XR Chest 1 View [[id number]] Collected: 12/14/23 0626     Updated: 12/14/23 0631    Narrative:      EXAMINATION:  XR CHEST 1 VW-     12/14/2023 5:12 AM     HISTORY: generalized weakness/fatigue     A frontal projection of the chest is compared with the previous study  dated 9/1/2016.     The lungs are poorly expanded, worse on the right side.     There is moderate elevation of right diaphragm which is more pronounced  since the previous study is probably due to poor lung expansion.     The left lung base and left lateral diaphragm is not optimally  visualized. Possibility of left lower lobar consolidation and pleural  effusion may not be excluded.     There is no pulmonary congestion or pneumothorax.     There is persistent moderate cardiomegaly.     There is no acute bony abnormality.       Impression:      1. Complete obliteration of the lateral left diaphragm and left lung  base which may be artifactual or due to left lower lobar consolidation  or pleural effusion. This may be further evaluated by a follow-up chest  radiograph in PA projection. The remaining lungs are unremarkable.     This report was signed and finalized on 12/14/2023 6:28 AM by Dr. Omar Ayala MD.             LAB RESULTS:      Lab 12/20/23  0342 12/19/23  0341 12/18/23  0523 12/17/23  0342 12/16/23  1555 12/16/23  0525 12/15/23  1214 12/15/23  0456   WBC  --  7.46 6.66 7.61  --  6.80  --  6.35   HEMOGLOBIN  --  12.3 12.0 11.9*  --  12.2  --  11.9*   HEMATOCRIT  --  38.0 38.0 36.4  --  36.7  --  35.6   PLATELETS  --  216 213 221  --  192  --  184   NEUTROS ABS  --  5.72 4.65 5.44  --  4.97  --  4.24   IMMATURE GRANS (ABS)  --  0.01 0.02 0.01  --  0.02  --  0.02   LYMPHS ABS  --  0.98 1.25 1.20  --  0.94  --  1.21   MONOS ABS  --  0.61 0.61 0.85  --  0.77  --  0.73   EOS ABS  --  0.09 0.07 0.06  --  0.05  --  0.09   MCV  --  87.8 88.6 87.7  --  85.3  --  85.0   PROTIME 37.8* 29.0* 24.4* 22.0* 20.5*  --    < >  --     < > = values in this interval not displayed.         Lab 12/19/23  0341 12/18/23  1710 12/18/23  1249 12/18/23  0523 12/17/23  7006  12/17/23  0953 12/17/23  0342 12/16/23  1926 12/16/23  1555 12/16/23  0754 12/16/23  0525 12/15/23  0838 12/15/23  0456 12/14/23  1120 12/14/23  0606   SODIUM 126* 124* 128* 126* 127*   < > 128*   < > 124*   < > 125*  125*   < > 124*  124*   < > 120*   POTASSIUM 4.9 4.7 4.6 4.8 4.6   < > 4.8   < > 4.5   < > 4.0  4.0   < > 4.0  4.0   < > 4.6   CHLORIDE 93*  --   --  93*  --   --  92*  --  90*  --  90*   < > 92*  --  84*   CO2 21.0*  --   --  23.0  --   --  26.0  --  23.0  --  24.0   < > 25.0  --  24.0   ANION GAP 12.0  --   --  10.0  --   --  10.0  --  11.0  --  11.0   < > 7.0  --  12.0   BUN 12  --   --  9  --   --  9  --  9  --  10   < > 10  --  11   CREATININE 0.37*  --   --  0.35*  --   --  0.43*  --  0.33*  --  0.31*   < > 0.26*  --  0.36*   EGFR 109.3  --   --  110.8  --   --  105.4  --  112.4  --  114.1   < > 119.0  --  110.1   GLUCOSE 109*  --   --  93  --   --  187*  --  324*  --  276*   < > 194*  --  227*   CALCIUM 8.3*  --   --  8.3*  --   --  8.6  --  8.5*  --  8.4*   < > 8.6  --  9.2   MAGNESIUM  --   --   --   --   --   --   --   --   --   --   --   --  1.8  --  1.5*   PHOSPHORUS  --   --   --   --   --   --   --   --   --   --   --   --  2.2*  --   --    HEMOGLOBIN A1C  --   --   --   --   --   --   --   --   --   --  7.80*  --   --   --   --    TSH  --   --   --   --   --   --   --   --   --   --   --   --   --   --  5.900*    < > = values in this interval not displayed.         Lab 12/14/23  0606   TOTAL PROTEIN 6.4   ALBUMIN 3.1*   GLOBULIN 3.3   ALT (SGPT) 22   AST (SGOT) 25   BILIRUBIN 0.8   ALK PHOS 59         Lab 12/20/23  0342 12/19/23  0341 12/18/23  0523 12/17/23  0342 12/16/23  1555 12/15/23  1214 12/14/23  0606   PROBNP  --   --   --   --   --   --  497.1   PROTIME 37.8* 29.0* 24.4* 22.0* 20.5*   < > 21.7*   INR 3.76* 2.70* 2.16* 1.89* 1.73*   < > 1.86*    < > = values in this interval not displayed.                 Brief Urine Lab Results  (Last result in the past 365 days)         Color   Clarity   Blood   Leuk Est   Nitrite   Protein   CREAT   Urine HCG        12/17/23 1635             45.8               Microbiology Results (last 10 days)       Procedure Component Value - Date/Time    COVID PRE-OP / PRE-PROCEDURE SCREENING ORDER (NO ISOLATION) - Swab, Nasal Cavity [[id number]]  (Normal) Collected: 12/14/23 0619    Lab Status: Final result Specimen: Swab from Nasal Cavity Updated: 12/14/23 0644    Narrative:      The following orders were created for panel order COVID PRE-OP / PRE-PROCEDURE SCREENING ORDER (NO ISOLATION) - Swab, Nasal Cavity.  Procedure                               Abnormality         Status                     ---------                               -----------         ------                     COVID-19 and FLU A/B PCR...[[id number]]  Normal              Final result                 Please view results for these tests on the individual orders.    COVID-19 and FLU A/B PCR, 1 HR TAT - Swab, Nasopharynx [[id number]]  (Normal) Collected: 12/14/23 0619    Lab Status: Final result Specimen: Swab from Nasopharynx Updated: 12/14/23 0644     COVID19 Not Detected     Influenza A PCR Not Detected     Influenza B PCR Not Detected    Narrative:      Fact sheet for providers: https://www.fda.gov/media/714065/download    Fact sheet for patients: https://www.fda.gov/media/120592/download    Test performed by PCR.            Hospital Course:   The patient is an unfortunate 68 y.o. woman with multiple comorbidities including coronary artery disease, congestive heart failure, chronic atrial fibrillation, essential hypertension, Fabry's disease, and type 2 diabetes mellitus.  She presented with complaints of worsening generalized weakness and poor appetite with nausea, abdominal pain of several months duration.  She was recently diagnosed with hyponatremia and no response to dose salt tablets.  She was admitted and evaluated for above problems.  She was started on fluid restriction with some  "improvement in hyponatremia.     As part of the evaluation of her problems, she had a CT scan of the abdomen and pelvis on 12/17/2023. 6.3 x 5.4 x 4.9 cm infiltrative pancreatic mass involving the  pancreatic neck, body, and tail. Findings are in keeping with primary pancreatic adenocarcinoma. There is extensive vascular encasement.   Large volume ascites is present. There is also some induration of the left anterior omentum/peritoneum. Differential diagnosis includes malignant ascites and peritoneal carcinomatosis. Recommend correlation with diagnostic paracentesis.  15 mm hypodense liver lesion, concerning for metastatic disease, although incompletely characterized on the single phase CT. If clinically indicated, MRI abdomen could be performed for further evaluation (after paracentesis as large volume ascites would degrade image quality). Four-chamber cardiomegaly and diffuse edematous state. While large volume ascites and inflammatory change in the peritoneum/mesentery is concerning for metastatic disease, these findings could also simply be related to global edematous state in the setting of CHF. As mentioned above, diagnostic paracentesis may be helpful to differentiate.    Patient was reviewed by oncologist and she was counseled that her lesion was likely pancreatic cancer with metastasis and she had poor physical and performance status and was not recommended for any chemotherapy or radiation therapy.  Patient was counseled and agreed to be discharged home with hospice.  Patient's family was aware of her decision.  She was discharged in stable condition home with hospice.    Physical Exam on Discharge:  /64 (BP [facility]: Left arm, Patient Position: Lying)   Pulse 80   Temp 97.4 °F (36.3 °C) (Oral)   Resp 18   Ht 165.1 cm (65\")   Wt 86.2 kg (190 lb)   SpO2 95%   BMI 31.62 kg/m²   Physical Exam  General: No acute distress  HEENT: normocephalic, atraumatic  Neck: Supple  CV: RRR  Lung: Clear to " auscultation bilaterally.  No wheeze, rales, or rhonchi noted.  Abdominal exam: Positive bowel sounds, tenderness in periumbilical region.  Old ventral hernia present.  Extremity: No edema noted  Neurological exam: AAO x3. Cranial nerve II to XII grossly intact  Skin exam: Dry and warm  Psychiatric exam: Calm, cooperative, and normal affect     Condition on Discharge: None    Discharge Disposition:  Hospice/Home    Discharge Medications:     Discharge Medications        New Medications        Instructions Start Date   LORazepam 1 MG tablet  Commonly known as: Ativan   1 mg, Oral, Every 8 Hours PRN      naloxone 4 MG/0.1ML nasal spray  Commonly known as: NARCAN   Call 911. Don't prime. [place] in 1 nostril for overdose. Repeat in 2-3 minutes in other nostril if no or minimal breathing/responsiveness.      ondansetron 4 MG tablet  Commonly known as: Zofran   4 mg, Oral, Every 8 Hours PRN      oxyCODONE 10 MG 12 hr tablet  Commonly known as: oxyCONTIN   10 mg, Oral, Every 12 Hours Scheduled      oxyCODONE 5 MG immediate release tablet  Commonly known as: Roxicodone   5 mg, Oral, Every 6 Hours PRN      sennosides-docusate 8.6-50 MG per tablet  Commonly known as: PERICOLACE   2 tablets, Oral, Nightly      tamsulosin 0.4 MG capsule 24 hr capsule  Commonly known as: FLOMAX   0.4 mg, Oral, Daily   Start Date: December 21, 2023            Changes to Medications        Instructions Start Date   glipizide 10 MG tablet  Commonly known as: GLUCOTROL  What changed: Another medication with the same name was removed. Continue taking this medication, and follow the directions you see here.   10 mg, Oral, Daily             Continue These Medications        Instructions Start Date   acetaminophen 500 MG tablet  Commonly known as: TYLENOL   500 mg, Oral, Every 6 Hours PRN      aspirin 81 MG chewable tablet   81 mg, Oral, Daily      atorvastatin 80 MG tablet  Commonly known as: LIPITOR   80 mg, Oral, Nightly      calcium carbonate 500 MG  chewable tablet  Commonly known as: TUMS   2 tablets, Oral, 4 Times Daily PRN      carvedilol 12.5 MG tablet  Commonly known as: COREG   12.5 mg, Oral, 2 Times Daily With Meals      cholecalciferol 25 MCG (1000 UT) tablet  Commonly known as: VITAMIN D3   1,000 Units, Oral, 2 Times Daily      collagenase 250 UNIT/GM ointment   1 application , Topical, Daily, USE AS DIRECTED ONCE DAILY FOR 14 DAYS      digoxin 125 MCG tablet  Commonly known as: LANOXIN   125 mcg, Oral, Daily Digoxin      Dry Eye Relief Drops 0.2-0.2-1 % solution ophthalmic solution  Generic drug: Glycerin-Hypromellose-   1 drop, Both Eyes, Every 1 Hour PRN      Dulcolax 10 MG suppository  Generic drug: bisacodyl   10 mg, Rectal, Daily PRN      fluticasone 50 MCG/ACT nasal spray  Commonly known as: FLONASE   2 sprays, Nasal, Daily PRN      furosemide 40 MG tablet  Commonly known as: LASIX   40 mg, Oral, Daily      levothyroxine 125 MCG tablet  Commonly known as: SYNTHROID, LEVOTHROID   125 mcg, Oral, Daily      linagliptin 5 MG tablet tablet  Commonly known as: TRADJENTA   5 mg, Oral, Daily      loratadine 10 MG tablet  Commonly known as: CLARITIN   1 capsule, Oral, Daily      metFORMIN 1000 MG tablet  Commonly known as: GLUCOPHAGE   1,000 mg, Oral, 2 Times Daily With Meals      multivitamin with minerals tablet tablet   1 tablet, Oral, Daily      oxymetazoline 0.05 % nasal spray  Commonly known as: AFRIN   2 sprays, Nasal, Daily      sacubitril-valsartan 24-26 MG tablet  Commonly known as: ENTRESTO   1 tablet, Oral, 2 Times Daily      spironolactone 25 MG tablet  Commonly known as: ALDACTONE   25 mg, Oral, Daily      vitamin B-12 500 MCG tablet  Commonly known as: CYANOCOBALAMIN   500 mcg, Oral, Daily      warfarin 6 MG tablet  Commonly known as: COUMADIN   6 mg, Oral, 4 Times Weekly, MONDAY WEDNESDAY FRIDAY SUNDAY      warfarin 6 MG tablet  Commonly known as: COUMADIN   3 mg, Oral, 3 Times Weekly, TUES THURS SAT               This patient does  not have current or prior documentation of an left ventricular ejection fraction (LVEF) of less than or equal to 40%.    The patient was prescribed or already taking an ACE/ARB.    The patient was prescribed already taking bisoprolol, carvedilol, or sustained release metoprolol succinate.     Discharge instructions: You are being discharged home with hospice    Discharge diet: Regular diet    Activity at Discharge: Self-limited activity    Follow-up Appointments:   No future appointments.    Test Results Pending at Discharge: None    Electronically signed by Meenu Tubbs MD, 12/20/23, 14:33 CST.    Time: 39 minutes of time were spent evaluating patient and planning discharge.

## 2023-12-20 NOTE — PLAN OF CARE
Goal Outcome Evaluation:  Plan of Care Reviewed With: patient, family, son        Progress: no change  Outcome Evaluation: Dickson removed per protocol. Patient had not voided after 6 hours, scan showed over 800mL, straight cath performed and only 100mL returned. Scan repeated with different scanner showed greater than 900mL, second cath with 18F performed and only around 10mL removed. No bladder distention noted, patient does not feel need to void, was determined with charge nurse that scanner is misreading patients anatomy and severely over estimating urine contents.

## 2023-12-20 NOTE — CASE MANAGEMENT/SOCIAL WORK
Continued Stay Note  GIANNA Viveros     Patient Name: Anne-Marie Foreman  MRN: [id number]  Today's Date: 12/20/2023    Admit Date: 12/14/2023    Plan: Home with [facility]   Discharge Plan       Row Name 12/20/23 0920       Plan    Plan Home with [facility]    Patient/Family in Agreement with Plan yes    Final Note Pt to dc home today with [facility].  Phone:  [phone] Fax: [phone].                   Discharge Codes    No documentation.                       CRAIG NickersonW

## 2023-12-21 NOTE — THERAPY DISCHARGE NOTE
Acute Care - Occupational Therapy Discharge Summary  [facility]     Patient Name: Anne-Marie Foreman  : 1955  MRN: [id number]    Today's Date: 2023                 Admit Date: 2023        OT Recommendation and Plan    Visit Dx:    ICD-10-CM ICD-9-CM   1. Generalized weakness  R53.1 780.79   2. Hyponatremia  E87.1 276.1   3. Acute UTI (urinary tract infection)  N39.0 599.0   4. Hypomagnesemia  E83.42 275.2   5. Abnormal chest x-ray  R93.89 793.2   6. Impaired mobility [Z74.09]  Z74.09 799.89   7. Cancer associated pain  G89.3 338.3   8. Pancreatic mass  K86.89 577.8                OT Rehab Goals       Row Name 23 0700             Transfer Goal 1 (OT)    Activity/Assistive Device (Transfer Goal 1, OT) shower chair;commode, bedside without drop arms  -CS      [place] Level/Cues Needed (Transfer Goal 1, OT) contact guard required  -CS      Time Frame (Transfer Goal 1, OT) long term goal (LTG);by discharge  -CS      Progress/Outcome (Transfer Goal 1, OT) goal not met  -CS         Dressing Goal 1 (OT)    Activity/Device (Dressing Goal 1, OT) lower body dressing  -CS      [place]/Cues Needed (Dressing Goal 1, OT) moderate assist (50-74% patient effort)  -CS      Time Frame (Dressing Goal 1, OT) long term goal (LTG);by discharge  -CS      Progress/Outcome (Dressing Goal 1, OT) goal not met  -CS         Toileting Goal 1 (OT)    Activity/Device (Toileting Goal 1, OT) toileting skills, all  -CS      [place] Level/Cues Needed (Toileting Goal 1, OT) minimum assist (75% or more patient effort)  -CS      Time Frame (Toileting Goal 1, OT) long term goal (LTG);by discharge  -CS      Progress/Outcome (Toileting Goal 1, OT) goal not met  -CS                User Key  (r) = Recorded By, (t) = Taken By, (c) = Cosigned By      Initials Name Provider Type Discipline    CS Kim Abdi, OTR/L, CNT Occupational Therapist OT                     Outcome Measures       Row Name 23 1200             How  much help from another person do you currently need...    Turning from your back to your side while in flat bed without using bedrails? 3  -DEENA      Moving from lying on back to sitting on the side of a flat bed without bedrails? 2  -DEENA      Moving to and from a bed to a chair (including a wheelchair)? 2  -DEENA      Standing up from a chair using your arms (e.g., wheelchair, bedside chair)? 2  -DEENA      Climbing 3-5 steps with a railing? 1  -DEENA      To walk in hospital room? 1  -DEENA      AM-PAC 6 Clicks Score (PT) 11  -DEENA      Highest Level of Mobility Goal 4 --> Transfer to chair/commode  -DEENA                User Key  (r) = Recorded By, (t) = Taken By, (c) = Cosigned By      Initials Name Provider Type    Wendy Chairez, PTA Physical Therapist Assistant                    Timed Therapy Charges  Total Units: 3      Suggested Charges  Total Units: 3      Procedure Name Documented Minutes Units Code    HC OT SELF CARE/MGMT/TRAIN EA 15 MIN 38 3   45032 (CPT®)                 Documented Minutes  Total Minutes: 38      Therapy Provided Minutes    36325 - OT Self Care/Mgmt Minutes 38                        OT Discharge Summary  Anticipated Discharge Disposition (OT): skilled nursing facility  Reason for Discharge: Discharge from facility  Outcomes Achieved: Refer to plan of care for updates on goals achieved  Discharge Destination: Home with assist, other (comment) (home with hospice)      MARK Hinds/L, DIONTE  12/21/2023

## (undated) DEVICE — CATH IV ANGIO FEP 12G 3IN LTBLU 10PK

## (undated) DEVICE — GLV SURG BIOGEL M LTX PF 7

## (undated) DEVICE — TOTAL TRAY, 16FR 10ML SIL FOLEY, URN: Brand: MEDLINE

## (undated) DEVICE — SHEET,DRAPE,53X77,STERILE: Brand: MEDLINE

## (undated) DEVICE — GLV SURG BIOGEL LTX PF 6 1/2

## (undated) DEVICE — 3.0MM PRECISION NEURO (MATCH HEAD)

## (undated) DEVICE — CONN FLX BREATHE CIRCT

## (undated) DEVICE — APPL CHLORAPREP HI/LITE 26ML ORNG

## (undated) DEVICE — ANTIBACTERIAL UNDYED BRAIDED (POLYGLACTIN 910), SYNTHETIC ABSORBABLE SUTURE: Brand: COATED VICRYL

## (undated) DEVICE — RESERVOIR,SUCTION,100CC,SILICONE: Brand: MEDLINE

## (undated) DEVICE — PK TURNOVER RM ADV

## (undated) DEVICE — GLV SURG GRN DERMASSURE LF PF 7.5

## (undated) DEVICE — KNIF BAYO METRX DISECT

## (undated) DEVICE — CODMAN® SURGICAL PATTIES 1/4" X 1/4" (0.64CM X 0.64CM): Brand: CODMAN®

## (undated) DEVICE — SPK10277 JACKSON/PRO-AXIS KIT: Brand: SPK10277 JACKSON/PRO-AXIS KIT

## (undated) DEVICE — DRN PENRS 5/8X18IN LTX

## (undated) DEVICE — COUNT NDL FOAM STRIP W/MAG 60CT

## (undated) DEVICE — DRN JP FLT NO TROC SIL FUL/PERF 7MM

## (undated) DEVICE — ELECTRD BLD EDGE/INSUL1P 2.4X5.1MM STRL

## (undated) DEVICE — SKIN AFFIX SURG ADHESIVE 72/CS 0.55ML: Brand: MEDLINE

## (undated) DEVICE — CLTH CLENS READYCLEANSE PERI CARE PK/5

## (undated) DEVICE — SUT MNCRYL 4/0 PS2 27IN UD MCP426H

## (undated) DEVICE — SPHR MARKR STEALTH STATION

## (undated) DEVICE — PK SPINE POST 30

## (undated) DEVICE — ELECTRD BLD EXT EDGE/INSUL 6IN

## (undated) DEVICE — NEEDLE, QUINCKE 25GX3.5": Brand: MEDLINE

## (undated) DEVICE — TOOL 15TA23L LEGEND 15CM 2.3MM TAPER L: Brand: MIDAS REX ™

## (undated) DEVICE — SUT VIC 0 MO4 CR8 18IN VCP701D

## (undated) DEVICE — VESSEL LOOPS X-RAY DETECTABLE: Brand: DEROYAL

## (undated) DEVICE — SUT VIC 0 UR6 27IN VCP603H

## (undated) DEVICE — GRFT BONE MAGNIFUSE PC 1X5CM: Type: IMPLANTABLE DEVICE | Site: SPINE LUMBAR | Status: NON-FUNCTIONAL

## (undated) DEVICE — LP VESL MAXI 2.5X1MM RED 2PK